# Patient Record
Sex: FEMALE | Race: WHITE | NOT HISPANIC OR LATINO | Employment: FULL TIME | URBAN - NONMETROPOLITAN AREA
[De-identification: names, ages, dates, MRNs, and addresses within clinical notes are randomized per-mention and may not be internally consistent; named-entity substitution may affect disease eponyms.]

---

## 2017-01-18 ENCOUNTER — OFFICE VISIT (OUTPATIENT)
Dept: FAMILY MEDICINE CLINIC | Facility: CLINIC | Age: 57
End: 2017-01-18

## 2017-01-18 DIAGNOSIS — I25.810 CORONARY ARTERY DISEASE INVOLVING CORONARY BYPASS GRAFT OF NATIVE HEART WITHOUT ANGINA PECTORIS: ICD-10-CM

## 2017-01-18 DIAGNOSIS — E11.8 TYPE 2 DIABETES MELLITUS WITH COMPLICATION, WITHOUT LONG-TERM CURRENT USE OF INSULIN (HCC): Primary | Chronic | ICD-10-CM

## 2017-01-18 DIAGNOSIS — R74.8 ELEVATED LIVER ENZYMES: ICD-10-CM

## 2017-01-18 DIAGNOSIS — K20.90 ESOPHAGITIS: ICD-10-CM

## 2017-01-18 DIAGNOSIS — I10 ESSENTIAL HYPERTENSION: Chronic | ICD-10-CM

## 2017-01-18 DIAGNOSIS — M19.90 OSTEOARTHRITIS, UNSPECIFIED OSTEOARTHRITIS TYPE, UNSPECIFIED SITE: Chronic | ICD-10-CM

## 2017-01-18 DIAGNOSIS — E78.5 DYSLIPIDEMIA: ICD-10-CM

## 2017-01-18 PROCEDURE — 99203 OFFICE O/P NEW LOW 30 MIN: CPT | Performed by: NURSE PRACTITIONER

## 2017-01-18 PROCEDURE — 90686 IIV4 VACC NO PRSV 0.5 ML IM: CPT | Performed by: NURSE PRACTITIONER

## 2017-01-18 PROCEDURE — 90471 IMMUNIZATION ADMIN: CPT | Performed by: NURSE PRACTITIONER

## 2017-01-18 RX ORDER — BUPROPION HYDROCHLORIDE 100 MG/1
100 TABLET ORAL DAILY
Qty: 30 TABLET | Refills: 0 | COMMUNITY
Start: 2017-01-18 | End: 2017-02-17

## 2017-01-18 RX ORDER — SUCRALFATE 1 G/1
1 TABLET ORAL 4 TIMES DAILY
COMMUNITY
End: 2017-05-24

## 2017-01-18 RX ORDER — VENLAFAXINE HYDROCHLORIDE 37.5 MG/1
37.5 CAPSULE, EXTENDED RELEASE ORAL DAILY
Qty: 30 CAPSULE | Refills: 0 | Status: SHIPPED | OUTPATIENT
Start: 2017-01-18 | End: 2017-04-28 | Stop reason: SDUPTHER

## 2017-01-18 RX ORDER — DULOXETIN HYDROCHLORIDE 30 MG/1
30 CAPSULE, DELAYED RELEASE ORAL DAILY
COMMUNITY
End: 2017-03-21

## 2017-01-18 RX ORDER — BUPROPION HYDROCHLORIDE 100 MG/1
100 TABLET ORAL 2 TIMES DAILY
COMMUNITY
End: 2017-01-18 | Stop reason: SDUPTHER

## 2017-01-18 NOTE — MR AVS SNAPSHOT
Monisha Gardner   1/18/2017 2:00 PM   Office Visit    Dept Phone:  [phone]   Encounter #:  [id number]    Provider:  TRUONG Torres   Department:  [facility]                Your Full Care Plan              Today's Medication Changes          These changes are accurate as of: 1/18/17  3:04 PM.  If you have any questions, ask your nurse or doctor.               New Medication(s)Ordered:     venlafaxine XR 37.5 MG 24 hr capsule   Commonly known as:  EFFEXOR XR   Take 1 capsule by mouth Daily for 30 days.   Replaces:  venlafaxine 75 MG tablet   Started by:  TRUONG Torres         Medication(s)that have changed:     buPROPion 100 MG tablet   Commonly known as:  WELLBUTRIN   Take 1 tablet by mouth Daily for 30 doses.   What changed:  when to take this   Changed by:  TRUONG Torres         Stop taking medication(s)listed here:     acetaminophen-codeine 300-30 MG per tablet   Commonly known as:  TYLENOL #3   Stopped by:  TRUONG Torres           JANUMET  MG per tablet   Generic drug:  sitaGLIPtin-metFORMIN   Stopped by:  TRUONG Torres           meloxicam 7.5 MG tablet   Commonly known as:  MOBIC   Stopped by:  TRUONG Torres           venlafaxine 75 MG tablet   Commonly known as:  EFFEXOR   Replaced by:  venlafaxine XR 37.5 MG 24 hr capsule   Stopped by:  TRUONG Torres                Where to Get Your Medications      [facility] [address] [facility] [address] [place] [address] [phone] [facility] [phone] [address] [address]     Phone:  [phone]     venlafaxine XR 37.5 MG 24 hr capsule         Information about where to get these medications is not yet available     ! Ask your nurse or doctor about these medications     buPROPion 100 MG tablet                  Your Updated Medication List          This list  is accurate as of: 1/18/17  3:04 PM.  Always use your most recent med list.                * aspirin 325 MG tablet       * aspirin  MG tablet   take 1 tablet by mouth once daily       buPROPion 100 MG tablet   Commonly known as:  WELLBUTRIN   Take 1 tablet by mouth Daily for 30 doses.       clopidogrel 75 MG tablet   Commonly known as:  PLAVIX   Take 1 tablet by mouth daily.       DULoxetine 30 MG capsule   Commonly known as:  CYMBALTA       esomeprazole 40 MG capsule   Commonly known as:  nexIUM       estradiol 0.075 MG/24HR patch   Commonly known as:  CLIMARA       FARXIGA 5 MG tablet tablet   Generic drug:  dapagliflozin       losartan 50 MG tablet   Commonly known as:  COZAAR   Take 1 tablet by mouth 2 (Two) Times a Day.       metoprolol tartrate 50 MG tablet   Commonly known as:  LOPRESSOR   Take 1 tablet by mouth 2 (two) times a day.       nitroglycerin 0.4 MG SL tablet   Commonly known as:  NITROSTAT   Place 1 tablet under the tongue Every 5 (Five) Minutes As Needed for chest pain.       pitavastatin calcium 2 MG tablet tablet   Commonly known as:  LIVALO       sucralfate 1 G tablet   Commonly known as:  CARAFATE       venlafaxine XR 37.5 MG 24 hr capsule   Commonly known as:  EFFEXOR XR   Take 1 capsule by mouth Daily for 30 days.       VICTOZA 18 MG/3ML solution pen-injector   Generic drug:  Liraglutide       * Notice:  This list has 2 medication(s) that are the same as other medications prescribed for you. Read the directions carefully, and ask your doctor or other care provider to review them with you.            We Performed the Following     Flu Vaccine Greater Than or Equal To 2yo Preservative Free IM       Instructions    Type 2 Diabetes Mellitus, Adult  Type 2 diabetes mellitus, often simply referred to as type 2 diabetes, is a long-lasting (chronic) disease. In type 2 diabetes, the pancreas does not make enough insulin (a hormone), the cells are less responsive to the insulin that is made  (insulin resistance), or both. Normally, insulin moves sugars from food into the tissue cells. The tissue cells use the sugars for energy. The lack of insulin or the lack of normal response to insulin causes excess sugars to build up in the blood instead of going into the tissue cells. As a result, high blood sugar (hyperglycemia) develops. The effect of high sugar (glucose) levels can cause many complications.   Type 2 diabetes was also previously called adult-onset diabetes, but it can occur at any age.     RISK FACTORS   A person is predisposed to developing type 2 diabetes if someone in the family has the disease and also has one or more of the following primary risk factors:  · Weight gain, or being overweight or obese.  · An inactive lifestyle.  · A history of consistently eating high-calorie foods.  Maintaining a normal weight and regular physical activity can reduce the chance of developing type 2 diabetes.  SYMPTOMS   A person with type 2 diabetes may not show symptoms initially. The symptoms of type 2 diabetes appear slowly. The symptoms include:  · Increased thirst (polydipsia).  · Increased urination (polyuria).  · Increased urination during the night (nocturia).  · Sudden or unexplained weight changes.  · Frequent, recurring infections.  · Tiredness (fatigue).  · Weakness.  · Vision changes, such as blurred vision.  · Fruity smell to your breath.  · Abdominal pain.  · Nausea or vomiting.  · Cuts or bruises which are slow to heal.  · Tingling or numbness in the hands or feet.  · An open skin wound (ulcer).  DIAGNOSIS  Type 2 diabetes is frequently not diagnosed until complications of diabetes are present. Type 2 diabetes is diagnosed when symptoms or complications are present and when blood glucose levels are increased. Your blood glucose level may be checked by one or more of the following blood tests:  · A fasting blood glucose test. You will not be allowed to eat for at least 8 hours before a blood  sample is taken.  · A random blood glucose test. Your blood glucose is checked at any time of the day regardless of when you ate.  · A hemoglobin A1c blood glucose test. A hemoglobin A1c test provides information about blood glucose control over the previous 3 months.  · An oral glucose tolerance test (OGTT). Your blood glucose is measured after you have not eaten (fasted) for 2 hours and then after you drink a glucose-containing beverage.  TREATMENT   · You may need to take insulin or diabetes medicine daily to keep blood glucose levels in the desired range.  · If you use insulin, you may need to adjust the dosage depending on the carbohydrates that you eat with each meal or snack.  · Lifestyle changes are recommended as part of your treatment. These may include:    Following an individualized diet plan developed by a nutritionist or dietitian.    Exercising daily.  Your health care providers will set individualized treatment goals for you based on your age, your medicines, how long you have had diabetes, and any other medical conditions you have. Generally, the goal of treatment is to maintain the following blood glucose levels:  · Before meals (preprandial):  mg/dL.  · After meals (postprandial): below 180 mg/dL.  · A1c: less than 6.5-7%.  HOME CARE INSTRUCTIONS   · Have your hemoglobin A1c level checked twice a year.  · Perform daily blood glucose monitoring as directed by your health care provider.  · Monitor urine ketones when you are ill and as directed by your health care provider.  · Take your diabetes medicine or insulin as directed by your health care provider to maintain your blood glucose levels in the desired range.    Never run out of diabetes medicine or insulin. It is needed every day.    If you are using insulin, you may need to adjust the amount of insulin given based on your intake of carbohydrates. Carbohydrates can raise blood glucose levels but need to be included in your diet.  Carbohydrates provide vitamins, minerals, and fiber which are an essential part of a healthy diet. Carbohydrates are found in fruits, vegetables, whole grains, dairy products, legumes, and foods containing added sugars.  · Eat healthy foods. You should make an appointment to see a registered dietitian to help you create an eating plan that is right for you.  · Lose weight if you are overweight.  · Carry a medical alert card or wear your medical alert jewelry.  · Carry a 15-gram carbohydrate snack with you at all times to treat low blood glucose (hypoglycemia). Some examples of 15-gram carbohydrate snacks include:    Glucose tablets, 3 or 4.    Glucose gel, 15-gram tube.    Raisins, 2 tablespoons (24 grams).    Jelly beans, 6.    Animal crackers, 8.    Regular pop, 4 ounces (120 mL).    Gummy treats, 9.  · Recognize hypoglycemia. Hypoglycemia occurs with blood glucose levels of 70 mg/dL and below. The risk for hypoglycemia increases when fasting or skipping meals, during or after intense exercise, and during sleep. Hypoglycemia symptoms can include:    Tremors or shakes.    Decreased ability to concentrate.    Sweating.    Increased heart rate.    Headache.    Dry mouth.    Hunger.    Irritability.    Anxiety.    Restless sleep.    Altered speech or coordination.    Confusion.  · Treat hypoglycemia promptly. If you are alert and able to safely swallow, follow the 15:15 rule:    Take 15-20 grams of rapid-acting glucose or carbohydrate. Rapid-acting options include glucose gel, glucose tablets, or 4 ounces (120 mL) of fruit juice, regular soda, or low-fat milk.    Check your blood glucose level 15 minutes after taking the glucose.    Take 15-20 grams more of glucose if the repeat blood glucose level is still 70 mg/dL or below.    Eat a meal or snack within 1 hour once blood glucose levels return to normal.  · Be alert to feeling very thirsty and urinating more frequently than usual, which are early signs of  hyperglycemia. An early awareness of hyperglycemia allows for prompt treatment. Treat hyperglycemia as directed by your health care provider.  · Engage in at least 150 minutes of moderate-intensity physical activity a week, spread over at least 3 days of the week or as directed by your health care provider. In addition, you should engage in resistance exercise at least 2 times a week or as directed by your health care provider. Try to spend no more than 90 minutes at one time inactive.  · Adjust your medicine and food intake as needed if you start a new exercise or sport.  · Follow your sick-day plan anytime you are unable to eat or drink as usual.  · Do not use any tobacco products including cigarettes, chewing tobacco, or electronic cigarettes. If you need help quitting, ask your health care provider.  · Limit alcohol intake to no more than 1 drink per day for nonpregnant women and 2 drinks per day for men. You should drink alcohol only when you are also eating food. Talk with your health care provider whether alcohol is safe for you. Tell your health care provider if you drink alcohol several times a week.  · Keep all follow-up visits as directed by your health care provider. This is important.  · Schedule an eye exam soon after the diagnosis of type 2 diabetes and then annually.  · Perform daily skin and foot care. Examine your skin and feet daily for cuts, bruises, redness, nail problems, bleeding, blisters, or sores. A foot exam by a health care provider should be done annually.  · Brush your teeth and gums at least twice a day and floss at least once a day. Follow up with your dentist regularly.  · Share your diabetes management plan with your workplace or school.  · Keep your immunizations up to date. It is recommended that you receive a flu (influenza) vaccine every year. It is also recommended that you receive a pneumonia (pneumococcal) vaccine. If you are 65 years of age or older and have never received a  pneumonia vaccine, this vaccine may be given as a series of two separate shots. Ask your health care provider which additional vaccines may be recommended.  · Learn to manage stress.  · Obtain ongoing diabetes education and support as needed.  · Participate in or seek rehabilitation as needed to maintain or improve independence and quality of life. Request a physical or occupational therapy referral if you are having foot or hand numbness, or difficulties with grooming, dressing, eating, or physical activity.  SEEK MEDICAL CARE IF:   · You are unable to eat food or drink fluids for more than 6 hours.  · You have nausea and vomiting for more than 6 hours.  · Your blood glucose level is over 240 mg/dL.  · There is a change in mental status.  · You develop an additional serious illness.  · You have diarrhea for more than 6 hours.  · You have been sick or have had a fever for a couple of days and are not getting better.  · You have pain during any physical activity.    SEEK IMMEDIATE MEDICAL CARE IF:  · You have difficulty breathing.  · You have moderate to large ketone levels.     This information is not intended to replace advice given to you by your health care provider. Make sure you discuss any questions you have with your health care provider.     Document Released: 12/18/2006 Document Revised: 09/07/2016 Document Reviewed: 07/16/2013  [employer] Interactive Patient Education ©2016 [employer]  Food Choices for Gastroesophageal Reflux Disease, Adult  When you have gastroesophageal reflux disease (GERD), the foods you eat and your eating habits are very important. Choosing the right foods can help ease your discomfort.   WHAT GUIDELINES DO I NEED TO FOLLOW?   · Choose fruits, vegetables, whole grains, and low-fat dairy products.    · Choose low-fat meat, fish, and poultry.  · Limit fats such as oils, salad dressings, butter, nuts, and avocado.    · Keep a food diary. This helps you identify foods that cause  symptoms.    · Avoid foods that cause symptoms. These may be different for everyone.    · Eat small meals often instead of 3 large meals a day.    · Eat your meals slowly, in a place where you are relaxed.    · Limit fried foods.    · Cook foods using methods other than frying.    · Avoid drinking alcohol.    · Avoid drinking large amounts of liquids with your meals.    · Avoid bending over or lying down until 2-3 hours after eating.    WHAT FOODS ARE NOT RECOMMENDED?   These are some foods and drinks that may make your symptoms worse:  Vegetables  Tomatoes. Tomato juice. Tomato and spaghetti sauce. Chili peppers. Onion and garlic. Horseradish.  Fruits  Oranges, grapefruit, and lemon (fruit and juice).  Meats  High-fat meats, fish, and poultry. This includes hot dogs, ribs, ham, sausage, salami, and villar.  Dairy  Whole milk and chocolate milk. Sour cream. Cream. Butter. Ice cream. Cream cheese.   Drinks  Coffee and tea. Bubbly (carbonated) drinks or energy drinks.  Condiments  Hot sauce. Barbecue sauce.   Sweets/Desserts  Chocolate and cocoa. Donuts. Peppermint and spearmint.  Fats and Oils  High-fat foods. This includes French fries and potato chips.  Other  Vinegar. Strong spices. This includes black pepper, white pepper, red pepper, cayenne, tyson powder, cloves, ginger, and chili powder.  The items listed above may not be a complete list of foods and drinks to avoid. Contact your dietitian for more information.     This information is not intended to replace advice given to you by your health care provider. Make sure you discuss any questions you have with your health care provider.     Document Released: 06/18/2013 Document Revised: 01/08/2016 Document Reviewed: 10/22/2014  "[employer] Interactive Patient Education ©2016 "[employer] Inc.  Gastritis, Adult  Gastritis is soreness and puffiness (inflammation) of the lining of the stomach. If you do not get help, gastritis can cause bleeding and sores (ulcers) in the  stomach.  HOME CARE   · Only take medicine as told by your doctor.  · If you were given antibiotic medicines, take them as told. Finish the medicines even if you start to feel better.  · Drink enough fluids to keep your pee (urine) clear or pale yellow.  · Avoid foods and drinks that make your problems worse. Foods you may want to avoid include:    Caffeine or alcohol.    Chocolate.    Mint.    Garlic and onions.    Spicy foods.    Citrus fruits, including oranges, letty, or limes.    Food containing tomatoes, including sauce, chili, salsa, and pizza.    Fried and fatty foods.  · Eat small meals throughout the day instead of large meals.  GET HELP RIGHT AWAY IF:   · You have black or dark red poop (stools).  · You throw up (vomit) blood. It may look like coffee grounds.  · You cannot keep fluids down.  · Your belly (abdominal) pain gets worse.  · You have a fever.  · You do not feel better after 1 week.  · You have any other questions or concerns.  MAKE SURE YOU:   · Understand these instructions.  · Will watch your condition.  · Will get help right away if you are not doing well or get worse.     This information is not intended to replace advice given to you by your health care provider. Make sure you discuss any questions you have with your health care provider.     Document Released: 06/05/2009 Document Revised: 03/11/2013 Document Reviewed: 01/30/2013  [employer] Interactive Patient Education ©2016 [employer]       Patient Instructions History      Upcoming Appointments     Visit Type Date Time Department    OFFICE VISIT 1/18/2017  2:00 PM [facility]    LAB 1/23/2017  8:50 AM [facility]    OFFICE VISIT 2/1/2017  5:00 PM [facility]      MyChart Signup     Our records indicate that you have declined [employer] signup. If you would like to sign up for [employer], please email [email or web address] or call [phone] to obtain an activation code.             Other Info  "from Your Visit           Your Appointments     Jan 23, 2017  8:50 AM EST   Lab with [facility] (--)    [address] [facility] [address]   [phone]            Feb 01, 2017  5:00 PM EST   Office Visit with TRUONG Torres   [facility] (--)    [address] [place] [id number]   [phone]           Arrive 15 minutes prior to appointment.              Allergies     Zofran [Ondansetron Hcl]  Shortness Of Breath    Ciprofloxacin      Penicillins        Vital Signs     Blood Pressure Pulse Temperature Height Weight Oxygen Saturation    140/80 (BP Location: Left arm, Patient Position: Sitting, Cuff Size: Large Adult) 66 98.3 °F (36.8 °C) (Oral) 64\" (162.6 cm) 183 lb (83 kg) 98%    Body Mass Index Smoking Status                31.41 kg/m2 Never Smoker          Immunizations Administered     Name Date    Influenza (IM) Preservative Free         "

## 2017-01-18 NOTE — PATIENT INSTRUCTIONS
Type 2 Diabetes Mellitus, Adult  Type 2 diabetes mellitus, often simply referred to as type 2 diabetes, is a long-lasting (chronic) disease. In type 2 diabetes, the pancreas does not make enough insulin (a hormone), the cells are less responsive to the insulin that is made (insulin resistance), or both. Normally, insulin moves sugars from food into the tissue cells. The tissue cells use the sugars for energy. The lack of insulin or the lack of normal response to insulin causes excess sugars to build up in the blood instead of going into the tissue cells. As a result, high blood sugar (hyperglycemia) develops. The effect of high sugar (glucose) levels can cause many complications.   Type 2 diabetes was also previously called adult-onset diabetes, but it can occur at any age.     RISK FACTORS   A person is predisposed to developing type 2 diabetes if someone in the family has the disease and also has one or more of the following primary risk factors:  · Weight gain, or being overweight or obese.  · An inactive lifestyle.  · A history of consistently eating high-calorie foods.  Maintaining a normal weight and regular physical activity can reduce the chance of developing type 2 diabetes.  SYMPTOMS   A person with type 2 diabetes may not show symptoms initially. The symptoms of type 2 diabetes appear slowly. The symptoms include:  · Increased thirst (polydipsia).  · Increased urination (polyuria).  · Increased urination during the night (nocturia).  · Sudden or unexplained weight changes.  · Frequent, recurring infections.  · Tiredness (fatigue).  · Weakness.  · Vision changes, such as blurred vision.  · Fruity smell to your breath.  · Abdominal pain.  · Nausea or vomiting.  · Cuts or bruises which are slow to heal.  · Tingling or numbness in the hands or feet.  · An open skin wound (ulcer).  DIAGNOSIS  Type 2 diabetes is frequently not diagnosed until complications of diabetes are present. Type 2 diabetes is diagnosed  when symptoms or complications are present and when blood glucose levels are increased. Your blood glucose level may be checked by one or more of the following blood tests:  · A fasting blood glucose test. You will not be allowed to eat for at least 8 hours before a blood sample is taken.  · A random blood glucose test. Your blood glucose is checked at any time of the day regardless of when you ate.  · A hemoglobin A1c blood glucose test. A hemoglobin A1c test provides information about blood glucose control over the previous 3 months.  · An oral glucose tolerance test (OGTT). Your blood glucose is measured after you have not eaten (fasted) for 2 hours and then after you drink a glucose-containing beverage.  TREATMENT   · You may need to take insulin or diabetes medicine daily to keep blood glucose levels in the desired range.  · If you use insulin, you may need to adjust the dosage depending on the carbohydrates that you eat with each meal or snack.  · Lifestyle changes are recommended as part of your treatment. These may include:    Following an individualized diet plan developed by a nutritionist or dietitian.    Exercising daily.  Your health care providers will set individualized treatment goals for you based on your age, your medicines, how long you have had diabetes, and any other medical conditions you have. Generally, the goal of treatment is to maintain the following blood glucose levels:  · Before meals (preprandial):  mg/dL.  · After meals (postprandial): below 180 mg/dL.  · A1c: less than 6.5-7%.  HOME CARE INSTRUCTIONS   · Have your hemoglobin A1c level checked twice a year.  · Perform daily blood glucose monitoring as directed by your health care provider.  · Monitor urine ketones when you are ill and as directed by your health care provider.  · Take your diabetes medicine or insulin as directed by your health care provider to maintain your blood glucose levels in the desired range.    Never run  out of diabetes medicine or insulin. It is needed every day.    If you are using insulin, you may need to adjust the amount of insulin given based on your intake of carbohydrates. Carbohydrates can raise blood glucose levels but need to be included in your diet. Carbohydrates provide vitamins, minerals, and fiber which are an essential part of a healthy diet. Carbohydrates are found in fruits, vegetables, whole grains, dairy products, legumes, and foods containing added sugars.  · Eat healthy foods. You should make an appointment to see a registered dietitian to help you create an eating plan that is right for you.  · Lose weight if you are overweight.  · Carry a medical alert card or wear your medical alert jewelry.  · Carry a 15-gram carbohydrate snack with you at all times to treat low blood glucose (hypoglycemia). Some examples of 15-gram carbohydrate snacks include:    Glucose tablets, 3 or 4.    Glucose gel, 15-gram tube.    Raisins, 2 tablespoons (24 grams).    Jelly beans, 6.    Animal crackers, 8.    Regular pop, 4 ounces (120 mL).    Gummy treats, 9.  · Recognize hypoglycemia. Hypoglycemia occurs with blood glucose levels of 70 mg/dL and below. The risk for hypoglycemia increases when fasting or skipping meals, during or after intense exercise, and during sleep. Hypoglycemia symptoms can include:    Tremors or shakes.    Decreased ability to concentrate.    Sweating.    Increased heart rate.    Headache.    Dry mouth.    Hunger.    Irritability.    Anxiety.    Restless sleep.    Altered speech or coordination.    Confusion.  · Treat hypoglycemia promptly. If you are alert and able to safely swallow, follow the 15:15 rule:    Take 15-20 grams of rapid-acting glucose or carbohydrate. Rapid-acting options include glucose gel, glucose tablets, or 4 ounces (120 mL) of fruit juice, regular soda, or low-fat milk.    Check your blood glucose level 15 minutes after taking the glucose.    Take 15-20 grams more of  glucose if the repeat blood glucose level is still 70 mg/dL or below.    Eat a meal or snack within 1 hour once blood glucose levels return to normal.  · Be alert to feeling very thirsty and urinating more frequently than usual, which are early signs of hyperglycemia. An early awareness of hyperglycemia allows for prompt treatment. Treat hyperglycemia as directed by your health care provider.  · Engage in at least 150 minutes of moderate-intensity physical activity a week, spread over at least 3 days of the week or as directed by your health care provider. In addition, you should engage in resistance exercise at least 2 times a week or as directed by your health care provider. Try to spend no more than 90 minutes at one time inactive.  · Adjust your medicine and food intake as needed if you start a new exercise or sport.  · Follow your sick-day plan anytime you are unable to eat or drink as usual.  · Do not use any tobacco products including cigarettes, chewing tobacco, or electronic cigarettes. If you need help quitting, ask your health care provider.  · Limit alcohol intake to no more than 1 drink per day for nonpregnant women and 2 drinks per day for men. You should drink alcohol only when you are also eating food. Talk with your health care provider whether alcohol is safe for you. Tell your health care provider if you drink alcohol several times a week.  · Keep all follow-up visits as directed by your health care provider. This is important.  · Schedule an eye exam soon after the diagnosis of type 2 diabetes and then annually.  · Perform daily skin and foot care. Examine your skin and feet daily for cuts, bruises, redness, nail problems, bleeding, blisters, or sores. A foot exam by a health care provider should be done annually.  · Brush your teeth and gums at least twice a day and floss at least once a day. Follow up with your dentist regularly.  · Share your diabetes management plan with your workplace or  school.  · Keep your immunizations up to date. It is recommended that you receive a flu (influenza) vaccine every year. It is also recommended that you receive a pneumonia (pneumococcal) vaccine. If you are 65 years of age or older and have never received a pneumonia vaccine, this vaccine may be given as a series of two separate shots. Ask your health care provider which additional vaccines may be recommended.  · Learn to manage stress.  · Obtain ongoing diabetes education and support as needed.  · Participate in or seek rehabilitation as needed to maintain or improve independence and quality of life. Request a physical or occupational therapy referral if you are having foot or hand numbness, or difficulties with grooming, dressing, eating, or physical activity.  SEEK MEDICAL CARE IF:   · You are unable to eat food or drink fluids for more than 6 hours.  · You have nausea and vomiting for more than 6 hours.  · Your blood glucose level is over 240 mg/dL.  · There is a change in mental status.  · You develop an additional serious illness.  · You have diarrhea for more than 6 hours.  · You have been sick or have had a fever for a couple of days and are not getting better.  · You have pain during any physical activity.    SEEK IMMEDIATE MEDICAL CARE IF:  · You have difficulty breathing.  · You have moderate to large ketone levels.     This information is not intended to replace advice given to you by your health care provider. Make sure you discuss any questions you have with your health care provider.     Document Released: 12/18/2006 Document Revised: 09/07/2016 Document Reviewed: 07/16/2013  [employer] Interactive Patient Education ©2016 [employer]  Food Choices for Gastroesophageal Reflux Disease, Adult  When you have gastroesophageal reflux disease (GERD), the foods you eat and your eating habits are very important. Choosing the right foods can help ease your discomfort.   WHAT GUIDELINES DO I NEED TO FOLLOW?    · Choose fruits, vegetables, whole grains, and low-fat dairy products.    · Choose low-fat meat, fish, and poultry.  · Limit fats such as oils, salad dressings, butter, nuts, and avocado.    · Keep a food diary. This helps you identify foods that cause symptoms.    · Avoid foods that cause symptoms. These may be different for everyone.    · Eat small meals often instead of 3 large meals a day.    · Eat your meals slowly, in a place where you are relaxed.    · Limit fried foods.    · Cook foods using methods other than frying.    · Avoid drinking alcohol.    · Avoid drinking large amounts of liquids with your meals.    · Avoid bending over or lying down until 2-3 hours after eating.    WHAT FOODS ARE NOT RECOMMENDED?   These are some foods and drinks that may make your symptoms worse:  Vegetables  Tomatoes. Tomato juice. Tomato and spaghetti sauce. Chili peppers. Onion and garlic. Horseradish.  Fruits  Oranges, grapefruit, and lemon (fruit and juice).  Meats  High-fat meats, fish, and poultry. This includes hot dogs, ribs, ham, sausage, salami, and villar.  Dairy  Whole milk and chocolate milk. Sour cream. Cream. Butter. Ice cream. Cream cheese.   Drinks  Coffee and tea. Bubbly (carbonated) drinks or energy drinks.  Condiments  Hot sauce. Barbecue sauce.   Sweets/Desserts  Chocolate and cocoa. Donuts. Peppermint and spearmint.  Fats and Oils  High-fat foods. This includes French fries and potato chips.  Other  Vinegar. Strong spices. This includes black pepper, white pepper, red pepper, cayenne, tyson powder, cloves, ginger, and chili powder.  The items listed above may not be a complete list of foods and drinks to avoid. Contact your dietitian for more information.     This information is not intended to replace advice given to you by your health care provider. Make sure you discuss any questions you have with your health care provider.     Document Released: 06/18/2013 Document Revised: 01/08/2016 Document  Reviewed: 10/22/2014  [employer] Interactive Patient Education ©2016 [employer]  Gastritis, Adult  Gastritis is soreness and puffiness (inflammation) of the lining of the stomach. If you do not get help, gastritis can cause bleeding and sores (ulcers) in the stomach.  HOME CARE   · Only take medicine as told by your doctor.  · If you were given antibiotic medicines, take them as told. Finish the medicines even if you start to feel better.  · Drink enough fluids to keep your pee (urine) clear or pale yellow.  · Avoid foods and drinks that make your problems worse. Foods you may want to avoid include:    Caffeine or alcohol.    Chocolate.    Mint.    Garlic and onions.    Spicy foods.    Citrus fruits, including oranges, letty, or limes.    Food containing tomatoes, including sauce, chili, salsa, and pizza.    Fried and fatty foods.  · Eat small meals throughout the day instead of large meals.  GET HELP RIGHT AWAY IF:   · You have black or dark red poop (stools).  · You throw up (vomit) blood. It may look like coffee grounds.  · You cannot keep fluids down.  · Your belly (abdominal) pain gets worse.  · You have a fever.  · You do not feel better after 1 week.  · You have any other questions or concerns.  MAKE SURE YOU:   · Understand these instructions.  · Will watch your condition.  · Will get help right away if you are not doing well or get worse.     This information is not intended to replace advice given to you by your health care provider. Make sure you discuss any questions you have with your health care provider.     Document Released: 06/05/2009 Document Revised: 03/11/2013 Document Reviewed: 01/30/2013  [employer] Interactive Patient Education ©2016 [employer]

## 2017-01-18 NOTE — PROGRESS NOTES
HPI Comments: Monisha presents to the clinic today to reestablish care. Monisha has a h/o DM, type 2, HTN, Dyslipidemia,  and CAD with Previous MI  Stent.   She was recently hospitalized at [facility] on 12/27/2016  for gastric pain , cardiac etiology was ruled out.  Her hospital records were requested and reviewed by me  with her discharge diagnosis of Chest Pain atypical, Esophagitis and Elevated liver functions. She was discharged on Carafate 1 Gr four times daily.        Diabetes   She has type 2 diabetes mellitus. Her disease course has been fluctuating. Associated symptoms include fatigue, polydipsia, polyphagia, polyuria and visual change. Pertinent negatives for diabetes include no chest pain, no weakness and no weight loss. Diabetic complications include peripheral neuropathy. Risk factors for coronary artery disease include diabetes mellitus, dyslipidemia, family history, hypertension, obesity, post-menopausal, sedentary lifestyle and stress. Current diabetic treatment includes oral agent (dual therapy). She is compliant with treatment most of the time. She is following a generally healthy diet. She has had a previous visit with a dietitian. She rarely participates in exercise. An ACE inhibitor/angiotensin II receptor blocker is not being taken. Eye exam is current.   Hypertension   The current episode started more than 1 year ago. Pertinent negatives include no chest pain, palpitations, peripheral edema or shortness of breath. Risk factors for coronary artery disease include diabetes mellitus, dyslipidemia, family history, obesity, post-menopausal state, sedentary lifestyle and stress. Past treatments include angiotensin blockers and beta blockers.        The following portions of the patient's history were reviewed and updated as appropriate: allergies, current medications, past family history, past medical history, past social history, past surgical history and problem list.    Review  of Systems   Constitutional: Positive for fatigue and unexpected weight change. Negative for activity change, appetite change, chills, fever and weight loss.   HENT: Negative.    Eyes: Positive for visual disturbance.   Respiratory: Negative for cough, chest tightness, shortness of breath and wheezing.    Cardiovascular: Negative for chest pain, palpitations and leg swelling.   Gastrointestinal: Negative for abdominal pain, constipation, diarrhea, nausea and vomiting.   Endocrine: Positive for heat intolerance, polydipsia, polyphagia and polyuria. Negative for cold intolerance.   Skin: Negative for color change and rash.   Neurological: Negative for weakness and light-headedness.   Hematological: Negative for adenopathy.   Psychiatric/Behavioral: Negative for decreased concentration and suicidal ideas.   All other systems reviewed and are negative.    Objective   Physical Exam   Constitutional: She is oriented to person, place, and time. She appears well-developed and well-nourished. No distress.   HENT:   Head: Normocephalic.   Nose: Nose normal.   Mouth/Throat: Oropharynx is clear and moist. No oropharyngeal exudate.   Eyes: Conjunctivae are normal. Pupils are equal, round, and reactive to light. Right eye exhibits no discharge. Left eye exhibits no discharge. No scleral icterus.   Neck: Neck supple. No JVD present. No rigidity. No thyromegaly present.   Cardiovascular: Normal rate, regular rhythm, normal heart sounds and intact distal pulses.  Exam reveals no friction rub.    No murmur heard.  Pulmonary/Chest: Effort normal. No respiratory distress. She has no decreased breath sounds. She has no wheezes. She has no rhonchi. She has no rales.   Abdominal: Soft. Bowel sounds are normal. She exhibits no distension. There is tenderness in the epigastric area. There is no rigidity, no rebound, no guarding and no CVA tenderness.   Musculoskeletal: She exhibits no edema.   Lymphadenopathy:     She has no cervical  "adenopathy.   Neurological: She is alert and oriented to person, place, and time.   Skin: Skin is warm and dry. No rash noted. No erythema.   Psychiatric: She has a normal mood and affect. Her behavior is normal. Judgment and thought content normal.   Nursing note and vitals reviewed.      Assessment/Plan   Diagnoses and all orders for this visit:    Type 2 diabetes mellitus with complication, without long-term current use of insulin  Comments:  Findings and recommendations discussed with Monisha. Will order labs to assess and have her to f/u in two weeks to review.   Orders:  -     Comprehensive Metabolic Panel; Future  -     TSH; Future  -     Hemoglobin A1c; Future  -     Vitamin D 25 Hydroxy; Future  -     MicroAlbumin, Urine, Random; Future    Essential hypertension  Comments:  Stable; Will continue current regimen; Reinforced lifestyle and risk reduction modifications.     Dyslipidemia  Comments:  Has been intolerate to various statins including Crestor, Lipitor and Zocor. Provided samples of Livalo 2 mg for her to try due to multiple risk factors.   Orders:  -     Lipid Panel; Future    Coronary artery disease involving coronary bypass graft of native heart without angina pectoris  Comments:  Stable; Encouraged f/u with Dr Watts.    Osteoarthritis, unspecified osteoarthritis type, unspecified site  Comments:  Stable; Reinforced lifestyle and risk reduction modifications including weight loss.  Orders:  -     CBC & Differential; Future  -     Uric Acid; Future    Elevated liver enzymes  Comments:  Labs ordered including Hepatitis Panel. She reports she has been told she has a \"Fatty Liver\".   Orders:  -     Hepatitis C Antibody; Future  -     Hepatitis B Core Antibody, Total; Future    Esophagitis  Comments:  Will continue Carafate for now but will consider GI consult.   Orders:  -     Helicobacter Pylori, IgA IgG IgM; Future    Other orders  -     buPROPion (WELLBUTRIN) 100 MG tablet; Take 1 tablet by mouth " Daily for 30 doses.  -     venlafaxine XR (EFFEXOR XR) 37.5 MG 24 hr capsule; Take 1 capsule by mouth Daily for 30 days.  -     Flu Vaccine Greater Than or Equal To 2yo Preservative Free IM    Findings and recommendations discussed with Monisha. Reinforced lifestyle and risk reduction modifications including nutrition, activity and weight loss. She does report she and her Mother are beginning a walking program. She expressed concerned over her recent weight gain and would like to reduce her Effexor and Wellbutrin at this time which we did.   Labs ordered and she will f/u in two weeks to review and evaluate.

## 2017-01-19 VITALS
BODY MASS INDEX: 31.24 KG/M2 | HEIGHT: 64 IN | WEIGHT: 183 LBS | OXYGEN SATURATION: 98 % | HEART RATE: 66 BPM | SYSTOLIC BLOOD PRESSURE: 140 MMHG | DIASTOLIC BLOOD PRESSURE: 80 MMHG | TEMPERATURE: 98.3 F | RESPIRATION RATE: 14 BRPM

## 2017-01-19 PROBLEM — R74.8 ELEVATED LIVER ENZYMES: Status: ACTIVE | Noted: 2017-01-19

## 2017-01-19 PROBLEM — K20.90 ESOPHAGITIS: Status: ACTIVE | Noted: 2017-01-19

## 2017-01-23 ENCOUNTER — LAB (OUTPATIENT)
Dept: FAMILY MEDICINE CLINIC | Facility: CLINIC | Age: 57
End: 2017-01-23

## 2017-01-23 DIAGNOSIS — M19.90 OSTEOARTHRITIS, UNSPECIFIED OSTEOARTHRITIS TYPE, UNSPECIFIED SITE: ICD-10-CM

## 2017-01-23 DIAGNOSIS — E78.5 DYSLIPIDEMIA: ICD-10-CM

## 2017-01-23 DIAGNOSIS — E11.8 TYPE 2 DIABETES MELLITUS WITH COMPLICATION, WITHOUT LONG-TERM CURRENT USE OF INSULIN (HCC): ICD-10-CM

## 2017-01-23 DIAGNOSIS — K20.90 ESOPHAGITIS: ICD-10-CM

## 2017-01-23 DIAGNOSIS — R74.8 ELEVATED LIVER ENZYMES: ICD-10-CM

## 2017-01-23 LAB
25(OH)D3 SERPL-MCNC: 18 NG/ML
ALBUMIN SERPL-MCNC: 4.6 G/DL (ref 3.5–5)
ALBUMIN UR-MCNC: 4.9 MG/L
ALBUMIN/GLOB SERPL: 1.5 G/DL (ref 1.5–2.5)
ALP SERPL-CCNC: 118 U/L (ref 46–116)
ALT SERPL W P-5'-P-CCNC: 35 U/L (ref 10–36)
ANION GAP SERPL CALCULATED.3IONS-SCNC: 9.5 MMOL/L (ref 3.6–11.2)
AST SERPL-CCNC: 38 U/L (ref 10–30)
BASOPHILS # BLD AUTO: 0.1 10*3/MM3 (ref 0–0.3)
BASOPHILS NFR BLD AUTO: 1.1 % (ref 0–2)
BILIRUB SERPL-MCNC: 0.3 MG/DL (ref 0.2–1.8)
BUN BLD-MCNC: 10 MG/DL (ref 7–21)
BUN/CREAT SERPL: 10.2 (ref 7–25)
CALCIUM SPEC-SCNC: 9.8 MG/DL (ref 7.7–10)
CHLORIDE SERPL-SCNC: 104 MMOL/L (ref 99–112)
CHOLEST SERPL-MCNC: 254 MG/DL (ref 0–200)
CO2 SERPL-SCNC: 30.5 MMOL/L (ref 24.3–31.9)
CREAT BLD-MCNC: 0.98 MG/DL (ref 0.43–1.29)
DEPRECATED RDW RBC AUTO: 44.9 FL (ref 37–54)
EOSINOPHIL # BLD AUTO: 0.18 10*3/MM3 (ref 0–0.7)
EOSINOPHIL NFR BLD AUTO: 2 % (ref 0–5)
ERYTHROCYTE [DISTWIDTH] IN BLOOD BY AUTOMATED COUNT: 15.5 % (ref 11.5–14.5)
GFR SERPL CREATININE-BSD FRML MDRD: 59 ML/MIN/1.73
GLOBULIN UR ELPH-MCNC: 3 GM/DL
GLUCOSE BLD-MCNC: 158 MG/DL (ref 70–110)
HBA1C MFR BLD: 7.3 % (ref 4.5–5.7)
HCT VFR BLD AUTO: 45.7 % (ref 37–47)
HCV AB SER DONR QL: NORMAL
HDLC SERPL-MCNC: 46 MG/DL (ref 60–100)
HGB BLD-MCNC: 14.4 G/DL (ref 12–16)
IMM GRANULOCYTES # BLD: 0.05 10*3/MM3 (ref 0–0.03)
IMM GRANULOCYTES NFR BLD: 0.5 % (ref 0–0.5)
LDLC SERPL CALC-MCNC: ABNORMAL MG/DL (ref 0–100)
LDLC/HDLC SERPL: ABNORMAL {RATIO}
LYMPHOCYTES # BLD AUTO: 3.27 10*3/MM3 (ref 1–3)
LYMPHOCYTES NFR BLD AUTO: 35.6 % (ref 21–51)
MCH RBC QN AUTO: 25.3 PG (ref 27–33)
MCHC RBC AUTO-ENTMCNC: 31.5 G/DL (ref 33–37)
MCV RBC AUTO: 80.3 FL (ref 80–94)
MONOCYTES # BLD AUTO: 0.6 10*3/MM3 (ref 0.1–0.9)
MONOCYTES NFR BLD AUTO: 6.5 % (ref 0–10)
NEUTROPHILS # BLD AUTO: 4.98 10*3/MM3 (ref 1.4–6.5)
NEUTROPHILS NFR BLD AUTO: 54.3 % (ref 30–70)
NRBC BLD MANUAL-RTO: 0 /100 WBC (ref 0–0)
OSMOLALITY SERPL CALC.SUM OF ELEC: 289.2 MOSM/KG (ref 273–305)
PLATELET # BLD AUTO: 367 10*3/MM3 (ref 130–400)
PMV BLD AUTO: 9.9 FL (ref 6–10)
POTASSIUM BLD-SCNC: 4.1 MMOL/L (ref 3.5–5.3)
PROT SERPL-MCNC: 7.6 G/DL (ref 6–8)
RBC # BLD AUTO: 5.69 10*6/MM3 (ref 4.2–5.4)
SODIUM BLD-SCNC: 144 MMOL/L (ref 135–153)
TRIGL SERPL-MCNC: 705 MG/DL (ref 0–150)
TSH SERPL DL<=0.05 MIU/L-ACNC: 1.16 MIU/ML (ref 0.55–4.78)
URATE SERPL-MCNC: 4.9 MG/DL (ref 2.4–5.7)
VLDLC SERPL-MCNC: ABNORMAL MG/DL
WBC NRBC COR # BLD: 9.18 10*3/MM3 (ref 4.5–12.5)

## 2017-01-23 PROCEDURE — 86704 HEP B CORE ANTIBODY TOTAL: CPT | Performed by: NURSE PRACTITIONER

## 2017-01-23 PROCEDURE — 84443 ASSAY THYROID STIM HORMONE: CPT | Performed by: NURSE PRACTITIONER

## 2017-01-23 PROCEDURE — 80061 LIPID PANEL: CPT | Performed by: NURSE PRACTITIONER

## 2017-01-23 PROCEDURE — 84550 ASSAY OF BLOOD/URIC ACID: CPT | Performed by: NURSE PRACTITIONER

## 2017-01-23 PROCEDURE — 36415 COLL VENOUS BLD VENIPUNCTURE: CPT

## 2017-01-23 PROCEDURE — 82043 UR ALBUMIN QUANTITATIVE: CPT | Performed by: NURSE PRACTITIONER

## 2017-01-23 PROCEDURE — 80053 COMPREHEN METABOLIC PANEL: CPT | Performed by: NURSE PRACTITIONER

## 2017-01-23 PROCEDURE — 82306 VITAMIN D 25 HYDROXY: CPT | Performed by: NURSE PRACTITIONER

## 2017-01-23 PROCEDURE — 83036 HEMOGLOBIN GLYCOSYLATED A1C: CPT | Performed by: NURSE PRACTITIONER

## 2017-01-23 PROCEDURE — 85025 COMPLETE CBC W/AUTO DIFF WBC: CPT | Performed by: NURSE PRACTITIONER

## 2017-01-23 PROCEDURE — 86803 HEPATITIS C AB TEST: CPT | Performed by: NURSE PRACTITIONER

## 2017-01-24 LAB
H PYLORI IGA SER IA-ACNC: 32.8 UNITS (ref 0–8.9)
H PYLORI IGG SER IA-ACNC: >8 U/ML (ref 0–0.8)
H PYLORI IGM SER-ACNC: <9 UNITS (ref 0–8.9)
HBV CORE AB SER DONR QL IA: NEGATIVE

## 2017-01-24 RX ORDER — BLOOD SUGAR DIAGNOSTIC
STRIP MISCELLANEOUS
Refills: 5 | OUTPATIENT
Start: 2017-01-24

## 2017-01-31 RX ORDER — CLOPIDOGREL BISULFATE 75 MG/1
75 TABLET ORAL DAILY
Qty: 30 TABLET | Refills: 6 | Status: SHIPPED | OUTPATIENT
Start: 2017-01-31 | End: 2017-10-13 | Stop reason: SDUPTHER

## 2017-02-01 ENCOUNTER — OFFICE VISIT (OUTPATIENT)
Dept: FAMILY MEDICINE CLINIC | Facility: CLINIC | Age: 57
End: 2017-02-01

## 2017-02-01 VITALS
DIASTOLIC BLOOD PRESSURE: 70 MMHG | HEIGHT: 64 IN | SYSTOLIC BLOOD PRESSURE: 140 MMHG | HEART RATE: 72 BPM | TEMPERATURE: 98.6 F | WEIGHT: 184 LBS | BODY MASS INDEX: 31.41 KG/M2 | OXYGEN SATURATION: 97 %

## 2017-02-01 DIAGNOSIS — Z87.19 HX OF PANCREATITIS: ICD-10-CM

## 2017-02-01 DIAGNOSIS — R30.0 DYSURIA: ICD-10-CM

## 2017-02-01 DIAGNOSIS — E55.9 VITAMIN D DEFICIENCY: ICD-10-CM

## 2017-02-01 DIAGNOSIS — E11.8 TYPE 2 DIABETES MELLITUS WITH COMPLICATION, WITHOUT LONG-TERM CURRENT USE OF INSULIN (HCC): Primary | Chronic | ICD-10-CM

## 2017-02-01 DIAGNOSIS — B96.81 HELICOBACTER POSITIVE GASTRITIS: ICD-10-CM

## 2017-02-01 DIAGNOSIS — I10 ESSENTIAL HYPERTENSION: Chronic | ICD-10-CM

## 2017-02-01 DIAGNOSIS — I25.810 CORONARY ARTERY DISEASE INVOLVING CORONARY BYPASS GRAFT OF NATIVE HEART WITHOUT ANGINA PECTORIS: Chronic | ICD-10-CM

## 2017-02-01 DIAGNOSIS — E78.5 DYSLIPIDEMIA: Chronic | ICD-10-CM

## 2017-02-01 DIAGNOSIS — K29.70 HELICOBACTER POSITIVE GASTRITIS: ICD-10-CM

## 2017-02-01 PROCEDURE — 99214 OFFICE O/P EST MOD 30 MIN: CPT | Performed by: NURSE PRACTITIONER

## 2017-02-01 PROCEDURE — 87086 URINE CULTURE/COLONY COUNT: CPT | Performed by: NURSE PRACTITIONER

## 2017-02-01 RX ORDER — ERGOCALCIFEROL 1.25 MG/1
50000 CAPSULE ORAL
Qty: 12 CAPSULE | Refills: 0 | Status: SHIPPED | OUTPATIENT
Start: 2017-02-01 | End: 2017-04-20

## 2017-02-01 NOTE — PATIENT INSTRUCTIONS
"DASH Eating Plan  DASH stands for \"Dietary Approaches to Stop Hypertension.\" The DASH eating plan is a healthy eating plan that has been shown to reduce high blood pressure (hypertension). Additional health benefits may include reducing the risk of type 2 diabetes mellitus, heart disease, and stroke. The DASH eating plan may also help with weight loss.  WHAT DO I NEED TO KNOW ABOUT THE DASH EATING PLAN?  For the DASH eating plan, you will follow these general guidelines:  · Choose foods with a percent daily value for sodium of less than 5% (as listed on the food label).  · Use salt-free seasonings or herbs instead of table salt or sea salt.  · Check with your health care provider or pharmacist before using salt substitutes.  · Eat lower-sodium products, often labeled as \"lower sodium\" or \"no salt added.\"  · Eat fresh foods.  · Eat more vegetables, fruits, and low-fat dairy products.  · Choose whole grains. Look for the word \"whole\" as the first word in the ingredient list.  · Choose fish and skinless chicken or turkey more often than red meat. Limit fish, poultry, and meat to 6 oz (170 g) each day.  · Limit sweets, desserts, sugars, and sugary drinks.  · Choose heart-healthy fats.  · Limit cheese to 1 oz (28 g) per day.  · Eat more home-cooked food and less restaurant, buffet, and fast food.  · Limit fried foods.  · Cook foods using methods other than frying.  · Limit canned vegetables. If you do use them, rinse them well to decrease the sodium.  · When eating at a restaurant, ask that your food be prepared with less salt, or no salt if possible.  WHAT FOODS CAN I EAT?  Seek help from a dietitian for individual calorie needs.  Grains  Whole grain or whole wheat bread. Brown rice. Whole grain or whole wheat pasta. Quinoa, bulgur, and whole grain cereals. Low-sodium cereals. Corn or whole wheat flour tortillas. Whole grain cornbread. Whole grain crackers. Low-sodium crackers.  Vegetables  Fresh or frozen vegetables " (raw, steamed, roasted, or grilled). Low-sodium or reduced-sodium tomato and vegetable juices. Low-sodium or reduced-sodium tomato sauce and paste. Low-sodium or reduced-sodium canned vegetables.   Fruits  All fresh, canned (in natural juice), or frozen fruits.  Meat and Other Protein Products  Ground beef (85% or leaner), grass-fed beef, or beef trimmed of fat. Skinless chicken or turkey. Ground chicken or turkey. Pork trimmed of fat. All fish and seafood. Eggs. Dried beans, peas, or lentils. Unsalted nuts and seeds. Unsalted canned beans.  Dairy  Low-fat dairy products, such as skim or 1% milk, 2% or reduced-fat cheeses, low-fat ricotta or cottage cheese, or plain low-fat yogurt. Low-sodium or reduced-sodium cheeses.  Fats and Oils  Tub margarines without trans fats. Light or reduced-fat mayonnaise and salad dressings (reduced sodium). Avocado. Safflower, olive, or canola oils. Natural peanut or almond butter.  Other  Unsalted popcorn and pretzels.  The items listed above may not be a complete list of recommended foods or beverages. Contact your dietitian for more options.  WHAT FOODS ARE NOT RECOMMENDED?  Grains  White bread. White pasta. White rice. Refined cornbread. Bagels and croissants. Crackers that contain trans fat.  Vegetables  Creamed or fried vegetables. Vegetables in a cheese sauce. Regular canned vegetables. Regular canned tomato sauce and paste. Regular tomato and vegetable juices.  Fruits  Dried fruits. Canned fruit in light or heavy syrup. Fruit juice.  Meat and Other Protein Products  Fatty cuts of meat. Ribs, chicken wings, villar, sausage, bologna, salami, chitterlings, fatback, hot dogs, bratwurst, and packaged luncheon meats. Salted nuts and seeds. Canned beans with salt.  Dairy  Whole or 2% milk, cream, half-and-half, and cream cheese. Whole-fat or sweetened yogurt. Full-fat cheeses or blue cheese. Nondairy creamers and whipped toppings. Processed cheese, cheese spreads, or cheese  "curds.  Condiments  Onion and garlic salt, seasoned salt, table salt, and sea salt. Canned and packaged gravies. Worcestershire sauce. Tartar sauce. Barbecue sauce. Teriyaki sauce. Soy sauce, including reduced sodium. Steak sauce. Fish sauce. Oyster sauce. Cocktail sauce. Horseradish. Ketchup and mustard. Meat flavorings and tenderizers. Bouillon cubes. Hot sauce. Tabasco sauce. Marinades. Taco seasonings. Relishes.  Fats and Oils  Butter, stick margarine, lard, shortening, ghee, and villar fat. Coconut, palm kernel, or palm oils. Regular salad dressings.  Other  Pickles and olives. Salted popcorn and pretzels.  The items listed above may not be a complete list of foods and beverages to avoid. Contact your dietitian for more information.  WHERE CAN I FIND MORE INFORMATION?  National [employer], [employer], [employer] [place]: www.nhlbi.nih.gov/health/health-topics/topics/dash/     This information is not intended to replace advice given to you by your health care provider. Make sure you discuss any questions you have with your health care provider.     Document Released: 12/06/2012 Document Revised: 01/08/2016 Document Reviewed: 10/22/2014  [employer] Interactive Patient Education ©2016 [employer]  Food Choices to Lower Your Triglycerides  Triglycerides are a type of fat in your blood. High levels of triglycerides can increase the risk of heart disease and stroke. If your triglyceride levels are high, the foods you eat and your eating habits are very important. Choosing the right foods can help lower your triglycerides.   WHAT GENERAL GUIDELINES DO I NEED TO FOLLOW?  · Lose weight if you are overweight.    · Limit or avoid alcohol.    · Fill one half of your plate with vegetables and green salads.    · Limit fruit to two servings a day. Choose fruit instead of juice.    · Make one fourth of your plate whole grains. Look for the word \"whole\" as the first word in the ingredient list.  · Fill one fourth of your plate with lean " protein foods.  · Enjoy fatty fish (such as salmon, mackerel, sardines, and tuna) three times a week.    · Choose healthy fats.    · Limit foods high in starch and sugar.  · Eat more home-cooked food and less restaurant, buffet, and fast food.  · Limit fried foods.  · Cook foods using methods other than frying.  · Limit saturated fats.  · Check ingredient lists to avoid foods with partially hydrogenated oils (trans fats) in them.  WHAT FOODS CAN I EAT?   Grains  Whole grains, such as whole wheat or whole grain breads, crackers, cereals, and pasta. Unsweetened oatmeal, bulgur, barley, quinoa, or brown rice. Corn or whole wheat flour tortillas.   Vegetables  Fresh or frozen vegetables (raw, steamed, roasted, or grilled). Green salads.  Fruits  All fresh, canned (in natural juice), or frozen fruits.  Meat and Other Protein Products  Ground beef (85% or leaner), grass-fed beef, or beef trimmed of fat. Skinless chicken or turkey. Ground chicken or turkey. Pork trimmed of fat. All fish and seafood. Eggs. Dried beans, peas, or lentils. Unsalted nuts or seeds. Unsalted canned or dry beans.  Dairy  Low-fat dairy products, such as skim or 1% milk, 2% or reduced-fat cheeses, low-fat ricotta or cottage cheese, or plain low-fat yogurt.  Fats and Oils  Tub margarines without trans fats. Light or reduced-fat mayonnaise and salad dressings. Avocado. Safflower, olive, or canola oils. Natural peanut or almond butter.  The items listed above may not be a complete list of recommended foods or beverages. Contact your dietitian for more options.  WHAT FOODS ARE NOT RECOMMENDED?   Grains  White bread. White pasta. White rice. Cornbread. Bagels, pastries, and croissants. Crackers that contain trans fat.  Vegetables  White potatoes. Corn. Creamed or fried vegetables. Vegetables in a cheese sauce.  Fruits  Dried fruits. Canned fruit in light or heavy syrup. Fruit juice.  Meat and Other Protein Products  Fatty cuts of meat. Ribs, chicken  wings, villar, sausage, bologna, salami, chitterlings, fatback, hot dogs, bratwurst, and packaged luncheon meats.  Dairy  Whole or 2% milk, cream, half-and-half, and cream cheese. Whole-fat or sweetened yogurt. Full-fat cheeses. Nondairy creamers and whipped toppings. Processed cheese, cheese spreads, or cheese curds.  Sweets and Desserts  Corn syrup, sugars, honey, and molasses. Candy. Jam and jelly. Syrup. Sweetened cereals. Cookies, pies, cakes, donuts, muffins, and ice cream.  Fats and Oils  Butter, stick margarine, lard, shortening, ghee, or villar fat. Coconut, palm kernel, or palm oils.  Beverages  Alcohol. Sweetened drinks (such as sodas, lemonade, and fruit drinks or punches).  The items listed above may not be a complete list of foods and beverages to avoid. Contact your dietitian for more information.     This information is not intended to replace advice given to you by your health care provider. Make sure you discuss any questions you have with your health care provider.     Document Released: 10/05/2005 Document Revised: 01/08/2016 Document Reviewed: 10/22/2014  [employer] Interactive Patient Education ©2016 [employer]

## 2017-02-01 NOTE — PROGRESS NOTES
HPI Comments: Monisha presents to the office today for follow up. She recently had lab work done pertaining to her  h/o DM, type 2, HTN, Dyslipidemia, Gastritis and CAD with Previous MI  Stent. These will be reviewed with her.    Diabetes   She has type 2 diabetes mellitus. Her disease course has been fluctuating. Associated symptoms include fatigue, polydipsia, polyphagia, polyuria and visual change. Pertinent negatives for diabetes include no chest pain, no weakness and no weight loss. Diabetic complications include peripheral neuropathy. Risk factors for coronary artery disease include diabetes mellitus, dyslipidemia, family history, hypertension, obesity, post-menopausal, sedentary lifestyle and stress. Current diabetic treatment includes oral agent (dual therapy). She is compliant with treatment most of the time. She is following a generally healthy diet. She has had a previous visit with a dietitian. She rarely participates in exercise. An ACE inhibitor/angiotensin II receptor blocker is not being taken. Eye exam is current.     Hypertension   The current episode started more than 1 year ago. Pertinent negatives include no chest pain, palpitations, peripheral edema or shortness of breath. Risk factors for coronary artery disease include diabetes mellitus, dyslipidemia, family history, obesity, post-menopausal state, sedentary lifestyle and stress. Past treatments include angiotensin blockers and beta blockers.   Refer to ROS for additional information.     Difficulty Urinating    The current episode started today. The quality of the pain is described as burning. There has been no fever. There is no history of pyelonephritis. Pertinent negatives include no chills, discharge, flank pain, hematuria, hesitancy, nausea, possible pregnancy, sweats, urgency or vomiting. She has tried increased fluids for the symptoms. There is no history of catheterization, kidney stones, recurrent UTIs, a single kidney or a  urological procedure.      The following portions of the patient's history were reviewed and updated as appropriate: allergies, current medications, past family history, past medical history, past social history, past surgical history and problem list.    Review of Systems   Constitutional: Positive for fatigue. Negative for activity change, appetite change, chills and fever.   HENT: Negative.    Eyes: Positive for visual disturbance (Improving).   Respiratory: Negative for cough, chest tightness, shortness of breath and wheezing.    Cardiovascular: Negative for chest pain, palpitations and leg swelling.   Gastrointestinal: Positive for abdominal pain (Improving) and diarrhea (Baseline). Negative for nausea and vomiting.   Endocrine: Negative for cold intolerance, heat intolerance, polydipsia, polyphagia and polyuria.   Genitourinary: Positive for dysuria. Negative for flank pain, hematuria, hesitancy and urgency.   Musculoskeletal: Positive for arthralgias (Baseline).   Skin: Negative for color change and rash.   Neurological: Negative for dizziness, tremors, speech difficulty, weakness, light-headedness and headaches.   Hematological: Negative for adenopathy. Bruises/bleeds easily (Contributes to Plavix).   Psychiatric/Behavioral: Negative for confusion, decreased concentration and suicidal ideas. The patient is not nervous/anxious.    All other systems reviewed and are negative.    Physical Exam   Constitutional: She is oriented to person, place, and time. She appears well-developed and well-nourished. No distress.   HENT:   Head: Normocephalic.   Right Ear: Tympanic membrane and ear canal normal.   Left Ear: Tympanic membrane and ear canal normal.   Nose: Nose normal.   Mouth/Throat: Oropharynx is clear and moist.   Eyes: Conjunctivae are normal. Pupils are equal, round, and reactive to light. Right eye exhibits no discharge. Left eye exhibits no discharge. Right conjunctiva is not injected. Left conjunctiva is  not injected.   Neck: Neck supple. No JVD present. No rigidity. No thyromegaly present.   Cardiovascular: Normal rate, regular rhythm, normal heart sounds and intact distal pulses.  Exam reveals no friction rub.    No murmur heard.  Pulmonary/Chest: Effort normal and breath sounds normal. No respiratory distress. She has no decreased breath sounds. She has no wheezes. She has no rhonchi. She has no rales.   Abdominal: Soft. Bowel sounds are normal. She exhibits no distension. There is tenderness in the epigastric area. There is no rigidity, no rebound, no guarding and no CVA tenderness.   Musculoskeletal: She exhibits no edema or tenderness.   Lymphadenopathy:     She has no cervical adenopathy.   Neurological: She is alert and oriented to person, place, and time.   Skin: Skin is warm and dry. No rash noted. No erythema.   Psychiatric: She has a normal mood and affect. Her behavior is normal. Judgment and thought content normal.   Vitals reviewed.    Assessment/Plan   Diagnoses and all orders for this visit:    Type 2 diabetes mellitus with complication, without long-term current use of insulin  Comments:  Findings and recommendations discussed with Monisha. Reviewed her lab work with her. Treatment options discussed.   Orders:  -     SITagliptin-MetFORMIN HCl ER (JANUMET XR) 100-1000 MG tablet sustained-release 24 hour; Take 1 tablet by mouth Daily for 30 days.    Dyslipidemia  Comments:  Lipid panel reviewed with Monisha.  She is tolerating the Livalo very well were she could not tolerate multiple other statins tried. Will increase to 4 mg.  Orders:  -     Pitavastatin Calcium (LIVALO) 4 MG tablet; Take 1 tablet by mouth Every Night.    Essential hypertension  Comments:  Will continue current regimen.    Coronary artery disease involving coronary bypass graft of native heart without angina pectoris  Comments:  Follow up with Dr Watts.    Helicobacter positive gastritis  Comments:  Reviewed her lab results which was  positive for H Pylori. Will hold off on treatment regimen until next visit.     Dysuria  Comments:  Urinalysis reviewed which was negative for leukocytes. Will send to lab for confirmation. Will put Farxiga on hold.   Orders:  -     Urine Culture    Vitamin D deficiency  Comments:  Vitamin D results: 18. Will start weekly Vitamin D.  Orders:  -     vitamin D (ERGOCALCIFEROL) 99945 UNITS capsule capsule; Take 1 capsule by mouth Every 7 (Seven) Days for 12 doses.    Hx of pancreatitis  Comments:  Noted Triglycerides of 705 mg/dL. Reinforced s/so of pancreatitis. Will monitor.    Findings and recommendations discussed with Monisha. Reviewed her lab work with her. Treatment options discussed. Will put both Farxiga and Victoza on hold for now. She will try JanuMet which she has previously used but did have diarrhea associated with it. Will titrate gradually. Monisha will f/u with me in March; Sooner if problems/concerns occur.

## 2017-02-02 PROBLEM — K29.70 HELICOBACTER POSITIVE GASTRITIS: Status: ACTIVE | Noted: 2017-02-02

## 2017-02-02 PROBLEM — Z87.19 HX OF PANCREATITIS: Status: ACTIVE | Noted: 2017-02-02

## 2017-02-02 PROBLEM — B96.81 HELICOBACTER POSITIVE GASTRITIS: Status: ACTIVE | Noted: 2017-02-02

## 2017-02-03 ENCOUNTER — TELEPHONE (OUTPATIENT)
Dept: RETAIL CLINIC | Facility: CLINIC | Age: 57
End: 2017-02-03

## 2017-02-03 ENCOUNTER — TELEPHONE (OUTPATIENT)
Dept: FAMILY MEDICINE CLINIC | Facility: CLINIC | Age: 57
End: 2017-02-03

## 2017-02-03 NOTE — TELEPHONE ENCOUNTER
Please phone Monisha to let her know that her Urine Culture confirmed she did not have a Urinary Tract Infection.

## 2017-02-04 LAB — BACTERIA SPEC AEROBE CULT: NORMAL

## 2017-02-08 RX ORDER — CLARITHROMYCIN 500 MG/1
500 TABLET, COATED ORAL 2 TIMES DAILY
Qty: 28 TABLET | Refills: 0 | Status: SHIPPED | OUTPATIENT
Start: 2017-02-08 | End: 2017-02-22

## 2017-02-08 RX ORDER — METRONIDAZOLE 500 MG/1
500 TABLET ORAL 2 TIMES DAILY
Qty: 28 TABLET | Refills: 0 | Status: SHIPPED | OUTPATIENT
Start: 2017-02-08 | End: 2017-02-22

## 2017-02-08 RX ORDER — LANSOPRAZOLE 30 MG/1
30 CAPSULE, DELAYED RELEASE ORAL 2 TIMES DAILY
Qty: 28 CAPSULE | Refills: 0 | Status: SHIPPED | OUTPATIENT
Start: 2017-02-08 | End: 2017-02-22

## 2017-03-06 RX ORDER — METOPROLOL TARTRATE 50 MG/1
50 TABLET, FILM COATED ORAL 2 TIMES DAILY
Qty: 60 TABLET | Refills: 6 | Status: SHIPPED | OUTPATIENT
Start: 2017-03-06 | End: 2017-10-13 | Stop reason: SDUPTHER

## 2017-03-17 RX ORDER — LOSARTAN POTASSIUM 50 MG/1
50 TABLET ORAL 2 TIMES DAILY
Qty: 30 TABLET | Refills: 6 | Status: SHIPPED | OUTPATIENT
Start: 2017-03-17 | End: 2017-06-28 | Stop reason: SDUPTHER

## 2017-03-21 ENCOUNTER — OFFICE VISIT (OUTPATIENT)
Dept: CARDIOLOGY | Facility: CLINIC | Age: 57
End: 2017-03-21

## 2017-03-21 VITALS
WEIGHT: 186.6 LBS | DIASTOLIC BLOOD PRESSURE: 66 MMHG | HEART RATE: 80 BPM | HEIGHT: 64 IN | SYSTOLIC BLOOD PRESSURE: 111 MMHG | BODY MASS INDEX: 31.86 KG/M2

## 2017-03-21 DIAGNOSIS — E78.5 DYSLIPIDEMIA: ICD-10-CM

## 2017-03-21 DIAGNOSIS — I73.9 CLAUDICATION (HCC): ICD-10-CM

## 2017-03-21 DIAGNOSIS — I10 ESSENTIAL HYPERTENSION: ICD-10-CM

## 2017-03-21 DIAGNOSIS — I25.708 CORONARY ARTERY DISEASE OF BYPASS GRAFT OF NATIVE HEART WITH STABLE ANGINA PECTORIS (HCC): Primary | ICD-10-CM

## 2017-03-21 PROCEDURE — 99214 OFFICE O/P EST MOD 30 MIN: CPT | Performed by: INTERNAL MEDICINE

## 2017-03-21 RX ORDER — VENLAFAXINE HYDROCHLORIDE 37.5 MG/1
37.5 CAPSULE, EXTENDED RELEASE ORAL DAILY
COMMUNITY
End: 2017-09-27 | Stop reason: DRUGHIGH

## 2017-03-21 RX ORDER — ISOSORBIDE MONONITRATE 30 MG/1
30 TABLET, EXTENDED RELEASE ORAL DAILY
Qty: 90 TABLET | Refills: 3 | Status: SHIPPED | OUTPATIENT
Start: 2017-03-21 | End: 2018-03-27 | Stop reason: SDUPTHER

## 2017-03-21 NOTE — PROGRESS NOTES
Encounter Date:03/21/2017      Patient ID: Monisha Gardner is a 56 y.o. female.    Chief Complaint: Coronary Artery Disease; Hypertension; and Hyperlipidemia    PROBLEM LIST:  1.  Coronary artery disease:  a. Previous MI and stent to unknown vessel  2 years ago, incomplete database.   b. Hospital presentation with acute coronary syndrome and features of inferior ST elevations, November 2015.  c. Cardiac catheterization study by Dr. Watts, 11/17/2015, showed single vessel disease with 95% stenosis  of the ostial/proximal RCA, ejection fraction 65%. No other significant disease.  d. Stenting of the RCA with 3.5 x 26 mm Resolute drug-eluting stent.   2.  Hypertension.   3. Dyslipidemia.   4. Type 2 diabetes.   5. Overweight.   6. Osteoarthritis.     History of Present Illness   Patient returns today for follow-up.  Since last visit she is expressing chest pain and has had to use nitroglycerin for relief up to twice a week.  Has associated left shoulder pain.  She also notes pedal edema and bilateral leg pain.  These episodes occur at random times but are more pronounced with exertion.  Denies any recent hospitalization.  Continues to remain active as tolerated.  Does not smoke cigarettes nor does she consume alcohol.      Allergies   Allergen Reactions   • Zofran [Ondansetron Hcl] Shortness Of Breath   • Ciprofloxacin    • Penicillins    • Statins Myalgia         Current Outpatient Prescriptions:   •  aspirin  MG tablet, take 1 tablet by mouth once daily, Disp: 30 tablet, Rfl: 6  •  clopidogrel (PLAVIX) 75 MG tablet, Take 1 tablet by mouth Daily., Disp: 30 tablet, Rfl: 6  •  losartan (COZAAR) 50 MG tablet, Take 1 tablet by mouth 2 (Two) Times a Day., Disp: 30 tablet, Rfl: 6  •  metoprolol tartrate (LOPRESSOR) 50 MG tablet, Take 1 tablet by mouth 2 (Two) Times a Day., Disp: 60 tablet, Rfl: 6  •  nitroglycerin (NITROSTAT) 0.4 MG SL tablet, Place 1 tablet under the tongue Every 5 (Five) Minutes As Needed for  "chest pain., Disp: 25 tablet, Rfl: 6  •  sucralfate (CARAFATE) 1 G tablet, Take 1 g by mouth 4 (Four) Times a Day., Disp: , Rfl:   •  venlafaxine XR (EFFEXOR-XR) 37.5 MG 24 hr capsule, Take 37.5 mg by mouth Daily., Disp: , Rfl:   •  vitamin D (ERGOCALCIFEROL) 06924 UNITS capsule capsule, Take 1 capsule by mouth Every 7 (Seven) Days for 12 doses., Disp: 12 capsule, Rfl: 0  •  isosorbide mononitrate (IMDUR) 30 MG 24 hr tablet, Take 1 tablet by mouth Daily., Disp: 90 tablet, Rfl: 3    The following portions of the patient's history were reviewed and updated as appropriate: allergies, current medications, past family history, past medical history, past social history, past surgical history and problem list.    Review of Systems   Constitution: Negative for chills, fever, weight gain and weight loss.   Cardiovascular: Negative for chest pain, claudication, dyspnea on exertion, leg swelling, orthopnea, palpitations, paroxysmal nocturnal dyspnea and syncope.        No dizziness   Gastrointestinal: Negative for abdominal pain, constipation, diarrhea, nausea and vomiting.   Genitourinary:        No urinary symptoms   Neurological:        No symptoms of stroke.   All other systems reviewed and are negative.    Objective:     Blood pressure 111/66, pulse 80, height 64\" (162.6 cm), weight 186 lb 9.6 oz (84.6 kg).      Physical Exam   Constitutional: She appears well-developed and well-nourished.   HENT:   HEENT exam unremarkable.   Neck: Neck supple. No JVD present.   No carotid bruits.   Cardiovascular: Normal rate, regular rhythm and normal heart sounds.    No murmur heard.  2 plus symmetric pulses.   Pulmonary/Chest: Breath sounds normal. She exhibits no tenderness.   Abdominal:   Abdomen benign.   Musculoskeletal: She exhibits no edema.   Discoloration of bilateral lower legs.   Neurological:   Neurological exam unremarkable.   Vitals reviewed.    Procedures       Assessment:      Diagnosis Plan   1. Coronary artery disease " of bypass graft of native heart with stable angina pectoris  Stress Test With Myocardial Perfusion One Day   2. Essential hypertension     3. Dyslipidemia     4. Claudication  Doppler Ankle Brachial Index Multi Level CAR     Plan:   Schedule IOANA and stress test.  Start Imdur 30 mg once daily.  Continue all other current medications.   FU in 3 MO, sooner as needed.  Thank you for allowing us to participate in the care of your patient.     Scribed for Derrick Watts MD by Karsten Jerry. 3/21/2017  1:39 PM    I, Derrick Watts MD, personally performed the services described in this documentation as scribed by the above named individual in my presence, and it is both accurate and complete.  3/21/2017  1:42 PM        Please note that portions of this note may have been completed with a voice recognition program. Efforts were made to edit the dictations, but occasionally words are mistranscribed.

## 2017-03-27 ENCOUNTER — TRANSCRIBE ORDERS (OUTPATIENT)
Dept: CARDIOLOGY | Facility: CLINIC | Age: 57
End: 2017-03-27

## 2017-03-27 ENCOUNTER — TELEPHONE (OUTPATIENT)
Dept: CARDIOLOGY | Facility: CLINIC | Age: 57
End: 2017-03-27

## 2017-03-27 DIAGNOSIS — I73.9 PERIPHERAL VASCULAR DISEASE (HCC): Primary | ICD-10-CM

## 2017-03-27 NOTE — TELEPHONE ENCOUNTER
Pt called to report headaches and increased BP since starting Imdur.  BP was 187/110 with HR in the 90's this morning.  Most recent BP is 124/70, HR 84.  She did not take her night time dose of Imdur yesterday.    She will cut her dosage in half and take them six hours apart to see if that alleviates the symptoms.  She will call back in three days to let us know the results.  Patient verbalized understanding.  KENDY Hernández

## 2017-03-29 ENCOUNTER — OFFICE VISIT (OUTPATIENT)
Dept: FAMILY MEDICINE CLINIC | Facility: CLINIC | Age: 57
End: 2017-03-29

## 2017-03-29 VITALS
DIASTOLIC BLOOD PRESSURE: 70 MMHG | BODY MASS INDEX: 31.58 KG/M2 | HEIGHT: 64 IN | WEIGHT: 185 LBS | SYSTOLIC BLOOD PRESSURE: 140 MMHG | HEART RATE: 77 BPM | TEMPERATURE: 98.7 F | OXYGEN SATURATION: 97 %

## 2017-03-29 DIAGNOSIS — K29.70 HELICOBACTER POSITIVE GASTRITIS: ICD-10-CM

## 2017-03-29 DIAGNOSIS — I10 ESSENTIAL HYPERTENSION: Chronic | ICD-10-CM

## 2017-03-29 DIAGNOSIS — R21 RASH: ICD-10-CM

## 2017-03-29 DIAGNOSIS — E11.8 TYPE 2 DIABETES MELLITUS WITH COMPLICATION, WITHOUT LONG-TERM CURRENT USE OF INSULIN (HCC): Primary | Chronic | ICD-10-CM

## 2017-03-29 DIAGNOSIS — E55.9 VITAMIN D DEFICIENCY: ICD-10-CM

## 2017-03-29 DIAGNOSIS — B96.81 HELICOBACTER POSITIVE GASTRITIS: ICD-10-CM

## 2017-03-29 DIAGNOSIS — E78.5 DYSLIPIDEMIA: Chronic | ICD-10-CM

## 2017-03-29 DIAGNOSIS — M19.90 OSTEOARTHRITIS, UNSPECIFIED OSTEOARTHRITIS TYPE, UNSPECIFIED SITE: Chronic | ICD-10-CM

## 2017-03-29 PROCEDURE — 99214 OFFICE O/P EST MOD 30 MIN: CPT | Performed by: NURSE PRACTITIONER

## 2017-04-04 DIAGNOSIS — E11.8 TYPE 2 DIABETES MELLITUS WITH COMPLICATION, WITHOUT LONG-TERM CURRENT USE OF INSULIN (HCC): Chronic | ICD-10-CM

## 2017-04-04 RX ORDER — SITAGLIPTIN AND METFORMIN HYDROCHLORIDE 1000; 100 MG/1; MG/1
TABLET, FILM COATED, EXTENDED RELEASE ORAL
Qty: 30 TABLET | Refills: 1 | Status: SHIPPED | OUTPATIENT
Start: 2017-04-04 | End: 2017-06-02 | Stop reason: SDUPTHER

## 2017-04-05 ENCOUNTER — HOSPITAL ENCOUNTER (OUTPATIENT)
Dept: NUCLEAR MEDICINE | Facility: HOSPITAL | Age: 57
Discharge: HOME | End: 2017-04-05

## 2017-04-05 ENCOUNTER — HOSPITAL ENCOUNTER (OUTPATIENT)
Dept: ULTRASOUND IMAGING | Facility: HOSPITAL | Age: 57
Discharge: HOME | End: 2017-04-05

## 2017-04-05 ENCOUNTER — HOSPITAL ENCOUNTER (OUTPATIENT)
Dept: CARDIOLOGY | Facility: HOSPITAL | Age: 57
Discharge: HOME | End: 2017-04-05

## 2017-04-05 DIAGNOSIS — I25.708 CORONARY ARTERY DISEASE OF BYPASS GRAFT OF NATIVE HEART WITH STABLE ANGINA PECTORIS (HCC): ICD-10-CM

## 2017-04-05 DIAGNOSIS — I73.9 PERIPHERAL VASCULAR DISEASE (HCC): ICD-10-CM

## 2017-04-05 PROCEDURE — 93018 CV STRESS TEST I&R ONLY: CPT | Performed by: INTERNAL MEDICINE

## 2017-04-05 PROCEDURE — 93922 UPR/L XTREMITY ART 2 LEVELS: CPT | Performed by: RADIOLOGY

## 2017-04-05 PROCEDURE — 93922 UPR/L XTREMITY ART 2 LEVELS: CPT

## 2017-04-05 PROCEDURE — 25010000002 REGADENOSON 0.4 MG/5ML SOLUTION: Performed by: INTERNAL MEDICINE

## 2017-04-05 PROCEDURE — 0 TECHNETIUM SESTAMIBI: Performed by: INTERNAL MEDICINE

## 2017-04-05 PROCEDURE — 25010000002 AMINOPHYLLINE PER 250 MG: Performed by: INTERNAL MEDICINE

## 2017-04-05 PROCEDURE — 78451 HT MUSCLE IMAGE SPECT SING: CPT

## 2017-04-05 PROCEDURE — A9500 TC99M SESTAMIBI: HCPCS | Performed by: INTERNAL MEDICINE

## 2017-04-05 PROCEDURE — 78451 HT MUSCLE IMAGE SPECT SING: CPT | Performed by: INTERNAL MEDICINE

## 2017-04-05 PROCEDURE — 93017 CV STRESS TEST TRACING ONLY: CPT

## 2017-04-05 RX ORDER — AMINOPHYLLINE DIHYDRATE 25 MG/ML
125 INJECTION, SOLUTION INTRAVENOUS ONCE
Status: COMPLETED | OUTPATIENT
Start: 2017-04-05 | End: 2017-04-05

## 2017-04-05 RX ADMIN — REGADENOSON 0.4 MG: 0.08 INJECTION, SOLUTION INTRAVENOUS at 08:40

## 2017-04-05 RX ADMIN — AMINOPHYLLINE: 25 INJECTION, SOLUTION INTRAVENOUS at 08:45

## 2017-04-05 RX ADMIN — Medication 1 DOSE: at 07:40

## 2017-04-05 RX ADMIN — Medication 1 DOSE: at 08:40

## 2017-04-06 LAB
BH CV NUCLEAR PRIOR STUDY: 3
BH CV STRESS BP STAGE 1: NORMAL
BH CV STRESS BP STAGE 2: NORMAL
BH CV STRESS COMMENTS STAGE 1: NORMAL
BH CV STRESS COMMENTS STAGE 2: NORMAL
BH CV STRESS DOSE REGADENOSON STAGE 1: 0.4
BH CV STRESS DURATION MIN STAGE 1: 0
BH CV STRESS DURATION MIN STAGE 2: 4
BH CV STRESS DURATION SEC STAGE 1: 15
BH CV STRESS DURATION SEC STAGE 2: 0
BH CV STRESS HR STAGE 1: 55
BH CV STRESS HR STAGE 2: 69
BH CV STRESS PROTOCOL 1: NORMAL
BH CV STRESS RECOVERY BP: NORMAL MMHG
BH CV STRESS RECOVERY HR: 61 BPM
BH CV STRESS STAGE 1: 1
BH CV STRESS STAGE 2: 2
LV EF NUC BP: 82 %
MAXIMAL PREDICTED HEART RATE: 165 BPM
PERCENT MAX PREDICTED HR: 48.48 %
STRESS BASELINE BP: NORMAL MMHG
STRESS BASELINE HR: 55 BPM
STRESS PERCENT HR: 57 %
STRESS POST PEAK BP: NORMAL MMHG
STRESS POST PEAK HR: 80 BPM
STRESS TARGET HR: 140 BPM

## 2017-04-12 ENCOUNTER — DOCUMENTATION (OUTPATIENT)
Dept: FAMILY MEDICINE CLINIC | Facility: CLINIC | Age: 57
End: 2017-04-12

## 2017-04-27 ENCOUNTER — DOCUMENTATION (OUTPATIENT)
Dept: FAMILY MEDICINE CLINIC | Facility: CLINIC | Age: 57
End: 2017-04-27

## 2017-04-27 NOTE — PROGRESS NOTES
Faxed labs, appeal letter, office notes, copy of insurance denial to [employer] for futher appeals process.  PKF

## 2017-04-28 RX ORDER — VENLAFAXINE HYDROCHLORIDE 37.5 MG/1
CAPSULE, EXTENDED RELEASE ORAL
Qty: 30 CAPSULE | Refills: 2 | Status: SHIPPED | OUTPATIENT
Start: 2017-04-28 | End: 2017-05-24

## 2017-05-12 ENCOUNTER — DOCUMENTATION (OUTPATIENT)
Dept: FAMILY MEDICINE CLINIC | Facility: CLINIC | Age: 57
End: 2017-05-12

## 2017-05-16 ENCOUNTER — LAB (OUTPATIENT)
Dept: FAMILY MEDICINE CLINIC | Facility: CLINIC | Age: 57
End: 2017-05-16

## 2017-05-16 DIAGNOSIS — R21 RASH: ICD-10-CM

## 2017-05-16 DIAGNOSIS — E78.5 DYSLIPIDEMIA: ICD-10-CM

## 2017-05-16 DIAGNOSIS — M19.90 OSTEOARTHRITIS, UNSPECIFIED OSTEOARTHRITIS TYPE, UNSPECIFIED SITE: ICD-10-CM

## 2017-05-16 DIAGNOSIS — K29.70 HELICOBACTER POSITIVE GASTRITIS: ICD-10-CM

## 2017-05-16 DIAGNOSIS — E55.9 VITAMIN D DEFICIENCY: ICD-10-CM

## 2017-05-16 DIAGNOSIS — E78.5 DYSLIPIDEMIA: Primary | ICD-10-CM

## 2017-05-16 DIAGNOSIS — B96.81 HELICOBACTER POSITIVE GASTRITIS: ICD-10-CM

## 2017-05-16 DIAGNOSIS — E11.8 TYPE 2 DIABETES MELLITUS WITH COMPLICATION, WITHOUT LONG-TERM CURRENT USE OF INSULIN (HCC): ICD-10-CM

## 2017-05-16 LAB
25(OH)D3 SERPL-MCNC: 28 NG/ML
ALBUMIN SERPL-MCNC: 4.3 G/DL (ref 3.5–5)
ALBUMIN UR-MCNC: 3.4 MG/L
ALBUMIN/GLOB SERPL: 1.6 G/DL (ref 1.5–2.5)
ALP SERPL-CCNC: 94 U/L (ref 35–104)
ALT SERPL W P-5'-P-CCNC: 44 U/L (ref 10–36)
ANION GAP SERPL CALCULATED.3IONS-SCNC: 7.6 MMOL/L (ref 3.6–11.2)
AST SERPL-CCNC: 40 U/L (ref 10–30)
BASOPHILS # BLD AUTO: 0.05 10*3/MM3 (ref 0–0.3)
BASOPHILS NFR BLD AUTO: 0.7 % (ref 0–2)
BILIRUB SERPL-MCNC: 0.3 MG/DL (ref 0.2–1.8)
BUN BLD-MCNC: 12 MG/DL (ref 7–21)
BUN/CREAT SERPL: 12.5 (ref 7–25)
CALCIUM SPEC-SCNC: 9.7 MG/DL (ref 7.7–10)
CHLORIDE SERPL-SCNC: 104 MMOL/L (ref 99–112)
CHOLEST SERPL-MCNC: 295 MG/DL (ref 0–200)
CHROMATIN AB SERPL-ACNC: 21 IU/ML (ref 0–14)
CO2 SERPL-SCNC: 31.4 MMOL/L (ref 24.3–31.9)
CREAT BLD-MCNC: 0.96 MG/DL (ref 0.43–1.29)
CRP SERPL-MCNC: 0.67 MG/DL (ref 0–0.99)
DEPRECATED RDW RBC AUTO: 44.6 FL (ref 37–54)
EOSINOPHIL # BLD AUTO: 0.21 10*3/MM3 (ref 0–0.7)
EOSINOPHIL NFR BLD AUTO: 2.9 % (ref 0–5)
ERYTHROCYTE [DISTWIDTH] IN BLOOD BY AUTOMATED COUNT: 14.8 % (ref 11.5–14.5)
ERYTHROCYTE [SEDIMENTATION RATE] IN BLOOD: 10 MM/HR (ref 0–30)
GFR SERPL CREATININE-BSD FRML MDRD: 60 ML/MIN/1.73
GLOBULIN UR ELPH-MCNC: 2.7 GM/DL
GLUCOSE BLD-MCNC: 120 MG/DL (ref 70–110)
HBA1C MFR BLD: 7.3 % (ref 4.5–5.7)
HCT VFR BLD AUTO: 41.2 % (ref 37–47)
HDLC SERPL-MCNC: 50 MG/DL (ref 60–100)
HGB BLD-MCNC: 12.8 G/DL (ref 12–16)
IMM GRANULOCYTES # BLD: 0.03 10*3/MM3 (ref 0–0.03)
IMM GRANULOCYTES NFR BLD: 0.4 % (ref 0–0.5)
LDLC SERPL CALC-MCNC: ABNORMAL MG/DL (ref 0–100)
LDLC/HDLC SERPL: ABNORMAL {RATIO}
LYMPHOCYTES # BLD AUTO: 2.83 10*3/MM3 (ref 1–3)
LYMPHOCYTES NFR BLD AUTO: 39 % (ref 21–51)
MCH RBC QN AUTO: 25.6 PG (ref 27–33)
MCHC RBC AUTO-ENTMCNC: 31.1 G/DL (ref 33–37)
MCV RBC AUTO: 82.4 FL (ref 80–94)
MONOCYTES # BLD AUTO: 0.35 10*3/MM3 (ref 0.1–0.9)
MONOCYTES NFR BLD AUTO: 4.8 % (ref 0–10)
NEUTROPHILS # BLD AUTO: 3.79 10*3/MM3 (ref 1.4–6.5)
NEUTROPHILS NFR BLD AUTO: 52.2 % (ref 30–70)
OSMOLALITY SERPL CALC.SUM OF ELEC: 285.9 MOSM/KG (ref 273–305)
PLATELET # BLD AUTO: 312 10*3/MM3 (ref 130–400)
PMV BLD AUTO: 10 FL (ref 6–10)
POTASSIUM BLD-SCNC: 4 MMOL/L (ref 3.5–5.3)
PROT SERPL-MCNC: 7 G/DL (ref 6–8)
RBC # BLD AUTO: 5 10*6/MM3 (ref 4.2–5.4)
SODIUM BLD-SCNC: 143 MMOL/L (ref 135–153)
TRIGL SERPL-MCNC: 510 MG/DL (ref 0–150)
TSH SERPL DL<=0.05 MIU/L-ACNC: 1.25 MIU/ML (ref 0.55–4.78)
VIT B12 BLD-MCNC: 520 PG/ML (ref 211–911)
VLDLC SERPL-MCNC: ABNORMAL MG/DL
WBC NRBC COR # BLD: 7.26 10*3/MM3 (ref 4.5–12.5)

## 2017-05-16 PROCEDURE — 85652 RBC SED RATE AUTOMATED: CPT | Performed by: NURSE PRACTITIONER

## 2017-05-16 PROCEDURE — 83036 HEMOGLOBIN GLYCOSYLATED A1C: CPT | Performed by: NURSE PRACTITIONER

## 2017-05-16 PROCEDURE — 82306 VITAMIN D 25 HYDROXY: CPT | Performed by: NURSE PRACTITIONER

## 2017-05-16 PROCEDURE — 85025 COMPLETE CBC W/AUTO DIFF WBC: CPT | Performed by: NURSE PRACTITIONER

## 2017-05-16 PROCEDURE — 83721 ASSAY OF BLOOD LIPOPROTEIN: CPT | Performed by: NURSE PRACTITIONER

## 2017-05-16 PROCEDURE — 80061 LIPID PANEL: CPT | Performed by: NURSE PRACTITIONER

## 2017-05-16 PROCEDURE — 36415 COLL VENOUS BLD VENIPUNCTURE: CPT

## 2017-05-16 PROCEDURE — 84443 ASSAY THYROID STIM HORMONE: CPT | Performed by: NURSE PRACTITIONER

## 2017-05-16 PROCEDURE — 86038 ANTINUCLEAR ANTIBODIES: CPT | Performed by: NURSE PRACTITIONER

## 2017-05-16 PROCEDURE — 82607 VITAMIN B-12: CPT | Performed by: NURSE PRACTITIONER

## 2017-05-16 PROCEDURE — 82043 UR ALBUMIN QUANTITATIVE: CPT | Performed by: NURSE PRACTITIONER

## 2017-05-16 PROCEDURE — 86431 RHEUMATOID FACTOR QUANT: CPT | Performed by: NURSE PRACTITIONER

## 2017-05-16 PROCEDURE — 86140 C-REACTIVE PROTEIN: CPT | Performed by: NURSE PRACTITIONER

## 2017-05-16 PROCEDURE — 80053 COMPREHEN METABOLIC PANEL: CPT | Performed by: NURSE PRACTITIONER

## 2017-05-17 LAB — ANA SER QL: NEGATIVE

## 2017-05-18 LAB — LDL CHOL. (DIRECT): 185 MG/DL (ref 0–99)

## 2017-05-23 ENCOUNTER — DOCUMENTATION (OUTPATIENT)
Dept: FAMILY MEDICINE CLINIC | Facility: CLINIC | Age: 57
End: 2017-05-23

## 2017-05-24 ENCOUNTER — OFFICE VISIT (OUTPATIENT)
Dept: FAMILY MEDICINE CLINIC | Facility: CLINIC | Age: 57
End: 2017-05-24

## 2017-05-24 VITALS
HEIGHT: 64 IN | BODY MASS INDEX: 31.58 KG/M2 | HEART RATE: 71 BPM | OXYGEN SATURATION: 96 % | SYSTOLIC BLOOD PRESSURE: 130 MMHG | WEIGHT: 185 LBS | TEMPERATURE: 99 F | DIASTOLIC BLOOD PRESSURE: 70 MMHG

## 2017-05-24 DIAGNOSIS — R21 RASH: ICD-10-CM

## 2017-05-24 DIAGNOSIS — E11.8 TYPE 2 DIABETES MELLITUS WITH COMPLICATION, WITHOUT LONG-TERM CURRENT USE OF INSULIN (HCC): Primary | Chronic | ICD-10-CM

## 2017-05-24 DIAGNOSIS — E55.9 VITAMIN D DEFICIENCY: ICD-10-CM

## 2017-05-24 DIAGNOSIS — E78.5 DYSLIPIDEMIA: Chronic | ICD-10-CM

## 2017-05-24 DIAGNOSIS — I10 ESSENTIAL HYPERTENSION: Chronic | ICD-10-CM

## 2017-05-24 DIAGNOSIS — M19.90 OSTEOARTHRITIS, UNSPECIFIED OSTEOARTHRITIS TYPE, UNSPECIFIED SITE: Chronic | ICD-10-CM

## 2017-05-24 PROCEDURE — 99214 OFFICE O/P EST MOD 30 MIN: CPT | Performed by: NURSE PRACTITIONER

## 2017-05-24 RX ORDER — ERGOCALCIFEROL 1.25 MG/1
50000 CAPSULE ORAL
Qty: 4 CAPSULE | Refills: 3 | Status: SHIPPED | OUTPATIENT
Start: 2017-05-24 | End: 2017-09-19 | Stop reason: SDUPTHER

## 2017-05-24 RX ORDER — INSULIN DEGLUDEC 200 U/ML
INJECTION, SOLUTION SUBCUTANEOUS
Refills: 0 | COMMUNITY
Start: 2017-05-19 | End: 2017-05-24 | Stop reason: SDUPTHER

## 2017-05-24 RX ORDER — INSULIN DEGLUDEC 200 U/ML
30 INJECTION, SOLUTION SUBCUTANEOUS DAILY
Qty: 2 PEN | Refills: 3 | Status: SHIPPED | OUTPATIENT
Start: 2017-05-24 | End: 2017-06-23

## 2017-05-29 PROBLEM — R21 RASH: Status: ACTIVE | Noted: 2017-05-29

## 2017-06-02 DIAGNOSIS — E11.8 TYPE 2 DIABETES MELLITUS WITH COMPLICATION, WITHOUT LONG-TERM CURRENT USE OF INSULIN (HCC): Chronic | ICD-10-CM

## 2017-06-03 RX ORDER — SITAGLIPTIN AND METFORMIN HYDROCHLORIDE 1000; 100 MG/1; MG/1
TABLET, FILM COATED, EXTENDED RELEASE ORAL
Qty: 30 TABLET | Refills: 1 | Status: SHIPPED | OUTPATIENT
Start: 2017-06-03 | End: 2017-08-02 | Stop reason: SDUPTHER

## 2017-06-06 ENCOUNTER — OFFICE VISIT (OUTPATIENT)
Dept: FAMILY MEDICINE CLINIC | Facility: CLINIC | Age: 57
End: 2017-06-06

## 2017-06-06 VITALS
SYSTOLIC BLOOD PRESSURE: 125 MMHG | OXYGEN SATURATION: 98 % | WEIGHT: 183 LBS | DIASTOLIC BLOOD PRESSURE: 85 MMHG | TEMPERATURE: 98.2 F | BODY MASS INDEX: 31.24 KG/M2 | HEIGHT: 64 IN | HEART RATE: 67 BPM

## 2017-06-06 DIAGNOSIS — E11.8 TYPE 2 DIABETES MELLITUS WITH COMPLICATION, WITHOUT LONG-TERM CURRENT USE OF INSULIN (HCC): ICD-10-CM

## 2017-06-06 DIAGNOSIS — L03.115 CELLULITIS OF RIGHT LOWER EXTREMITY: Primary | ICD-10-CM

## 2017-06-06 DIAGNOSIS — M19.90 OSTEOARTHRITIS, UNSPECIFIED OSTEOARTHRITIS TYPE, UNSPECIFIED SITE: ICD-10-CM

## 2017-06-06 DIAGNOSIS — I10 ESSENTIAL HYPERTENSION: ICD-10-CM

## 2017-06-06 PROCEDURE — 99214 OFFICE O/P EST MOD 30 MIN: CPT | Performed by: PHYSICIAN ASSISTANT

## 2017-06-06 RX ORDER — SULFAMETHOXAZOLE AND TRIMETHOPRIM 800; 160 MG/1; MG/1
1 TABLET ORAL 2 TIMES DAILY
Qty: 20 TABLET | Refills: 0 | Status: SHIPPED | OUTPATIENT
Start: 2017-06-06 | End: 2017-08-22

## 2017-06-06 NOTE — PROGRESS NOTES
Subjective   Monisha Gardner is a 56 y.o. female.     History of Present Illness     Acute injury-  She is here today with complaints of hitting her leg on an outdoor park bench approximately 2 weeks ago.  She reports the trauma resulted in a cut on her right lower leg.  The wound has become tender and red and progressively worsening over the past week.  Pain is described as moderate soreness worse with palpation.  Some relief with Neosporin    Hypertension-well controlled    Diabetes  Current symptoms include none.  Evaluation to date has been: fasting blood sugar. Home sugars: BGs are running  consistent with Hgb A1C. Current treatments: metformin, DPP inhibitor - Janumet and basal insulin - Tesiba. Most recent hemoglobin A1c   Lab Results   Component Value Date    HGBA1C 7.30 (H) 05/16/2017    HGBA1C 7.30 (H) 01/23/2017    HGBA1C 6.4 (H) 11/15/2015    HGBA1C 6.5 (H) 03/07/2015    Stable    Osteoarthritis-stable    The following portions of the patient's history were reviewed and updated as appropriate: allergies, current medications, past family history, past medical history, past social history, past surgical history and problem list.    Review of Systems   Constitutional: Negative for activity change, appetite change and fever.   HENT: Negative for ear pain, sinus pressure and sore throat.    Eyes: Negative for pain and visual disturbance.   Respiratory: Negative for cough and chest tightness.    Cardiovascular: Negative for chest pain and palpitations.   Gastrointestinal: Negative for abdominal pain, constipation, diarrhea, nausea and vomiting.   Endocrine: Negative for polydipsia and polyuria.   Genitourinary: Negative for dysuria and frequency.   Musculoskeletal: Negative for back pain and myalgias.   Skin: Positive for wound. Negative for color change and rash.   Allergic/Immunologic: Negative for food allergies and immunocompromised state.   Neurological: Negative for dizziness, syncope and headaches.    Hematological: Negative for adenopathy. Does not bruise/bleed easily.   Psychiatric/Behavioral: Negative for hallucinations and suicidal ideas. The patient is not nervous/anxious.        Objective   Physical Exam   Constitutional: She is oriented to person, place, and time. She appears well-developed and well-nourished.   HENT:   Head: Normocephalic and atraumatic.   Nose: Nose normal.   Mouth/Throat: Oropharynx is clear and moist.   Eyes: Conjunctivae and EOM are normal. Pupils are equal, round, and reactive to light.   Neck: Normal range of motion. Neck supple. No tracheal deviation present. No thyromegaly present.   Cardiovascular: Normal rate, regular rhythm, normal heart sounds and intact distal pulses.    No murmur heard.  Pulmonary/Chest: Effort normal and breath sounds normal. No respiratory distress. She has no wheezes.   Abdominal: Soft. Bowel sounds are normal. There is no tenderness. There is no guarding.   Musculoskeletal: Normal range of motion. She exhibits no edema or tenderness.   Lymphadenopathy:     She has no cervical adenopathy.   Neurological: She is alert and oriented to person, place, and time.   Skin: Skin is warm and dry. No rash noted. There is erythema.   Approximately one and half by 1-1/2 cm excoriation with granulating tissue noted, erythema surrounded with tenderness approximately half a centimeter   Psychiatric: She has a normal mood and affect. Her behavior is normal.   Nursing note and vitals reviewed.      Assessment/Plan   Diagnoses and all orders for this visit:    Cellulitis of right lower extremity  -     mupirocin (BACTROBAN) 2 % ointment; Apply  topically 3 (Three) Times a Day.  -     sulfamethoxazole-trimethoprim (BACTRIM DS) 800-160 MG per tablet; Take 1 tablet by mouth 2 (Two) Times a Day.    Essential hypertension    Type 2 diabetes mellitus with complication, without long-term current use of insulin    Osteoarthritis, unspecified osteoarthritis type, unspecified  site

## 2017-06-11 ENCOUNTER — HOSPITAL ENCOUNTER (EMERGENCY)
Facility: HOSPITAL | Age: 57
Discharge: HOME | End: 2017-06-11
Admitting: EMERGENCY MEDICINE

## 2017-06-11 ENCOUNTER — APPOINTMENT (OUTPATIENT)
Dept: GENERAL RADIOLOGY | Facility: HOSPITAL | Age: 57
End: 2017-06-11

## 2017-06-11 VITALS
WEIGHT: 181 LBS | HEART RATE: 58 BPM | SYSTOLIC BLOOD PRESSURE: 117 MMHG | BODY MASS INDEX: 30.9 KG/M2 | OXYGEN SATURATION: 98 % | TEMPERATURE: 98 F | RESPIRATION RATE: 16 BRPM | DIASTOLIC BLOOD PRESSURE: 82 MMHG | HEIGHT: 64 IN

## 2017-06-11 DIAGNOSIS — S76.011A HIP STRAIN, RIGHT, INITIAL ENCOUNTER: Primary | ICD-10-CM

## 2017-06-11 PROCEDURE — 73502 X-RAY EXAM HIP UNI 2-3 VIEWS: CPT | Performed by: RADIOLOGY

## 2017-06-11 PROCEDURE — 96372 THER/PROPH/DIAG INJ SC/IM: CPT

## 2017-06-11 PROCEDURE — 99283 EMERGENCY DEPT VISIT LOW MDM: CPT

## 2017-06-11 PROCEDURE — 25010000002 PROMETHAZINE PER 50 MG: Performed by: NURSE PRACTITIONER

## 2017-06-11 PROCEDURE — 73502 X-RAY EXAM HIP UNI 2-3 VIEWS: CPT

## 2017-06-11 PROCEDURE — 25010000002 KETOROLAC TROMETHAMINE PER 15 MG: Performed by: NURSE PRACTITIONER

## 2017-06-11 PROCEDURE — 25010000002 HYDROMORPHONE PER 4 MG: Performed by: NURSE PRACTITIONER

## 2017-06-11 RX ORDER — ACETAMINOPHEN AND CODEINE PHOSPHATE 300; 30 MG/1; MG/1
1 TABLET ORAL EVERY 4 HOURS PRN
Qty: 15 TABLET | Refills: 0 | Status: SHIPPED | OUTPATIENT
Start: 2017-06-11 | End: 2017-08-22

## 2017-06-11 RX ORDER — PROMETHAZINE HYDROCHLORIDE 25 MG/ML
6.25 INJECTION, SOLUTION INTRAMUSCULAR; INTRAVENOUS ONCE
Status: COMPLETED | OUTPATIENT
Start: 2017-06-11 | End: 2017-06-11

## 2017-06-11 RX ORDER — KETOROLAC TROMETHAMINE 30 MG/ML
30 INJECTION, SOLUTION INTRAMUSCULAR; INTRAVENOUS ONCE
Status: COMPLETED | OUTPATIENT
Start: 2017-06-11 | End: 2017-06-11

## 2017-06-11 RX ADMIN — HYDROMORPHONE HYDROCHLORIDE 1 MG: 1 INJECTION, SOLUTION INTRAMUSCULAR; INTRAVENOUS; SUBCUTANEOUS at 12:22

## 2017-06-11 RX ADMIN — KETOROLAC TROMETHAMINE 30 MG: 30 INJECTION, SOLUTION INTRAMUSCULAR; INTRAVENOUS at 14:00

## 2017-06-11 RX ADMIN — PROMETHAZINE HYDROCHLORIDE 6.25 MG: 25 INJECTION INTRAMUSCULAR; INTRAVENOUS at 12:21

## 2017-06-11 NOTE — ED PROVIDER NOTES
Subjective   Patient is a 56 y.o. female presenting with lower extremity pain.   Lower Extremity Issue   Location:  Hip  Time since incident:  1 day  Injury: yes    Mechanism of injury comment:  Reports slipped in shower and states she twisted right hip  Hip location:  R hip  Pain details:     Quality:  Aching    Radiates to:  R leg    Severity:  Moderate    Onset quality:  Sudden    Duration:  1 day    Timing:  Constant    Progression:  Worsening  Chronicity:  New  Dislocation: no    Foreign body present:  No foreign bodies  Tetanus status:  Up to date  Prior injury to area:  No  Relieved by:  Nothing  Worsened by:  Nothing  Ineffective treatments:  None tried  Associated symptoms: no back pain, no decreased ROM, no fever, no itching, no muscle weakness, no neck pain, no numbness, no stiffness, no swelling and no tingling    Risk factors: no concern for non-accidental trauma, no frequent fractures, no known bone disorder, no obesity and no recent illness        Review of Systems   Constitutional: Negative.  Negative for fever.   HENT: Negative.    Eyes: Negative.    Respiratory: Negative.    Cardiovascular: Negative.    Gastrointestinal: Negative.    Endocrine: Negative.    Genitourinary: Negative.    Musculoskeletal: Negative.  Negative for back pain, neck pain and stiffness.   Skin: Negative.  Negative for itching.   Allergic/Immunologic: Negative.    Neurological: Negative.    Hematological: Negative.    Psychiatric/Behavioral: Negative.        Past Medical History:   Diagnosis Date   • Diabetes mellitus    • Hypertension    • Myocardial infarction        Allergies   Allergen Reactions   • Zofran [Ondansetron Hcl] Shortness Of Breath   • Ciprofloxacin    • Penicillins    • Statins Myalgia       Past Surgical History:   Procedure Laterality Date   • APPENDECTOMY     • CARDIAC CATHETERIZATION     •  SECTION     • CHOLECYSTECTOMY     • CORONARY STENT PLACEMENT      Stenting of the RCA with 3.5 x 26 mm  Resolute drug-eluting stent   • HYSTERECTOMY     • JOINT REPLACEMENT     • TONSILLECTOMY         Family History   Problem Relation Age of Onset   • Arthritis Mother    • Heart disease Sister    • Vision loss Daughter    • Heart disease Father        Social History     Social History   • Marital status:      Spouse name: N/A   • Number of children: 2   • Years of education: 12     Occupational History   • [employer]      Social History Main Topics   • Smoking status: Never Smoker   • Smokeless tobacco: Never Used   • Alcohol use No   • Drug use: No   • Sexual activity: Defer     Other Topics Concern   • None     Social History Narrative   • None           Objective   Physical Exam   Constitutional: She is oriented to person, place, and time. She appears well-developed and well-nourished.   HENT:   Head: Normocephalic.   Right Ear: External ear normal.   Left Ear: External ear normal.   Mouth/Throat: Oropharynx is clear and moist.   Eyes: EOM are normal. Pupils are equal, round, and reactive to light.   Neck: Normal range of motion. Neck supple.   Cardiovascular: Normal rate and regular rhythm.    Pulmonary/Chest: Effort normal and breath sounds normal.   Abdominal: Soft. Bowel sounds are normal.   Musculoskeletal: Normal range of motion.   Neurological: She is alert and oriented to person, place, and time.   Skin: Skin is warm and dry.   Psychiatric: She has a normal mood and affect. Her behavior is normal.   Nursing note and vitals reviewed.      Procedures         ED Course  ED Course                  MDM    Final diagnoses:   Hip strain, right, initial encounter            Nahid Del Angel, TRUONG  06/11/17 1402       Nahid Del Angel, TRUONG  06/11/17 1401

## 2017-06-20 ENCOUNTER — TELEPHONE (OUTPATIENT)
Dept: CARDIOLOGY | Facility: CLINIC | Age: 57
End: 2017-06-20

## 2017-06-20 NOTE — TELEPHONE ENCOUNTER
Patient called to check on status of injectable cholesterol medication that we have been trying to get approved. Informed patient it appeared it was rejected on 5/11/17 but I will leave Dr Watts nurse a note to see if he had appealed it and will let her know status.

## 2017-06-21 ENCOUNTER — TELEPHONE (OUTPATIENT)
Dept: FAMILY MEDICINE CLINIC | Facility: CLINIC | Age: 57
End: 2017-06-21

## 2017-06-23 ENCOUNTER — DOCUMENTATION (OUTPATIENT)
Dept: FAMILY MEDICINE CLINIC | Facility: CLINIC | Age: 57
End: 2017-06-23

## 2017-06-23 NOTE — PROGRESS NOTES
I had discussed the issue of the Repatha with Dr Watts's nurse and he said he would contact Monisha with the information about this medication.

## 2017-06-28 ENCOUNTER — TELEPHONE (OUTPATIENT)
Dept: CARDIOLOGY | Facility: CLINIC | Age: 57
End: 2017-06-28

## 2017-06-28 RX ORDER — LOSARTAN POTASSIUM 50 MG/1
TABLET ORAL
Qty: 30 TABLET | Refills: 6 | Status: SHIPPED | OUTPATIENT
Start: 2017-06-28 | End: 2017-10-14 | Stop reason: SDUPTHER

## 2017-06-28 NOTE — TELEPHONE ENCOUNTER
Called to inform patient we are still working on pre-authorization for Repatha.  I will update her once I have more information    Patient verbalized understanding.

## 2017-07-06 ENCOUNTER — TELEPHONE (OUTPATIENT)
Dept: CARDIOLOGY | Facility: CLINIC | Age: 57
End: 2017-07-06

## 2017-07-06 NOTE — TELEPHONE ENCOUNTER
We are in the process of getting Repatha prior authorized for this patient.    Her PCP initially tried to get her Praluent, but it was denied based on Repatha being preferred.    Now the patient was denied based on not having tried Zetia.    Would you like me to proceed with an appeal based on Zetia not being appropriate, or have her try the Zetia?    Please advise.

## 2017-07-07 DIAGNOSIS — Z79.899 HIGH RISK MEDICATION USE: ICD-10-CM

## 2017-07-07 DIAGNOSIS — E78.5 DYSLIPIDEMIA: Primary | ICD-10-CM

## 2017-07-07 RX ORDER — EZETIMIBE 10 MG/1
10 TABLET ORAL DAILY
Qty: 90 TABLET | Refills: 1 | Status: SHIPPED | OUTPATIENT
Start: 2017-07-07 | End: 2017-08-01

## 2017-07-18 ENCOUNTER — TELEPHONE (OUTPATIENT)
Dept: CARDIOLOGY | Facility: CLINIC | Age: 57
End: 2017-07-18

## 2017-07-18 NOTE — TELEPHONE ENCOUNTER
Patient called in and states that she has been having severe stomach cramps, nausea, diarrhea, and belching since starting Zetia.  The patient has held the medication for the last two days and her symptoms are diminished.      I advised the patient to discontinue the medication, and I would appeal the denial of injectable cholesterol meds.    Patient verbalized understanding.

## 2017-08-02 DIAGNOSIS — E11.8 TYPE 2 DIABETES MELLITUS WITH COMPLICATION, WITHOUT LONG-TERM CURRENT USE OF INSULIN (HCC): Chronic | ICD-10-CM

## 2017-08-02 RX ORDER — SITAGLIPTIN AND METFORMIN HYDROCHLORIDE 1000; 100 MG/1; MG/1
TABLET, FILM COATED, EXTENDED RELEASE ORAL
Qty: 30 TABLET | Refills: 1 | Status: SHIPPED | OUTPATIENT
Start: 2017-08-02 | End: 2017-09-30 | Stop reason: SDUPTHER

## 2017-08-22 ENCOUNTER — OFFICE VISIT (OUTPATIENT)
Dept: CARDIOLOGY | Facility: CLINIC | Age: 57
End: 2017-08-22

## 2017-08-22 VITALS
WEIGHT: 183 LBS | HEIGHT: 64 IN | SYSTOLIC BLOOD PRESSURE: 118 MMHG | DIASTOLIC BLOOD PRESSURE: 72 MMHG | HEART RATE: 72 BPM | BODY MASS INDEX: 31.24 KG/M2

## 2017-08-22 DIAGNOSIS — R61 DIAPHORESIS: Primary | ICD-10-CM

## 2017-08-22 DIAGNOSIS — I25.708 CORONARY ARTERY DISEASE OF BYPASS GRAFT OF NATIVE HEART WITH STABLE ANGINA PECTORIS (HCC): ICD-10-CM

## 2017-08-22 DIAGNOSIS — E78.5 DYSLIPIDEMIA: ICD-10-CM

## 2017-08-22 DIAGNOSIS — I10 ESSENTIAL HYPERTENSION: ICD-10-CM

## 2017-08-22 PROCEDURE — 99214 OFFICE O/P EST MOD 30 MIN: CPT | Performed by: INTERNAL MEDICINE

## 2017-08-22 NOTE — PROGRESS NOTES
Encounter Date:08/22/2017      Patient ID: Monisha Gardner is a 56 y.o. female.    Chief Complaint: Coronary Artery Disease (follow up)    PROBLEM LIST:  1.  Coronary artery disease:  a. Previous MI and stent to unknown vessel  2 years ago, incomplete database.   b. Hospital presentation with acute coronary syndrome and features of inferior ST elevations, November 2015.  c. Cardiac catheterization study by Dr. Watts, 11/17/2015, showed single vessel disease with 95% stenosis  of the ostial/proximal RCA, ejection fraction 65%. No other significant disease.  d. Stenting of the RCA with 3.5 x 26 mm Resolute drug-eluting stent.   e. MPS 4/6/2017: EF >70%. Normal perfusion with no evidence of ischemia.  2. Peripheral arterial disease  a. IOANA 4/5/2017 showed pressure in the right arm was 140 and L arm was 140.   3. Hypertension.   4. Dyslipidemia.   5. Type 2 diabetes.   6. Overweight.   7. Osteoarthritis.     History of Present Illness  Patient presents today for follow-up with a history of CAD and cardiac risk factors. She has been doing well, except for experiencing excessive Hot sweats with exertion and this subsequently leads to a sensation of fatigue. Otherwise no complaints of exertional chest pain or dyspnea, she has recently started cholesterol injections which she is tolerating well. Denies chest pain, shortness of breath, leg swelling, palpitations, and syncope. Remains busy and active, but concerned as the symptoms occur with activity.    Allergies   Allergen Reactions   • Zofran [Ondansetron Hcl] Shortness Of Breath   • Ciprofloxacin    • Penicillins    • Statins Myalgia         Current Outpatient Prescriptions:   •  aspirin  MG tablet, take 1 tablet by mouth once daily, Disp: 30 tablet, Rfl: 6  •  clopidogrel (PLAVIX) 75 MG tablet, Take 1 tablet by mouth Daily., Disp: 30 tablet, Rfl: 6  •  Evolocumab with Infusor 420 MG/3.5ML solution cartridge, Inject 420 mg under the skin Every 30 (Thirty)  "Days., Disp: 3 cartridge., Rfl: 3  •  isosorbide mononitrate (IMDUR) 30 MG 24 hr tablet, Take 1 tablet by mouth Daily., Disp: 90 tablet, Rfl: 3  •  JANUMET -1000 MG tablet sustained-release 24 hour, TAKE 1 TABLET BY MOUTH ONCE A DAY, Disp: 30 tablet, Rfl: 1  •  losartan (COZAAR) 50 MG tablet, take 1 tablet by mouth twice a day, Disp: 30 tablet, Rfl: 6  •  metoprolol tartrate (LOPRESSOR) 50 MG tablet, Take 1 tablet by mouth 2 (Two) Times a Day., Disp: 60 tablet, Rfl: 6  •  nitroglycerin (NITROSTAT) 0.4 MG SL tablet, Place 1 tablet under the tongue Every 5 (Five) Minutes As Needed for chest pain., Disp: 25 tablet, Rfl: 6  •  venlafaxine XR (EFFEXOR-XR) 37.5 MG 24 hr capsule, Take 37.5 mg by mouth Daily., Disp: , Rfl:     The following portions of the patient's history were reviewed and updated as appropriate: allergies, current medications, past family history, past medical history, past social history, past surgical history and problem list.    ROS  Review of Systems   Constitution: Negative for chills, fever, weight gain and weight loss.   Cardiovascular: Negative for chest pain, claudication, dyspnea on exertion, leg swelling, orthopnea, palpitations, paroxysmal nocturnal dyspnea and syncope.        No dizziness   Gastrointestinal: Negative for abdominal pain, constipation, diarrhea, nausea and vomiting.   Genitourinary:        No urinary symptoms   Neurological:        No symptoms of stroke.   All other systems reviewed and are negative.    Objective:     Blood pressure 118/72, pulse 72, height 64\" (162.6 cm), weight 183 lb (83 kg).      Physical Exam  Constitutional: She appears well-developed and well-nourished.   HENT:   HEENT exam unremarkable.   Neck: Neck supple. No JVD present.   No carotid bruits.   Cardiovascular: Normal rate, regular rhythm and normal heart sounds.    No murmur heard.  2 plus symmetric pulses.   Pulmonary/Chest: Breath sounds normal. Does not exhibit tenderness.   Abdominal: "   Abdomen benign.   Musculoskeletal: Does not exhibit edema.   Neurological:   Neurological exam unremarkable.   Vitals reviewed.    Lab Review:   Procedures       Assessment:      Diagnosis Plan   1. Hot sweats, these appear with heat intolerance and hormone issues rather than cardiac problem, she has no angina and had a negative stress test. FU with PCP and/or Endocrinology   2. Coronary artery disease , Status post stent, stable and asymptomatic    3. Essential hypertension, well controlled.    4. Dyslipidemia, controlled on recently started Cholesterol injections      Plan:   Stable cardiac status.  Continue current medications.   FU in 6 MO, sooner as needed.  Thank you for allowing us to participate in the care of your patient.     Scribed for Derrick Watts MD by Karsten Jerry. 8/22/2017  1:31 PM    I, Derrick Watts MD, personally performed the services described in this documentation as scribed by the above named individual in my presence, and it is both accurate and complete.  8/22/2017  1:41 PM        Please note that portions of this note may have been completed with a voice recognition program. Efforts were made to edit the dictations, but occasionally words are mistranscribed.

## 2017-08-30 ENCOUNTER — OFFICE VISIT (OUTPATIENT)
Dept: FAMILY MEDICINE CLINIC | Facility: CLINIC | Age: 57
End: 2017-08-30

## 2017-08-30 VITALS
WEIGHT: 186 LBS | SYSTOLIC BLOOD PRESSURE: 115 MMHG | BODY MASS INDEX: 31.76 KG/M2 | DIASTOLIC BLOOD PRESSURE: 60 MMHG | TEMPERATURE: 99.3 F | OXYGEN SATURATION: 97 % | HEIGHT: 64 IN | HEART RATE: 75 BPM

## 2017-08-30 DIAGNOSIS — R21 RASH: ICD-10-CM

## 2017-08-30 DIAGNOSIS — E55.9 VITAMIN D DEFICIENCY: ICD-10-CM

## 2017-08-30 DIAGNOSIS — E11.8 TYPE 2 DIABETES MELLITUS WITH COMPLICATION, WITHOUT LONG-TERM CURRENT USE OF INSULIN (HCC): Primary | Chronic | ICD-10-CM

## 2017-08-30 DIAGNOSIS — M19.90 OSTEOARTHRITIS, UNSPECIFIED OSTEOARTHRITIS TYPE, UNSPECIFIED SITE: Chronic | ICD-10-CM

## 2017-08-30 DIAGNOSIS — I10 ESSENTIAL HYPERTENSION: Chronic | ICD-10-CM

## 2017-08-30 DIAGNOSIS — E78.5 DYSLIPIDEMIA: Chronic | ICD-10-CM

## 2017-08-30 PROCEDURE — 99214 OFFICE O/P EST MOD 30 MIN: CPT | Performed by: NURSE PRACTITIONER

## 2017-08-30 RX ORDER — ERGOCALCIFEROL 1.25 MG/1
CAPSULE ORAL
Refills: 0 | COMMUNITY
Start: 2017-08-24 | End: 2018-03-27

## 2017-08-30 RX ORDER — DEXLANSOPRAZOLE 60 MG/1
CAPSULE, DELAYED RELEASE ORAL DAILY
Refills: 0 | COMMUNITY
Start: 2017-08-25 | End: 2018-07-05 | Stop reason: SDUPTHER

## 2017-09-13 ENCOUNTER — TELEPHONE (OUTPATIENT)
Dept: FAMILY MEDICINE CLINIC | Facility: CLINIC | Age: 57
End: 2017-09-13

## 2017-09-13 ENCOUNTER — LAB (OUTPATIENT)
Dept: FAMILY MEDICINE CLINIC | Facility: CLINIC | Age: 57
End: 2017-09-13

## 2017-09-13 DIAGNOSIS — E78.5 DYSLIPIDEMIA: ICD-10-CM

## 2017-09-13 DIAGNOSIS — E55.9 VITAMIN D DEFICIENCY: ICD-10-CM

## 2017-09-13 DIAGNOSIS — M19.90 OSTEOARTHRITIS, UNSPECIFIED OSTEOARTHRITIS TYPE, UNSPECIFIED SITE: ICD-10-CM

## 2017-09-13 DIAGNOSIS — R05.9 COUGH: Primary | ICD-10-CM

## 2017-09-13 DIAGNOSIS — E11.8 TYPE 2 DIABETES MELLITUS WITH COMPLICATION, WITHOUT LONG-TERM CURRENT USE OF INSULIN (HCC): ICD-10-CM

## 2017-09-13 DIAGNOSIS — Z79.899 HIGH RISK MEDICATION USE: ICD-10-CM

## 2017-09-13 LAB
25(OH)D3 SERPL-MCNC: 39 NG/ML
ALBUMIN SERPL-MCNC: 4.7 G/DL (ref 3.5–5)
ALBUMIN/GLOB SERPL: 1.9 G/DL (ref 1.5–2.5)
ALP SERPL-CCNC: 97 U/L (ref 35–104)
ALT SERPL W P-5'-P-CCNC: 47 U/L (ref 10–36)
ANION GAP SERPL CALCULATED.3IONS-SCNC: 10.2 MMOL/L (ref 3.6–11.2)
AST SERPL-CCNC: 45 U/L (ref 10–30)
BASOPHILS # BLD AUTO: 0.07 10*3/MM3 (ref 0–0.3)
BASOPHILS NFR BLD AUTO: 0.8 % (ref 0–2)
BILIRUB CONJ SERPL-MCNC: 0.1 MG/DL (ref 0–0.2)
BILIRUB SERPL-MCNC: 0.3 MG/DL (ref 0.2–1.8)
BUN BLD-MCNC: 8 MG/DL (ref 7–21)
BUN/CREAT SERPL: 8.3 (ref 7–25)
CALCIUM SPEC-SCNC: 10 MG/DL (ref 7.7–10)
CHLORIDE SERPL-SCNC: 105 MMOL/L (ref 99–112)
CHOLEST SERPL-MCNC: 190 MG/DL (ref 0–200)
CO2 SERPL-SCNC: 26.8 MMOL/L (ref 24.3–31.9)
CREAT BLD-MCNC: 0.96 MG/DL (ref 0.43–1.29)
DEPRECATED RDW RBC AUTO: 52.9 FL (ref 37–54)
EOSINOPHIL # BLD AUTO: 0.28 10*3/MM3 (ref 0–0.7)
EOSINOPHIL NFR BLD AUTO: 3.3 % (ref 0–5)
ERYTHROCYTE [DISTWIDTH] IN BLOOD BY AUTOMATED COUNT: 17.5 % (ref 11.5–14.5)
GFR SERPL CREATININE-BSD FRML MDRD: 60 ML/MIN/1.73
GLOBULIN UR ELPH-MCNC: 2.5 GM/DL
GLUCOSE BLD-MCNC: 125 MG/DL (ref 70–110)
HBA1C MFR BLD: 6.4 % (ref 4.5–5.7)
HCT VFR BLD AUTO: 42 % (ref 37–47)
HDLC SERPL-MCNC: 45 MG/DL (ref 60–100)
HGB BLD-MCNC: 13.2 G/DL (ref 12–16)
IMM GRANULOCYTES # BLD: 0.04 10*3/MM3 (ref 0–0.03)
IMM GRANULOCYTES NFR BLD: 0.5 % (ref 0–0.5)
LDLC SERPL CALC-MCNC: ABNORMAL MG/DL (ref 0–100)
LDLC/HDLC SERPL: ABNORMAL {RATIO}
LYMPHOCYTES # BLD AUTO: 3.36 10*3/MM3 (ref 1–3)
LYMPHOCYTES NFR BLD AUTO: 39.4 % (ref 21–51)
MCH RBC QN AUTO: 26.2 PG (ref 27–33)
MCHC RBC AUTO-ENTMCNC: 31.4 G/DL (ref 33–37)
MCV RBC AUTO: 83.5 FL (ref 80–94)
MONOCYTES # BLD AUTO: 0.48 10*3/MM3 (ref 0.1–0.9)
MONOCYTES NFR BLD AUTO: 5.6 % (ref 0–10)
NEUTROPHILS # BLD AUTO: 4.3 10*3/MM3 (ref 1.4–6.5)
NEUTROPHILS NFR BLD AUTO: 50.4 % (ref 30–70)
OSMOLALITY SERPL CALC.SUM OF ELEC: 282.9 MOSM/KG (ref 273–305)
PLATELET # BLD AUTO: 358 10*3/MM3 (ref 130–400)
PMV BLD AUTO: 9.7 FL (ref 6–10)
POTASSIUM BLD-SCNC: 4.3 MMOL/L (ref 3.5–5.3)
PROT SERPL-MCNC: 7.2 G/DL (ref 6–8)
RBC # BLD AUTO: 5.03 10*6/MM3 (ref 4.2–5.4)
SODIUM BLD-SCNC: 142 MMOL/L (ref 135–153)
TRIGL SERPL-MCNC: 416 MG/DL (ref 0–150)
TSH SERPL DL<=0.05 MIU/L-ACNC: 0.95 MIU/ML (ref 0.55–4.78)
URATE SERPL-MCNC: 8 MG/DL (ref 2.4–5.7)
VIT B12 BLD-MCNC: 503 PG/ML (ref 211–911)
VLDLC SERPL-MCNC: ABNORMAL MG/DL
WBC NRBC COR # BLD: 8.53 10*3/MM3 (ref 4.5–12.5)

## 2017-09-13 PROCEDURE — 84550 ASSAY OF BLOOD/URIC ACID: CPT | Performed by: NURSE PRACTITIONER

## 2017-09-13 PROCEDURE — 82607 VITAMIN B-12: CPT | Performed by: NURSE PRACTITIONER

## 2017-09-13 PROCEDURE — 80061 LIPID PANEL: CPT | Performed by: NURSE PRACTITIONER

## 2017-09-13 PROCEDURE — 82306 VITAMIN D 25 HYDROXY: CPT | Performed by: NURSE PRACTITIONER

## 2017-09-13 PROCEDURE — 85025 COMPLETE CBC W/AUTO DIFF WBC: CPT | Performed by: NURSE PRACTITIONER

## 2017-09-13 PROCEDURE — 83036 HEMOGLOBIN GLYCOSYLATED A1C: CPT | Performed by: NURSE PRACTITIONER

## 2017-09-13 PROCEDURE — 82248 BILIRUBIN DIRECT: CPT | Performed by: NURSE PRACTITIONER

## 2017-09-13 PROCEDURE — 80053 COMPREHEN METABOLIC PANEL: CPT | Performed by: NURSE PRACTITIONER

## 2017-09-13 PROCEDURE — 84443 ASSAY THYROID STIM HORMONE: CPT | Performed by: NURSE PRACTITIONER

## 2017-09-13 RX ORDER — BENZONATATE 200 MG/1
200 CAPSULE ORAL 3 TIMES DAILY PRN
Qty: 20 CAPSULE | Refills: 0 | Status: SHIPPED | OUTPATIENT
Start: 2017-09-13 | End: 2017-11-27

## 2017-09-13 NOTE — TELEPHONE ENCOUNTER
Monisha was here doing labs and she has a bad cough wants to know if you could write something for her cough

## 2017-09-14 ENCOUNTER — TELEPHONE (OUTPATIENT)
Dept: FAMILY MEDICINE CLINIC | Facility: CLINIC | Age: 57
End: 2017-09-14

## 2017-09-14 NOTE — TELEPHONE ENCOUNTER
Monisha wants to know if her cholestenol is down enough to  Take a shot to help you loose weight that maybe you told her about

## 2017-09-14 NOTE — TELEPHONE ENCOUNTER
If you would let Monisha know her Lipid panel had improved but her Triglycerides are still above 400 so I feel she needs to wait until her next labs and we can see where she stands. Her Total Cholesterol is below 200 mg/dL which is good. Her A1C is down almost one percent at 6.4. Keep up the good job. Vitamin D is 39 so continue Vitamin D.   I also sent in a prescription for Tessalon Perles for her cough.

## 2017-09-18 RX ORDER — BROMPHENIRAMINE MALEATE, PSEUDOEPHEDRINE HYDROCHLORIDE, AND DEXTROMETHORPHAN HYDROBROMIDE 2; 30; 10 MG/5ML; MG/5ML; MG/5ML
5 SYRUP ORAL 3 TIMES DAILY PRN
Qty: 160 ML | Refills: 0 | Status: SHIPPED | OUTPATIENT
Start: 2017-09-18 | End: 2017-09-27

## 2017-09-19 ENCOUNTER — TELEPHONE (OUTPATIENT)
Dept: FAMILY MEDICINE CLINIC | Facility: CLINIC | Age: 57
End: 2017-09-19

## 2017-09-19 DIAGNOSIS — E11.65 UNCONTROLLED TYPE 2 DIABETES MELLITUS WITH COMPLICATION, UNSPECIFIED LONG TERM INSULIN USE STATUS: Primary | ICD-10-CM

## 2017-09-19 DIAGNOSIS — K21.9 GASTROESOPHAGEAL REFLUX DISEASE, ESOPHAGITIS PRESENCE NOT SPECIFIED: ICD-10-CM

## 2017-09-19 DIAGNOSIS — E55.9 VITAMIN D DEFICIENCY: ICD-10-CM

## 2017-09-19 DIAGNOSIS — E78.5 DYSLIPIDEMIA: ICD-10-CM

## 2017-09-19 DIAGNOSIS — E11.8 UNCONTROLLED TYPE 2 DIABETES MELLITUS WITH COMPLICATION, UNSPECIFIED LONG TERM INSULIN USE STATUS: Primary | ICD-10-CM

## 2017-09-19 RX ORDER — ERGOCALCIFEROL 1.25 MG/1
CAPSULE ORAL
Qty: 4 CAPSULE | Refills: 3 | Status: SHIPPED | OUTPATIENT
Start: 2017-09-19 | End: 2018-01-15 | Stop reason: SDUPTHER

## 2017-09-27 ENCOUNTER — OFFICE VISIT (OUTPATIENT)
Dept: FAMILY MEDICINE CLINIC | Facility: CLINIC | Age: 57
End: 2017-09-27

## 2017-09-27 VITALS
WEIGHT: 185 LBS | HEART RATE: 71 BPM | BODY MASS INDEX: 31.58 KG/M2 | HEIGHT: 64 IN | DIASTOLIC BLOOD PRESSURE: 80 MMHG | SYSTOLIC BLOOD PRESSURE: 148 MMHG | OXYGEN SATURATION: 98 % | TEMPERATURE: 96.8 F

## 2017-09-27 DIAGNOSIS — M19.90 OSTEOARTHRITIS, UNSPECIFIED OSTEOARTHRITIS TYPE, UNSPECIFIED SITE: Chronic | ICD-10-CM

## 2017-09-27 DIAGNOSIS — E11.8 TYPE 2 DIABETES MELLITUS WITH COMPLICATION, WITHOUT LONG-TERM CURRENT USE OF INSULIN (HCC): Chronic | ICD-10-CM

## 2017-09-27 DIAGNOSIS — E55.9 VITAMIN D DEFICIENCY: ICD-10-CM

## 2017-09-27 DIAGNOSIS — N95.1 MENOPAUSAL SYMPTOMS: ICD-10-CM

## 2017-09-27 DIAGNOSIS — J01.00 ACUTE NON-RECURRENT MAXILLARY SINUSITIS: Primary | ICD-10-CM

## 2017-09-27 DIAGNOSIS — E78.5 DYSLIPIDEMIA: Chronic | ICD-10-CM

## 2017-09-27 DIAGNOSIS — J40 BRONCHITIS: ICD-10-CM

## 2017-09-27 DIAGNOSIS — I10 ESSENTIAL HYPERTENSION: Chronic | ICD-10-CM

## 2017-09-27 PROCEDURE — 96372 THER/PROPH/DIAG INJ SC/IM: CPT | Performed by: NURSE PRACTITIONER

## 2017-09-27 PROCEDURE — 99214 OFFICE O/P EST MOD 30 MIN: CPT | Performed by: NURSE PRACTITIONER

## 2017-09-27 RX ORDER — DOXYCYCLINE HYCLATE 100 MG/1
100 CAPSULE ORAL 2 TIMES DAILY
Qty: 20 CAPSULE | Refills: 0 | Status: SHIPPED | OUTPATIENT
Start: 2017-09-27 | End: 2017-10-07

## 2017-09-27 RX ORDER — VENLAFAXINE HYDROCHLORIDE 75 MG/1
75 CAPSULE, EXTENDED RELEASE ORAL DAILY
Qty: 30 CAPSULE | Refills: 2 | Status: SHIPPED | OUTPATIENT
Start: 2017-09-27 | End: 2017-12-27 | Stop reason: SDUPTHER

## 2017-09-27 RX ORDER — METHYLPREDNISOLONE ACETATE 80 MG/ML
80 INJECTION, SUSPENSION INTRA-ARTICULAR; INTRALESIONAL; INTRAMUSCULAR; SOFT TISSUE ONCE
Status: COMPLETED | OUTPATIENT
Start: 2017-09-27 | End: 2017-09-27

## 2017-09-27 RX ORDER — PREDNISONE 10 MG/1
20 TABLET ORAL 2 TIMES DAILY
Qty: 20 TABLET | Refills: 0 | Status: SHIPPED | OUTPATIENT
Start: 2017-09-27 | End: 2017-11-27

## 2017-09-27 RX ADMIN — METHYLPREDNISOLONE ACETATE 80 MG: 80 INJECTION, SUSPENSION INTRA-ARTICULAR; INTRALESIONAL; INTRAMUSCULAR; SOFT TISSUE at 16:34

## 2017-09-30 DIAGNOSIS — E11.8 TYPE 2 DIABETES MELLITUS WITH COMPLICATION, WITHOUT LONG-TERM CURRENT USE OF INSULIN (HCC): Chronic | ICD-10-CM

## 2017-10-02 PROBLEM — J32.9 SINUSITIS: Status: ACTIVE | Noted: 2017-10-02

## 2017-10-02 RX ORDER — SITAGLIPTIN AND METFORMIN HYDROCHLORIDE 1000; 100 MG/1; MG/1
TABLET, FILM COATED, EXTENDED RELEASE ORAL
Qty: 30 TABLET | Refills: 3 | Status: SHIPPED | OUTPATIENT
Start: 2017-10-02 | End: 2018-03-07 | Stop reason: ALTCHOICE

## 2017-10-02 RX ORDER — IMIQUIMOD 37.5 MG/G
CREAM TOPICAL
COMMUNITY
Start: 2017-09-12 | End: 2017-11-27

## 2017-10-03 ENCOUNTER — TELEPHONE (OUTPATIENT)
Dept: FAMILY MEDICINE CLINIC | Facility: CLINIC | Age: 57
End: 2017-10-03

## 2017-10-03 NOTE — TELEPHONE ENCOUNTER
I would recommend either to:     Call Dr Watts and get his input whether this could be relate to the Repatha which she started just prior to the cough. Or    She could come in to let one of the other providers evaluate her.

## 2017-10-03 NOTE — TELEPHONE ENCOUNTER
Monisha called and she said her cough did calm down a little but it is back and she said it is a bad cough what should she do

## 2017-10-05 ENCOUNTER — TELEPHONE (OUTPATIENT)
Dept: FAMILY MEDICINE CLINIC | Facility: CLINIC | Age: 57
End: 2017-10-05

## 2017-10-05 NOTE — TELEPHONE ENCOUNTER
----- Message from TRUONG Torres sent at 10/5/2017  9:38 AM EDT -----  If her cough continues to be better I do not see why she could not...If it does not continue to improve she needs to get Dr Watts's input.  ----- Message -----     From: Erika Long MA     Sent: 10/5/2017   9:21 AM       To: TRUONG Torres    She is due for her Repatha injection Monday, wants to know if you think she should continue it?? Her cough is little better      10/5/2017  S/w pt, she will call dr Watts in the morning for his input as well. tmills

## 2017-10-06 ENCOUNTER — TELEPHONE (OUTPATIENT)
Dept: CARDIOLOGY | Facility: CLINIC | Age: 57
End: 2017-10-06

## 2017-10-06 NOTE — TELEPHONE ENCOUNTER
"Patient reports having a \"harsh cough\" for the last several weeks.  She has also been experiencing cold like symptoms.  She has been treated with two different antibiotics with no relief.  Her MD is concerned this could be a side effect of the Repatha and urged the patient to call our office.    Patient has had two monthly injections at this point, and is almost ready for her third.  I advised her to hold medication until I have spoken with Dr. Watts.  "

## 2017-10-09 ENCOUNTER — TELEPHONE (OUTPATIENT)
Dept: FAMILY MEDICINE CLINIC | Facility: CLINIC | Age: 57
End: 2017-10-09

## 2017-10-09 NOTE — TELEPHONE ENCOUNTER
I have spoke with ave and the dr is not going to let her take her repatha shot today and she is doing much better the cough has almost stopped and she said thank you very much

## 2017-10-13 RX ORDER — METOPROLOL TARTRATE 50 MG/1
TABLET, FILM COATED ORAL
Qty: 60 TABLET | Refills: 6 | Status: SHIPPED | OUTPATIENT
Start: 2017-10-13 | End: 2018-05-05 | Stop reason: SDUPTHER

## 2017-10-13 RX ORDER — CLOPIDOGREL BISULFATE 75 MG/1
TABLET ORAL
Qty: 30 TABLET | Refills: 6 | Status: SHIPPED | OUTPATIENT
Start: 2017-10-13 | End: 2018-05-05 | Stop reason: SDUPTHER

## 2017-10-16 RX ORDER — LOSARTAN POTASSIUM 50 MG/1
TABLET ORAL
Qty: 30 TABLET | Refills: 6 | Status: SHIPPED | OUTPATIENT
Start: 2017-10-16 | End: 2018-01-21 | Stop reason: SDUPTHER

## 2017-10-26 ENCOUNTER — TELEPHONE (OUTPATIENT)
Dept: CARDIOLOGY | Facility: CLINIC | Age: 57
End: 2017-10-26

## 2017-10-26 ENCOUNTER — TELEPHONE (OUTPATIENT)
Dept: FAMILY MEDICINE CLINIC | Facility: CLINIC | Age: 57
End: 2017-10-26

## 2017-10-26 DIAGNOSIS — R25.2 MUSCLE CRAMPS: Primary | ICD-10-CM

## 2017-10-26 NOTE — TELEPHONE ENCOUNTER
Patient wants to know when she should take her Repatha injection again.  She is also having muscle cramps in feet/legs.    Informed patient to hold Repatha one month as recommended by Dr. Watts for flu like symptoms.  Next injection would be on 11/9/17.    Follow up with PCP about muscle cramps.    Patient verbalized understanding.

## 2017-10-26 NOTE — TELEPHONE ENCOUNTER
----- Message from TRUONG Torres sent at 10/26/2017 11:35 AM EDT -----  That is fine. I will place the order and she can come on in and have it done..thanks  ----- Message -----     From: Mark Muñoz Rep     Sent: 10/26/2017  11:27 AM       To: TRUONG Torres    Having sever leg cramps, jamie recommend we check her potassium and what ever you recommend .

## 2017-10-27 ENCOUNTER — LAB (OUTPATIENT)
Dept: FAMILY MEDICINE CLINIC | Facility: CLINIC | Age: 57
End: 2017-10-27

## 2017-10-27 DIAGNOSIS — E11.65 UNCONTROLLED TYPE 2 DIABETES MELLITUS WITH COMPLICATION, UNSPECIFIED LONG TERM INSULIN USE STATUS: ICD-10-CM

## 2017-10-27 DIAGNOSIS — K21.9 GASTROESOPHAGEAL REFLUX DISEASE, ESOPHAGITIS PRESENCE NOT SPECIFIED: ICD-10-CM

## 2017-10-27 DIAGNOSIS — R25.2 MUSCLE CRAMPS: ICD-10-CM

## 2017-10-27 DIAGNOSIS — E11.8 UNCONTROLLED TYPE 2 DIABETES MELLITUS WITH COMPLICATION, UNSPECIFIED LONG TERM INSULIN USE STATUS: ICD-10-CM

## 2017-10-27 DIAGNOSIS — E55.9 VITAMIN D DEFICIENCY: ICD-10-CM

## 2017-10-27 DIAGNOSIS — E78.5 DYSLIPIDEMIA: ICD-10-CM

## 2017-10-27 LAB
25(OH)D3 SERPL-MCNC: 34 NG/ML
ALBUMIN SERPL-MCNC: 4.5 G/DL (ref 3.5–5)
ALBUMIN UR-MCNC: 5.1 MG/L
ALBUMIN/GLOB SERPL: 1.7 G/DL (ref 1.5–2.5)
ALP SERPL-CCNC: 94 U/L (ref 35–104)
ALT SERPL W P-5'-P-CCNC: 56 U/L (ref 10–36)
ANION GAP SERPL CALCULATED.3IONS-SCNC: 6.4 MMOL/L (ref 3.6–11.2)
AST SERPL-CCNC: 43 U/L (ref 10–30)
BASOPHILS # BLD AUTO: 0.06 10*3/MM3 (ref 0–0.3)
BASOPHILS NFR BLD AUTO: 0.6 % (ref 0–2)
BILIRUB SERPL-MCNC: 0.3 MG/DL (ref 0.2–1.8)
BUN BLD-MCNC: 26 MG/DL (ref 7–21)
BUN/CREAT SERPL: 17.2 (ref 7–25)
CALCIUM SPEC-SCNC: 10.1 MG/DL (ref 7.7–10)
CHLORIDE SERPL-SCNC: 105 MMOL/L (ref 99–112)
CHOLEST SERPL-MCNC: 231 MG/DL (ref 0–200)
CO2 SERPL-SCNC: 29.6 MMOL/L (ref 24.3–31.9)
CREAT BLD-MCNC: 1.51 MG/DL (ref 0.43–1.29)
DEPRECATED RDW RBC AUTO: 45.6 FL (ref 37–54)
EOSINOPHIL # BLD AUTO: 0.16 10*3/MM3 (ref 0–0.7)
EOSINOPHIL NFR BLD AUTO: 1.6 % (ref 0–5)
ERYTHROCYTE [DISTWIDTH] IN BLOOD BY AUTOMATED COUNT: 14.9 % (ref 11.5–14.5)
GFR SERPL CREATININE-BSD FRML MDRD: 36 ML/MIN/1.73
GLOBULIN UR ELPH-MCNC: 2.6 GM/DL
GLUCOSE BLD-MCNC: 123 MG/DL (ref 70–110)
HBA1C MFR BLD: 6.2 % (ref 4.5–5.7)
HCT VFR BLD AUTO: 41.5 % (ref 37–47)
HDLC SERPL-MCNC: 58 MG/DL (ref 60–100)
HGB BLD-MCNC: 13.7 G/DL (ref 12–16)
IMM GRANULOCYTES # BLD: 0.04 10*3/MM3 (ref 0–0.03)
IMM GRANULOCYTES NFR BLD: 0.4 % (ref 0–0.5)
LDLC SERPL CALC-MCNC: 136 MG/DL (ref 0–100)
LDLC/HDLC SERPL: 2.35 {RATIO}
LYMPHOCYTES # BLD AUTO: 3.12 10*3/MM3 (ref 1–3)
LYMPHOCYTES NFR BLD AUTO: 30.6 % (ref 21–51)
MAGNESIUM SERPL-MCNC: 2.2 MG/DL (ref 1.7–2.6)
MCH RBC QN AUTO: 28 PG (ref 27–33)
MCHC RBC AUTO-ENTMCNC: 33 G/DL (ref 33–37)
MCV RBC AUTO: 84.9 FL (ref 80–94)
MONOCYTES # BLD AUTO: 0.67 10*3/MM3 (ref 0.1–0.9)
MONOCYTES NFR BLD AUTO: 6.6 % (ref 0–10)
NEUTROPHILS # BLD AUTO: 6.15 10*3/MM3 (ref 1.4–6.5)
NEUTROPHILS NFR BLD AUTO: 60.2 % (ref 30–70)
OSMOLALITY SERPL CALC.SUM OF ELEC: 287.4 MOSM/KG (ref 273–305)
PLATELET # BLD AUTO: 359 10*3/MM3 (ref 130–400)
PMV BLD AUTO: 9.6 FL (ref 6–10)
POTASSIUM BLD-SCNC: 4.3 MMOL/L (ref 3.5–5.3)
PROT SERPL-MCNC: 7.1 G/DL (ref 6–8)
RBC # BLD AUTO: 4.89 10*6/MM3 (ref 4.2–5.4)
SODIUM BLD-SCNC: 141 MMOL/L (ref 135–153)
TRIGL SERPL-MCNC: 184 MG/DL (ref 0–150)
TSH SERPL DL<=0.05 MIU/L-ACNC: 0.65 MIU/ML (ref 0.55–4.78)
VIT B12 BLD-MCNC: 349 PG/ML (ref 211–911)
VLDLC SERPL-MCNC: 36.8 MG/DL
WBC NRBC COR # BLD: 10.2 10*3/MM3 (ref 4.5–12.5)

## 2017-10-27 PROCEDURE — 84443 ASSAY THYROID STIM HORMONE: CPT | Performed by: NURSE PRACTITIONER

## 2017-10-27 PROCEDURE — 85025 COMPLETE CBC W/AUTO DIFF WBC: CPT | Performed by: NURSE PRACTITIONER

## 2017-10-27 PROCEDURE — 83036 HEMOGLOBIN GLYCOSYLATED A1C: CPT | Performed by: NURSE PRACTITIONER

## 2017-10-27 PROCEDURE — 82306 VITAMIN D 25 HYDROXY: CPT | Performed by: NURSE PRACTITIONER

## 2017-10-27 PROCEDURE — 82607 VITAMIN B-12: CPT | Performed by: NURSE PRACTITIONER

## 2017-10-27 PROCEDURE — 36415 COLL VENOUS BLD VENIPUNCTURE: CPT

## 2017-10-27 PROCEDURE — 83735 ASSAY OF MAGNESIUM: CPT | Performed by: NURSE PRACTITIONER

## 2017-10-27 PROCEDURE — 80053 COMPREHEN METABOLIC PANEL: CPT | Performed by: NURSE PRACTITIONER

## 2017-10-27 PROCEDURE — 82043 UR ALBUMIN QUANTITATIVE: CPT | Performed by: NURSE PRACTITIONER

## 2017-10-27 PROCEDURE — 80061 LIPID PANEL: CPT | Performed by: NURSE PRACTITIONER

## 2017-10-28 ENCOUNTER — TELEPHONE (OUTPATIENT)
Dept: RETAIL CLINIC | Facility: CLINIC | Age: 57
End: 2017-10-28

## 2017-10-28 NOTE — TELEPHONE ENCOUNTER
Discussed results of her CMP and Magnesium with Monisha which revealed an increase in BUN and Creatinine. She reports she has been dieting very strictly over the past month. Recommended she increase water intake, monitor her urine output, do not restrict CHOs more than 30-45 Grams per meal and put her Janumet on hold and increase her blood glucose monitoring. Counseled regarding s/s of concern and if occur to seek further medical evaluation. Will repeat CMP in one week. She verbalized understanding.

## 2017-11-07 ENCOUNTER — LAB (OUTPATIENT)
Dept: FAMILY MEDICINE CLINIC | Facility: CLINIC | Age: 57
End: 2017-11-07

## 2017-11-07 DIAGNOSIS — R25.2 MUSCLE CRAMPS: ICD-10-CM

## 2017-11-07 LAB
ALBUMIN SERPL-MCNC: 4.2 G/DL (ref 3.5–5)
ALBUMIN/GLOB SERPL: 1.7 G/DL (ref 1.5–2.5)
ALP SERPL-CCNC: 95 U/L (ref 35–104)
ALT SERPL W P-5'-P-CCNC: 29 U/L (ref 10–36)
ANION GAP SERPL CALCULATED.3IONS-SCNC: 5.2 MMOL/L (ref 3.6–11.2)
AST SERPL-CCNC: 23 U/L (ref 10–30)
BILIRUB SERPL-MCNC: 0.3 MG/DL (ref 0.2–1.8)
BUN BLD-MCNC: 14 MG/DL (ref 7–21)
BUN/CREAT SERPL: 15.1 (ref 7–25)
CALCIUM SPEC-SCNC: 9.5 MG/DL (ref 7.7–10)
CHLORIDE SERPL-SCNC: 107 MMOL/L (ref 99–112)
CO2 SERPL-SCNC: 30.8 MMOL/L (ref 24.3–31.9)
CREAT BLD-MCNC: 0.93 MG/DL (ref 0.43–1.29)
GFR SERPL CREATININE-BSD FRML MDRD: 62 ML/MIN/1.73
GLOBULIN UR ELPH-MCNC: 2.5 GM/DL
GLUCOSE BLD-MCNC: 104 MG/DL (ref 70–110)
OSMOLALITY SERPL CALC.SUM OF ELEC: 285.8 MOSM/KG (ref 273–305)
POTASSIUM BLD-SCNC: 4.5 MMOL/L (ref 3.5–5.3)
PROT SERPL-MCNC: 6.7 G/DL (ref 6–8)
SODIUM BLD-SCNC: 143 MMOL/L (ref 135–153)

## 2017-11-07 PROCEDURE — 80053 COMPREHEN METABOLIC PANEL: CPT | Performed by: NURSE PRACTITIONER

## 2017-11-07 PROCEDURE — 36415 COLL VENOUS BLD VENIPUNCTURE: CPT

## 2017-11-22 ENCOUNTER — OFFICE VISIT (OUTPATIENT)
Dept: FAMILY MEDICINE CLINIC | Facility: CLINIC | Age: 57
End: 2017-11-22

## 2017-11-22 VITALS
BODY MASS INDEX: 31.76 KG/M2 | DIASTOLIC BLOOD PRESSURE: 80 MMHG | HEIGHT: 64 IN | HEART RATE: 76 BPM | WEIGHT: 186 LBS | TEMPERATURE: 98.6 F | OXYGEN SATURATION: 96 % | SYSTOLIC BLOOD PRESSURE: 130 MMHG

## 2017-11-22 DIAGNOSIS — E78.5 DYSLIPIDEMIA: Chronic | ICD-10-CM

## 2017-11-22 DIAGNOSIS — E55.9 VITAMIN D DEFICIENCY: ICD-10-CM

## 2017-11-22 DIAGNOSIS — E11.8 TYPE 2 DIABETES MELLITUS WITH COMPLICATION, WITHOUT LONG-TERM CURRENT USE OF INSULIN (HCC): Primary | ICD-10-CM

## 2017-11-22 DIAGNOSIS — M19.90 OSTEOARTHRITIS, UNSPECIFIED OSTEOARTHRITIS TYPE, UNSPECIFIED SITE: ICD-10-CM

## 2017-11-22 DIAGNOSIS — I25.708 CORONARY ARTERY DISEASE OF BYPASS GRAFT OF NATIVE HEART WITH STABLE ANGINA PECTORIS (HCC): ICD-10-CM

## 2017-11-22 DIAGNOSIS — I10 ESSENTIAL HYPERTENSION: Chronic | ICD-10-CM

## 2017-11-22 PROCEDURE — 99214 OFFICE O/P EST MOD 30 MIN: CPT | Performed by: NURSE PRACTITIONER

## 2017-11-22 NOTE — PATIENT INSTRUCTIONS
Type 2 Diabetes Mellitus, Self Care, Adult  When you have type 2 diabetes (type 2 diabetes mellitus), you must keep your blood sugar (glucose) under control. You can do this with:  · Nutrition.  · Exercise.  · Lifestyle changes.  · Medicines or insulin, if needed.  · Support from your doctors and others.  HOW DO I MANAGE MY BLOOD SUGAR?  · Check your blood sugar level every day, as often as told.  · Call your doctor if your blood sugar is above your goal numbers for 2 tests in a row.  · Have your A1c (hemoglobin A1c) level checked at least two times a year. Have it checked more often if your doctor tells you to.  Your doctor will set treatment goals for you. Generally, you should have these blood sugar levels:  · Before meals (preprandial):  mg/dL (4.4-7.2 mmol/L).  · After meals (postprandial): lower than 180 mg/dL (10 mmol/L).  · A1c level: less than 7%.  WHAT DO I NEED TO KNOW ABOUT HIGH BLOOD SUGAR?  High blood sugar is called hyperglycemia. Know the signs of high blood sugar. Signs may include:  · Feeling:    Thirsty.    Hungry.    Very tired.  · Needing to pee (urinate) more than usual.  · Blurry vision.  WHAT DO I NEED TO KNOW ABOUT LOW BLOOD SUGAR?  Low blood sugar is called hypoglycemia. This is when blood sugar is at or below 70 mg/dL (3.9 mmol/L). Symptoms may include:  · Feeling:    Hungry.    Worried or nervous (anxious).    Sweaty and clammy.    Confused.    Dizzy.    Sleepy.    Sick to your stomach (nauseous).  · Having:    A fast heartbeat (palpitations).    A headache.    A change in your vision.    Jerky movements that you cannot control (seizure).    Nightmares.    Tingling or no feeling (numbness) around the mouth, lips, or tongue.  · Having trouble with:    Talking.    Paying attention (concentrating).    Moving (coordination).    Sleeping.  · Shaking.  · Passing out (fainting).  · Getting upset easily (irritability).  Treating low blood sugar  To treat low blood sugar, eat or drink  something sugary right away. If you can think clearly and swallow safely, follow the 15:15 rule:   · Take 15 grams of a fast-acting carb (carbohydrate). Some fast-acting carbs are:    1 tube of glucose gel.    3 sugar tablets (glucose pills).    6-8 pieces of hard candy.    4 oz (120 mL) of fruit juice.    4 oz (120 mL) regular (not diet) soda.  · Check your blood sugar 15 minutes after you take the carb.  · If your blood sugar is still at or below 70 mg/dL (3.9 mmol/L), take 15 grams of a carb again.  · If your blood sugar does not go above 70 mg/dL (3.9 mmol/L) after 3 tries, get help right away.  · After your blood sugar goes back to normal, eat a meal or a snack within 1 hour.  Treating very low blood sugar  If your blood sugar is at or below 54 mg/dL (3 mmol/L), you have very low blood sugar (severe hypoglycemia). This is an emergency. Do not wait to see if the symptoms will go away. Get medical help right away. Call your local emergency services (911 in the [place] Do not drive yourself to the hospital.  If you have very low blood sugar and you cannot eat or drink, you may need a glucagon shot (injection). A family member or friend should learn how to check your blood sugar and how to give you a glucagon shot. Ask your doctor if you need to have a glucagon shot kit at home.  WHAT ELSE IS IMPORTANT TO MANAGE MY DIABETES?  Medicine  Follow these instructions about insulin and diabetes medicines:  · Take them as told by your doctor.  · Adjust them as told by your doctor.  · Do not run out of them.  Having diabetes can raise your risk for other long-term conditions. These include heart or kidney disease. Your doctor may prescribe medicines to help prevent problems from diabetes.  Food  · Make healthy food choices. These include:    Chicken, fish, egg whites, and beans.    Oats, whole wheat, bulgur, brown rice, quinoa, and millet.    Fresh fruits and vegetables.    Low-fat dairy products.    Nuts, avocado, olive  oil, and canola oil.  · Make a food plan with a specialist (dietitian).  · Follow instructions from your doctor about what you cannot eat or drink.  · Drink enough fluid to keep your pee (urine) clear or pale yellow.  · Eat healthy snacks between healthy meals.  · Keep track of carbs that you eat. Read food labels. Learn food serving sizes.  · Follow your sick day plan when you cannot eat or drink normally. Make this plan with your doctor so it is ready to use.  Activity  · Exercise at least 3 times a week.  · Do not go more than 2 days without exercising.  · Talk with your doctor before you start a new exercise. Your doctor may need to adjust your insulin, medicines, or food.  Lifestyle  · Do not use any tobacco products. These include cigarettes, chewing tobacco, and e-cigarettes.If you need help quitting, ask your doctor.  · Ask your doctor how much alcohol is safe for you.  · Learn to deal with stress. If you need help with this, ask your doctor.  Body care  · Stay up to date with your shots (immunizations).  · Have your eyes and feet checked by a doctor as often as told.  · Check your skin and feet every day. Check for cuts, bruises, redness, blisters, or sores.  · Brush your teeth and gums two times a day.  · Floss at least one time a day.  · Go to the dentist least one time every 6 months.  · Stay at a healthy weight.  General instructions  · Take over-the-counter and prescription medicines only as told by your doctor.  · Share your diabetes care plan with:    Your work or school.    People you live with.  · Check your pee (urine) for ketones:    When you are sick.    As told by your doctor.  · Carry a card or wear jewelry that says that you have diabetes.  · Ask your doctor:    Do I need to meet with a diabetes educator?    Where can I find a support group for people with diabetes?  · Keep all follow-up visits as told by your doctor. This is important.  WHERE TO FIND MORE INFORMATION:  To learn more about  "diabetes, visit:  · American Diabetes Association: www.diabetes.org  · American Association of Diabetes Educators: www.diabeteseducator.org/patient-resources     This information is not intended to replace advice given to you by your health care provider. Make sure you discuss any questions you have with your health care provider.     Document Released: 04/10/2017 Document Reviewed: 01/20/2017  [employer] Interactive Patient Education ©2017 [employer] Inc.  DASH Eating Plan  DASH stands for \"Dietary Approaches to Stop Hypertension.\" The DASH eating plan is a healthy eating plan that has been shown to reduce high blood pressure (hypertension). Additional health benefits may include reducing the risk of type 2 diabetes mellitus, heart disease, and stroke. The DASH eating plan may also help with weight loss.  WHAT DO I NEED TO KNOW ABOUT THE DASH EATING PLAN?  For the DASH eating plan, you will follow these general guidelines:  · Choose foods with less than 150 milligrams of sodium per serving (as listed on the food label).  · Use salt-free seasonings or herbs instead of table salt or sea salt.  · Check with your health care provider or pharmacist before using salt substitutes.  · Eat lower-sodium products. These are often labeled as \"low-sodium\" or \"no salt added.\"  · Eat fresh foods. Avoid eating a lot of canned foods.  · Eat more vegetables, fruits, and low-fat dairy products.  · Choose whole grains. Look for the word \"whole\" as the first word in the ingredient list.  · Choose fish and skinless chicken or turkey more often than red meat. Limit fish, poultry, and meat to 6 oz (170 g) each day.  · Limit sweets, desserts, sugars, and sugary drinks.  · Choose heart-healthy fats.  · Eat more home-cooked food and less restaurant, buffet, and fast food.  · Limit fried foods.  · Do not terrell foods. Cook foods using methods such as baking, boiling, grilling, and broiling instead.  · When eating at a restaurant, ask that your food " be prepared with less salt, or no salt if possible.  WHAT FOODS CAN I EAT?  Seek help from a dietitian for individual calorie needs.  Grains  Whole grain or whole wheat bread. Brown rice. Whole grain or whole wheat pasta. Quinoa, bulgur, and whole grain cereals. Low-sodium cereals. Corn or whole wheat flour tortillas. Whole grain cornbread. Whole grain crackers. Low-sodium crackers.  Vegetables  Fresh or frozen vegetables (raw, steamed, roasted, or grilled). Low-sodium or reduced-sodium tomato and vegetable juices. Low-sodium or reduced-sodium tomato sauce and paste. Low-sodium or reduced-sodium canned vegetables.   Fruits  All fresh, canned (in natural juice), or frozen fruits.  Meat and Other Protein Products  Ground beef (85% or leaner), grass-fed beef, or beef trimmed of fat. Skinless chicken or turkey. Ground chicken or turkey. Pork trimmed of fat. All fish and seafood. Eggs. Dried beans, peas, or lentils. Unsalted nuts and seeds. Unsalted canned beans.  Dairy  Low-fat dairy products, such as skim or 1% milk, 2% or reduced-fat cheeses, low-fat ricotta or cottage cheese, or plain low-fat yogurt. Low-sodium or reduced-sodium cheeses.  Fats and Oils  Tub margarines without trans fats. Light or reduced-fat mayonnaise and salad dressings (reduced sodium). Avocado. Safflower, olive, or canola oils. Natural peanut or almond butter.  Other  Unsalted popcorn and pretzels.  The items listed above may not be a complete list of recommended foods or beverages. Contact your dietitian for more options.  WHAT FOODS ARE NOT RECOMMENDED?  Grains  White bread. White pasta. White rice. Refined cornbread. Bagels and croissants. Crackers that contain trans fat.  Vegetables  Creamed or fried vegetables. Vegetables in a cheese sauce. Regular canned vegetables. Regular canned tomato sauce and paste. Regular tomato and vegetable juices.  Fruits  Canned fruit in light or heavy syrup. Fruit juice.  Meat and Other Protein Products  Fatty  cuts of meat. Ribs, chicken wings, villar, sausage, bologna, salami, chitterlings, fatback, hot dogs, bratwurst, and packaged luncheon meats. Salted nuts and seeds. Canned beans with salt.  Dairy  Whole or 2% milk, cream, half-and-half, and cream cheese. Whole-fat or sweetened yogurt. Full-fat cheeses or blue cheese. Nondairy creamers and whipped toppings. Processed cheese, cheese spreads, or cheese curds.  Condiments  Onion and garlic salt, seasoned salt, table salt, and sea salt. Canned and packaged gravies. Worcestershire sauce. Tartar sauce. Barbecue sauce. Teriyaki sauce. Soy sauce, including reduced sodium. Steak sauce. Fish sauce. Oyster sauce. Cocktail sauce. Horseradish. Ketchup and mustard. Meat flavorings and tenderizers. Bouillon cubes. Hot sauce. Tabasco sauce. Marinades. Taco seasonings. Relishes.  Fats and Oils  Butter, stick margarine, lard, shortening, ghee, and villar fat. Coconut, palm kernel, or palm oils. Regular salad dressings.  Other  Pickles and olives. Salted popcorn and pretzels.  The items listed above may not be a complete list of foods and beverages to avoid. Contact your dietitian for more information.  WHERE CAN I FIND MORE INFORMATION?  National Heart, Lung, and [employer] [place]: www.nhlbi.nih.gov/health/health-topics/topics/dash/     This information is not intended to replace advice given to you by your health care provider. Make sure you discuss any questions you have with your health care provider.     Document Released: 12/06/2012 Document Revised: 04/10/2017 Document Reviewed: 10/22/2014  Elsevier Interactive Patient Education ©2017 [employer]

## 2017-11-22 NOTE — PROGRESS NOTES
HPI Comments: Monisha presents to the office today for follow up. She recently had lab work done pertaining to her  h/o DM, type 2, HTN, Dyslipidemia, Gastritis and CAD with Previous MI  Stent. These will be reviewed with her.    Diabetes   She has type 2 diabetes mellitus. Her disease course has been improving.  Pertinent negatives for diabetes include no chest pain, no weakness and no weight loss. Diabetic complications include peripheral neuropathy. Risk factors for coronary artery disease include diabetes mellitus, dyslipidemia, family history, hypertension, obesity, post-menopausal, sedentary lifestyle and stress. Current diabetic treatment includes oral agent (dual therapy) which is on hold due to recent decline in renal function and basal insulin. She is compliant with treatment most of the time. She is following a generally healthy diet. She has had a previous visit with a dietitian. She has increased her exercise due to assisting her mother with her joint replacement exercises. An ACE inhibitor/angiotensin II receptor blocker is being taken. Eye exam is current.     Hypertension   The current episode started more than 1 year ago. Pertinent negatives include no chest pain, palpitations, peripheral edema or shortness of breath. Risk factors for coronary artery disease include diabetes mellitus, dyslipidemia, family history, obesity, post-menopausal state, sedentary lifestyle and stress. Past treatments include angiotensin blockers and beta blockers.     Hyperlipidemia   The current episode started more than 1 year ago. The problem is uncontrolled. Recent lipid tests were reviewed and are high. Pertinent negatives include no chest pain or shortness of breath. She is currently on no antihyperlipidemic treatment. Compliance problems include medication side effects.  Risk factors for coronary artery disease include diabetes mellitus, family history, dyslipidemia, hypertension, obesity, post-menopausal and  "stress. Dr Watts, her cardiologist, has started her on Repatha. This has been on hold due to her respiratory symptoms which felt was related to this medication. She is due to restart soon.     Rash   The current episode started more than 1 month ago. The problem has been gradually worsening since onset. The affected locations include the right lowerleg and left lower leg. The rash is characterized by redness. She was exposed to nothing. Pertinent negatives include no cough, facial edema, fever, shortness of breath or vomiting. Since her last appt, she has seen a dermatologist, Dr Rowe, in [place] whom Monisha was not happy with and will be seeing a dermatologist in [place].     Refer to ROS for additional information.      Vitals:    11/22/17 1054   BP: 130/80   Pulse: 76   Temp: 98.6 °F (37 °C)   TempSrc: Temporal Artery    SpO2: 96%   Weight: 186 lb (84.4 kg)   Height: 64\" (162.6 cm)      The following portions of the patient's history were reviewed and updated as appropriate: allergies, current medications, past family history, past medical history, past social history, past surgical history and problem list.    Review of Systems   Constitutional: Positive for activity change (Increasing). Negative for appetite change, chills, fatigue, fever and unexpected weight change.   HENT: Negative.    Eyes: Negative for visual disturbance.   Respiratory: Negative for cough, chest tightness, shortness of breath and wheezing.    Cardiovascular: Negative for chest pain, palpitations and leg swelling.   Gastrointestinal: Negative for abdominal pain, nausea and vomiting.   Endocrine: Negative for cold intolerance, heat intolerance, polydipsia, polyphagia and polyuria.   Musculoskeletal: Positive for arthralgias.   Skin: Positive for rash (Stable). Negative for color change.   Neurological: Negative for dizziness, tremors, speech difficulty, weakness, light-headedness and headaches.   Hematological: Negative for adenopathy. "   Psychiatric/Behavioral: Negative for confusion, decreased concentration and suicidal ideas. The patient is not nervous/anxious.    All other systems reviewed and are negative.    Physical Exam   Constitutional: She is oriented to person, place, and time. She appears well-developed and well-nourished. No distress.   HENT:   Head: Normocephalic.   Nose: Nose normal.   Mouth/Throat: Oropharynx is clear and moist. No oropharyngeal exudate.   Eyes: Conjunctivae are normal. Pupils are equal, round, and reactive to light. Right eye exhibits no discharge. Left eye exhibits no discharge. No scleral icterus.   Neck: Neck supple. No JVD present. No thyromegaly present.   Cardiovascular: Normal rate, regular rhythm and normal heart sounds.  Exam reveals no friction rub.    No murmur heard.  Pulmonary/Chest: Effort normal and breath sounds normal. No respiratory distress. She has no wheezes. She has no rales.   Abdominal: Soft. She exhibits no distension. There is no tenderness. There is no rebound and no guarding.   Musculoskeletal: She exhibits no edema.   Lymphadenopathy:     She has no cervical adenopathy.   Neurological: She is alert and oriented to person, place, and time.   Skin: Skin is warm and dry. Rash noted. No erythema.   Psychiatric: She has a normal mood and affect. Her behavior is normal. Judgment and thought content normal.   Vitals reviewed.    Assessment/Plan   Problems Addressed this Visit        Cardiovascular and Mediastinum    CAD (coronary artery disease)    Hypertension       Digestive    Vitamin D deficiency    Relevant Orders    Vitamin D 25 Hydroxy       Endocrine    Type 2 diabetes mellitus - Primary    Relevant Orders    Comprehensive Metabolic Panel    TSH    Hemoglobin A1c    Vitamin B12    MicroAlbumin, Urine, Random - Urine, Clean Catch       Musculoskeletal and Integument    Osteoarthritis    Relevant Orders    CBC & Differential       Other    Dyslipidemia    Relevant Orders    Lipid Panel           Results for orders placed or performed in visit on 11/07/17   Comprehensive Metabolic Panel   Result Value Ref Range    Glucose 104 70 - 110 mg/dL    BUN 14 7 - 21 mg/dL    Creatinine 0.93 0.43 - 1.29 mg/dL    Sodium 143 135 - 153 mmol/L    Potassium 4.5 3.5 - 5.3 mmol/L    Chloride 107 99 - 112 mmol/L    CO2 30.8 24.3 - 31.9 mmol/L    Calcium 9.5 7.7 - 10.0 mg/dL    Total Protein 6.7 6.0 - 8.0 g/dL    Albumin 4.20 3.50 - 5.00 g/dL    ALT (SGPT) 29 10 - 36 U/L    AST (SGOT) 23 10 - 30 U/L    Alkaline Phosphatase 95 35 - 104 U/L    Total Bilirubin 0.3 0.2 - 1.8 mg/dL    eGFR Non African Amer 62 >60 mL/min/1.73    Globulin 2.5 gm/dL    A/G Ratio 1.7 1.5 - 2.5 g/dL    BUN/Creatinine Ratio 15.1 7.0 - 25.0    Anion Gap 5.2 3.6 - 11.2 mmol/L   Osmolality, Calculated   Result Value Ref Range    Osmolality Calc 285.8 273.0 - 305.0 mOsm/kg     Findings and recommendations discussed with Monisha. Reviewed her lab results with her. Treatment options discussed. Will continue to hold JanuMet at this time and add Trulicity. Reviewed her lipid panel which included TC: 231 mg/dL; Triglycerides: 184 mg/dL; and HDL: 58 mg/dL and  mg/dL with good response to Repatha.   Lifestyle modifications reinforced including nutrition and exercise recommendations. Monisha will f/u in late January with labs scheduled prior to her appt. Sooner if problems/concerns occur.

## 2017-11-27 ENCOUNTER — TELEPHONE (OUTPATIENT)
Dept: FAMILY MEDICINE CLINIC | Facility: CLINIC | Age: 57
End: 2017-11-27

## 2017-11-27 PROBLEM — J32.9 SINUSITIS: Status: RESOLVED | Noted: 2017-10-02 | Resolved: 2017-11-27

## 2017-11-27 RX ORDER — COLCHICINE 0.6 MG/1
TABLET ORAL
Qty: 3 TABLET | Refills: 0 | Status: SHIPPED | OUTPATIENT
Start: 2017-11-27 | End: 2018-01-17

## 2017-11-27 NOTE — TELEPHONE ENCOUNTER
----- Message from TRUONG Torres sent at 11/27/2017 11:23 AM EST -----  Cristal,    I looked at her visit and they also gave her a steroid injection and started her on Zithromax. She has had a recent issue with her kidneys so I want to be cautious about what I prescribe for her. I will send three more tablets which she can take today. She can take two at one time and wait one hour and take the other. The other medication used is Indocin which can impact kidneys so I would rather her wait and see how she is feeling. You may check to see her could see her like on Tuesday,  Wednesday or Thursday if she is no better. thanks  ----- Message -----     From: Mark Muñoz Rep     Sent: 11/27/2017  10:57 AM       To: TRUONG Torres    I have the visit scanned in for you to review, she said does seem to be helping. Also gave her flu medicaition but is feeling better with that   [facility] [place]   ----- Message -----     From: TRUONG Torres     Sent: 11/27/2017   9:55 AM       To: Mark Muñoz    If you would confirm what they gave her and if it worked. If it is colchicine, I can prescribe an additional number. She can take three for three days if she tolerated them.Also, where to send .thanks  ----- Message -----     From: Mark Muñoz Rep     Sent: 11/27/2017   9:06 AM       To: TRUONG Torres    Went to urgent care over the holiday was treated for gout, but only gave her two pills and told her if it was still flared up to call her pcp for more.. Tried to get her in but not able to come     [facility]         11/27/2017  S/W Patient,  She understands the directions on taking the medication and if she is no better she will call to be seen by any providers tmills

## 2017-12-20 NOTE — TELEPHONE ENCOUNTER
Left Message - left message about patient's injectable cholesterol medication. Ask if they could assist with the Rapatha since the insurance will not cover praluent. Ask Nurse to return my call. PKF

## 2017-12-20 NOTE — TELEPHONE ENCOUNTER
Called pt to be sure that she has gotten the 3 way that was called in. She said she had. I told her if she was not doing better in a few days she would need to be seen. SLY

## 2017-12-27 DIAGNOSIS — N95.1 MENOPAUSAL SYMPTOMS: ICD-10-CM

## 2017-12-28 ENCOUNTER — OFFICE VISIT (OUTPATIENT)
Dept: FAMILY MEDICINE CLINIC | Facility: CLINIC | Age: 57
End: 2017-12-28

## 2017-12-28 VITALS
HEART RATE: 63 BPM | BODY MASS INDEX: 31.76 KG/M2 | DIASTOLIC BLOOD PRESSURE: 74 MMHG | WEIGHT: 186 LBS | HEIGHT: 64 IN | TEMPERATURE: 98.6 F | OXYGEN SATURATION: 97 % | SYSTOLIC BLOOD PRESSURE: 134 MMHG

## 2017-12-28 DIAGNOSIS — M79.604 PAIN IN BOTH LOWER EXTREMITIES: Primary | ICD-10-CM

## 2017-12-28 DIAGNOSIS — M79.605 PAIN IN BOTH LOWER EXTREMITIES: Primary | ICD-10-CM

## 2017-12-28 PROCEDURE — 99213 OFFICE O/P EST LOW 20 MIN: CPT | Performed by: PHYSICIAN ASSISTANT

## 2017-12-28 RX ORDER — VENLAFAXINE HYDROCHLORIDE 75 MG/1
CAPSULE, EXTENDED RELEASE ORAL
Qty: 30 CAPSULE | Refills: 2 | Status: SHIPPED | OUTPATIENT
Start: 2017-12-28 | End: 2018-03-07 | Stop reason: ALTCHOICE

## 2017-12-28 RX ORDER — ROPINIROLE 0.25 MG/1
0.25 TABLET, FILM COATED ORAL SEE ADMIN INSTRUCTIONS
Qty: 60 TABLET | Refills: 0 | Status: SHIPPED | OUTPATIENT
Start: 2017-12-28 | End: 2018-01-17

## 2017-12-28 NOTE — PROGRESS NOTES
"Subjective   Monisha Gardner is a 57 y.o. female.     History of Present Illness     Leg pain-  She complains of bilateral lower extremity moderate burning with associated \"drawing up\"of feet that is worse when she is sleeping.  Onset was several months ago.  Potassium has been checked and found to be normal since that time.  She states that she does have an urge to move and has to get up and walk around the house to try and alleviate the pain.    The following portions of the patient's history were reviewed and updated as appropriate: allergies, current medications, past family history, past medical history, past social history, past surgical history and problem list.    Review of Systems   Constitutional: Negative for activity change, appetite change and fever.   HENT: Negative for ear pain, sinus pressure and sore throat.    Eyes: Negative for pain and visual disturbance.   Respiratory: Negative for cough and chest tightness.    Cardiovascular: Negative for chest pain and palpitations.   Gastrointestinal: Negative for abdominal pain, constipation, diarrhea, nausea and vomiting.   Endocrine: Negative for polydipsia and polyuria.   Genitourinary: Negative for dysuria and frequency.   Musculoskeletal: Negative for back pain and myalgias.        Leg pain and feet drawing up   Skin: Negative for color change and rash.   Allergic/Immunologic: Negative for food allergies and immunocompromised state.   Neurological: Negative for dizziness, syncope and headaches.   Hematological: Negative for adenopathy. Does not bruise/bleed easily.   Psychiatric/Behavioral: Negative for hallucinations and suicidal ideas. The patient is not nervous/anxious.        Objective   Physical Exam   Constitutional: She appears well-developed and well-nourished.   Cardiovascular: Normal rate, regular rhythm and normal heart sounds.    No murmur heard.  Pulmonary/Chest: Effort normal and breath sounds normal.   Musculoskeletal: Normal range of " motion. She exhibits no edema or tenderness.   Neurological: She is alert.   Skin: Skin is warm and dry.   Psychiatric: She has a normal mood and affect. Her behavior is normal.   Nursing note and vitals reviewed.      Assessment/Plan   Diagnoses and all orders for this visit:    Pain in both lower extremities  -     rOPINIRole (REQUIP) 0.25 MG tablet; Take 1 tablet by mouth See Admin Instructions. 1 tab qhs x 3 days then 2 qhs x 3 days then 4 qhs thereafter    Start Requip and titrate up to 1 mg daily at bedtime    11/7/2017 labs reviewed with potassium normal at 4.5

## 2018-01-09 ENCOUNTER — LAB (OUTPATIENT)
Dept: FAMILY MEDICINE CLINIC | Facility: CLINIC | Age: 58
End: 2018-01-09

## 2018-01-09 DIAGNOSIS — M19.90 OSTEOARTHRITIS, UNSPECIFIED OSTEOARTHRITIS TYPE, UNSPECIFIED SITE: ICD-10-CM

## 2018-01-09 DIAGNOSIS — E11.8 TYPE 2 DIABETES MELLITUS WITH COMPLICATION, WITHOUT LONG-TERM CURRENT USE OF INSULIN (HCC): ICD-10-CM

## 2018-01-09 DIAGNOSIS — E78.5 DYSLIPIDEMIA: Chronic | ICD-10-CM

## 2018-01-09 DIAGNOSIS — E55.9 VITAMIN D DEFICIENCY: ICD-10-CM

## 2018-01-09 LAB
25(OH)D3 SERPL-MCNC: 31 NG/ML
ALBUMIN SERPL-MCNC: 4.5 G/DL (ref 3.5–5)
ALBUMIN UR-MCNC: 24.4 MG/L
ALBUMIN/GLOB SERPL: 1.9 G/DL (ref 1.5–2.5)
ALP SERPL-CCNC: 98 U/L (ref 35–104)
ALT SERPL W P-5'-P-CCNC: 31 U/L (ref 10–36)
ANION GAP SERPL CALCULATED.3IONS-SCNC: 6.8 MMOL/L (ref 3.6–11.2)
AST SERPL-CCNC: 24 U/L (ref 10–30)
BASOPHILS # BLD AUTO: 0.05 10*3/MM3 (ref 0–0.3)
BASOPHILS NFR BLD AUTO: 0.5 % (ref 0–2)
BILIRUB SERPL-MCNC: 0.4 MG/DL (ref 0.2–1.8)
BUN BLD-MCNC: 11 MG/DL (ref 7–21)
BUN/CREAT SERPL: 11.1 (ref 7–25)
CALCIUM SPEC-SCNC: 9.6 MG/DL (ref 7.7–10)
CHLORIDE SERPL-SCNC: 103 MMOL/L (ref 99–112)
CHOLEST SERPL-MCNC: 191 MG/DL (ref 0–200)
CO2 SERPL-SCNC: 31.2 MMOL/L (ref 24.3–31.9)
CREAT BLD-MCNC: 0.99 MG/DL (ref 0.43–1.29)
DEPRECATED RDW RBC AUTO: 47.5 FL (ref 37–54)
EOSINOPHIL # BLD AUTO: 0.09 10*3/MM3 (ref 0–0.7)
EOSINOPHIL NFR BLD AUTO: 0.8 % (ref 0–5)
ERYTHROCYTE [DISTWIDTH] IN BLOOD BY AUTOMATED COUNT: 15.7 % (ref 11.5–14.5)
GFR SERPL CREATININE-BSD FRML MDRD: 58 ML/MIN/1.73
GLOBULIN UR ELPH-MCNC: 2.4 GM/DL
GLUCOSE BLD-MCNC: 115 MG/DL (ref 70–110)
HBA1C MFR BLD: 7.3 % (ref 4.5–5.7)
HCT VFR BLD AUTO: 43.3 % (ref 37–47)
HDLC SERPL-MCNC: 56 MG/DL (ref 60–100)
HGB BLD-MCNC: 14 G/DL (ref 12–16)
IMM GRANULOCYTES # BLD: 0.05 10*3/MM3 (ref 0–0.03)
IMM GRANULOCYTES NFR BLD: 0.5 % (ref 0–0.5)
LDLC SERPL CALC-MCNC: 95 MG/DL (ref 0–100)
LDLC/HDLC SERPL: 1.7 {RATIO}
LYMPHOCYTES # BLD AUTO: 3.33 10*3/MM3 (ref 1–3)
LYMPHOCYTES NFR BLD AUTO: 31.3 % (ref 21–51)
MCH RBC QN AUTO: 27.2 PG (ref 27–33)
MCHC RBC AUTO-ENTMCNC: 32.3 G/DL (ref 33–37)
MCV RBC AUTO: 84.2 FL (ref 80–94)
MONOCYTES # BLD AUTO: 0.73 10*3/MM3 (ref 0.1–0.9)
MONOCYTES NFR BLD AUTO: 6.9 % (ref 0–10)
NEUTROPHILS # BLD AUTO: 6.38 10*3/MM3 (ref 1.4–6.5)
NEUTROPHILS NFR BLD AUTO: 60 % (ref 30–70)
OSMOLALITY SERPL CALC.SUM OF ELEC: 281.6 MOSM/KG (ref 273–305)
PLATELET # BLD AUTO: 327 10*3/MM3 (ref 130–400)
PMV BLD AUTO: 9.5 FL (ref 6–10)
POTASSIUM BLD-SCNC: 4.2 MMOL/L (ref 3.5–5.3)
PROT SERPL-MCNC: 6.9 G/DL (ref 6–8)
RBC # BLD AUTO: 5.14 10*6/MM3 (ref 4.2–5.4)
SODIUM BLD-SCNC: 141 MMOL/L (ref 135–153)
TRIGL SERPL-MCNC: 200 MG/DL (ref 0–150)
TSH SERPL DL<=0.05 MIU/L-ACNC: 0.45 MIU/ML (ref 0.55–4.78)
URATE SERPL-MCNC: 6.3 MG/DL (ref 2.4–5.7)
VIT B12 BLD-MCNC: 389 PG/ML (ref 211–911)
VLDLC SERPL-MCNC: 40 MG/DL
WBC NRBC COR # BLD: 10.63 10*3/MM3 (ref 4.5–12.5)

## 2018-01-09 PROCEDURE — 85025 COMPLETE CBC W/AUTO DIFF WBC: CPT | Performed by: NURSE PRACTITIONER

## 2018-01-09 PROCEDURE — 82043 UR ALBUMIN QUANTITATIVE: CPT | Performed by: NURSE PRACTITIONER

## 2018-01-09 PROCEDURE — 84443 ASSAY THYROID STIM HORMONE: CPT | Performed by: NURSE PRACTITIONER

## 2018-01-09 PROCEDURE — 82306 VITAMIN D 25 HYDROXY: CPT | Performed by: NURSE PRACTITIONER

## 2018-01-09 PROCEDURE — 80061 LIPID PANEL: CPT | Performed by: NURSE PRACTITIONER

## 2018-01-09 PROCEDURE — 82607 VITAMIN B-12: CPT | Performed by: NURSE PRACTITIONER

## 2018-01-09 PROCEDURE — 84550 ASSAY OF BLOOD/URIC ACID: CPT | Performed by: NURSE PRACTITIONER

## 2018-01-09 PROCEDURE — 83036 HEMOGLOBIN GLYCOSYLATED A1C: CPT | Performed by: NURSE PRACTITIONER

## 2018-01-09 PROCEDURE — 36415 COLL VENOUS BLD VENIPUNCTURE: CPT

## 2018-01-09 PROCEDURE — 80053 COMPREHEN METABOLIC PANEL: CPT | Performed by: NURSE PRACTITIONER

## 2018-01-15 DIAGNOSIS — E55.9 VITAMIN D DEFICIENCY: ICD-10-CM

## 2018-01-15 RX ORDER — ERGOCALCIFEROL 1.25 MG/1
CAPSULE ORAL
Qty: 4 CAPSULE | Refills: 3 | Status: SHIPPED | OUTPATIENT
Start: 2018-01-15 | End: 2018-03-27

## 2018-01-17 ENCOUNTER — OFFICE VISIT (OUTPATIENT)
Dept: FAMILY MEDICINE CLINIC | Facility: CLINIC | Age: 58
End: 2018-01-17

## 2018-01-17 DIAGNOSIS — G47.62 NOCTURNAL LEG CRAMPS: ICD-10-CM

## 2018-01-17 DIAGNOSIS — E78.5 DYSLIPIDEMIA: Chronic | ICD-10-CM

## 2018-01-17 DIAGNOSIS — E66.3 OVERWEIGHT: ICD-10-CM

## 2018-01-17 DIAGNOSIS — E11.8 TYPE 2 DIABETES MELLITUS WITH COMPLICATION, WITHOUT LONG-TERM CURRENT USE OF INSULIN (HCC): Primary | Chronic | ICD-10-CM

## 2018-01-17 DIAGNOSIS — E53.8 VITAMIN B 12 DEFICIENCY: ICD-10-CM

## 2018-01-17 DIAGNOSIS — E55.9 VITAMIN D DEFICIENCY: ICD-10-CM

## 2018-01-17 DIAGNOSIS — R79.89 ABNORMAL TSH: ICD-10-CM

## 2018-01-17 DIAGNOSIS — I10 ESSENTIAL HYPERTENSION: Chronic | ICD-10-CM

## 2018-01-17 PROCEDURE — 99214 OFFICE O/P EST MOD 30 MIN: CPT | Performed by: NURSE PRACTITIONER

## 2018-01-17 RX ORDER — CYANOCOBALAMIN 1000 UG/ML
1000 INJECTION, SOLUTION INTRAMUSCULAR; SUBCUTANEOUS WEEKLY
Status: SHIPPED | OUTPATIENT
Start: 2018-01-17 | End: 2018-02-14

## 2018-01-17 NOTE — PROGRESS NOTES
HPI Comments: Monisha presents to the office today for follow up. She recently had lab work done pertaining to her  h/o DM, type 2, HTN, Dyslipidemia, Gastritis and CAD with Previous MI  Stent. These will be reviewed with her.  Monisha also reports her nocturnal leg cramps and burning of her feet has worsened. She was seen on 12/28/2017 and prescribed Requip which she has titrated but has had no relief.     Diabetes   She has type 2 diabetes mellitus. Her disease course has been stable.  Pertinent negatives for diabetes include no chest pain, no weakness and no weight loss. Diabetic complications include peripheral neuropathy which is worsening. She reports for appx one month, she has had increased in burning/tingling especially at night. Risk factors for coronary artery disease include diabetes mellitus, dyslipidemia, family history, hypertension, obesity, post-menopausal, sedentary lifestyle and stress. Current diabetic treatment includes oral agent (dual therapy)  and basal insulin. She is compliant with treatment most of the time. She is following a generally healthy diet. She has had a previous visit with a dietitian.  An ACE inhibitor/angiotensin II receptor blocker is being taken. Eye exam is current and due 02/2018.     Hypertension   The current episode started more than 1 year ago. She does home blood pressure.  Pertinent negatives include no chest pain, palpitations, peripheral edema or shortness of breath. Risk factors for coronary artery disease include diabetes mellitus, dyslipidemia, family history, obesity, post-menopausal state, sedentary lifestyle and stress. Past treatments include angiotensin blockers and beta blockers.     Hyperlipidemia   The current episode started more than 1 year ago. The problem is uncontrolled. Recent lipid tests are improving. Pertinent negatives include no chest pain or shortness of breath. She is currently on Rapatha.  Risk factors for coronary artery disease include  diabetes mellitus, family history, dyslipidemia, hypertension, obesity, post-menopausal and stress.     Rash   The current episode started more than 1 month ago. The problem has been stable for the past weeks.  The affected locations include the bilateral lower extremities. The rash is characterized by redness. She was exposed to nothing. Pertinent negatives include no cough, facial edema, fever, shortness of breath or vomiting. Since her last appt, her plan was to see a dermatologist in [place] but due to her mother's health condition, she has not made an appt.    Refer to ROS for additional information.      Vitals:    01/17/18 1117   BP: 148/92   BP Location: Right arm   Patient Position: Sitting   Cuff Size: Adult   Pulse: 67   Temp: 97.7 °F (36.5 °C)   TempSrc: Oral      The following portions of the patient's history were reviewed and updated as appropriate: allergies, current medications, past family history, past medical history, past social history, past surgical history and problem list.    Review of Systems   Constitutional: Negative for activity change, appetite change, chills and fever.   HENT: Negative.    Eyes: Negative for visual disturbance.   Respiratory: Negative for cough, chest tightness, shortness of breath and wheezing.    Cardiovascular: Negative for chest pain, palpitations and leg swelling.   Gastrointestinal: Negative for abdominal pain, constipation, diarrhea, nausea and vomiting.   Endocrine: Negative for cold intolerance, heat intolerance, polydipsia, polyphagia and polyuria.   Musculoskeletal: Positive for myalgias (Bilateral lower  extremities at night).   Skin: Positive for rash. Negative for color change.   Neurological: Positive for numbness. Negative for dizziness, tremors, speech difficulty, weakness, light-headedness and headaches.   Hematological: Negative for adenopathy.   Psychiatric/Behavioral: Negative for confusion, decreased concentration and suicidal ideas. The patient  is not nervous/anxious.    All other systems reviewed and are negative.    Physical Exam   Constitutional: She is oriented to person, place, and time. She appears well-developed and well-nourished. No distress.   HENT:   Head: Normocephalic and atraumatic.   Nose: Nose normal.   Mouth/Throat: Oropharynx is clear and moist. No oropharyngeal exudate.   Eyes: Conjunctivae are normal. Pupils are equal, round, and reactive to light. Right eye exhibits no discharge. Left eye exhibits no discharge. No scleral icterus.   Neck: Neck supple. No JVD present. No thyromegaly present.   Cardiovascular: Normal rate, regular rhythm and normal heart sounds.  Exam reveals no friction rub.    No murmur heard.  Pulmonary/Chest: Effort normal and breath sounds normal. No respiratory distress. She has no wheezes. She has no rales.   Abdominal: Soft. Bowel sounds are normal. She exhibits no distension. There is no tenderness. There is no rebound and no guarding.   Musculoskeletal: She exhibits no edema.    Monisha had a diabetic foot exam performed today.    Vascular Status -  Her exam exhibits right foot vasculature normal. Her exam exhibits no right foot edema. Her exam exhibits left foot vasculature normal. Her exam exhibits no left foot edema.   Skin Integrity  -  Her right foot skin is intact.     Monisha 's left foot skin is intact. .  Lymphadenopathy:     She has no cervical adenopathy.   Neurological: She is alert and oriented to person, place, and time.   Skin: Skin is warm and dry. Rash (Bilateral lower  extremities) noted. No erythema.   Psychiatric: She has a normal mood and affect. Her behavior is normal. Judgment and thought content normal.   Vitals reviewed.    Assessment/Plan   Problems Addressed this Visit        Cardiovascular and Mediastinum    Hypertension       Digestive    Vitamin D deficiency    Vitamin B 12 deficiency    Relevant Medications    cyanocobalamin injection 1,000 mcg       Endocrine    Type 2 diabetes  mellitus - Primary    Relevant Medications    gabapentin (NEURONTIN) 300 MG capsule       Musculoskeletal and Integument    Nocturnal leg cramps    Relevant Medications    Magnesium 500 MG capsule       Other    Dyslipidemia    Overweight      Other Visit Diagnoses     Abnormal TSH        TSH of 0.448 noted and will repeat.    Relevant Orders    TSH    T4, Free    T3, Free          Results for orders placed or performed in visit on 01/09/18   Comprehensive Metabolic Panel   Result Value Ref Range    Glucose 115 (H) 70 - 110 mg/dL    BUN 11 7 - 21 mg/dL    Creatinine 0.99 0.43 - 1.29 mg/dL    Sodium 141 135 - 153 mmol/L    Potassium 4.2 3.5 - 5.3 mmol/L    Chloride 103 99 - 112 mmol/L    CO2 31.2 24.3 - 31.9 mmol/L    Calcium 9.6 7.7 - 10.0 mg/dL    Total Protein 6.9 6.0 - 8.0 g/dL    Albumin 4.50 3.50 - 5.00 g/dL    ALT (SGPT) 31 10 - 36 U/L    AST (SGOT) 24 10 - 30 U/L    Alkaline Phosphatase 98 35 - 104 U/L    Total Bilirubin 0.4 0.2 - 1.8 mg/dL    eGFR Non African Amer 58 (L) >60 mL/min/1.73    Globulin 2.4 gm/dL    A/G Ratio 1.9 1.5 - 2.5 g/dL    BUN/Creatinine Ratio 11.1 7.0 - 25.0    Anion Gap 6.8 3.6 - 11.2 mmol/L   Lipid Panel   Result Value Ref Range    Total Cholesterol 191 0 - 200 mg/dL    Triglycerides 200 (H) 0 - 150 mg/dL    HDL Cholesterol 56 (L) 60 - 100 mg/dL    LDL Cholesterol  95 0 - 100 mg/dL    VLDL Cholesterol 40 mg/dL    LDL/HDL Ratio 1.70    TSH   Result Value Ref Range    TSH 0.448 (L) 0.550 - 4.780 mIU/mL   Hemoglobin A1c   Result Value Ref Range    Hemoglobin A1C 7.30 (H) 4.50 - 5.70 %   Vitamin D 25 Hydroxy   Result Value Ref Range    25 Hydroxy, Vitamin D 31.0 ng/ml   Vitamin B12   Result Value Ref Range    Vitamin B-12 389 211 - 911 pg/mL   Uric Acid   Result Value Ref Range    Uric Acid 6.3 (H) 2.4 - 5.7 mg/dL   MicroAlbumin, Urine, Random - Urine, Clean Catch   Result Value Ref Range    Microalbumin, Urine 24.4 mg/L   CBC Auto Differential   Result Value Ref Range    WBC 10.63 4.50 -  12.50 10*3/mm3    RBC 5.14 4.20 - 5.40 10*6/mm3    Hemoglobin 14.0 12.0 - 16.0 g/dL    Hematocrit 43.3 37.0 - 47.0 %    MCV 84.2 80.0 - 94.0 fL    MCH 27.2 27.0 - 33.0 pg    MCHC 32.3 (L) 33.0 - 37.0 g/dL    RDW 15.7 (H) 11.5 - 14.5 %    RDW-SD 47.5 37.0 - 54.0 fl    MPV 9.5 6.0 - 10.0 fL    Platelets 327 130 - 400 10*3/mm3    Neutrophil % 60.0 30.0 - 70.0 %    Lymphocyte % 31.3 21.0 - 51.0 %    Monocyte % 6.9 0.0 - 10.0 %    Eosinophil % 0.8 0.0 - 5.0 %    Basophil % 0.5 0.0 - 2.0 %    Immature Grans % 0.5 0.0 - 0.5 %    Neutrophils, Absolute 6.38 1.40 - 6.50 10*3/mm3    Lymphocytes, Absolute 3.33 (H) 1.00 - 3.00 10*3/mm3    Monocytes, Absolute 0.73 0.10 - 0.90 10*3/mm3    Eosinophils, Absolute 0.09 0.00 - 0.70 10*3/mm3    Basophils, Absolute 0.05 0.00 - 0.30 10*3/mm3    Immature Grans, Absolute 0.05 (H) 0.00 - 0.03 10*3/mm3   Osmolality, Calculated   Result Value Ref Range    Osmolality Calc 281.6 273.0 - 305.0 mOsm/kg   Findings and recommendations discussed with Monisha. Reviewed above labs with her which included an A1C of 7.3, Reinforced diabetes self management skills with her including nutrition and activity. She is tolerated Trulicity at this time but did discuss possibly changing to Victoza at a later date. Due to her lack of response to Requip, I discussed options with her. Will start Neurontin 300 mg nightly for one week and increase to twice daily.   Reviewed her lipid panel which included: TC: 191 mg/dL; Triglycerides: 200 mg/dL; HDL: 56 mg/dL and LDL 95 mg/dL. Much improved.  Discussed night time stretches and adding Magnesium for her nocturnal leg cramps   Encouraged Monisha to to report if symptoms worsen or if any problems/ concerns. She will f/u in four weeks for recheck; Sooner if problems/concerns occur.   She will have her Influenza vaccine at her pharmacy.   She will return to the office for her TSH, Free T4 and T3 and Vitamin B12 injection.     As part of the patient's treatment plan they are  being prescribed a controlled substance/ substances with potential for abuse.  Monisha has been made aware of the appropriate use of such medications, including potential risk of somnolence, limited ability to drive and/or work safely, and potential for overdose.  It has also been made clear these medications are for use by Monisha only, without concomitant use of alcohol or other substances unless prescribed/advised by medical provider.    Ana Maria has completed prescribing agreement detailing terms of continued prescribing of controlled substances including monitoring JUAN R reports, urine drug screens, and pill counts.  Monisha  is aware JUAN R reports are reviewed on a regular basis and scanned into the chart.    History and physical exam exhibit  safe and appropriate use of controlled substances.  Juan R #[id number] reviewed and consistent with her history.

## 2018-01-17 NOTE — PATIENT INSTRUCTIONS
Hypertension  Hypertension is another name for high blood pressure. High blood pressure forces your heart to work harder to pump blood. A blood pressure reading has two numbers, which includes a higher number over a lower number (example: 110/72).  Follow these instructions at home:  · Have your blood pressure rechecked by your doctor.  · Only take medicine as told by your doctor. Follow the directions carefully. The medicine does not work as well if you skip doses. Skipping doses also puts you at risk for problems.  · Do not smoke.  · Monitor your blood pressure at home as told by your doctor.  Contact a doctor if:  · You think you are having a reaction to the medicine you are taking.  · You have repeat headaches or feel dizzy.  · You have puffiness (swelling) in your ankles.  · You have trouble with your vision.  Get help right away if:  · You get a very bad headache and are confused.  · You feel weak, numb, or faint.  · You get chest or belly (abdominal) pain.  · You throw up (vomit).  · You cannot breathe very well.  This information is not intended to replace advice given to you by your health care provider. Make sure you discuss any questions you have with your health care provider.  Document Released: 06/05/2009 Document Revised: 05/25/2017 Document Reviewed: 10/10/2014  [employer] Interactive Patient Education © 2017 [employer]  Type 2 Diabetes Mellitus, Self Care, Adult  When you have type 2 diabetes (type 2 diabetes mellitus), you must keep your blood sugar (glucose) under control. You can do this with:  · Nutrition.  · Exercise.  · Lifestyle changes.  · Medicines or insulin, if needed.  · Support from your doctors and others.  How do I manage my blood sugar?  · Check your blood sugar level every day, as often as told.  · Call your doctor if your blood sugar is above your goal numbers for 2 tests in a row.  · Have your A1c (hemoglobin A1c) level checked at least two times a year. Have it checked more  often if your doctor tells you to.  Your doctor will set treatment goals for you. Generally, you should have these blood sugar levels:  · Before meals (preprandial):  mg/dL (4.4-7.2 mmol/L).  · After meals (postprandial): lower than 180 mg/dL (10 mmol/L).  · A1c level: less than 7%.  What do I need to know about high blood sugar?  High blood sugar is called hyperglycemia. Know the signs of high blood sugar. Signs may include:  · Feeling:  ¨ Thirsty.  ¨ Hungry.  ¨ Very tired.  · Needing to pee (urinate) more than usual.  · Blurry vision.  What do I need to know about low blood sugar?  Low blood sugar is called hypoglycemia. This is when blood sugar is at or below 70 mg/dL (3.9 mmol/L). Symptoms may include:  · Feeling:  ¨ Hungry.  ¨ Worried or nervous (anxious).  ¨ Sweaty and clammy.  ¨ Confused.  ¨ Dizzy.  ¨ Sleepy.  ¨ Sick to your stomach (nauseous).  · Having:  ¨ A fast heartbeat (palpitations).  ¨ A headache.  ¨ A change in your vision.  ¨ Jerky movements that you cannot control (seizure).  ¨ Nightmares.  ¨ Tingling or no feeling (numbness) around the mouth, lips, or tongue.  · Having trouble with:  ¨ Talking.  ¨ Paying attention (concentrating).  ¨ Moving (coordination).  ¨ Sleeping.  · Shaking.  · Passing out (fainting).  · Getting upset easily (irritability).  Treating low blood sugar   To treat low blood sugar, eat or drink something sugary right away. If you can think clearly and swallow safely, follow the 15:15 rule:  · Take 15 grams of a fast-acting carb (carbohydrate). Some fast-acting carbs are:  ¨ 1 tube of glucose gel.  ¨ 3 sugar tablets (glucose pills).  ¨ 6-8 pieces of hard candy.  ¨ 4 oz (120 mL) of fruit juice.  ¨ 4 oz (120 mL) regular (not diet) soda.  · Check your blood sugar 15 minutes after you take the carb.  · If your blood sugar is still at or below 70 mg/dL (3.9 mmol/L), take 15 grams of a carb again.  · If your blood sugar does not go above 70 mg/dL (3.9 mmol/L) after 3 tries, get  help right away.  · After your blood sugar goes back to normal, eat a meal or a snack within 1 hour.  Treating very low blood sugar   If your blood sugar is at or below 54 mg/dL (3 mmol/L), you have very low blood sugar (severe hypoglycemia). This is an emergency. Do not wait to see if the symptoms will go away. Get medical help right away. Call your local emergency services (911 in the [place] Do not drive yourself to the hospital.  If you have very low blood sugar and you cannot eat or drink, you may need a glucagon shot (injection). A family member or friend should learn how to check your blood sugar and how to give you a glucagon shot. Ask your doctor if you need to have a glucagon shot kit at home.  What else is important to manage my diabetes?  Medicine   Follow these instructions about insulin and diabetes medicines:  · Take them as told by your doctor.  · Adjust them as told by your doctor.  · Do not run out of them.  Having diabetes can raise your risk for other long-term conditions. These include heart or kidney disease. Your doctor may prescribe medicines to help prevent problems from diabetes.  Food   · Make healthy food choices. These include:  ¨ Chicken, fish, egg whites, and beans.  ¨ Oats, whole wheat, bulgur, brown rice, quinoa, and millet.  ¨ Fresh fruits and vegetables.  ¨ Low-fat dairy products.  ¨ Nuts, avocado, olive oil, and canola oil.  · Make a food plan with a specialist (dietitian).  · Follow instructions from your doctor about what you cannot eat or drink.  · Drink enough fluid to keep your pee (urine) clear or pale yellow.  · Eat healthy snacks between healthy meals.  · Keep track of carbs that you eat. Read food labels. Learn food serving sizes.  · Follow your sick day plan when you cannot eat or drink normally. Make this plan with your doctor so it is ready to use.  Activity  · Exercise at least 3 times a week.  · Do not go more than 2 days without exercising.  · Talk with your doctor  "before you start a new exercise. Your doctor may need to adjust your insulin, medicines, or food.  Lifestyle   · Do not use any tobacco products. These include cigarettes, chewing tobacco, and e-cigarettes.If you need help quitting, ask your doctor.  · Ask your doctor how much alcohol is safe for you.  · Learn to deal with stress. If you need help with this, ask your doctor.  Body care  · Stay up to date with your shots (immunizations).  · Have your eyes and feet checked by a doctor as often as told.  · Check your skin and feet every day. Check for cuts, bruises, redness, blisters, or sores.  · Brush your teeth and gums two times a day.  · Floss at least one time a day.  · Go to the dentist least one time every 6 months.  · Stay at a healthy weight.  General instructions   · Take over-the-counter and prescription medicines only as told by your doctor.  · Share your diabetes care plan with:  ¨ Your work or school.  ¨ People you live with.  · Check your pee (urine) for ketones:  ¨ When you are sick.  ¨ As told by your doctor.  · Carry a card or wear jewelry that says that you have diabetes.  · Ask your doctor:  ¨ Do I need to meet with a diabetes educator?  ¨ Where can I find a support group for people with diabetes?  · Keep all follow-up visits as told by your doctor. This is important.  Where to find more information:  To learn more about diabetes, visit:  · American Diabetes Association: www.diabetes.org  · American Association of Diabetes Educators: www.diabeteseducator.org/patient-resources  This information is not intended to replace advice given to you by your health care provider. Make sure you discuss any questions you have with your health care provider.  Document Released: 04/10/2017 Document Revised: 05/25/2017 Document Reviewed: 01/20/2017  [employer] Interactive Patient Education © 2017 [employer]  DASH Eating Plan  DASH stands for \"Dietary Approaches to Stop Hypertension.\" The DASH eating plan is a " "healthy eating plan that has been shown to reduce high blood pressure (hypertension). Additional health benefits may include reducing the risk of type 2 diabetes mellitus, heart disease, and stroke. The DASH eating plan may also help with weight loss.  What do I need to know about the DASH eating plan?  For the DASH eating plan, you will follow these general guidelines:  · Choose foods with less than 150 milligrams of sodium per serving (as listed on the food label).  · Use salt-free seasonings or herbs instead of table salt or sea salt.  · Check with your health care provider or pharmacist before using salt substitutes.  · Eat lower-sodium products. These are often labeled as \"low-sodium\" or \"no salt added.\"  · Eat fresh foods. Avoid eating a lot of canned foods.  · Eat more vegetables, fruits, and low-fat dairy products.  · Choose whole grains. Look for the word \"whole\" as the first word in the ingredient list.  · Choose fish and skinless chicken or turkey more often than red meat. Limit fish, poultry, and meat to 6 oz (170 g) each day.  · Limit sweets, desserts, sugars, and sugary drinks.  · Choose heart-healthy fats.  · Eat more home-cooked food and less restaurant, buffet, and fast food.  · Limit fried foods.  · Do not terrell foods. Cook foods using methods such as baking, boiling, grilling, and broiling instead.  · When eating at a restaurant, ask that your food be prepared with less salt, or no salt if possible.  What foods can I eat?  Seek help from a dietitian for individual calorie needs.  Grains   Whole grain or whole wheat bread. Brown rice. Whole grain or whole wheat pasta. Quinoa, bulgur, and whole grain cereals. Low-sodium cereals. Corn or whole wheat flour tortillas. Whole grain cornbread. Whole grain crackers. Low-sodium crackers.  Vegetables   Fresh or frozen vegetables (raw, steamed, roasted, or grilled). Low-sodium or reduced-sodium tomato and vegetable juices. Low-sodium or reduced-sodium tomato " sauce and paste. Low-sodium or reduced-sodium canned vegetables.  Fruits   All fresh, canned (in natural juice), or frozen fruits.  Meat and Other Protein Products   Ground beef (85% or leaner), grass-fed beef, or beef trimmed of fat. Skinless chicken or turkey. Ground chicken or turkey. Pork trimmed of fat. All fish and seafood. Eggs. Dried beans, peas, or lentils. Unsalted nuts and seeds. Unsalted canned beans.  Dairy   Low-fat dairy products, such as skim or 1% milk, 2% or reduced-fat cheeses, low-fat ricotta or cottage cheese, or plain low-fat yogurt. Low-sodium or reduced-sodium cheeses.  Fats and Oils   Tub margarines without trans fats. Light or reduced-fat mayonnaise and salad dressings (reduced sodium). Avocado. Safflower, olive, or canola oils. Natural peanut or almond butter.  Other   Unsalted popcorn and pretzels.  The items listed above may not be a complete list of recommended foods or beverages. Contact your dietitian for more options.   What foods are not recommended?  Grains   White bread. White pasta. White rice. Refined cornbread. Bagels and croissants. Crackers that contain trans fat.  Vegetables   Creamed or fried vegetables. Vegetables in a cheese sauce. Regular canned vegetables. Regular canned tomato sauce and paste. Regular tomato and vegetable juices.  Fruits   Canned fruit in light or heavy syrup. Fruit juice.  Meat and Other Protein Products   Fatty cuts of meat. Ribs, chicken wings, villar, sausage, bologna, salami, chitterlings, fatback, hot dogs, bratwurst, and packaged luncheon meats. Salted nuts and seeds. Canned beans with salt.  Dairy   Whole or 2% milk, cream, half-and-half, and cream cheese. Whole-fat or sweetened yogurt. Full-fat cheeses or blue cheese. Nondairy creamers and whipped toppings. Processed cheese, cheese spreads, or cheese curds.  Condiments   Onion and garlic salt, seasoned salt, table salt, and sea salt. Canned and packaged gravies. [facility] sauce. Tartar  sauce. Barbecue sauce. Teriyaki sauce. Soy sauce, including reduced sodium. Steak sauce. Fish sauce. Oyster sauce. Cocktail sauce. Horseradish. Ketchup and mustard. Meat flavorings and tenderizers. Bouillon cubes. Hot sauce. Tabasco sauce. Marinades. Taco seasonings. Relishes.  Fats and Oils   Butter, stick margarine, lard, shortening, ghee, and villar fat. Coconut, palm kernel, or palm oils. Regular salad dressings.  Other   Pickles and olives. Salted popcorn and pretzels.  The items listed above may not be a complete list of foods and beverages to avoid. Contact your dietitian for more information.   Where can I find more information?  National Heart, [employer], and [employer] [place]: www.nhlbi.nih.gov/health/health-topics/topics/dash/  This information is not intended to replace advice given to you by your health care provider. Make sure you discuss any questions you have with your health care provider.  Document Released: 12/06/2012 Document Revised: 05/25/2017 Document Reviewed: 10/22/2014  [employer] Interactive Patient Education © 2017 [employer]

## 2018-01-18 PROBLEM — G47.62 NOCTURNAL LEG CRAMPS: Status: ACTIVE | Noted: 2018-01-18

## 2018-01-18 RX ORDER — GABAPENTIN 300 MG/1
CAPSULE ORAL
Qty: 50 CAPSULE | Refills: 0
Start: 2018-01-18 | End: 2018-03-07 | Stop reason: SINTOL

## 2018-01-18 RX ORDER — FOLIC ACID 0.8 MG
1 TABLET ORAL DAILY
Qty: 30 EACH | Refills: 2 | Status: SHIPPED | OUTPATIENT
Start: 2018-01-18 | End: 2018-07-26

## 2018-01-22 RX ORDER — LOSARTAN POTASSIUM 50 MG/1
TABLET ORAL
Qty: 60 TABLET | Refills: 6 | Status: SHIPPED | OUTPATIENT
Start: 2018-01-22 | End: 2018-08-23 | Stop reason: SDUPTHER

## 2018-01-23 ENCOUNTER — CLINICAL SUPPORT (OUTPATIENT)
Dept: FAMILY MEDICINE CLINIC | Facility: CLINIC | Age: 58
End: 2018-01-23

## 2018-01-23 DIAGNOSIS — E53.8 VITAMIN B 12 DEFICIENCY: ICD-10-CM

## 2018-01-23 LAB
T3FREE SERPL-MCNC: 2.7 PG/ML (ref 2.3–4.2)
T4 FREE SERPL-MCNC: 1.1 NG/DL (ref 0.89–1.76)
TSH SERPL DL<=0.05 MIU/L-ACNC: 0.96 MIU/ML (ref 0.55–4.78)

## 2018-01-23 PROCEDURE — 84439 ASSAY OF FREE THYROXINE: CPT | Performed by: NURSE PRACTITIONER

## 2018-01-23 PROCEDURE — 36415 COLL VENOUS BLD VENIPUNCTURE: CPT | Performed by: NURSE PRACTITIONER

## 2018-01-23 PROCEDURE — 84443 ASSAY THYROID STIM HORMONE: CPT | Performed by: NURSE PRACTITIONER

## 2018-01-23 PROCEDURE — 96372 THER/PROPH/DIAG INJ SC/IM: CPT | Performed by: NURSE PRACTITIONER

## 2018-01-23 PROCEDURE — 84481 FREE ASSAY (FT-3): CPT | Performed by: NURSE PRACTITIONER

## 2018-01-23 RX ADMIN — CYANOCOBALAMIN 1000 MCG: 1000 INJECTION, SOLUTION INTRAMUSCULAR; SUBCUTANEOUS at 10:00

## 2018-01-24 ENCOUNTER — DOCUMENTATION (OUTPATIENT)
Dept: FAMILY MEDICINE CLINIC | Facility: CLINIC | Age: 58
End: 2018-01-24

## 2018-01-24 VITALS
HEART RATE: 67 BPM | TEMPERATURE: 97.7 F | BODY MASS INDEX: 32.27 KG/M2 | DIASTOLIC BLOOD PRESSURE: 92 MMHG | SYSTOLIC BLOOD PRESSURE: 148 MMHG | WEIGHT: 189 LBS | RESPIRATION RATE: 16 BRPM | HEIGHT: 64 IN | OXYGEN SATURATION: 98 %

## 2018-02-12 ENCOUNTER — CLINICAL SUPPORT (OUTPATIENT)
Dept: FAMILY MEDICINE CLINIC | Facility: CLINIC | Age: 58
End: 2018-02-12

## 2018-02-12 DIAGNOSIS — E53.8 VITAMIN B 12 DEFICIENCY: ICD-10-CM

## 2018-02-12 PROCEDURE — 96372 THER/PROPH/DIAG INJ SC/IM: CPT | Performed by: NURSE PRACTITIONER

## 2018-02-12 RX ADMIN — CYANOCOBALAMIN 1000 MCG: 1000 INJECTION, SOLUTION INTRAMUSCULAR; SUBCUTANEOUS at 15:31

## 2018-03-07 ENCOUNTER — OFFICE VISIT (OUTPATIENT)
Dept: FAMILY MEDICINE CLINIC | Facility: CLINIC | Age: 58
End: 2018-03-07

## 2018-03-07 VITALS
SYSTOLIC BLOOD PRESSURE: 120 MMHG | TEMPERATURE: 97.6 F | WEIGHT: 192 LBS | DIASTOLIC BLOOD PRESSURE: 80 MMHG | HEIGHT: 64 IN | HEART RATE: 76 BPM | BODY MASS INDEX: 32.78 KG/M2 | OXYGEN SATURATION: 98 %

## 2018-03-07 DIAGNOSIS — E78.5 DYSLIPIDEMIA: Chronic | ICD-10-CM

## 2018-03-07 DIAGNOSIS — E55.9 VITAMIN D DEFICIENCY: ICD-10-CM

## 2018-03-07 DIAGNOSIS — I10 ESSENTIAL HYPERTENSION: Chronic | ICD-10-CM

## 2018-03-07 DIAGNOSIS — E53.8 VITAMIN B 12 DEFICIENCY: ICD-10-CM

## 2018-03-07 DIAGNOSIS — I25.708 CORONARY ARTERY DISEASE OF BYPASS GRAFT OF NATIVE HEART WITH STABLE ANGINA PECTORIS (HCC): ICD-10-CM

## 2018-03-07 DIAGNOSIS — F41.9 ANXIETY: ICD-10-CM

## 2018-03-07 DIAGNOSIS — E11.8 TYPE 2 DIABETES MELLITUS WITH COMPLICATION, WITHOUT LONG-TERM CURRENT USE OF INSULIN (HCC): Primary | Chronic | ICD-10-CM

## 2018-03-07 DIAGNOSIS — G47.62 NOCTURNAL LEG CRAMPS: ICD-10-CM

## 2018-03-07 PROCEDURE — 99214 OFFICE O/P EST MOD 30 MIN: CPT | Performed by: NURSE PRACTITIONER

## 2018-03-07 RX ORDER — HYDROXYZINE HYDROCHLORIDE 10 MG/1
10 TABLET, FILM COATED ORAL EVERY 6 HOURS PRN
Qty: 50 TABLET | Refills: 0 | Status: SHIPPED | OUTPATIENT
Start: 2018-03-07 | End: 2018-06-27 | Stop reason: SDUPTHER

## 2018-03-07 RX ORDER — ESCITALOPRAM OXALATE 10 MG/1
10 TABLET ORAL DAILY
Qty: 30 TABLET | Refills: 1 | Status: SHIPPED | OUTPATIENT
Start: 2018-03-07 | End: 2018-05-06 | Stop reason: SDUPTHER

## 2018-03-07 NOTE — PROGRESS NOTES
History of Present Illness   Monisha presents to the office today for follow up pertaining to her H/O DM, type 2, HTN, Dyslipidemia, HTN. In addition, she has a H/O CAD with Previous MI  Stent which she is followed by Dr Watts.   Monisha also is C/O anxiety which she contributes to her mother's recent hospitalizations and her work.    Diabetes   She has type 2 diabetes mellitus. Her disease course has been stable.  Pertinent negatives for diabetes include no chest pain, no weakness and no weight loss. Diabetic complications include peripheral neuropathy which is worsening. She reports for appx one month, she has had increased in burning/tingling especially at night. Risk factors for coronary artery disease include diabetes mellitus, dyslipidemia, family history, hypertension, obesity, post-menopausal, sedentary lifestyle and stress. Current diabetic treatment includes oral agent (dual therapy)  and basal insulin. She is compliant with treatment most of the time. She is following a generally healthy diet. She has had a previous visit with a dietitian.  An ACE inhibitor/angiotensin II receptor blocker is being taken. Eye exam is current and due 02/2018.     Hypertension   The current episode started more than 1 year ago. She does home blood pressure.  Pertinent negatives include no chest pain, palpitations, peripheral edema or shortness of breath. Risk factors for coronary artery disease include diabetes mellitus, dyslipidemia, family history, obesity, post-menopausal state, sedentary lifestyle and stress. Past treatments include angiotensin blockers and beta blockers.     Hyperlipidemia   The current episode started more than 1 year ago. The problem is uncontrolled. Recent lipid tests are improving. Pertinent negatives include no chest pain or shortness of breath. She is currently on Rapatha.  Risk factors for coronary artery disease include diabetes mellitus, family history, dyslipidemia, hypertension, obesity,  "post-menopausal and stress.   Refer to ROS for additional information.     Vitals:    03/07/18 1306   BP: 120/80   Pulse: 76   Temp: 97.6 °F (36.4 °C)   TempSrc: Temporal Artery    SpO2: 98%   Weight: 87.1 kg (192 lb)   Height: 162.6 cm (64\")      The following portions of the patient's history were reviewed and updated as appropriate: allergies, current medications, past family history, past medical history, past social history, past surgical history and problem list.    Review of Systems   Constitutional: Positive for fatigue. Negative for activity change, appetite change, chills, fever and unexpected weight change.   Eyes: Positive for visual disturbance (Recently seen by Dr Winkler, Optomerist).   Respiratory: Negative for cough, chest tightness, shortness of breath and wheezing.    Cardiovascular: Negative for chest pain, palpitations and leg swelling.   Gastrointestinal: Negative for abdominal pain and vomiting.   Endocrine: Negative for cold intolerance, heat intolerance, polydipsia, polyphagia and polyuria.   Musculoskeletal: Positive for arthralgias (At baseline).   Skin: Positive for rash (Being managed by Dermatology). Negative for color change.   Neurological: Positive for numbness (Neuropathy) and headaches. Negative for dizziness, tremors, speech difficulty, weakness and light-headedness.   Hematological: Negative for adenopathy.   Psychiatric/Behavioral: Positive for decreased concentration. Negative for confusion, self-injury and suicidal ideas. The patient is nervous/anxious.    All other systems reviewed and are negative.    Physical Exam   Constitutional: She is oriented to person, place, and time. She appears well-developed and well-nourished. No distress.   HENT:   Head: Normocephalic.   Nose: Nose normal.   Mouth/Throat: Oropharynx is clear and moist. No oropharyngeal exudate.   Eyes: Conjunctivae and EOM are normal. Pupils are equal, round, and reactive to light. Right eye exhibits no " discharge. Left eye exhibits no discharge. No scleral icterus.   Neck: Neck supple. No JVD present. No thyromegaly present.   Cardiovascular: Normal rate, regular rhythm and normal heart sounds.  Exam reveals no friction rub.    No murmur heard.  Pulmonary/Chest: Effort normal and breath sounds normal. No respiratory distress. She has no wheezes. She has no rales.   Abdominal: Soft. Bowel sounds are normal. She exhibits no distension. There is no tenderness. There is no guarding.   Musculoskeletal: She exhibits no edema.   Lymphadenopathy:     She has no cervical adenopathy.   Neurological: She is alert and oriented to person, place, and time.   Skin: Skin is warm and dry.   Psychiatric: Her speech is normal and behavior is normal. Judgment and thought content normal. Her mood appears anxious. She is not withdrawn. Cognition and memory are normal. She does not exhibit a depressed mood.   Vitals reviewed.    Assessment/Plan   Problems Addressed this Visit        Cardiovascular and Mediastinum    CAD (coronary artery disease)    Hypertension       Digestive    Vitamin D deficiency    Relevant Orders    Vitamin D 25 Hydroxy    Vitamin B 12 deficiency    Relevant Orders    Vitamin B12       Endocrine    Type 2 diabetes mellitus - Primary    Relevant Medications    Dulaglutide (TRULICITY) 1.5 MG/0.5ML solution pen-injector    metFORMIN (GLUCOPHAGE) 500 MG tablet    Other Relevant Orders    Comprehensive Metabolic Panel    TSH    Hemoglobin A1c    MicroAlbumin, Urine, Random - Urine, Clean Catch       Musculoskeletal and Integument    Nocturnal leg cramps    Relevant Orders    CBC & Differential       Other    Dyslipidemia    Relevant Orders    Lipid Panel    Anxiety    Relevant Medications    escitalopram (LEXAPRO) 10 MG tablet    hydrOXYzine (ATARAX) 10 MG tablet      Other Visit Diagnoses    None.       Findings and recommendations discussed with Monisha. She reports her blood sugars have been running above 250 mg/dL.  She has not been taking JanuMet or Metformin so will restart Metformin. She may also increase her Basal insulin if her blood sugars do not improve with the addition of Metformin. Individualized diabetes self management skills reinforced.   Treatment options reviewed regarding her anxiety. Allowed her to discuss the stressors in her life. Offered counseling which she does not feel is needed at this time.  She would like to start Lexapro (discontinue Effexor)  and will add Hydroxyzine on prn basis. Also, discussed lifestyle modifications which would assist her including exercise. S/S of concern reviewed and if occurs, she will phone the office or seek further evaluation.   She will f/u with me in April with routine labs ordered prior to her appt; sooner if problems/concerns occur.

## 2018-03-07 NOTE — PATIENT INSTRUCTIONS
"DASH Eating Plan  DASH stands for \"Dietary Approaches to Stop Hypertension.\" The DASH eating plan is a healthy eating plan that has been shown to reduce high blood pressure (hypertension). It may also reduce your risk for type 2 diabetes, heart disease, and stroke. The DASH eating plan may also help with weight loss.  What are tips for following this plan?  General guidelines   · Avoid eating more than 2,300 mg (milligrams) of salt (sodium) a day. If you have hypertension, you may need to reduce your sodium intake to 1,500 mg a day.  · Limit alcohol intake to no more than 1 drink a day for nonpregnant women and 2 drinks a day for men. One drink equals 12 oz of beer, 5 oz of wine, or 1½ oz of hard liquor.  · Work with your health care provider to maintain a healthy body weight or to lose weight. Ask what an ideal weight is for you.  · Get at least 30 minutes of exercise that causes your heart to beat faster (aerobic exercise) most days of the week. Activities may include walking, swimming, or biking.  · Work with your health care provider or diet and nutrition specialist (dietitian) to adjust your eating plan to your individual calorie needs.  Reading food labels   · Check food labels for the amount of sodium per serving. Choose foods with less than 5 percent of the Daily Value of sodium. Generally, foods with less than 300 mg of sodium per serving fit into this eating plan.  · To find whole grains, look for the word \"whole\" as the first word in the ingredient list.  Shopping   · Buy products labeled as \"low-sodium\" or \"no salt added.\"  · Buy fresh foods. Avoid canned foods and premade or frozen meals.  Cooking   · Avoid adding salt when cooking. Use salt-free seasonings or herbs instead of table salt or sea salt. Check with your health care provider or pharmacist before using salt substitutes.  · Do not terrell foods. Cook foods using healthy methods such as baking, boiling, grilling, and broiling instead.  · Cook with " heart-healthy oils, such as olive, canola, soybean, or sunflower oil.  Meal planning     · Eat a balanced diet that includes:  ¨ 5 or more servings of fruits and vegetables each day. At each meal, try to fill half of your plate with fruits and vegetables.  ¨ Up to 6-8 servings of whole grains each day.  ¨ Less than 6 oz of lean meat, poultry, or fish each day. A 3-oz serving of meat is about the same size as a deck of cards. One egg equals 1 oz.  ¨ 2 servings of low-fat dairy each day.  ¨ A serving of nuts, seeds, or beans 5 times each week.  ¨ Heart-healthy fats. Healthy fats called Omega-3 fatty acids are found in foods such as flaxseeds and coldwater fish, like sardines, salmon, and mackerel.  · Limit how much you eat of the following:  ¨ Canned or prepackaged foods.  ¨ Food that is high in trans fat, such as fried foods.  ¨ Food that is high in saturated fat, such as fatty meat.  ¨ Sweets, desserts, sugary drinks, and other foods with added sugar.  ¨ Full-fat dairy products.  · Do not salt foods before eating.  · Try to eat at least 2 vegetarian meals each week.  · Eat more home-cooked food and less restaurant, buffet, and fast food.  · When eating at a restaurant, ask that your food be prepared with less salt or no salt, if possible.  What foods are recommended?  The items listed may not be a complete list. Talk with your dietitian about what dietary choices are best for you.  Grains   Whole-grain or whole-wheat bread. Whole-grain or whole-wheat pasta. Brown rice. Oatmeal. Quinoa. Bulgur. Whole-grain and low-sodium cereals. Cleopatra bread. Low-fat, low-sodium crackers. Whole-wheat flour tortillas.  Vegetables   Fresh or frozen vegetables (raw, steamed, roasted, or grilled). Low-sodium or reduced-sodium tomato and vegetable juice. Low-sodium or reduced-sodium tomato sauce and tomato paste. Low-sodium or reduced-sodium canned vegetables.  Fruits   All fresh, dried, or frozen fruit. Canned fruit in natural juice  (without added sugar).  Meat and other protein foods   Skinless chicken or turkey. Ground chicken or turkey. Pork with fat trimmed off. Fish and seafood. Egg whites. Dried beans, peas, or lentils. Unsalted nuts, nut butters, and seeds. Unsalted canned beans. Lean cuts of beef with fat trimmed off. Low-sodium, lean deli meat.  Dairy   Low-fat (1%) or fat-free (skim) milk. Fat-free, low-fat, or reduced-fat cheeses. Nonfat, low-sodium ricotta or cottage cheese. Low-fat or nonfat yogurt. Low-fat, low-sodium cheese.  Fats and oils   Soft margarine without trans fats. Vegetable oil. Low-fat, reduced-fat, or light mayonnaise and salad dressings (reduced-sodium). Canola, safflower, olive, soybean, and sunflower oils. Avocado.  Seasoning and other foods   Herbs. Spices. Seasoning mixes without salt. Unsalted popcorn and pretzels. Fat-free sweets.  What foods are not recommended?  The items listed may not be a complete list. Talk with your dietitian about what dietary choices are best for you.  Grains   Baked goods made with fat, such as croissants, muffins, or some breads. Dry pasta or rice meal packs.  Vegetables   Creamed or fried vegetables. Vegetables in a cheese sauce. Regular canned vegetables (not low-sodium or reduced-sodium). Regular canned tomato sauce and paste (not low-sodium or reduced-sodium). Regular tomato and vegetable juice (not low-sodium or reduced-sodium). Pickles. Olives.  Fruits   Canned fruit in a light or heavy syrup. Fried fruit. Fruit in cream or butter sauce.  Meat and other protein foods   Fatty cuts of meat. Ribs. Fried meat. Dhaliwal. Sausage. Bologna and other processed lunch meats. Salami. Fatback. Hotdogs. Bratwurst. Salted nuts and seeds. Canned beans with added salt. Canned or smoked fish. Whole eggs or egg yolks. Chicken or turkey with skin.  Dairy   Whole or 2% milk, cream, and half-and-half. Whole or full-fat cream cheese. Whole-fat or sweetened yogurt. Full-fat cheese. Nondairy creamers.  Whipped toppings. Processed cheese and cheese spreads.  Fats and oils   Butter. Stick margarine. Lard. Shortening. Ghee. Dhaliwal fat. Tropical oils, such as coconut, palm kernel, or palm oil.  Seasoning and other foods   Salted popcorn and pretzels. Onion salt, garlic salt, seasoned salt, table salt, and sea salt. Worcestershire sauce. Tartar sauce. Barbecue sauce. Teriyaki sauce. Soy sauce, including reduced-sodium. Steak sauce. Canned and packaged gravies. Fish sauce. Oyster sauce. Cocktail sauce. Horseradish that you find on the shelf. Ketchup. Mustard. Meat flavorings and tenderizers. Bouillon cubes. Hot sauce and Tabasco sauce. Premade or packaged marinades. Premade or packaged taco seasonings. Relishes. Regular salad dressings.  Where to find more information:  · National Heart, Lung, and Blood [place]: www.nhlbi.nih.gov  · American Heart Association: www.heart.org  Summary  · The DASH eating plan is a healthy eating plan that has been shown to reduce high blood pressure (hypertension). It may also reduce your risk for type 2 diabetes, heart disease, and stroke.  · With the DASH eating plan, you should limit salt (sodium) intake to 2,300 mg a day. If you have hypertension, you may need to reduce your sodium intake to 1,500 mg a day.  · When on the DASH eating plan, aim to eat more fresh fruits and vegetables, whole grains, lean proteins, low-fat dairy, and heart-healthy fats.  · Work with your health care provider or diet and nutrition specialist (dietitian) to adjust your eating plan to your individual calorie needs.  This information is not intended to replace advice given to you by your health care provider. Make sure you discuss any questions you have with your health care provider.  Document Released: 12/06/2012 Document Revised: 12/11/2017 Document Reviewed: 12/11/2017  [employer] Interactive Patient Education © 2017 [employer]  Type 2 Diabetes Mellitus, Self Care, Adult  When you have type 2 diabetes  (type 2 diabetes mellitus), you must keep your blood sugar (glucose) under control. You can do this with:  · Nutrition.  · Exercise.  · Lifestyle changes.  · Medicines or insulin, if needed.  · Support from your doctors and others.  How do I manage my blood sugar?  · Check your blood sugar level every day, as often as told.  · Call your doctor if your blood sugar is above your goal numbers for 2 tests in a row.  · Have your A1c (hemoglobin A1c) level checked at least two times a year. Have it checked more often if your doctor tells you to.  Your doctor will set treatment goals for you. Generally, you should have these blood sugar levels:  · Before meals (preprandial):  mg/dL (4.4-7.2 mmol/L).  · After meals (postprandial): lower than 180 mg/dL (10 mmol/L).  · A1c level: less than 7%.  What do I need to know about high blood sugar?  High blood sugar is called hyperglycemia. Know the signs of high blood sugar. Signs may include:  · Feeling:  ¨ Thirsty.  ¨ Hungry.  ¨ Very tired.  · Needing to pee (urinate) more than usual.  · Blurry vision.  What do I need to know about low blood sugar?  Low blood sugar is called hypoglycemia. This is when blood sugar is at or below 70 mg/dL (3.9 mmol/L). Symptoms may include:  · Feeling:  ¨ Hungry.  ¨ Worried or nervous (anxious).  ¨ Sweaty and clammy.  ¨ Confused.  ¨ Dizzy.  ¨ Sleepy.  ¨ Sick to your stomach (nauseous).  · Having:  ¨ A fast heartbeat (palpitations).  ¨ A headache.  ¨ A change in your vision.  ¨ Jerky movements that you cannot control (seizure).  ¨ Nightmares.  ¨ Tingling or no feeling (numbness) around the mouth, lips, or tongue.  · Having trouble with:  ¨ Talking.  ¨ Paying attention (concentrating).  ¨ Moving (coordination).  ¨ Sleeping.  · Shaking.  · Passing out (fainting).  · Getting upset easily (irritability).  Treating low blood sugar     To treat low blood sugar, eat or drink something sugary right away. If you can think clearly and swallow safely,  follow the 15:15 rule:  · Take 15 grams of a fast-acting carb (carbohydrate). Some fast-acting carbs are:  ¨ 1 tube of glucose gel.  ¨ 3 sugar tablets (glucose pills).  ¨ 6-8 pieces of hard candy.  ¨ 4 oz (120 mL) of fruit juice.  ¨ 4 oz (120 mL) regular (not diet) soda.  · Check your blood sugar 15 minutes after you take the carb.  · If your blood sugar is still at or below 70 mg/dL (3.9 mmol/L), take 15 grams of a carb again.  · If your blood sugar does not go above 70 mg/dL (3.9 mmol/L) after 3 tries, get help right away.  · After your blood sugar goes back to normal, eat a meal or a snack within 1 hour.  Treating very low blood sugar   If your blood sugar is at or below 54 mg/dL (3 mmol/L), you have very low blood sugar (severe hypoglycemia). This is an emergency. Do not wait to see if the symptoms will go away. Get medical help right away. Call your local emergency services (911 in the [place] Do not drive yourself to the hospital.  If you have very low blood sugar and you cannot eat or drink, you may need a glucagon shot (injection). A family member or friend should learn how to check your blood sugar and how to give you a glucagon shot. Ask your doctor if you need to have a glucagon shot kit at home.  What else is important to manage my diabetes?  Medicine   Follow these instructions about insulin and diabetes medicines:  · Take them as told by your doctor.  · Adjust them as told by your doctor.  · Do not run out of them.  Having diabetes can raise your risk for other long-term conditions. These include heart or kidney disease. Your doctor may prescribe medicines to help prevent problems from diabetes.  Food     · Make healthy food choices. These include:  ¨ Chicken, fish, egg whites, and beans.  ¨ Oats, whole wheat, bulgur, brown rice, quinoa, and millet.  ¨ Fresh fruits and vegetables.  ¨ Low-fat dairy products.  ¨ Nuts, avocado, olive oil, and canola oil.  · Make a food plan with a specialist  (dietitian).  · Follow instructions from your doctor about what you cannot eat or drink.  · Drink enough fluid to keep your pee (urine) clear or pale yellow.  · Eat healthy snacks between healthy meals.  · Keep track of carbs that you eat. Read food labels. Learn food serving sizes.  · Follow your sick day plan when you cannot eat or drink normally. Make this plan with your doctor so it is ready to use.  Activity   · Exercise at least 3 times a week.  · Do not go more than 2 days without exercising.  · Talk with your doctor before you start a new exercise. Your doctor may need to adjust your insulin, medicines, or food.  Lifestyle     · Do not use any tobacco products. These include cigarettes, chewing tobacco, and e-cigarettes.If you need help quitting, ask your doctor.  · Ask your doctor how much alcohol is safe for you.  · Learn to deal with stress. If you need help with this, ask your doctor.  Body care   · Stay up to date with your shots (immunizations).  · Have your eyes and feet checked by a doctor as often as told.  · Check your skin and feet every day. Check for cuts, bruises, redness, blisters, or sores.  · Brush your teeth and gums two times a day.  · Floss at least one time a day.  · Go to the dentist least one time every 6 months.  · Stay at a healthy weight.  General instructions     · Take over-the-counter and prescription medicines only as told by your doctor.  · Share your diabetes care plan with:  ¨ Your work or school.  ¨ People you live with.  · Check your pee (urine) for ketones:  ¨ When you are sick.  ¨ As told by your doctor.  · Carry a card or wear jewelry that says that you have diabetes.  · Ask your doctor:  ¨ Do I need to meet with a diabetes educator?  ¨ Where can I find a support group for people with diabetes?  · Keep all follow-up visits as told by your doctor. This is important.  Where to find more information:  To learn more about diabetes, visit:  · American Diabetes Association:  www.diabetes.org  · American Association of Diabetes Educators: www.diabeteseducator.org/patient-resources  This information is not intended to replace advice given to you by your health care provider. Make sure you discuss any questions you have with your health care provider.  Document Released: 04/10/2017 Document Revised: 05/25/2017 Document Reviewed: 01/20/2017  [employer] Interactive Patient Education © 2017 [employer]

## 2018-03-08 ENCOUNTER — TELEPHONE (OUTPATIENT)
Dept: FAMILY MEDICINE CLINIC | Facility: CLINIC | Age: 58
End: 2018-03-08

## 2018-03-08 NOTE — TELEPHONE ENCOUNTER
----- Message from TRUONG Torres sent at 3/8/2018  1:56 PM EST -----  The 50 is correct. This is only a as needed medication which she should only take if needed for anxiety. thanks  ----- Message -----     From: Sophy Carmona MA     Sent: 3/8/2018   1:13 PM       To: TRUONG Torresa called and wanted to know about her hydroxyzine medicine. She said she thought the prescription should have been for 150 tablets instead on the 50. What should I tell her?

## 2018-03-10 RX ORDER — POLYMYXIN B SULFATE AND TRIMETHOPRIM 1; 10000 MG/ML; [USP'U]/ML
SOLUTION OPHTHALMIC
Refills: 0 | COMMUNITY
Start: 2018-03-02 | End: 2018-03-27

## 2018-03-12 ENCOUNTER — OFFICE VISIT (OUTPATIENT)
Dept: FAMILY MEDICINE CLINIC | Facility: CLINIC | Age: 58
End: 2018-03-12

## 2018-03-12 ENCOUNTER — TELEPHONE (OUTPATIENT)
Dept: FAMILY MEDICINE CLINIC | Facility: CLINIC | Age: 58
End: 2018-03-12

## 2018-03-12 VITALS
BODY MASS INDEX: 32.1 KG/M2 | DIASTOLIC BLOOD PRESSURE: 70 MMHG | TEMPERATURE: 98.5 F | OXYGEN SATURATION: 96 % | WEIGHT: 188 LBS | SYSTOLIC BLOOD PRESSURE: 130 MMHG | HEART RATE: 80 BPM | HEIGHT: 64 IN

## 2018-03-12 DIAGNOSIS — Z79.4 TYPE 2 DIABETES MELLITUS WITH OTHER CIRCULATORY COMPLICATION, WITH LONG-TERM CURRENT USE OF INSULIN (HCC): ICD-10-CM

## 2018-03-12 DIAGNOSIS — I25.10 CORONARY ARTERY DISEASE INVOLVING NATIVE CORONARY ARTERY OF NATIVE HEART WITHOUT ANGINA PECTORIS: ICD-10-CM

## 2018-03-12 DIAGNOSIS — R68.89 FLU-LIKE SYMPTOMS: Primary | ICD-10-CM

## 2018-03-12 DIAGNOSIS — E11.59 TYPE 2 DIABETES MELLITUS WITH OTHER CIRCULATORY COMPLICATION, WITH LONG-TERM CURRENT USE OF INSULIN (HCC): ICD-10-CM

## 2018-03-12 DIAGNOSIS — I10 ESSENTIAL HYPERTENSION: ICD-10-CM

## 2018-03-12 LAB
EXPIRATION DATE: NORMAL
FLUAV AG NPH QL: NORMAL
FLUBV AG NPH QL: NORMAL
INTERNAL CONTROL: NORMAL
Lab: NORMAL

## 2018-03-12 PROCEDURE — 99214 OFFICE O/P EST MOD 30 MIN: CPT | Performed by: NURSE PRACTITIONER

## 2018-03-12 PROCEDURE — 87804 INFLUENZA ASSAY W/OPTIC: CPT | Performed by: NURSE PRACTITIONER

## 2018-03-12 RX ORDER — AZITHROMYCIN 250 MG/1
TABLET, FILM COATED ORAL
Qty: 6 TABLET | Refills: 0 | Status: SHIPPED | OUTPATIENT
Start: 2018-03-12 | End: 2018-03-27

## 2018-03-12 RX ORDER — AZITHROMYCIN 250 MG/1
TABLET, FILM COATED ORAL
Qty: 6 TABLET | Refills: 0 | Status: SHIPPED | OUTPATIENT
Start: 2018-03-12 | End: 2018-03-12 | Stop reason: SDUPTHER

## 2018-03-12 NOTE — TELEPHONE ENCOUNTER
----- Message from TRUONG Torres sent at 3/10/2018  9:20 PM EST -----  Regarding: Lab appt  Monisha will need a lab appt prior to her April appt. thanks

## 2018-03-12 NOTE — PROGRESS NOTES
Subjective   Monisha Gardner is a 57 y.o. female.     Chief Complaint   Patient presents with   • Cough   • Fever       History of Present Illness    URI symptoms ×3 days.  Moderate intensity.  Bodyaches and chills.  Unknown exposure to flu.  Cough that is keeping her up at night.    Medical history must be considered in plan of care today due to coronary artery disease, hypertension and diabetes.    Diabetes-controlled    Hypertension-controlled    Coronary artery disease-stable      The following portions of the patient's history were reviewed and updated as appropriate: allergies, current medications, past family history, past medical history, past social history, past surgical history and problem list.    Review of Systems   Constitutional: Positive for chills, fatigue and fever. Negative for appetite change and unexpected weight change.   HENT: Positive for congestion, ear pain, sinus pain and sinus pressure. Negative for nosebleeds, postnasal drip, rhinorrhea, sore throat, trouble swallowing and voice change.    Eyes: Positive for pain. Negative for visual disturbance.   Respiratory: Positive for cough. Negative for shortness of breath and wheezing.    Cardiovascular: Negative for chest pain and palpitations.   Gastrointestinal: Positive for abdominal pain and diarrhea. Negative for blood in stool and constipation.   Endocrine: Negative for cold intolerance and polydipsia.   Genitourinary: Negative for difficulty urinating, flank pain and hematuria.   Musculoskeletal: Negative for arthralgias, back pain, gait problem, joint swelling and myalgias.   Skin: Negative for color change and rash.   Allergic/Immunologic: Negative.    Neurological: Negative for syncope, numbness and headaches.   Hematological: Negative.    Psychiatric/Behavioral: Negative for sleep disturbance and suicidal ideas.   All other systems reviewed and are negative.      Objective     /70   Pulse 80   Temp 98.5 °F (36.9 °C) (Temporal  "Artery )   Ht 162.6 cm (64\")   Wt 85.3 kg (188 lb)   LMP  (LMP Unknown)   SpO2 96%   BMI 32.27 kg/m²   Office Visit on 01/17/2018   Component Date Value Ref Range Status   • TSH 01/23/2018 0.965  0.550 - 4.780 mIU/mL Final   • Free T4 01/23/2018 1.10  0.89 - 1.76 ng/dL Final   • T3, Free 01/23/2018 2.70  2.30 - 4.20 pg/mL Final       Physical Exam   Constitutional: She is oriented to person, place, and time. She appears well-developed and well-nourished. She has a sickly appearance. No distress.   Appears acutely ill.    HENT:   Head: Normocephalic.   Right Ear: Hearing normal. No lacerations. No drainage or swelling. No foreign bodies. No mastoid tenderness. Tympanic membrane is bulging. Tympanic membrane is not perforated and not erythematous. A middle ear effusion is present.   Left Ear: Hearing normal. No lacerations. No drainage or swelling. No foreign bodies. No mastoid tenderness. Tympanic membrane is bulging. Tympanic membrane is not perforated and not erythematous. A middle ear effusion is present.   Nose: Mucosal edema and rhinorrhea present. No nose lacerations, sinus tenderness or nasal deformity. No epistaxis.  No foreign bodies. Right sinus exhibits maxillary sinus tenderness. Left sinus exhibits maxillary sinus tenderness.   Mouth/Throat: Uvula is midline and mucous membranes are normal. Oropharyngeal exudate and posterior oropharyngeal erythema present. No posterior oropharyngeal edema or tonsillar abscesses.   TM's are cloudy bilateral   Eyes: EOM are normal. Pupils are equal, round, and reactive to light.   Neck: Normal range of motion. Neck supple. No thyromegaly present.   Cardiovascular: Normal rate, regular rhythm, normal heart sounds and intact distal pulses.    Pulmonary/Chest: Effort normal and breath sounds normal. No respiratory distress.   Cough noted    Abdominal: Soft. Bowel sounds are normal. She exhibits no distension. There is no tenderness. There is no guarding. "   Lymphadenopathy:     She has cervical adenopathy.        Right cervical: Superficial cervical adenopathy present. No deep cervical adenopathy present.       Left cervical: Superficial cervical adenopathy present. No deep cervical adenopathy present.   Neurological: She is alert and oriented to person, place, and time. She has normal reflexes.   Skin: Skin is warm and dry. No rash noted. She is not diaphoretic.   Psychiatric: She has a normal mood and affect. Her speech is normal and behavior is normal. Judgment and thought content normal. Cognition and memory are normal.       Assessment/Plan     Problem List Items Addressed This Visit        Cardiovascular and Mediastinum    CAD (coronary artery disease)    Overview     a. Previous MI and stent to unknown vessel 2 years ago, incomplete database.   b. Hospital presentation with acute coronary syndrome and features of inferior ST elevations, November 2015.  c. Cardiac catheterization study by Dr. Watts, 11/17/2015, showed single vessel disease with 95% stenosis of the ostial/proximal RCA, ejection fraction 65%. No other significant disease.  d. Stenting of the RCA with 3.5 x 26 mm Resolute drug-eluting stent.          Hypertension    Type 2 diabetes mellitus with circulatory disorder, with long-term current use of insulin      Other Visit Diagnoses     Flu-like symptoms    -  Primary    Relevant Medications    azithromycin (ZITHROMAX Z-JENNIFER) 250 MG tablet    Other Relevant Orders    POCT Influenza A/B (Completed)        Influenza negative.  Rotate Tylenol and Motrin as needed for pain/fever.  Written script for 3-way cough syrup one tsp every eight hours prn, disp 6 oz. Equal parts Robitussin DM, Liquid phenergan and Albuterol syrup.   Fluids, rest, symptomatic treatment advised. Steam therapy. Dispose of tooth bush after 24 hours of starting antibiotics if ordered. Complete antibiotics as ordered.  Discussed possible side effects/interactions of medication.  Discussed symptoms to report as well as reasons to seek urgent or emergent medical attention. Understanding stated.   Recommend follow up in 2-3 days if not improved, sooner if needed.   RTC 2-5 days if not improved, sooner if condition worsens/changes. Symptomatic care advised as well as reasons for urgent or emergent care. Pt / family state understanding.              This document has been electronically signed by:  TRUONG Pierre, NP-C

## 2018-03-27 ENCOUNTER — OFFICE VISIT (OUTPATIENT)
Dept: CARDIOLOGY | Facility: CLINIC | Age: 58
End: 2018-03-27

## 2018-03-27 VITALS
HEART RATE: 78 BPM | WEIGHT: 191 LBS | DIASTOLIC BLOOD PRESSURE: 74 MMHG | SYSTOLIC BLOOD PRESSURE: 121 MMHG | BODY MASS INDEX: 32.61 KG/M2 | HEIGHT: 64 IN

## 2018-03-27 DIAGNOSIS — I10 ESSENTIAL HYPERTENSION: ICD-10-CM

## 2018-03-27 DIAGNOSIS — E78.5 DYSLIPIDEMIA: ICD-10-CM

## 2018-03-27 DIAGNOSIS — I25.118 CORONARY ARTERY DISEASE OF NATIVE ARTERY OF NATIVE HEART WITH STABLE ANGINA PECTORIS (HCC): ICD-10-CM

## 2018-03-27 PROCEDURE — 99213 OFFICE O/P EST LOW 20 MIN: CPT | Performed by: INTERNAL MEDICINE

## 2018-03-27 PROCEDURE — 93000 ELECTROCARDIOGRAM COMPLETE: CPT | Performed by: INTERNAL MEDICINE

## 2018-03-27 RX ORDER — ISOSORBIDE MONONITRATE 60 MG/1
60 TABLET, EXTENDED RELEASE ORAL DAILY
Qty: 90 TABLET | Refills: 3 | Status: SHIPPED | OUTPATIENT
Start: 2018-03-27 | End: 2018-03-27 | Stop reason: SDUPTHER

## 2018-03-27 RX ORDER — ISOSORBIDE MONONITRATE 60 MG/1
60 TABLET, EXTENDED RELEASE ORAL DAILY
Qty: 90 TABLET | Refills: 3 | Status: SHIPPED | OUTPATIENT
Start: 2018-03-27 | End: 2018-04-25

## 2018-03-27 NOTE — PROGRESS NOTES
Encounter Date:03/27/2018      Patient ID: Monisha Gardner is a 57 y.o. female.    Chief Complaint: Coronary Artery Disease      PROBLEM LIST:  1. Coronary artery disease:  a. Previous MI and stent to unknown vessel  2 years ago, incomplete database.   b. Hospital presentation with acute coronary syndrome and features of inferior ST elevations, November 2015.  c. Cardiac catheterization study by Dr. Watts, 11/17/2015, showed single vessel disease with 95% stenosis  of the ostial/proximal RCA, ejection fraction 65%. No other significant disease.  d. Stenting of the RCA with 3.5 x 26 mm Resolute drug-eluting stent.   e. MPS 4/6/2017: EF >70%. Normal perfusion with no evidence of ischemia.  2. Peripheral arterial disease  a. IOANA 4/5/2017 showed pressure in the right arm was 140 and L arm was 140.   3. Hypertension.   4. Dyslipidemia.   5. Type 2 diabetes.   6. Overweight.   7. Osteoarthritis.     History of Present Illness  Patient presents today for follow-up with a history of CAD, PAD, and cardiac risk factors. Since last visit has complaints of left shoulder Pain which is positional, states that the left shoulder and arm hurt all day long, this worsens when she tries to lift her arm up.  Sometimes this is also associated with a left-sided jaw pain and palpitations.  She does not recall any injury to the left shoulder.  Has been under a lot of stress lately due to her mother's medical problems and having to provide care.  Past remain busy and active, occasionally experiences exertional dyspnea and jaw pain but shoulder pain is unrelated.    Allergies   Allergen Reactions   • Ciprofloxacin Anaphylaxis   • Penicillins Shortness Of Breath   • Statins Myalgia   • Zofran [Ondansetron Hcl] Shortness Of Breath         Current Outpatient Prescriptions:   •  aspirin  MG tablet, take 1 tablet by mouth once daily, Disp: 30 tablet, Rfl: 6  •  clopidogrel (PLAVIX) 75 MG tablet, take 1 tablet by mouth once daily,  Disp: 30 tablet, Rfl: 6  •  DEXILANT 60 MG capsule, Daily., Disp: , Rfl: 0  •  Dulaglutide (TRULICITY) 1.5 MG/0.5ML solution pen-injector, Inject  under the skin., Disp: , Rfl:   •  escitalopram (LEXAPRO) 10 MG tablet, Take 1 tablet by mouth Daily., Disp: 30 tablet, Rfl: 1  •  Evolocumab with Infusor 420 MG/3.5ML solution cartridge, Inject 420 mg under the skin Every 30 (Thirty) Days., Disp: 3 cartridge., Rfl: 3  •  hydrOXYzine (ATARAX) 10 MG tablet, Take 1 tablet by mouth Every 6 (Six) Hours As Needed for Anxiety., Disp: 50 tablet, Rfl: 0  •  Insulin Degludec (TRESIBA FLEXTOUCH) 200 UNIT/ML solution pen-injector, Inject  under the skin., Disp: , Rfl:   •  isosorbide mononitrate (IMDUR) 60 MG 24 hr tablet, Take 1 tablet by mouth Daily., Disp: 90 tablet, Rfl: 3  •  losartan (COZAAR) 50 MG tablet, take 1 tablet by mouth twice a day, Disp: 60 tablet, Rfl: 6  •  Magnesium 500 MG capsule, Take 1 capsule by mouth Daily., Disp: 30 each, Rfl: 2  •  metFORMIN (GLUCOPHAGE) 500 MG tablet, Take 2 tablets by mouth 2 (Two) Times a Day With Meals., Disp: 120 tablet, Rfl: 1  •  metoprolol tartrate (LOPRESSOR) 50 MG tablet, take 1 tablet by mouth twice a day, Disp: 60 tablet, Rfl: 6  •  nitroglycerin (NITROSTAT) 0.4 MG SL tablet, Place 1 tablet under the tongue Every 5 (Five) Minutes As Needed for chest pain., Disp: 25 tablet, Rfl: 6    The following portions of the patient's history were reviewed and updated as appropriate: allergies, current medications, past family history, past medical history, past social history, past surgical history and problem list.    ROS  Review of Systems   Constitution: Negative for chills, fever, weight gain and weight loss.   Cardiovascular: Negative for chest pain, claudication, dyspnea on exertion, leg swelling, orthopnea, palpitations, paroxysmal nocturnal dyspnea and syncope.        No dizziness   Gastrointestinal: Negative for abdominal pain, constipation, diarrhea, nausea and vomiting.  "  Genitourinary:        No urinary symptoms   Neurological:        No symptoms of stroke.   All other systems reviewed and are negative.    Objective:     Blood pressure 121/74, pulse 78, height 162.6 cm (64\"), weight 86.6 kg (191 lb).        Physical Exam  Constitutional: She appears well-developed and well-nourished.   HENT:   HEENT exam unremarkable.   Neck: Neck supple. No JVD present.   No carotid bruits.   Cardiovascular: Normal rate, regular rhythm and normal heart sounds.    No murmur heard.  2 plus symmetric pulses.   Pulmonary/Chest: Breath sounds normal. Does not exhibit tenderness.   Abdominal:   Abdomen benign.   Musculoskeletal: Does not exhibit edema.   Neurological:   Neurological exam unremarkable.   Vitals reviewed.    Lab Review:     ECG 12 Lead  Date/Time: 3/27/2018 2:03 PM  Performed by: LOIDA WATTS  Authorized by: LOIDA WATTS   Comparison: compared with previous ECG from 11/15/2015  Similar to previous ECG  Rhythm: sinus rhythm  Clinical impression: non-specific ECG  Comments: Nonspecific ST changes                Assessment:      Diagnosis Plan   1. Coronary artery disease of native artery of native heart with stable angina pectoris  Increase Imdur, if no significant improvement of symptoms we will consider further cardiac evaluation.     2. Essential hypertension  Well controlled, continue current medications.     3. Dyslipidemia  Goal LDL less than 70, continue current PCS K-9 inhibitor therapy.       Plan:   Increase Imdur to 60 minute grams daily, continue rest of the current medications.    Can improvement of symptoms we will consider further cardiac evaluation.     FU in 6 MO, sooner as needed.  Thank you for allowing us to participate in the care of your patient.     Scribed for Loida Watts MD by Kota Jerry. 3/27/2018  2:23 PM    ILoida MD, personally performed the services described in this documentation as scribed by the above named individual in my presence, and it is " both accurate and complete.  3/27/2018  2:28 PM        Please note that portions of this note may have been completed with a voice recognition program. Efforts were made to edit the dictations, but occasionally words are mistranscribed.

## 2018-04-02 ENCOUNTER — OFFICE VISIT (OUTPATIENT)
Dept: FAMILY MEDICINE CLINIC | Facility: CLINIC | Age: 58
End: 2018-04-02

## 2018-04-02 VITALS
SYSTOLIC BLOOD PRESSURE: 160 MMHG | HEART RATE: 88 BPM | BODY MASS INDEX: 32.44 KG/M2 | OXYGEN SATURATION: 97 % | WEIGHT: 190 LBS | HEIGHT: 64 IN | DIASTOLIC BLOOD PRESSURE: 85 MMHG | TEMPERATURE: 98.6 F

## 2018-04-02 DIAGNOSIS — R68.89 FLU-LIKE SYMPTOMS: ICD-10-CM

## 2018-04-02 DIAGNOSIS — R52 BODY ACHES: Primary | ICD-10-CM

## 2018-04-02 DIAGNOSIS — R05.9 COUGH: ICD-10-CM

## 2018-04-02 LAB
EXPIRATION DATE: NORMAL
FLUAV AG NPH QL: NEGATIVE
FLUBV AG NPH QL: NEGATIVE
INTERNAL CONTROL: NORMAL
Lab: NORMAL

## 2018-04-02 PROCEDURE — 96372 THER/PROPH/DIAG INJ SC/IM: CPT | Performed by: NURSE PRACTITIONER

## 2018-04-02 PROCEDURE — 87804 INFLUENZA ASSAY W/OPTIC: CPT | Performed by: NURSE PRACTITIONER

## 2018-04-02 PROCEDURE — 99214 OFFICE O/P EST MOD 30 MIN: CPT | Performed by: NURSE PRACTITIONER

## 2018-04-02 RX ORDER — PEN NEEDLE, DIABETIC 32GX 5/32"
NEEDLE, DISPOSABLE MISCELLANEOUS
Refills: 0 | COMMUNITY
Start: 2018-02-27 | End: 2020-06-16 | Stop reason: SDUPTHER

## 2018-04-02 RX ORDER — SULFAMETHOXAZOLE AND TRIMETHOPRIM 800; 160 MG/1; MG/1
1 TABLET ORAL 2 TIMES DAILY
Qty: 20 TABLET | Refills: 0 | Status: SHIPPED | OUTPATIENT
Start: 2018-04-02 | End: 2018-04-02 | Stop reason: SDUPTHER

## 2018-04-02 RX ORDER — IPRATROPIUM BROMIDE AND ALBUTEROL SULFATE 2.5; .5 MG/3ML; MG/3ML
3 SOLUTION RESPIRATORY (INHALATION) EVERY 4 HOURS PRN
Qty: 540 ML | Refills: 5 | Status: SHIPPED | OUTPATIENT
Start: 2018-04-02 | End: 2018-04-02 | Stop reason: SDUPTHER

## 2018-04-02 RX ORDER — FLUTICASONE PROPIONATE 50 MCG
2 SPRAY, SUSPENSION (ML) NASAL DAILY
Qty: 1 BOTTLE | Refills: 5 | Status: SHIPPED | OUTPATIENT
Start: 2018-04-02 | End: 2018-04-02 | Stop reason: SDUPTHER

## 2018-04-02 RX ORDER — IPRATROPIUM BROMIDE AND ALBUTEROL SULFATE 2.5; .5 MG/3ML; MG/3ML
3 SOLUTION RESPIRATORY (INHALATION) EVERY 6 HOURS PRN
Qty: 540 ML | Refills: 5 | Status: SHIPPED | OUTPATIENT
Start: 2018-04-02 | End: 2018-07-26

## 2018-04-02 RX ORDER — VALACYCLOVIR HYDROCHLORIDE 500 MG/1
500 TABLET, FILM COATED ORAL EVERY 12 HOURS
Refills: 0 | COMMUNITY
Start: 2018-01-15 | End: 2018-08-01

## 2018-04-02 RX ORDER — LANSOPRAZOLE 30 MG/1
CAPSULE, DELAYED RELEASE ORAL
Refills: 0 | COMMUNITY
Start: 2017-12-28 | End: 2018-07-26

## 2018-04-02 RX ORDER — FLUTICASONE PROPIONATE 50 MCG
2 SPRAY, SUSPENSION (ML) NASAL DAILY
Qty: 1 BOTTLE | Refills: 5 | Status: SHIPPED | OUTPATIENT
Start: 2018-04-02 | End: 2018-07-26

## 2018-04-02 RX ORDER — SULFAMETHOXAZOLE AND TRIMETHOPRIM 800; 160 MG/1; MG/1
1 TABLET ORAL 2 TIMES DAILY
Qty: 20 TABLET | Refills: 0 | Status: SHIPPED | OUTPATIENT
Start: 2018-04-02 | End: 2018-04-25

## 2018-04-02 NOTE — PROGRESS NOTES
Subjective   Monisha Gardner is a 57 y.o. female.     Chief Complaint   Patient presents with   • Cough   • Nasal Congestion   • Back Pain       History of Present Illness       Flulike symptoms ×1 month.  Patient reports that she has completed her recent antibiotics and been using three-way cough syrup with no improvement.  She has cough, nasal congestion, body aches and sore throat and fatigue.  Cough is productive with thick green sputum.  She does have some wheezing.  Patient is a diabetic with elevated glucose levels over the past few weeks.She reports having been to see her cardiology provider Dr. Watts in the past month. An EKG was performed at that visit and her cardiac medications increased. She is not sleeping well, tired all the time. Using Proair inhaler several times a day with no improvement. Low grade temp on and off.     MULTIPLE ALLERGIES AS LISTED, UNSURE IF TOLERATED ROCEPHIN IN THE PAST     She has tested negative for the flu a few weeks ago.  Symptoms have not improved.      The following portions of the patient's history were reviewed and updated as appropriate: allergies, current medications, past family history, past medical history, past social history, past surgical history and problem list.    Review of Systems   Constitutional: Positive for fatigue. Negative for appetite change and unexpected weight change.   HENT: Positive for congestion, postnasal drip and sore throat. Negative for ear pain, nosebleeds, rhinorrhea, trouble swallowing and voice change.    Eyes: Negative for pain and visual disturbance.   Respiratory: Positive for cough and wheezing. Negative for shortness of breath.    Cardiovascular: Negative for chest pain and palpitations.   Gastrointestinal: Negative for abdominal pain, blood in stool, constipation and diarrhea.   Endocrine: Negative for cold intolerance and polydipsia.   Genitourinary: Negative for difficulty urinating, flank pain and hematuria.   Musculoskeletal:  "Positive for back pain. Negative for arthralgias, gait problem, joint swelling and myalgias.   Skin: Negative for color change and rash.   Allergic/Immunologic: Negative.    Neurological: Negative for syncope, numbness and headaches.   Hematological: Negative.    Psychiatric/Behavioral: Negative for sleep disturbance and suicidal ideas.   All other systems reviewed and are negative.      Objective     /85   Pulse 88   Temp 98.6 °F (37 °C) (Temporal Artery )   Ht 162.6 cm (64\")   Wt 86.2 kg (190 lb)   LMP  (LMP Unknown)   SpO2 97%   BMI 32.61 kg/m²   Office Visit on 03/12/2018   Component Date Value Ref Range Status   • Rapid Influenza A Ag 03/12/2018 neg   Final   • Rapid Influenza B Ag 03/12/2018 neg   Final   • Internal Control 03/12/2018 Passed  Passed Final   • Lot Number 03/12/2018 [id number]   Final   • Expiration Date 03/12/2018 12/13/2020   Final       Physical Exam   Constitutional: Vital signs are normal. She appears well-developed and well-nourished. She appears ill. No distress.   Ill appearing 57-year-old female, febrile with audible cough.   HENT:   Head: Atraumatic.   Right Ear: There is tenderness. Tympanic membrane is erythematous.   Left Ear: There is tenderness. Tympanic membrane is erythematous.   Nose: Mucosal edema present. Right sinus exhibits maxillary sinus tenderness. Right sinus exhibits no frontal sinus tenderness. Left sinus exhibits maxillary sinus tenderness. Left sinus exhibits no frontal sinus tenderness.   Mouth/Throat: Mucous membranes are normal. Oropharyngeal exudate and posterior oropharyngeal erythema present. No posterior oropharyngeal edema.   Cardiovascular: Normal rate, regular rhythm and normal heart sounds.    Pulmonary/Chest: Effort normal. No respiratory distress. She has decreased breath sounds in the right lower field and the left lower field. She has wheezes in the right upper field and the left upper field. She has no rhonchi. She has no rales. "   Abdominal: Soft. Bowel sounds are normal. There is no tenderness.   Skin: Skin is warm and dry. No rash noted. She is not diaphoretic. No erythema. No pallor.   Psychiatric: She has a normal mood and affect. Her behavior is normal. Judgment and thought content normal.       Assessment/Plan     Problem List Items Addressed This Visit     None      Visit Diagnoses     Body aches    -  Primary    Relevant Medications    ketorolac (TORADOL) injection 30 mg (Completed) (Start on 4/2/2018  5:30 PM)    Other Relevant Orders    XR Chest PA & Lateral    CBC & Differential    Comprehensive Metabolic Panel    Cough        Relevant Medications    promethazine (PHENERGAN) 6.25 MG/5ML solution oral solution    ipratropium-albuterol (DUO-NEB) 0.5-2.5 mg/mL nebulizer    Other Relevant Orders    XR Chest PA & Lateral    CBC & Differential    Comprehensive Metabolic Panel    Flu-like symptoms        Relevant Medications    sulfamethoxazole-trimethoprim (BACTRIM DS) 800-160 MG per tablet    fluticasone (FLONASE) 50 MCG/ACT nasal spray    Other Relevant Orders    XR Chest PA & Lateral    CBC & Differential    Comprehensive Metabolic Panel    POCT Influenza A/B        Flu test negative.  Start liquid Phenergan which should be helpful with cough and lack of sleep.  Bactrim DS 1 by mouth twice a day ×10 days.  Encouraged adequate fluid intake.  Start Flonase.  I have discussed diagnosis in detail today allowing time for questions and answers. Pt is aware of reasons to seek urgent or emergent medical care as well as reasons to return to the clinic for evaluation. Possible side effects, interactions and progression of symptoms discussed as well. Pt / family states understanding.   Emotional support and active listening provided.     Patient was given an order for chest x-ray, CBC and CMP to be performed at the [facility] as she does not wish to travel to [facility].   Off work next 3 days, longer if needed.   Order provided for  Nebulizer machine, instructions for use given. Do not use Pro-air inhaler or 3 way cough syrup with in 6 hours of use.   Fluids, rest, symptomatic treatment advised. Steam therapy. Dispose of tooth bush after 24 hours of starting antibiotics if ordered. Complete antibiotics as ordered.  Discussed possible side effects/interactions of medication. Discussed symptoms to report as well as reasons to seek urgent or emergent medical attention. Understanding stated.   Recommend follow up in 2-3 days if not improved, sooner if needed.   Suspect pneumonia. Pt will call tomorrow morning for any further instructions/orders. Seek immediate medical attention with any changes or worsening of symptoms.     RTC 2-5 days if not improved, sooner if condition worsens/changes. Symptomatic care advised as well as reasons for urgent or emergent care. Pt / family state understanding.     Remain under the care of cardiology and report any changes in symptoms to cardiology at once.  Patient states understanding.    Errors in dictation may reflect use of voice recognition software and not all errors in transcription may have been detected prior to signing.               This document has been electronically signed by:  TRUONG Pierre, NP-C

## 2018-04-03 ENCOUNTER — TELEPHONE (OUTPATIENT)
Dept: FAMILY MEDICINE CLINIC | Facility: CLINIC | Age: 58
End: 2018-04-03

## 2018-04-03 NOTE — TELEPHONE ENCOUNTER
Matt has looked at [facility] she does not have pneumonia she is really still sick today I have told her that matt said she needs to go to the ER and be evaluated I sent her prescription over to [facility] to get a nebulizer machine and I have spoke with laisha at the pharmacy

## 2018-04-09 RX ORDER — ISOSORBIDE MONONITRATE 60 MG/1
60 TABLET, EXTENDED RELEASE ORAL DAILY
Qty: 90 TABLET | Refills: 1 | Status: SHIPPED | OUTPATIENT
Start: 2018-04-09 | End: 2018-06-27 | Stop reason: SDUPTHER

## 2018-04-13 RX ORDER — ISOSORBIDE MONONITRATE 30 MG/1
TABLET, EXTENDED RELEASE ORAL
Qty: 90 TABLET | Refills: 3 | OUTPATIENT
Start: 2018-04-13

## 2018-04-16 ENCOUNTER — LAB (OUTPATIENT)
Dept: FAMILY MEDICINE CLINIC | Facility: CLINIC | Age: 58
End: 2018-04-16

## 2018-04-16 DIAGNOSIS — E55.9 VITAMIN D DEFICIENCY: ICD-10-CM

## 2018-04-16 DIAGNOSIS — G47.62 NOCTURNAL LEG CRAMPS: ICD-10-CM

## 2018-04-16 DIAGNOSIS — E53.8 VITAMIN B 12 DEFICIENCY: ICD-10-CM

## 2018-04-16 DIAGNOSIS — E11.8 TYPE 2 DIABETES MELLITUS WITH COMPLICATION, WITHOUT LONG-TERM CURRENT USE OF INSULIN (HCC): ICD-10-CM

## 2018-04-16 DIAGNOSIS — E78.5 DYSLIPIDEMIA: Chronic | ICD-10-CM

## 2018-04-16 LAB
25(OH)D3 SERPL-MCNC: 31 NG/ML
ALBUMIN SERPL-MCNC: 4.4 G/DL (ref 3.5–5)
ALBUMIN UR-MCNC: 18.8 MG/L
ALBUMIN/GLOB SERPL: 1.8 G/DL (ref 1.5–2.5)
ALP SERPL-CCNC: 100 U/L (ref 35–104)
ALT SERPL W P-5'-P-CCNC: 61 U/L (ref 10–36)
ANION GAP SERPL CALCULATED.3IONS-SCNC: 8.6 MMOL/L (ref 3.6–11.2)
AST SERPL-CCNC: 59 U/L (ref 10–30)
BASOPHILS # BLD AUTO: 0.06 10*3/MM3 (ref 0–0.3)
BASOPHILS NFR BLD AUTO: 0.7 % (ref 0–2)
BILIRUB SERPL-MCNC: 0.4 MG/DL (ref 0.2–1.8)
BUN BLD-MCNC: 11 MG/DL (ref 7–21)
BUN/CREAT SERPL: 12 (ref 7–25)
CALCIUM SPEC-SCNC: 9.8 MG/DL (ref 7.7–10)
CHLORIDE SERPL-SCNC: 107 MMOL/L (ref 99–112)
CHOLEST SERPL-MCNC: 127 MG/DL (ref 0–200)
CO2 SERPL-SCNC: 26.4 MMOL/L (ref 24.3–31.9)
CREAT BLD-MCNC: 0.92 MG/DL (ref 0.43–1.29)
DEPRECATED RDW RBC AUTO: 45.8 FL (ref 37–54)
EOSINOPHIL # BLD AUTO: 0.31 10*3/MM3 (ref 0–0.7)
EOSINOPHIL NFR BLD AUTO: 3.9 % (ref 0–5)
ERYTHROCYTE [DISTWIDTH] IN BLOOD BY AUTOMATED COUNT: 14.7 % (ref 11.5–14.5)
GFR SERPL CREATININE-BSD FRML MDRD: 63 ML/MIN/1.73
GLOBULIN UR ELPH-MCNC: 2.5 GM/DL
GLUCOSE BLD-MCNC: 115 MG/DL (ref 70–110)
HBA1C MFR BLD: 6.8 % (ref 4.5–5.7)
HCT VFR BLD AUTO: 39.8 % (ref 37–47)
HDLC SERPL-MCNC: 44 MG/DL (ref 60–100)
HGB BLD-MCNC: 12.8 G/DL (ref 12–16)
IMM GRANULOCYTES # BLD: 0.02 10*3/MM3 (ref 0–0.03)
IMM GRANULOCYTES NFR BLD: 0.2 % (ref 0–0.5)
LDLC SERPL CALC-MCNC: 16 MG/DL (ref 0–100)
LDLC/HDLC SERPL: 0.36 {RATIO}
LYMPHOCYTES # BLD AUTO: 2.62 10*3/MM3 (ref 1–3)
LYMPHOCYTES NFR BLD AUTO: 32.6 % (ref 21–51)
MCH RBC QN AUTO: 27.8 PG (ref 27–33)
MCHC RBC AUTO-ENTMCNC: 32.2 G/DL (ref 33–37)
MCV RBC AUTO: 86.3 FL (ref 80–94)
MONOCYTES # BLD AUTO: 0.46 10*3/MM3 (ref 0.1–0.9)
MONOCYTES NFR BLD AUTO: 5.7 % (ref 0–10)
NEUTROPHILS # BLD AUTO: 4.57 10*3/MM3 (ref 1.4–6.5)
NEUTROPHILS NFR BLD AUTO: 56.9 % (ref 30–70)
OSMOLALITY SERPL CALC.SUM OF ELEC: 283.4 MOSM/KG (ref 273–305)
PLATELET # BLD AUTO: 336 10*3/MM3 (ref 130–400)
PMV BLD AUTO: 9.9 FL (ref 6–10)
POTASSIUM BLD-SCNC: 4.5 MMOL/L (ref 3.5–5.3)
PROT SERPL-MCNC: 6.9 G/DL (ref 6–8)
RBC # BLD AUTO: 4.61 10*6/MM3 (ref 4.2–5.4)
SODIUM BLD-SCNC: 142 MMOL/L (ref 135–153)
TRIGL SERPL-MCNC: 336 MG/DL (ref 0–150)
TSH SERPL DL<=0.05 MIU/L-ACNC: 0.98 MIU/ML (ref 0.55–4.78)
VIT B12 BLD-MCNC: 573 PG/ML (ref 211–911)
VLDLC SERPL-MCNC: 67.2 MG/DL
WBC NRBC COR # BLD: 8.04 10*3/MM3 (ref 4.5–12.5)

## 2018-04-16 PROCEDURE — 83036 HEMOGLOBIN GLYCOSYLATED A1C: CPT | Performed by: NURSE PRACTITIONER

## 2018-04-16 PROCEDURE — 84443 ASSAY THYROID STIM HORMONE: CPT | Performed by: NURSE PRACTITIONER

## 2018-04-16 PROCEDURE — 85025 COMPLETE CBC W/AUTO DIFF WBC: CPT | Performed by: NURSE PRACTITIONER

## 2018-04-16 PROCEDURE — 80053 COMPREHEN METABOLIC PANEL: CPT | Performed by: NURSE PRACTITIONER

## 2018-04-16 PROCEDURE — 80061 LIPID PANEL: CPT | Performed by: NURSE PRACTITIONER

## 2018-04-16 PROCEDURE — 82607 VITAMIN B-12: CPT | Performed by: NURSE PRACTITIONER

## 2018-04-16 PROCEDURE — 36415 COLL VENOUS BLD VENIPUNCTURE: CPT

## 2018-04-16 PROCEDURE — 82306 VITAMIN D 25 HYDROXY: CPT | Performed by: NURSE PRACTITIONER

## 2018-04-16 PROCEDURE — 82043 UR ALBUMIN QUANTITATIVE: CPT | Performed by: NURSE PRACTITIONER

## 2018-04-18 ENCOUNTER — TELEPHONE (OUTPATIENT)
Dept: CARDIOLOGY | Facility: CLINIC | Age: 58
End: 2018-04-18

## 2018-04-18 NOTE — TELEPHONE ENCOUNTER
Patient reports she is allergic to fish oil.  States it caused severe N/V.  I added this to her allergy list.    Just work on diet, or add something else?

## 2018-04-18 NOTE — TELEPHONE ENCOUNTER
Patient was advised by her PCP to follow up with Dr. Watts due to elevated triglycerides.  Patient has frequent labs due to DM II.  She is on Repatha 420mg monthly.    Labs are available to review in Epic.

## 2018-04-23 RX ORDER — FENOFIBRATE 160 MG/1
160 TABLET ORAL DAILY
Qty: 90 TABLET | Refills: 1 | Status: SHIPPED | OUTPATIENT
Start: 2018-04-23 | End: 2018-06-27 | Stop reason: SDUPTHER

## 2018-04-24 NOTE — PROGRESS NOTES
Monisha presents to the office today for follow up and to review lab results pertaining to her H/O DM, type 2, HTN, Dyslipidemia, HTN. In addition, she has a H/O CAD with Previous MI  Stent which she is followed by Dr Watts. Monisha is also c/o left shoulder pain which started three weeks ago when she was experiencing upper respiratory symptoms. She did have a chest x-ray done on April 2nd which was reviewed and did not reveal acute cardiopulmonary findings    Diabetes   She has type 2 diabetes mellitus. Her disease course has been stable.  Pertinent negatives for diabetes include no chest pain, no weakness. Diabetic complications include peripheral neuropathy. Risk factors for coronary artery disease include diabetes mellitus, dyslipidemia, family history, hypertension, obesity, post-menopausal, sedentary lifestyle and stress. Current diabetic treatment includes oral agent (dual therapy)  and basal insulin. In addition, she was started on Trulicity at her last visit. She is compliant with treatment most of the time. She is following a generally healthy diet. She has had a previous visit with a dietitian.  An ACE inhibitor/angiotensin II receptor blocker is being taken. Eye exam is up to date and done by Dr Winkler.    Hypertension   The current episode started more than 1 year ago. She does home blood pressure.  Pertinent negatives include no chest pain, palpitations, peripheral edema or shortness of breath. Risk factors for coronary artery disease include diabetes mellitus, dyslipidemia, family history, obesity, post-menopausal state, sedentary lifestyle and stress. Past treatments include angiotensin blockers and beta blockers.     Hyperlipidemia   The current episode started more than 1 year ago. The problem is uncontrolled. Recent lipid tests are improving. Pertinent negatives include no chest pain or shortness of breath. She is currently on Rapatha.  Risk factors for coronary artery disease include diabetes  "mellitus, family history, dyslipidemia, hypertension, obesity, post-menopausal and stress.     Upper Extremity Issue   This is a new problem. The current episode started 1 to 4 weeks ago. The problem occurs intermittently. The problem has been gradually worsening. Associated symptoms include arthralgias (Left shoulder) and nausea. Pertinent negatives include no vomiting. Exacerbated by: Movements. She has tried position changes, ice, heat and acetaminophen (Patches) for the symptoms. The treatment provided no relief.     Refer to ROS for additional information.     Vitals:    04/25/18 1425   BP: 122/60   Pulse: 102   Temp: 97.2 °F (36.2 °C)   TempSrc: Temporal Artery    SpO2: 97%   Weight: 84.8 kg (187 lb)   Height: 162.6 cm (64\")        The following portions of the patient's history were reviewed and updated as appropriate: allergies, current medications, past family history, past medical history, past social history, past surgical history and problem list.    Review of Systems   Constitutional: Positive for appetite change. Negative for activity change and unexpected weight change.   Eyes: Negative for visual disturbance.   Respiratory: Negative for chest tightness, shortness of breath and wheezing.    Cardiovascular: Positive for leg swelling. Negative for palpitations.   Gastrointestinal: Positive for nausea. Negative for constipation, diarrhea and vomiting.   Endocrine: Positive for heat intolerance and polydipsia. Negative for cold intolerance, polyphagia and polyuria.   Musculoskeletal: Positive for arthralgias (Left shoulder).   Skin: Negative for color change.   Neurological: Positive for dizziness. Negative for tremors, speech difficulty and light-headedness.   Hematological: Negative for adenopathy.   Psychiatric/Behavioral: Positive for sleep disturbance (Due to pain). Negative for confusion, decreased concentration and suicidal ideas. The patient is nervous/anxious (Doing well on Lexapor).    All other " systems reviewed and are negative.    Physical Exam   Constitutional: She is oriented to person, place, and time. She appears well-developed and well-nourished. No distress.   HENT:   Head: Normocephalic.   Right Ear: Hearing, tympanic membrane, external ear and ear canal normal.   Left Ear: Hearing, tympanic membrane, external ear and ear canal normal.   Nose: No mucosal edema or rhinorrhea.   Mouth/Throat: Oropharynx is clear and moist and mucous membranes are normal. No oropharyngeal exudate.   Eyes: Conjunctivae, EOM and lids are normal. Pupils are equal, round, and reactive to light. No scleral icterus. Right eye exhibits normal extraocular motion and no nystagmus. Left eye exhibits normal extraocular motion and no nystagmus.   Neck: Neck supple. No JVD present. No tracheal tenderness present. Carotid bruit is not present. No thyromegaly present.   Cardiovascular: Normal rate, regular rhythm, S1 normal, S2 normal, normal heart sounds and intact distal pulses.    No murmur heard.  Pulmonary/Chest: Effort normal and breath sounds normal. She has no wheezes. She has no rales. She exhibits no tenderness.   Abdominal: Soft. She exhibits no mass. There is no hepatosplenomegaly. There is no tenderness.   Musculoskeletal: She exhibits tenderness (Left Shoulder). She exhibits no edema.        Left shoulder: She exhibits decreased range of motion, tenderness and pain.    Monisha had a diabetic foot exam performed today.  Vascular Status -  Her right foot exhibits normal foot vasculature  and no edema. Her left foot exhibits normal foot vasculature  and no edema.  Skin Integrity  -  Her right foot skin is intact.Her left foot skin is intact..  Lymphadenopathy:     She has no cervical adenopathy.   Neurological: She is alert and oriented to person, place, and time. She has normal strength. She displays no tremor. No cranial nerve deficit or sensory deficit. She exhibits normal muscle tone. Coordination and gait normal.    Skin: Skin is warm, dry and intact. Capillary refill takes less than 2 seconds. No cyanosis. No pallor. Nails show no clubbing.   Psychiatric: She has a normal mood and affect. Her speech is normal and behavior is normal. Judgment and thought content normal. She is not actively hallucinating. Cognition and memory are normal. She is attentive.   Vitals reviewed.    Assessment/Plan   Problems Addressed this Visit        Cardiovascular and Mediastinum    Hypertension    Type 2 diabetes mellitus with circulatory disorder, with long-term current use of insulin - Primary    Relevant Orders    Comprehensive Metabolic Panel    TSH    Hemoglobin A1c    MicroAlbumin, Urine, Random - Urine, Clean Catch    Vitamin B12       Digestive    Vitamin D deficiency    Relevant Orders    Vitamin D 25 Hydroxy    Vitamin B 12 deficiency       Nervous and Auditory    Acute pain of left shoulder    Relevant Medications    cyclobenzaprine (FLEXERIL) 5 MG tablet    ibuprofen (ADVIL,MOTRIN) 800 MG tablet    Other Relevant Orders    XR Shoulder 2+ View Left (Completed)       Musculoskeletal and Integument    Osteoarthritis    Relevant Orders    Uric Acid       Other    Dyslipidemia    Relevant Orders    Lipid Panel    Overweight    Elevated liver enzymes    Relevant Orders    CBC & Differential    Anxiety      Other Visit Diagnoses    None.       Results for orders placed or performed in visit on 04/16/18   Comprehensive Metabolic Panel   Result Value Ref Range    Glucose 115 (H) 70 - 110 mg/dL    BUN 11 7 - 21 mg/dL    Creatinine 0.92 0.43 - 1.29 mg/dL    Sodium 142 135 - 153 mmol/L    Potassium 4.5 3.5 - 5.3 mmol/L    Chloride 107 99 - 112 mmol/L    CO2 26.4 24.3 - 31.9 mmol/L    Calcium 9.8 7.7 - 10.0 mg/dL    Total Protein 6.9 6.0 - 8.0 g/dL    Albumin 4.40 3.50 - 5.00 g/dL    ALT (SGPT) 61 (H) 10 - 36 U/L    AST (SGOT) 59 (H) 10 - 30 U/L    Alkaline Phosphatase 100 35 - 104 U/L    Total Bilirubin 0.4 0.2 - 1.8 mg/dL    eGFR Non African  Amer 63 >60 mL/min/1.73    Globulin 2.5 gm/dL    A/G Ratio 1.8 1.5 - 2.5 g/dL    BUN/Creatinine Ratio 12.0 7.0 - 25.0    Anion Gap 8.6 3.6 - 11.2 mmol/L   Lipid Panel   Result Value Ref Range    Total Cholesterol 127 0 - 200 mg/dL    Triglycerides 336 (H) 0 - 150 mg/dL    HDL Cholesterol 44 (L) 60 - 100 mg/dL    LDL Cholesterol  16 0 - 100 mg/dL    VLDL Cholesterol 67.2 mg/dL    LDL/HDL Ratio 0.36    TSH   Result Value Ref Range    TSH 0.984 0.550 - 4.780 mIU/mL   Hemoglobin A1c   Result Value Ref Range    Hemoglobin A1C 6.80 (H) 4.50 - 5.70 %   Vitamin D 25 Hydroxy   Result Value Ref Range    25 Hydroxy, Vitamin D 31.0 ng/ml   Vitamin B12   Result Value Ref Range    Vitamin B-12 573 211 - 911 pg/mL   MicroAlbumin, Urine, Random - Urine, Clean Catch   Result Value Ref Range    Microalbumin, Urine 18.8 mg/L   CBC Auto Differential   Result Value Ref Range    WBC 8.04 4.50 - 12.50 10*3/mm3    RBC 4.61 4.20 - 5.40 10*6/mm3    Hemoglobin 12.8 12.0 - 16.0 g/dL    Hematocrit 39.8 37.0 - 47.0 %    MCV 86.3 80.0 - 94.0 fL    MCH 27.8 27.0 - 33.0 pg    MCHC 32.2 (L) 33.0 - 37.0 g/dL    RDW 14.7 (H) 11.5 - 14.5 %    RDW-SD 45.8 37.0 - 54.0 fl    MPV 9.9 6.0 - 10.0 fL    Platelets 336 130 - 400 10*3/mm3    Neutrophil % 56.9 30.0 - 70.0 %    Lymphocyte % 32.6 21.0 - 51.0 %    Monocyte % 5.7 0.0 - 10.0 %    Eosinophil % 3.9 0.0 - 5.0 %    Basophil % 0.7 0.0 - 2.0 %    Immature Grans % 0.2 0.0 - 0.5 %    Neutrophils, Absolute 4.57 1.40 - 6.50 10*3/mm3    Lymphocytes, Absolute 2.62 1.00 - 3.00 10*3/mm3    Monocytes, Absolute 0.46 0.10 - 0.90 10*3/mm3    Eosinophils, Absolute 0.31 0.00 - 0.70 10*3/mm3    Basophils, Absolute 0.06 0.00 - 0.30 10*3/mm3    Immature Grans, Absolute 0.02 0.00 - 0.03 10*3/mm3   Osmolality, Calculated   Result Value Ref Range    Osmolality Calc 283.4 273.0 - 305.0 mOsm/kg     Findings and recommendations discussed with Monisha. Reviewed her lab results with her which included an A1C of 6.8 and FBS of 115  mg/dL  She does c/o nausea which is possibly a s/e of Trulicity. Options discussed and she would like to continue.  Lipid panel was reviewed. Dr Watts has added Fenofibrate at this time. Reinforced nutrition recommendations to improve Triglycerides. Monisha reports the Lexapro has worked wonders without side effects and she has only had to take the Hydroxyzine a couple of times for increased anxiety.   Sent for X-Ray of Left Shoulder which revealed:  Arthritic change at the acromioclavicular joint. There is no acute fracture or dislocation. Treatment options reviewed including scheduling an appt for a Joint Injection and/or Physical Therapy. She also sees Dr Singh who she or I could contact for evaluation.   Monisha will f/u with me in August with labs prior to her appt.sooner if problems/concerns occur.

## 2018-04-25 ENCOUNTER — TRANSCRIBE ORDERS (OUTPATIENT)
Dept: ADMINISTRATIVE | Facility: HOSPITAL | Age: 58
End: 2018-04-25

## 2018-04-25 ENCOUNTER — OFFICE VISIT (OUTPATIENT)
Dept: FAMILY MEDICINE CLINIC | Facility: CLINIC | Age: 58
End: 2018-04-25

## 2018-04-25 ENCOUNTER — HOSPITAL ENCOUNTER (OUTPATIENT)
Dept: GENERAL RADIOLOGY | Facility: HOSPITAL | Age: 58
Discharge: HOME | End: 2018-04-25
Admitting: NURSE PRACTITIONER

## 2018-04-25 VITALS
TEMPERATURE: 97.2 F | BODY MASS INDEX: 31.92 KG/M2 | SYSTOLIC BLOOD PRESSURE: 122 MMHG | HEART RATE: 102 BPM | DIASTOLIC BLOOD PRESSURE: 60 MMHG | WEIGHT: 187 LBS | OXYGEN SATURATION: 97 % | HEIGHT: 64 IN

## 2018-04-25 DIAGNOSIS — E78.5 DYSLIPIDEMIA: Chronic | ICD-10-CM

## 2018-04-25 DIAGNOSIS — M19.90 OSTEOARTHRITIS, UNSPECIFIED OSTEOARTHRITIS TYPE, UNSPECIFIED SITE: ICD-10-CM

## 2018-04-25 DIAGNOSIS — E66.3 OVERWEIGHT: ICD-10-CM

## 2018-04-25 DIAGNOSIS — Z79.4 TYPE 2 DIABETES MELLITUS WITH OTHER CIRCULATORY COMPLICATION, WITH LONG-TERM CURRENT USE OF INSULIN (HCC): Primary | Chronic | ICD-10-CM

## 2018-04-25 DIAGNOSIS — F41.9 ANXIETY: ICD-10-CM

## 2018-04-25 DIAGNOSIS — I10 ESSENTIAL HYPERTENSION: Chronic | ICD-10-CM

## 2018-04-25 DIAGNOSIS — E11.59 TYPE 2 DIABETES MELLITUS WITH OTHER CIRCULATORY COMPLICATION, WITH LONG-TERM CURRENT USE OF INSULIN (HCC): Primary | Chronic | ICD-10-CM

## 2018-04-25 DIAGNOSIS — R74.8 ELEVATED LIVER ENZYMES: ICD-10-CM

## 2018-04-25 DIAGNOSIS — E55.9 VITAMIN D DEFICIENCY: ICD-10-CM

## 2018-04-25 DIAGNOSIS — E53.8 VITAMIN B 12 DEFICIENCY: ICD-10-CM

## 2018-04-25 DIAGNOSIS — M25.512 ACUTE PAIN OF LEFT SHOULDER: ICD-10-CM

## 2018-04-25 DIAGNOSIS — M25.512 ACUTE PAIN OF LEFT SHOULDER: Primary | ICD-10-CM

## 2018-04-25 PROCEDURE — 99214 OFFICE O/P EST MOD 30 MIN: CPT | Performed by: NURSE PRACTITIONER

## 2018-04-25 PROCEDURE — 73030 X-RAY EXAM OF SHOULDER: CPT

## 2018-04-25 PROCEDURE — 73030 X-RAY EXAM OF SHOULDER: CPT | Performed by: RADIOLOGY

## 2018-04-25 RX ORDER — IBUPROFEN 800 MG/1
800 TABLET ORAL 3 TIMES DAILY PRN
Qty: 30 TABLET | Refills: 1 | Status: SHIPPED | OUTPATIENT
Start: 2018-04-25 | End: 2020-04-09

## 2018-04-25 RX ORDER — CYCLOBENZAPRINE HCL 5 MG
5 TABLET ORAL 2 TIMES DAILY PRN
Qty: 30 TABLET | Refills: 0 | Status: SHIPPED | OUTPATIENT
Start: 2018-04-25 | End: 2018-07-26

## 2018-04-25 NOTE — PATIENT INSTRUCTIONS
"Food Choices to Lower Your Triglycerides  Triglycerides are a type of fat in your blood. High levels of triglycerides can increase the risk of heart disease and stroke. If your triglyceride levels are high, the foods you eat and your eating habits are very important. Choosing the right foods can help lower your triglycerides.  What general guidelines do I need to follow?  · Lose weight if you are overweight.  · Limit or avoid alcohol.  · Fill one half of your plate with vegetables and green salads.  · Limit fruit to two servings a day. Choose fruit instead of juice.  · Make one fourth of your plate whole grains. Look for the word \"whole\" as the first word in the ingredient list.  · Fill one fourth of your plate with lean protein foods.  · Enjoy fatty fish (such as salmon, mackerel, sardines, and tuna) three times a week.  · Choose healthy fats.  · Limit foods high in starch and sugar.  · Eat more home-cooked food and less restaurant, buffet, and fast food.  · Limit fried foods.  · Cook foods using methods other than frying.  · Limit saturated fats.  · Check ingredient lists to avoid foods with partially hydrogenated oils (trans fats) in them.  What foods can I eat?  Grains   Whole grains, such as whole wheat or whole grain breads, crackers, cereals, and pasta. Unsweetened oatmeal, bulgur, barley, quinoa, or brown rice. Corn or whole wheat flour tortillas.  Vegetables   Fresh or frozen vegetables (raw, steamed, roasted, or grilled). Green salads.  Fruits   All fresh, canned (in natural juice), or frozen fruits.  Meat and Other Protein Products   Ground beef (85% or leaner), grass-fed beef, or beef trimmed of fat. Skinless chicken or turkey. Ground chicken or turkey. Pork trimmed of fat. All fish and seafood. Eggs. Dried beans, peas, or lentils. Unsalted nuts or seeds. Unsalted canned or dry beans.  Dairy   Low-fat dairy products, such as skim or 1% milk, 2% or reduced-fat cheeses, low-fat ricotta or cottage cheese, " or plain low-fat yogurt.  Fats and Oils   Tub margarines without trans fats. Light or reduced-fat mayonnaise and salad dressings. Avocado. Safflower, olive, or canola oils. Natural peanut or almond butter.  The items listed above may not be a complete list of recommended foods or beverages. Contact your dietitian for more options.   What foods are not recommended?  Grains   White bread. White pasta. White rice. Cornbread. Bagels, pastries, and croissants. Crackers that contain trans fat.  Vegetables   White potatoes. Corn. Creamed or fried vegetables. Vegetables in a cheese sauce.  Fruits   Dried fruits. Canned fruit in light or heavy syrup. Fruit juice.  Meat and Other Protein Products   Fatty cuts of meat. Ribs, chicken wings, villar, sausage, bologna, salami, chitterlings, fatback, hot dogs, bratwurst, and packaged luncheon meats.  Dairy   Whole or 2% milk, cream, half-and-half, and cream cheese. Whole-fat or sweetened yogurt. Full-fat cheeses. Nondairy creamers and whipped toppings. Processed cheese, cheese spreads, or cheese curds.  Sweets and Desserts   Corn syrup, sugars, honey, and molasses. Candy. Jam and jelly. Syrup. Sweetened cereals. Cookies, pies, cakes, donuts, muffins, and ice cream.  Fats and Oils   Butter, stick margarine, lard, shortening, ghee, or villar fat. Coconut, palm kernel, or palm oils.  Beverages   Alcohol. Sweetened drinks (such as sodas, lemonade, and fruit drinks or punches).  The items listed above may not be a complete list of foods and beverages to avoid. Contact your dietitian for more information.   This information is not intended to replace advice given to you by your health care provider. Make sure you discuss any questions you have with your health care provider.  Document Released: 10/05/2005 Document Revised: 05/25/2017 Document Reviewed: 10/22/2014  [employer] Interactive Patient Education © 2017 [employer]    Shoulder Pain  Many things can cause shoulder pain,  including:  · An injury to the area.  · Overuse of the shoulder.  · Arthritis.  The source of the pain can be:  · Inflammation.  · An injury to the shoulder joint.  · An injury to a tendon, ligament, or bone.  Follow these instructions at home:  Take these actions to help with your pain:  · Squeeze a soft ball or a foam pad as much as possible. This helps to keep the shoulder from swelling. It also helps to strengthen the arm.  · Take over-the-counter and prescription medicines only as told by your health care provider.  · If directed, apply ice to the area:  ¨ Put ice in a plastic bag.  ¨ Place a towel between your skin and the bag.  ¨ Leave the ice on for 20 minutes, 2-3 times per day. Stop applying ice if it does not help with the pain.  · If you were given a shoulder sling or immobilizer:  ¨ Wear it as told.  ¨ Remove it to shower or bathe.  ¨ Move your arm as little as possible, but keep your hand moving to prevent swelling.  Contact a health care provider if:  · Your pain gets worse.  · Your pain is not relieved with medicines.  · New pain develops in your arm, hand, or fingers.  Get help right away if:  · Your arm, hand, or fingers:  ¨ Tingle.  ¨ Become numb.  ¨ Become swollen.  ¨ Become painful.  ¨ Turn white or blue.  This information is not intended to replace advice given to you by your health care provider. Make sure you discuss any questions you have with your health care provider.  Document Released: 09/27/2006 Document Revised: 08/13/2017 Document Reviewed: 04/11/2016  [employer] Interactive Patient Education © 2017 [employer]

## 2018-04-27 ENCOUNTER — OFFICE VISIT (OUTPATIENT)
Dept: FAMILY MEDICINE CLINIC | Facility: CLINIC | Age: 58
End: 2018-04-27

## 2018-04-27 VITALS
DIASTOLIC BLOOD PRESSURE: 74 MMHG | OXYGEN SATURATION: 98 % | SYSTOLIC BLOOD PRESSURE: 130 MMHG | BODY MASS INDEX: 32.44 KG/M2 | HEIGHT: 64 IN | WEIGHT: 190 LBS | HEART RATE: 75 BPM | TEMPERATURE: 98.7 F

## 2018-04-27 DIAGNOSIS — M25.512 ACUTE PAIN OF LEFT SHOULDER: Primary | ICD-10-CM

## 2018-04-27 PROCEDURE — 99213 OFFICE O/P EST LOW 20 MIN: CPT | Performed by: PHYSICIAN ASSISTANT

## 2018-04-29 NOTE — PROGRESS NOTES
"Karthik Gardner is a 57 y.o. female.     Chief complaint-shoulder pain    History of Present Illness     Shoulder pain-  She complains of left shoulder pain.  Pain is described as intermittent severe aching with some sharp pains.  She states that she has not been able to lift her left arm to wash her hair.  Onset 5 weeks ago.  Pain likely due to exacerbation of osteoarthritis.    The following portions of the patient's history were reviewed and updated as appropriate: allergies, current medications, past family history, past medical history, past social history, past surgical history and problem list.    Review of Systems   Constitutional: Negative for activity change, appetite change and fever.   HENT: Negative for ear pain, sinus pressure and sore throat.    Eyes: Negative for pain and visual disturbance.   Respiratory: Negative for cough and chest tightness.    Cardiovascular: Negative for chest pain and palpitations.   Gastrointestinal: Negative for abdominal pain, constipation, diarrhea, nausea and vomiting.   Endocrine: Negative for polydipsia and polyuria.   Genitourinary: Negative for dysuria and frequency.   Musculoskeletal: Positive for arthralgias. Negative for back pain and myalgias.        Left shoulder pain   Skin: Negative for color change and rash.   Allergic/Immunologic: Negative for food allergies and immunocompromised state.   Neurological: Negative for dizziness, syncope and headaches.   Hematological: Negative for adenopathy. Does not bruise/bleed easily.   Psychiatric/Behavioral: Negative for hallucinations and suicidal ideas. The patient is not nervous/anxious.        /74   Pulse 75   Temp 98.7 °F (37.1 °C) (Oral)   Ht 162.6 cm (64.02\")   Wt 86.2 kg (190 lb)   LMP  (LMP Unknown)   SpO2 98%   BMI 32.60 kg/m²     Physical Exam   Constitutional: She is oriented to person, place, and time. She appears well-developed and well-nourished.   Cardiovascular: Normal rate, " regular rhythm, normal heart sounds and intact distal pulses.    No murmur heard.  Pulmonary/Chest: Effort normal and breath sounds normal. She has no wheezes.   Musculoskeletal: She exhibits tenderness.   Severely impaired range of joint motion with left shoulder abduction and mild decreased range of joint motion with internal rotation.  Tenderness to left shoulder palpation superior and posteriorly.   Neurological: She is alert and oriented to person, place, and time.   Nursing note and vitals reviewed.      Assessment/Plan     Diagnoses and all orders for this visit:    Acute pain of left shoulder  Comments:  Acute pain due to chronic left shoulder osteoarthritis    Procedure: Left shoulder intra-articular injection  The procedure risks and benefits were spinous patient she gave verbal consent to have the procedure performed.  Indication: Left shoulder pain due to osteoarthritis  Provider: Kimmy Vela PA-C  Description: The left shoulder area was prepped and draped in sterile fashion.  An injection was given using 2 cc of 1% lidocaine and 1 cc of Kenalog intra-articularly.  Pressure was held and a sterile dressing was placed.  No complications  Estimated blood loss: Minimal  Patient tolerated procedure well    One percent lidocaine 500 mg per 50 mL NDC #0409-427 6-1 7  Kenalog 40 mg per 1 mL NDC #0003-029 3-0 5               This document has been electronically signed by:  Kimmy Vela PA-C

## 2018-04-30 ENCOUNTER — TELEPHONE (OUTPATIENT)
Dept: FAMILY MEDICINE CLINIC | Facility: CLINIC | Age: 58
End: 2018-04-30

## 2018-04-30 NOTE — TELEPHONE ENCOUNTER
Pt said she doesn't have time for physical therapy right now, she said she would call and talk to her ortho Dr and see what he says.      ----- Message from TRUONG Torres sent at 4/30/2018 11:14 AM EDT -----  Both. Please call her and see who she has used previously for PT and make the referral and  see if she wants to call or have you to call her Ortho Dr Corbin  who they have for shoulder evaluation.Thanks   ----- Message -----  From: Sophy Carmona MA  Sent: 4/30/2018  11:12 AM  To: TRUONG Torres    Patient called in this morning and said that the shoulder injection that attila done on Friday isn't helping her. She wants to know what you would like to do next? Ortho, physical therapy maybe?

## 2018-05-05 DIAGNOSIS — E11.8 TYPE 2 DIABETES MELLITUS WITH COMPLICATION, WITHOUT LONG-TERM CURRENT USE OF INSULIN (HCC): Chronic | ICD-10-CM

## 2018-05-06 DIAGNOSIS — E11.8 TYPE 2 DIABETES MELLITUS WITH COMPLICATION, WITHOUT LONG-TERM CURRENT USE OF INSULIN (HCC): Chronic | ICD-10-CM

## 2018-05-06 DIAGNOSIS — F41.9 ANXIETY: ICD-10-CM

## 2018-05-07 RX ORDER — ESCITALOPRAM OXALATE 10 MG/1
TABLET ORAL
Qty: 30 TABLET | Refills: 1 | Status: SHIPPED | OUTPATIENT
Start: 2018-05-07 | End: 2018-06-27 | Stop reason: SDUPTHER

## 2018-05-07 RX ORDER — CLOPIDOGREL BISULFATE 75 MG/1
TABLET ORAL
Qty: 30 TABLET | Refills: 6 | Status: SHIPPED | OUTPATIENT
Start: 2018-05-07 | End: 2018-11-21 | Stop reason: SDUPTHER

## 2018-05-07 RX ORDER — CLOPIDOGREL BISULFATE 75 MG/1
TABLET ORAL
Qty: 30 TABLET | Refills: 6 | OUTPATIENT
Start: 2018-05-07

## 2018-05-07 RX ORDER — METOPROLOL TARTRATE 50 MG/1
TABLET, FILM COATED ORAL
Qty: 60 TABLET | Refills: 6 | Status: SHIPPED | OUTPATIENT
Start: 2018-05-07 | End: 2018-11-21 | Stop reason: SDUPTHER

## 2018-05-07 RX ORDER — METOPROLOL TARTRATE 50 MG/1
TABLET, FILM COATED ORAL
Qty: 60 TABLET | Refills: 6 | OUTPATIENT
Start: 2018-05-07

## 2018-05-25 ENCOUNTER — TELEPHONE (OUTPATIENT)
Dept: FAMILY MEDICINE CLINIC | Facility: CLINIC | Age: 58
End: 2018-05-25

## 2018-05-25 NOTE — TELEPHONE ENCOUNTER
Pt is aware of this information.   ----- Message from TRUONG Torres sent at 5/25/2018  9:25 AM EDT -----  Will do.I am thinking that the only antibiotic ointment is Bactroban that the insurance will cover. She can cleanse the area three times a day with like Dial soap; pat dry and then apply antibiotic ointment. Just monitor it...  ----- Message -----  From: Sophy Carmona MA  Sent: 5/25/2018   9:20 AM  To: TRUONG Torres    Patient called and stated that she has cut her leg on a cinder block yesterday. She wants to know if you could send her in some antibiotic cream to the pharmacy. She is afraid it wont heal good where she is a diabetic.

## 2018-06-27 DIAGNOSIS — F41.9 ANXIETY: ICD-10-CM

## 2018-06-27 DIAGNOSIS — E11.8 TYPE 2 DIABETES MELLITUS WITH COMPLICATION, WITHOUT LONG-TERM CURRENT USE OF INSULIN (HCC): Chronic | ICD-10-CM

## 2018-06-27 RX ORDER — FENOFIBRATE 160 MG/1
160 TABLET ORAL DAILY
Qty: 90 TABLET | Refills: 1 | Status: SHIPPED | OUTPATIENT
Start: 2018-06-27 | End: 2018-12-22 | Stop reason: SDUPTHER

## 2018-06-27 RX ORDER — ISOSORBIDE MONONITRATE 60 MG/1
60 TABLET, EXTENDED RELEASE ORAL DAILY
Qty: 90 TABLET | Refills: 1 | Status: SHIPPED | OUTPATIENT
Start: 2018-06-27 | End: 2018-12-22 | Stop reason: SDUPTHER

## 2018-06-28 RX ORDER — ESCITALOPRAM OXALATE 10 MG/1
10 TABLET ORAL DAILY
Qty: 30 TABLET | Refills: 1 | Status: SHIPPED | OUTPATIENT
Start: 2018-06-28 | End: 2018-06-29 | Stop reason: SDUPTHER

## 2018-06-28 RX ORDER — HYDROXYZINE HYDROCHLORIDE 10 MG/1
10 TABLET, FILM COATED ORAL EVERY 6 HOURS PRN
Qty: 50 TABLET | Refills: 0 | Status: SHIPPED | OUTPATIENT
Start: 2018-06-28 | End: 2018-06-29 | Stop reason: SDUPTHER

## 2018-06-29 DIAGNOSIS — E11.8 TYPE 2 DIABETES MELLITUS WITH COMPLICATION, WITHOUT LONG-TERM CURRENT USE OF INSULIN (HCC): Chronic | ICD-10-CM

## 2018-06-29 DIAGNOSIS — F41.9 ANXIETY: ICD-10-CM

## 2018-06-29 RX ORDER — ESCITALOPRAM OXALATE 10 MG/1
10 TABLET ORAL DAILY
Qty: 30 TABLET | Refills: 1 | Status: SHIPPED | OUTPATIENT
Start: 2018-06-29 | End: 2018-07-13 | Stop reason: SDUPTHER

## 2018-06-29 RX ORDER — HYDROXYZINE HYDROCHLORIDE 10 MG/1
10 TABLET, FILM COATED ORAL EVERY 6 HOURS PRN
Qty: 50 TABLET | Refills: 0 | Status: SHIPPED | OUTPATIENT
Start: 2018-06-29 | End: 2019-08-22 | Stop reason: SDUPTHER

## 2018-07-05 RX ORDER — DEXLANSOPRAZOLE 60 MG/1
60 CAPSULE, DELAYED RELEASE ORAL DAILY
Qty: 30 CAPSULE | Refills: 2 | Status: SHIPPED | OUTPATIENT
Start: 2018-07-05 | End: 2019-09-13 | Stop reason: SDUPTHER

## 2018-07-13 DIAGNOSIS — F41.9 ANXIETY: ICD-10-CM

## 2018-07-14 RX ORDER — ESCITALOPRAM OXALATE 10 MG/1
10 TABLET ORAL DAILY
Qty: 30 TABLET | Refills: 1 | Status: SHIPPED | OUTPATIENT
Start: 2018-07-14 | End: 2018-08-31 | Stop reason: SDUPTHER

## 2018-07-26 ENCOUNTER — OFFICE VISIT (OUTPATIENT)
Dept: FAMILY MEDICINE CLINIC | Facility: CLINIC | Age: 58
End: 2018-07-26

## 2018-07-26 ENCOUNTER — LAB (OUTPATIENT)
Dept: FAMILY MEDICINE CLINIC | Facility: CLINIC | Age: 58
End: 2018-07-26

## 2018-07-26 VITALS
TEMPERATURE: 98.6 F | SYSTOLIC BLOOD PRESSURE: 110 MMHG | BODY MASS INDEX: 29.53 KG/M2 | WEIGHT: 173 LBS | HEIGHT: 64 IN | DIASTOLIC BLOOD PRESSURE: 58 MMHG | HEART RATE: 67 BPM | OXYGEN SATURATION: 97 %

## 2018-07-26 DIAGNOSIS — E55.9 VITAMIN D DEFICIENCY: ICD-10-CM

## 2018-07-26 DIAGNOSIS — Z79.4 TYPE 2 DIABETES MELLITUS WITH OTHER CIRCULATORY COMPLICATION, WITH LONG-TERM CURRENT USE OF INSULIN (HCC): ICD-10-CM

## 2018-07-26 DIAGNOSIS — I25.118 CORONARY ARTERY DISEASE OF NATIVE ARTERY OF NATIVE HEART WITH STABLE ANGINA PECTORIS (HCC): ICD-10-CM

## 2018-07-26 DIAGNOSIS — M19.90 OSTEOARTHRITIS, UNSPECIFIED OSTEOARTHRITIS TYPE, UNSPECIFIED SITE: ICD-10-CM

## 2018-07-26 DIAGNOSIS — I10 ESSENTIAL HYPERTENSION: ICD-10-CM

## 2018-07-26 DIAGNOSIS — R74.8 ELEVATED LIVER ENZYMES: ICD-10-CM

## 2018-07-26 DIAGNOSIS — E78.5 DYSLIPIDEMIA: Chronic | ICD-10-CM

## 2018-07-26 DIAGNOSIS — M17.11 PRIMARY OSTEOARTHRITIS OF RIGHT KNEE: Primary | ICD-10-CM

## 2018-07-26 DIAGNOSIS — E11.59 TYPE 2 DIABETES MELLITUS WITH OTHER CIRCULATORY COMPLICATION, WITH LONG-TERM CURRENT USE OF INSULIN (HCC): ICD-10-CM

## 2018-07-26 LAB
25(OH)D3 SERPL-MCNC: 40 NG/ML
ALBUMIN SERPL-MCNC: 4.4 G/DL (ref 3.5–5)
ALBUMIN UR-MCNC: 0.9 MG/L
ALBUMIN/GLOB SERPL: 2.1 G/DL (ref 1.5–2.5)
ALP SERPL-CCNC: 58 U/L (ref 35–104)
ALT SERPL W P-5'-P-CCNC: 33 U/L (ref 10–36)
ANION GAP SERPL CALCULATED.3IONS-SCNC: 8.7 MMOL/L (ref 3.6–11.2)
AST SERPL-CCNC: 41 U/L (ref 10–30)
BASOPHILS # BLD AUTO: 0.07 10*3/MM3 (ref 0–0.3)
BASOPHILS NFR BLD AUTO: 0.9 % (ref 0–2)
BILIRUB SERPL-MCNC: 0.3 MG/DL (ref 0.2–1.8)
BUN BLD-MCNC: 14 MG/DL (ref 7–21)
BUN/CREAT SERPL: 14.9 (ref 7–25)
CALCIUM SPEC-SCNC: 9.7 MG/DL (ref 7.7–10)
CHLORIDE SERPL-SCNC: 107 MMOL/L (ref 99–112)
CHOLEST SERPL-MCNC: 105 MG/DL (ref 0–200)
CO2 SERPL-SCNC: 25.3 MMOL/L (ref 24.3–31.9)
CREAT BLD-MCNC: 0.94 MG/DL (ref 0.43–1.29)
DEPRECATED RDW RBC AUTO: 44.6 FL (ref 37–54)
EOSINOPHIL # BLD AUTO: 0.4 10*3/MM3 (ref 0–0.7)
EOSINOPHIL NFR BLD AUTO: 5.2 % (ref 0–5)
ERYTHROCYTE [DISTWIDTH] IN BLOOD BY AUTOMATED COUNT: 14.8 % (ref 11.5–14.5)
GFR SERPL CREATININE-BSD FRML MDRD: 62 ML/MIN/1.73
GLOBULIN UR ELPH-MCNC: 2.1 GM/DL
GLUCOSE BLD-MCNC: 87 MG/DL (ref 70–110)
HBA1C MFR BLD: 5.5 % (ref 4.5–5.7)
HCT VFR BLD AUTO: 38.3 % (ref 37–47)
HDLC SERPL-MCNC: 42 MG/DL (ref 60–100)
HGB BLD-MCNC: 12.7 G/DL (ref 12–16)
IMM GRANULOCYTES # BLD: 0.01 10*3/MM3 (ref 0–0.03)
IMM GRANULOCYTES NFR BLD: 0.1 % (ref 0–0.5)
LDLC SERPL CALC-MCNC: 29 MG/DL (ref 0–100)
LDLC/HDLC SERPL: 0.7 {RATIO}
LYMPHOCYTES # BLD AUTO: 2.83 10*3/MM3 (ref 1–3)
LYMPHOCYTES NFR BLD AUTO: 36.6 % (ref 21–51)
MCH RBC QN AUTO: 27.6 PG (ref 27–33)
MCHC RBC AUTO-ENTMCNC: 33.2 G/DL (ref 33–37)
MCV RBC AUTO: 83.3 FL (ref 80–94)
MONOCYTES # BLD AUTO: 0.58 10*3/MM3 (ref 0.1–0.9)
MONOCYTES NFR BLD AUTO: 7.5 % (ref 0–10)
NEUTROPHILS # BLD AUTO: 3.85 10*3/MM3 (ref 1.4–6.5)
NEUTROPHILS NFR BLD AUTO: 49.7 % (ref 30–70)
OSMOLALITY SERPL CALC.SUM OF ELEC: 281.1 MOSM/KG (ref 273–305)
PLATELET # BLD AUTO: 380 10*3/MM3 (ref 130–400)
PMV BLD AUTO: 9.8 FL (ref 6–10)
POTASSIUM BLD-SCNC: 4.2 MMOL/L (ref 3.5–5.3)
PROT SERPL-MCNC: 6.5 G/DL (ref 6–8)
RBC # BLD AUTO: 4.6 10*6/MM3 (ref 4.2–5.4)
SODIUM BLD-SCNC: 141 MMOL/L (ref 135–153)
TRIGL SERPL-MCNC: 169 MG/DL (ref 0–150)
TSH SERPL DL<=0.05 MIU/L-ACNC: 1.12 MIU/ML (ref 0.55–4.78)
URATE SERPL-MCNC: 5.8 MG/DL (ref 2.4–5.7)
VIT B12 BLD-MCNC: 313 PG/ML (ref 211–911)
VLDLC SERPL-MCNC: 33.8 MG/DL
WBC NRBC COR # BLD: 7.74 10*3/MM3 (ref 4.5–12.5)

## 2018-07-26 PROCEDURE — 84443 ASSAY THYROID STIM HORMONE: CPT | Performed by: NURSE PRACTITIONER

## 2018-07-26 PROCEDURE — 82043 UR ALBUMIN QUANTITATIVE: CPT | Performed by: NURSE PRACTITIONER

## 2018-07-26 PROCEDURE — 83036 HEMOGLOBIN GLYCOSYLATED A1C: CPT | Performed by: NURSE PRACTITIONER

## 2018-07-26 PROCEDURE — 85025 COMPLETE CBC W/AUTO DIFF WBC: CPT | Performed by: NURSE PRACTITIONER

## 2018-07-26 PROCEDURE — 20610 DRAIN/INJ JOINT/BURSA W/O US: CPT | Performed by: PHYSICIAN ASSISTANT

## 2018-07-26 PROCEDURE — 82607 VITAMIN B-12: CPT | Performed by: NURSE PRACTITIONER

## 2018-07-26 PROCEDURE — 99214 OFFICE O/P EST MOD 30 MIN: CPT | Performed by: PHYSICIAN ASSISTANT

## 2018-07-26 PROCEDURE — 36415 COLL VENOUS BLD VENIPUNCTURE: CPT

## 2018-07-26 PROCEDURE — 80053 COMPREHEN METABOLIC PANEL: CPT | Performed by: NURSE PRACTITIONER

## 2018-07-26 PROCEDURE — 84550 ASSAY OF BLOOD/URIC ACID: CPT | Performed by: NURSE PRACTITIONER

## 2018-07-26 PROCEDURE — 80061 LIPID PANEL: CPT | Performed by: NURSE PRACTITIONER

## 2018-07-26 PROCEDURE — 82306 VITAMIN D 25 HYDROXY: CPT | Performed by: NURSE PRACTITIONER

## 2018-07-30 NOTE — PROGRESS NOTES
"Karthik Gardner is a 57 y.o. female.     Chief complaint-right knee pain    History of Present Illness     Right knee pain-  She complains of moderate throbbing anterior right knee pain.  There is associated swelling for the past month.  Minimal relief with ibuprofen.  No known injury.    The following portions of the patient's history were reviewed and updated as appropriate: allergies, current medications, past family history, past medical history, past social history, past surgical history and problem list.    Review of Systems   Constitutional: Negative for activity change, appetite change and fever.   HENT: Negative for ear pain, sinus pressure and sore throat.    Eyes: Negative for pain and visual disturbance.   Respiratory: Negative for cough and chest tightness.    Cardiovascular: Negative for chest pain and palpitations.   Gastrointestinal: Negative for abdominal pain, constipation, diarrhea, nausea and vomiting.   Endocrine: Negative for polydipsia and polyuria.   Genitourinary: Negative for dysuria and frequency.   Musculoskeletal: Positive for gait problem and joint swelling. Negative for back pain and myalgias.        Right knee pain   Skin: Negative for color change and rash.   Allergic/Immunologic: Negative for food allergies and immunocompromised state.   Neurological: Negative for dizziness, syncope and headaches.   Hematological: Negative for adenopathy. Does not bruise/bleed easily.   Psychiatric/Behavioral: Negative for hallucinations and suicidal ideas. The patient is not nervous/anxious.        /58   Pulse 67   Temp 98.6 °F (37 °C) (Oral)   Ht 162.6 cm (64.02\")   Wt 78.5 kg (173 lb)   LMP  (LMP Unknown)   SpO2 97%   BMI 29.68 kg/m²     Physical Exam   Constitutional: She is oriented to person, place, and time. She appears well-developed and well-nourished.   HENT:   Head: Normocephalic and atraumatic.   Nose: Nose normal.   Mouth/Throat: Oropharynx is clear and moist. "   Eyes: Pupils are equal, round, and reactive to light. Conjunctivae and EOM are normal.   Neck: Normal range of motion. Neck supple. No tracheal deviation present. No thyromegaly present.   Cardiovascular: Normal rate, regular rhythm, normal heart sounds and intact distal pulses.    No murmur heard.  Pulmonary/Chest: Effort normal and breath sounds normal. No respiratory distress. She has no wheezes.   Abdominal: Soft. Bowel sounds are normal. There is no tenderness. There is no guarding.   Musculoskeletal: Normal range of motion. She exhibits edema and tenderness.   Right knee tender to flexion and extension with coarse crepitus noted.  Minimal swelling noted anteriorly.   Lymphadenopathy:     She has no cervical adenopathy.   Neurological: She is alert and oriented to person, place, and time.   Skin: Skin is warm and dry. No rash noted.   Psychiatric: She has a normal mood and affect. Her behavior is normal.   Nursing note and vitals reviewed.      Assessment/Plan     Diagnoses and all orders for this visit:    Primary osteoarthritis of right knee    Coronary artery disease of native artery of native heart with stable angina pectoris (CMS/[facility])    Essential hypertension    Vitamin D deficiency      Procedure: Right knee intra-articular/large joint injection  The procedure risks and benefits were explained to patient she gave verbal consent have the procedure performed.  Indication: Osteoarthritis  right knee  Provider: Kimmy Vela PA-C  Description: The right knee was placed at 90° angle.  The right knee was prepped and draped in sterile fashion.  An injection was given using 2 cc lidocaine 1% and 1 cc of Kenalog.  Pressure was held and a sterile dressing was placed.  medications  Estimated blood loss: Minimal  Patient tolerated the procedure well    Kenalog 40 mg per 1 mL NDC #0003-029 3-0 5  1% lidocaine 20 mg per 2 mL NDC #5515 0-1 6 1-0 2           This document has been electronically signed by:  Kimmy Vela  JARRET

## 2018-08-01 ENCOUNTER — OFFICE VISIT (OUTPATIENT)
Dept: FAMILY MEDICINE CLINIC | Facility: CLINIC | Age: 58
End: 2018-08-01

## 2018-08-01 VITALS
TEMPERATURE: 98.6 F | WEIGHT: 170 LBS | OXYGEN SATURATION: 97 % | BODY MASS INDEX: 29.02 KG/M2 | HEIGHT: 64 IN | RESPIRATION RATE: 14 BRPM | SYSTOLIC BLOOD PRESSURE: 135 MMHG | DIASTOLIC BLOOD PRESSURE: 82 MMHG | HEART RATE: 80 BPM

## 2018-08-01 DIAGNOSIS — M19.90 OSTEOARTHRITIS, UNSPECIFIED OSTEOARTHRITIS TYPE, UNSPECIFIED SITE: Chronic | ICD-10-CM

## 2018-08-01 DIAGNOSIS — E55.9 VITAMIN D DEFICIENCY: ICD-10-CM

## 2018-08-01 DIAGNOSIS — Z79.4 TYPE 2 DIABETES MELLITUS WITH OTHER CIRCULATORY COMPLICATION, WITH LONG-TERM CURRENT USE OF INSULIN (HCC): Primary | ICD-10-CM

## 2018-08-01 DIAGNOSIS — E78.5 DYSLIPIDEMIA: Chronic | ICD-10-CM

## 2018-08-01 DIAGNOSIS — E11.59 TYPE 2 DIABETES MELLITUS WITH OTHER CIRCULATORY COMPLICATION, WITH LONG-TERM CURRENT USE OF INSULIN (HCC): Primary | ICD-10-CM

## 2018-08-01 DIAGNOSIS — E53.8 VITAMIN B 12 DEFICIENCY: ICD-10-CM

## 2018-08-01 DIAGNOSIS — I10 ESSENTIAL HYPERTENSION: Chronic | ICD-10-CM

## 2018-08-01 PROBLEM — M25.512 ACUTE PAIN OF LEFT SHOULDER: Status: RESOLVED | Noted: 2018-04-25 | Resolved: 2018-08-01

## 2018-08-01 PROBLEM — R21 RASH: Status: RESOLVED | Noted: 2017-05-29 | Resolved: 2018-08-01

## 2018-08-01 PROCEDURE — 99214 OFFICE O/P EST MOD 30 MIN: CPT | Performed by: NURSE PRACTITIONER

## 2018-08-01 NOTE — PROGRESS NOTES
History of Present Illness   Monisha presents to the office today for follow up and to review lab results pertaining to her H/O DM, type 2, HTN, Dyslipidemia, HTN. In addition, she has a H/O CAD with Previous MI  Stent which she is followed by Dr Watts.    Diabetes   She has type 2 diabetes mellitus. Her disease course has been improving.  Pertinent negatives for diabetes include no chest pain, no weakness. Diabetic complications include peripheral neuropathy. Risk factors for coronary artery disease include diabetes mellitus, dyslipidemia, family history, hypertension, obesity, post-menopausal, sedentary lifestyle and stress. Current diabetic treatment includes oral agent (dual therapy),  basal insulin and Trulicity. She is compliant with treatment most of the time. She is following a generally healthy diet. She has had a previous visit with a dietitian.  An ACE inhibitor/angiotensin II receptor blocker is being taken. Eye exam is up to date and done by Dr Winkler.    Hypertension   The current episode started more than 1 year ago. She does home blood pressure.  Pertinent negatives include no chest pain, palpitations, peripheral edema or shortness of breath. Risk factors for coronary artery disease include diabetes mellitus, dyslipidemia, family history, obesity, post-menopausal state, sedentary lifestyle and stress. Past treatments include angiotensin blockers and beta blockers.     Hyperlipidemia   The current episode started more than 1 year ago. The problem is uncontrolled. Recent lipid tests are improving. Pertinent negatives include no chest pain or shortness of breath. She is currently on Rapatha.  Risk factors for coronary artery disease include diabetes mellitus, family history, dyslipidemia, hypertension, obesity, post-menopausal and stress.     Vitals:    08/01/18 1105   BP: 135/82   Pulse: 80   Resp: 14   Temp: 98.6 °F (37 °C)   TempSrc: Temporal Artery    SpO2: 97%   Weight: 77.1 kg (170 lb)  "  Height: 162.6 cm (64.02\")      The following portions of the patient's history were reviewed and updated as appropriate: allergies, current medications, past family history, past medical history, past social history, past surgical history and problem list.    Review of Systems   Constitutional: Positive for activity change (More active), appetite change (Lifestyle changes made) and fatigue. Negative for fever and unexpected weight change (Intentional).   HENT: Negative.    Eyes: Positive for visual disturbance.   Respiratory: Positive for cough. Negative for chest tightness, shortness of breath and wheezing.    Cardiovascular: Negative for chest pain, palpitations and leg swelling.   Gastrointestinal: Negative for abdominal pain, constipation, diarrhea, nausea and vomiting.   Endocrine: Positive for heat intolerance. Negative for cold intolerance, polydipsia, polyphagia and polyuria.   Musculoskeletal: Positive for arthralgias (Knee).   Skin: Negative for color change and rash.   Neurological: Positive for numbness. Negative for dizziness, tremors, speech difficulty, weakness, light-headedness and headaches.   Hematological: Bruises/bleeds easily.   Psychiatric/Behavioral: Positive for sleep disturbance. Negative for confusion, decreased concentration and suicidal ideas. The patient is not nervous/anxious.    All other systems reviewed and are negative.    Physical Exam   Constitutional: She is oriented to person, place, and time. She appears well-developed and well-nourished. No distress.   HENT:   Head: Normocephalic.   Nose: Nose normal.   Mouth/Throat: Oropharynx is clear and moist. No oropharyngeal exudate.   Eyes: Pupils are equal, round, and reactive to light. Conjunctivae are normal. Right eye exhibits no discharge. Left eye exhibits no discharge. No scleral icterus.   Neck: Neck supple. No JVD present. No thyromegaly present.   Cardiovascular: Normal rate, regular rhythm, normal heart sounds and intact " distal pulses.  Exam reveals no friction rub.    No murmur heard.  Pulmonary/Chest: Effort normal and breath sounds normal. No respiratory distress. She has no wheezes. She has no rales.   Abdominal: Soft. Bowel sounds are normal. She exhibits no distension. There is no tenderness. There is no rebound and no guarding.   Musculoskeletal: She exhibits no edema. Tenderness: Right knee tenderness.   Lymphadenopathy:     She has no cervical adenopathy.   Neurological: She is alert and oriented to person, place, and time.   Skin: Skin is warm and dry. Capillary refill takes less than 2 seconds. No rash noted. No erythema.   Psychiatric: She has a normal mood and affect. Her behavior is normal. Judgment and thought content normal.   Vitals reviewed.      Assessment/Plan   Problems Addressed this Visit        Cardiovascular and Mediastinum    Hypertension    Type 2 diabetes mellitus with circulatory disorder, with long-term current use of insulin (CMS/[facility]) - Primary       Digestive    Vitamin D deficiency    Vitamin B 12 deficiency       Musculoskeletal and Integument    Osteoarthritis       Other    Dyslipidemia        Results for orders placed or performed in visit on 07/26/18   Comprehensive Metabolic Panel   Result Value Ref Range    Glucose 87 70 - 110 mg/dL    BUN 14 7 - 21 mg/dL    Creatinine 0.94 0.43 - 1.29 mg/dL    Sodium 141 135 - 153 mmol/L    Potassium 4.2 3.5 - 5.3 mmol/L    Chloride 107 99 - 112 mmol/L    CO2 25.3 24.3 - 31.9 mmol/L    Calcium 9.7 7.7 - 10.0 mg/dL    Total Protein 6.5 6.0 - 8.0 g/dL    Albumin 4.40 3.50 - 5.00 g/dL    ALT (SGPT) 33 10 - 36 U/L    AST (SGOT) 41 (H) 10 - 30 U/L    Alkaline Phosphatase 58 35 - 104 U/L    Total Bilirubin 0.3 0.2 - 1.8 mg/dL    eGFR Non African Amer 62 >60 mL/min/1.73    Globulin 2.1 gm/dL    A/G Ratio 2.1 1.5 - 2.5 g/dL    BUN/Creatinine Ratio 14.9 7.0 - 25.0    Anion Gap 8.7 3.6 - 11.2 mmol/L   Lipid Panel   Result Value Ref Range    Total Cholesterol 105 0 -  200 mg/dL    Triglycerides 169 (H) 0 - 150 mg/dL    HDL Cholesterol 42 (L) 60 - 100 mg/dL    LDL Cholesterol  29 0 - 100 mg/dL    VLDL Cholesterol 33.8 mg/dL    LDL/HDL Ratio 0.70    TSH   Result Value Ref Range    TSH 1.117 0.550 - 4.780 mIU/mL   Hemoglobin A1c   Result Value Ref Range    Hemoglobin A1C 5.50 4.50 - 5.70 %   Vitamin D 25 Hydroxy   Result Value Ref Range    25 Hydroxy, Vitamin D 40.0 ng/ml   Uric Acid   Result Value Ref Range    Uric Acid 5.8 (H) 2.4 - 5.7 mg/dL   MicroAlbumin, Urine, Random - Urine, Clean Catch   Result Value Ref Range    Microalbumin, Urine 0.9 mg/L   Vitamin B12   Result Value Ref Range    Vitamin B-12 313 211 - 911 pg/mL   CBC Auto Differential   Result Value Ref Range    WBC 7.74 4.50 - 12.50 10*3/mm3    RBC 4.60 4.20 - 5.40 10*6/mm3    Hemoglobin 12.7 12.0 - 16.0 g/dL    Hematocrit 38.3 37.0 - 47.0 %    MCV 83.3 80.0 - 94.0 fL    MCH 27.6 27.0 - 33.0 pg    MCHC 33.2 33.0 - 37.0 g/dL    RDW 14.8 (H) 11.5 - 14.5 %    RDW-SD 44.6 37.0 - 54.0 fl    MPV 9.8 6.0 - 10.0 fL    Platelets 380 130 - 400 10*3/mm3    Neutrophil % 49.7 30.0 - 70.0 %    Lymphocyte % 36.6 21.0 - 51.0 %    Monocyte % 7.5 0.0 - 10.0 %    Eosinophil % 5.2 (H) 0.0 - 5.0 %    Basophil % 0.9 0.0 - 2.0 %    Immature Grans % 0.1 0.0 - 0.5 %    Neutrophils, Absolute 3.85 1.40 - 6.50 10*3/mm3    Lymphocytes, Absolute 2.83 1.00 - 3.00 10*3/mm3    Monocytes, Absolute 0.58 0.10 - 0.90 10*3/mm3    Eosinophils, Absolute 0.40 0.00 - 0.70 10*3/mm3    Basophils, Absolute 0.07 0.00 - 0.30 10*3/mm3    Immature Grans, Absolute 0.01 0.00 - 0.03 10*3/mm3   Osmolality, Calculated   Result Value Ref Range    Osmolality Calc 281.1 273.0 - 305.0 mOsm/kg   Findings and recommendations discussed with oMnisha. Reviewed her recent lab results with her which included an A1C of 5.5. Praise given for her glycemic and cardiovascular improvement and weight loss. No changes were made today but we did discuss with her weight loss changes made need  to be made. She is to contact our office if she has any problems/concerns. She will f/u with me in December; sooner if problems/concerns occur.

## 2018-08-01 NOTE — PATIENT INSTRUCTIONS
"DASH Eating Plan  DASH stands for \"Dietary Approaches to Stop Hypertension.\" The DASH eating plan is a healthy eating plan that has been shown to reduce high blood pressure (hypertension). It may also reduce your risk for type 2 diabetes, heart disease, and stroke. The DASH eating plan may also help with weight loss.  What are tips for following this plan?  General guidelines  · Avoid eating more than 2,300 mg (milligrams) of salt (sodium) a day. If you have hypertension, you may need to reduce your sodium intake to 1,500 mg a day.  · Limit alcohol intake to no more than 1 drink a day for nonpregnant women and 2 drinks a day for men. One drink equals 12 oz of beer, 5 oz of wine, or 1½ oz of hard liquor.  · Work with your health care provider to maintain a healthy body weight or to lose weight. Ask what an ideal weight is for you.  · Get at least 30 minutes of exercise that causes your heart to beat faster (aerobic exercise) most days of the week. Activities may include walking, swimming, or biking.  · Work with your health care provider or diet and nutrition specialist (dietitian) to adjust your eating plan to your individual calorie needs.  Reading food labels  · Check food labels for the amount of sodium per serving. Choose foods with less than 5 percent of the Daily Value of sodium. Generally, foods with less than 300 mg of sodium per serving fit into this eating plan.  · To find whole grains, look for the word \"whole\" as the first word in the ingredient list.  Shopping  · Buy products labeled as \"low-sodium\" or \"no salt added.\"  · Buy fresh foods. Avoid canned foods and premade or frozen meals.  Cooking  · Avoid adding salt when cooking. Use salt-free seasonings or herbs instead of table salt or sea salt. Check with your health care provider or pharmacist before using salt substitutes.  · Do not terrell foods. Cook foods using healthy methods such as baking, boiling, grilling, and broiling instead.  · Cook with " heart-healthy oils, such as olive, canola, soybean, or sunflower oil.  Meal planning    · Eat a balanced diet that includes:  ? 5 or more servings of fruits and vegetables each day. At each meal, try to fill half of your plate with fruits and vegetables.  ? Up to 6-8 servings of whole grains each day.  ? Less than 6 oz of lean meat, poultry, or fish each day. A 3-oz serving of meat is about the same size as a deck of cards. One egg equals 1 oz.  ? 2 servings of low-fat dairy each day.  ? A serving of nuts, seeds, or beans 5 times each week.  ? Heart-healthy fats. Healthy fats called Omega-3 fatty acids are found in foods such as flaxseeds and coldwater fish, like sardines, salmon, and mackerel.  · Limit how much you eat of the following:  ? Canned or prepackaged foods.  ? Food that is high in trans fat, such as fried foods.  ? Food that is high in saturated fat, such as fatty meat.  ? Sweets, desserts, sugary drinks, and other foods with added sugar.  ? Full-fat dairy products.  · Do not salt foods before eating.  · Try to eat at least 2 vegetarian meals each week.  · Eat more home-cooked food and less restaurant, buffet, and fast food.  · When eating at a restaurant, ask that your food be prepared with less salt or no salt, if possible.  What foods are recommended?  The items listed may not be a complete list. Talk with your dietitian about what dietary choices are best for you.  Grains  Whole-grain or whole-wheat bread. Whole-grain or whole-wheat pasta. Brown rice. Oatmeal. Quinoa. Bulgur. Whole-grain and low-sodium cereals. Cleopatra bread. Low-fat, low-sodium crackers. Whole-wheat flour tortillas.  Vegetables  Fresh or frozen vegetables (raw, steamed, roasted, or grilled). Low-sodium or reduced-sodium tomato and vegetable juice. Low-sodium or reduced-sodium tomato sauce and tomato paste. Low-sodium or reduced-sodium canned vegetables.  Fruits  All fresh, dried, or frozen fruit. Canned fruit in natural juice (without  added sugar).  Meat and other protein foods  Skinless chicken or turkey. Ground chicken or turkey. Pork with fat trimmed off. Fish and seafood. Egg whites. Dried beans, peas, or lentils. Unsalted nuts, nut butters, and seeds. Unsalted canned beans. Lean cuts of beef with fat trimmed off. Low-sodium, lean deli meat.  Dairy  Low-fat (1%) or fat-free (skim) milk. Fat-free, low-fat, or reduced-fat cheeses. Nonfat, low-sodium ricotta or cottage cheese. Low-fat or nonfat yogurt. Low-fat, low-sodium cheese.  Fats and oils  Soft margarine without trans fats. Vegetable oil. Low-fat, reduced-fat, or light mayonnaise and salad dressings (reduced-sodium). Canola, safflower, olive, soybean, and sunflower oils. Avocado.  Seasoning and other foods  Herbs. Spices. Seasoning mixes without salt. Unsalted popcorn and pretzels. Fat-free sweets.  What foods are not recommended?  The items listed may not be a complete list. Talk with your dietitian about what dietary choices are best for you.  Grains  Baked goods made with fat, such as croissants, muffins, or some breads. Dry pasta or rice meal packs.  Vegetables  Creamed or fried vegetables. Vegetables in a cheese sauce. Regular canned vegetables (not low-sodium or reduced-sodium). Regular canned tomato sauce and paste (not low-sodium or reduced-sodium). Regular tomato and vegetable juice (not low-sodium or reduced-sodium). Pickles. Olives.  Fruits  Canned fruit in a light or heavy syrup. Fried fruit. Fruit in cream or butter sauce.  Meat and other protein foods  Fatty cuts of meat. Ribs. Fried meat. Dhaliwal. Sausage. Bologna and other processed lunch meats. Salami. Fatback. Hotdogs. Bratwurst. Salted nuts and seeds. Canned beans with added salt. Canned or smoked fish. Whole eggs or egg yolks. Chicken or turkey with skin.  Dairy  Whole or 2% milk, cream, and half-and-half. Whole or full-fat cream cheese. Whole-fat or sweetened yogurt. Full-fat cheese. Nondairy creamers. Whipped toppings.  Processed cheese and cheese spreads.  Fats and oils  Butter. Stick margarine. Lard. Shortening. Ghee. Dhaliwal fat. Tropical oils, such as coconut, palm kernel, or palm oil.  Seasoning and other foods  Salted popcorn and pretzels. Onion salt, garlic salt, seasoned salt, table salt, and sea salt. Worcestershire sauce. Tartar sauce. Barbecue sauce. Teriyaki sauce. Soy sauce, including reduced-sodium. Steak sauce. Canned and packaged gravies. Fish sauce. Oyster sauce. Cocktail sauce. Horseradish that you find on the shelf. Ketchup. Mustard. Meat flavorings and tenderizers. Bouillon cubes. Hot sauce and Tabasco sauce. Premade or packaged marinades. Premade or packaged taco seasonings. Relishes. Regular salad dressings.  Where to find more information:  · National Heart, Lung, and Blood [place]: www.nhlbi.nih.gov  · American Heart Association: www.heart.org  Summary  · The DASH eating plan is a healthy eating plan that has been shown to reduce high blood pressure (hypertension). It may also reduce your risk for type 2 diabetes, heart disease, and stroke.  · With the DASH eating plan, you should limit salt (sodium) intake to 2,300 mg a day. If you have hypertension, you may need to reduce your sodium intake to 1,500 mg a day.  · When on the DASH eating plan, aim to eat more fresh fruits and vegetables, whole grains, lean proteins, low-fat dairy, and heart-healthy fats.  · Work with your health care provider or diet and nutrition specialist (dietitian) to adjust your eating plan to your individual calorie needs.  This information is not intended to replace advice given to you by your health care provider. Make sure you discuss any questions you have with your health care provider.  Document Released: 12/06/2012 Document Revised: 12/11/2017 Document Reviewed: 12/11/2017  [employer] Interactive Patient Education © 2018 [employer]  Type 2 Diabetes Mellitus, Diagnosis, Adult  Type 2 diabetes (type 2 diabetes mellitus) is a  long-term (chronic) disease. It may be caused by one or both of these problems:  · Your body does not make enough of a hormone called insulin.  · Your body does not react in a normal way to insulin that it makes.    Insulin lets sugars (glucose) go into cells in the body. This gives you energy. If you have type 2 diabetes, sugars cannot get into cells. This causes high blood sugar (hyperglycemia).  Your doctor will set treatment goals for you. Generally, you should have these blood sugar levels:  · Before meals (preprandial):  mg/dL (4.4-7.2 mmol/L).  · After meals (postprandial): below 180 mg/dL (10 mmol/L).  · A1c (hemoglobin A1c) level: less than 7%.    Follow these instructions at home:  Questions to Ask Your Doctor    You may want to ask these questions:  · Do I need to meet with a diabetes educator?  · Where can I find a support group for people with diabetes?  · What equipment will I need to care for myself at home?  · What diabetes medicines do I need? When should I take them?  · How often do I need to check my blood sugar?  · What number can I call if I have questions?  · When is my next doctor's visit?    General instructions  · Take over-the-counter and prescription medicines only as told by your doctor.  · Keep all follow-up visits as told by your doctor. This is important.  Contact a doctor if:  · Your blood sugar is at or above 240 mg/dL (13.3 mmol/L) for 2 days in a row.  · You have been sick or have had a fever for 2 days or more and you are not getting better.  · You have any of these problems for more than 6 hours:  ? You cannot eat or drink.  ? You feel sick to your stomach (nauseous).  ? You throw up (vomit).  ? You have watery poop (diarrhea).  Get help right away if:  · Your blood sugar is lower than 54 mg/dL (3 mmol/L).  · You get confused.  · You have trouble:  ? Thinking clearly.  ? Breathing.  · You have moderate or large ketone levels in your pee (urine).  This information is not  intended to replace advice given to you by your health care provider. Make sure you discuss any questions you have with your health care provider.  Document Released: 09/26/2009 Document Revised: 05/25/2017 Document Reviewed: 01/20/2017  [employer] Interactive Patient Education © 2018 [employer]

## 2018-08-17 ENCOUNTER — TELEPHONE (OUTPATIENT)
Dept: CARDIOLOGY | Facility: CLINIC | Age: 58
End: 2018-08-17

## 2018-08-17 NOTE — TELEPHONE ENCOUNTER
Patient needs blood thinner instructions for EGD/Colonoscopy.  Letter faxed.    [facility]  [phone] P  [phone] F

## 2018-08-23 RX ORDER — LOSARTAN POTASSIUM 100 MG/1
50 TABLET ORAL 2 TIMES DAILY
Qty: 30 TABLET | Refills: 5 | Status: SHIPPED | OUTPATIENT
Start: 2018-08-23 | End: 2019-02-27 | Stop reason: SDUPTHER

## 2018-08-31 DIAGNOSIS — E11.8 TYPE 2 DIABETES MELLITUS WITH COMPLICATION, WITHOUT LONG-TERM CURRENT USE OF INSULIN (HCC): Chronic | ICD-10-CM

## 2018-08-31 DIAGNOSIS — F41.9 ANXIETY: ICD-10-CM

## 2018-08-31 RX ORDER — ESCITALOPRAM OXALATE 10 MG/1
10 TABLET ORAL DAILY
Qty: 30 TABLET | Refills: 3 | Status: SHIPPED | OUTPATIENT
Start: 2018-08-31 | End: 2019-01-11 | Stop reason: SDUPTHER

## 2018-09-26 ENCOUNTER — OFFICE VISIT (OUTPATIENT)
Dept: FAMILY MEDICINE CLINIC | Facility: CLINIC | Age: 58
End: 2018-09-26

## 2018-09-26 VITALS
SYSTOLIC BLOOD PRESSURE: 120 MMHG | DIASTOLIC BLOOD PRESSURE: 82 MMHG | OXYGEN SATURATION: 97 % | BODY MASS INDEX: 29.19 KG/M2 | TEMPERATURE: 98.7 F | WEIGHT: 171 LBS | HEART RATE: 85 BPM | HEIGHT: 64 IN

## 2018-09-26 DIAGNOSIS — J01.00 ACUTE NON-RECURRENT MAXILLARY SINUSITIS: Primary | ICD-10-CM

## 2018-09-26 PROCEDURE — 96372 THER/PROPH/DIAG INJ SC/IM: CPT | Performed by: PHYSICIAN ASSISTANT

## 2018-09-26 PROCEDURE — 99213 OFFICE O/P EST LOW 20 MIN: CPT | Performed by: PHYSICIAN ASSISTANT

## 2018-09-26 RX ORDER — CEFTRIAXONE 1 G/1
1 INJECTION, POWDER, FOR SOLUTION INTRAMUSCULAR; INTRAVENOUS ONCE
Status: COMPLETED | OUTPATIENT
Start: 2018-09-26 | End: 2018-09-26

## 2018-09-26 RX ORDER — GUAIFENESIN/DEXTROMETHORPHAN 100-10MG/5
5 SYRUP ORAL 3 TIMES DAILY PRN
Qty: 236 ML | Refills: 1 | Status: SHIPPED | OUTPATIENT
Start: 2018-09-26 | End: 2018-11-02

## 2018-09-26 RX ORDER — SULFAMETHOXAZOLE AND TRIMETHOPRIM 800; 160 MG/1; MG/1
1 TABLET ORAL 2 TIMES DAILY
Qty: 20 TABLET | Refills: 0 | Status: SHIPPED | OUTPATIENT
Start: 2018-09-26 | End: 2018-11-02

## 2018-09-26 RX ADMIN — CEFTRIAXONE 1 G: 1 INJECTION, POWDER, FOR SOLUTION INTRAMUSCULAR; INTRAVENOUS at 14:51

## 2018-09-26 NOTE — PROGRESS NOTES
"Karthik Gardner is a 58 y.o. female.     Cc- sinus congestion    History of Present Illness     Sinus congestion-  She complains of nasal congestion, intermittent dizziness, achiness, chills, right earache, and dry cough for the past week.  Minimal relief with Tylenol, Mucinex, and over-the-counter cough syrup.    The following portions of the patient's history were reviewed and updated as appropriate: allergies, current medications, past family history, past medical history, past social history, past surgical history and problem list.    Review of Systems   Constitutional: Positive for chills and fever. Negative for activity change and appetite change.   HENT: Positive for congestion, ear pain and sore throat. Negative for sinus pressure.    Eyes: Negative for pain and visual disturbance.   Respiratory: Positive for cough. Negative for chest tightness.    Cardiovascular: Negative for chest pain and palpitations.   Gastrointestinal: Negative for abdominal pain, constipation, diarrhea, nausea and vomiting.   Endocrine: Negative for polydipsia and polyuria.   Genitourinary: Negative for dysuria and frequency.   Musculoskeletal: Negative for back pain and myalgias.   Skin: Negative for color change and rash.   Allergic/Immunologic: Negative for food allergies and immunocompromised state.   Neurological: Negative for dizziness, syncope and headaches.   Hematological: Negative for adenopathy. Does not bruise/bleed easily.   Psychiatric/Behavioral: Negative for hallucinations and suicidal ideas. The patient is not nervous/anxious.        /82   Pulse 85   Temp 98.7 °F (37.1 °C) (Temporal Artery )   Ht 162.6 cm (64\")   Wt 77.6 kg (171 lb)   LMP  (LMP Unknown)   SpO2 97%   BMI 29.35 kg/m²     Physical Exam   Constitutional: She is oriented to person, place, and time. She appears well-developed and well-nourished. No distress.   HENT:   Head: Normocephalic and atraumatic.   Right Ear: Tympanic " membrane, external ear and ear canal normal.   Left Ear: Tympanic membrane, external ear and ear canal normal.   Right maxillary sinus tenderness noted.  Posterior oropharynx noted to have postnasal drainage with minimal erythema and no exudates.   Neck: Normal range of motion. Neck supple. No thyromegaly present.   Cardiovascular: Normal rate and regular rhythm.    No murmur heard.  Pulmonary/Chest: Effort normal and breath sounds normal. She has no wheezes. She has no rales.   Neurological: She is alert and oriented to person, place, and time.   Skin: Skin is warm and dry.   Psychiatric: She has a normal mood and affect. Her behavior is normal.       Assessment/Plan     Diagnoses and all orders for this visit:    Acute non-recurrent maxillary sinusitis  -     sulfamethoxazole-trimethoprim (BACTRIM DS) 800-160 MG per tablet; Take 1 tablet by mouth 2 (Two) Times a Day.  -     cefTRIAXone (ROCEPHIN) injection 1 g; Inject 1 g into the appropriate muscle as directed by prescriber 1 (One) Time.  -     guaifenesin-dextromethorphan (ROBITUSSIN DM) 100-10 MG/5ML syrup; Take 5 mL by mouth 3 (Three) Times a Day As Needed for Cough.                 This document has been electronically signed by:  Kimmy Vela PA-C

## 2018-11-02 ENCOUNTER — LAB (OUTPATIENT)
Dept: LAB | Facility: HOSPITAL | Age: 58
End: 2018-11-02

## 2018-11-02 ENCOUNTER — OFFICE VISIT (OUTPATIENT)
Dept: CARDIOLOGY | Facility: CLINIC | Age: 58
End: 2018-11-02

## 2018-11-02 VITALS
HEART RATE: 63 BPM | HEIGHT: 64 IN | BODY MASS INDEX: 29.02 KG/M2 | DIASTOLIC BLOOD PRESSURE: 70 MMHG | WEIGHT: 170 LBS | SYSTOLIC BLOOD PRESSURE: 142 MMHG

## 2018-11-02 DIAGNOSIS — I10 ESSENTIAL HYPERTENSION: ICD-10-CM

## 2018-11-02 DIAGNOSIS — R29.898 LEFT ARM WEAKNESS: ICD-10-CM

## 2018-11-02 DIAGNOSIS — I25.10 CORONARY ARTERY DISEASE INVOLVING NATIVE CORONARY ARTERY OF NATIVE HEART WITHOUT ANGINA PECTORIS: Primary | ICD-10-CM

## 2018-11-02 DIAGNOSIS — E78.5 DYSLIPIDEMIA: ICD-10-CM

## 2018-11-02 DIAGNOSIS — I25.10 CORONARY ARTERY DISEASE INVOLVING NATIVE CORONARY ARTERY OF NATIVE HEART WITHOUT ANGINA PECTORIS: ICD-10-CM

## 2018-11-02 LAB
ARTICHOKE IGE QN: 156 MG/DL (ref 0–130)
CHOLEST SERPL-MCNC: 216 MG/DL (ref 0–200)
HDLC SERPL-MCNC: 49 MG/DL (ref 40–60)
TRIGL SERPL-MCNC: 201 MG/DL (ref 0–150)

## 2018-11-02 PROCEDURE — 80061 LIPID PANEL: CPT

## 2018-11-02 PROCEDURE — 99214 OFFICE O/P EST MOD 30 MIN: CPT | Performed by: INTERNAL MEDICINE

## 2018-11-02 PROCEDURE — 36415 COLL VENOUS BLD VENIPUNCTURE: CPT

## 2018-11-02 NOTE — PROGRESS NOTES
Encounter Date:11/02/2018      Patient ID: Monisha Gardner is a 58 y.o. female.  PCP: Dr. Peterson   Chief Complaint: Coronary Artery Disease      PROBLEM LIST:  1. Coronary artery disease:  a. Previous MI and stent to unknown vessel  2 years ago, incomplete database.   b. Hospital presentation with acute coronary syndrome and features of inferior ST elevations, November 2015.  c. Cardiac catheterization study by Dr. Watts, 11/17/2015, showed single vessel disease with 95% stenosis  of the ostial/proximal RCA, ejection fraction 65%. No other significant disease.  d. Stenting of the RCA with 3.5 x 26 mm Resolute drug-eluting stent.   e. MPS 4/6/2017: EF >70%. Normal perfusion with no evidence of ischemia.  2. Peripheral arterial disease  a. IOANA 4/5/2017 showed pressure in the right arm was 140 and L arm was 140.   3. Hypertension.   4. Dyslipidemia.   5. Type 2 diabetes.   6. Overweight.   7. Osteoarthritis.     History of Present Illness  Patient presents today for follow-up with a history of CAD, PAD, and cardiac risk factors. Since last visit she has severe numbness in left hand this is has been going for a few months. Her BP is well controlled.  She is tolerating her medications well. She would like to get back on Repatha for HLD. She notices this when trying to hold or Grandbaby. Denies chest pain, shortness of breath, leg swelling, palpitations, and syncope. She has been concerned about the Left arm intermittently going numb.  She is staying busy babysitting her Grandchild.      Allergies   Allergen Reactions   • Ciprofloxacin Anaphylaxis   • Penicillins Shortness Of Breath   • Statins Myalgia   • Zofran [Ondansetron Hcl] Shortness Of Breath   • Fish Oil [Omega-3 Fatty Acids] Nausea And Vomiting         Current Outpatient Prescriptions:   •  BD PEN NEEDLE ASAEL U/F 32G X 4 MM misc, , Disp: , Rfl: 0  •  clopidogrel (PLAVIX) 75 MG tablet, take 1 tablet by mouth once daily, Disp: 30 tablet, Rfl: 6  •   DEXILANT 60 MG capsule, Take 1 capsule by mouth Daily., Disp: 30 capsule, Rfl: 2  •  Dulaglutide (TRULICITY) 1.5 MG/0.5ML solution pen-injector, Inject 1.5 mg under the skin 1 (One) Time Per Week., Disp: 4 pen, Rfl: 3  •  escitalopram (LEXAPRO) 10 MG tablet, Take 1 tablet by mouth Daily., Disp: 30 tablet, Rfl: 3  •  fenofibrate 160 MG tablet, Take 1 tablet by mouth Daily., Disp: 90 tablet, Rfl: 1  •  hydrOXYzine (ATARAX) 10 MG tablet, Take 1 tablet by mouth Every 6 (Six) Hours As Needed for Anxiety., Disp: 50 tablet, Rfl: 0  •  ibuprofen (ADVIL,MOTRIN) 800 MG tablet, Take 1 tablet by mouth 3 (Three) Times a Day As Needed for Mild Pain  or Moderate Pain ., Disp: 30 tablet, Rfl: 1  •  Insulin Degludec (TRESIBA FLEXTOUCH) 200 UNIT/ML solution pen-injector, Inject 40 Units under the skin Daily., Disp: 4 pen, Rfl: 3  •  isosorbide mononitrate (IMDUR) 60 MG 24 hr tablet, Take 1 tablet by mouth Daily., Disp: 90 tablet, Rfl: 1  •  losartan (COZAAR) 100 MG tablet, Take 0.5 tablets by mouth 2 (Two) Times a Day., Disp: 30 tablet, Rfl: 5  •  metFORMIN (GLUCOPHAGE) 500 MG tablet, Take 1 tablet by mouth 2 (Two) Times a Day With Meals., Disp: 120 tablet, Rfl: 3  •  metoprolol tartrate (LOPRESSOR) 50 MG tablet, take 1 tablet by mouth twice a day, Disp: 60 tablet, Rfl: 6  •  nitroglycerin (NITROSTAT) 0.4 MG SL tablet, Place 1 tablet under the tongue Every 5 (Five) Minutes As Needed for chest pain., Disp: 25 tablet, Rfl: 6    The following portions of the patient's history were reviewed and updated as appropriate: allergies, current medications, past family history, past medical history, past social history, past surgical history and problem list.    ROS  Review of Systems   Constitution: Negative for chills, fever, weight gain and weight loss.   Cardiovascular: Negative for chest pain, claudication, dyspnea on exertion, leg swelling, orthopnea, palpitations, paroxysmal nocturnal dyspnea and syncope.        No dizziness  "  Gastrointestinal: Negative for abdominal pain, constipation, diarrhea, nausea and vomiting.   Genitourinary:        No urinary symptoms   Neurological:        No symptoms of stroke.   All other systems reviewed and are negative.    Objective:     Blood pressure 142/70, pulse 63, height 162.6 cm (64\"), weight 77.1 kg (170 lb).        Physical Exam  Constitutional: She appears well-developed and well-nourished.   HENT:   HEENT exam unremarkable.   Neck: Neck supple. No JVD present.   No carotid bruits.   Cardiovascular: Normal rate, regular rhythm and normal heart sounds.    No murmur heard.  2 plus symmetric pulses.  Decreased radial pulse with excellent ulnar pulse.   Pulmonary/Chest: Breath sounds normal. Does not exhibit tenderness.   Abdominal:   Abdomen benign.   Musculoskeletal: Does not exhibit edema.   Neurological:   Neurological exam unremarkable.   Vitals reviewed.    Lab Review:   Procedures       Assessment:      Diagnosis Plan   1. Coronary artery disease involving native coronary artery of native heart without angina pectoris  Lipid Panel   2. Essential hypertension  Lipid Panel   3. Dyslipidemia  Lipid Panel   4. Left arm weakness  Lipid Panel     Plan:   Follow up with PCP for suspected Radiculopathy in left arm.  Continue current medications.   FU in 12 MO, sooner as needed.  Thank you for allowing us to participate in the care of your patient.     Scribed for Derrick Watts MD by Kalpesh Garcia PA-C. 11/2/2018  10:51 AM    I, Derrick Watts MD, personally performed the services described in this documentation as scribed by the above named individual in my presence, and it is both accurate and complete.  11/2/2018  11:42 AM          Please note that portions of this note may have been completed with a voice recognition program. Efforts were made to edit the dictations, but occasionally words are mistranscribed.      "

## 2018-11-19 ENCOUNTER — TELEPHONE (OUTPATIENT)
Dept: CARDIOLOGY | Facility: CLINIC | Age: 58
End: 2018-11-19

## 2018-11-19 NOTE — TELEPHONE ENCOUNTER
Patient called about her lipid panel and the results.   Says that she was in the ER at [facility] with severe leg cramps.   Says that she could not walk.    They to call an ambulance to take to the ER.   She says that her insurance has changed and she thinks that [facility] was going to her her Repatha approved.

## 2018-11-20 ENCOUNTER — PRIOR AUTHORIZATION (OUTPATIENT)
Dept: CARDIOLOGY | Facility: CLINIC | Age: 58
End: 2018-11-20

## 2018-11-21 RX ORDER — METOPROLOL TARTRATE 50 MG/1
TABLET, FILM COATED ORAL
Qty: 60 TABLET | Refills: 4 | Status: SHIPPED | OUTPATIENT
Start: 2018-11-21 | End: 2019-05-03 | Stop reason: SDUPTHER

## 2018-11-21 RX ORDER — CLOPIDOGREL BISULFATE 75 MG/1
TABLET ORAL
Qty: 30 TABLET | Refills: 4 | Status: SHIPPED | OUTPATIENT
Start: 2018-11-21 | End: 2019-04-19

## 2018-11-21 NOTE — TELEPHONE ENCOUNTER
ER discharge recs are to f/u with PCP. Patient will f/u with PCP.  Call back if they rec. Further assessment by cardio.

## 2018-12-24 RX ORDER — ISOSORBIDE MONONITRATE 60 MG/1
TABLET, EXTENDED RELEASE ORAL
Qty: 90 TABLET | Refills: 1 | Status: SHIPPED | OUTPATIENT
Start: 2018-12-24 | End: 2019-08-06 | Stop reason: SDUPTHER

## 2018-12-24 RX ORDER — FENOFIBRATE 160 MG/1
160 TABLET ORAL DAILY
Qty: 90 TABLET | Refills: 1 | Status: SHIPPED | OUTPATIENT
Start: 2018-12-24 | End: 2019-12-20

## 2019-01-11 DIAGNOSIS — F41.9 ANXIETY: ICD-10-CM

## 2019-01-11 RX ORDER — ESCITALOPRAM OXALATE 10 MG/1
10 TABLET ORAL DAILY
Qty: 30 TABLET | Refills: 1 | Status: SHIPPED | OUTPATIENT
Start: 2019-01-11 | End: 2019-02-19

## 2019-01-24 ENCOUNTER — LAB (OUTPATIENT)
Dept: FAMILY MEDICINE CLINIC | Facility: CLINIC | Age: 59
End: 2019-01-24

## 2019-01-24 DIAGNOSIS — Z79.4 TYPE 2 DIABETES MELLITUS WITH OTHER CIRCULATORY COMPLICATION, WITH LONG-TERM CURRENT USE OF INSULIN (HCC): ICD-10-CM

## 2019-01-24 DIAGNOSIS — E55.9 VITAMIN D DEFICIENCY: ICD-10-CM

## 2019-01-24 DIAGNOSIS — M19.90 OSTEOARTHRITIS, UNSPECIFIED OSTEOARTHRITIS TYPE, UNSPECIFIED SITE: ICD-10-CM

## 2019-01-24 DIAGNOSIS — E53.8 VITAMIN B 12 DEFICIENCY: ICD-10-CM

## 2019-01-24 DIAGNOSIS — E11.59 TYPE 2 DIABETES MELLITUS WITH OTHER CIRCULATORY COMPLICATION, WITH LONG-TERM CURRENT USE OF INSULIN (HCC): ICD-10-CM

## 2019-01-24 LAB
25(OH)D3 SERPL-MCNC: 66 NG/ML
ALBUMIN SERPL-MCNC: 4.4 G/DL (ref 3.5–5)
ALBUMIN/GLOB SERPL: 1.9 G/DL (ref 1.5–2.5)
ALP SERPL-CCNC: 74 U/L (ref 35–104)
ALT SERPL W P-5'-P-CCNC: 28 U/L (ref 10–36)
ANION GAP SERPL CALCULATED.3IONS-SCNC: 9.1 MMOL/L (ref 3.6–11.2)
AST SERPL-CCNC: 31 U/L (ref 10–30)
BASOPHILS # BLD AUTO: 0.05 10*3/MM3 (ref 0–0.3)
BASOPHILS NFR BLD AUTO: 0.7 % (ref 0–2)
BILIRUB SERPL-MCNC: 0.3 MG/DL (ref 0.2–1.8)
BUN BLD-MCNC: 15 MG/DL (ref 7–21)
BUN/CREAT SERPL: 14.3 (ref 7–25)
CALCIUM SPEC-SCNC: 10.1 MG/DL (ref 7.7–10)
CHLORIDE SERPL-SCNC: 104 MMOL/L (ref 99–112)
CHOLEST SERPL-MCNC: 222 MG/DL (ref 0–200)
CO2 SERPL-SCNC: 27.9 MMOL/L (ref 24.3–31.9)
CREAT BLD-MCNC: 1.05 MG/DL (ref 0.43–1.29)
DEPRECATED RDW RBC AUTO: 42.5 FL (ref 37–54)
EOSINOPHIL # BLD AUTO: 0.2 10*3/MM3 (ref 0–0.7)
EOSINOPHIL NFR BLD AUTO: 2.7 % (ref 0–5)
ERYTHROCYTE [DISTWIDTH] IN BLOOD BY AUTOMATED COUNT: 14.1 % (ref 11.5–14.5)
GFR SERPL CREATININE-BSD FRML MDRD: 54 ML/MIN/1.73
GLOBULIN UR ELPH-MCNC: 2.3 GM/DL
GLUCOSE BLD-MCNC: 96 MG/DL (ref 70–110)
HBA1C MFR BLD: 6.9 % (ref 4.5–5.7)
HCT VFR BLD AUTO: 40.2 % (ref 37–47)
HDLC SERPL-MCNC: 46 MG/DL (ref 60–100)
HGB BLD-MCNC: 13.1 G/DL (ref 12–16)
IMM GRANULOCYTES # BLD AUTO: 0.03 10*3/MM3 (ref 0–0.03)
IMM GRANULOCYTES NFR BLD AUTO: 0.4 % (ref 0–0.5)
LDLC SERPL CALC-MCNC: 137 MG/DL (ref 0–100)
LDLC/HDLC SERPL: 2.97 {RATIO}
LYMPHOCYTES # BLD AUTO: 2.86 10*3/MM3 (ref 1–3)
LYMPHOCYTES NFR BLD AUTO: 38.8 % (ref 21–51)
MCH RBC QN AUTO: 27.3 PG (ref 27–33)
MCHC RBC AUTO-ENTMCNC: 32.6 G/DL (ref 33–37)
MCV RBC AUTO: 83.9 FL (ref 80–94)
MONOCYTES # BLD AUTO: 0.43 10*3/MM3 (ref 0.1–0.9)
MONOCYTES NFR BLD AUTO: 5.8 % (ref 0–10)
NEUTROPHILS # BLD AUTO: 3.8 10*3/MM3 (ref 1.4–6.5)
NEUTROPHILS NFR BLD AUTO: 51.6 % (ref 30–70)
OSMOLALITY SERPL CALC.SUM OF ELEC: 282 MOSM/KG (ref 273–305)
PLATELET # BLD AUTO: 347 10*3/MM3 (ref 130–400)
PMV BLD AUTO: 10 FL (ref 6–10)
POTASSIUM BLD-SCNC: 4.4 MMOL/L (ref 3.5–5.3)
PROT SERPL-MCNC: 6.7 G/DL (ref 6–8)
RBC # BLD AUTO: 4.79 10*6/MM3 (ref 4.2–5.4)
SODIUM BLD-SCNC: 141 MMOL/L (ref 135–153)
TRIGL SERPL-MCNC: 196 MG/DL (ref 0–150)
TSH SERPL DL<=0.05 MIU/L-ACNC: 0.97 MIU/ML (ref 0.55–4.78)
URATE SERPL-MCNC: 6.5 MG/DL (ref 2.4–5.7)
VIT B12 BLD-MCNC: 470 PG/ML (ref 211–911)
VLDLC SERPL-MCNC: 39.2 MG/DL
WBC NRBC COR # BLD: 7.37 10*3/MM3 (ref 4.5–12.5)

## 2019-01-24 PROCEDURE — 82607 VITAMIN B-12: CPT | Performed by: NURSE PRACTITIONER

## 2019-01-24 PROCEDURE — 83036 HEMOGLOBIN GLYCOSYLATED A1C: CPT | Performed by: NURSE PRACTITIONER

## 2019-01-24 PROCEDURE — 82306 VITAMIN D 25 HYDROXY: CPT | Performed by: NURSE PRACTITIONER

## 2019-01-24 PROCEDURE — 80053 COMPREHEN METABOLIC PANEL: CPT | Performed by: NURSE PRACTITIONER

## 2019-01-24 PROCEDURE — 84550 ASSAY OF BLOOD/URIC ACID: CPT | Performed by: NURSE PRACTITIONER

## 2019-01-24 PROCEDURE — 80061 LIPID PANEL: CPT | Performed by: NURSE PRACTITIONER

## 2019-01-24 PROCEDURE — 85025 COMPLETE CBC W/AUTO DIFF WBC: CPT | Performed by: NURSE PRACTITIONER

## 2019-01-24 PROCEDURE — 84443 ASSAY THYROID STIM HORMONE: CPT | Performed by: NURSE PRACTITIONER

## 2019-01-30 ENCOUNTER — OFFICE VISIT (OUTPATIENT)
Dept: FAMILY MEDICINE CLINIC | Facility: CLINIC | Age: 59
End: 2019-01-30

## 2019-01-30 VITALS
SYSTOLIC BLOOD PRESSURE: 140 MMHG | HEART RATE: 83 BPM | BODY MASS INDEX: 29.71 KG/M2 | HEIGHT: 64 IN | TEMPERATURE: 98.6 F | OXYGEN SATURATION: 94 % | DIASTOLIC BLOOD PRESSURE: 85 MMHG | WEIGHT: 174 LBS

## 2019-01-30 DIAGNOSIS — I10 ESSENTIAL HYPERTENSION: Chronic | ICD-10-CM

## 2019-01-30 DIAGNOSIS — E11.40 CONTROLLED TYPE 2 DIABETES MELLITUS WITH NEUROPATHY (HCC): Primary | Chronic | ICD-10-CM

## 2019-01-30 DIAGNOSIS — E78.5 DYSLIPIDEMIA: Chronic | ICD-10-CM

## 2019-01-30 DIAGNOSIS — E55.9 VITAMIN D DEFICIENCY: ICD-10-CM

## 2019-01-30 DIAGNOSIS — E53.8 VITAMIN B 12 DEFICIENCY: ICD-10-CM

## 2019-01-30 DIAGNOSIS — M1A.9XX0 CHRONIC GOUT WITHOUT TOPHUS, UNSPECIFIED CAUSE, UNSPECIFIED SITE: Chronic | ICD-10-CM

## 2019-01-30 DIAGNOSIS — F41.9 ANXIETY: ICD-10-CM

## 2019-01-30 DIAGNOSIS — M19.90 OSTEOARTHRITIS, UNSPECIFIED OSTEOARTHRITIS TYPE, UNSPECIFIED SITE: Chronic | ICD-10-CM

## 2019-01-30 PROCEDURE — 90471 IMMUNIZATION ADMIN: CPT | Performed by: NURSE PRACTITIONER

## 2019-01-30 PROCEDURE — 90632 HEPA VACCINE ADULT IM: CPT | Performed by: NURSE PRACTITIONER

## 2019-01-30 PROCEDURE — 99214 OFFICE O/P EST MOD 30 MIN: CPT | Performed by: NURSE PRACTITIONER

## 2019-01-30 PROCEDURE — 90472 IMMUNIZATION ADMIN EACH ADD: CPT | Performed by: NURSE PRACTITIONER

## 2019-01-30 PROCEDURE — 90686 IIV4 VACC NO PRSV 0.5 ML IM: CPT | Performed by: NURSE PRACTITIONER

## 2019-01-30 RX ORDER — ALLOPURINOL 100 MG/1
100 TABLET ORAL 2 TIMES DAILY
Qty: 60 TABLET | Refills: 1 | Status: SHIPPED | OUTPATIENT
Start: 2019-01-30 | End: 2019-04-03 | Stop reason: SDUPTHER

## 2019-01-30 NOTE — PATIENT INSTRUCTIONS
Hypertension  Hypertension is another name for high blood pressure. High blood pressure forces your heart to work harder to pump blood. This can cause problems over time.  There are two numbers in a blood pressure reading. There is a top number (systolic) over a bottom number (diastolic). It is best to have a blood pressure below 120/80. Healthy choices can help lower your blood pressure. You may need medicine to help lower your blood pressure if:  · Your blood pressure cannot be lowered with healthy choices.  · Your blood pressure is higher than 130/80.    Follow these instructions at home:  Eating and drinking  · If directed, follow the DASH eating plan. This diet includes:  ? Filling half of your plate at each meal with fruits and vegetables.  ? Filling one quarter of your plate at each meal with whole grains. Whole grains include whole wheat pasta, brown rice, and whole grain bread.  ? Eating or drinking low-fat dairy products, such as skim milk or low-fat yogurt.  ? Filling one quarter of your plate at each meal with low-fat (lean) proteins. Low-fat proteins include fish, skinless chicken, eggs, beans, and tofu.  ? Avoiding fatty meat, cured and processed meat, or chicken with skin.  ? Avoiding premade or processed food.  · Eat less than 1,500 mg of salt (sodium) a day.  · Limit alcohol use to no more than 1 drink a day for nonpregnant women and 2 drinks a day for men. One drink equals 12 oz of beer, 5 oz of wine, or 1½ oz of hard liquor.  Lifestyle  · Work with your doctor to stay at a healthy weight or to lose weight. Ask your doctor what the best weight is for you.  · Get at least 30 minutes of exercise that causes your heart to beat faster (aerobic exercise) most days of the week. This may include walking, swimming, or biking.  · Get at least 30 minutes of exercise that strengthens your muscles (resistance exercise) at least 3 days a week. This may include lifting weights or pilates.  · Do not use any  products that contain nicotine or tobacco. This includes cigarettes and e-cigarettes. If you need help quitting, ask your doctor.  · Check your blood pressure at home as told by your doctor.  · Keep all follow-up visits as told by your doctor. This is important.  Medicines  · Take over-the-counter and prescription medicines only as told by your doctor. Follow directions carefully.  · Do not skip doses of blood pressure medicine. The medicine does not work as well if you skip doses. Skipping doses also puts you at risk for problems.  · Ask your doctor about side effects or reactions to medicines that you should watch for.  Contact a doctor if:  · You think you are having a reaction to the medicine you are taking.  · You have headaches that keep coming back (recurring).  · You feel dizzy.  · You have swelling in your ankles.  · You have trouble with your vision.  Get help right away if:  · You get a very bad headache.  · You start to feel confused.  · You feel weak or numb.  · You feel faint.  · You get very bad pain in your:  ? Chest.  ? Belly (abdomen).  · You throw up (vomit) more than once.  · You have trouble breathing.  Summary  · Hypertension is another name for high blood pressure.  · Making healthy choices can help lower blood pressure. If your blood pressure cannot be controlled with healthy choices, you may need to take medicine.  This information is not intended to replace advice given to you by your health care provider. Make sure you discuss any questions you have with your health care provider.  Document Released: 06/05/2009 Document Revised: 11/15/2017 Document Reviewed: 11/15/2017  [employer] Interactive Patient Education © 2018 [employer]  Gout  Gout is painful swelling that can happen in some of your joints. Gout is a type of arthritis. This condition is caused by having too much uric acid in your body. Uric acid is a chemical that is made when your body breaks down substances called purines. If  your body has too much uric acid, sharp crystals can form and build up in your joints. This causes pain and swelling.  Gout attacks can happen quickly and be very painful (acute gout). Over time, the attacks can affect more joints and happen more often (chronic gout).  Follow these instructions at home:  During a Gout Attack  · If directed, put ice on the painful area:  ? Put ice in a plastic bag.  ? Place a towel between your skin and the bag.  ? Leave the ice on for 20 minutes, 2-3 times a day.  · Rest the joint as much as possible. If the joint is in your leg, you may be given crutches to use.  · Raise (elevate) the painful joint above the level of your heart as often as you can.  · Drink enough fluids to keep your pee (urine) clear or pale yellow.  · Take over-the-counter and prescription medicines only as told by your doctor.  · Do not drive or use heavy machinery while taking prescription pain medicine.  · Follow instructions from your doctor about what you can or cannot eat and drink.  · Return to your normal activities as told by your doctor. Ask your doctor what activities are safe for you.  Avoiding Future Gout Attacks  · Follow a low-purine diet as told by a specialist (dietitian) or your doctor. Avoid foods and drinks that have a lot of purines, such as:  ? Liver.  ? Kidney.  ? Anchovies.  ? Asparagus.  ? Herring.  ? Mushrooms  ? Mussels.  ? Beer.  · Limit alcohol intake to no more than 1 drink a day for nonpregnant women and 2 drinks a day for men. One drink equals 12 oz of beer, 5 oz of wine, or 1½ oz of hard liquor.  · Stay at a healthy weight or lose weight if you are overweight. If you want to lose weight, talk with your doctor. It is important that you do not lose weight too fast.  · Start or continue an exercise plan as told by your doctor.  · Drink enough fluids to keep your pee clear or pale yellow.  · Take over-the-counter and prescription medicines only as told by your doctor.  · Keep all  follow-up visits as told by your doctor. This is important.  Contact a doctor if:  · You have another gout attack.  · You still have symptoms of a gout attack after10 days of treatment.  · You have problems (side effects) because of your medicines.  · You have chills or a fever.  · You have burning pain when you pee (urinate).  · You have pain in your lower back or belly.  Get help right away if:  · You have very bad pain.  · Your pain cannot be controlled.  · You cannot pee.  This information is not intended to replace advice given to you by your health care provider. Make sure you discuss any questions you have with your health care provider.  Document Released: 09/26/2009 Document Revised: 05/25/2017 Document Reviewed: 09/29/2016  [employer] Interactive Patient Education © 2018 [employer]  Type 2 Diabetes Mellitus, Self Care, Adult  Caring for yourself after you have been diagnosed with type 2 diabetes (type 2 diabetes mellitus) means keeping your blood sugar (glucose) under control with a balance of:  · Nutrition.  · Exercise.  · Lifestyle changes.  · Medicines or insulin, if necessary.  · Support from your team of health care providers and others.    The following information explains what you need to know to manage your diabetes at home.  What do I need to do to manage my blood glucose?  · Check your blood glucose every day, as often as told by your health care provider.  · Contact your health care provider if your blood glucose is above your target for 2 tests in a row.  · Have your A1c (hemoglobin A1c) level checked at least two times a year, or as often as told by your health care provider.  Your health care provider will set individualized treatment goals for you. Generally, the goal of treatment is to maintain the following blood glucose levels:  · Before meals (preprandial):  mg/dL (4.4-7.2 mmol/L).  · After meals (postprandial): below 180 mg/dL (10 mmol/L).  · A1c level: less than 7%.    What do  I need to know about hyperglycemia and hypoglycemia?  What is hyperglycemia?  Hyperglycemia, also called high blood glucose, occurs when blood glucose is too high. Make sure you know the early signs of hyperglycemia, such as:  · Increased thirst.  · Hunger.  · Feeling very tired.  · Needing to urinate more often than usual.  · Blurry vision.    What is hypoglycemia?  Hypoglycemia, also called low blood glucose, occurs with a blood glucose level at or below 70 mg/dL (3.9 mmol/L). The risk for hypoglycemia increases during or after exercise, during sleep, during illness, and when skipping meals or not eating for a long time (fasting).  It is important to know the symptoms of hypoglycemia and treat it right away. Always have a 15-gram rapid-acting carbohydrate snack with you to treat low blood glucose. Family members and close friends should also know the symptoms and should understand how to treat hypoglycemia, in case you are not able to treat yourself.  What are the symptoms of hypoglycemia?  Hypoglycemia symptoms can include:  · Hunger.  · Anxiety.  · Sweating and feeling clammy.  · Confusion.  · Dizziness or feeling light-headed.  · Sleepiness.  · Nausea.  · Increased heart rate.  · Headache.  · Blurry vision.  · Seizure.  · Nightmares.  · Tingling or numbness around the mouth, lips, or tongue.  · A change in speech.  · Decreased ability to concentrate.  · A change in coordination.  · Restless sleep.  · Tremors or shakes.  · Fainting.  · Irritability.    How do I treat hypoglycemia?    If you are alert and able to swallow safely, follow the 15:15 rule:  · Take 15 grams of a rapid-acting carbohydrate. Rapid-acting options include:  ? 1 tube of glucose gel.  ? 3 glucose pills.  ? 6-8 pieces of hard candy.  ? 4 oz (120 mL) of fruit juice.  ? 4 oz (120 mL) of regular (not diet) soda.  · Check your blood glucose 15 minutes after you take the carbohydrate.  · If the repeat blood glucose level is still at or below 70  mg/dL (3.9 mmol/L), take 15 grams of a carbohydrate again.  · If your blood glucose level does not increase above 70 mg/dL (3.9 mmol/L) after 3 tries, seek emergency medical care.  · After your blood glucose level returns to normal, eat a meal or a snack within 1 hour.    How do I treat severe hypoglycemia?  Severe hypoglycemia is when your blood glucose level is at or below 54 mg/dL (3 mmol/L). Severe hypoglycemia is an emergency. Do not wait to see if the symptoms will go away. Get medical help right away. Call your local emergency services (911 in the [place] Do not drive yourself to the hospital.  If you have severe hypoglycemia and you cannot eat or drink, you may need an injection of glucagon. A family member or close friend should learn how to check your blood glucose and how to give you a glucagon injection. Ask your health care provider if you need to have an emergency glucagon injection kit available.  Severe hypoglycemia may need to be treated in a hospital. The treatment may include getting glucose through an IV tube. You may also need treatment for the cause of your hypoglycemia.  Can having diabetes put me at risk for other conditions?  Having diabetes can put you at risk for other long-term (chronic) conditions, such as heart disease and kidney disease. Your health care provider may prescribe medicines to help prevent complications from diabetes. These medicines may include:  · Aspirin.  · Medicine to lower cholesterol.  · Medicine to control blood pressure.    What else can I do to manage my diabetes?  Take your diabetes medicines as told  · If your health care provider prescribed insulin or diabetes medicines, take them every day.  · Do not run out of insulin or other diabetes medicines that you take. Plan ahead so you always have these available.  · If you use insulin, adjust your dosage based on how physically active you are and what foods you eat. Your health care provider will tell you how to  adjust your dosage.  Make healthy food choices    The things that you eat and drink affect your blood glucose and your insulin dosage. Making good choices helps to control your diabetes and prevent other health problems. A healthy meal plan includes eating lean proteins, complex carbohydrates, fresh fruits and vegetables, low-fat dairy products, and healthy fats.  Make an appointment to see a diet and nutrition specialist (registered dietitian) to help you create an eating plan that is right for you. Make sure that you:  · Follow instructions from your health care provider about eating or drinking restrictions.  · Drink enough fluid to keep your urine clear or pale yellow.  · Eat healthy snacks between nutritious meals.  · Track the carbohydrates that you eat. Do this by reading food labels and learning the standard serving sizes of foods.  · Follow your sick day plan whenever you cannot eat or drink as usual. Make this plan in advance with your health care provider.    Stay active    Exercise regularly, as told by your health care provider. This may include:  · Stretching and doing strength exercises, such as yoga or weightlifting, at least 2 times a week.  · Doing at least 150 minutes of moderate-intensity or vigorous-intensity exercise each week. This could be brisk walking, biking, or water aerobics.  ? Spread out your activity over at least 3 days of the week.  ? Do not go more than 2 days in a row without doing some kind of physical activity.    When you start a new exercise or activity, work with your health care provider to adjust your insulin, medicines, or food intake as needed.  Make healthy lifestyle choices  · Do not use any tobacco products, such as cigarettes, chewing tobacco, and e-cigarettes. If you need help quitting, ask your health care provider.  · If your health care provider says that alcohol is safe for you, limit alcohol intake to no more than 1 drink per day for nonpregnant women and 2  drinks per day for men. One drink equals 12 oz of beer, 5 oz of wine, or 1½ oz of hard liquor.  · Learn to manage stress. If you need help with this, ask your health care provider.  Care for your body    · Keep your immunizations up to date. In addition to getting vaccinations as told by your health care provider, it is recommended that you get vaccinated against the following illnesses:  ? The flu (influenza). Get a flu shot every year.  ? Pneumonia.  ? Hepatitis B.  · Schedule an eye exam soon after your diagnosis, and then one time every year after that.  · Check your skin and feet every day for cuts, bruises, redness, blisters, or sores. Schedule a foot exam with your health care provider once every year.  · Brush your teeth and gums two times a day, and floss at least one time a day. Visit your dentist at least once every 6 months.  · Maintain a healthy weight.  General instructions  · Take over-the-counter and prescription medicines only as told by your health care provider.  · Share your diabetes management plan with people in your workplace, school, and household.  · Check your urine for ketones when you are ill and as told by your health care provider.  · Ask your health care provider:  ? Do I need to meet with a diabetes educator?  ? Where can I find a support group for people with diabetes?  · Carry a medical alert card or wear medical alert jewelry.  · Keep all follow-up visits as told by your health care provider. This is important.  Where to find more information:  For more information about diabetes, visit:  · American Diabetes Association (ADA): www.diabetes.org  · American Association of Diabetes Educators (AADE): www.diabeteseducator.org/patient-resources    This information is not intended to replace advice given to you by your health care provider. Make sure you discuss any questions you have with your health care provider.  Document Released: 04/10/2017 Document Revised: 05/25/2017 Document  Reviewed: 01/20/2017  [employer] Interactive Patient Education © 2018 [employer]

## 2019-01-30 NOTE — PROGRESS NOTES
History of Present Illness   Monisha presents to the office today for follow up and to review lab results pertaining to her H/O DM, type 2, HTN, Dyslipidemia, HTN. In addition, she has a H/O CAD with Previous MI  Stent which she is followed by Dr Watst. In addition, she has had Hip Replacement per Dr Singh.  Monisha does report she was seen in the [facility] on November 26 th, 2018 due to having issues with ambulation. This visit note was reviewed by me. Her labs were within normal limits and she was given Toradol. She does continue to have bothersome leg cramps especially at night. She does have a history of gout and has noticed an increase in joint pain.  She does report she has been under a great deal of stress due to the loss of a job and being a primary caregiver of her mother. She is prescribed a very low does of Vistaril for anxiety which she has not been taking.     Diabetes   She has type 2 diabetes mellitus. Her disease course has been improving.  Pertinent negatives for diabetes include no chest pain, no weakness. Diabetic complications include peripheral neuropathy. Risk factors for coronary artery disease include diabetes mellitus, dyslipidemia, family history, hypertension, obesity, post-menopausal, sedentary lifestyle and stress. Current diabetic treatment includes oral agent (dual therapy),  basal insulin and Trulicity. She is compliant with treatment most of the time. She is following a generally healthy diet. She has had a previous visit with a dietitian.  An ACE inhibitor/angiotensin II receptor blocker is being taken. Eye exam is up to date and done by Dr Winkler.     Hypertension   The current episode started more than 1 year ago. She does home blood pressure.  Pertinent negatives include no chest pain, palpitations, peripheral edema or shortness of breath. Risk factors for coronary artery disease include diabetes mellitus, dyslipidemia, family history, obesity, post-menopausal  "state, sedentary lifestyle and stress. Past treatments include angiotensin blockers and beta blockers.      Hyperlipidemia   The current episode started more than 1 year ago. The problem is uncontrolled. Recent lipid tests are improving. Pertinent negatives include no chest pain or shortness of breath. She is currently prescribed Rapatha per Dr Watts but she her insurance has denied coverage. .  Risk factors for coronary artery disease include diabetes mellitus, family history, dyslipidemia, hypertension, obesity, post-menopausal and stress.     Vitals:    01/30/19 1052   BP: 140/85   Pulse: 83   Temp: 98.6 °F (37 °C)   TempSrc: Temporal   SpO2: 94%   Weight: 78.9 kg (174 lb)   Height: 162.6 cm (64\")      The following portions of the patient's history were reviewed and updated as appropriate: allergies, current medications, past family history, past medical history, past social history, past surgical history and problem list.    Review of Systems   Constitutional: Positive for appetite change. Negative for activity change, chills, fatigue, fever and unexpected weight change.   HENT: Negative.    Eyes: Positive for visual disturbance.   Respiratory: Negative for cough, chest tightness, shortness of breath and wheezing.    Cardiovascular: Negative for chest pain, palpitations and leg swelling.   Gastrointestinal: Positive for nausea. Negative for abdominal pain, constipation, diarrhea and vomiting.   Endocrine: Negative for cold intolerance, heat intolerance, polydipsia, polyphagia and polyuria.   Musculoskeletal: Positive for arthralgias.   Skin: Negative for color change and rash.   Neurological: Positive for numbness. Negative for dizziness, tremors, speech difficulty, weakness, light-headedness and headaches.   Hematological: Negative for adenopathy. Bruises/bleeds easily.   Psychiatric/Behavioral: Negative for confusion, decreased concentration and suicidal ideas. The patient is not nervous/anxious.    All other " systems reviewed and are negative.    Physical Exam   Constitutional: She is oriented to person, place, and time. She appears well-developed and well-nourished. No distress.   HENT:   Head: Normocephalic.   Nose: Nose normal.   Mouth/Throat: Oropharynx is clear and moist. No oropharyngeal exudate.   Eyes: Conjunctivae and EOM are normal. Pupils are equal, round, and reactive to light. Right eye exhibits no discharge. Left eye exhibits no discharge. No scleral icterus.   Neck: Neck supple. No JVD present. No thyromegaly present.   Cardiovascular: Normal rate, regular rhythm and normal heart sounds. Exam reveals no friction rub.   No murmur heard.  Pulmonary/Chest: Effort normal and breath sounds normal. No respiratory distress. She has no wheezes. She has no rales.   Abdominal: Soft. Bowel sounds are normal. She exhibits no distension. There is no tenderness. There is no rebound and no guarding.   Musculoskeletal: She exhibits tenderness (Multiple sites ). She exhibits no edema.   Lymphadenopathy:     She has no cervical adenopathy.   Neurological: She is alert and oriented to person, place, and time. No cranial nerve deficit. Coordination normal.   Skin: Skin is warm and dry. Capillary refill takes less than 2 seconds. No rash noted. No erythema.   Psychiatric: She has a normal mood and affect. Her behavior is normal. Judgment and thought content normal.   Vitals reviewed.    Assessment/Plan   Problems Addressed this Visit        Cardiovascular and Mediastinum    Hypertension       Digestive    Vitamin D deficiency    Vitamin B 12 deficiency       Endocrine    Controlled type 2 diabetes mellitus with neuropathy (CMS/HCC) - Primary       Musculoskeletal and Integument    Osteoarthritis       Other    Dyslipidemia    Anxiety      Other Visit Diagnoses     Chronic gout without tophus, unspecified cause, unspecified site  (Chronic)           Results for orders placed or performed in visit on 01/24/19   Comprehensive  Metabolic Panel   Result Value Ref Range    Glucose 96 70 - 110 mg/dL    BUN 15 7 - 21 mg/dL    Creatinine 1.05 0.43 - 1.29 mg/dL    Sodium 141 135 - 153 mmol/L    Potassium 4.4 3.5 - 5.3 mmol/L    Chloride 104 99 - 112 mmol/L    CO2 27.9 24.3 - 31.9 mmol/L    Calcium 10.1 (H) 7.7 - 10.0 mg/dL    Total Protein 6.7 6.0 - 8.0 g/dL    Albumin 4.40 3.50 - 5.00 g/dL    ALT (SGPT) 28 10 - 36 U/L    AST (SGOT) 31 (H) 10 - 30 U/L    Alkaline Phosphatase 74 35 - 104 U/L    Total Bilirubin 0.3 0.2 - 1.8 mg/dL    eGFR Non African Amer 54 (L) >60 mL/min/1.73    Globulin 2.3 gm/dL    A/G Ratio 1.9 1.5 - 2.5 g/dL    BUN/Creatinine Ratio 14.3 7.0 - 25.0    Anion Gap 9.1 3.6 - 11.2 mmol/L   Lipid Panel   Result Value Ref Range    Total Cholesterol 222 (H) 0 - 200 mg/dL    Triglycerides 196 (H) 0 - 150 mg/dL    HDL Cholesterol 46 (L) 60 - 100 mg/dL    LDL Cholesterol  137 (H) 0 - 100 mg/dL    VLDL Cholesterol 39.2 mg/dL    LDL/HDL Ratio 2.97    TSH   Result Value Ref Range    TSH 0.968 0.550 - 4.780 mIU/mL   Hemoglobin A1c   Result Value Ref Range    Hemoglobin A1C 6.90 (H) 4.50 - 5.70 %   Vitamin D 25 Hydroxy   Result Value Ref Range    25 Hydroxy, Vitamin D 66.0 ng/ml   Uric Acid   Result Value Ref Range    Uric Acid 6.5 (H) 2.4 - 5.7 mg/dL   Vitamin B12   Result Value Ref Range    Vitamin B-12 470 211 - 911 pg/mL   CBC Auto Differential   Result Value Ref Range    WBC 7.37 4.50 - 12.50 10*3/mm3    RBC 4.79 4.20 - 5.40 10*6/mm3    Hemoglobin 13.1 12.0 - 16.0 g/dL    Hematocrit 40.2 37.0 - 47.0 %    MCV 83.9 80.0 - 94.0 fL    MCH 27.3 27.0 - 33.0 pg    MCHC 32.6 (L) 33.0 - 37.0 g/dL    RDW 14.1 11.5 - 14.5 %    RDW-SD 42.5 37.0 - 54.0 fl    MPV 10.0 6.0 - 10.0 fL    Platelets 347 130 - 400 10*3/mm3    Neutrophil % 51.6 30.0 - 70.0 %    Lymphocyte % 38.8 21.0 - 51.0 %    Monocyte % 5.8 0.0 - 10.0 %    Eosinophil % 2.7 0.0 - 5.0 %    Basophil % 0.7 0.0 - 2.0 %    Immature Grans % 0.4 0.0 - 0.5 %    Neutrophils, Absolute 3.80 1.40 -  6.50 10*3/mm3    Lymphocytes, Absolute 2.86 1.00 - 3.00 10*3/mm3    Monocytes, Absolute 0.43 0.10 - 0.90 10*3/mm3    Eosinophils, Absolute 0.20 0.00 - 0.70 10*3/mm3    Basophils, Absolute 0.05 0.00 - 0.30 10*3/mm3    Immature Grans, Absolute 0.03 0.00 - 0.03 10*3/mm3   Osmolality, Calculated   Result Value Ref Range    Osmolality Calc 282.0 273.0 - 305.0 mOsm/kg     Findings and recommendations discussed with Monisha. Reviewed her recent labs with her. Reinforced lifestyle modifications including nutrition and stress management. Encouraged her to use the low dose Vistaril as needed for her Anxiety at this time. The other option is to increase the Lexapro if needed. She has not had her Repatha due to insurance coverage. Provided her samples for her to use.   Will restart her on her Allopurinol at 100 mg and she can add an additional tablet after one week. Encouraged increase in her water intake. Discussed her leg cramps and strategies to reduce.If they worsen, she is to contact me. She will f/u in six weeks for reevaluation; sooner if problems/concerns occur.   Influenza and Hep A vaccinations given today.

## 2019-01-31 ENCOUNTER — TELEPHONE (OUTPATIENT)
Dept: FAMILY MEDICINE CLINIC | Facility: CLINIC | Age: 59
End: 2019-01-31

## 2019-01-31 NOTE — TELEPHONE ENCOUNTER
----- Message from TRUONG Torres sent at 1/31/2019  2:07 PM EST -----  Regarding: RE: Repatha Approval and Allopurinal  Thanks so much...  ----- Message -----  From: Sophy Carmona MA  Sent: 1/31/2019   1:55 PM  To: TRUONG Torres  Subject: RE: Repatha Approval and Allopurinal             Yeah I called the pharmacy just to make sure. Its probably because she has a deductible and that will have to be paid before her insurance will give her that medicine any cheaper.   ----- Message -----  From: Jean Alexander APRN  Sent: 1/31/2019   1:42 PM  To: Sophy Carmona MA  Subject: RE: Repatha Approval and Allopurinal             Thanks so even with Approval?  ----- Message -----  From: Sophy Carmona MA  Sent: 1/31/2019   1:01 PM  To: TRUONG Torres  Subject: RE: Repatha Approval and Allopurinal             Monisha said that her co pay was 750 dollars for the repatha. So she cant afford to pay that.   ----- Message -----  From: Jean Alexander APRN  Sent: 1/31/2019  11:54 AM  To: Sophy Carmona MA  Subject: Repatha Approval and Allopurinal                 Sophy,    Would you give Monisha a call and tell her that it looks like the Repatha was approved in November. If you would look in the media section that is what it appears ..     Also, with the Allopurinol she can increase that in one week to twice daily. She does need to increase her water intake and take with food.

## 2019-02-19 ENCOUNTER — PROCEDURE VISIT (OUTPATIENT)
Dept: FAMILY MEDICINE CLINIC | Facility: CLINIC | Age: 59
End: 2019-02-19

## 2019-02-19 VITALS
BODY MASS INDEX: 29.88 KG/M2 | HEIGHT: 64 IN | OXYGEN SATURATION: 97 % | WEIGHT: 175 LBS | SYSTOLIC BLOOD PRESSURE: 140 MMHG | HEART RATE: 74 BPM | TEMPERATURE: 98.4 F | DIASTOLIC BLOOD PRESSURE: 70 MMHG

## 2019-02-19 DIAGNOSIS — F33.1 MODERATE EPISODE OF RECURRENT MAJOR DEPRESSIVE DISORDER (HCC): Primary | ICD-10-CM

## 2019-02-19 DIAGNOSIS — I25.10 CORONARY ARTERY DISEASE INVOLVING NATIVE CORONARY ARTERY OF NATIVE HEART WITHOUT ANGINA PECTORIS: ICD-10-CM

## 2019-02-19 DIAGNOSIS — M19.012 PRIMARY OSTEOARTHRITIS OF LEFT SHOULDER: ICD-10-CM

## 2019-02-19 DIAGNOSIS — M19.011 PRIMARY OSTEOARTHRITIS, RIGHT SHOULDER: ICD-10-CM

## 2019-02-19 DIAGNOSIS — I10 ESSENTIAL HYPERTENSION: ICD-10-CM

## 2019-02-19 PROCEDURE — 20610 DRAIN/INJ JOINT/BURSA W/O US: CPT | Performed by: PHYSICIAN ASSISTANT

## 2019-02-19 PROCEDURE — 99214 OFFICE O/P EST MOD 30 MIN: CPT | Performed by: PHYSICIAN ASSISTANT

## 2019-02-19 RX ORDER — ESCITALOPRAM OXALATE 20 MG/1
20 TABLET ORAL DAILY
Qty: 30 TABLET | Refills: 5 | Status: SHIPPED | OUTPATIENT
Start: 2019-02-19 | End: 2019-03-01

## 2019-02-19 NOTE — PROGRESS NOTES
Subjective   Monisha Gardner is a 58 y.o. female.       Chief Complaint -  depression    History of Present Illness -      Depression-  She complains of depression and anxiety with feelings of being overwhelmed.  Her mother and daughter have both recently undergone surgery.  She is caring for them both and they have a pill so that they can bring for her services.  She states she is also helping to care for her grandson.  She reports being significantly overwhelmed despite the use of Lexapro 10 milligrams daily.  Uncontrolled    Joint pain-  She complains of moderate throbbing pain in her left and right shoulders that is worse with abduction.  Apparently her daughter weighs 300 pounds and she has had 2 help her transfer and change body positions.  She states that she feels this has caused some strain in irritated her shoulders.    Osteoarthritis-  Not at goal due to acute exacerbation of left and right shoulder osteoarthritis and pain.    Hypertension-may be elevated today due to acute pain and stress    Coronary artery disease-stable with Plavix and risk factor modification    he following portions of the patient's history were reviewed and updated as appropriate: allergies, current medications, past family history, past medical history, past social history, past surgical history and problem list.    Review of Systems   Constitutional: Negative for activity change, appetite change and fever.   HENT: Negative for ear pain, sinus pressure and sore throat.    Eyes: Negative for pain and visual disturbance.   Respiratory: Negative for cough and chest tightness.    Cardiovascular: Negative for chest pain and palpitations.   Gastrointestinal: Negative for abdominal pain, constipation, diarrhea, nausea and vomiting.   Endocrine: Negative for polydipsia and polyuria.   Genitourinary: Negative for dysuria and frequency.   Musculoskeletal: Positive for arthralgias (left and right shoulder pain). Negative for back pain and  "myalgias.   Skin: Negative for color change and rash.   Allergic/Immunologic: Negative for food allergies and immunocompromised state.   Neurological: Negative for dizziness, syncope and headaches.   Hematological: Negative for adenopathy. Does not bruise/bleed easily.   Psychiatric/Behavioral: Positive for dysphoric mood and sleep disturbance. Negative for hallucinations, self-injury and suicidal ideas. The patient is nervous/anxious.        /70   Pulse 74   Temp 98.4 °F (36.9 °C) (Oral)   Ht 162.6 cm (64.02\")   Wt 79.4 kg (175 lb)   LMP  (LMP Unknown)   SpO2 97%   BMI 30.02 kg/m²   Lab on 01/24/2019   Component Date Value Ref Range Status   • Glucose 01/24/2019 96  70 - 110 mg/dL Final   • BUN 01/24/2019 15  7 - 21 mg/dL Final   • Creatinine 01/24/2019 1.05  0.43 - 1.29 mg/dL Final   • Sodium 01/24/2019 141  135 - 153 mmol/L Final   • Potassium 01/24/2019 4.4  3.5 - 5.3 mmol/L Final   • Chloride 01/24/2019 104  99 - 112 mmol/L Final   • CO2 01/24/2019 27.9  24.3 - 31.9 mmol/L Final   • Calcium 01/24/2019 10.1* 7.7 - 10.0 mg/dL Final   • Total Protein 01/24/2019 6.7  6.0 - 8.0 g/dL Final   • Albumin 01/24/2019 4.40  3.50 - 5.00 g/dL Final   • ALT (SGPT) 01/24/2019 28  10 - 36 U/L Final   • AST (SGOT) 01/24/2019 31* 10 - 30 U/L Final   • Alkaline Phosphatase 01/24/2019 74  35 - 104 U/L Final   • Total Bilirubin 01/24/2019 0.3  0.2 - 1.8 mg/dL Final   • eGFR Non African Amer 01/24/2019 54* >60 mL/min/1.73 Final   • Globulin 01/24/2019 2.3  gm/dL Final   • A/G Ratio 01/24/2019 1.9  1.5 - 2.5 g/dL Final   • BUN/Creatinine Ratio 01/24/2019 14.3  7.0 - 25.0 Final   • Anion Gap 01/24/2019 9.1  3.6 - 11.2 mmol/L Final   • Total Cholesterol 01/24/2019 222* 0 - 200 mg/dL Final   • Triglycerides 01/24/2019 196* 0 - 150 mg/dL Final   • HDL Cholesterol 01/24/2019 46* 60 - 100 mg/dL Final   • LDL Cholesterol  01/24/2019 137* 0 - 100 mg/dL Final   • VLDL Cholesterol 01/24/2019 39.2  mg/dL Final   • LDL/HDL Ratio " 01/24/2019 2.97   Final   • TSH 01/24/2019 0.968  0.550 - 4.780 mIU/mL Final   • Hemoglobin A1C 01/24/2019 6.90* 4.50 - 5.70 % Final   • 25 Hydroxy, Vitamin D 01/24/2019 66.0  ng/ml Final   • Uric Acid 01/24/2019 6.5* 2.4 - 5.7 mg/dL Final   • Vitamin B-12 01/24/2019 470  211 - 911 pg/mL Final   • WBC 01/24/2019 7.37  4.50 - 12.50 10*3/mm3 Final   • RBC 01/24/2019 4.79  4.20 - 5.40 10*6/mm3 Final   • Hemoglobin 01/24/2019 13.1  12.0 - 16.0 g/dL Final   • Hematocrit 01/24/2019 40.2  37.0 - 47.0 % Final   • MCV 01/24/2019 83.9  80.0 - 94.0 fL Final   • MCH 01/24/2019 27.3  27.0 - 33.0 pg Final   • MCHC 01/24/2019 32.6* 33.0 - 37.0 g/dL Final   • RDW 01/24/2019 14.1  11.5 - 14.5 % Final   • RDW-SD 01/24/2019 42.5  37.0 - 54.0 fl Final   • MPV 01/24/2019 10.0  6.0 - 10.0 fL Final   • Platelets 01/24/2019 347  130 - 400 10*3/mm3 Final   • Neutrophil % 01/24/2019 51.6  30.0 - 70.0 % Final   • Lymphocyte % 01/24/2019 38.8  21.0 - 51.0 % Final   • Monocyte % 01/24/2019 5.8  0.0 - 10.0 % Final   • Eosinophil % 01/24/2019 2.7  0.0 - 5.0 % Final   • Basophil % 01/24/2019 0.7  0.0 - 2.0 % Final   • Immature Grans % 01/24/2019 0.4  0.0 - 0.5 % Final   • Neutrophils, Absolute 01/24/2019 3.80  1.40 - 6.50 10*3/mm3 Final   • Lymphocytes, Absolute 01/24/2019 2.86  1.00 - 3.00 10*3/mm3 Final   • Monocytes, Absolute 01/24/2019 0.43  0.10 - 0.90 10*3/mm3 Final   • Eosinophils, Absolute 01/24/2019 0.20  0.00 - 0.70 10*3/mm3 Final   • Basophils, Absolute 01/24/2019 0.05  0.00 - 0.30 10*3/mm3 Final   • Immature Grans, Absolute 01/24/2019 0.03  0.00 - 0.03 10*3/mm3 Final   • Osmolality Calc 01/24/2019 282.0  273.0 - 305.0 mOsm/kg Final       Physical Exam   Constitutional: She is oriented to person, place, and time. She appears well-developed and well-nourished.   HENT:   Head: Normocephalic and atraumatic.   Nose: Nose normal.   Mouth/Throat: Oropharynx is clear and moist.   Eyes: Conjunctivae and EOM are normal. Pupils are equal, round,  and reactive to light.   Neck: Normal range of motion. Neck supple. No tracheal deviation present. No thyromegaly present.   Cardiovascular: Normal rate, regular rhythm, normal heart sounds and intact distal pulses.   No murmur heard.  Pulmonary/Chest: Effort normal and breath sounds normal. No respiratory distress. She has no wheezes.   Abdominal: Soft. Bowel sounds are normal. There is no tenderness. There is no guarding.   Musculoskeletal: She exhibits tenderness. She exhibits no edema.   Tenderness noted to superior palpation of left and right shoulder joints.  Decreased range of joint motion in left and right shoulder joints approximately 80-90° with abduction.  Coarse crepitus noted in bilateral shoulder joints with manipulation.   Lymphadenopathy:     She has no cervical adenopathy.   Neurological: She is alert and oriented to person, place, and time.   Skin: Skin is warm and dry. No rash noted.   Psychiatric: She has a normal mood and affect. Her behavior is normal.   Nursing note and vitals reviewed.      Assessment/Plan     Diagnoses and all orders for this visit:    Moderate episode of recurrent major depressive disorder (CMS/[facility])  Comments:  increase lexapro 20mg qd  Orders:  -     escitalopram (LEXAPRO) 20 MG tablet; Take 1 tablet by mouth Daily.    Primary osteoarthritis of left shoulder    Primary osteoarthritis, right shoulder    Essential hypertension    Coronary artery disease involving native coronary artery of native heart without angina pectoris     procedure: Intra-articular left and right shoulder joint injections (2 joint injections total)  Indication: Left shoulder osteoarthritis/right shoulder osteoarthritis  Provider: Kimmy Vela PA-C  Description: The left and right shoulder areas were prepped and draped in sterile fashion.  An intra-articular injection was given into each joint using 2 cc 1% lidocaine and 1 cc of Kenalog.  Pressure was held and sterile dressings were placed.  No  consultations  Estimated blood loss: Minimal  Patient tolerated the procedure well    One percent lidocaine 20 mg per 2 mL NDC #5515 0-1 6 1-0 2  Kenalog 40 mg per 1 mL NDC #0003-029 3-0 5             This document has been electronically signed by:  Kimmy Vela PA-C

## 2019-02-27 RX ORDER — LOSARTAN POTASSIUM 100 MG/1
TABLET ORAL
Qty: 30 TABLET | Refills: 5 | Status: SHIPPED | OUTPATIENT
Start: 2019-02-27 | End: 2019-09-30 | Stop reason: SDUPTHER

## 2019-03-01 RX ORDER — DULOXETIN HYDROCHLORIDE 60 MG/1
60 CAPSULE, DELAYED RELEASE ORAL DAILY
Qty: 30 CAPSULE | Refills: 5 | Status: SHIPPED | OUTPATIENT
Start: 2019-03-01 | End: 2019-06-13

## 2019-03-04 ENCOUNTER — TELEPHONE (OUTPATIENT)
Dept: FAMILY MEDICINE CLINIC | Facility: CLINIC | Age: 59
End: 2019-03-04

## 2019-03-04 NOTE — TELEPHONE ENCOUNTER
---  I CALLED AUSTYN informed her that cymbalta donna sent to pharmacy take it for 2 week than stop lexapro but take    cymbalta  Change follow up to one month                  -- Message from ANH Medina sent at 3/1/2019  2:04 PM EST -----  Inform the patient of the following  1.  Start Cymbalta  2.  discontinue Lexapro 2 weeks after starting Cymbalta since she states that it makes her more anxious  3.  Change her follow-up visit to 1 month with Kimmy

## 2019-04-03 RX ORDER — ALLOPURINOL 100 MG/1
TABLET ORAL
Qty: 60 TABLET | Refills: 1 | Status: SHIPPED | OUTPATIENT
Start: 2019-04-03 | End: 2019-09-13 | Stop reason: SDUPTHER

## 2019-04-18 ENCOUNTER — TELEPHONE (OUTPATIENT)
Dept: CARDIOLOGY | Facility: CLINIC | Age: 59
End: 2019-04-18

## 2019-04-18 DIAGNOSIS — Z79.01 CHRONIC ANTICOAGULATION: ICD-10-CM

## 2019-04-18 DIAGNOSIS — I25.119 CORONARY ARTERY DISEASE INVOLVING NATIVE CORONARY ARTERY OF NATIVE HEART WITH ANGINA PECTORIS (HCC): Primary | ICD-10-CM

## 2019-04-18 NOTE — TELEPHONE ENCOUNTER
"Patient calling to report \"blood spots\" all over arms and legs.  Spots are \"small\" \"like pins\".  OB MD advised patient to call cardiology.  Patient also reports easy bruising while working in her yard, or other physical tasks.  She would like to know if she could cut back on plavix/aspirin.    Currently on plavix 75mg, and aspirin 325mg.    Please advise.  "

## 2019-04-26 ENCOUNTER — LAB (OUTPATIENT)
Dept: FAMILY MEDICINE CLINIC | Facility: CLINIC | Age: 59
End: 2019-04-26

## 2019-04-26 DIAGNOSIS — I25.119 CORONARY ARTERY DISEASE INVOLVING NATIVE CORONARY ARTERY OF NATIVE HEART WITH ANGINA PECTORIS (HCC): ICD-10-CM

## 2019-04-26 LAB
DEPRECATED RDW RBC AUTO: 47.2 FL (ref 37–54)
ERYTHROCYTE [DISTWIDTH] IN BLOOD BY AUTOMATED COUNT: 14.7 % (ref 12.3–15.4)
HCT VFR BLD AUTO: 44.1 % (ref 34–46.6)
HGB BLD-MCNC: 13.7 G/DL (ref 12–15.9)
MCH RBC QN AUTO: 27.3 PG (ref 26.6–33)
MCHC RBC AUTO-ENTMCNC: 31.1 G/DL (ref 31.5–35.7)
MCV RBC AUTO: 87.8 FL (ref 79–97)
PLATELET # BLD AUTO: 334 10*3/MM3 (ref 140–450)
PMV BLD AUTO: 9.6 FL (ref 6–12)
RBC # BLD AUTO: 5.02 10*6/MM3 (ref 3.77–5.28)
WBC NRBC COR # BLD: 8.32 10*3/MM3 (ref 3.4–10.8)

## 2019-04-26 PROCEDURE — 85027 COMPLETE CBC AUTOMATED: CPT | Performed by: INTERNAL MEDICINE

## 2019-05-03 DIAGNOSIS — E78.5 DYSLIPIDEMIA: ICD-10-CM

## 2019-05-03 DIAGNOSIS — E53.8 VITAMIN B 12 DEFICIENCY: ICD-10-CM

## 2019-05-03 DIAGNOSIS — F41.9 ANXIETY: ICD-10-CM

## 2019-05-03 DIAGNOSIS — E11.8 TYPE 2 DIABETES MELLITUS WITH COMPLICATION, WITHOUT LONG-TERM CURRENT USE OF INSULIN (HCC): Chronic | ICD-10-CM

## 2019-05-03 DIAGNOSIS — M47.817 SPONDYLOSIS OF LUMBOSACRAL REGION, UNSPECIFIED SPINAL OSTEOARTHRITIS COMPLICATION STATUS: ICD-10-CM

## 2019-05-03 DIAGNOSIS — I25.10 CORONARY ARTERY DISEASE INVOLVING NATIVE CORONARY ARTERY OF NATIVE HEART WITHOUT ANGINA PECTORIS: ICD-10-CM

## 2019-05-03 DIAGNOSIS — E11.40 CONTROLLED TYPE 2 DIABETES MELLITUS WITH NEUROPATHY (HCC): Primary | ICD-10-CM

## 2019-05-03 DIAGNOSIS — E55.9 VITAMIN D DEFICIENCY: ICD-10-CM

## 2019-05-06 ENCOUNTER — LAB (OUTPATIENT)
Dept: FAMILY MEDICINE CLINIC | Facility: CLINIC | Age: 59
End: 2019-05-06

## 2019-05-06 DIAGNOSIS — E53.8 VITAMIN B 12 DEFICIENCY: ICD-10-CM

## 2019-05-06 DIAGNOSIS — E78.5 DYSLIPIDEMIA: ICD-10-CM

## 2019-05-06 DIAGNOSIS — F41.9 ANXIETY: ICD-10-CM

## 2019-05-06 DIAGNOSIS — M47.817 SPONDYLOSIS OF LUMBOSACRAL REGION, UNSPECIFIED SPINAL OSTEOARTHRITIS COMPLICATION STATUS: ICD-10-CM

## 2019-05-06 DIAGNOSIS — E55.9 VITAMIN D DEFICIENCY: ICD-10-CM

## 2019-05-06 DIAGNOSIS — E11.40 CONTROLLED TYPE 2 DIABETES MELLITUS WITH NEUROPATHY (HCC): ICD-10-CM

## 2019-05-06 LAB
25(OH)D3 SERPL-MCNC: 68.6 NG/ML (ref 30–100)
ALBUMIN SERPL-MCNC: 4.5 G/DL (ref 3.5–5.2)
ALBUMIN UR-MCNC: 3.7 MG/L
ALBUMIN/GLOB SERPL: 1.8 G/DL
ALP SERPL-CCNC: 91 U/L (ref 39–117)
ALT SERPL W P-5'-P-CCNC: 49 U/L (ref 1–33)
ANION GAP SERPL CALCULATED.3IONS-SCNC: 13.9 MMOL/L
AST SERPL-CCNC: 44 U/L (ref 1–32)
BASOPHILS # BLD AUTO: 0.07 10*3/MM3 (ref 0–0.2)
BASOPHILS NFR BLD AUTO: 0.9 % (ref 0–1.5)
BILIRUB SERPL-MCNC: 0.3 MG/DL (ref 0.2–1.2)
BUN BLD-MCNC: 12 MG/DL (ref 6–20)
BUN/CREAT SERPL: 14.1 (ref 7–25)
CALCIUM SPEC-SCNC: 10.1 MG/DL (ref 8.6–10.5)
CHLORIDE SERPL-SCNC: 99 MMOL/L (ref 98–107)
CHOLEST SERPL-MCNC: 220 MG/DL (ref 0–200)
CO2 SERPL-SCNC: 27.1 MMOL/L (ref 22–29)
CREAT BLD-MCNC: 0.85 MG/DL (ref 0.57–1)
DEPRECATED RDW RBC AUTO: 46.4 FL (ref 37–54)
EOSINOPHIL # BLD AUTO: 0.2 10*3/MM3 (ref 0–0.4)
EOSINOPHIL NFR BLD AUTO: 2.5 % (ref 0.3–6.2)
ERYTHROCYTE [DISTWIDTH] IN BLOOD BY AUTOMATED COUNT: 14.6 % (ref 12.3–15.4)
GFR SERPL CREATININE-BSD FRML MDRD: 69 ML/MIN/1.73
GLOBULIN UR ELPH-MCNC: 2.5 GM/DL
GLUCOSE BLD-MCNC: 140 MG/DL (ref 65–99)
HBA1C MFR BLD: 6.1 % (ref 4.8–5.6)
HCT VFR BLD AUTO: 42.2 % (ref 34–46.6)
HDLC SERPL-MCNC: 40 MG/DL (ref 40–60)
HGB BLD-MCNC: 13.3 G/DL (ref 12–15.9)
IMM GRANULOCYTES # BLD AUTO: 0.06 10*3/MM3 (ref 0–0.05)
IMM GRANULOCYTES NFR BLD AUTO: 0.7 % (ref 0–0.5)
LDLC SERPL CALC-MCNC: 111 MG/DL (ref 0–100)
LDLC/HDLC SERPL: 2.78 {RATIO}
LYMPHOCYTES # BLD AUTO: 3.23 10*3/MM3 (ref 0.7–3.1)
LYMPHOCYTES NFR BLD AUTO: 40.3 % (ref 19.6–45.3)
MCH RBC QN AUTO: 27.5 PG (ref 26.6–33)
MCHC RBC AUTO-ENTMCNC: 31.5 G/DL (ref 31.5–35.7)
MCV RBC AUTO: 87.4 FL (ref 79–97)
MONOCYTES # BLD AUTO: 0.49 10*3/MM3 (ref 0.1–0.9)
MONOCYTES NFR BLD AUTO: 6.1 % (ref 5–12)
NEUTROPHILS # BLD AUTO: 3.97 10*3/MM3 (ref 1.7–7)
NEUTROPHILS NFR BLD AUTO: 49.5 % (ref 42.7–76)
NRBC BLD AUTO-RTO: 0 /100 WBC (ref 0–0.2)
PLATELET # BLD AUTO: 297 10*3/MM3 (ref 140–450)
PMV BLD AUTO: 9.6 FL (ref 6–12)
POTASSIUM BLD-SCNC: 4.6 MMOL/L (ref 3.5–5.2)
PROT SERPL-MCNC: 7 G/DL (ref 6–8.5)
RBC # BLD AUTO: 4.83 10*6/MM3 (ref 3.77–5.28)
SODIUM BLD-SCNC: 140 MMOL/L (ref 136–145)
TRIGL SERPL-MCNC: 344 MG/DL (ref 0–150)
TSH SERPL DL<=0.05 MIU/L-ACNC: 1.2 MIU/ML (ref 0.27–4.2)
URATE SERPL-MCNC: 6.3 MG/DL (ref 2.4–5.7)
VIT B12 BLD-MCNC: 526 PG/ML (ref 211–946)
VLDLC SERPL-MCNC: 68.8 MG/DL (ref 5–40)
WBC NRBC COR # BLD: 8.02 10*3/MM3 (ref 3.4–10.8)

## 2019-05-06 PROCEDURE — 80053 COMPREHEN METABOLIC PANEL: CPT | Performed by: NURSE PRACTITIONER

## 2019-05-06 PROCEDURE — 80061 LIPID PANEL: CPT | Performed by: NURSE PRACTITIONER

## 2019-05-06 PROCEDURE — 85025 COMPLETE CBC W/AUTO DIFF WBC: CPT | Performed by: NURSE PRACTITIONER

## 2019-05-06 PROCEDURE — 84443 ASSAY THYROID STIM HORMONE: CPT | Performed by: NURSE PRACTITIONER

## 2019-05-06 PROCEDURE — 84550 ASSAY OF BLOOD/URIC ACID: CPT | Performed by: NURSE PRACTITIONER

## 2019-05-06 PROCEDURE — 82306 VITAMIN D 25 HYDROXY: CPT | Performed by: NURSE PRACTITIONER

## 2019-05-06 PROCEDURE — 83036 HEMOGLOBIN GLYCOSYLATED A1C: CPT | Performed by: NURSE PRACTITIONER

## 2019-05-06 PROCEDURE — 82043 UR ALBUMIN QUANTITATIVE: CPT | Performed by: NURSE PRACTITIONER

## 2019-05-06 PROCEDURE — 82607 VITAMIN B-12: CPT | Performed by: NURSE PRACTITIONER

## 2019-05-06 RX ORDER — INSULIN DEGLUDEC 200 U/ML
INJECTION, SOLUTION SUBCUTANEOUS
Qty: 9 ML | Refills: 3 | Status: SHIPPED | OUTPATIENT
Start: 2019-05-06 | End: 2019-07-24 | Stop reason: ALTCHOICE

## 2019-05-06 RX ORDER — METOPROLOL TARTRATE 50 MG/1
TABLET, FILM COATED ORAL
Qty: 60 TABLET | Refills: 6 | Status: SHIPPED | OUTPATIENT
Start: 2019-05-06 | End: 2020-01-13

## 2019-05-28 RX ORDER — CLOPIDOGREL BISULFATE 75 MG/1
TABLET ORAL
Qty: 30 TABLET | Refills: 4 | OUTPATIENT
Start: 2019-05-28

## 2019-06-13 ENCOUNTER — OFFICE VISIT (OUTPATIENT)
Dept: FAMILY MEDICINE CLINIC | Facility: CLINIC | Age: 59
End: 2019-06-13

## 2019-06-13 VITALS
BODY MASS INDEX: 30.56 KG/M2 | SYSTOLIC BLOOD PRESSURE: 150 MMHG | HEART RATE: 66 BPM | DIASTOLIC BLOOD PRESSURE: 80 MMHG | WEIGHT: 179 LBS | OXYGEN SATURATION: 98 % | HEIGHT: 64 IN | TEMPERATURE: 99.3 F

## 2019-06-13 DIAGNOSIS — L57.0 AK (ACTINIC KERATOSIS): ICD-10-CM

## 2019-06-13 DIAGNOSIS — F41.1 GAD (GENERALIZED ANXIETY DISORDER): ICD-10-CM

## 2019-06-13 DIAGNOSIS — M19.012 PRIMARY OSTEOARTHRITIS OF LEFT SHOULDER: ICD-10-CM

## 2019-06-13 DIAGNOSIS — M15.9 PRIMARY OSTEOARTHRITIS INVOLVING MULTIPLE JOINTS: Primary | ICD-10-CM

## 2019-06-13 DIAGNOSIS — L98.9 SKIN LESION OF LEFT LEG: ICD-10-CM

## 2019-06-13 DIAGNOSIS — M17.11 PRIMARY OSTEOARTHRITIS OF RIGHT KNEE: ICD-10-CM

## 2019-06-13 PROCEDURE — 99214 OFFICE O/P EST MOD 30 MIN: CPT | Performed by: PHYSICIAN ASSISTANT

## 2019-06-13 PROCEDURE — 96372 THER/PROPH/DIAG INJ SC/IM: CPT | Performed by: PHYSICIAN ASSISTANT

## 2019-06-13 PROCEDURE — 20610 DRAIN/INJ JOINT/BURSA W/O US: CPT | Performed by: PHYSICIAN ASSISTANT

## 2019-06-13 RX ORDER — ESCITALOPRAM OXALATE 20 MG/1
20 TABLET ORAL DAILY
Qty: 30 TABLET | Refills: 5 | Status: SHIPPED | OUTPATIENT
Start: 2019-06-13 | End: 2019-09-13

## 2019-06-13 RX ORDER — KETOROLAC TROMETHAMINE 30 MG/ML
60 INJECTION, SOLUTION INTRAMUSCULAR; INTRAVENOUS ONCE
Status: COMPLETED | OUTPATIENT
Start: 2019-06-13 | End: 2019-06-13

## 2019-06-13 RX ADMIN — KETOROLAC TROMETHAMINE 60 MG: 30 INJECTION, SOLUTION INTRAMUSCULAR; INTRAVENOUS at 12:34

## 2019-06-13 NOTE — PROGRESS NOTES
Subjective   Monisha Gardner is a 58 y.o. female.       Chief Complaint -joint pain    History of Present Illness -      Joint pain-  She complains of joint pain due to osteoarthritis in bilateral shoulders and right knee.  She reports pain in right knee is moderate to severe achy and exacerbated with walking.  She reports left shoulder pain is moderate to severe and worse with posterior palpation.  Onset approximately  1 week.  Minimal relief with ibuprofen. No known injury    Generalized anxiety disorder-  She reports having tremors when waking up and whole body shaking in the morning when taking Cymbalta.  She denies any SI or HI.  Not at goal    Skin lesion-  She reports multiple skin lesions on her lower extremities.  There is a soreness surrounding skin lesion on left anterior lower extremity that is been present for 3 weeks.  She has a similar area on the medial right knee and a black nevus that is suspicious on the lateral right knee.    The following portions of the patient's history were reviewed and updated as appropriate: allergies, current medications, past family history, past medical history, past social history, past surgical history and problem list.    Review of Systems   Constitutional: Negative for activity change, appetite change and fever.   HENT: Negative for ear pain, sinus pressure and sore throat.    Eyes: Negative for pain and visual disturbance.   Respiratory: Negative for cough and chest tightness.    Cardiovascular: Negative for chest pain and palpitations.   Gastrointestinal: Negative for abdominal pain, constipation, diarrhea, nausea and vomiting.   Endocrine: Negative for polydipsia and polyuria.   Genitourinary: Negative for dysuria and frequency.   Musculoskeletal: Positive for arthralgias and joint swelling (right knee). Negative for back pain and myalgias.   Skin: Positive for color change (skin lesions left leg). Negative for rash.   Allergic/Immunologic: Negative for food  "allergies and immunocompromised state.   Neurological: Negative for dizziness, syncope and headaches.   Hematological: Negative for adenopathy. Does not bruise/bleed easily.   Psychiatric/Behavioral: Positive for dysphoric mood. Negative for hallucinations, self-injury and suicidal ideas. The patient is nervous/anxious.        /80   Pulse 66   Temp 99.3 °F (37.4 °C) (Core)   Ht 162.6 cm (64.02\")   Wt 81.2 kg (179 lb)   LMP  (LMP Unknown)   SpO2 98%   BMI 30.71 kg/m²   Lab on 05/06/2019   Component Date Value Ref Range Status   • Glucose 05/06/2019 140* 65 - 99 mg/dL Final   • BUN 05/06/2019 12  6 - 20 mg/dL Final   • Creatinine 05/06/2019 0.85  0.57 - 1.00 mg/dL Final   • Sodium 05/06/2019 140  136 - 145 mmol/L Final   • Potassium 05/06/2019 4.6  3.5 - 5.2 mmol/L Final   • Chloride 05/06/2019 99  98 - 107 mmol/L Final   • CO2 05/06/2019 27.1  22.0 - 29.0 mmol/L Final   • Calcium 05/06/2019 10.1  8.6 - 10.5 mg/dL Final   • Total Protein 05/06/2019 7.0  6.0 - 8.5 g/dL Final   • Albumin 05/06/2019 4.50  3.50 - 5.20 g/dL Final   • ALT (SGPT) 05/06/2019 49* 1 - 33 U/L Final   • AST (SGOT) 05/06/2019 44* 1 - 32 U/L Final   • Alkaline Phosphatase 05/06/2019 91  39 - 117 U/L Final   • Total Bilirubin 05/06/2019 0.3  0.2 - 1.2 mg/dL Final   • eGFR Non African Amer 05/06/2019 69  >60 mL/min/1.73 Final   • Globulin 05/06/2019 2.5  gm/dL Final   • A/G Ratio 05/06/2019 1.8  g/dL Final   • BUN/Creatinine Ratio 05/06/2019 14.1  7.0 - 25.0 Final   • Anion Gap 05/06/2019 13.9  mmol/L Final   • TSH 05/06/2019 1.200  0.270 - 4.200 mIU/mL Final   • Total Cholesterol 05/06/2019 220* 0 - 200 mg/dL Final   • Triglycerides 05/06/2019 344* 0 - 150 mg/dL Final   • HDL Cholesterol 05/06/2019 40  40 - 60 mg/dL Final   • LDL Cholesterol  05/06/2019 111* 0 - 100 mg/dL Final   • VLDL Cholesterol 05/06/2019 68.8* 5 - 40 mg/dL Final   • LDL/HDL Ratio 05/06/2019 2.78   Final   • Hemoglobin A1C 05/06/2019 6.10* 4.80 - 5.60 % Final   • 25 " Hydroxy, Vitamin D 05/06/2019 68.6  30.0 - 100.0 ng/ml Final   • Uric Acid 05/06/2019 6.3* 2.4 - 5.7 mg/dL Final   • Microalbumin, Urine 05/06/2019 3.7  mg/L Final   • Vitamin B-12 05/06/2019 526  211 - 946 pg/mL Final   • WBC 05/06/2019 8.02  3.40 - 10.80 10*3/mm3 Final   • RBC 05/06/2019 4.83  3.77 - 5.28 10*6/mm3 Final   • Hemoglobin 05/06/2019 13.3  12.0 - 15.9 g/dL Final   • Hematocrit 05/06/2019 42.2  34.0 - 46.6 % Final   • MCV 05/06/2019 87.4  79.0 - 97.0 fL Final   • MCH 05/06/2019 27.5  26.6 - 33.0 pg Final   • MCHC 05/06/2019 31.5  31.5 - 35.7 g/dL Final   • RDW 05/06/2019 14.6  12.3 - 15.4 % Final   • RDW-SD 05/06/2019 46.4  37.0 - 54.0 fl Final   • MPV 05/06/2019 9.6  6.0 - 12.0 fL Final   • Platelets 05/06/2019 297  140 - 450 10*3/mm3 Final   • Neutrophil % 05/06/2019 49.5  42.7 - 76.0 % Final   • Lymphocyte % 05/06/2019 40.3  19.6 - 45.3 % Final   • Monocyte % 05/06/2019 6.1  5.0 - 12.0 % Final   • Eosinophil % 05/06/2019 2.5  0.3 - 6.2 % Final   • Basophil % 05/06/2019 0.9  0.0 - 1.5 % Final   • Immature Grans % 05/06/2019 0.7* 0.0 - 0.5 % Final   • Neutrophils, Absolute 05/06/2019 3.97  1.70 - 7.00 10*3/mm3 Final   • Lymphocytes, Absolute 05/06/2019 3.23* 0.70 - 3.10 10*3/mm3 Final   • Monocytes, Absolute 05/06/2019 0.49  0.10 - 0.90 10*3/mm3 Final   • Eosinophils, Absolute 05/06/2019 0.20  0.00 - 0.40 10*3/mm3 Final   • Basophils, Absolute 05/06/2019 0.07  0.00 - 0.20 10*3/mm3 Final   • Immature Grans, Absolute 05/06/2019 0.06* 0.00 - 0.05 10*3/mm3 Final   • nRBC 05/06/2019 0.0  0.0 - 0.2 /100 WBC Final       Physical Exam   Constitutional: She is oriented to person, place, and time. She appears well-developed and well-nourished.   HENT:   Head: Normocephalic and atraumatic.   Nose: Nose normal.   Mouth/Throat: Oropharynx is clear and moist.   Eyes: Conjunctivae and EOM are normal. Pupils are equal, round, and reactive to light.   Neck: Normal range of motion. Neck supple. No tracheal deviation  present. No thyromegaly present.   Cardiovascular: Normal rate, regular rhythm, normal heart sounds and intact distal pulses.   No murmur heard.  Pulmonary/Chest: Effort normal and breath sounds normal. No respiratory distress. She has no wheezes.   Abdominal: Soft. Bowel sounds are normal. There is no tenderness. There is no guarding.   Musculoskeletal: Normal range of motion. She exhibits edema and tenderness.   Tenderness noted with minimal swelling right knee joint with coarse crepitus noted.  Left shoulder tender to posterior palpation with decreased range of joint motion with abduction greater than 70 degrees.  Crepitus noted right shoulder joint.   Lymphadenopathy:     She has no cervical adenopathy.   Neurological: She is alert and oriented to person, place, and time.   Skin: Skin is warm and dry. No rash noted.   Multiple erythematous skin lesions noted on bilateral lower extremities.  The tender skin lesion that she points out is on the left anterior mid lower extremity and appears erythematous plaque approximately 1 x 1 cm with scales and exquisitely tender surrounding approximately 2 cm.  Similar lesion is noted left medial knee but smaller.  Small black nevus noted left lateral knee   Psychiatric: She has a normal mood and affect. Her behavior is normal.   Nursing note and vitals reviewed.      Assessment/Plan     Diagnoses and all orders for this visit:    Primary osteoarthritis involving multiple joints  -     ketorolac (TORADOL) injection 60 mg    Skin lesion of left leg  Comments:  Procedure will be scheduled for next week for excision and punch biopsy of several lesions on left leg    INDIRA (generalized anxiety disorder)  Comments:  Discontinued Cymbalta due to tremor  Restart Lexapro 20 mill grams daily  Orders:  -     escitalopram (LEXAPRO) 20 MG tablet; Take 1 tablet by mouth Daily.    AK (actinic keratosis)  Comments:  Multiple AK's on lower extremities  Refer dermatology  Orders:  -      Ambulatory Referral to Dermatology    Primary osteoarthritis of left shoulder  Comments:  Left shoulder injection given today    Primary osteoarthritis of right knee  Comments:  Right knee injection given today    Procedure: Right knee injection  Indication: Right knee pain due to osteoarthritis  Provider: Kimmy Vela PA-C  Description: The right knee was prepped and draped in sterile fashion.  An intra-articular knee injection was given using 3 cc 1% lidocaine 1 cc triamcinolone and 1 cc of dexamethasone.  Pressure was on a sterile dressing was placed.  No complications  Estimate blood loss: Minimal  Patient tolerated the procedure well.    Procedure: Left shoulder injection  Indication: Left shoulder pain due to osteoarthritis  Provider: Kimmy Vela PA-C  Description: The left shoulder was prepped and draped in sterile fashion.  An intra-articular injection was given using a 3 cc of 1% lidocaine 1 cc triamcinolone and 1 cc of dexamethasone.  Pressure was held and sterile dressing was placed.  No comp occasions  Asthma blood loss: Minimal  Patient tolerated procedure well    Lidocaine 1% 500 mg per 50 mL NDC number 4902-7612-36  Triamcinolone 40 mg per 1 mL NDC number [address]-5  Dexamethasone 4 mg per 1 mL NDC number 10589-902-240             This document has been electronically signed by:  Kimmy Vela PA-C

## 2019-06-14 ENCOUNTER — TELEPHONE (OUTPATIENT)
Dept: FAMILY MEDICINE CLINIC | Facility: CLINIC | Age: 59
End: 2019-06-14

## 2019-06-14 NOTE — TELEPHONE ENCOUNTER
---has sunni in [place] the patient aware              -- Message from Mark Muñoz sent at 6/13/2019  1:15 PM EDT -----  Requesting the dermatology apt for Lena be scheduled on  Thursday or Friday if possible

## 2019-06-28 ENCOUNTER — OFFICE VISIT (OUTPATIENT)
Dept: FAMILY MEDICINE CLINIC | Facility: CLINIC | Age: 59
End: 2019-06-28

## 2019-06-28 VITALS
DIASTOLIC BLOOD PRESSURE: 78 MMHG | OXYGEN SATURATION: 99 % | HEIGHT: 64 IN | WEIGHT: 172 LBS | HEART RATE: 74 BPM | TEMPERATURE: 97.6 F | BODY MASS INDEX: 29.37 KG/M2 | SYSTOLIC BLOOD PRESSURE: 138 MMHG

## 2019-06-28 DIAGNOSIS — E66.3 OVERWEIGHT: ICD-10-CM

## 2019-06-28 DIAGNOSIS — E55.9 VITAMIN D DEFICIENCY: ICD-10-CM

## 2019-06-28 DIAGNOSIS — I10 ESSENTIAL HYPERTENSION: Chronic | ICD-10-CM

## 2019-06-28 DIAGNOSIS — M19.90 OSTEOARTHRITIS, UNSPECIFIED OSTEOARTHRITIS TYPE, UNSPECIFIED SITE: ICD-10-CM

## 2019-06-28 DIAGNOSIS — E53.8 VITAMIN B 12 DEFICIENCY: ICD-10-CM

## 2019-06-28 DIAGNOSIS — E11.40 CONTROLLED TYPE 2 DIABETES MELLITUS WITH NEUROPATHY (HCC): Primary | Chronic | ICD-10-CM

## 2019-06-28 DIAGNOSIS — G47.62 NOCTURNAL LEG CRAMPS: ICD-10-CM

## 2019-06-28 DIAGNOSIS — E78.5 DYSLIPIDEMIA: Chronic | ICD-10-CM

## 2019-06-28 DIAGNOSIS — M79.672 LEFT FOOT PAIN: ICD-10-CM

## 2019-06-28 DIAGNOSIS — F41.9 ANXIETY: ICD-10-CM

## 2019-06-28 PROCEDURE — 99214 OFFICE O/P EST MOD 30 MIN: CPT | Performed by: NURSE PRACTITIONER

## 2019-06-28 NOTE — PATIENT INSTRUCTIONS
Stress  Stress is a normal reaction to life events. Stress is what you feel when life demands more than you are used to, or more than you think you can handle. Some stress can be useful, such as studying for a test or meeting a deadline at work. Stress that occurs too often or for too long can cause problems. It can affect your emotional health and interfere with relationships and normal daily activities. Too much stress can weaken your body's defense system (immune system) and increase your risk for physical illness. If you already have a medical problem, stress can make it worse.  What are the causes?  All sorts of life events can cause stress. An event that causes stress for one person may not be stressful for another person. Major life events, whether positive or negative, commonly cause stress. Examples include:  · Losing a job or starting a new job.  · Losing a loved one.  · Moving to a new town or home.  · Getting  or .  · Having a baby.  · Injury or illness.    Less obvious life events can also cause stress, especially if they occur day after day or in combination with each other. Examples include:  · Working long hours.  · Driving in traffic.  · Caring for children.  · Being in debt.  · Being in a difficult relationship.    What are the signs or symptoms?  Stress can cause emotional symptoms, including:  · Anxiety. This is feeling worried, afraid, on edge, overwhelmed, or out of control.  · Anger, including irritation or impatience.  · Depression. This is feeling sad, down, helpless, or guilty.  · Trouble focusing, remembering, or making decisions.    Stress can cause physical symptoms, including:  · Aches and pains. These may affect your head, neck, back, stomach, or other areas of your body.  · Tight muscles or a clenched jaw.  · Low energy.  · Trouble sleeping.    Stress can cause unhealthy behaviors, including:  · Eating to feel better (overeating) or skipping meals.  · Working too much  or putting off tasks.  · Smoking, drinking alcohol, or using drugs to feel better.    How is this diagnosed?  Stress is diagnosed through an assessment by your health care provider. He or she may diagnose this condition based on:  · Your symptoms and any stressful life events.  · Your medical history.  · Tests to rule out other causes of your symptoms.    Depending on your condition, your health care provider may refer you to a specialist for further evaluation.  How is this treated?  Stress management techniques are the recommended treatment for stress. Medicine is not typically recommended for the treatment of stress.  Techniques to reduce your reaction to stressful life events include:  · Stress identification. Monitor yourself for symptoms of stress and identify what causes stress for you. These skills may help you to avoid or prepare for stressful events.  · Time management. Set your priorities, keep a calendar of events, and learn to say “no.” Taking these actions can help you avoid making too many commitments.    Techniques for coping with stress include:  · Rethinking the problem. Try to think realistically about stressful events rather than ignoring them or overreacting. Try to find the positives in a stressful situation rather than focusing on the negatives.  · Exercise. Physical exercise can release both physical and emotional tension. The key is to find a form of exercise that you enjoy and do it regularly.  · Relaxation techniques. These relax the body and mind. The key is to find one or more that you enjoy and use the technique(s) regularly. Examples include:  ? Meditation, deep breathing, or progressive relaxation techniques.  ? Yoga or jose manuel chi.  ? Biofeedback, mindfulness techniques, or journaling.  ? Listening to music, being out in nature, or participating in other hobbies.  · Practicing a healthy lifestyle. Eat a balanced diet, drink plenty of water, limit or avoid caffeine, and get plenty of  sleep.  · Having a strong support network. Spend time with family, friends, or other people you enjoy being around. Express your feelings and talk things over with someone you trust.    Counseling or talk therapy with a mental health professional may be helpful if you are having trouble managing stress on your own.  Follow these instructions at home:  Lifestyle  · Avoid drugs.  · Do not use any products that contain nicotine or tobacco, such as cigarettes and e-cigarettes. If you need help quitting, ask your health care provider.  · Limit alcohol intake to no more than 1 drink a day for nonpregnant women and 2 drinks a day for men. One drink equals 12 oz of beer, 5 oz of wine, or 1½ oz of hard liquor.  · Do not use alcohol or drugs to relax.  · Eat a balanced diet that includes fresh fruits and vegetables, whole grains, lean meats, fish, eggs, and beans, and low-fat dairy. Avoid processed foods and foods high in added fat, sugar, and salt.  · Exercise at least 30 minutes on 5 or more days each week.  · Get 7-8 hours of sleep each night.  General instructions  · Practice stress management techniques as discussed with your health care provider.  · Drink enough fluid to keep your urine clear or pale yellow.  · Take over-the-counter and prescription medicines only as told by your health care provider.  · Keep all follow-up visits as told by your health care provider. This is important.  Contact a health care provider if:  · Your symptoms get worse.  · You have new symptoms.  · You feel overwhelmed by your problems and can no longer manage them on your own.  Get help right away if:  · You have thoughts of hurting yourself or others.  If you ever feel like you may hurt yourself or others, or have thoughts about taking your own life, get help right away. You can go to your nearest emergency department or call:  · Your local emergency services (911 in the [place]  · A suicide crisis helpline, such as the National Suicide  "Prevention Lifeline at 1-550.803.8858. This is open 24 hours a day.    Summary  · Stress is a normal reaction to life events. It can cause problems if it happens too often or for too long.  · Practicing stress management techniques is the best way to treat stress.  · Counseling or talk therapy with a mental health professional may be helpful if you are having trouble managing stress on your own.  This information is not intended to replace advice given to you by your health care provider. Make sure you discuss any questions you have with your health care provider.  Document Released: 06/13/2002 Document Revised: 02/07/2018 Document Reviewed: 02/07/2018  [employer] Interactive Patient Education © 2019 [employer]  DASH Eating Plan  DASH stands for \"Dietary Approaches to Stop Hypertension.\" The DASH eating plan is a healthy eating plan that has been shown to reduce high blood pressure (hypertension). It may also reduce your risk for type 2 diabetes, heart disease, and stroke. The DASH eating plan may also help with weight loss.  What are tips for following this plan?  General guidelines  · Avoid eating more than 2,300 mg (milligrams) of salt (sodium) a day. If you have hypertension, you may need to reduce your sodium intake to 1,500 mg a day.  · Limit alcohol intake to no more than 1 drink a day for nonpregnant women and 2 drinks a day for men. One drink equals 12 oz of beer, 5 oz of wine, or 1½ oz of hard liquor.  · Work with your health care provider to maintain a healthy body weight or to lose weight. Ask what an ideal weight is for you.  · Get at least 30 minutes of exercise that causes your heart to beat faster (aerobic exercise) most days of the week. Activities may include walking, swimming, or biking.  · Work with your health care provider or diet and nutrition specialist (dietitian) to adjust your eating plan to your individual calorie needs.  Reading food labels  · Check food labels for the amount of sodium " "per serving. Choose foods with less than 5 percent of the Daily Value of sodium. Generally, foods with less than 300 mg of sodium per serving fit into this eating plan.  · To find whole grains, look for the word \"whole\" as the first word in the ingredient list.  Shopping  · Buy products labeled as \"low-sodium\" or \"no salt added.\"  · Buy fresh foods. Avoid canned foods and premade or frozen meals.  Cooking  · Avoid adding salt when cooking. Use salt-free seasonings or herbs instead of table salt or sea salt. Check with your health care provider or pharmacist before using salt substitutes.  · Do not terrell foods. Cook foods using healthy methods such as baking, boiling, grilling, and broiling instead.  · Cook with heart-healthy oils, such as olive, canola, soybean, or sunflower oil.  Meal planning    · Eat a balanced diet that includes:  ? 5 or more servings of fruits and vegetables each day. At each meal, try to fill half of your plate with fruits and vegetables.  ? Up to 6-8 servings of whole grains each day.  ? Less than 6 oz of lean meat, poultry, or fish each day. A 3-oz serving of meat is about the same size as a deck of cards. One egg equals 1 oz.  ? 2 servings of low-fat dairy each day.  ? A serving of nuts, seeds, or beans 5 times each week.  ? Heart-healthy fats. Healthy fats called Omega-3 fatty acids are found in foods such as flaxseeds and coldwater fish, like sardines, salmon, and mackerel.  · Limit how much you eat of the following:  ? Canned or prepackaged foods.  ? Food that is high in trans fat, such as fried foods.  ? Food that is high in saturated fat, such as fatty meat.  ? Sweets, desserts, sugary drinks, and other foods with added sugar.  ? Full-fat dairy products.  · Do not salt foods before eating.  · Try to eat at least 2 vegetarian meals each week.  · Eat more home-cooked food and less restaurant, buffet, and fast food.  · When eating at a restaurant, ask that your food be prepared with less " salt or no salt, if possible.  What foods are recommended?  The items listed may not be a complete list. Talk with your dietitian about what dietary choices are best for you.  Grains  Whole-grain or whole-wheat bread. Whole-grain or whole-wheat pasta. Brown rice. Oatmeal. Quinoa. Bulgur. Whole-grain and low-sodium cereals. Cleopatra bread. Low-fat, low-sodium crackers. Whole-wheat flour tortillas.  Vegetables  Fresh or frozen vegetables (raw, steamed, roasted, or grilled). Low-sodium or reduced-sodium tomato and vegetable juice. Low-sodium or reduced-sodium tomato sauce and tomato paste. Low-sodium or reduced-sodium canned vegetables.  Fruits  All fresh, dried, or frozen fruit. Canned fruit in natural juice (without added sugar).  Meat and other protein foods  Skinless chicken or turkey. Ground chicken or turkey. Pork with fat trimmed off. Fish and seafood. Egg whites. Dried beans, peas, or lentils. Unsalted nuts, nut butters, and seeds. Unsalted canned beans. Lean cuts of beef with fat trimmed off. Low-sodium, lean deli meat.  Dairy  Low-fat (1%) or fat-free (skim) milk. Fat-free, low-fat, or reduced-fat cheeses. Nonfat, low-sodium ricotta or cottage cheese. Low-fat or nonfat yogurt. Low-fat, low-sodium cheese.  Fats and oils  Soft margarine without trans fats. Vegetable oil. Low-fat, reduced-fat, or light mayonnaise and salad dressings (reduced-sodium). Canola, safflower, olive, soybean, and sunflower oils. Avocado.  Seasoning and other foods  Herbs. Spices. Seasoning mixes without salt. Unsalted popcorn and pretzels. Fat-free sweets.  What foods are not recommended?  The items listed may not be a complete list. Talk with your dietitian about what dietary choices are best for you.  Grains  Baked goods made with fat, such as croissants, muffins, or some breads. Dry pasta or rice meal packs.  Vegetables  Creamed or fried vegetables. Vegetables in a cheese sauce. Regular canned vegetables (not low-sodium or  reduced-sodium). Regular canned tomato sauce and paste (not low-sodium or reduced-sodium). Regular tomato and vegetable juice (not low-sodium or reduced-sodium). Pickles. Olives.  Fruits  Canned fruit in a light or heavy syrup. Fried fruit. Fruit in cream or butter sauce.  Meat and other protein foods  Fatty cuts of meat. Ribs. Fried meat. Dhaliwal. Sausage. Bologna and other processed lunch meats. Salami. Fatback. Hotdogs. Bratwurst. Salted nuts and seeds. Canned beans with added salt. Canned or smoked fish. Whole eggs or egg yolks. Chicken or turkey with skin.  Dairy  Whole or 2% milk, cream, and half-and-half. Whole or full-fat cream cheese. Whole-fat or sweetened yogurt. Full-fat cheese. Nondairy creamers. Whipped toppings. Processed cheese and cheese spreads.  Fats and oils  Butter. Stick margarine. Lard. Shortening. Ghee. Dhaliwal fat. Tropical oils, such as coconut, palm kernel, or palm oil.  Seasoning and other foods  Salted popcorn and pretzels. Onion salt, garlic salt, seasoned salt, table salt, and sea salt. Worcestershire sauce. Tartar sauce. Barbecue sauce. Teriyaki sauce. Soy sauce, including reduced-sodium. Steak sauce. Canned and packaged gravies. Fish sauce. Oyster sauce. Cocktail sauce. Horseradish that you find on the shelf. Ketchup. Mustard. Meat flavorings and tenderizers. Bouillon cubes. Hot sauce and Tabasco sauce. Premade or packaged marinades. Premade or packaged taco seasonings. Relishes. Regular salad dressings.  Where to find more information:  · National Heart, Lung, and Blood [place]: www.nhlbi.nih.gov  · American Heart Association: www.heart.org  Summary  · The DASH eating plan is a healthy eating plan that has been shown to reduce high blood pressure (hypertension). It may also reduce your risk for type 2 diabetes, heart disease, and stroke.  · With the DASH eating plan, you should limit salt (sodium) intake to 2,300 mg a day. If you have hypertension, you may need to reduce your sodium  intake to 1,500 mg a day.  · When on the DASH eating plan, aim to eat more fresh fruits and vegetables, whole grains, lean proteins, low-fat dairy, and heart-healthy fats.  · Work with your health care provider or diet and nutrition specialist (dietitian) to adjust your eating plan to your individual calorie needs.  This information is not intended to replace advice given to you by your health care provider. Make sure you discuss any questions you have with your health care provider.  Document Released: 12/06/2012 Document Revised: 12/11/2017 Document Reviewed: 12/11/2017  [employer] Interactive Patient Education © 2019 [employer]  Type 2 Diabetes Mellitus, Self Care, Adult  When you have type 2 diabetes (type 2 diabetes mellitus), you must make sure your blood sugar (glucose) stays in a healthy range. You can do this with:  · Nutrition.  · Exercise.  · Lifestyle changes.  · Medicines or insulin, if needed.  · Support from your doctors and others.    How to stay aware of blood sugar  · Check your blood sugar level every day, as often as told.  · Have your A1c (hemoglobin A1c) level checked two or more times a year. Have it checked more often if your doctor tells you to.  Your doctor will set personal treatment goals for you. Generally, you should have these blood sugar levels:  · Before meals (preprandial):  mg/dL (4.4-7.2 mmol/L).  · After meals (postprandial): below 180 mg/dL (10 mmol/L).  · A1c level: less than 7%.    How to manage high and low blood sugar  Signs of high blood sugar  High blood sugar is called hyperglycemia. Know the signs of high blood sugar. Signs may include:  · Feeling:  ? Thirsty.  ? Hungry.  ? Very tired.  · Needing to pee (urinate) more than usual.  · Blurry vision.    Signs of low blood sugar  Low blood sugar is called hypoglycemia. This is when blood sugar is at or below 70 mg/dL (3.9 mmol/L). Signs may include:  · Feeling:  ? Hungry.  ? Worried or nervous (anxious).  ? Sweaty and  clammy.  ? Confused.  ? Dizzy.  ? Sleepy.  ? Sick to your stomach (nauseous).  · Having:  ? A fast heartbeat.  ? A headache.  ? A change in your vision.  ? Jerky movements that you cannot control (seizure).  ? Tingling or no feeling (numbness) around your mouth, lips, or tongue.  · Having trouble with:  ? Moving (coordination).  ? Sleeping.  ? Passing out (fainting).  ? Getting upset easily (irritability).    Treating low blood sugar  To treat low blood sugar, eat or drink something sugary right away. If you can think clearly and swallow safely, follow the 15:15 rule:  · Take 15 grams of a fast-acting carb (carbohydrate). Some fast-acting carbs are:  ? 1 tube of glucose gel.  ? 3 sugar tablets (glucose pills).  ? 6-8 pieces of hard candy.  ? 4 oz (120 mL) of fruit juice.  ? 4 oz (120 mL) regular (not diet) soda.  · Check your blood sugar 15 minutes after you take the carb.  · If your blood sugar is still at or below 70 mg/dL (3.9 mmol/L), take 15 grams of a carb again.  · If your blood sugar does not go above 70 mg/dL (3.9 mmol/L) after 3 tries, get help right away.  · After your blood sugar goes back to normal, eat a meal or a snack within 1 hour.    Treating very low blood sugar  If your blood sugar is at or below 54 mg/dL (3 mmol/L), you have very low blood sugar (severe hypoglycemia). This is an emergency. Do not wait to see if the symptoms will go away. Get medical help right away. Call your local emergency services (911 in the [place]  If you have very low blood sugar and you cannot eat or drink, you may need a glucagon shot (injection). A family member or friend should learn how to check your blood sugar and how to give you a glucagon shot. Ask your doctor if you need to have a glucagon shot kit at home.  Follow these instructions at home:  Medicine  · Take insulin and diabetes medicines as told.  · If your doctor says you should take more or less insulin and medicines, do this exactly as told.  · Do not run  out of insulin or medicines.  Having diabetes can raise your risk for other long-term conditions. These include heart disease and kidney disease. Your doctor may prescribe medicines to help you not have these problems.  Food  · Make healthy food choices. These include:  ? Chicken, fish, egg whites, and beans.  ? Oats, whole wheat, bulgur, brown rice, quinoa, and millet.  ? Fresh fruits and vegetables.  ? Low-fat dairy products.  ? Nuts, avocado, olive oil, and canola oil.  · Meet with a  (dietitian). He or she can help you make an eating plan that is right for you.  · Follow instructions from your doctor about what you cannot eat or drink.  · Drink enough fluid to keep your pee (urine) pale yellow.  · Keep track of carbs that you eat. Do this by reading food labels and learning food serving sizes.  · Follow your sick day plan when you cannot eat or drink normally. Make this plan with your doctor so it is ready to use.  Activity  · Exercise 3 or more times a week.  · Do not go more than 2 days without exercising.  · Talk with your doctor before you start a new exercise. Your doctor may need to tell you to change:  ? How much insulin or medicines you take.  ? How much food you eat.  Lifestyle  · Do not use any tobacco products. These include cigarettes, chewing tobacco, and e-cigarettes. If you need help quitting, ask your doctor.  · Ask your doctor how much alcohol is safe for you.  · Learn to deal with stress. If you need help with this, ask your doctor.  Body care  · Stay up to date with your shots (immunizations).  · Have your eyes and feet checked by a doctor as often as told.  · Check your skin and feet every day. Check for cuts, bruises, redness, blisters, or sores.  · Brush your teeth and gums two times a day. Floss one or more times a day.  · Go to the dentist one or more times every 6 months.  · Stay at a healthy weight.  General instructions  · Take over-the-counter and prescription medicines  only as told by your doctor.  · Share your diabetes care plan with:  ? Your work or school.  ? People you live with.  · Carry a card or wear jewelry that says you have diabetes.  · Keep all follow-up visits as told by your doctor. This is important.  Questions to ask your doctor  · Do I need to meet with a diabetes educator?  · Where can I find a support group for people with diabetes?  Where to find more information  To learn more about diabetes, visit:  · American Diabetes Association: www.diabetes.org  · American Association of Diabetes Educators: www.diabeteseducator.org    Summary  · When you have type 2 diabetes, you must make sure your blood sugar (glucose) stays in a healthy range.  · Check your blood sugar every day, as often as told.  · Having diabetes can raise your risk for other conditions. Your doctor may prescribe medicines to help you not have these problems.  · Keep all follow-up visits as told by your doctor. This is important.  This information is not intended to replace advice given to you by your health care provider. Make sure you discuss any questions you have with your health care provider.  Document Released: 04/10/2017 Document Revised: 07/20/2018 Document Reviewed: 01/20/2017  [employer] Interactive Patient Education © 2019 [employer]

## 2019-06-28 NOTE — PROGRESS NOTES
History of Present Illness   Monisha presents to the office today for follow up and to review lab results pertaining to her DM, type 2, HTN, Dyslipidemia, HTN. In addition, she has a H/O CAD with Previous MI  Stent which she is followed by Dr Watts. In addition, she has had Hip Replacement per Dr Singh.  She does continue to have bothersome leg cramps especially at night. She does have a history of gout and has noticed an increase in joint pain which the Allopurinol.  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   She has developed left heel pain which started two to three weeks ago. She is having to wear shoes without backs due to the discomfort. She denies an injury or trauma prior to the pain.     Diabetes   She has type 2 diabetes mellitus. Her disease course has been improving.  Pertinent negatives for diabetes include no chest pain, no weakness. Diabetic complications include peripheral neuropathy. Risk factors for coronary artery disease include diabetes mellitus, dyslipidemia, family history, hypertension, obesity, post-menopausal, sedentary lifestyle and stress. Current diabetic treatment includes oral agent (dual therapy),  basal insulin and Trulicity. She is compliant with treatment most of the time. She is following a generally healthy diet. She has had a previous visit with a dietitian.  An ACE inhibitor/angiotensin II receptor blocker is being taken. Eye exam is up to date and done by Dr Winkler.     Lab Results   Component Value Date    HGBA1C 6.10 (H) 05/06/2019     Hypertension   The current episode started more than 1 year ago. She does home blood pressure.  Pertinent negatives include no chest pain, palpitations, peripheral edema or shortness of breath. Risk factors for coronary artery disease include diabetes mellitus, dyslipidemia, family history, obesity, post-menopausal state, sedentary lifestyle and stress. Past treatments include  "angiotensin blockers and beta blockers.      Lab Results   Component Value Date    GLUCOSE 140 (H) 05/06/2019    BUN 12 05/06/2019    CREATININE 0.85 05/06/2019    EGFRIFNONA 69 05/06/2019    BCR 14.1 05/06/2019    K 4.6 05/06/2019    CO2 27.1 05/06/2019    CALCIUM 10.1 05/06/2019    ALBUMIN 4.50 05/06/2019    LABIL2 1.9 03/07/2015    AST 44 (H) 05/06/2019    ALT 49 (H) 05/06/2019     Hyperlipidemia   The current episode started more than 1 year ago. The problem is uncontrolled. Recent lipid tests are improving. Pertinent negatives include no chest pain or shortness of breath. She is currently prescribed Rapatha per Dr Watts but she her insurance has denied coverage. .  Risk factors for coronary artery disease include diabetes mellitus, family history, dyslipidemia, hypertension, obesity, post-menopausal and stress.   Lab Results   Component Value Date    CHOL 220 (H) 05/06/2019    CHOL 222 (H) 01/24/2019    CHOL 216 (H) 11/02/2018    CHLPL 186 11/16/2015    CHLPL 206 (H) 03/07/2015    CHLPL 322 (H) 07/15/2014     Lab Results   Component Value Date    TRIG 344 (H) 05/06/2019    TRIG 196 (H) 01/24/2019    TRIG 201 (H) 11/02/2018     Lab Results   Component Value Date    HDL 40 05/06/2019    HDL 46 (L) 01/24/2019    HDL 49 11/02/2018     Lab Results   Component Value Date     (H) 05/06/2019     (H) 01/24/2019     (H) 11/02/2018     Vitals:    06/28/19 1107 06/28/19 1140   BP: 146/84 138/78   BP Location: Right arm Left arm   Patient Position: Sitting Sitting   Cuff Size: Adult Adult   Pulse: 75 74   Temp: 97.6 °F (36.4 °C)    TempSrc: Tympanic    SpO2: 99%    Weight: 78 kg (172 lb)    Height: 162.6 cm (64\")       The following portions of the patient's history were reviewed and updated as appropriate: allergies, current medications, past family history, past medical history, past social history, past surgical history and problem list.    Review of Systems   Constitutional: Positive for activity " change. Negative for appetite change, chills, fatigue, fever and unexpected weight change.   Eyes: Positive for visual disturbance.   Respiratory: Negative for cough, chest tightness, shortness of breath and wheezing.    Cardiovascular: Negative for chest pain, palpitations and leg swelling.   Gastrointestinal: Negative for abdominal pain, constipation, diarrhea, nausea and vomiting.   Endocrine: Negative for cold intolerance, heat intolerance, polydipsia, polyphagia and polyuria.   Musculoskeletal: Positive for arthralgias and gait problem.   Skin: Negative for color change and rash.   Neurological: Positive for headaches. Negative for dizziness, tremors, speech difficulty, weakness and light-headedness.   Hematological: Negative for adenopathy.   Psychiatric/Behavioral: Negative for confusion, decreased concentration and suicidal ideas. The patient is not nervous/anxious.    All other systems reviewed and are negative.    Physical Exam   Constitutional: She is oriented to person, place, and time. She appears well-developed and well-nourished. No distress.   HENT:   Head: Normocephalic.   Nose: Nose normal.   Mouth/Throat: Oropharynx is clear and moist. No oropharyngeal exudate.   Eyes: Conjunctivae are normal. Pupils are equal, round, and reactive to light. Right eye exhibits no discharge. Left eye exhibits no discharge. No scleral icterus.   Neck: Neck supple. No JVD present. No thyromegaly present.   Cardiovascular: Normal rate, regular rhythm and normal heart sounds. Exam reveals no friction rub.   No murmur heard.  Pulmonary/Chest: Effort normal and breath sounds normal. No respiratory distress. She has no wheezes. She has no rales.   Abdominal: Soft. She exhibits no distension. There is no tenderness. There is no rebound and no guarding.   Musculoskeletal: She exhibits tenderness (Left heel). She exhibits no edema.        Left foot: There is tenderness and bony tenderness. There is no swelling, no crepitus,  no deformity and no laceration.   Lymphadenopathy:     She has no cervical adenopathy.   Neurological: She is alert and oriented to person, place, and time.   Skin: Skin is warm and dry. Capillary refill takes less than 2 seconds. No rash noted. No erythema.   Psychiatric: She has a normal mood and affect. Her behavior is normal. Judgment and thought content normal.   Vitals reviewed.    Results for orders placed or performed in visit on 05/06/19   Comprehensive Metabolic Panel   Result Value Ref Range    Glucose 140 (H) 65 - 99 mg/dL    BUN 12 6 - 20 mg/dL    Creatinine 0.85 0.57 - 1.00 mg/dL    Sodium 140 136 - 145 mmol/L    Potassium 4.6 3.5 - 5.2 mmol/L    Chloride 99 98 - 107 mmol/L    CO2 27.1 22.0 - 29.0 mmol/L    Calcium 10.1 8.6 - 10.5 mg/dL    Total Protein 7.0 6.0 - 8.5 g/dL    Albumin 4.50 3.50 - 5.20 g/dL    ALT (SGPT) 49 (H) 1 - 33 U/L    AST (SGOT) 44 (H) 1 - 32 U/L    Alkaline Phosphatase 91 39 - 117 U/L    Total Bilirubin 0.3 0.2 - 1.2 mg/dL    eGFR Non African Amer 69 >60 mL/min/1.73    Globulin 2.5 gm/dL    A/G Ratio 1.8 g/dL    BUN/Creatinine Ratio 14.1 7.0 - 25.0    Anion Gap 13.9 mmol/L   TSH   Result Value Ref Range    TSH 1.200 0.270 - 4.200 mIU/mL   Lipid Panel   Result Value Ref Range    Total Cholesterol 220 (H) 0 - 200 mg/dL    Triglycerides 344 (H) 0 - 150 mg/dL    HDL Cholesterol 40 40 - 60 mg/dL    LDL Cholesterol  111 (H) 0 - 100 mg/dL    VLDL Cholesterol 68.8 (H) 5 - 40 mg/dL    LDL/HDL Ratio 2.78    Hemoglobin A1c   Result Value Ref Range    Hemoglobin A1C 6.10 (H) 4.80 - 5.60 %   Vitamin D 25 Hydroxy   Result Value Ref Range    25 Hydroxy, Vitamin D 68.6 30.0 - 100.0 ng/ml   Uric Acid   Result Value Ref Range    Uric Acid 6.3 (H) 2.4 - 5.7 mg/dL   MicroAlbumin, Urine, Random - Urine, Clean Catch   Result Value Ref Range    Microalbumin, Urine 3.7 mg/L   Vitamin B12   Result Value Ref Range    Vitamin B-12 526 211 - 946 pg/mL   CBC Auto Differential   Result Value Ref Range    WBC  8.02 3.40 - 10.80 10*3/mm3    RBC 4.83 3.77 - 5.28 10*6/mm3    Hemoglobin 13.3 12.0 - 15.9 g/dL    Hematocrit 42.2 34.0 - 46.6 %    MCV 87.4 79.0 - 97.0 fL    MCH 27.5 26.6 - 33.0 pg    MCHC 31.5 31.5 - 35.7 g/dL    RDW 14.6 12.3 - 15.4 %    RDW-SD 46.4 37.0 - 54.0 fl    MPV 9.6 6.0 - 12.0 fL    Platelets 297 140 - 450 10*3/mm3    Neutrophil % 49.5 42.7 - 76.0 %    Lymphocyte % 40.3 19.6 - 45.3 %    Monocyte % 6.1 5.0 - 12.0 %    Eosinophil % 2.5 0.3 - 6.2 %    Basophil % 0.9 0.0 - 1.5 %    Immature Grans % 0.7 (H) 0.0 - 0.5 %    Neutrophils, Absolute 3.97 1.70 - 7.00 10*3/mm3    Lymphocytes, Absolute 3.23 (H) 0.70 - 3.10 10*3/mm3    Monocytes, Absolute 0.49 0.10 - 0.90 10*3/mm3    Eosinophils, Absolute 0.20 0.00 - 0.40 10*3/mm3    Basophils, Absolute 0.07 0.00 - 0.20 10*3/mm3    Immature Grans, Absolute 0.06 (H) 0.00 - 0.05 10*3/mm3    nRBC 0.0 0.0 - 0.2 /100 WBC     Assessment/Plan     Patient's Body mass index is 29.52 kg/m². BMI is above normal parameters. Recommendations include: exercise counseling and nutrition counseling.   (Normal BMI:  18.5-24.9, OW 25-29.9, Obesity 30 or greater)    Assessment/Plan (cont)  Problems Addressed this Visit        Cardiovascular and Mediastinum    Hypertension       Digestive    Vitamin D deficiency    Relevant Orders    Vitamin D 25 Hydroxy    Vitamin B 12 deficiency    Relevant Orders    Vitamin B12       Endocrine    Controlled type 2 diabetes mellitus with neuropathy (CMS/HCC) - Primary    Relevant Orders    Comprehensive Metabolic Panel    TSH    Hemoglobin A1c    MicroAlbumin, Urine, Random - Urine, Clean Catch       Musculoskeletal and Integument    Osteoarthritis    Relevant Orders    Uric Acid    Nocturnal leg cramps       Other    Dyslipidemia    Relevant Orders    Lipid Panel    Overweight    Anxiety    Relevant Orders    CBC & Differential      Other Visit Diagnoses     Left foot pain        Left Foot Xray ordered    Relevant Orders    XR Foot 3+ View Left         Findings and recommendations discussed with Monisha. Reviewed above labs with her which included an A1C of 6.1. Treatment options reviewed. BP well controlled at this time. Will continue Cozaar and Cozaar and lifestyle modifications.   Lipid panel reviewed and not at target. She reports she has been inconsistent about taking her Repatha  Provided her two months supply.   Lifestyle modifcations including weight bearing exercises reinforced in regards to Osteoarthritis. She will continue Lexapro and Hydroxyzine prn for Anxiety. Left Foot Xray ordered for Left Heel pain and she will be notified when results are available. She will f/u in August; sooner if problems/concerns occur. Routine labs ordered.               This document has been electronically signed by TRUONG Avendaño, JERSON-BC, KETTY  June 28, 2019 5:53 PM

## 2019-07-10 ENCOUNTER — TELEPHONE (OUTPATIENT)
Dept: FAMILY MEDICINE CLINIC | Facility: CLINIC | Age: 59
End: 2019-07-10

## 2019-07-10 RX ORDER — DOXYCYCLINE 100 MG/1
100 CAPSULE ORAL EVERY 12 HOURS SCHEDULED
Qty: 20 CAPSULE | Refills: 0 | Status: SHIPPED | OUTPATIENT
Start: 2019-07-10 | End: 2019-09-13

## 2019-07-10 NOTE — TELEPHONE ENCOUNTER
----- Message from TRUONG Gonzalez sent at 7/10/2019 11:21 AM EDT -----  sent  ----- Message -----  From: Sophy Carmona MA  Sent: 7/10/2019   9:45 AM  To: TRUONG Gonzalez    Patient of attila's. Requesting antibiotics for a head cold.

## 2019-07-24 DIAGNOSIS — E11.40 CONTROLLED TYPE 2 DIABETES MELLITUS WITH NEUROPATHY (HCC): Primary | ICD-10-CM

## 2019-07-24 RX ORDER — INSULIN GLARGINE 100 [IU]/ML
40 INJECTION, SOLUTION SUBCUTANEOUS DAILY
Qty: 4 PEN | Refills: 1 | Status: SHIPPED | OUTPATIENT
Start: 2019-07-24 | End: 2019-09-13 | Stop reason: SDUPTHER

## 2019-08-07 RX ORDER — ISOSORBIDE MONONITRATE 60 MG/1
TABLET, EXTENDED RELEASE ORAL
Qty: 90 TABLET | Refills: 1 | Status: SHIPPED | OUTPATIENT
Start: 2019-08-07 | End: 2020-03-13

## 2019-08-22 ENCOUNTER — OFFICE VISIT (OUTPATIENT)
Dept: FAMILY MEDICINE CLINIC | Facility: CLINIC | Age: 59
End: 2019-08-22

## 2019-08-22 VITALS
TEMPERATURE: 98.3 F | HEART RATE: 95 BPM | SYSTOLIC BLOOD PRESSURE: 150 MMHG | WEIGHT: 178.4 LBS | HEIGHT: 64 IN | OXYGEN SATURATION: 96 % | DIASTOLIC BLOOD PRESSURE: 90 MMHG | BODY MASS INDEX: 30.46 KG/M2

## 2019-08-22 DIAGNOSIS — F41.9 ANXIETY: ICD-10-CM

## 2019-08-22 DIAGNOSIS — E55.9 VITAMIN D DEFICIENCY: ICD-10-CM

## 2019-08-22 DIAGNOSIS — E78.5 DYSLIPIDEMIA: Chronic | ICD-10-CM

## 2019-08-22 DIAGNOSIS — G47.62 NOCTURNAL LEG CRAMPS: ICD-10-CM

## 2019-08-22 DIAGNOSIS — E11.40 TYPE 2 DIABETES, CONTROLLED, WITH NEUROPATHY (HCC): Primary | Chronic | ICD-10-CM

## 2019-08-22 DIAGNOSIS — M19.90 OSTEOARTHRITIS, UNSPECIFIED OSTEOARTHRITIS TYPE, UNSPECIFIED SITE: ICD-10-CM

## 2019-08-22 DIAGNOSIS — E53.8 VITAMIN B 12 DEFICIENCY: ICD-10-CM

## 2019-08-22 DIAGNOSIS — L03.116 CELLULITIS OF LEFT LOWER EXTREMITY: ICD-10-CM

## 2019-08-22 DIAGNOSIS — I10 ESSENTIAL HYPERTENSION: Chronic | ICD-10-CM

## 2019-08-22 DIAGNOSIS — F32.A ANXIETY AND DEPRESSION: ICD-10-CM

## 2019-08-22 DIAGNOSIS — F41.9 ANXIETY AND DEPRESSION: ICD-10-CM

## 2019-08-22 DIAGNOSIS — E11.40 CONTROLLED TYPE 2 DIABETES MELLITUS WITH NEUROPATHY (HCC): Chronic | ICD-10-CM

## 2019-08-22 PROCEDURE — 80061 LIPID PANEL: CPT | Performed by: NURSE PRACTITIONER

## 2019-08-22 PROCEDURE — 82043 UR ALBUMIN QUANTITATIVE: CPT | Performed by: NURSE PRACTITIONER

## 2019-08-22 PROCEDURE — 82306 VITAMIN D 25 HYDROXY: CPT | Performed by: NURSE PRACTITIONER

## 2019-08-22 PROCEDURE — 83036 HEMOGLOBIN GLYCOSYLATED A1C: CPT | Performed by: NURSE PRACTITIONER

## 2019-08-22 PROCEDURE — 99214 OFFICE O/P EST MOD 30 MIN: CPT | Performed by: NURSE PRACTITIONER

## 2019-08-22 PROCEDURE — 80053 COMPREHEN METABOLIC PANEL: CPT | Performed by: NURSE PRACTITIONER

## 2019-08-22 PROCEDURE — 85025 COMPLETE CBC W/AUTO DIFF WBC: CPT | Performed by: NURSE PRACTITIONER

## 2019-08-22 PROCEDURE — 82607 VITAMIN B-12: CPT | Performed by: NURSE PRACTITIONER

## 2019-08-22 PROCEDURE — 84550 ASSAY OF BLOOD/URIC ACID: CPT | Performed by: NURSE PRACTITIONER

## 2019-08-22 PROCEDURE — 84443 ASSAY THYROID STIM HORMONE: CPT | Performed by: NURSE PRACTITIONER

## 2019-08-22 RX ORDER — DULOXETIN HYDROCHLORIDE 30 MG/1
30 CAPSULE, DELAYED RELEASE ORAL 2 TIMES DAILY
Qty: 60 CAPSULE | Refills: 0 | Status: SHIPPED | OUTPATIENT
Start: 2019-08-22 | End: 2019-09-13

## 2019-08-22 RX ORDER — HYDROXYZINE HYDROCHLORIDE 10 MG/1
10 TABLET, FILM COATED ORAL EVERY 6 HOURS PRN
Qty: 50 TABLET | Refills: 0 | Status: SHIPPED | OUTPATIENT
Start: 2019-08-22 | End: 2020-06-05

## 2019-08-22 NOTE — PROGRESS NOTES
Monisha presents to the office today for follow up pertaining to her DM, type 2, HTN, Dyslipidemia,and Gout. In addition, she has CAD with Previous MI  Stent which she is followed by Dr Watts. In addition, she has had Hip Replacement per Dr Singh.  She does continue to have bothersome leg cramps especially at night.  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    She has a laceration on her left lower extremity which she suffered when she was doing yard work. She is UTD on her DTAP.     Diabetes   She has type 2 diabetes mellitus. Her disease course has been improving.  Pertinent negatives for diabetes include no chest pain, no weakness. Diabetic complications include peripheral neuropathy. Risk factors for coronary artery disease include diabetes mellitus, dyslipidemia, family history, hypertension, obesity, post-menopausal, sedentary lifestyle and stress. Current diabetic treatment includes oral agent (dual therapy),  basal insulin and Trulicity. She is compliant with treatment most of the time. She is following a generally healthy diet. She has had a previous visit with a dietitian.  An ACE inhibitor/angiotensin II receptor blocker is being taken. Eye exam is up to date and done by Dr Winkler.           Lab Results   Component Value Date     HGBA1C 6.10 (H) 05/06/2019      Hypertension   The current episode started more than 1 year ago. She does home blood pressure.  Pertinent negatives include no chest pain, palpitations, peripheral edema or shortness of breath. Risk factors for coronary artery disease include diabetes mellitus, dyslipidemia, family history, obesity, post-menopausal state, sedentary lifestyle and stress. Past treatments include angiotensin blockers and beta blockers.            Lab Results   Component Value Date     GLUCOSE 140 (H) 05/06/2019     BUN 12 05/06/2019     CREATININE 0.85 05/06/2019      "EGFRIFNONA 69 05/06/2019     BCR 14.1 05/06/2019     K 4.6 05/06/2019     CO2 27.1 05/06/2019     CALCIUM 10.1 05/06/2019     ALBUMIN 4.50 05/06/2019     LABIL2 1.9 03/07/2015     AST 44 (H) 05/06/2019     ALT 49 (H) 05/06/2019      Hyperlipidemia   The current episode started more than 1 year ago. The problem is uncontrolled. Recent lipid tests are improving. Pertinent negatives include no chest pain or shortness of breath. She is currently prescribed Rapatha per Dr Watts but she her insurance has denied coverage. .  Risk factors for coronary artery disease include diabetes mellitus, family history, dyslipidemia, hypertension, obesity, post-menopausal and stress.         Lab Results   Component Value Date     CHOL 220 (H) 05/06/2019     CHOL 222 (H) 01/24/2019     CHOL 216 (H) 11/02/2018     CHLPL 186 11/16/2015     CHLPL 206 (H) 03/07/2015     CHLPL 322 (H) 07/15/2014            Lab Results   Component Value Date     TRIG 344 (H) 05/06/2019     TRIG 196 (H) 01/24/2019     TRIG 201 (H) 11/02/2018            Lab Results   Component Value Date     HDL 40 05/06/2019     HDL 46 (L) 01/24/2019     HDL 49 11/02/2018            Lab Results   Component Value Date      (H) 05/06/2019      (H) 01/24/2019      (H) 11/02/2018       Lower Extremity Issue   This is a new problem. The current episode started in the past 7 days. The problem has been gradually worsening. Associated symptoms include congestion, coughing, headaches, neck pain and numbness. Pertinent negatives include no abdominal pain, chest pain, chills, fatigue, fever, nausea, rash, vomiting or weakness. Treatments tried: Cleansed and SteriStrip. The treatment provided no relief.      Vitals:    08/22/19 1050   BP: 150/90   Pulse: 95   Temp: 98.3 °F (36.8 °C)   TempSrc: Oral   SpO2: 96%   Weight: 80.9 kg (178 lb 6.4 oz)   Height: 162.6 cm (64\")      The following portions of the patient's history were reviewed and updated as appropriate: " allergies, current medications, past family history, past medical history, past social history, past surgical history and problem list.    Review of Systems   Constitutional: Positive for activity change. Negative for appetite change, chills, fatigue, fever and unexpected weight change.   HENT: Positive for congestion.    Eyes: Negative for visual disturbance.   Respiratory: Positive for cough. Negative for chest tightness, shortness of breath and wheezing.    Cardiovascular: Negative for chest pain, palpitations and leg swelling.   Gastrointestinal: Negative for abdominal pain, constipation, diarrhea, nausea and vomiting.   Endocrine: Negative for cold intolerance, heat intolerance, polydipsia, polyphagia and polyuria.   Musculoskeletal: Positive for neck pain.        [place]urna leg crampsl    Skin: Negative for color change and rash.   Neurological: Positive for numbness and headaches. Negative for dizziness, tremors, speech difficulty, weakness and light-headedness.   Hematological: Negative for adenopathy.   Psychiatric/Behavioral: Positive for sleep disturbance. Negative for confusion, decreased concentration and suicidal ideas. The patient is nervous/anxious.    All other systems reviewed and are negative.    Physical Exam   Constitutional: She is oriented to person, place, and time. She appears well-developed and well-nourished. No distress.   HENT:   Head: Normocephalic.   Nose: Nose normal.   Mouth/Throat: Oropharynx is clear and moist. No oropharyngeal exudate.   Eyes: Conjunctivae and EOM are normal. Pupils are equal, round, and reactive to light. Right eye exhibits no discharge. Left eye exhibits no discharge. No scleral icterus.   Neck: Neck supple. No JVD present. No thyromegaly present.   Cardiovascular: Normal rate, regular rhythm and normal heart sounds. Exam reveals no friction rub.   No murmur heard.  Pulmonary/Chest: Effort normal and breath sounds normal. No respiratory distress. She has no  wheezes. She has no rales.   Abdominal: Soft. Bowel sounds are normal. She exhibits no distension. There is no tenderness. There is no rebound and no guarding.   Musculoskeletal: She exhibits no edema.   Lymphadenopathy:     She has no cervical adenopathy.   Neurological: She is alert and oriented to person, place, and time.   Skin: Skin is warm and dry. Capillary refill takes less than 2 seconds. No rash noted. No erythema.        Psychiatric: She has a normal mood and affect. Her behavior is normal. Judgment and thought content normal.   Nursing note and vitals reviewed.    Assessment/Plan   PHQ-9 Depression Screening  Little interest or pleasure in doing things?  0   Feeling down, depressed, or hopeless?  2   Trouble falling or staying asleep, or sleeping too much?  2   Feeling tired or having little energy?  1   Poor appetite or overeating?  0   Feeling bad about yourself - or that you are a failure or have let yourself or your family down?  1   Trouble concentrating on things, such as reading the newspaper or watching television?  1   Moving or speaking so slowly that other people could have noticed? Or the opposite - being so fidgety or restless that you have been moving around a lot more than usual?  2   Thoughts that you would be better off dead, or of hurting yourself in some way?  0   PHQ-9 Total Score  9   If you checked off any problems, how difficult have these problems made it for you to do your work, take care of things at home, or get along with other people?  0       Patient's Body mass index is 30.62 kg/m². BMI is above normal parameters. Recommendations include: exercise counseling and nutrition counseling.   (Normal BMI:  18.5-24.9, OW 25-29.9, Obesity 30 or greater)  Fall Risk Assessment was completed, and Monisha Gardner is at high risk for falls.    Problems Addressed this Visit        Cardiovascular and Mediastinum    Hypertension       Digestive    Vitamin D deficiency    Vitamin B 12  deficiency       Endocrine    Type 2 diabetes, controlled, with neuropathy (CMS/HCC) - Primary    Relevant Medications    Dulaglutide (TRULICITY) 1.5 MG/0.5ML solution pen-injector    DULoxetine (CYMBALTA) 30 MG capsule       Musculoskeletal and Integument    Nocturnal leg cramps       Other    Dyslipidemia    Body mass index (bmi) 30.0-30.9, adult    Anxiety    Relevant Medications    hydrOXYzine (ATARAX) 10 MG tablet      Other Visit Diagnoses     Cellulitis of left lower extremity        Wound care and will add Bactroban    Relevant Medications    mupirocin (BACTROBAN) 2 % ointment        Findings and recommendations discussed with Monisha. Routine labs are due and she is fasting today. These were done including Vit B12 and Vit D levels. Treatment options reviewed.She will restart Trulucity  She would like to try Cymbalta again for the neuropathy and perhaps it will help her Anxiety. She will taper the Lexapro to 10 mg and she will return in September for reevaluation.She has not been taking taking Metoprolol as directed  She will increase to bid. For her Anxiety and Depression offered Behavioral Health but at this time, she has so much going on she would like to postpone and see her response to Cymbalta. She does have low dose of Hydroxyzine if needed. Reinforced lifestyle modifications including nutrition and exercise recommendations. Supportive care measures reinforced regarding her leg cramps and CMP completed today. Health maintenance recommendations reviewed and she would like to postpone due to her vacation which is coming up in a few days.   Follow up scheduled for September but encouraged her if she had problems/concerns she should f/u sooner.     This document has been electronically signed by TRUONG Avendaño, JERSON-BC, KETTY

## 2019-08-22 NOTE — PATIENT INSTRUCTIONS
"Wound Care, Adult  Taking care of your wound properly can help to prevent pain, infection, and scarring. It can also help your wound to heal more quickly.  How to care for your wound  Wound care    DASH Eating Plan  DASH stands for \"Dietary Approaches to Stop Hypertension.\" The DASH eating plan is a healthy eating plan that has been shown to reduce high blood pressure (hypertension). It may also reduce your risk for type 2 diabetes, heart disease, and stroke. The DASH eating plan may also help with weight loss.  What are tips for following this plan?    General guidelines  · Avoid eating more than 2,300 mg (milligrams) of salt (sodium) a day. If you have hypertension, you may need to reduce your sodium intake to 1,500 mg a day.  · Limit alcohol intake to no more than 1 drink a day for nonpregnant women and 2 drinks a day for men. One drink equals 12 oz of beer, 5 oz of wine, or 1½ oz of hard liquor.  · Work with your health care provider to maintain a healthy body weight or to lose weight. Ask what an ideal weight is for you.  · Get at least 30 minutes of exercise that causes your heart to beat faster (aerobic exercise) most days of the week. Activities may include walking, swimming, or biking.  · Work with your health care provider or diet and nutrition specialist (dietitian) to adjust your eating plan to your individual calorie needs.  Reading food labels    · Check food labels for the amount of sodium per serving. Choose foods with less than 5 percent of the Daily Value of sodium. Generally, foods with less than 300 mg of sodium per serving fit into this eating plan.  · To find whole grains, look for the word \"whole\" as the first word in the ingredient list.  Shopping  · Buy products labeled as \"low-sodium\" or \"no salt added.\"  · Buy fresh foods. Avoid canned foods and premade or frozen meals.  Cooking  · Avoid adding salt when cooking. Use salt-free seasonings or herbs instead of table salt or sea salt. Check " with your health care provider or pharmacist before using salt substitutes.  · Do not terrell foods. Cook foods using healthy methods such as baking, boiling, grilling, and broiling instead.  · Cook with heart-healthy oils, such as olive, canola, soybean, or sunflower oil.  Meal planning  · Eat a balanced diet that includes:  ? 5 or more servings of fruits and vegetables each day. At each meal, try to fill half of your plate with fruits and vegetables.  ? Up to 6-8 servings of whole grains each day.  ? Less than 6 oz of lean meat, poultry, or fish each day. A 3-oz serving of meat is about the same size as a deck of cards. One egg equals 1 oz.  ? 2 servings of low-fat dairy each day.  ? A serving of nuts, seeds, or beans 5 times each week.  ? Heart-healthy fats. Healthy fats called Omega-3 fatty acids are found in foods such as flaxseeds and coldwater fish, like sardines, salmon, and mackerel.  · Limit how much you eat of the following:  ? Canned or prepackaged foods.  ? Food that is high in trans fat, such as fried foods.  ? Food that is high in saturated fat, such as fatty meat.  ? Sweets, desserts, sugary drinks, and other foods with added sugar.  ? Full-fat dairy products.  · Do not salt foods before eating.  · Try to eat at least 2 vegetarian meals each week.  · Eat more home-cooked food and less restaurant, buffet, and fast food.  · When eating at a restaurant, ask that your food be prepared with less salt or no salt, if possible.  What foods are recommended?  The items listed may not be a complete list. Talk with your dietitian about what dietary choices are best for you.  Grains  Whole-grain or whole-wheat bread. Whole-grain or whole-wheat pasta. Brown rice. Oatmeal. Quinoa. Bulgur. Whole-grain and low-sodium cereals. Cleopatra bread. Low-fat, low-sodium crackers. Whole-wheat flour tortillas.  Vegetables  Fresh or frozen vegetables (raw, steamed, roasted, or grilled). Low-sodium or reduced-sodium tomato and vegetable  juice. Low-sodium or reduced-sodium tomato sauce and tomato paste. Low-sodium or reduced-sodium canned vegetables.  Fruits  All fresh, dried, or frozen fruit. Canned fruit in natural juice (without added sugar).  Meat and other protein foods  Skinless chicken or turkey. Ground chicken or turkey. Pork with fat trimmed off. Fish and seafood. Egg whites. Dried beans, peas, or lentils. Unsalted nuts, nut butters, and seeds. Unsalted canned beans. Lean cuts of beef with fat trimmed off. Low-sodium, lean deli meat.  Dairy  Low-fat (1%) or fat-free (skim) milk. Fat-free, low-fat, or reduced-fat cheeses. Nonfat, low-sodium ricotta or cottage cheese. Low-fat or nonfat yogurt. Low-fat, low-sodium cheese.  Fats and oils  Soft margarine without trans fats. Vegetable oil. Low-fat, reduced-fat, or light mayonnaise and salad dressings (reduced-sodium). Canola, safflower, olive, soybean, and sunflower oils. Avocado.  Seasoning and other foods  Herbs. Spices. Seasoning mixes without salt. Unsalted popcorn and pretzels. Fat-free sweets.  What foods are not recommended?  The items listed may not be a complete list. Talk with your dietitian about what dietary choices are best for you.  Grains  Baked goods made with fat, such as croissants, muffins, or some breads. Dry pasta or rice meal packs.  Vegetables  Creamed or fried vegetables. Vegetables in a cheese sauce. Regular canned vegetables (not low-sodium or reduced-sodium). Regular canned tomato sauce and paste (not low-sodium or reduced-sodium). Regular tomato and vegetable juice (not low-sodium or reduced-sodium). Pickles. Olives.  Fruits  Canned fruit in a light or heavy syrup. Fried fruit. Fruit in cream or butter sauce.  Meat and other protein foods  Fatty cuts of meat. Ribs. Fried meat. Dhaliwal. Sausage. Bologna and other processed lunch meats. Salami. Fatback. Hotdogs. Bratwurst. Salted nuts and seeds. Canned beans with added salt. Canned or smoked fish. Whole eggs or egg yolks.  Chicken or turkey with skin.  Dairy  Whole or 2% milk, cream, and half-and-half. Whole or full-fat cream cheese. Whole-fat or sweetened yogurt. Full-fat cheese. Nondairy creamers. Whipped toppings. Processed cheese and cheese spreads.  Fats and oils  Butter. Stick margarine. Lard. Shortening. Ghee. Dhaliwal fat. Tropical oils, such as coconut, palm kernel, or palm oil.  Seasoning and other foods  Salted popcorn and pretzels. Onion salt, garlic salt, seasoned salt, table salt, and sea salt. Worcestershire sauce. Tartar sauce. Barbecue sauce. Teriyaki sauce. Soy sauce, including reduced-sodium. Steak sauce. Canned and packaged gravies. Fish sauce. Oyster sauce. Cocktail sauce. Horseradish that you find on the shelf. Ketchup. Mustard. Meat flavorings and tenderizers. Bouillon cubes. Hot sauce and Tabasco sauce. Premade or packaged marinades. Premade or packaged taco seasonings. Relishes. Regular salad dressings.  Where to find more information:  · National Heart, Lung, and Blood [place]: www.nhlbi.nih.gov  · American Heart Association: www.heart.org  Summary  · The DASH eating plan is a healthy eating plan that has been shown to reduce high blood pressure (hypertension). It may also reduce your risk for type 2 diabetes, heart disease, and stroke.  · With the DASH eating plan, you should limit salt (sodium) intake to 2,300 mg a day. If you have hypertension, you may need to reduce your sodium intake to 1,500 mg a day.  · When on the DASH eating plan, aim to eat more fresh fruits and vegetables, whole grains, lean proteins, low-fat dairy, and heart-healthy fats.  · Work with your health care provider or diet and nutrition specialist (dietitian) to adjust your eating plan to your individual calorie needs.  This information is not intended to replace advice given to you by your health care provider. Make sure you discuss any questions you have with your health care provider.  Document Released: 12/06/2012 Document Revised:  12/11/2017 Document Reviewed: 12/11/2017  [employer] Interactive Patient Education © 2019 [employer]    Type 2 Diabetes Mellitus, Self Care, Adult  When you have type 2 diabetes (type 2 diabetes mellitus), you must make sure your blood sugar (glucose) stays in a healthy range. You can do this with:  Nutrition.  Exercise.  Lifestyle changes.  Medicines or insulin, if needed.  Support from your doctors and others.  How to stay aware of blood sugar    Check your blood sugar level every day, as often as told.  Have your A1c (hemoglobin A1c) level checked two or more times a year. Have it checked more often if your doctor tells you to.  Your doctor will set personal treatment goals for you. Generally, you should have these blood sugar levels:  Before meals (preprandial):  mg/dL (4.4-7.2 mmol/L).  After meals (postprandial): below 180 mg/dL (10 mmol/L).  A1c level: less than 7%.  How to manage high and low blood sugar  Signs of high blood sugar  High blood sugar is called hyperglycemia. Know the signs of high blood sugar. Signs may include:  Feeling:  Thirsty.  Hungry.  Very tired.  Needing to pee (urinate) more than usual.  Blurry vision.  Signs of low blood sugar  Low blood sugar is called hypoglycemia. This is when blood sugar is at or below 70 mg/dL (3.9 mmol/L). Signs may include:  Feeling:  Hungry.  Worried or nervous (anxious).  Sweaty and clammy.  Confused.  Dizzy.  Sleepy.  Sick to your stomach (nauseous).  Having:  A fast heartbeat.  A headache.  A change in your vision.  Jerky movements that you cannot control (seizure).  Tingling or no feeling (numbness) around your mouth, lips, or tongue.  Having trouble with:  Moving (coordination).  Sleeping.  Passing out (fainting).  Getting upset easily (irritability).  Treating low blood sugar  To treat low blood sugar, eat or drink something sugary right away. If you can think clearly and swallow safely, follow the 15:15 rule:  Take 15 grams of a fast-acting carb  (carbohydrate). Some fast-acting carbs are:  1 tube of glucose gel.  3 sugar tablets (glucose pills).  6-8 pieces of hard candy.  4 oz (120 mL) of fruit juice.  4 oz (120 mL) regular (not diet) soda.  Check your blood sugar 15 minutes after you take the carb.  If your blood sugar is still at or below 70 mg/dL (3.9 mmol/L), take 15 grams of a carb again.  If your blood sugar does not go above 70 mg/dL (3.9 mmol/L) after 3 tries, get help right away.  After your blood sugar goes back to normal, eat a meal or a snack within 1 hour.  Treating very low blood sugar  If your blood sugar is at or below 54 mg/dL (3 mmol/L), you have very low blood sugar (severe hypoglycemia). This is an emergency. Do not wait to see if the symptoms will go away. Get medical help right away. Call your local emergency services (911 in the [place]  If you have very low blood sugar and you cannot eat or drink, you may need a glucagon shot (injection). A family member or friend should learn how to check your blood sugar and how to give you a glucagon shot. Ask your doctor if you need to have a glucagon shot kit at home.  Follow these instructions at home:  Medicine  Take insulin and diabetes medicines as told.  If your doctor says you should take more or less insulin and medicines, do this exactly as told.  Do not run out of insulin or medicines.  Having diabetes can raise your risk for other long-term conditions. These include heart disease and kidney disease. Your doctor may prescribe medicines to help you not have these problems.  Food    Make healthy food choices. These include:  Chicken, fish, egg whites, and beans.  Oats, whole wheat, bulgur, brown rice, quinoa, and millet.  Fresh fruits and vegetables.  Low-fat dairy products.  Nuts, avocado, olive oil, and canola oil.  Meet with a  (dietitian). He or she can help you make an eating plan that is right for you.  Follow instructions from your doctor about what you cannot eat or  drink.  Drink enough fluid to keep your pee (urine) pale yellow.  Keep track of carbs that you eat. Do this by reading food labels and learning food serving sizes.  Follow your sick day plan when you cannot eat or drink normally. Make this plan with your doctor so it is ready to use.  Activity  Exercise 3 or more times a week.  Do not go more than 2 days without exercising.  Talk with your doctor before you start a new exercise. Your doctor may need to tell you to change:  How much insulin or medicines you take.  How much food you eat.  Lifestyle  Do not use any tobacco products. These include cigarettes, chewing tobacco, and e-cigarettes. If you need help quitting, ask your doctor.  Ask your doctor how much alcohol is safe for you.  Learn to deal with stress. If you need help with this, ask your doctor.  Body care    Stay up to date with your shots (immunizations).  Have your eyes and feet checked by a doctor as often as told.  Check your skin and feet every day. Check for cuts, bruises, redness, blisters, or sores.  Brush your teeth and gums two times a day. Floss one or more times a day.  Go to the dentist one or more times every 6 months.  Stay at a healthy weight.  General instructions  Take over-the-counter and prescription medicines only as told by your doctor.  Share your diabetes care plan with:  Your work or school.  People you live with.  Carry a card or wear jewelry that says you have diabetes.  Keep all follow-up visits as told by your doctor. This is important.  Questions to ask your doctor  Do I need to meet with a diabetes educator?  Where can I find a support group for people with diabetes?  Where to find more information  To learn more about diabetes, visit:  American Diabetes Association: www.diabetes.org  American Association of Diabetes Educators: www.diabeteseducator.org  Summary  When you have type 2 diabetes, you must make sure your blood sugar (glucose) stays in a healthy range.  Check your  blood sugar every day, as often as told.  Having diabetes can raise your risk for other conditions. Your doctor may prescribe medicines to help you not have these problems.  Keep all follow-up visits as told by your doctor. This is important.  This information is not intended to replace advice given to you by your health care provider. Make sure you discuss any questions you have with your health care provider.  Document Released: 04/10/2017 Document Revised: 07/20/2018 Document Reviewed: 01/20/2017  [employer] Interactive Patient Education © 2019 [employer]    · Follow instructions from your health care provider about how to take care of your wound. Make sure you:  ? Wash your hands with soap and water before you change the bandage (dressing). If soap and water are not available, use hand .  ? Change your dressing as told by your health care provider.  ? Leave stitches (sutures), skin glue, or adhesive strips in place. These skin closures may need to stay in place for 2 weeks or longer. If adhesive strip edges start to loosen and curl up, you may trim the loose edges. Do not remove adhesive strips completely unless your health care provider tells you to do that.  · Check your wound area every day for signs of infection. Check for:  ? Redness, swelling, or pain.  ? Fluid or blood.  ? Warmth.  ? Pus or a bad smell.  · Ask your health care provider if you should clean the wound with mild soap and water. Doing this may include:  ? Using a clean towel to pat the wound dry after cleaning it. Do not rub or scrub the wound.  ? Applying a cream or ointment. Do this only as told by your health care provider.  ? Covering the incision with a clean dressing.  · Ask your health care provider when you can leave the wound uncovered.  · Keep the dressing dry until your health care provider says it can be removed. Do not take baths, swim, use a hot tub, or do anything that would put the wound underwater until your health  care provider approves. Ask your health care provider if you can take showers. You may only be allowed to take sponge baths.  Medicines    · If you were prescribed an antibiotic medicine, cream, or ointment, take or use the antibiotic as told by your health care provider. Do not stop taking or using the antibiotic even if your condition improves.  · Take over-the-counter and prescription medicines only as told by your health care provider. If you were prescribed pain medicine, take it 30 or more minutes before you do any wound care or as told by your health care provider.  General instructions  · Return to your normal activities as told by your health care provider. Ask your health care provider what activities are safe.  · Do not scratch or pick at the wound.  · Do not use any products that contain nicotine or tobacco, such as cigarettes and e-cigarettes. These may delay wound healing. If you need help quitting, ask your health care provider.  · Keep all follow-up visits as told by your health care provider. This is important.  · Eat a diet that includes protein, vitamin A, vitamin C, and other nutrient-rich foods to help the wound heal.  ? Foods rich in protein include meat, dairy, beans, nuts, and other sources.  ? Foods rich in vitamin A include carrots and dark green, leafy vegetables.  ? Foods rich in vitamin C include citrus, tomatoes, and other fruits and vegetables.  ? Nutrient-rich foods have protein, carbohydrates, fat, vitamins, or minerals. Eat a variety of healthy foods including vegetables, fruits, and whole grains.  Contact a health care provider if:  · You received a tetanus shot and you have swelling, severe pain, redness, or bleeding at the injection site.  · Your pain is not controlled with medicine.  · You have redness, swelling, or pain around the wound.  · You have fluid or blood coming from the wound.  · Your wound feels warm to the touch.  · You have pus or a bad smell coming from the  wound.  · You have a fever or chills.  · You are nauseous or you vomit.  · You are dizzy.  Get help right away if:  · You have a red streak going away from your wound.  · The edges of the wound open up and separate.  · Your wound is bleeding, and the bleeding does not stop with gentle pressure.  · You have a rash.  · You faint.  · You have trouble breathing.  Summary  · Always wash your hands with soap and water before changing your bandage (dressing).  · To help with healing, eat foods that are rich in protein, vitamin A, vitamin C, and other nutrients.  · Check your wound every day for signs of infection. Contact your health care provider if you suspect that your wound is infected.  This information is not intended to replace advice given to you by your health care provider. Make sure you discuss any questions you have with your health care provider.  Document Released: 09/26/2009 Document Revised: 01/29/2019 Document Reviewed: 07/04/2017  [employer] Interactive Patient Education © 2019 [employer]    Living With Anxiety    After being diagnosed with an anxiety disorder, you may be relieved to know why you have felt or behaved a certain way. It is natural to also feel overwhelmed about the treatment ahead and what it will mean for your life. With care and support, you can manage this condition and recover from it.  How to cope with anxiety  Dealing with stress  Stress is your body’s reaction to life changes and events, both good and bad. Stress can last just a few hours or it can be ongoing. Stress can play a major role in anxiety, so it is important to learn both how to cope with stress and how to think about it differently.  Talk with your health care provider or a counselor to learn more about stress reduction. He or she may suggest some stress reduction techniques, such as:  · Music therapy. This can include creating or listening to music that you enjoy and that inspires you.  · Mindfulness-based meditation.  This involves being aware of your normal breaths, rather than trying to control your breathing. It can be done while sitting or walking.  · Centering prayer. This is a kind of meditation that involves focusing on a word, phrase, or sacred image that is meaningful to you and that brings you peace.  · Deep breathing. To do this, expand your stomach and inhale slowly through your nose. Hold your breath for 3-5 seconds. Then exhale slowly, allowing your stomach muscles to relax.  · Self-talk. This is a skill where you identify thought patterns that lead to anxiety reactions and correct those thoughts.  · Muscle relaxation. This involves tensing muscles then relaxing them.  Choose a stress reduction technique that fits your lifestyle and personality. Stress reduction techniques take time and practice. Set aside 5-15 minutes a day to do them. Therapists can offer training in these techniques. The training may be covered by some insurance plans. Other things you can do to manage stress include:  · Keeping a stress diary. This can help you learn what triggers your stress and ways to control your response.  · Thinking about how you respond to certain situations. You may not be able to control everything, but you can control your reaction.  · Making time for activities that help you relax, and not feeling guilty about spending your time in this way.  Therapy combined with coping and stress-reduction skills provides the best chance for successful treatment.  Medicines  Medicines can help ease symptoms. Medicines for anxiety include:  · Anti-anxiety drugs.  · Antidepressants.  · Beta-blockers.  Medicines may be used as the main treatment for anxiety disorder, along with therapy, or if other treatments are not working. Medicines should be prescribed by a health care provider.  Relationships  Relationships can play a big part in helping you recover. Try to spend more time connecting with trusted friends and family members.  Consider going to couples counseling, taking family education classes, or going to family therapy. Therapy can help you and others better understand the condition.  How to recognize changes in your condition  Everyone has a different response to treatment for anxiety. Recovery from anxiety happens when symptoms decrease and stop interfering with your daily activities at home or work. This may mean that you will start to:  · Have better concentration and focus.  · Sleep better.  · Be less irritable.  · Have more energy.  · Have improved memory.  It is important to recognize when your condition is getting worse. Contact your health care provider if your symptoms interfere with home or work and you do not feel like your condition is improving.  Where to find help and support:  You can get help and support from these sources:  · Self-help groups.  · Online and community organizations.  · A trusted spiritual leader.  · Couples counseling.  · Family education classes.  · Family therapy.  Follow these instructions at home:  · Eat a healthy diet that includes plenty of vegetables, fruits, whole grains, low-fat dairy products, and lean protein. Do not eat a lot of foods that are high in solid fats, added sugars, or salt.  · Exercise. Most adults should do the following:  ? Exercise for at least 150 minutes each week. The exercise should increase your heart rate and make you sweat (moderate-intensity exercise).  ? Strengthening exercises at least twice a week.  · Cut down on caffeine, tobacco, alcohol, and other potentially harmful substances.  · Get the right amount and quality of sleep. Most adults need 7-9 hours of sleep each night.  · Make choices that simplify your life.  · Take over-the-counter and prescription medicines only as told by your health care provider.  · Avoid caffeine, alcohol, and certain over-the-counter cold medicines. These may make you feel worse. Ask your pharmacist which medicines to avoid.  · Keep  all follow-up visits as told by your health care provider. This is important.  Questions to ask your health care provider  · Would I benefit from therapy?  · How often should I follow up with a health care provider?  · How long do I need to take medicine?  · Are there any long-term side effects of my medicine?  · Are there any alternatives to taking medicine?  Contact a health care provider if:  · You have a hard time staying focused or finishing daily tasks.  · You spend many hours a day feeling worried about everyday life.  · You become exhausted by worry.  · You start to have headaches, feel tense, or have nausea.  · You urinate more than normal.  · You have diarrhea.  Get help right away if:  · You have a racing heart and shortness of breath.  · You have thoughts of hurting yourself or others.  If you ever feel like you may hurt yourself or others, or have thoughts about taking your own life, get help right away. You can go to your nearest emergency department or call:  · Your local emergency services (911 in the [place]  · A suicide crisis helpline, such as the National Suicide Prevention Lifeline at 1-219.493.1776. This is open 24-hours a day.  Summary  · Taking steps to deal with stress can help calm you.  · Medicines cannot cure anxiety disorders, but they can help ease symptoms.  · Family, friends, and partners can play a big part in helping you recover from an anxiety disorder.  This information is not intended to replace advice given to you by your health care provider. Make sure you discuss any questions you have with your health care provider.  Document Released: 12/12/2017 Document Revised: 12/12/2017 Document Reviewed: 12/12/2017  Elsevier Interactive Patient Education © 2019 [employer]

## 2019-08-23 LAB
25(OH)D3 SERPL-MCNC: 59.3 NG/ML (ref 30–100)
ALBUMIN SERPL-MCNC: 4.7 G/DL (ref 3.5–5.2)
ALBUMIN UR-MCNC: <1.2 MG/DL
ALBUMIN/GLOB SERPL: 2.2 G/DL
ALP SERPL-CCNC: 53 U/L (ref 39–117)
ALT SERPL W P-5'-P-CCNC: <5 U/L (ref 1–33)
ANION GAP SERPL CALCULATED.3IONS-SCNC: 14.1 MMOL/L (ref 5–15)
AST SERPL-CCNC: 30 U/L (ref 1–32)
BASOPHILS # BLD AUTO: 0.06 10*3/MM3 (ref 0–0.2)
BASOPHILS NFR BLD AUTO: 0.8 % (ref 0–1.5)
BILIRUB SERPL-MCNC: 0.3 MG/DL (ref 0.2–1.2)
BUN BLD-MCNC: 8 MG/DL (ref 6–20)
BUN/CREAT SERPL: 8.4 (ref 7–25)
CALCIUM SPEC-SCNC: 9.5 MG/DL (ref 8.6–10.5)
CHLORIDE SERPL-SCNC: 106 MMOL/L (ref 98–107)
CHOLEST SERPL-MCNC: 102 MG/DL (ref 0–200)
CO2 SERPL-SCNC: 23.9 MMOL/L (ref 22–29)
CREAT BLD-MCNC: 0.95 MG/DL (ref 0.57–1)
DEPRECATED RDW RBC AUTO: 49 FL (ref 37–54)
EOSINOPHIL # BLD AUTO: 0.12 10*3/MM3 (ref 0–0.4)
EOSINOPHIL NFR BLD AUTO: 1.6 % (ref 0.3–6.2)
ERYTHROCYTE [DISTWIDTH] IN BLOOD BY AUTOMATED COUNT: 15.3 % (ref 12.3–15.4)
GFR SERPL CREATININE-BSD FRML MDRD: 61 ML/MIN/1.73
GLOBULIN UR ELPH-MCNC: 2.1 GM/DL
GLUCOSE BLD-MCNC: 69 MG/DL (ref 65–99)
HBA1C MFR BLD: 6.15 % (ref 4.8–5.6)
HCT VFR BLD AUTO: 42.3 % (ref 34–46.6)
HDLC SERPL-MCNC: 45 MG/DL (ref 40–60)
HGB BLD-MCNC: 13 G/DL (ref 12–15.9)
IMM GRANULOCYTES # BLD AUTO: 0.03 10*3/MM3 (ref 0–0.05)
IMM GRANULOCYTES NFR BLD AUTO: 0.4 % (ref 0–0.5)
LDLC SERPL CALC-MCNC: 26 MG/DL (ref 0–100)
LDLC/HDLC SERPL: 0.57 {RATIO}
LYMPHOCYTES # BLD AUTO: 3.44 10*3/MM3 (ref 0.7–3.1)
LYMPHOCYTES NFR BLD AUTO: 46.4 % (ref 19.6–45.3)
MCH RBC QN AUTO: 26.7 PG (ref 26.6–33)
MCHC RBC AUTO-ENTMCNC: 30.7 G/DL (ref 31.5–35.7)
MCV RBC AUTO: 87 FL (ref 79–97)
MONOCYTES # BLD AUTO: 0.4 10*3/MM3 (ref 0.1–0.9)
MONOCYTES NFR BLD AUTO: 5.4 % (ref 5–12)
NEUTROPHILS # BLD AUTO: 3.37 10*3/MM3 (ref 1.7–7)
NEUTROPHILS NFR BLD AUTO: 45.4 % (ref 42.7–76)
NRBC BLD AUTO-RTO: 0.1 /100 WBC (ref 0–0.2)
PLATELET # BLD AUTO: 385 10*3/MM3 (ref 140–450)
PMV BLD AUTO: 9.9 FL (ref 6–12)
POTASSIUM BLD-SCNC: 4.1 MMOL/L (ref 3.5–5.2)
PROT SERPL-MCNC: 6.8 G/DL (ref 6–8.5)
RBC # BLD AUTO: 4.86 10*6/MM3 (ref 3.77–5.28)
SODIUM BLD-SCNC: 144 MMOL/L (ref 136–145)
TRIGL SERPL-MCNC: 156 MG/DL (ref 0–150)
TSH SERPL DL<=0.05 MIU/L-ACNC: 0.72 MIU/ML (ref 0.27–4.2)
URATE SERPL-MCNC: 5.6 MG/DL (ref 2.4–5.7)
VIT B12 BLD-MCNC: 458 PG/ML (ref 211–946)
VLDLC SERPL-MCNC: 31.2 MG/DL (ref 5–40)
WBC NRBC COR # BLD: 7.42 10*3/MM3 (ref 3.4–10.8)

## 2019-08-24 PROBLEM — B96.81 HELICOBACTER POSITIVE GASTRITIS: Status: RESOLVED | Noted: 2017-02-02 | Resolved: 2019-08-24

## 2019-08-24 PROBLEM — K29.70 HELICOBACTER POSITIVE GASTRITIS: Status: RESOLVED | Noted: 2017-02-02 | Resolved: 2019-08-24

## 2019-08-26 ENCOUNTER — TELEPHONE (OUTPATIENT)
Dept: CARDIOLOGY | Facility: CLINIC | Age: 59
End: 2019-08-26

## 2019-08-26 NOTE — TELEPHONE ENCOUNTER
"Patient reports that over the last three weeks her BP has increased, she is fatigued, and has bilateral lower extremity edema.  States BP has been 208/111 at the highest.  The readings are highest in the morning before medications, and come down gradually through the day.  BP normally in the 140-150's/60-70's, HR's in the 70-80's.  Denies SOA, CP.  She has gained around 6lbs over this time.  The edema in her legs gets better overnight when sleeping.  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  She will be back next week.  Advised her to elevate legs when resting.  Monitor sodium intake.  Please advise.  "

## 2019-08-27 RX ORDER — CHLORTHALIDONE 25 MG/1
25 TABLET ORAL DAILY
Qty: 30 TABLET | Refills: 5 | Status: SHIPPED | OUTPATIENT
Start: 2019-08-27 | End: 2020-03-13

## 2019-09-13 ENCOUNTER — OFFICE VISIT (OUTPATIENT)
Dept: FAMILY MEDICINE CLINIC | Facility: CLINIC | Age: 59
End: 2019-09-13

## 2019-09-13 VITALS
HEIGHT: 64 IN | HEART RATE: 103 BPM | BODY MASS INDEX: 29.53 KG/M2 | DIASTOLIC BLOOD PRESSURE: 68 MMHG | RESPIRATION RATE: 16 BRPM | WEIGHT: 173 LBS | TEMPERATURE: 98.3 F | SYSTOLIC BLOOD PRESSURE: 142 MMHG | OXYGEN SATURATION: 97 %

## 2019-09-13 DIAGNOSIS — F32.A ANXIETY AND DEPRESSION: ICD-10-CM

## 2019-09-13 DIAGNOSIS — K20.90 ESOPHAGITIS: ICD-10-CM

## 2019-09-13 DIAGNOSIS — E11.40 TYPE 2 DIABETES, CONTROLLED, WITH NEUROPATHY (HCC): Chronic | ICD-10-CM

## 2019-09-13 DIAGNOSIS — E11.8 TYPE 2 DIABETES MELLITUS WITH COMPLICATION, WITHOUT LONG-TERM CURRENT USE OF INSULIN (HCC): Chronic | ICD-10-CM

## 2019-09-13 DIAGNOSIS — E11.40 CONTROLLED TYPE 2 DIABETES MELLITUS WITH NEUROPATHY (HCC): ICD-10-CM

## 2019-09-13 DIAGNOSIS — M1A.09X0 IDIOPATHIC CHRONIC GOUT OF MULTIPLE SITES WITHOUT TOPHUS: ICD-10-CM

## 2019-09-13 DIAGNOSIS — F41.9 ANXIETY AND DEPRESSION: ICD-10-CM

## 2019-09-13 DIAGNOSIS — G25.81 RLS (RESTLESS LEGS SYNDROME): Primary | ICD-10-CM

## 2019-09-13 PROCEDURE — 99214 OFFICE O/P EST MOD 30 MIN: CPT | Performed by: PHYSICIAN ASSISTANT

## 2019-09-13 PROCEDURE — 90674 CCIIV4 VAC NO PRSV 0.5 ML IM: CPT | Performed by: PHYSICIAN ASSISTANT

## 2019-09-13 PROCEDURE — 90471 IMMUNIZATION ADMIN: CPT | Performed by: PHYSICIAN ASSISTANT

## 2019-09-13 RX ORDER — DULOXETIN HYDROCHLORIDE 60 MG/1
60 CAPSULE, DELAYED RELEASE ORAL DAILY
Qty: 30 CAPSULE | Refills: 5 | Status: SHIPPED | OUTPATIENT
Start: 2019-09-13 | End: 2020-03-14

## 2019-09-13 RX ORDER — ROPINIROLE 0.25 MG/1
0.25 TABLET, FILM COATED ORAL SEE ADMIN INSTRUCTIONS
Qty: 120 TABLET | Refills: 0 | Status: SHIPPED | OUTPATIENT
Start: 2019-09-13 | End: 2019-10-14

## 2019-09-13 RX ORDER — DEXLANSOPRAZOLE 60 MG/1
60 CAPSULE, DELAYED RELEASE ORAL DAILY
Qty: 30 CAPSULE | Refills: 2 | Status: SHIPPED | OUTPATIENT
Start: 2019-09-13 | End: 2020-03-31 | Stop reason: SDUPTHER

## 2019-09-13 RX ORDER — INSULIN GLARGINE 100 [IU]/ML
40 INJECTION, SOLUTION SUBCUTANEOUS DAILY
Qty: 4 PEN | Refills: 1 | Status: SHIPPED | OUTPATIENT
Start: 2019-09-13 | End: 2019-12-13 | Stop reason: SDUPTHER

## 2019-09-13 RX ORDER — ALLOPURINOL 100 MG/1
100 TABLET ORAL 2 TIMES DAILY
Qty: 60 TABLET | Refills: 5 | Status: SHIPPED | OUTPATIENT
Start: 2019-09-13 | End: 2020-03-14

## 2019-09-14 NOTE — PROGRESS NOTES
Subjective   Monisha Gardner is a 59 y.o. female.       Chief Complaint -leg pain    History of Present Illness -      Leg pain-  She complains of pain in bilateral legs and feet.  Pain is described as intermittent moderate burning that is worse at night.  She also reports a overwhelming urge to move her legs with involuntary movements.  Pain and leg movements disrupt her sleep.  Onset greater than 6 months.    Diabetes mellitus type 2-  Controlled with Trulicity, diabetic diet, and Basaglar 40 units daily  Lab Results   Component Value Date    HGBA1C 6.15 (H) 08/22/2019     Peripheral neuropathy-  She does have associated peripheral neuropathy with diabetes mellitus.  Leg pain not controlled.  She recently started Cymbalta but is on 30 mg dose twice daily.  She complains of sleepiness when taking the Cymbalta in the morning.    Depression with anxiety-  She complains of hypersomnolence with the use of Lexapro and hydroxyzine.  She also had been taking the Cymbalta 30 mg twice daily.  She states that she is having a hard time staying during the day when taking these medications in the morning.  She denies any SI or HI.    Esophagitis-improved with Dexilant.  She denies any heartburn.    Chronic gout-stable with allopurinol for prophylaxis    The following portions of the patient's history were reviewed and updated as appropriate: allergies, current medications, past family history, past medical history, past social history, past surgical history and problem list.    Review of Systems   Constitutional: Positive for fatigue. Negative for activity change, appetite change and fever.   HENT: Negative for ear pain, sinus pressure and sore throat.    Eyes: Negative for pain and visual disturbance.   Respiratory: Negative for cough and chest tightness.    Cardiovascular: Negative for chest pain and palpitations.   Gastrointestinal: Negative for abdominal pain, constipation, diarrhea, nausea and vomiting.   Endocrine:  "Negative for polydipsia and polyuria.   Genitourinary: Negative for dysuria and frequency.   Musculoskeletal: Negative for back pain and myalgias.        Leg pain.  Involuntary leg movements.   Skin: Negative for color change and rash.   Allergic/Immunologic: Negative for food allergies and immunocompromised state.   Neurological: Negative for dizziness, syncope and headaches.   Hematological: Negative for adenopathy. Does not bruise/bleed easily.   Psychiatric/Behavioral: Positive for dysphoric mood and sleep disturbance. Negative for hallucinations, self-injury and suicidal ideas. The patient is nervous/anxious.        /68 (BP Location: Right arm, Patient Position: Sitting)   Pulse 103   Temp 98.3 °F (36.8 °C) (Oral)   Resp 16   Ht 162.6 cm (64.02\")   Wt 78.5 kg (173 lb)   LMP  (LMP Unknown)   SpO2 97%   BMI 29.68 kg/m²   Orders Only on 08/22/2019   Component Date Value Ref Range Status   • Glucose 08/22/2019 69  65 - 99 mg/dL Final   • BUN 08/22/2019 8  6 - 20 mg/dL Final   • Creatinine 08/22/2019 0.95  0.57 - 1.00 mg/dL Final   • Sodium 08/22/2019 144  136 - 145 mmol/L Final   • Potassium 08/22/2019 4.1  3.5 - 5.2 mmol/L Final   • Chloride 08/22/2019 106  98 - 107 mmol/L Final   • CO2 08/22/2019 23.9  22.0 - 29.0 mmol/L Final   • Calcium 08/22/2019 9.5  8.6 - 10.5 mg/dL Final   • Total Protein 08/22/2019 6.8  6.0 - 8.5 g/dL Final   • Albumin 08/22/2019 4.70  3.50 - 5.20 g/dL Final   • ALT (SGPT) 08/22/2019 <5  1 - 33 U/L Final   • AST (SGOT) 08/22/2019 30  1 - 32 U/L Final   • Alkaline Phosphatase 08/22/2019 53  39 - 117 U/L Final   • Total Bilirubin 08/22/2019 0.3  0.2 - 1.2 mg/dL Final   • eGFR Non African Amer 08/22/2019 61  >60 mL/min/1.73 Final   • Globulin 08/22/2019 2.1  gm/dL Final   • A/G Ratio 08/22/2019 2.2  g/dL Final   • BUN/Creatinine Ratio 08/22/2019 8.4  7.0 - 25.0 Final   • Anion Gap 08/22/2019 14.1  5.0 - 15.0 mmol/L Final   • Total Cholesterol 08/22/2019 102  0 - 200 mg/dL Final "   • Triglycerides 08/22/2019 156* 0 - 150 mg/dL Final   • HDL Cholesterol 08/22/2019 45  40 - 60 mg/dL Final   • LDL Cholesterol  08/22/2019 26  0 - 100 mg/dL Final   • VLDL Cholesterol 08/22/2019 31.2  5 - 40 mg/dL Final   • LDL/HDL Ratio 08/22/2019 0.57   Final   • TSH 08/22/2019 0.720  0.270 - 4.200 mIU/mL Final   • Hemoglobin A1C 08/22/2019 6.15* 4.80 - 5.60 % Final   • 25 Hydroxy, Vitamin D 08/22/2019 59.3  30.0 - 100.0 ng/ml Final   • Uric Acid 08/22/2019 5.6  2.4 - 5.7 mg/dL Final   • Microalbumin, Urine 08/22/2019 <1.2  mg/dL Final   • Vitamin B-12 08/22/2019 458  211 - 946 pg/mL Final   • WBC 08/22/2019 7.42  3.40 - 10.80 10*3/mm3 Final   • RBC 08/22/2019 4.86  3.77 - 5.28 10*6/mm3 Final   • Hemoglobin 08/22/2019 13.0  12.0 - 15.9 g/dL Final   • Hematocrit 08/22/2019 42.3  34.0 - 46.6 % Final   • MCV 08/22/2019 87.0  79.0 - 97.0 fL Final   • MCH 08/22/2019 26.7  26.6 - 33.0 pg Final   • MCHC 08/22/2019 30.7* 31.5 - 35.7 g/dL Final   • RDW 08/22/2019 15.3  12.3 - 15.4 % Final   • RDW-SD 08/22/2019 49.0  37.0 - 54.0 fl Final   • MPV 08/22/2019 9.9  6.0 - 12.0 fL Final   • Platelets 08/22/2019 385  140 - 450 10*3/mm3 Final   • Neutrophil % 08/22/2019 45.4  42.7 - 76.0 % Final   • Lymphocyte % 08/22/2019 46.4* 19.6 - 45.3 % Final   • Monocyte % 08/22/2019 5.4  5.0 - 12.0 % Final   • Eosinophil % 08/22/2019 1.6  0.3 - 6.2 % Final   • Basophil % 08/22/2019 0.8  0.0 - 1.5 % Final   • Immature Grans % 08/22/2019 0.4  0.0 - 0.5 % Final   • Neutrophils, Absolute 08/22/2019 3.37  1.70 - 7.00 10*3/mm3 Final   • Lymphocytes, Absolute 08/22/2019 3.44* 0.70 - 3.10 10*3/mm3 Final   • Monocytes, Absolute 08/22/2019 0.40  0.10 - 0.90 10*3/mm3 Final   • Eosinophils, Absolute 08/22/2019 0.12  0.00 - 0.40 10*3/mm3 Final   • Basophils, Absolute 08/22/2019 0.06  0.00 - 0.20 10*3/mm3 Final   • Immature Grans, Absolute 08/22/2019 0.03  0.00 - 0.05 10*3/mm3 Final   • nRBC 08/22/2019 0.1  0.0 - 0.2 /100 WBC Final       Physical Exam    Constitutional: She is oriented to person, place, and time. She appears well-developed and well-nourished.   HENT:   Head: Normocephalic and atraumatic.   Nose: Nose normal.   Mouth/Throat: Oropharynx is clear and moist.   Eyes: Conjunctivae and EOM are normal. Pupils are equal, round, and reactive to light.   Neck: Normal range of motion. Neck supple. No tracheal deviation present. No thyromegaly present.   Cardiovascular: Normal rate, regular rhythm, normal heart sounds and intact distal pulses.   No murmur heard.  Pulmonary/Chest: Effort normal and breath sounds normal. No respiratory distress. She has no wheezes.   Abdominal: Soft. Bowel sounds are normal. There is no tenderness. There is no guarding.   Musculoskeletal: Normal range of motion. She exhibits no edema or tenderness.   Lymphadenopathy:     She has no cervical adenopathy.   Neurological: She is alert and oriented to person, place, and time.   Skin: Skin is warm and dry. No rash noted.   Psychiatric: She has a normal mood and affect. Her behavior is normal.   Nursing note and vitals reviewed.      Assessment/Plan     Diagnoses and all orders for this visit:    RLS (restless legs syndrome)  Comments:  start requip  Orders:  -     rOPINIRole (REQUIP) 0.25 MG tablet; Take 1 tablet by mouth See Admin Instructions. 1 tab qhs x 2 nights then 2 tab qhs x 2 nights then 1 4 tabs qhs after    Type 2 diabetes, controlled, with neuropathy (CMS/HCC)  Comments:  continue trulicity and metformin  Orders:  -     Dulaglutide (TRULICITY) 1.5 MG/0.5ML solution pen-injector; Inject 1.5 mg under the skin into the appropriate area as directed 1 (One) Time Per Week.    Controlled type 2 diabetes mellitus with neuropathy (CMS/HCC)  -     Insulin Glargine (BASAGLAR KWIKPEN) 100 UNIT/ML injection pen; Inject 40 Units under the skin into the appropriate area as directed Daily.    Anxiety and depression  Comments:  discontinue lexapro and hydroxizine due to hypersomnolence    Change cymbalta 60mg qhs  Orders:  -     DULoxetine (CYMBALTA) 60 MG capsule; Take 1 capsule by mouth Daily.    Type 2 diabetes mellitus with complication, without long-term current use of insulin (CMS/[facility])  Comments:  Findings and recommendations discussed with Monisha. She reports her blood sugars have been running above 250 mg/dL. She has not been taking JanuMet.  Orders:  -     metFORMIN (GLUCOPHAGE) 500 MG tablet; Take 1 tablet by mouth 2 (Two) Times a Day With Meals.    Esophagitis  Comments:  continue dexilant  Orders:  -     DEXILANT 60 MG capsule; Take 1 capsule by mouth Daily.    Idiopathic chronic gout of multiple sites without tophus  -     allopurinol (ZYLOPRIM) 100 MG tablet; Take 1 tablet by mouth 2 (Two) Times a Day.    Other orders  -     Flucelvax Quad=>4Years (PFS)             This document has been electronically signed by:  Kimmy Vela PA-C

## 2019-09-26 ENCOUNTER — TELEPHONE (OUTPATIENT)
Dept: FAMILY MEDICINE CLINIC | Facility: CLINIC | Age: 59
End: 2019-09-26

## 2019-09-30 RX ORDER — LOSARTAN POTASSIUM 100 MG/1
TABLET ORAL
Qty: 30 TABLET | Refills: 5 | Status: SHIPPED | OUTPATIENT
Start: 2019-09-30 | End: 2019-10-14 | Stop reason: SDUPTHER

## 2019-10-14 ENCOUNTER — OFFICE VISIT (OUTPATIENT)
Dept: FAMILY MEDICINE CLINIC | Facility: CLINIC | Age: 59
End: 2019-10-14

## 2019-10-14 VITALS
SYSTOLIC BLOOD PRESSURE: 132 MMHG | DIASTOLIC BLOOD PRESSURE: 78 MMHG | BODY MASS INDEX: 29.53 KG/M2 | OXYGEN SATURATION: 90 % | HEART RATE: 88 BPM | HEIGHT: 64 IN | WEIGHT: 173 LBS | TEMPERATURE: 98.7 F

## 2019-10-14 DIAGNOSIS — E11.40 TYPE 2 DIABETES, CONTROLLED, WITH NEUROPATHY (HCC): ICD-10-CM

## 2019-10-14 DIAGNOSIS — I10 ESSENTIAL HYPERTENSION: ICD-10-CM

## 2019-10-14 DIAGNOSIS — G25.81 RLS (RESTLESS LEGS SYNDROME): Primary | ICD-10-CM

## 2019-10-14 DIAGNOSIS — J06.9 UPPER RESPIRATORY TRACT INFECTION, UNSPECIFIED TYPE: ICD-10-CM

## 2019-10-14 PROCEDURE — 96372 THER/PROPH/DIAG INJ SC/IM: CPT | Performed by: PHYSICIAN ASSISTANT

## 2019-10-14 PROCEDURE — 99214 OFFICE O/P EST MOD 30 MIN: CPT | Performed by: PHYSICIAN ASSISTANT

## 2019-10-14 RX ORDER — ROPINIROLE 2 MG/1
2 TABLET, FILM COATED ORAL NIGHTLY
Qty: 30 TABLET | Refills: 5 | Status: SHIPPED | OUTPATIENT
Start: 2019-10-14 | End: 2020-01-24 | Stop reason: SDUPTHER

## 2019-10-14 RX ORDER — LOSARTAN POTASSIUM 100 MG/1
50 TABLET ORAL 2 TIMES DAILY
Qty: 30 TABLET | Refills: 5 | Status: SHIPPED | OUTPATIENT
Start: 2019-10-14 | End: 2020-08-27 | Stop reason: SDUPTHER

## 2019-10-14 RX ORDER — CEFDINIR 300 MG/1
600 CAPSULE ORAL DAILY
Qty: 20 CAPSULE | Refills: 0 | Status: SHIPPED | OUTPATIENT
Start: 2019-10-14 | End: 2019-10-24

## 2019-10-14 RX ORDER — CEFTRIAXONE 1 G/1
1 INJECTION, POWDER, FOR SOLUTION INTRAMUSCULAR; INTRAVENOUS ONCE
Status: COMPLETED | OUTPATIENT
Start: 2019-10-14 | End: 2019-10-14

## 2019-10-14 RX ADMIN — CEFTRIAXONE 1 G: 1 INJECTION, POWDER, FOR SOLUTION INTRAMUSCULAR; INTRAVENOUS at 12:46

## 2019-10-14 NOTE — PROGRESS NOTES
Subjective   Monisha Gardner is a 59 y.o. female.       Chief Complaint -leg cramps    History of Present Illness -      Leg cramps-  She complains of moderate intermittent cramping pain in her legs that is worse at night.  She does have restless leg syndrome which is improved with Requip 1 mg nightly but not at goal due to intermittent leg cramps that do awaken her from sleep.  She states there is an improvement in her involuntary leg movements but pain persists.  Not at goal    Head congestion-she complains of head congestion left earache sore throat hoarseness and dry cough for the past week.  Minimal relief with Clarinex and Mucinex.    Hypertension-stable with losartan and metoprolol    Diabetes mellitus type 2- stable with Basaglar metformin.  She was unable to get Trulicity due to insurance coverage and cost.  Lab Results   Component Value Date    HGBA1C 6.15 (H) 08/22/2019       The following portions of the patient's history were reviewed and updated as appropriate: allergies, current medications, past family history, past medical history, past social history, past surgical history and problem list.    Review of Systems   Constitutional: Positive for diaphoresis and fatigue. Negative for activity change, appetite change and fever.   HENT: Positive for congestion, ear pain, sore throat and voice change. Negative for sinus pressure.    Eyes: Negative for pain and visual disturbance.   Respiratory: Positive for cough. Negative for chest tightness.    Cardiovascular: Negative for chest pain and palpitations.   Gastrointestinal: Negative for abdominal pain, constipation, diarrhea, nausea and vomiting.   Endocrine: Negative for polydipsia and polyuria.   Genitourinary: Negative for dysuria and frequency.   Musculoskeletal: Positive for myalgias (leg cramps). Negative for back pain.   Skin: Negative for color change and rash.   Allergic/Immunologic: Negative for food allergies and immunocompromised state.  "  Neurological: Negative for dizziness, syncope and headaches.   Hematological: Negative for adenopathy. Does not bruise/bleed easily.   Psychiatric/Behavioral: Negative for hallucinations and suicidal ideas. The patient is not nervous/anxious.        /78   Pulse 88   Temp 98.7 °F (37.1 °C) (Oral)   Ht 162.6 cm (64.02\")   Wt 78.5 kg (173 lb)   LMP  (LMP Unknown)   SpO2 90%   BMI 29.68 kg/m²   Orders Only on 08/22/2019   Component Date Value Ref Range Status   • Glucose 08/22/2019 69  65 - 99 mg/dL Final   • BUN 08/22/2019 8  6 - 20 mg/dL Final   • Creatinine 08/22/2019 0.95  0.57 - 1.00 mg/dL Final   • Sodium 08/22/2019 144  136 - 145 mmol/L Final   • Potassium 08/22/2019 4.1  3.5 - 5.2 mmol/L Final   • Chloride 08/22/2019 106  98 - 107 mmol/L Final   • CO2 08/22/2019 23.9  22.0 - 29.0 mmol/L Final   • Calcium 08/22/2019 9.5  8.6 - 10.5 mg/dL Final   • Total Protein 08/22/2019 6.8  6.0 - 8.5 g/dL Final   • Albumin 08/22/2019 4.70  3.50 - 5.20 g/dL Final   • ALT (SGPT) 08/22/2019 <5  1 - 33 U/L Final   • AST (SGOT) 08/22/2019 30  1 - 32 U/L Final   • Alkaline Phosphatase 08/22/2019 53  39 - 117 U/L Final   • Total Bilirubin 08/22/2019 0.3  0.2 - 1.2 mg/dL Final   • eGFR Non African Amer 08/22/2019 61  >60 mL/min/1.73 Final   • Globulin 08/22/2019 2.1  gm/dL Final   • A/G Ratio 08/22/2019 2.2  g/dL Final   • BUN/Creatinine Ratio 08/22/2019 8.4  7.0 - 25.0 Final   • Anion Gap 08/22/2019 14.1  5.0 - 15.0 mmol/L Final   • Total Cholesterol 08/22/2019 102  0 - 200 mg/dL Final   • Triglycerides 08/22/2019 156* 0 - 150 mg/dL Final   • HDL Cholesterol 08/22/2019 45  40 - 60 mg/dL Final   • LDL Cholesterol  08/22/2019 26  0 - 100 mg/dL Final   • VLDL Cholesterol 08/22/2019 31.2  5 - 40 mg/dL Final   • LDL/HDL Ratio 08/22/2019 0.57   Final   • TSH 08/22/2019 0.720  0.270 - 4.200 mIU/mL Final   • Hemoglobin A1C 08/22/2019 6.15* 4.80 - 5.60 % Final   • 25 Hydroxy, Vitamin D 08/22/2019 59.3  30.0 - 100.0 ng/ml Final "   • Uric Acid 08/22/2019 5.6  2.4 - 5.7 mg/dL Final   • Microalbumin, Urine 08/22/2019 <1.2  mg/dL Final   • Vitamin B-12 08/22/2019 458  211 - 946 pg/mL Final   • WBC 08/22/2019 7.42  3.40 - 10.80 10*3/mm3 Final   • RBC 08/22/2019 4.86  3.77 - 5.28 10*6/mm3 Final   • Hemoglobin 08/22/2019 13.0  12.0 - 15.9 g/dL Final   • Hematocrit 08/22/2019 42.3  34.0 - 46.6 % Final   • MCV 08/22/2019 87.0  79.0 - 97.0 fL Final   • MCH 08/22/2019 26.7  26.6 - 33.0 pg Final   • MCHC 08/22/2019 30.7* 31.5 - 35.7 g/dL Final   • RDW 08/22/2019 15.3  12.3 - 15.4 % Final   • RDW-SD 08/22/2019 49.0  37.0 - 54.0 fl Final   • MPV 08/22/2019 9.9  6.0 - 12.0 fL Final   • Platelets 08/22/2019 385  140 - 450 10*3/mm3 Final   • Neutrophil % 08/22/2019 45.4  42.7 - 76.0 % Final   • Lymphocyte % 08/22/2019 46.4* 19.6 - 45.3 % Final   • Monocyte % 08/22/2019 5.4  5.0 - 12.0 % Final   • Eosinophil % 08/22/2019 1.6  0.3 - 6.2 % Final   • Basophil % 08/22/2019 0.8  0.0 - 1.5 % Final   • Immature Grans % 08/22/2019 0.4  0.0 - 0.5 % Final   • Neutrophils, Absolute 08/22/2019 3.37  1.70 - 7.00 10*3/mm3 Final   • Lymphocytes, Absolute 08/22/2019 3.44* 0.70 - 3.10 10*3/mm3 Final   • Monocytes, Absolute 08/22/2019 0.40  0.10 - 0.90 10*3/mm3 Final   • Eosinophils, Absolute 08/22/2019 0.12  0.00 - 0.40 10*3/mm3 Final   • Basophils, Absolute 08/22/2019 0.06  0.00 - 0.20 10*3/mm3 Final   • Immature Grans, Absolute 08/22/2019 0.03  0.00 - 0.05 10*3/mm3 Final   • nRBC 08/22/2019 0.1  0.0 - 0.2 /100 WBC Final       Physical Exam   Constitutional: She is oriented to person, place, and time. She appears well-developed and well-nourished.   HENT:   Head: Normocephalic and atraumatic.   Right Ear: External ear normal.   Left Ear: External ear normal.   Mouth/Throat: No oropharyngeal exudate.   Oropharynx erythematous without exudates.  Boggy nasal mucosa.   Eyes: Conjunctivae and EOM are normal. Pupils are equal, round, and reactive to light.   Neck: Normal range of  motion. Neck supple. No tracheal deviation present. No thyromegaly present.   Cardiovascular: Normal rate, regular rhythm, normal heart sounds and intact distal pulses.   No murmur heard.  Pulmonary/Chest: Effort normal and breath sounds normal. No respiratory distress. She has no wheezes.   Abdominal: Soft. Bowel sounds are normal. There is no tenderness. There is no guarding.   Musculoskeletal: Normal range of motion. She exhibits no edema or tenderness.   Lymphadenopathy:     She has no cervical adenopathy.   Neurological: She is alert and oriented to person, place, and time.   Skin: Skin is warm and dry. No rash noted.   Psychiatric: She has a normal mood and affect. Her behavior is normal.   Nursing note and vitals reviewed.      Assessment/Plan     Diagnoses and all orders for this visit:    RLS (restless legs syndrome)  Comments:  Increase Requip 2 mg daily  Orders:  -     rOPINIRole (REQUIP) 2 MG tablet; Take 1 tablet by mouth Every Night.    Upper respiratory tract infection, unspecified type  -     cefTRIAXone (ROCEPHIN) injection 1 g  -     cefdinir (OMNICEF) 300 MG capsule; Take 2 capsules by mouth Daily for 10 days.    Essential hypertension  Comments:  Continue losartan and metoprolol  Orders:  -     losartan (COZAAR) 100 MG tablet; Take 0.5 tablets by mouth 2 (Two) Times a Day.    Type 2 diabetes, controlled, with neuropathy (CMS/HCC)  Comments:  Continue Basaglar and metformin  Start Ozempic    Other orders  -     Semaglutide, 1 MG/DOSE, (OZEMPIC, 1 MG/DOSE,) 2 MG/1.5ML solution pen-injector; Inject 0.25 mg under the skin into the appropriate area as directed 1 (One) Time Per Week.                 This document has been electronically signed by:  Kimmy Vela PA-C

## 2019-10-24 ENCOUNTER — TELEPHONE (OUTPATIENT)
Dept: FAMILY MEDICINE CLINIC | Facility: CLINIC | Age: 59
End: 2019-10-24

## 2019-10-24 NOTE — TELEPHONE ENCOUNTER
Pt has been to see Kimmy since this note and Kimmy has prescribed Ozempic.   ----- Message from TRUONG Torres sent at 9/25/2019  3:16 PM EDT -----  Regarding: RE: Trulicity denial  Please let Monisha know this. If she would like to try the  Ozempic which tends to have greater weight loss and once weekly vs Victoza.either is fine with me just let me know  ----- Message -----  From: Bertha Vitale LPN  Sent: 9/25/2019  10:30 AM  To: TRUONG Torres  Subject: Trulicity denial                                 I completed a PA for Trulicity 1.5mg/0.5ml pen and it was denied. Insurance prefers Victoza or Ozempic. How would you like to proceed?    Bertha Melvin

## 2019-12-13 ENCOUNTER — OFFICE VISIT (OUTPATIENT)
Dept: FAMILY MEDICINE CLINIC | Facility: CLINIC | Age: 59
End: 2019-12-13

## 2019-12-13 VITALS
TEMPERATURE: 97.9 F | WEIGHT: 172 LBS | OXYGEN SATURATION: 98 % | BODY MASS INDEX: 29.37 KG/M2 | SYSTOLIC BLOOD PRESSURE: 100 MMHG | HEART RATE: 81 BPM | HEIGHT: 64 IN | DIASTOLIC BLOOD PRESSURE: 66 MMHG

## 2019-12-13 DIAGNOSIS — E11.40 CONTROLLED TYPE 2 DIABETES MELLITUS WITH NEUROPATHY (HCC): ICD-10-CM

## 2019-12-13 DIAGNOSIS — E11.42 DIABETIC POLYNEUROPATHY ASSOCIATED WITH TYPE 2 DIABETES MELLITUS (HCC): Primary | ICD-10-CM

## 2019-12-13 DIAGNOSIS — I10 ESSENTIAL HYPERTENSION: ICD-10-CM

## 2019-12-13 DIAGNOSIS — F41.9 ANXIETY: ICD-10-CM

## 2019-12-13 PROCEDURE — 99214 OFFICE O/P EST MOD 30 MIN: CPT | Performed by: PHYSICIAN ASSISTANT

## 2019-12-13 RX ORDER — INSULIN GLARGINE 100 [IU]/ML
40 INJECTION, SOLUTION SUBCUTANEOUS DAILY
Qty: 4 PEN | Refills: 5 | Status: SHIPPED | OUTPATIENT
Start: 2019-12-13 | End: 2020-06-12

## 2019-12-13 NOTE — PROGRESS NOTES
Subjective   Monisha Gardner is a 59 y.o. female.       Chief Complaint -  Foot pain    History of Present Illness -      Foot pain-  She complains of moderate to severe sharp burning pain in bilateral feet and lower legs that is worse at night.  She reports pain has began to occur during the day as well.  Onset greater than 6 months.  Minimal relief with Requip.    Diabetes-controlled with basaglar Ozempic and metformin  Lab Results   Component Value Date    HGBA1C 6.15 (H) 08/22/2019     Hypertension-controlled with chlorthalidone and losartan    Anxiety-stable with Cymbalta.  She denies any SI or HI.    The following portions of the patient's history were reviewed and updated as appropriate: allergies, current medications, past family history, past medical history, past social history, past surgical history and problem list.    Review of Systems   Constitutional: Negative for activity change, appetite change, fatigue and fever.   HENT: Negative for ear pain, sinus pressure and sore throat.    Eyes: Negative for pain and visual disturbance.   Respiratory: Negative for cough and chest tightness.    Cardiovascular: Negative for chest pain and palpitations.   Gastrointestinal: Negative for abdominal pain, constipation, diarrhea, nausea and vomiting.   Endocrine: Negative for polydipsia and polyuria.   Genitourinary: Negative for dysuria and frequency.   Musculoskeletal: Positive for myalgias (foot pain). Negative for back pain.   Skin: Negative for color change and rash.   Allergic/Immunologic: Negative for food allergies and immunocompromised state.   Neurological: Negative for dizziness, syncope and headaches.   Hematological: Negative for adenopathy. Does not bruise/bleed easily.   Psychiatric/Behavioral: Positive for suicidal ideas. Negative for dysphoric mood and hallucinations. The patient is not nervous/anxious.        /66 (BP Location: Right arm, Patient Position: Sitting, Cuff Size: Adult)   Pulse 81   " Temp 97.9 °F (36.6 °C) (Temporal)   Ht 162.6 cm (64.02\")   Wt 78 kg (172 lb)   LMP  (LMP Unknown)   SpO2 98%   BMI 29.51 kg/m²   Orders Only on 08/22/2019   Component Date Value Ref Range Status   • Glucose 08/22/2019 69  65 - 99 mg/dL Final   • BUN 08/22/2019 8  6 - 20 mg/dL Final   • Creatinine 08/22/2019 0.95  0.57 - 1.00 mg/dL Final   • Sodium 08/22/2019 144  136 - 145 mmol/L Final   • Potassium 08/22/2019 4.1  3.5 - 5.2 mmol/L Final   • Chloride 08/22/2019 106  98 - 107 mmol/L Final   • CO2 08/22/2019 23.9  22.0 - 29.0 mmol/L Final   • Calcium 08/22/2019 9.5  8.6 - 10.5 mg/dL Final   • Total Protein 08/22/2019 6.8  6.0 - 8.5 g/dL Final   • Albumin 08/22/2019 4.70  3.50 - 5.20 g/dL Final   • ALT (SGPT) 08/22/2019 <5  1 - 33 U/L Final   • AST (SGOT) 08/22/2019 30  1 - 32 U/L Final   • Alkaline Phosphatase 08/22/2019 53  39 - 117 U/L Final   • Total Bilirubin 08/22/2019 0.3  0.2 - 1.2 mg/dL Final   • eGFR Non African Amer 08/22/2019 61  >60 mL/min/1.73 Final   • Globulin 08/22/2019 2.1  gm/dL Final   • A/G Ratio 08/22/2019 2.2  g/dL Final   • BUN/Creatinine Ratio 08/22/2019 8.4  7.0 - 25.0 Final   • Anion Gap 08/22/2019 14.1  5.0 - 15.0 mmol/L Final   • Total Cholesterol 08/22/2019 102  0 - 200 mg/dL Final   • Triglycerides 08/22/2019 156* 0 - 150 mg/dL Final   • HDL Cholesterol 08/22/2019 45  40 - 60 mg/dL Final   • LDL Cholesterol  08/22/2019 26  0 - 100 mg/dL Final   • VLDL Cholesterol 08/22/2019 31.2  5 - 40 mg/dL Final   • LDL/HDL Ratio 08/22/2019 0.57   Final   • TSH 08/22/2019 0.720  0.270 - 4.200 mIU/mL Final   • Hemoglobin A1C 08/22/2019 6.15* 4.80 - 5.60 % Final   • 25 Hydroxy, Vitamin D 08/22/2019 59.3  30.0 - 100.0 ng/ml Final   • Uric Acid 08/22/2019 5.6  2.4 - 5.7 mg/dL Final   • Microalbumin, Urine 08/22/2019 <1.2  mg/dL Final   • Vitamin B-12 08/22/2019 458  211 - 946 pg/mL Final   • WBC 08/22/2019 7.42  3.40 - 10.80 10*3/mm3 Final   • RBC 08/22/2019 4.86  3.77 - 5.28 10*6/mm3 Final   • " Hemoglobin 08/22/2019 13.0  12.0 - 15.9 g/dL Final   • Hematocrit 08/22/2019 42.3  34.0 - 46.6 % Final   • MCV 08/22/2019 87.0  79.0 - 97.0 fL Final   • MCH 08/22/2019 26.7  26.6 - 33.0 pg Final   • MCHC 08/22/2019 30.7* 31.5 - 35.7 g/dL Final   • RDW 08/22/2019 15.3  12.3 - 15.4 % Final   • RDW-SD 08/22/2019 49.0  37.0 - 54.0 fl Final   • MPV 08/22/2019 9.9  6.0 - 12.0 fL Final   • Platelets 08/22/2019 385  140 - 450 10*3/mm3 Final   • Neutrophil % 08/22/2019 45.4  42.7 - 76.0 % Final   • Lymphocyte % 08/22/2019 46.4* 19.6 - 45.3 % Final   • Monocyte % 08/22/2019 5.4  5.0 - 12.0 % Final   • Eosinophil % 08/22/2019 1.6  0.3 - 6.2 % Final   • Basophil % 08/22/2019 0.8  0.0 - 1.5 % Final   • Immature Grans % 08/22/2019 0.4  0.0 - 0.5 % Final   • Neutrophils, Absolute 08/22/2019 3.37  1.70 - 7.00 10*3/mm3 Final   • Lymphocytes, Absolute 08/22/2019 3.44* 0.70 - 3.10 10*3/mm3 Final   • Monocytes, Absolute 08/22/2019 0.40  0.10 - 0.90 10*3/mm3 Final   • Eosinophils, Absolute 08/22/2019 0.12  0.00 - 0.40 10*3/mm3 Final   • Basophils, Absolute 08/22/2019 0.06  0.00 - 0.20 10*3/mm3 Final   • Immature Grans, Absolute 08/22/2019 0.03  0.00 - 0.05 10*3/mm3 Final   • nRBC 08/22/2019 0.1  0.0 - 0.2 /100 WBC Final       Physical Exam   Constitutional: She is oriented to person, place, and time. She appears well-developed and well-nourished.   HENT:   Head: Normocephalic and atraumatic.   Nose: Nose normal.   Mouth/Throat: Oropharynx is clear and moist.   Eyes: Pupils are equal, round, and reactive to light. Conjunctivae and EOM are normal.   Neck: Normal range of motion. Neck supple. No tracheal deviation present. No thyromegaly present.   Cardiovascular: Normal rate, regular rhythm, normal heart sounds and intact distal pulses.   No murmur heard.  Pulmonary/Chest: Effort normal and breath sounds normal. No respiratory distress. She has no wheezes.   Abdominal: Soft. Bowel sounds are normal. There is no tenderness. There is no  guarding.   Musculoskeletal: Normal range of motion. She exhibits tenderness (soles of bilateral feet without erythema). She exhibits no edema.   Lymphadenopathy:     She has no cervical adenopathy.   Neurological: She is alert and oriented to person, place, and time.   Skin: Skin is warm and dry. No rash noted.   Psychiatric: She has a normal mood and affect. Her behavior is normal.   Nursing note and vitals reviewed.      Assessment/Plan     Diagnoses and all orders for this visit:    Diabetic polyneuropathy associated with type 2 diabetes mellitus (CMS/[facility])  Comments:  Start gabapentin  Advised trial of CBD cream rubbed on feet    Controlled type 2 diabetes mellitus with neuropathy (CMS/HCC)  Comments:  Start prescription gabapentin 100 mg nightly and then may titrate to 100 mg twice daily if needed  Orders:  -     Insulin Glargine (BASAGLAR KWIKPEN) 100 UNIT/ML injection pen; Inject 40 Units under the skin into the appropriate area as directed Daily.    Essential hypertension  Comments:  Continue chlorthalidone and losartan.    Anxiety  Comments:  Continue Cymbalta                 This document has been electronically signed by:  Kimmy Vela PA-C

## 2019-12-13 NOTE — PATIENT INSTRUCTIONS
"Carbohydrate Counting for Diabetes Mellitus, Adult    Carbohydrate counting is a method of keeping track of how many carbohydrates you eat. Eating carbohydrates naturally increases the amount of sugar (glucose) in the blood. Counting how many carbohydrates you eat helps keep your blood glucose within normal limits, which helps you manage your diabetes (diabetes mellitus).  It is important to know how many carbohydrates you can safely have in each meal. This is different for every person. A diet and nutrition specialist (registered dietitian) can help you make a meal plan and calculate how many carbohydrates you should have at each meal and snack.  Carbohydrates are found in the following foods:  · Grains, such as breads and cereals.  · Dried beans and soy products.  · Starchy vegetables, such as potatoes, peas, and corn.  · Fruit and fruit juices.  · Milk and yogurt.  · Sweets and snack foods, such as cake, cookies, candy, chips, and soft drinks.  How do I count carbohydrates?  There are two ways to count carbohydrates in food. You can use either of the methods or a combination of both.  Reading \"Nutrition Facts\" on packaged food  The \"Nutrition Facts\" list is included on the labels of almost all packaged foods and beverages in the [place] It includes:  · The serving size.  · Information about nutrients in each serving, including the grams (g) of carbohydrate per serving.  To use the “Nutrition Facts\":  · Decide how many servings you will have.  · Multiply the number of servings by the number of carbohydrates per serving.  · The resulting number is the total amount of carbohydrates that you will be having.  Learning standard serving sizes of other foods  When you eat carbohydrate foods that are not packaged or do not include \"Nutrition Facts\" on the label, you need to measure the servings in order to count the amount of carbohydrates:  · Measure the foods that you will eat with a food scale or measuring cup, if " needed.  · Decide how many standard-size servings you will eat.  · Multiply the number of servings by 15. Most carbohydrate-rich foods have about 15 g of carbohydrates per serving.  ? For example, if you eat 8 oz (170 g) of strawberries, you will have eaten 2 servings and 30 g of carbohydrates (2 servings x 15 g = 30 g).  · For foods that have more than one food mixed, such as soups and casseroles, you must count the carbohydrates in each food that is included.  The following list contains standard serving sizes of common carbohydrate-rich foods. Each of these servings has about 15 g of carbohydrates:  · ½ hamburger bun or ½ English muffin.  · ½ oz (15 mL) syrup.  · ½ oz (14 g) jelly.  · 1 slice of bread.  · 1 six-inch tortilla.  · 3 oz (85 g) cooked rice or pasta.  · 4 oz (113 g) cooked dried beans.  · 4 oz (113 g) starchy vegetable, such as peas, corn, or potatoes.  · 4 oz (113 g) hot cereal.  · 4 oz (113 g) mashed potatoes or ¼ of a large baked potato.  · 4 oz (113 g) canned or frozen fruit.  · 4 oz (120 mL) fruit juice.  · 4-6 crackers.  · 6 chicken nuggets.  · 6 oz (170 g) unsweetened dry cereal.  · 6 oz (170 g) plain fat-free yogurt or yogurt sweetened with artificial sweeteners.  · 8 oz (240 mL) milk.  · 8 oz (170 g) fresh fruit or one small piece of fruit.  · 24 oz (680 g) popped popcorn.  Example of carbohydrate counting  Sample meal  · 3 oz (85 g) chicken breast.  · 6 oz (170 g) brown rice.  · 4 oz (113 g) corn.  · 8 oz (240 mL) milk.  · 8 oz (170 g) strawberries with sugar-free whipped topping.  Carbohydrate calculation  1. Identify the foods that contain carbohydrates:  ? Rice.  ? Corn.  ? Milk.  ? Strawberries.  2. Calculate how many servings you have of each food:  ? 2 servings rice.  ? 1 serving corn.  ? 1 serving milk.  ? 1 serving strawberries.  3. Multiply each number of servings by 15 g:  ? 2 servings rice x 15 g = 30 g.  ? 1 serving corn x 15 g = 15 g.  ? 1 serving milk x 15 g = 15 g.  ? 1  serving strawberries x 15 g = 15 g.  4. Add together all of the amounts to find the total grams of carbohydrates eaten:  ? 30 g + 15 g + 15 g + 15 g = 75 g of carbohydrates total.  Summary  · Carbohydrate counting is a method of keeping track of how many carbohydrates you eat.  · Eating carbohydrates naturally increases the amount of sugar (glucose) in the blood.  · Counting how many carbohydrates you eat helps keep your blood glucose within normal limits, which helps you manage your diabetes.  · A diet and nutrition specialist (registered dietitian) can help you make a meal plan and calculate how many carbohydrates you should have at each meal and snack.  This information is not intended to replace advice given to you by your health care provider. Make sure you discuss any questions you have with your health care provider.  Document Released: 12/18/2006 Document Revised: 06/27/2018 Document Reviewed: 05/31/2017  [employer] Interactive Patient Education © 2019 [employer]

## 2019-12-14 RX ORDER — GABAPENTIN 100 MG/1
100 CAPSULE ORAL SEE ADMIN INSTRUCTIONS
Qty: 60 CAPSULE | Refills: 2 | Status: SHIPPED | OUTPATIENT
Start: 2019-12-14 | End: 2020-03-27 | Stop reason: SDUPTHER

## 2019-12-20 RX ORDER — PSEUDOEPHEDRINE HCL 120 MG/1
120 TABLET, FILM COATED, EXTENDED RELEASE ORAL EVERY 12 HOURS
Qty: 30 TABLET | Refills: 0 | Status: SHIPPED | OUTPATIENT
Start: 2019-12-20 | End: 2020-03-17

## 2019-12-20 RX ORDER — FENOFIBRATE 160 MG/1
160 TABLET ORAL DAILY
Qty: 90 TABLET | Refills: 1 | Status: SHIPPED | OUTPATIENT
Start: 2019-12-20 | End: 2020-06-30

## 2020-01-13 RX ORDER — LANCETS 30 GAUGE
EACH MISCELLANEOUS
Qty: 100 EACH | Refills: 5 | Status: SHIPPED | OUTPATIENT
Start: 2020-01-13 | End: 2020-08-27 | Stop reason: SDUPTHER

## 2020-01-13 RX ORDER — METOPROLOL TARTRATE 50 MG/1
TABLET, FILM COATED ORAL
Qty: 60 TABLET | Refills: 6 | Status: SHIPPED | OUTPATIENT
Start: 2020-01-13 | End: 2020-08-27 | Stop reason: SDUPTHER

## 2020-01-18 ENCOUNTER — HOSPITAL ENCOUNTER (EMERGENCY)
Facility: HOSPITAL | Age: 60
Discharge: HOME | End: 2020-01-18
Attending: FAMILY MEDICINE | Admitting: FAMILY MEDICINE

## 2020-01-18 ENCOUNTER — APPOINTMENT (OUTPATIENT)
Dept: CT IMAGING | Facility: HOSPITAL | Age: 60
End: 2020-01-18

## 2020-01-18 VITALS
RESPIRATION RATE: 20 BRPM | OXYGEN SATURATION: 98 % | SYSTOLIC BLOOD PRESSURE: 167 MMHG | HEIGHT: 63 IN | HEART RATE: 70 BPM | BODY MASS INDEX: 30.48 KG/M2 | DIASTOLIC BLOOD PRESSURE: 72 MMHG | TEMPERATURE: 97.6 F | WEIGHT: 172 LBS

## 2020-01-18 DIAGNOSIS — R10.13 EPIGASTRIC ABDOMINAL PAIN: Primary | ICD-10-CM

## 2020-01-18 LAB
ALBUMIN SERPL-MCNC: 4.33 G/DL (ref 3.5–5.2)
ALBUMIN/GLOB SERPL: 1.6 G/DL
ALP SERPL-CCNC: 63 U/L (ref 39–117)
ALT SERPL W P-5'-P-CCNC: 27 U/L (ref 1–33)
ANION GAP SERPL CALCULATED.3IONS-SCNC: 14.5 MMOL/L (ref 5–15)
AST SERPL-CCNC: 36 U/L (ref 1–32)
BASOPHILS # BLD AUTO: 0.09 10*3/MM3 (ref 0–0.2)
BASOPHILS NFR BLD AUTO: 0.8 % (ref 0–1.5)
BILIRUB SERPL-MCNC: 0.3 MG/DL (ref 0.2–1.2)
BUN BLD-MCNC: 16 MG/DL (ref 6–20)
BUN/CREAT SERPL: 13.1 (ref 7–25)
CALCIUM SPEC-SCNC: 10.3 MG/DL (ref 8.6–10.5)
CHLORIDE SERPL-SCNC: 102 MMOL/L (ref 98–107)
CK SERPL-CCNC: 126 U/L (ref 20–180)
CO2 SERPL-SCNC: 24.5 MMOL/L (ref 22–29)
CREAT BLD-MCNC: 1.22 MG/DL (ref 0.57–1)
CRP SERPL-MCNC: 0.46 MG/DL (ref 0–0.5)
DEPRECATED RDW RBC AUTO: 44.2 FL (ref 37–54)
EOSINOPHIL # BLD AUTO: 0.24 10*3/MM3 (ref 0–0.4)
EOSINOPHIL NFR BLD AUTO: 2.2 % (ref 0.3–6.2)
ERYTHROCYTE [DISTWIDTH] IN BLOOD BY AUTOMATED COUNT: 13.9 % (ref 12.3–15.4)
GFR SERPL CREATININE-BSD FRML MDRD: 45 ML/MIN/1.73
GLOBULIN UR ELPH-MCNC: 2.7 GM/DL
GLUCOSE BLD-MCNC: 115 MG/DL (ref 65–99)
HCT VFR BLD AUTO: 40.2 % (ref 34–46.6)
HGB BLD-MCNC: 13 G/DL (ref 12–15.9)
HOLD SPECIMEN: NORMAL
HOLD SPECIMEN: NORMAL
IMM GRANULOCYTES # BLD AUTO: 0.06 10*3/MM3 (ref 0–0.05)
IMM GRANULOCYTES NFR BLD AUTO: 0.6 % (ref 0–0.5)
LIPASE SERPL-CCNC: 27 U/L (ref 13–60)
LYMPHOCYTES # BLD AUTO: 4.2 10*3/MM3 (ref 0.7–3.1)
LYMPHOCYTES NFR BLD AUTO: 38.7 % (ref 19.6–45.3)
MAGNESIUM SERPL-MCNC: 2 MG/DL (ref 1.6–2.6)
MCH RBC QN AUTO: 28.4 PG (ref 26.6–33)
MCHC RBC AUTO-ENTMCNC: 32.3 G/DL (ref 31.5–35.7)
MCV RBC AUTO: 88 FL (ref 79–97)
MONOCYTES # BLD AUTO: 0.68 10*3/MM3 (ref 0.1–0.9)
MONOCYTES NFR BLD AUTO: 6.3 % (ref 5–12)
NEUTROPHILS # BLD AUTO: 5.59 10*3/MM3 (ref 1.7–7)
NEUTROPHILS NFR BLD AUTO: 51.4 % (ref 42.7–76)
NRBC BLD AUTO-RTO: 0 /100 WBC (ref 0–0.2)
PLATELET # BLD AUTO: 323 10*3/MM3 (ref 140–450)
PMV BLD AUTO: 9.8 FL (ref 6–12)
POTASSIUM BLD-SCNC: 3.8 MMOL/L (ref 3.5–5.2)
PROT SERPL-MCNC: 7 G/DL (ref 6–8.5)
RBC # BLD AUTO: 4.57 10*6/MM3 (ref 3.77–5.28)
SODIUM BLD-SCNC: 141 MMOL/L (ref 136–145)
TROPONIN T SERPL-MCNC: <0.01 NG/ML (ref 0–0.03)
WBC NRBC COR # BLD: 10.86 10*3/MM3 (ref 3.4–10.8)
WHOLE BLOOD HOLD SPECIMEN: NORMAL
WHOLE BLOOD HOLD SPECIMEN: NORMAL

## 2020-01-18 PROCEDURE — 96374 THER/PROPH/DIAG INJ IV PUSH: CPT

## 2020-01-18 PROCEDURE — 83690 ASSAY OF LIPASE: CPT | Performed by: FAMILY MEDICINE

## 2020-01-18 PROCEDURE — 93010 ELECTROCARDIOGRAM REPORT: CPT | Performed by: INTERNAL MEDICINE

## 2020-01-18 PROCEDURE — 96375 TX/PRO/DX INJ NEW DRUG ADDON: CPT

## 2020-01-18 PROCEDURE — 85025 COMPLETE CBC W/AUTO DIFF WBC: CPT | Performed by: FAMILY MEDICINE

## 2020-01-18 PROCEDURE — 80053 COMPREHEN METABOLIC PANEL: CPT | Performed by: FAMILY MEDICINE

## 2020-01-18 PROCEDURE — 82550 ASSAY OF CK (CPK): CPT | Performed by: FAMILY MEDICINE

## 2020-01-18 PROCEDURE — 25010000002 METOCLOPRAMIDE PER 10 MG: Performed by: FAMILY MEDICINE

## 2020-01-18 PROCEDURE — 86140 C-REACTIVE PROTEIN: CPT | Performed by: FAMILY MEDICINE

## 2020-01-18 PROCEDURE — 25010000002 MORPHINE PER 10 MG: Performed by: FAMILY MEDICINE

## 2020-01-18 PROCEDURE — 84484 ASSAY OF TROPONIN QUANT: CPT | Performed by: FAMILY MEDICINE

## 2020-01-18 PROCEDURE — 83735 ASSAY OF MAGNESIUM: CPT | Performed by: FAMILY MEDICINE

## 2020-01-18 PROCEDURE — 99284 EMERGENCY DEPT VISIT MOD MDM: CPT

## 2020-01-18 PROCEDURE — 99283 EMERGENCY DEPT VISIT LOW MDM: CPT

## 2020-01-18 PROCEDURE — 93005 ELECTROCARDIOGRAM TRACING: CPT | Performed by: FAMILY MEDICINE

## 2020-01-18 PROCEDURE — 74176 CT ABD & PELVIS W/O CONTRAST: CPT

## 2020-01-18 RX ORDER — CLARITHROMYCIN 500 MG/1
500 TABLET, COATED ORAL 2 TIMES DAILY
Qty: 20 TABLET | Refills: 0 | Status: SHIPPED | OUTPATIENT
Start: 2020-01-18 | End: 2020-04-06 | Stop reason: SDUPTHER

## 2020-01-18 RX ORDER — ALUMINA, MAGNESIA, AND SIMETHICONE 2400; 2400; 240 MG/30ML; MG/30ML; MG/30ML
15 SUSPENSION ORAL ONCE
Status: COMPLETED | OUTPATIENT
Start: 2020-01-18 | End: 2020-01-18

## 2020-01-18 RX ORDER — METOCLOPRAMIDE HYDROCHLORIDE 5 MG/ML
10 INJECTION INTRAMUSCULAR; INTRAVENOUS ONCE
Status: COMPLETED | OUTPATIENT
Start: 2020-01-18 | End: 2020-01-18

## 2020-01-18 RX ORDER — LIDOCAINE HYDROCHLORIDE 20 MG/ML
15 SOLUTION OROPHARYNGEAL ONCE
Status: COMPLETED | OUTPATIENT
Start: 2020-01-18 | End: 2020-01-18

## 2020-01-18 RX ORDER — SODIUM CHLORIDE 0.9 % (FLUSH) 0.9 %
10 SYRINGE (ML) INJECTION AS NEEDED
Status: DISCONTINUED | OUTPATIENT
Start: 2020-01-18 | End: 2020-01-18 | Stop reason: HOSPADM

## 2020-01-18 RX ORDER — AMOXICILLIN 500 MG/1
1000 CAPSULE ORAL 2 TIMES DAILY
Qty: 40 CAPSULE | Refills: 0 | Status: SHIPPED | OUTPATIENT
Start: 2020-01-18 | End: 2020-03-20

## 2020-01-18 RX ORDER — FAMOTIDINE 10 MG/ML
20 INJECTION, SOLUTION INTRAVENOUS ONCE
Status: COMPLETED | OUTPATIENT
Start: 2020-01-18 | End: 2020-01-18

## 2020-01-18 RX ADMIN — MORPHINE SULFATE 4 MG: 4 INJECTION, SOLUTION INTRAMUSCULAR; INTRAVENOUS at 17:10

## 2020-01-18 RX ADMIN — SODIUM CHLORIDE 1000 ML: 9 INJECTION, SOLUTION INTRAVENOUS at 17:10

## 2020-01-18 RX ADMIN — ALUMINUM HYDROXIDE, MAGNESIUM HYDROXIDE, AND DIMETHICONE 15 ML: 400; 400; 40 SUSPENSION ORAL at 17:11

## 2020-01-18 RX ADMIN — LIDOCAINE HYDROCHLORIDE 15 ML: 20 SOLUTION ORAL; TOPICAL at 17:11

## 2020-01-18 RX ADMIN — METOCLOPRAMIDE 10 MG: 5 INJECTION, SOLUTION INTRAMUSCULAR; INTRAVENOUS at 17:10

## 2020-01-18 RX ADMIN — FAMOTIDINE 20 MG: 10 INJECTION INTRAVENOUS at 17:10

## 2020-01-19 NOTE — ED PROVIDER NOTES
Subjective     History provided by:  Patient  Abdominal Pain   Pain location:  Epigastric  Pain quality: aching and stabbing    Pain radiates to:  Does not radiate  Pain severity:  Moderate  Onset quality:  Gradual  Timing:  Constant  Progression:  Worsening  Chronicity:  Recurrent  Relieved by:  Nothing  Ineffective treatments: Pt has been on doxy x 1 month.  Associated symptoms: nausea    Risk factors comment:  Recently dx w/ h. pylori infection by Dr. Dewey      Review of Systems   Gastrointestinal: Positive for abdominal pain and nausea.       Past Medical History:   Diagnosis Date   • Anxiety    • Arthritis    • Diabetes mellitus (CMS/HCC)    • Diverticulosis    • Gout    • Hyperlipidemia    • Hypertension    • Myocardial infarction (CMS/HCC)        Allergies   Allergen Reactions   • Ciprofloxacin Anaphylaxis   • Penicillins Shortness Of Breath   • Statins Myalgia   • Zofran [Ondansetron Hcl] Shortness Of Breath   • Fish Oil [Omega-3 Fatty Acids] Nausea And Vomiting       Past Surgical History:   Procedure Laterality Date   • APPENDECTOMY     • CARDIAC CATHETERIZATION     •  SECTION     • CHOLECYSTECTOMY     • CORONARY STENT PLACEMENT      Stenting of the RCA with 3.5 x 26 mm Resolute drug-eluting stent   • HYSTERECTOMY     • JOINT REPLACEMENT     • TONSILLECTOMY         Family History   Problem Relation Age of Onset   • Arthritis Mother    • Heart disease Mother    • Heart disease Sister    • Vision loss Daughter    • Heart disease Father        Social History     Socioeconomic History   • Marital status:      Spouse name: Not on file   • Number of children: 2   • Years of education: 12   • Highest education level: Not on file   Occupational History   • Occupation: [employer]   Social Needs   • Financial resource strain: Somewhat hard   • Food insecurity:     Worry: Never true     Inability: Never true   • Transportation needs:     Medical: No     Non-medical: No   Tobacco Use   • Smoking  status: Never Smoker   • Smokeless tobacco: Never Used   Substance and Sexual Activity   • Alcohol use: No   • Drug use: No   • Sexual activity: Defer   Lifestyle   • Physical activity:     Days per week: 0 days     Minutes per session: 0 min   • Stress: Very much   Relationships   • Social connections:     Talks on phone: More than three times a week     Gets together: More than three times a week     Attends L[employer] day service: More than 4 times per year     Active member of club or organization: No     Attends meetings of clubs or organizations: Never     Relationship status:            Objective   Physical Exam   Constitutional: She is oriented to person, place, and time. She appears well-developed and well-nourished. No distress.   HENT:   Head: Normocephalic and atraumatic.   Right Ear: External ear normal.   Left Ear: External ear normal.   Nose: Nose normal.   Eyes: Conjunctivae are normal.   Neck: Normal range of motion. Neck supple. No JVD present. No tracheal deviation present.   Cardiovascular: Normal rate, regular rhythm and normal heart sounds.   No murmur heard.  Pulmonary/Chest: Effort normal and breath sounds normal. No respiratory distress. She has no wheezes.   Abdominal: Soft. Bowel sounds are normal. There is tenderness in the epigastric area.   Musculoskeletal: Normal range of motion. She exhibits no edema or deformity.   Neurological: She is alert and oriented to person, place, and time. No cranial nerve deficit.   Skin: Skin is warm and dry. No rash noted. She is not diaphoretic. No erythema. No pallor.   Psychiatric: She has a normal mood and affect. Her behavior is normal. Thought content normal.   Nursing note and vitals reviewed.      Procedures           ED Course  ED Course as of Jan 18 2031   Sat Jan 18, 2020 2022 CT abd pelvis rad interpreted;  Negative CT of the abdomen and pelvis. Surgical absence of the gallbladder.       [RB]   2030 Pt states that she has never had a  known allergy to PCN.     [RB]      ED Course User Index  [RB] Oneal Del Angel II, PA                                               MDM  Number of Diagnoses or Management Options  Epigastric abdominal pain: established and worsening     Amount and/or Complexity of Data Reviewed  Clinical lab tests: ordered and reviewed  Tests in the radiology section of CPT®: ordered and reviewed  Decide to obtain previous medical records or to obtain history from someone other than the patient: yes    Risk of Complications, Morbidity, and/or Mortality  Presenting problems: moderate  Diagnostic procedures: moderate  Management options: low    Patient Progress  Patient progress: stable      Final diagnoses:   Epigastric abdominal pain            Oneal Del Angel II, PA  01/18/20 2031

## 2020-01-24 ENCOUNTER — OFFICE VISIT (OUTPATIENT)
Dept: FAMILY MEDICINE CLINIC | Facility: CLINIC | Age: 60
End: 2020-01-24

## 2020-01-24 VITALS
OXYGEN SATURATION: 97 % | TEMPERATURE: 97.7 F | HEIGHT: 63 IN | HEART RATE: 111 BPM | SYSTOLIC BLOOD PRESSURE: 160 MMHG | DIASTOLIC BLOOD PRESSURE: 90 MMHG | WEIGHT: 173 LBS | BODY MASS INDEX: 30.65 KG/M2

## 2020-01-24 DIAGNOSIS — E11.65 TYPE 2 DIABETES MELLITUS WITH HYPERGLYCEMIA, WITH LONG-TERM CURRENT USE OF INSULIN (HCC): Primary | ICD-10-CM

## 2020-01-24 DIAGNOSIS — R10.13 EPIGASTRIC PAIN: ICD-10-CM

## 2020-01-24 DIAGNOSIS — A04.8 H. PYLORI INFECTION: ICD-10-CM

## 2020-01-24 DIAGNOSIS — G25.81 RLS (RESTLESS LEGS SYNDROME): ICD-10-CM

## 2020-01-24 DIAGNOSIS — M19.012 ARTHRITIS OF LEFT SHOULDER REGION: ICD-10-CM

## 2020-01-24 DIAGNOSIS — Z79.4 TYPE 2 DIABETES MELLITUS WITH HYPERGLYCEMIA, WITH LONG-TERM CURRENT USE OF INSULIN (HCC): Primary | ICD-10-CM

## 2020-01-24 PROCEDURE — 20610 DRAIN/INJ JOINT/BURSA W/O US: CPT | Performed by: PHYSICIAN ASSISTANT

## 2020-01-24 PROCEDURE — 99214 OFFICE O/P EST MOD 30 MIN: CPT | Performed by: PHYSICIAN ASSISTANT

## 2020-01-24 RX ORDER — DEXAMETHASONE SODIUM PHOSPHATE 4 MG/ML
4 INJECTION, SOLUTION INTRA-ARTICULAR; INTRALESIONAL; INTRAMUSCULAR; INTRAVENOUS; SOFT TISSUE ONCE
Status: COMPLETED | OUTPATIENT
Start: 2020-01-24 | End: 2020-01-24

## 2020-01-24 RX ORDER — ROPINIROLE 2 MG/1
2 TABLET, FILM COATED ORAL NIGHTLY
Qty: 30 TABLET | Refills: 5 | Status: SHIPPED | OUTPATIENT
Start: 2020-01-24 | End: 2020-08-27 | Stop reason: SDUPTHER

## 2020-01-24 RX ORDER — TRIAMCINOLONE ACETONIDE 40 MG/ML
40 INJECTION, SUSPENSION INTRA-ARTICULAR; INTRAMUSCULAR ONCE
Status: COMPLETED | OUTPATIENT
Start: 2020-01-24 | End: 2020-01-24

## 2020-01-24 RX ADMIN — DEXAMETHASONE SODIUM PHOSPHATE 4 MG: 4 INJECTION, SOLUTION INTRA-ARTICULAR; INTRALESIONAL; INTRAMUSCULAR; INTRAVENOUS; SOFT TISSUE at 12:49

## 2020-01-24 RX ADMIN — TRIAMCINOLONE ACETONIDE 40 MG: 40 INJECTION, SUSPENSION INTRA-ARTICULAR; INTRAMUSCULAR at 12:49

## 2020-01-24 NOTE — PATIENT INSTRUCTIONS
Joint Steroid Injection  A joint steroid injection is a procedure to relieve swelling and pain in a joint. Steroids are medicines that reduce inflammation. In this procedure, your health care provider uses a syringe and a needle to inject a steroid medicine into a painful and inflamed joint. A pain-relieving medicine (anesthetic) may be injected along with the steroid. In some cases, your health care provider may use an imaging technique such as ultrasound or fluoroscopy to guide the injection.  Joints that are often treated with steroid injections include the knee, shoulder, hip, and spine. These injections may also be used in the elbow, ankle, and joints of the hands or feet. You may have joint steroid injections as part of your treatment for inflammation caused by:  · Gout.  · Rheumatoid arthritis.  · Advanced wear-and-tear arthritis (osteoarthritis).  · Tendinitis.  · Bursitis.  Joint steroid injections may be repeated, but having them too often can damage a joint or the skin over the joint. You should not have joint steroid injections less than 6 weeks apart or more than four times a year.  Tell a health care provider about:  · Any allergies you have.  · All medicines you are taking, including vitamins, herbs, eye drops, creams, and over-the-counter medicines.  · Any problems you or family members have had with anesthetic medicines.  · Any blood disorders you have.  · Any surgeries you have had.  · Any medical conditions you have.  · Whether you are pregnant or may be pregnant.  What are the risks?  Generally, this is a safe treatment. However, problems may occur, including:  · Infection.  · Bleeding.  · Allergic reactions to medicines.  · Damage to the joint or tissues around the joint.  · Thinning of skin or loss of skin color over the joint.  · Temporary flushing of the face or chest.  · Temporary increase in pain.  · Temporary increase in blood sugar.  · Failure to relieve inflammation or pain.  What  happens before the treatment?  · You may have imaging tests of your joint.  · Ask your health care provider about:  ? Changing or stopping your regular medicines. This is especially important if you are taking diabetes medicines or blood thinners.  ? Taking medicines such as aspirin and ibuprofen. These medicines can thin your blood. Do not take these medicines unless your health care provider tells you to take them.  ? Taking over-the-counter medicines, vitamins, herbs, and supplements.  · Ask your health care provider if you can drive yourself home after the procedure.  What happens during the treatment?    · Your health care provider will position you for the injection and locate the injection site over your joint.  · The skin over the joint will be cleaned with a germ-killing soap.  · Your health care provider may:  ? Spray a numbing solution (topical anesthetic) over the injection site.  ? Inject a local anesthetic under the skin above your joint.  · The needle will be placed through your skin into your joint. Your health care provider may use imaging to guide the needle to the right spot for the injection. If imaging is used, a special contrast dye may be injected to confirm that the needle is in the correct location.  · The steroid medicine will be injected into your joint.  · Anesthetic may be injected along with the steroid. This may be a medicine that relieves pain for a short time (short-acting anesthetic) or for a longer time (long-acting anesthetic).  · The needle will be removed, and an adhesive bandage (dressing) will be placed over the injection site.  The procedure may vary among health care providers and hospitals.  What can I expect after the treatment?  · You will be able to go home after the treatment.  · It is normal to feel slight flushing for a few days after the injection.  · After the treatment, it is common to have an increase in joint pain after the anesthetic has worn off. This may  happen about an hour after a short-acting anesthetic or about 8 hours after a longer-acting anesthetic.  · You should begin to feel relief from joint pain and swelling after 24 to 48 hours.  Follow these instructions at home:  Injection site care  · Leave the adhesive dressing over your injection site in place until your health care provider says you can remove it.  · Check your injection site every day for signs of infection. Check for:  ? Redness, swelling, or pain.  ? Fluid or blood.  ? Warmth.  ? Pus or a bad smell.  Activity  · Return to your normal activities as told by your health care provider. Ask your health care provider what activities are safe for you. You may be asked to limit activities that put stress on the joint for a few days.  · Do joint exercises as told by your health care provider.  · Do not take baths, swim, or use a hot tub until your health care provider approves.  Managing pain, stiffness, and swelling    · If directed, put ice on the joint.  ? Put ice in a plastic bag.  ? Place a towel between your skin and the bag.  ? Leave the ice on for 20 minutes, 2-3 times a day.  · Raise (elevate) your joint above the level of your heart when you are sitting or lying down.  General instructions  · Take over-the-counter and prescription medicines only as told by your health care provider.  · Do not use any products that contain nicotine or tobacco, such as cigarettes, e-cigarettes, and chewing tobacco. These can delay joint healing. If you need help quitting, ask your health care provider.  · If you have diabetes, be aware that your blood sugar may be slightly elevated for several days after the injection.  · Keep all follow-up visits as told by your health care provider. This is important.  Contact a health care provider if you have:  · Chills or a fever.  · Any signs of infection at your injection site.  · Increased pain or swelling or no relief after 2 days.  Summary  · A joint steroid injection  is a treatment to relieve pain and swelling in a joint.  · Steroids are medicines that reduce inflammation. Your health care provider may add an anesthetic along with the steroid.  · You may have joint steroid injections as part of your arthritis treatment.  · Joint steroid injections may be repeated, but having them too often can damage a joint or the skin over the joint.  · Contact your health care provider if you have a fever, chills, or signs of infection or if you get no relief from joint pain or swelling.  This information is not intended to replace advice given to you by your health care provider. Make sure you discuss any questions you have with your health care provider.  Document Released: 08/20/2019 Document Revised: 08/20/2019 Document Reviewed: 08/20/2019  [employer] Interactive Patient Education © 2019 [employer]

## 2020-01-24 NOTE — PROGRESS NOTES
Subjective   Monisha Gardner is a 59 y.o. female.       Chief Complaint -diabetes    History of Present Illness -      Diabetes-  She complains of hyperglycemia and uncontrolled diabetes mellitus.  She states that she was unable to tolerate Ozempic due to weight gain.  She has been taking Basaglar 40 units every morning and metformin.  She reports home fasting blood glucose runs around 200 or more.    Abdominal pain-  She complains of intermittent moderate to severe crampy epigastric pain.  She was seen by Dr. Pagan/gastroenterologist and diagnosed with H. pylori.  She states that she has been on the treatment regimen for H. pylori for the last month with out symptom relief.  She is interested in a second opinion.  Not at goal    Leg pain-  She continues to complain of leg pain.  She states she has not been getting her Requip from the pharmacy due to some confusion.  She describes a moderate crampy pain that occurs at night with involuntary leg movements.  Not at goal    Shoulder pain-  She complains of moderate to severe sharp left shoulder pain that is worse with certain movements.  She does have associated crepitus.  Decreased range of joint motion with abduction or internal rotation limited due to pain.  No known injury.  Minimal relief with Tylenol.    The following portions of the patient's history were reviewed and updated as appropriate: allergies, current medications, past family history, past medical history, past social history, past surgical history and problem list.    Review of Systems   Constitutional: Positive for fatigue. Negative for activity change, appetite change and fever.   HENT: Negative for ear pain, sinus pressure and sore throat.    Eyes: Negative for pain and visual disturbance.   Respiratory: Negative for cough and chest tightness.    Cardiovascular: Negative for chest pain and palpitations.   Gastrointestinal: Positive for abdominal pain and nausea. Negative for constipation, diarrhea  "and vomiting.   Endocrine: Negative for polydipsia and polyuria.   Genitourinary: Negative for dysuria and frequency.   Musculoskeletal: Positive for arthralgias and joint swelling. Negative for back pain and myalgias.        Leg pain, shoulder pain   Skin: Negative for color change and rash.   Allergic/Immunologic: Negative for food allergies and immunocompromised state.   Neurological: Negative for dizziness, syncope and headaches.   Hematological: Negative for adenopathy. Does not bruise/bleed easily.   Psychiatric/Behavioral: Negative for hallucinations and suicidal ideas. The patient is not nervous/anxious.        /90   Pulse 111   Temp 97.7 °F (36.5 °C) (Temporal)   Ht 160 cm (63\")   Wt 78.5 kg (173 lb)   LMP  (LMP Unknown)   SpO2 97%   BMI 30.65 kg/m²   Admission on 01/18/2020, Discharged on 01/18/2020   Component Date Value Ref Range Status   • Glucose 01/18/2020 115* 65 - 99 mg/dL Final   • BUN 01/18/2020 16  6 - 20 mg/dL Final   • Creatinine 01/18/2020 1.22* 0.57 - 1.00 mg/dL Final   • Sodium 01/18/2020 141  136 - 145 mmol/L Final   • Potassium 01/18/2020 3.8  3.5 - 5.2 mmol/L Final   • Chloride 01/18/2020 102  98 - 107 mmol/L Final   • CO2 01/18/2020 24.5  22.0 - 29.0 mmol/L Final   • Calcium 01/18/2020 10.3  8.6 - 10.5 mg/dL Final   • Total Protein 01/18/2020 7.0  6.0 - 8.5 g/dL Final   • Albumin 01/18/2020 4.33  3.50 - 5.20 g/dL Final   • ALT (SGPT) 01/18/2020 27  1 - 33 U/L Final   • AST (SGOT) 01/18/2020 36* 1 - 32 U/L Final   • Alkaline Phosphatase 01/18/2020 63  39 - 117 U/L Final   • Total Bilirubin 01/18/2020 0.3  0.2 - 1.2 mg/dL Final   • eGFR Non African Amer 01/18/2020 45* >60 mL/min/1.73 Final   • Globulin 01/18/2020 2.7  gm/dL Final   • A/G Ratio 01/18/2020 1.6  g/dL Final   • BUN/Creatinine Ratio 01/18/2020 13.1  7.0 - 25.0 Final   • Anion Gap 01/18/2020 14.5  5.0 - 15.0 mmol/L Final   • Lipase 01/18/2020 27  13 - 60 U/L Final   • Troponin T 01/18/2020 <0.010  0.000 - 0.030 " ng/mL Final   • C-Reactive Protein 01/18/2020 0.46  0.00 - 0.50 mg/dL Final   • Magnesium 01/18/2020 2.0  1.6 - 2.6 mg/dL Final   • Creatine Kinase 01/18/2020 126  20 - 180 U/L Final   • WBC 01/18/2020 10.86* 3.40 - 10.80 10*3/mm3 Final   • RBC 01/18/2020 4.57  3.77 - 5.28 10*6/mm3 Final   • Hemoglobin 01/18/2020 13.0  12.0 - 15.9 g/dL Final   • Hematocrit 01/18/2020 40.2  34.0 - 46.6 % Final   • MCV 01/18/2020 88.0  79.0 - 97.0 fL Final   • MCH 01/18/2020 28.4  26.6 - 33.0 pg Final   • MCHC 01/18/2020 32.3  31.5 - 35.7 g/dL Final   • RDW 01/18/2020 13.9  12.3 - 15.4 % Final   • RDW-SD 01/18/2020 44.2  37.0 - 54.0 fl Final   • MPV 01/18/2020 9.8  6.0 - 12.0 fL Final   • Platelets 01/18/2020 323  140 - 450 10*3/mm3 Final   • Neutrophil % 01/18/2020 51.4  42.7 - 76.0 % Final   • Lymphocyte % 01/18/2020 38.7  19.6 - 45.3 % Final   • Monocyte % 01/18/2020 6.3  5.0 - 12.0 % Final   • Eosinophil % 01/18/2020 2.2  0.3 - 6.2 % Final   • Basophil % 01/18/2020 0.8  0.0 - 1.5 % Final   • Immature Grans % 01/18/2020 0.6* 0.0 - 0.5 % Final   • Neutrophils, Absolute 01/18/2020 5.59  1.70 - 7.00 10*3/mm3 Final   • Lymphocytes, Absolute 01/18/2020 4.20* 0.70 - 3.10 10*3/mm3 Final   • Monocytes, Absolute 01/18/2020 0.68  0.10 - 0.90 10*3/mm3 Final   • Eosinophils, Absolute 01/18/2020 0.24  0.00 - 0.40 10*3/mm3 Final   • Basophils, Absolute 01/18/2020 0.09  0.00 - 0.20 10*3/mm3 Final   • Immature Grans, Absolute 01/18/2020 0.06* 0.00 - 0.05 10*3/mm3 Final   • nRBC 01/18/2020 0.0  0.0 - 0.2 /100 WBC Final   • Extra Tube 01/18/2020 hold for add-on   Final   • Extra Tube 01/18/2020 Hold for add-ons.   Final   • Extra Tube 01/18/2020 hold for add-on   Final   • Extra Tube 01/18/2020 Hold for add-ons.   Final       Physical Exam   Constitutional: She is oriented to person, place, and time. She appears well-developed and well-nourished.   HENT:   Head: Normocephalic and atraumatic.   Nose: Nose normal.   Mouth/Throat: Oropharynx is clear  and moist.   Eyes: Pupils are equal, round, and reactive to light. Conjunctivae and EOM are normal.   Neck: Normal range of motion. Neck supple. No tracheal deviation present. No thyromegaly present.   Cardiovascular: Normal rate, regular rhythm, normal heart sounds and intact distal pulses.   No murmur heard.  Pulmonary/Chest: Effort normal and breath sounds normal. No respiratory distress. She has no wheezes.   Abdominal: Soft. Bowel sounds are normal. She exhibits no mass. There is tenderness (epigastric). There is no guarding. No hernia.   Musculoskeletal: She exhibits tenderness. She exhibits no edema.   Decreased range of joint motion with abduction or internal rotation of the left shoulder.  Pain reproducible with palpation of anterior left shoulder joint.   Lymphadenopathy:     She has no cervical adenopathy.   Neurological: She is alert and oriented to person, place, and time.   Skin: Skin is warm and dry. No rash noted.   Psychiatric: She has a normal mood and affect. Her behavior is normal.   Nursing note and vitals reviewed.      Assessment/Plan     Diagnoses and all orders for this visit:    Type 2 diabetes mellitus with hyperglycemia, with long-term current use of insulin (CMS/[facility])  Comments:  change Basaglar to 20 units qam and 40 units in evening  DO home F and 2hr pp BG log and bring to next visit    H. pylori infection  -     Ambulatory Referral to Gastroenterology    Epigastric pain  -     Ambulatory Referral to Gastroenterology    RLS (restless legs syndrome)  Comments:  start Requip 2 mg daily  Orders:  -     rOPINIRole (REQUIP) 2 MG tablet; Take 1 tablet by mouth Every Night.    Arthritis of left shoulder region  Comments:  Left shoulder intra-articular injection was performed today  Prescription written for topical pain cream from [facility] pharmacy    Procedure: Left shoulder intra-articular injection  Provider: Kimmy Vela PA-C  Indication: Left shoulder pain due to  osteoarthritis  Description: The left shoulder was prepped and draped in sterile fashion.  An intra-articular injection was given using 3 cc of 1% lidocaine, 1 cc of dexamethasone, 1 cc of triamcinolone.  Pressure was held and sterile dressings were placed.  No complication  Estimated blood loss: Minimal  Patient tolerated the procedure well    Lidocaine 1% 500 mg per 50 mL NDC number 7829-7400-64  Lot #[id number]  Expiration 5/2020        This document has been electronically signed by:  Kimmy Vela PA-C

## 2020-02-18 ENCOUNTER — TELEPHONE (OUTPATIENT)
Dept: FAMILY MEDICINE CLINIC | Facility: CLINIC | Age: 60
End: 2020-02-18

## 2020-02-18 DIAGNOSIS — Z01.10 ENCOUNTER FOR HEARING EXAMINATION, UNSPECIFIED WHETHER ABNORMAL FINDINGS: Primary | ICD-10-CM

## 2020-02-18 NOTE — TELEPHONE ENCOUNTER
Rebecca is working on it.        ----- Message from Erika Long, Mark Rep sent at 2/13/2020  1:31 PM EST -----  Regarding: referral   Monisha [facility] [place].. Needs to say hearing evaluation/treat   Says her and Kimmy discussed at last visit.      [phone] F [phone]

## 2020-03-13 DIAGNOSIS — F41.9 ANXIETY AND DEPRESSION: ICD-10-CM

## 2020-03-13 DIAGNOSIS — F32.A ANXIETY AND DEPRESSION: ICD-10-CM

## 2020-03-13 DIAGNOSIS — M1A.09X0 IDIOPATHIC CHRONIC GOUT OF MULTIPLE SITES WITHOUT TOPHUS: ICD-10-CM

## 2020-03-13 RX ORDER — ISOSORBIDE MONONITRATE 60 MG/1
TABLET, EXTENDED RELEASE ORAL
Qty: 90 TABLET | Refills: 2 | Status: SHIPPED | OUTPATIENT
Start: 2020-03-13 | End: 2020-08-27 | Stop reason: SDUPTHER

## 2020-03-13 RX ORDER — CHLORTHALIDONE 25 MG/1
25 TABLET ORAL DAILY
Qty: 30 TABLET | Refills: 5 | Status: SHIPPED | OUTPATIENT
Start: 2020-03-13 | End: 2020-08-27 | Stop reason: SDUPTHER

## 2020-03-14 RX ORDER — DULOXETIN HYDROCHLORIDE 60 MG/1
CAPSULE, DELAYED RELEASE ORAL
Qty: 30 CAPSULE | Refills: 5 | Status: SHIPPED | OUTPATIENT
Start: 2020-03-14 | End: 2020-08-27 | Stop reason: SDUPTHER

## 2020-03-14 RX ORDER — ALLOPURINOL 100 MG/1
TABLET ORAL
Qty: 60 TABLET | Refills: 5 | Status: SHIPPED | OUTPATIENT
Start: 2020-03-14 | End: 2020-08-27 | Stop reason: SDUPTHER

## 2020-03-17 RX ORDER — PSEUDOEPHEDRINE HCL 120 MG/1
TABLET, FILM COATED, EXTENDED RELEASE ORAL
Qty: 30 TABLET | Refills: 0 | Status: SHIPPED | OUTPATIENT
Start: 2020-03-17 | End: 2020-04-09

## 2020-03-19 ENCOUNTER — TELEPHONE (OUTPATIENT)
Dept: FAMILY MEDICINE CLINIC | Facility: CLINIC | Age: 60
End: 2020-03-19

## 2020-03-19 NOTE — TELEPHONE ENCOUNTER
----- Message from ANH Medina sent at 3/17/2020  2:16 PM EDT -----  Inform the patient that her endoscopy results from [facility] did show severe gastritis with bleeding.  Does she have a follow-up with the GI specialist?    Monisha said they did not make her another appt and she said she vomits and she is very concerned about this and wants to know what she should do what do you recommend

## 2020-03-20 DIAGNOSIS — K29.30 CHRONIC SUPERFICIAL GASTRITIS, PRESENCE OF BLEEDING UNSPECIFIED: Primary | ICD-10-CM

## 2020-03-20 RX ORDER — SUCRALFATE 1 G/1
1 TABLET ORAL
Qty: 120 TABLET | Refills: 1 | Status: SHIPPED | OUTPATIENT
Start: 2020-03-20 | End: 2020-06-30

## 2020-03-20 NOTE — TELEPHONE ENCOUNTER
Please inform the patient that I sent a prescription for her to take Carafate before meals and at bedtime (4 times daily) for the next month due to her severe gastritis with bleeding.  The pathology results are still pending but she should be able to receive those from her surgeon/GI specialist.  Make her a follow-up visit with me in 8 weeks please

## 2020-03-27 ENCOUNTER — TELEPHONE (OUTPATIENT)
Dept: FAMILY MEDICINE CLINIC | Facility: CLINIC | Age: 60
End: 2020-03-27

## 2020-03-27 DIAGNOSIS — F41.9 ANXIETY: Primary | ICD-10-CM

## 2020-03-27 RX ORDER — GABAPENTIN 100 MG/1
100 CAPSULE ORAL SEE ADMIN INSTRUCTIONS
Qty: 60 CAPSULE | Refills: 1 | Status: SHIPPED | OUTPATIENT
Start: 2020-03-27 | End: 2020-05-28 | Stop reason: SDUPTHER

## 2020-03-27 RX ORDER — BUSPIRONE HYDROCHLORIDE 15 MG/1
15 TABLET ORAL 2 TIMES DAILY
Qty: 60 TABLET | Refills: 2 | Status: SHIPPED | OUTPATIENT
Start: 2020-03-27 | End: 2020-06-05

## 2020-03-27 NOTE — TELEPHONE ENCOUNTER
Pt is aware of this information.      ----- Message from ANH Medina sent at 3/27/2020  3:30 PM EDT -----  Inform the patient that I sent her in a prescription for BuSpar which is an anxiety medicine that can be taken twice a day along with her current continuation of Cymbalta.  Please put her on my schedule in 2 weeks for a video visit follow-up      ----- Message -----  From: Sophy Carmona MA  Sent: 3/27/2020  10:22 AM EDT  To: ANH Medina    Patient called and stated that her nerve medicine is not helping. She said that she has a lot going on right now and she just cries and cries. She wants to know if there is anything different you could give her?

## 2020-03-31 DIAGNOSIS — K20.90 ESOPHAGITIS: ICD-10-CM

## 2020-03-31 RX ORDER — DEXLANSOPRAZOLE 60 MG/1
60 CAPSULE, DELAYED RELEASE ORAL DAILY
Qty: 30 CAPSULE | Refills: 2 | Status: SHIPPED | OUTPATIENT
Start: 2020-03-31 | End: 2020-06-30 | Stop reason: SDUPTHER

## 2020-04-06 ENCOUNTER — TELEPHONE (OUTPATIENT)
Dept: FAMILY MEDICINE CLINIC | Facility: CLINIC | Age: 60
End: 2020-04-06

## 2020-04-06 RX ORDER — CLARITHROMYCIN 500 MG/1
500 TABLET, COATED ORAL 2 TIMES DAILY
Qty: 20 TABLET | Refills: 0 | Status: SHIPPED | OUTPATIENT
Start: 2020-04-06 | End: 2020-06-05

## 2020-04-06 RX ORDER — ALBUTEROL SULFATE 90 UG/1
2 AEROSOL, METERED RESPIRATORY (INHALATION) EVERY 4 HOURS PRN
Qty: 1 INHALER | Refills: 5 | Status: SHIPPED | OUTPATIENT
Start: 2020-04-06 | End: 2020-08-27 | Stop reason: SDUPTHER

## 2020-04-06 NOTE — TELEPHONE ENCOUNTER
Pt is aware of this information.       ----- Message from TRUONG Gonzalez sent at 4/6/2020  4:21 PM EDT -----    I sent her some antibiotics and an inhaler.  I will encourage her to push fluids and to not let her feet and legs dangle.  If her shortness of breath increases any further she may need to seek emergent care.      ----- Message -----  From: Sophy Carmona MA  Sent: 4/6/2020   4:13 PM EDT  To: TRUONG Gonzalez    Patient called in and stated that she started getting sick last night.     She has a cough, sob (feels like she is smothering), wheezing. No fever, chills or sore throat. She also said her feet and legs are swelled up.     She wanted to know what she needed to do?

## 2020-04-09 ENCOUNTER — TELEPHONE (OUTPATIENT)
Dept: FAMILY MEDICINE CLINIC | Facility: CLINIC | Age: 60
End: 2020-04-09

## 2020-04-09 ENCOUNTER — OFFICE VISIT (OUTPATIENT)
Dept: FAMILY MEDICINE CLINIC | Facility: CLINIC | Age: 60
End: 2020-04-09

## 2020-04-09 ENCOUNTER — LAB (OUTPATIENT)
Dept: LAB | Facility: HOSPITAL | Age: 60
End: 2020-04-09

## 2020-04-09 DIAGNOSIS — R50.9 FEVER, UNSPECIFIED FEVER CAUSE: Primary | ICD-10-CM

## 2020-04-09 DIAGNOSIS — R05.9 COUGH: ICD-10-CM

## 2020-04-09 DIAGNOSIS — R50.9 FEVER, UNSPECIFIED FEVER CAUSE: ICD-10-CM

## 2020-04-09 PROCEDURE — 99442 PR PHYS/QHP TELEPHONE EVALUATION 11-20 MIN: CPT | Performed by: PHYSICIAN ASSISTANT

## 2020-04-09 PROCEDURE — U0003 INFECTIOUS AGENT DETECTION BY NUCLEIC ACID (DNA OR RNA); SEVERE ACUTE RESPIRATORY SYNDROME CORONAVIRUS 2 (SARS-COV-2) (CORONAVIRUS DISEASE [COVID-19]), AMPLIFIED PROBE TECHNIQUE, MAKING USE OF HIGH THROUGHPUT TECHNOLOGIES AS DESCRIBED BY CMS-2020-01-R: HCPCS

## 2020-04-09 PROCEDURE — 87635 SARS-COV-2 COVID-19 AMP PRB: CPT

## 2020-04-09 NOTE — TELEPHONE ENCOUNTER
Patient is being scheduled for a visit with attila.     ----- Message from ANH Medina sent at 4/9/2020 10:49 AM EDT -----  Put her on for video/telephone visit today with me    ----- Message -----  From: Sophy Carmona MA  Sent: 4/9/2020  10:31 AM EDT  To: ANH Medina    Patient called back this morning and stated that is still sick and it is not getting any better.     She has a horrible cough (so bad she could barley talk to me on the phone for coughing), fever (yesterday that was 102), SOB (feels like she is smothering), wheezing, and feels horrible.     She said her family told her she needs to be tested for the coronavirus but she thinks she is okay. She wanted to see what you recommended.

## 2020-04-10 NOTE — PROGRESS NOTES
Subjective   Monisha Gardner is a 59 y.o. female.     Telephone visit    You have chosen to receive care through a telephone visit. Do you consent to use a telephone visit for your medical care today? Yes      Chief Complaint -fever    History of Present Illness -      Fever-  She complains of fever, body aches, sharp shooting pains in her upper back, shortness of breath, chest tightness with nonproductive cough, fever and extreme fatigue for the past 4 days.  She reports symptoms have worsened despite the use of clarithromycin and albuterol.  Her oral antibiotic choices are limited due to her allergies.    The following portions of the patient's history were reviewed and updated as appropriate: allergies, current medications, past family history, past medical history, past social history, past surgical history and problem list.    Review of Systems   Constitutional: Positive for activity change, appetite change, chills, fatigue and fever.   HENT: Positive for congestion. Negative for ear pain, sinus pressure and sore throat.    Eyes: Negative for pain and visual disturbance.   Respiratory: Positive for cough, chest tightness and shortness of breath.    Cardiovascular: Positive for chest pain. Negative for palpitations.   Gastrointestinal: Negative for abdominal pain, constipation, diarrhea, nausea and vomiting.   Endocrine: Negative for polydipsia and polyuria.   Genitourinary: Negative for dysuria and frequency.   Musculoskeletal: Negative for back pain and myalgias.   Skin: Negative for color change and rash.   Allergic/Immunologic: Negative for food allergies and immunocompromised state.   Neurological: Negative for dizziness, syncope and headaches.   Hematological: Negative for adenopathy. Does not bruise/bleed easily.   Psychiatric/Behavioral: Negative for hallucinations and suicidal ideas. The patient is not nervous/anxious.        LMP  (LMP Unknown)   Admission on 01/18/2020, Discharged on 01/18/2020    Component Date Value Ref Range Status   • Glucose 01/18/2020 115* 65 - 99 mg/dL Final   • BUN 01/18/2020 16  6 - 20 mg/dL Final   • Creatinine 01/18/2020 1.22* 0.57 - 1.00 mg/dL Final   • Sodium 01/18/2020 141  136 - 145 mmol/L Final   • Potassium 01/18/2020 3.8  3.5 - 5.2 mmol/L Final   • Chloride 01/18/2020 102  98 - 107 mmol/L Final   • CO2 01/18/2020 24.5  22.0 - 29.0 mmol/L Final   • Calcium 01/18/2020 10.3  8.6 - 10.5 mg/dL Final   • Total Protein 01/18/2020 7.0  6.0 - 8.5 g/dL Final   • Albumin 01/18/2020 4.33  3.50 - 5.20 g/dL Final   • ALT (SGPT) 01/18/2020 27  1 - 33 U/L Final   • AST (SGOT) 01/18/2020 36* 1 - 32 U/L Final   • Alkaline Phosphatase 01/18/2020 63  39 - 117 U/L Final   • Total Bilirubin 01/18/2020 0.3  0.2 - 1.2 mg/dL Final   • eGFR Non African Amer 01/18/2020 45* >60 mL/min/1.73 Final   • Globulin 01/18/2020 2.7  gm/dL Final   • A/G Ratio 01/18/2020 1.6  g/dL Final   • BUN/Creatinine Ratio 01/18/2020 13.1  7.0 - 25.0 Final   • Anion Gap 01/18/2020 14.5  5.0 - 15.0 mmol/L Final   • Lipase 01/18/2020 27  13 - 60 U/L Final   • Troponin T 01/18/2020 <0.010  0.000 - 0.030 ng/mL Final   • C-Reactive Protein 01/18/2020 0.46  0.00 - 0.50 mg/dL Final   • Magnesium 01/18/2020 2.0  1.6 - 2.6 mg/dL Final   • Creatine Kinase 01/18/2020 126  20 - 180 U/L Final   • WBC 01/18/2020 10.86* 3.40 - 10.80 10*3/mm3 Final   • RBC 01/18/2020 4.57  3.77 - 5.28 10*6/mm3 Final   • Hemoglobin 01/18/2020 13.0  12.0 - 15.9 g/dL Final   • Hematocrit 01/18/2020 40.2  34.0 - 46.6 % Final   • MCV 01/18/2020 88.0  79.0 - 97.0 fL Final   • MCH 01/18/2020 28.4  26.6 - 33.0 pg Final   • MCHC 01/18/2020 32.3  31.5 - 35.7 g/dL Final   • RDW 01/18/2020 13.9  12.3 - 15.4 % Final   • RDW-SD 01/18/2020 44.2  37.0 - 54.0 fl Final   • MPV 01/18/2020 9.8  6.0 - 12.0 fL Final   • Platelets 01/18/2020 323  140 - 450 10*3/mm3 Final   • Neutrophil % 01/18/2020 51.4  42.7 - 76.0 % Final   • Lymphocyte % 01/18/2020 38.7  19.6 - 45.3 % Final    • Monocyte % 01/18/2020 6.3  5.0 - 12.0 % Final   • Eosinophil % 01/18/2020 2.2  0.3 - 6.2 % Final   • Basophil % 01/18/2020 0.8  0.0 - 1.5 % Final   • Immature Grans % 01/18/2020 0.6* 0.0 - 0.5 % Final   • Neutrophils, Absolute 01/18/2020 5.59  1.70 - 7.00 10*3/mm3 Final   • Lymphocytes, Absolute 01/18/2020 4.20* 0.70 - 3.10 10*3/mm3 Final   • Monocytes, Absolute 01/18/2020 0.68  0.10 - 0.90 10*3/mm3 Final   • Eosinophils, Absolute 01/18/2020 0.24  0.00 - 0.40 10*3/mm3 Final   • Basophils, Absolute 01/18/2020 0.09  0.00 - 0.20 10*3/mm3 Final   • Immature Grans, Absolute 01/18/2020 0.06* 0.00 - 0.05 10*3/mm3 Final   • nRBC 01/18/2020 0.0  0.0 - 0.2 /100 WBC Final   • Extra Tube 01/18/2020 hold for add-on   Final   • Extra Tube 01/18/2020 Hold for add-ons.   Final   • Extra Tube 01/18/2020 hold for add-on   Final   • Extra Tube 01/18/2020 Hold for add-ons.   Final       Physical Exam   Pulmonary/Chest:   She sounds winded and is unable to finish full sentences due to shortness of breath.   Psychiatric: She has a normal mood and affect. Her behavior is normal. Thought content normal.       Assessment/Plan     Diagnoses and all orders for this visit:    Fever, unspecified fever cause  -     SARS-CoV-2, [facility] ([facility]) - Swab, Nasopharynx; Future    Cough  -     XR Chest PA & Lateral; Future    Fever and cough-  Since she has failed outpatient antibiotics and her symptoms are worsening including shortness of breath she was advised to go to the emergency room for possible admission to the hospital.  The patient declines going to the hospital due to fear of rupert coronavirus.  I have ordered testing including chest x-ray and COVID -19 testing to further evaluate.  Patient was advised on symptomatic care and that if her symptoms persist she should call me back or go to the emergency room for hospital admission.    This visit has been rescheduled as a phone visit to comply with patient safety concerns in accordance  with CDC recommendations. Total time of discussion was 11 minutes.            This document has been electronically signed by:  Kimmy Vela PA-C

## 2020-04-11 LAB — SARS-COV-2 RNA RESP QL NAA+PROBE: NOT DETECTED

## 2020-04-12 ENCOUNTER — HOSPITAL ENCOUNTER (EMERGENCY)
Facility: HOSPITAL | Age: 60
Discharge: HOME | End: 2020-04-12
Attending: EMERGENCY MEDICINE

## 2020-04-12 ENCOUNTER — NURSE TRIAGE (OUTPATIENT)
Dept: CALL CENTER | Facility: HOSPITAL | Age: 60
End: 2020-04-12

## 2020-04-12 ENCOUNTER — APPOINTMENT (OUTPATIENT)
Dept: CT IMAGING | Facility: HOSPITAL | Age: 60
End: 2020-04-12

## 2020-04-12 ENCOUNTER — APPOINTMENT (OUTPATIENT)
Dept: GENERAL RADIOLOGY | Facility: HOSPITAL | Age: 60
End: 2020-04-12

## 2020-04-12 VITALS
RESPIRATION RATE: 18 BRPM | DIASTOLIC BLOOD PRESSURE: 68 MMHG | BODY MASS INDEX: 28.68 KG/M2 | WEIGHT: 168 LBS | TEMPERATURE: 98 F | SYSTOLIC BLOOD PRESSURE: 146 MMHG | OXYGEN SATURATION: 99 % | HEART RATE: 71 BPM | HEIGHT: 64 IN

## 2020-04-12 DIAGNOSIS — E11.9 TYPE 2 DIABETES MELLITUS WITHOUT COMPLICATION, WITH LONG-TERM CURRENT USE OF INSULIN (HCC): ICD-10-CM

## 2020-04-12 DIAGNOSIS — J45.901 ASTHMATIC BRONCHITIS WITH ACUTE EXACERBATION, UNSPECIFIED ASTHMA SEVERITY, UNSPECIFIED WHETHER PERSISTENT: Primary | ICD-10-CM

## 2020-04-12 DIAGNOSIS — Z79.4 TYPE 2 DIABETES MELLITUS WITHOUT COMPLICATION, WITH LONG-TERM CURRENT USE OF INSULIN (HCC): ICD-10-CM

## 2020-04-12 LAB
A-A DO2: 20.1 MMHG (ref 0–300)
ALBUMIN SERPL-MCNC: 4.94 G/DL (ref 3.5–5.2)
ALBUMIN/GLOB SERPL: 1.8 G/DL
ALP SERPL-CCNC: 97 U/L (ref 39–117)
ALT SERPL W P-5'-P-CCNC: 43 U/L (ref 1–33)
ANION GAP SERPL CALCULATED.3IONS-SCNC: 16.9 MMOL/L (ref 5–15)
ARTERIAL PATENCY WRIST A: ABNORMAL
AST SERPL-CCNC: 48 U/L (ref 1–32)
ATMOSPHERIC PRESS: 725 MMHG
BACTERIA UR QL AUTO: NORMAL /HPF
BASE EXCESS BLDA CALC-SCNC: 1.3 MMOL/L (ref 0–2)
BASOPHILS # BLD AUTO: 0.15 10*3/MM3 (ref 0–0.2)
BASOPHILS NFR BLD AUTO: 1.3 % (ref 0–1.5)
BDY SITE: ABNORMAL
BILIRUB SERPL-MCNC: 0.4 MG/DL (ref 0.2–1.2)
BILIRUB UR QL STRIP: NEGATIVE
BODY TEMPERATURE: 0 C
BUN BLD-MCNC: 22 MG/DL (ref 6–20)
BUN/CREAT SERPL: 14.3 (ref 7–25)
CALCIUM SPEC-SCNC: 11.1 MG/DL (ref 8.6–10.5)
CHLORIDE SERPL-SCNC: 99 MMOL/L (ref 98–107)
CLARITY UR: CLEAR
CO2 BLDA-SCNC: 24.6 MMOL/L (ref 22–33)
CO2 SERPL-SCNC: 24.1 MMOL/L (ref 22–29)
COHGB MFR BLD: 1.1 % (ref 0–5)
COLOR UR: YELLOW
CREAT BLD-MCNC: 1.54 MG/DL (ref 0.57–1)
CRP SERPL-MCNC: 0.34 MG/DL (ref 0–0.5)
D-LACTATE SERPL-SCNC: 3.4 MMOL/L (ref 0.5–2)
DEPRECATED RDW RBC AUTO: 43.1 FL (ref 37–54)
EOSINOPHIL # BLD AUTO: 0.47 10*3/MM3 (ref 0–0.4)
EOSINOPHIL NFR BLD AUTO: 4.1 % (ref 0.3–6.2)
ERYTHROCYTE [DISTWIDTH] IN BLOOD BY AUTOMATED COUNT: 13.6 % (ref 12.3–15.4)
FERRITIN SERPL-MCNC: 28.25 NG/ML (ref 13–150)
GFR SERPL CREATININE-BSD FRML MDRD: 35 ML/MIN/1.73
GLOBULIN UR ELPH-MCNC: 2.8 GM/DL
GLUCOSE BLD-MCNC: 125 MG/DL (ref 65–99)
GLUCOSE UR STRIP-MCNC: NEGATIVE MG/DL
HCO3 BLDA-SCNC: 23.7 MMOL/L (ref 20–26)
HCT VFR BLD AUTO: 45.2 % (ref 34–46.6)
HCT VFR BLD CALC: 45.4 % (ref 38–51)
HGB BLD-MCNC: 14.7 G/DL (ref 12–15.9)
HGB BLDA-MCNC: 14.8 G/DL (ref 13.5–17.5)
HGB UR QL STRIP.AUTO: NEGATIVE
HOROWITZ INDEX BLD+IHG-RTO: 21 %
HYALINE CASTS UR QL AUTO: NORMAL /LPF
IMM GRANULOCYTES # BLD AUTO: 0.06 10*3/MM3 (ref 0–0.05)
IMM GRANULOCYTES NFR BLD AUTO: 0.5 % (ref 0–0.5)
KETONES UR QL STRIP: NEGATIVE
LACTATE HOLD SPECIMEN: NORMAL
LDH SERPL-CCNC: 214 U/L (ref 135–214)
LEUKOCYTE ESTERASE UR QL STRIP.AUTO: ABNORMAL
LYMPHOCYTES # BLD AUTO: 4.62 10*3/MM3 (ref 0.7–3.1)
LYMPHOCYTES NFR BLD AUTO: 40.8 % (ref 19.6–45.3)
Lab: ABNORMAL
MCH RBC QN AUTO: 28.1 PG (ref 26.6–33)
MCHC RBC AUTO-ENTMCNC: 32.5 G/DL (ref 31.5–35.7)
MCV RBC AUTO: 86.4 FL (ref 79–97)
METHGB BLD QL: 0.4 % (ref 0–3)
MODALITY: ABNORMAL
MONOCYTES # BLD AUTO: 0.6 10*3/MM3 (ref 0.1–0.9)
MONOCYTES NFR BLD AUTO: 5.3 % (ref 5–12)
NEUTROPHILS # BLD AUTO: 5.43 10*3/MM3 (ref 1.7–7)
NEUTROPHILS NFR BLD AUTO: 48 % (ref 42.7–76)
NITRITE UR QL STRIP: NEGATIVE
NOTE: ABNORMAL
NRBC BLD AUTO-RTO: 0 /100 WBC (ref 0–0.2)
OXYHGB MFR BLDV: 96.1 % (ref 94–99)
PCO2 BLDA: 30.6 MM HG (ref 35–45)
PCO2 TEMP ADJ BLD: ABNORMAL MM[HG]
PH BLDA: 7.5 PH UNITS (ref 7.35–7.45)
PH UR STRIP.AUTO: 7.5 [PH] (ref 5–8)
PH, TEMP CORRECTED: ABNORMAL
PLATELET # BLD AUTO: 407 10*3/MM3 (ref 140–450)
PMV BLD AUTO: 9.8 FL (ref 6–12)
PO2 BLDA: 87.6 MM HG (ref 83–108)
PO2 TEMP ADJ BLD: ABNORMAL MM[HG]
POTASSIUM BLD-SCNC: 4.4 MMOL/L (ref 3.5–5.2)
PROCALCITONIN SERPL-MCNC: 0.12 NG/ML (ref 0.1–0.25)
PROT SERPL-MCNC: 7.7 G/DL (ref 6–8.5)
PROT UR QL STRIP: NEGATIVE
RBC # BLD AUTO: 5.23 10*6/MM3 (ref 3.77–5.28)
RBC # UR: NORMAL /HPF
REF LAB TEST METHOD: NORMAL
SAO2 % BLDCOA: 97.6 % (ref 94–99)
SODIUM BLD-SCNC: 140 MMOL/L (ref 136–145)
SP GR UR STRIP: 1.01 (ref 1–1.03)
SQUAMOUS #/AREA URNS HPF: NORMAL /HPF
TROPONIN T SERPL-MCNC: <0.01 NG/ML (ref 0–0.03)
UROBILINOGEN UR QL STRIP: ABNORMAL
VENTILATOR MODE: ABNORMAL
WBC NRBC COR # BLD: 11.33 10*3/MM3 (ref 3.4–10.8)
WBC UR QL AUTO: NORMAL /HPF

## 2020-04-12 PROCEDURE — 82375 ASSAY CARBOXYHB QUANT: CPT

## 2020-04-12 PROCEDURE — 99285 EMERGENCY DEPT VISIT HI MDM: CPT

## 2020-04-12 PROCEDURE — 81001 URINALYSIS AUTO W/SCOPE: CPT | Performed by: EMERGENCY MEDICINE

## 2020-04-12 PROCEDURE — 96375 TX/PRO/DX INJ NEW DRUG ADDON: CPT

## 2020-04-12 PROCEDURE — 93010 ELECTROCARDIOGRAM REPORT: CPT | Performed by: INTERNAL MEDICINE

## 2020-04-12 PROCEDURE — 71250 CT THORAX DX C-: CPT | Performed by: RADIOLOGY

## 2020-04-12 PROCEDURE — 25010000002 METHYLPREDNISOLONE PER 125 MG: Performed by: EMERGENCY MEDICINE

## 2020-04-12 PROCEDURE — 93005 ELECTROCARDIOGRAM TRACING: CPT | Performed by: EMERGENCY MEDICINE

## 2020-04-12 PROCEDURE — 70450 CT HEAD/BRAIN W/O DYE: CPT | Performed by: RADIOLOGY

## 2020-04-12 PROCEDURE — 83605 ASSAY OF LACTIC ACID: CPT | Performed by: EMERGENCY MEDICINE

## 2020-04-12 PROCEDURE — 83050 HGB METHEMOGLOBIN QUAN: CPT

## 2020-04-12 PROCEDURE — 71045 X-RAY EXAM CHEST 1 VIEW: CPT

## 2020-04-12 PROCEDURE — 80053 COMPREHEN METABOLIC PANEL: CPT | Performed by: EMERGENCY MEDICINE

## 2020-04-12 PROCEDURE — 25010000002 ONDANSETRON PER 1 MG: Performed by: EMERGENCY MEDICINE

## 2020-04-12 PROCEDURE — 36600 WITHDRAWAL OF ARTERIAL BLOOD: CPT

## 2020-04-12 PROCEDURE — 25010000002 LORAZEPAM PER 2 MG: Performed by: EMERGENCY MEDICINE

## 2020-04-12 PROCEDURE — 70450 CT HEAD/BRAIN W/O DYE: CPT

## 2020-04-12 PROCEDURE — 87040 BLOOD CULTURE FOR BACTERIA: CPT | Performed by: EMERGENCY MEDICINE

## 2020-04-12 PROCEDURE — 83615 LACTATE (LD) (LDH) ENZYME: CPT | Performed by: EMERGENCY MEDICINE

## 2020-04-12 PROCEDURE — 82728 ASSAY OF FERRITIN: CPT | Performed by: EMERGENCY MEDICINE

## 2020-04-12 PROCEDURE — 84484 ASSAY OF TROPONIN QUANT: CPT | Performed by: EMERGENCY MEDICINE

## 2020-04-12 PROCEDURE — 84145 PROCALCITONIN (PCT): CPT | Performed by: EMERGENCY MEDICINE

## 2020-04-12 PROCEDURE — 86140 C-REACTIVE PROTEIN: CPT | Performed by: EMERGENCY MEDICINE

## 2020-04-12 PROCEDURE — 71250 CT THORAX DX C-: CPT

## 2020-04-12 PROCEDURE — 85025 COMPLETE CBC W/AUTO DIFF WBC: CPT | Performed by: EMERGENCY MEDICINE

## 2020-04-12 PROCEDURE — 71045 X-RAY EXAM CHEST 1 VIEW: CPT | Performed by: RADIOLOGY

## 2020-04-12 PROCEDURE — 82805 BLOOD GASES W/O2 SATURATION: CPT

## 2020-04-12 PROCEDURE — 96374 THER/PROPH/DIAG INJ IV PUSH: CPT

## 2020-04-12 RX ORDER — DOXYCYCLINE 100 MG/1
100 CAPSULE ORAL 2 TIMES DAILY
Qty: 20 CAPSULE | Refills: 0 | Status: SHIPPED | OUTPATIENT
Start: 2020-04-12 | End: 2020-05-22

## 2020-04-12 RX ORDER — ALBUTEROL SULFATE 90 UG/1
2 AEROSOL, METERED RESPIRATORY (INHALATION) ONCE
Status: COMPLETED | OUTPATIENT
Start: 2020-04-12 | End: 2020-04-12

## 2020-04-12 RX ORDER — LORAZEPAM 2 MG/ML
1 INJECTION INTRAMUSCULAR ONCE
Status: COMPLETED | OUTPATIENT
Start: 2020-04-12 | End: 2020-04-12

## 2020-04-12 RX ORDER — DOXYCYCLINE 100 MG/1
100 CAPSULE ORAL ONCE
Status: COMPLETED | OUTPATIENT
Start: 2020-04-12 | End: 2020-04-12

## 2020-04-12 RX ORDER — SODIUM CHLORIDE 0.9 % (FLUSH) 0.9 %
10 SYRINGE (ML) INJECTION AS NEEDED
Status: DISCONTINUED | OUTPATIENT
Start: 2020-04-12 | End: 2020-04-12 | Stop reason: HOSPADM

## 2020-04-12 RX ORDER — METHYLPREDNISOLONE SODIUM SUCCINATE 125 MG/2ML
80 INJECTION, POWDER, LYOPHILIZED, FOR SOLUTION INTRAMUSCULAR; INTRAVENOUS ONCE
Status: COMPLETED | OUTPATIENT
Start: 2020-04-12 | End: 2020-04-12

## 2020-04-12 RX ORDER — ONDANSETRON 2 MG/ML
4 INJECTION INTRAMUSCULAR; INTRAVENOUS ONCE
Status: COMPLETED | OUTPATIENT
Start: 2020-04-12 | End: 2020-04-12

## 2020-04-12 RX ADMIN — DOXYCYCLINE 100 MG: 100 CAPSULE ORAL at 17:17

## 2020-04-12 RX ADMIN — ALBUTEROL SULFATE 2 PUFF: 90 AEROSOL, METERED RESPIRATORY (INHALATION) at 13:25

## 2020-04-12 RX ADMIN — SODIUM CHLORIDE 1000 ML: 9 INJECTION, SOLUTION INTRAVENOUS at 15:12

## 2020-04-12 RX ADMIN — ONDANSETRON 4 MG: 2 INJECTION INTRAMUSCULAR; INTRAVENOUS at 13:25

## 2020-04-12 RX ADMIN — LORAZEPAM 1 MG: 2 INJECTION INTRAMUSCULAR; INTRAVENOUS at 13:42

## 2020-04-12 RX ADMIN — METHYLPREDNISOLONE SODIUM SUCCINATE 80 MG: 125 INJECTION, POWDER, FOR SOLUTION INTRAMUSCULAR; INTRAVENOUS at 15:11

## 2020-04-12 NOTE — TELEPHONE ENCOUNTER
"Encouraged to call 911.  She is alone, as she is in quarantine.  I feel she is too sick to drive herself.      Reason for Disposition  • SEVERE difficulty breathing (e.g., struggling for each breath, speaks in single words)    Answer Assessment - Initial Assessment Questions  1. COVID-19 DIAGNOSIS: \"Who made your Coronavirus (COVID-19) diagnosis?\" \"Was it confirmed by a positive lab test?\" If not diagnosed by a HCP, ask \"Are there lots of cases (community spread) where you live?\" (See public health department website, if unsure)    * MAJOR community spread: high number of cases; numbers of cases are increasing; many people hospitalized.    * MINOR community spread: low number of cases; not increasing; few or no people hospitalized     Unknown.   2. ONSET: \"When did the COVID-19 symptoms start?\"       She was tested for COVID -19 on Thursday.   3. WORST SYMPTOM: \"What is your worst symptom?\" (e.g., cough, fever, shortness of breath, muscle aches)      Shortness of breath, cough, profuse sweating, sick at stomach.  She denies fever but states she feels like she is burning up.    4. COUGH: \"How bad is the cough?\"        Moderate, coughs every few words.    5. FEVER: \"Do you have a fever?\" If so, ask: \"What is your temperature, how was it measured, and when did it start?\"      She denies fever but states she is burning up.   6. RESPIRATORY STATUS: \"Describe your breathing?\" (e.g., shortness of breath, wheezing, unable to speak)       Shorthness of breath.    7. BETTER-SAME-WORSE: \"Are you getting better, staying the same or getting worse compared to yesterday?\"  If getting worse, ask, \"In what way?\"      She is much worse than yesterday.  More short of breath, cough worse, profuse sweating and sick at stomach.   8. HIGH RISK DISEASE: \"Do you have any chronic medical problems?\" (e.g., asthma, heart or lung disease, weak immune system, etc.)      Unknown.    9. PREGNANCY: \"Is there any chance you are pregnant?\" \"When was " "your last menstrual period?\"      Unknown.   10. OTHER SYMPTOMS: \"Do you have any other symptoms?\"  (e.g., runny nose, headache, sore throat, loss of smell)        Unknown.    Protocols used: CORONAVIRUS (COVID-19) DIAGNOSED OR SUSPECTED-ADULT-AH      "

## 2020-04-12 NOTE — ED PROVIDER NOTES
Subjective   59-year-old white female complains of shortness of breath.  Patient had outpatient testing for coronavirus 3 days ago.  Today she called to find out the results.  The results were not available, but she told them on the phone that she was having much worse shortness of breath, and they recommended she come to the ER.  She says that her shortness of breath started this morning and has been progressive.  She used inhaler without relief.  She denied any history of COPD, asthma, CHF, but did have a history of coronary artery disease and diabetes.  She also has a history of anxiety.  She has had subjective fever and cough.  She has not had any travel or known COVID contacts.          Review of Systems   All other systems reviewed and are negative.      Past Medical History:   Diagnosis Date   • Anxiety    • Arthritis    • Diabetes mellitus (CMS/HCC)    • Diverticulosis    • Gout    • Hyperlipidemia    • Hypertension    • Myocardial infarction (CMS/HCC)        Allergies   Allergen Reactions   • Ciprofloxacin Anaphylaxis   • Penicillins Shortness Of Breath   • Statins Myalgia   • Zofran [Ondansetron Hcl] Shortness Of Breath   • Fish Oil [Omega-3 Fatty Acids] Nausea And Vomiting       Past Surgical History:   Procedure Laterality Date   • APPENDECTOMY     • CARDIAC CATHETERIZATION     •  SECTION     • CHOLECYSTECTOMY     • CORONARY STENT PLACEMENT      Stenting of the RCA with 3.5 x 26 mm Resolute drug-eluting stent   • HYSTERECTOMY     • JOINT REPLACEMENT     • TONSILLECTOMY         Family History   Problem Relation Age of Onset   • Arthritis Mother    • Heart disease Mother    • Heart disease Sister    • Vision loss Daughter    • Heart disease Father        Social History     Socioeconomic History   • Marital status:      Spouse name: Not on file   • Number of children: 2   • Years of education: 12   • Highest education level: Not on file   Occupational History   • Occupation: [employer]    Social Needs   • Financial resource strain: Somewhat hard   • Food insecurity:     Worry: Never true     Inability: Never true   • Transportation needs:     Medical: No     Non-medical: No   Tobacco Use   • Smoking status: Never Smoker   • Smokeless tobacco: Never Used   Substance and Sexual Activity   • Alcohol use: No   • Drug use: No   • Sexual activity: Defer   Lifestyle   • Physical activity:     Days per week: 0 days     Minutes per session: 0 min   • Stress: Very much   Relationships   • Social connections:     Talks on phone: More than three times a week     Gets together: More than three times a week     Attends Denominational service: More than 4 times per year     Active member of club or organization: No     Attends meetings of clubs or organizations: Never     Relationship status:            Objective   Physical Exam   Constitutional: She is oriented to person, place, and time. She appears well-developed and well-nourished.   HENT:   Head: Normocephalic and atraumatic.   Eyes: No scleral icterus.   Cardiovascular: Normal rate, regular rhythm and normal heart sounds. Exam reveals no gallop and no friction rub.   No murmur heard.  Pulmonary/Chest: Tachypnea noted. She has wheezes (Mild bilateral anterior). She has no rhonchi. She has no rales.   Abdominal: Bowel sounds are normal. There is no tenderness. There is no rebound and no guarding.   Musculoskeletal: Normal range of motion. She exhibits no edema.   Neurological: She is alert and oriented to person, place, and time.   Skin: Skin is warm and dry.   Psychiatric: Her mood appears anxious.   Nursing note and vitals reviewed.      Procedures  Results for orders placed or performed during the hospital encounter of 04/12/20   Comprehensive Metabolic Panel   Result Value Ref Range    Glucose 125 (H) 65 - 99 mg/dL    BUN 22 (H) 6 - 20 mg/dL    Creatinine 1.54 (H) 0.57 - 1.00 mg/dL    Sodium 140 136 - 145 mmol/L    Potassium 4.4 3.5 - 5.2 mmol/L     Chloride 99 98 - 107 mmol/L    CO2 24.1 22.0 - 29.0 mmol/L    Calcium 11.1 (H) 8.6 - 10.5 mg/dL    Total Protein 7.7 6.0 - 8.5 g/dL    Albumin 4.94 3.50 - 5.20 g/dL    ALT (SGPT) 43 (H) 1 - 33 U/L    AST (SGOT) 48 (H) 1 - 32 U/L    Alkaline Phosphatase 97 39 - 117 U/L    Total Bilirubin 0.4 0.2 - 1.2 mg/dL    eGFR Non African Amer 35 (L) >60 mL/min/1.73    Globulin 2.8 gm/dL    A/G Ratio 1.8 g/dL    BUN/Creatinine Ratio 14.3 7.0 - 25.0    Anion Gap 16.9 (H) 5.0 - 15.0 mmol/L   Lactate Dehydrogenase   Result Value Ref Range     135 - 214 U/L   C-reactive Protein   Result Value Ref Range    C-Reactive Protein 0.34 0.00 - 0.50 mg/dL   Ferritin   Result Value Ref Range    Ferritin 28.25 13.00 - 150.00 ng/mL   Lactic Acid, Plasma   Result Value Ref Range    Lactate 3.4 (C) 0.5 - 2.0 mmol/L   CBC Auto Differential   Result Value Ref Range    WBC 11.33 (H) 3.40 - 10.80 10*3/mm3    RBC 5.23 3.77 - 5.28 10*6/mm3    Hemoglobin 14.7 12.0 - 15.9 g/dL    Hematocrit 45.2 34.0 - 46.6 %    MCV 86.4 79.0 - 97.0 fL    MCH 28.1 26.6 - 33.0 pg    MCHC 32.5 31.5 - 35.7 g/dL    RDW 13.6 12.3 - 15.4 %    RDW-SD 43.1 37.0 - 54.0 fl    MPV 9.8 6.0 - 12.0 fL    Platelets 407 140 - 450 10*3/mm3    Neutrophil % 48.0 42.7 - 76.0 %    Lymphocyte % 40.8 19.6 - 45.3 %    Monocyte % 5.3 5.0 - 12.0 %    Eosinophil % 4.1 0.3 - 6.2 %    Basophil % 1.3 0.0 - 1.5 %    Immature Grans % 0.5 0.0 - 0.5 %    Neutrophils, Absolute 5.43 1.70 - 7.00 10*3/mm3    Lymphocytes, Absolute 4.62 (H) 0.70 - 3.10 10*3/mm3    Monocytes, Absolute 0.60 0.10 - 0.90 10*3/mm3    Eosinophils, Absolute 0.47 (H) 0.00 - 0.40 10*3/mm3    Basophils, Absolute 0.15 0.00 - 0.20 10*3/mm3    Immature Grans, Absolute 0.06 (H) 0.00 - 0.05 10*3/mm3    nRBC 0.0 0.0 - 0.2 /100 WBC   Blood Gas, Arterial With Co-Ox   Result Value Ref Range    Site Left Brachial     Galileo's Test N/A     pH, Arterial 7.496 (H) 7.350 - 7.450 pH units    pCO2, Arterial 30.6 (L) 35.0 - 45.0 mm Hg    pO2,  Arterial 87.6 83.0 - 108.0 mm Hg    HCO3, Arterial 23.7 20.0 - 26.0 mmol/L    Base Excess, Arterial 1.3 0.0 - 2.0 mmol/L    O2 Saturation, Arterial 97.6 94.0 - 99.0 %    Hemoglobin, Blood Gas 14.8 13.5 - 17.5 g/dL    Hematocrit, Blood Gas 45.4 38.0 - 51.0 %    Oxyhemoglobin 96.1 94 - 99 %    Methemoglobin 0.40 0.00 - 3.00 %    Carboxyhemoglobin 1.1 0 - 5 %    A-a Gradiant 20.1 0.0 - 300.0 mmHg    CO2 Content 24.6 22 - 33 mmol/L    Temperature 0.0 C    Barometric Pressure for Blood Gas 725 mmHg    Modality Room Air     FIO2 21 %    Ventilator Mode NA     Note      Collected by [id number]     pH, Temp Corrected      pCO2, Temperature Corrected      pO2, Temperature Corrected     Troponin   Result Value Ref Range    Troponin T <0.010 0.000 - 0.030 ng/mL   Lactic Acid, Reflex Timer (This will reflex a repeat order 3-3:15 hours after ordered.)   Result Value Ref Range    Hold Tube Hold for add-ons.    Urinalysis With Culture If Indicated - Urine, Clean Catch   Result Value Ref Range    Color, UA Yellow Yellow, Straw    Appearance, UA Clear Clear    pH, UA 7.5 5.0 - 8.0    Specific Gravity, UA 1.013 1.005 - 1.030    Glucose, UA Negative Negative    Ketones, UA Negative Negative    Bilirubin, UA Negative Negative    Blood, UA Negative Negative    Protein, UA Negative Negative    Leuk Esterase, UA Trace (A) Negative    Nitrite, UA Negative Negative    Urobilinogen, UA 0.2 E.U./dL 0.2 - 1.0 E.U./dL   Urinalysis, Microscopic Only - Urine, Clean Catch   Result Value Ref Range    RBC, UA None Seen None Seen, 0-2 /HPF    WBC, UA 0-2 None Seen, 0-2 /HPF    Bacteria, UA None Seen None Seen /HPF    Squamous Epithelial Cells, UA 0-2 None Seen, 0-2 /HPF    Hyaline Casts, UA None Seen None Seen /LPF    Methodology Automated Microscopy      Ct Head Without Contrast    Result Date: 4/12/2020  Narrative: EXAMINATION: CT HEAD WO CONTRAST-  CLINICAL INDICATION:     ams  COMPARISON:    None  Technique: Multiple CT axial images were obtained  through the level of the brain without IV contrast administration. Reformatted images in the coronal and/or sagittal plane(s) were generated from the axial data set to facilitate diagnostic accuracy and/or surgical planning.  Radiation dose reduction techniques were utilized per ALARA protocol. Automated exposure control was initiated through either or [employer] or [employer] software packages by  protocol.   DOSE (DLP mGy-cm):  FINDINGS:   Brain: Unremarkable. No parenchymal hemorrhage or mass. No white matter abnormality. No areas of mass effect. Ventricles: Unremarkable. No hydrocephalus. Extra-axial spaces: Unremarkable. No extra-axial hemorrhage. No extra-axial masses. Bones: Unremarkable. No acute fracture identified. Sinuses: Unremarkable as visualized. No air-fluid levels. Mastoids: Unremarkable. No mastoid effusions. Soft Tissues: Unremarkable.       Impression: No CT evidence of acute intracranial abnormality.  This report was finalized on 4/12/2020 2:29 PM by Dr. Sai Gómez MD.      Ct Chest Without Contrast    Result Date: 4/12/2020  Narrative: EXAM: CT CHEST WO CONTRAST-    CLINICAL INDICATION:COVID Evaluation, Cough, Fever  COMPARISON: NONE.  TECHNIQUE: Multiple axial CT images were obtained from lung apex through upper abdomen WITHOUT administration of IV contrast. Reformatted images in the coronal and/or sagittal plane(s) were generated from the axial data set to facilitate diagnostic accuracy and/or surgical planning.  Radiation dose reduction techniques were utilized per ALARA protocol. Automated exposure control was initiated through either or [employer] or [employer] software packages by  protocol.  DOSE (DLP mGy-cm):   FINDINGS:  LUNGS: LUNGS ARE CLEAR WITH NO SUSPICIOUS GROUNDGLASS OPACITIES OR NODULES IDENTIFIED.  HEART: MODERATE TO SEVERE CORONARY ARTERY CALCIFICATIONS WITH MITRAL VALVULAR CALCIFICATIONS ALSO NOTED. HEART SIZE WITHIN NORMAL LIMITS.  PERICARDIUM: No  effusion.  MEDIASTINUM: No masses. No enlarged lymph nodes.  No fluid collections.  PLEURA: No pleural effusion. No pleural mass or abnormal calcification. No pneumothorax.  MAJOR AIRWAYS: Clear. No intrinsic mass.  VASCULATURE: No evidence of aneurysm.  VISUALIZED UPPER ABDOMEN:SMALL SLIDING-TYPE HIATAL HERNIA. SMALL NONOBSTRUCTING LEFT KIDNEY STONE. PRIOR CHOLECYSTECTOMY.  OTHER: None.  BONES: No acute bony abnormality.      Impression: 1. [facility] COVID-19 pneumonia imaging classification: Negative for pneumonia. 2. No CT findings present to indicate pneumonia. (Note: CT may be negative in the early stages of COVID-19.) 3. No pleural effusion. 4. Moderate to severe coronary artery calcifications with mitral valvular calcifications.  This report was finalized on 4/12/2020 2:35 PM by Dr. Sai Gómez MD.      Xr Chest Ap    Result Date: 4/12/2020  Narrative: EXAMINATION: XR CHEST AP-  CLINICAL INDICATION:     COVID Evaluation, Cough, Fever  TECHNIQUE:  XR CHEST AP-  COMPARISON: 03/07/2015  FINDINGS:  Lungs are aerated. Heart size, mediastinum, and pulmonary vascularity are unremarkable. No pneumothorax. No effusions. No acute osseous findings.       Impression: No radiographic evidence of acute cardiac or pulmonary disease.  This report was finalized on 4/12/2020 2:06 PM by Dr. Sai Gómez MD.               ED Course  ED Course as of Apr 12 1750   Sun Apr 12, 2020   1543 Endorsed to Dr. Guillen at shift change.    [BC]      ED Course User Index  [BC] Romain Jacobo MD                                           MDM  Number of Diagnoses or Management Options  Asthmatic bronchitis with acute exacerbation, unspecified asthma severity, unspecified whether persistent: new and requires workup  Type 2 diabetes mellitus without complication, with long-term current use of insulin (CMS/[facility]): new and requires workup     Amount and/or Complexity of Data Reviewed  Clinical lab tests: reviewed and ordered  Tests in the radiology  section of CPT®: reviewed and ordered  Tests in the medicine section of CPT®: ordered and reviewed  Decide to obtain previous medical records or to obtain history from someone other than the patient: yes  Independent visualization of images, tracings, or specimens: yes    Risk of Complications, Morbidity, and/or Mortality  Presenting problems: high  Diagnostic procedures: high  Management options: moderate    Patient Progress  Patient progress: stable      Final diagnoses:   Asthmatic bronchitis with acute exacerbation, unspecified asthma severity, unspecified whether persistent   Type 2 diabetes mellitus without complication, with long-term current use of insulin (CMS/[facility])            Madalyn Gulilen DO  04/12/20 [id number]

## 2020-04-12 NOTE — ED NOTES
Patient is more relaxed after medication. X-ray in to do procedure     Juan J Bautista, RN  04/12/20 [id number]

## 2020-04-14 DIAGNOSIS — J45.41 MODERATE PERSISTENT ASTHMA WITH EXACERBATION: Primary | ICD-10-CM

## 2020-04-14 RX ORDER — GUAIFENESIN AND CODEINE PHOSPHATE 100; 10 MG/5ML; MG/5ML
5 SOLUTION ORAL 3 TIMES DAILY PRN
Qty: 180 ML | Refills: 0 | Status: SHIPPED | OUTPATIENT
Start: 2020-04-14 | End: 2020-05-22

## 2020-04-14 RX ORDER — SEMAGLUTIDE 1.34 MG/ML
INJECTION, SOLUTION SUBCUTANEOUS
Qty: 1.5 ML | Refills: 5 | Status: SHIPPED | OUTPATIENT
Start: 2020-04-14 | End: 2020-06-05

## 2020-04-17 LAB
BACTERIA SPEC AEROBE CULT: NORMAL
BACTERIA SPEC AEROBE CULT: NORMAL

## 2020-05-11 ENCOUNTER — TELEPHONE (OUTPATIENT)
Dept: FAMILY MEDICINE CLINIC | Facility: CLINIC | Age: 60
End: 2020-05-11

## 2020-05-11 NOTE — TELEPHONE ENCOUNTER
----- Message from ANH Medina sent at 5/11/2020  1:14 PM EDT -----  I asked the front to make her an apt tomorrow at 1pm with me.  Thanks    ----- Message -----  From: Sophy Carmona MA  Sent: 5/11/2020   1:07 PM EDT  To: ANH Medina    Patient called and wanted to know if you could send her in something for her joint pain?

## 2020-05-22 ENCOUNTER — OFFICE VISIT (OUTPATIENT)
Dept: FAMILY MEDICINE CLINIC | Facility: CLINIC | Age: 60
End: 2020-05-22

## 2020-05-22 ENCOUNTER — PRIOR AUTHORIZATION (OUTPATIENT)
Dept: FAMILY MEDICINE CLINIC | Facility: CLINIC | Age: 60
End: 2020-05-22

## 2020-05-22 VITALS
OXYGEN SATURATION: 93 % | WEIGHT: 175 LBS | HEIGHT: 64 IN | BODY MASS INDEX: 29.88 KG/M2 | SYSTOLIC BLOOD PRESSURE: 140 MMHG | HEART RATE: 90 BPM | TEMPERATURE: 98.1 F | DIASTOLIC BLOOD PRESSURE: 90 MMHG

## 2020-05-22 DIAGNOSIS — M15.9 PRIMARY OSTEOARTHRITIS INVOLVING MULTIPLE JOINTS: Primary | ICD-10-CM

## 2020-05-22 DIAGNOSIS — Z79.4 TYPE 2 DIABETES MELLITUS WITH HYPERGLYCEMIA, WITH LONG-TERM CURRENT USE OF INSULIN (HCC): ICD-10-CM

## 2020-05-22 DIAGNOSIS — E53.8 VITAMIN B 12 DEFICIENCY: ICD-10-CM

## 2020-05-22 DIAGNOSIS — E78.5 DYSLIPIDEMIA: ICD-10-CM

## 2020-05-22 DIAGNOSIS — E55.9 VITAMIN D DEFICIENCY: ICD-10-CM

## 2020-05-22 DIAGNOSIS — E11.65 TYPE 2 DIABETES MELLITUS WITH HYPERGLYCEMIA, WITH LONG-TERM CURRENT USE OF INSULIN (HCC): ICD-10-CM

## 2020-05-22 PROBLEM — M1A.09X0 CHRONIC GOUT OF MULTIPLE SITES: Status: ACTIVE | Noted: 2020-05-22

## 2020-05-22 LAB
25(OH)D3 SERPL-MCNC: 29.1 NG/ML (ref 30–100)
ALBUMIN SERPL-MCNC: 4.6 G/DL (ref 3.5–5.2)
ALBUMIN UR-MCNC: 2.1 MG/DL
ALBUMIN/GLOB SERPL: 1.9 G/DL
ALP SERPL-CCNC: 71 U/L (ref 39–117)
ALT SERPL W P-5'-P-CCNC: 27 U/L (ref 1–33)
ANION GAP SERPL CALCULATED.3IONS-SCNC: 15.2 MMOL/L (ref 5–15)
AST SERPL-CCNC: 35 U/L (ref 1–32)
BASOPHILS # BLD AUTO: 0.11 10*3/MM3 (ref 0–0.2)
BASOPHILS NFR BLD AUTO: 1.3 % (ref 0–1.5)
BILIRUB SERPL-MCNC: 0.2 MG/DL (ref 0.2–1.2)
BUN BLD-MCNC: 17 MG/DL (ref 6–20)
BUN/CREAT SERPL: 16.2 (ref 7–25)
CALCIUM SPEC-SCNC: 10.3 MG/DL (ref 8.6–10.5)
CHLORIDE SERPL-SCNC: 99 MMOL/L (ref 98–107)
CHOLEST SERPL-MCNC: 216 MG/DL (ref 0–200)
CO2 SERPL-SCNC: 23.8 MMOL/L (ref 22–29)
CREAT BLD-MCNC: 1.05 MG/DL (ref 0.57–1)
DEPRECATED RDW RBC AUTO: 41.2 FL (ref 37–54)
EOSINOPHIL # BLD AUTO: 0.49 10*3/MM3 (ref 0–0.4)
EOSINOPHIL NFR BLD AUTO: 5.7 % (ref 0.3–6.2)
ERYTHROCYTE [DISTWIDTH] IN BLOOD BY AUTOMATED COUNT: 13.5 % (ref 12.3–15.4)
GFR SERPL CREATININE-BSD FRML MDRD: 54 ML/MIN/1.73
GLOBULIN UR ELPH-MCNC: 2.4 GM/DL
GLUCOSE BLD-MCNC: 139 MG/DL (ref 65–99)
HBA1C MFR BLD: 6.9 % (ref 4.8–5.6)
HCT VFR BLD AUTO: 37.7 % (ref 34–46.6)
HDLC SERPL-MCNC: 33 MG/DL (ref 40–60)
HGB BLD-MCNC: 12.7 G/DL (ref 12–15.9)
IMM GRANULOCYTES # BLD AUTO: 0.04 10*3/MM3 (ref 0–0.05)
IMM GRANULOCYTES NFR BLD AUTO: 0.5 % (ref 0–0.5)
LDLC SERPL CALC-MCNC: 103 MG/DL (ref 0–100)
LDLC/HDLC SERPL: 3.12 {RATIO}
LYMPHOCYTES # BLD AUTO: 3.36 10*3/MM3 (ref 0.7–3.1)
LYMPHOCYTES NFR BLD AUTO: 38.8 % (ref 19.6–45.3)
MCH RBC QN AUTO: 28.2 PG (ref 26.6–33)
MCHC RBC AUTO-ENTMCNC: 33.7 G/DL (ref 31.5–35.7)
MCV RBC AUTO: 83.6 FL (ref 79–97)
MONOCYTES # BLD AUTO: 0.48 10*3/MM3 (ref 0.1–0.9)
MONOCYTES NFR BLD AUTO: 5.5 % (ref 5–12)
NEUTROPHILS # BLD AUTO: 4.17 10*3/MM3 (ref 1.7–7)
NEUTROPHILS NFR BLD AUTO: 48.2 % (ref 42.7–76)
NRBC BLD AUTO-RTO: 0 /100 WBC (ref 0–0.2)
PLATELET # BLD AUTO: 414 10*3/MM3 (ref 140–450)
PMV BLD AUTO: 9.6 FL (ref 6–12)
POTASSIUM BLD-SCNC: 4.3 MMOL/L (ref 3.5–5.2)
PROT SERPL-MCNC: 7 G/DL (ref 6–8.5)
RBC # BLD AUTO: 4.51 10*6/MM3 (ref 3.77–5.28)
SODIUM BLD-SCNC: 138 MMOL/L (ref 136–145)
TRIGL SERPL-MCNC: 400 MG/DL (ref 0–150)
VLDLC SERPL-MCNC: 80 MG/DL (ref 5–40)
WBC NRBC COR # BLD: 8.65 10*3/MM3 (ref 3.4–10.8)

## 2020-05-22 PROCEDURE — 83921 ORGANIC ACID SINGLE QUANT: CPT | Performed by: PHYSICIAN ASSISTANT

## 2020-05-22 PROCEDURE — 82043 UR ALBUMIN QUANTITATIVE: CPT | Performed by: PHYSICIAN ASSISTANT

## 2020-05-22 PROCEDURE — 83036 HEMOGLOBIN GLYCOSYLATED A1C: CPT | Performed by: PHYSICIAN ASSISTANT

## 2020-05-22 PROCEDURE — 85025 COMPLETE CBC W/AUTO DIFF WBC: CPT | Performed by: PHYSICIAN ASSISTANT

## 2020-05-22 PROCEDURE — 99214 OFFICE O/P EST MOD 30 MIN: CPT | Performed by: PHYSICIAN ASSISTANT

## 2020-05-22 PROCEDURE — 20610 DRAIN/INJ JOINT/BURSA W/O US: CPT | Performed by: PHYSICIAN ASSISTANT

## 2020-05-22 PROCEDURE — 80053 COMPREHEN METABOLIC PANEL: CPT | Performed by: PHYSICIAN ASSISTANT

## 2020-05-22 PROCEDURE — 80061 LIPID PANEL: CPT | Performed by: PHYSICIAN ASSISTANT

## 2020-05-22 PROCEDURE — 82306 VITAMIN D 25 HYDROXY: CPT | Performed by: PHYSICIAN ASSISTANT

## 2020-05-22 RX ORDER — CALCIUM CITRATE/VITAMIN D3 200MG-6.25
1 TABLET ORAL 3 TIMES DAILY
COMMUNITY
Start: 2020-04-14 | End: 2020-08-27 | Stop reason: SDUPTHER

## 2020-05-22 NOTE — PATIENT INSTRUCTIONS
Joint Steroid Injection  A joint steroid injection is a procedure to relieve swelling and pain in a joint. Steroids are medicines that reduce inflammation. In this procedure, your health care provider uses a syringe and a needle to inject a steroid medicine into a painful and inflamed joint. A pain-relieving medicine (anesthetic) may be injected along with the steroid. In some cases, your health care provider may use an imaging technique such as ultrasound or fluoroscopy to guide the injection.  Joints that are often treated with steroid injections include the knee, shoulder, hip, and spine. These injections may also be used in the elbow, ankle, and joints of the hands or feet. You may have joint steroid injections as part of your treatment for inflammation caused by:  · Gout.  · Rheumatoid arthritis.  · Advanced wear-and-tear arthritis (osteoarthritis).  · Tendinitis.  · Bursitis.  Joint steroid injections may be repeated, but having them too often can damage a joint or the skin over the joint. You should not have joint steroid injections less than 6 weeks apart or more than four times a year.  Tell a health care provider about:  · Any allergies you have.  · All medicines you are taking, including vitamins, herbs, eye drops, creams, and over-the-counter medicines.  · Any problems you or family members have had with anesthetic medicines.  · Any blood disorders you have.  · Any surgeries you have had.  · Any medical conditions you have.  · Whether you are pregnant or may be pregnant.  What are the risks?  Generally, this is a safe treatment. However, problems may occur, including:  · Infection.  · Bleeding.  · Allergic reactions to medicines.  · Damage to the joint or tissues around the joint.  · Thinning of skin or loss of skin color over the joint.  · Temporary flushing of the face or chest.  · Temporary increase in pain.  · Temporary increase in blood sugar.  · Failure to relieve inflammation or pain.  What  happens before the treatment?  · You may have imaging tests of your joint.  · Ask your health care provider about:  ? Changing or stopping your regular medicines. This is especially important if you are taking diabetes medicines or blood thinners.  ? Taking medicines such as aspirin and ibuprofen. These medicines can thin your blood. Do not take these medicines unless your health care provider tells you to take them.  ? Taking over-the-counter medicines, vitamins, herbs, and supplements.  · Ask your health care provider if you can drive yourself home after the procedure.  What happens during the treatment?    · Your health care provider will position you for the injection and locate the injection site over your joint.  · The skin over the joint will be cleaned with a germ-killing soap.  · Your health care provider may:  ? Spray a numbing solution (topical anesthetic) over the injection site.  ? Inject a local anesthetic under the skin above your joint.  · The needle will be placed through your skin into your joint. Your health care provider may use imaging to guide the needle to the right spot for the injection. If imaging is used, a special contrast dye may be injected to confirm that the needle is in the correct location.  · The steroid medicine will be injected into your joint.  · Anesthetic may be injected along with the steroid. This may be a medicine that relieves pain for a short time (short-acting anesthetic) or for a longer time (long-acting anesthetic).  · The needle will be removed, and an adhesive bandage (dressing) will be placed over the injection site.  The procedure may vary among health care providers and hospitals.  What can I expect after the treatment?  · You will be able to go home after the treatment.  · It is normal to feel slight flushing for a few days after the injection.  · After the treatment, it is common to have an increase in joint pain after the anesthetic has worn off. This may  happen about an hour after a short-acting anesthetic or about 8 hours after a longer-acting anesthetic.  · You should begin to feel relief from joint pain and swelling after 24 to 48 hours.  Follow these instructions at home:  Injection site care  · Leave the adhesive dressing over your injection site in place until your health care provider says you can remove it.  · Check your injection site every day for signs of infection. Check for:  ? Redness, swelling, or pain.  ? Fluid or blood.  ? Warmth.  ? Pus or a bad smell.  Activity  · Return to your normal activities as told by your health care provider. Ask your health care provider what activities are safe for you. You may be asked to limit activities that put stress on the joint for a few days.  · Do joint exercises as told by your health care provider.  · Do not take baths, swim, or use a hot tub until your health care provider approves.  Managing pain, stiffness, and swelling    · If directed, put ice on the joint.  ? Put ice in a plastic bag.  ? Place a towel between your skin and the bag.  ? Leave the ice on for 20 minutes, 2-3 times a day.  · Raise (elevate) your joint above the level of your heart when you are sitting or lying down.  General instructions  · Take over-the-counter and prescription medicines only as told by your health care provider.  · Do not use any products that contain nicotine or tobacco, such as cigarettes, e-cigarettes, and chewing tobacco. These can delay joint healing. If you need help quitting, ask your health care provider.  · If you have diabetes, be aware that your blood sugar may be slightly elevated for several days after the injection.  · Keep all follow-up visits as told by your health care provider. This is important.  Contact a health care provider if you have:  · Chills or a fever.  · Any signs of infection at your injection site.  · Increased pain or swelling or no relief after 2 days.  Summary  · A joint steroid injection  is a treatment to relieve pain and swelling in a joint.  · Steroids are medicines that reduce inflammation. Your health care provider may add an anesthetic along with the steroid.  · You may have joint steroid injections as part of your arthritis treatment.  · Joint steroid injections may be repeated, but having them too often can damage a joint or the skin over the joint.  · Contact your health care provider if you have a fever, chills, or signs of infection or if you get no relief from joint pain or swelling.  This information is not intended to replace advice given to you by your health care provider. Make sure you discuss any questions you have with your health care provider.  Document Released: 08/20/2019 Document Revised: 08/20/2019 Document Reviewed: 08/20/2019  [employer]vier Patient Education © 2020 [employer]

## 2020-05-22 NOTE — PROGRESS NOTES
Subjective   Monisha Gardner is a 59 y.o. female.       Chief Complaint -joint pain    History of Present Illness -      Joint pain-  She complains of moderate to severe achy pain in her left shoulder and right knee due to osteoarthritis.  Pain in the left shoulder is worse with abduction.  Pain in the right knee is worse with prolonged ambulation.  Onset of exacerbation approximately 2 weeks.  No known new injury.  Minimal relief with Tylenol.    B12 deficiency-stable with B12 supplementation    Vitamin D deficiency-stable with vitamin D supplementation    Diabetes mellitus type 2-stable with basaglar and Ozempic  Lab Results   Component Value Date    HGBA1C 6.15 (H) 08/22/2019     Dyslipidemia-  Not at goal despite the use of fenofibrate.  She states that she has been unable to get Repatha due to insurance coverage and cost.  Lab Results   Component Value Date    CHOL 102 08/22/2019    CHOL 220 (H) 05/06/2019    CHOL 222 (H) 01/24/2019    CHLPL 186 11/16/2015    CHLPL 206 (H) 03/07/2015    CHLPL 322 (H) 07/15/2014     Lab Results   Component Value Date    TRIG 156 (H) 08/22/2019    TRIG 344 (H) 05/06/2019    TRIG 196 (H) 01/24/2019     Lab Results   Component Value Date    HDL 45 08/22/2019    HDL 40 05/06/2019    HDL 46 (L) 01/24/2019     Lab Results   Component Value Date    LDL 26 08/22/2019     (H) 05/06/2019     (H) 01/24/2019         The following portions of the patient's history were reviewed and updated as appropriate: allergies, current medications, past family history, past medical history, past social history, past surgical history and problem list.    Review of Systems   Constitutional: Negative for activity change, appetite change, fatigue and fever.   HENT: Negative for ear pain, sinus pressure and sore throat.    Eyes: Negative for pain and visual disturbance.   Respiratory: Negative for cough and chest tightness.    Cardiovascular: Negative for chest pain and palpitations.  "  Gastrointestinal: Negative for abdominal pain, constipation, diarrhea, nausea and vomiting.   Endocrine: Negative for polydipsia and polyuria.   Genitourinary: Negative for dysuria and frequency.   Musculoskeletal: Positive for arthralgias and gait problem. Negative for back pain and myalgias.   Skin: Negative for color change and rash.   Allergic/Immunologic: Negative for food allergies and immunocompromised state.   Neurological: Negative for dizziness, syncope and headaches.   Hematological: Negative for adenopathy. Does not bruise/bleed easily.   Psychiatric/Behavioral: Negative for hallucinations and suicidal ideas. The patient is not nervous/anxious.        /90   Pulse 90   Temp 98.1 °F (36.7 °C) (Temporal)   Ht 162.6 cm (64\")   Wt 79.4 kg (175 lb)   LMP  (LMP Unknown)   SpO2 93%   BMI 30.04 kg/m²   Admission on 04/12/2020, Discharged on 04/12/2020   Component Date Value Ref Range Status   • Glucose 04/12/2020 125* 65 - 99 mg/dL Final   • BUN 04/12/2020 22* 6 - 20 mg/dL Final   • Creatinine 04/12/2020 1.54* 0.57 - 1.00 mg/dL Final   • Sodium 04/12/2020 140  136 - 145 mmol/L Final   • Potassium 04/12/2020 4.4  3.5 - 5.2 mmol/L Final   • Chloride 04/12/2020 99  98 - 107 mmol/L Final   • CO2 04/12/2020 24.1  22.0 - 29.0 mmol/L Final   • Calcium 04/12/2020 11.1* 8.6 - 10.5 mg/dL Final   • Total Protein 04/12/2020 7.7  6.0 - 8.5 g/dL Final   • Albumin 04/12/2020 4.94  3.50 - 5.20 g/dL Final   • ALT (SGPT) 04/12/2020 43* 1 - 33 U/L Final   • AST (SGOT) 04/12/2020 48* 1 - 32 U/L Final   • Alkaline Phosphatase 04/12/2020 97  39 - 117 U/L Final   • Total Bilirubin 04/12/2020 0.4  0.2 - 1.2 mg/dL Final   • eGFR Non African Amer 04/12/2020 35* >60 mL/min/1.73 Final   • Globulin 04/12/2020 2.8  gm/dL Final   • A/G Ratio 04/12/2020 1.8  g/dL Final   • BUN/Creatinine Ratio 04/12/2020 14.3  7.0 - 25.0 Final   • Anion Gap 04/12/2020 16.9* 5.0 - 15.0 mmol/L Final   • LDH 04/12/2020 214  135 - 214 U/L Final   • " Procalcitonin 04/12/2020 0.12  0.10 - 0.25 ng/mL Final   • C-Reactive Protein 04/12/2020 0.34  0.00 - 0.50 mg/dL Final   • Ferritin 04/12/2020 28.25  13.00 - 150.00 ng/mL Final   • Lactate 04/12/2020 3.4* 0.5 - 2.0 mmol/L Final   • WBC 04/12/2020 11.33* 3.40 - 10.80 10*3/mm3 Final   • RBC 04/12/2020 5.23  3.77 - 5.28 10*6/mm3 Final   • Hemoglobin 04/12/2020 14.7  12.0 - 15.9 g/dL Final   • Hematocrit 04/12/2020 45.2  34.0 - 46.6 % Final   • MCV 04/12/2020 86.4  79.0 - 97.0 fL Final   • MCH 04/12/2020 28.1  26.6 - 33.0 pg Final   • MCHC 04/12/2020 32.5  31.5 - 35.7 g/dL Final   • RDW 04/12/2020 13.6  12.3 - 15.4 % Final   • RDW-SD 04/12/2020 43.1  37.0 - 54.0 fl Final   • MPV 04/12/2020 9.8  6.0 - 12.0 fL Final   • Platelets 04/12/2020 407  140 - 450 10*3/mm3 Final   • Neutrophil % 04/12/2020 48.0  42.7 - 76.0 % Final   • Lymphocyte % 04/12/2020 40.8  19.6 - 45.3 % Final   • Monocyte % 04/12/2020 5.3  5.0 - 12.0 % Final   • Eosinophil % 04/12/2020 4.1  0.3 - 6.2 % Final   • Basophil % 04/12/2020 1.3  0.0 - 1.5 % Final   • Immature Grans % 04/12/2020 0.5  0.0 - 0.5 % Final   • Neutrophils, Absolute 04/12/2020 5.43  1.70 - 7.00 10*3/mm3 Final   • Lymphocytes, Absolute 04/12/2020 4.62* 0.70 - 3.10 10*3/mm3 Final   • Monocytes, Absolute 04/12/2020 0.60  0.10 - 0.90 10*3/mm3 Final   • Eosinophils, Absolute 04/12/2020 0.47* 0.00 - 0.40 10*3/mm3 Final   • Basophils, Absolute 04/12/2020 0.15  0.00 - 0.20 10*3/mm3 Final   • Immature Grans, Absolute 04/12/2020 0.06* 0.00 - 0.05 10*3/mm3 Final   • nRBC 04/12/2020 0.0  0.0 - 0.2 /100 WBC Final   • Site 04/12/2020 Left Brachial   Final   • Galileo's Test 04/12/2020 N/A   Final   • pH, Arterial 04/12/2020 7.496* 7.350 - 7.450 pH units Final   • pCO2, Arterial 04/12/2020 30.6* 35.0 - 45.0 mm Hg Final   • pO2, Arterial 04/12/2020 87.6  83.0 - 108.0 mm Hg Final   • HCO3, Arterial 04/12/2020 23.7  20.0 - 26.0 mmol/L Final   • Base Excess, Arterial 04/12/2020 1.3  0.0 - 2.0 mmol/L Final    • O2 Saturation, Arterial 04/12/2020 97.6  94.0 - 99.0 % Final   • Hemoglobin, Blood Gas 04/12/2020 14.8  13.5 - 17.5 g/dL Final   • Hematocrit, Blood Gas 04/12/2020 45.4  38.0 - 51.0 % Final   • Oxyhemoglobin 04/12/2020 96.1  94 - 99 % Final   • Methemoglobin 04/12/2020 0.40  0.00 - 3.00 % Final   • Carboxyhemoglobin 04/12/2020 1.1  0 - 5 % Final   • A-a Gradiant 04/12/2020 20.1  0.0 - 300.0 mmHg Final   • CO2 Content 04/12/2020 24.6  22 - 33 mmol/L Final   • Temperature 04/12/2020 0.0  C Final   • Barometric Pressure for Blood Gas 04/12/2020 725  mmHg Final   • Modality 04/12/2020 Room Air   Final   • FIO2 04/12/2020 21  % Final   • Ventilator Mode 04/12/2020 NA   Final   • Collected by 04/12/2020 [id number]   Final   • Troponin T 04/12/2020 <0.010  0.000 - 0.030 ng/mL Final   • Blood Culture 04/12/2020 No growth at 5 days   Final   • Blood Culture 04/12/2020 No growth at 5 days   Final   • Hold Tube 04/12/2020 Hold for add-ons.   Final   • Color, UA 04/12/2020 Yellow  Yellow, Straw Final   • Appearance, UA 04/12/2020 Clear  Clear Final   • pH, UA 04/12/2020 7.5  5.0 - 8.0 Final   • Specific Gravity, UA 04/12/2020 1.013  1.005 - 1.030 Final   • Glucose, UA 04/12/2020 Negative  Negative Final   • Ketones, UA 04/12/2020 Negative  Negative Final   • Bilirubin, UA 04/12/2020 Negative  Negative Final   • Blood, UA 04/12/2020 Negative  Negative Final   • Protein, UA 04/12/2020 Negative  Negative Final   • Leuk Esterase, UA 04/12/2020 Trace* Negative Final   • Nitrite, UA 04/12/2020 Negative  Negative Final   • Urobilinogen, UA 04/12/2020 0.2 E.U./dL  0.2 - 1.0 E.U./dL Final   • RBC, UA 04/12/2020 None Seen  None Seen, 0-2 /HPF Final   • WBC, UA 04/12/2020 0-2  None Seen, 0-2 /HPF Final   • Bacteria, UA 04/12/2020 None Seen  None Seen /HPF Final   • Squamous Epithelial Cells, UA 04/12/2020 0-2  None Seen, 0-2 /HPF Final   • Hyaline Casts, UA 04/12/2020 None Seen  None Seen /LPF Final   • Methodology 04/12/2020 Automated  Microscopy   Final       Physical Exam   Constitutional: She is oriented to person, place, and time. She appears well-developed and well-nourished.   HENT:   Head: Normocephalic and atraumatic.   Nose: Nose normal.   Mouth/Throat: Oropharynx is clear and moist.   Eyes: Pupils are equal, round, and reactive to light. Conjunctivae and EOM are normal.   Neck: Normal range of motion. Neck supple. No tracheal deviation present. No thyromegaly present.   Cardiovascular: Normal rate, regular rhythm, normal heart sounds and intact distal pulses.   No murmur heard.  Pulmonary/Chest: Effort normal and breath sounds normal. No respiratory distress. She has no wheezes.   Abdominal: Soft. Bowel sounds are normal. There is no tenderness. There is no guarding.   Musculoskeletal: Normal range of motion. She exhibits tenderness. She exhibits no edema.   Tenderness noted to lateral deltoid and decreased range of joint motion in left shoulder with ambulation due to pain.  Coarse crepitus noted in bilateral knee joints with manipulation.   Lymphadenopathy:     She has no cervical adenopathy.   Neurological: She is alert and oriented to person, place, and time.   Skin: Skin is warm and dry. No rash noted.   Psychiatric: She has a normal mood and affect. Her behavior is normal.   Nursing note and vitals reviewed.      Assessment/Plan     Diagnoses and all orders for this visit:    Primary osteoarthritis involving multiple joints  Comments:  Left shoulder intra-articular injection and right knee intra-articular injections given today  Activity advised as tolerated  Orders:  -     diclofenac (VOLTAREN) 1 % gel gel; Apply 4 g topically to the appropriate area as directed 4 (Four) Times a Day As Needed (joint pain).  -     CBC & Differential  -     Comprehensive Metabolic Panel  -     CBC Auto Differential    Vitamin B 12 deficiency  -     CBC & Differential  -     Methylmalonic Acid, Serum  -     CBC Auto Differential    Vitamin D  deficiency  -     Vitamin D 25 Hydroxy    Type 2 diabetes mellitus with hyperglycemia, with long-term current use of insulin (CMS/[facility])  Comments:  Continue Ozempic and Basaglar    Orders:  -     MicroAlbumin, Urine, Random - Urine, Clean Catch  -     Hemoglobin A1c    Dyslipidemia  Comments:  Continue fenofibrate  Plan to ANH Perez as the patient needs this medication to reduce her cardiovascular risk  Orders:  -     Comprehensive Metabolic Panel  -     Lipid Panel      Procedure: Left shoulder intra-articular injection and right knee intra-articular injection  Provider: Kimmy Vela PA-C  Indication: Left shoulder pain and right knee pain due to osteoarthritis  Description: The left shoulder and right knee areas were prepped and draped in sterile fashion.  An intra-articular injection was given into each joint using 3 cc of 1% lidocaine, 1 cc of dexamethasone, and 1 cc of triamcinolone.  Pressure was held and sterile dressing was placed.  No complications  Estimated blood loss: Minimal  Patient tolerated procedure well    Lidocaine 1% 500 mg per 50 mL NDC number 0796-3883-88  Lot #[id number]  Expiration 2/2021    Triamcinolone 40 mg per 1 mL  NDC number [address]-0  Lot number [id number]  Expiration 9/2021    Dexamethasone 4 mg/mL NDC number 03941-813-61  Lot #[id number]  Expiration 12/20      This document has been electronically signed by:  Kimmy Vela PA-C

## 2020-05-26 LAB
Lab: NORMAL
METHYLMALONATE SERPL-SCNC: 110 NMOL/L (ref 0–378)

## 2020-05-29 RX ORDER — GABAPENTIN 100 MG/1
100 CAPSULE ORAL SEE ADMIN INSTRUCTIONS
Qty: 60 CAPSULE | Refills: 2 | Status: SHIPPED | OUTPATIENT
Start: 2020-05-29 | End: 2020-08-30

## 2020-05-29 RX ORDER — ERGOCALCIFEROL 1.25 MG/1
50000 CAPSULE ORAL WEEKLY
Qty: 5 CAPSULE | Refills: 5 | Status: SHIPPED | OUTPATIENT
Start: 2020-05-29 | End: 2020-08-27 | Stop reason: SDUPTHER

## 2020-06-05 ENCOUNTER — TELEPHONE (OUTPATIENT)
Dept: FAMILY MEDICINE CLINIC | Facility: CLINIC | Age: 60
End: 2020-06-05

## 2020-06-05 ENCOUNTER — OFFICE VISIT (OUTPATIENT)
Dept: CARDIOLOGY | Facility: CLINIC | Age: 60
End: 2020-06-05

## 2020-06-05 VITALS
WEIGHT: 174.8 LBS | SYSTOLIC BLOOD PRESSURE: 114 MMHG | HEIGHT: 64 IN | BODY MASS INDEX: 29.84 KG/M2 | DIASTOLIC BLOOD PRESSURE: 60 MMHG | OXYGEN SATURATION: 97 % | HEART RATE: 64 BPM

## 2020-06-05 DIAGNOSIS — M79.604 PAIN IN BOTH LOWER EXTREMITIES: ICD-10-CM

## 2020-06-05 DIAGNOSIS — I10 ESSENTIAL HYPERTENSION: ICD-10-CM

## 2020-06-05 DIAGNOSIS — I25.10 CORONARY ARTERY DISEASE INVOLVING NATIVE CORONARY ARTERY OF NATIVE HEART WITHOUT ANGINA PECTORIS: Primary | ICD-10-CM

## 2020-06-05 DIAGNOSIS — M79.605 PAIN IN BOTH LOWER EXTREMITIES: ICD-10-CM

## 2020-06-05 DIAGNOSIS — E78.5 DYSLIPIDEMIA: ICD-10-CM

## 2020-06-05 PROCEDURE — 99214 OFFICE O/P EST MOD 30 MIN: CPT | Performed by: INTERNAL MEDICINE

## 2020-06-05 RX ORDER — CETIRIZINE HYDROCHLORIDE 10 MG/1
10 TABLET ORAL DAILY
Qty: 30 TABLET | Refills: 5 | Status: SHIPPED | OUTPATIENT
Start: 2020-06-05 | End: 2020-08-27 | Stop reason: SDUPTHER

## 2020-06-05 NOTE — TELEPHONE ENCOUNTER
----- Message from ANH Medina sent at 6/5/2020  2:58 PM EDT -----  Inform the patient I sent a prescription for antihistamine called yrte to her pharmacy.  It is available over-the-counter as generic cetirizine 10 mg daily.  It is cheaper if she buys it over-the-counter.    ----- Message -----  From: Sophy Carmona MA  Sent: 5/29/2020  10:25 AM EDT  To: ANH Medina    Patient called and wanted to know if you could send her in something for allergies ?

## 2020-06-05 NOTE — PROGRESS NOTES
[facility] Cardiology    Encounter Date: 2020    Patient ID: Monisha Gardner is a 59 y.o. female.  : 1960     PCP: Kimmy Vela PA       Chief Complaint: Coronary Artery Disease and leg cramps/muscle pain      PROBLEM LIST:  1. Coronary artery disease:  a. Previous MI and stent to unknown vessel  2 years ago, incomplete database.   b. Hospital presentation with acute coronary syndrome and features of inferior ST elevations, 2015.  c. [facility], 2015, Dr. Watts: 95% stenosis of the ostial/proximal RCA s/p 3.5 x 26 mm Resolute VITALIY. No other significant disease. EF 65%.  d. [facility], 2017: EF >70%. No evidence of ischemia.  2. Peripheral arterial disease  a. IOANA, 2017: Right IOANA 1.16, Left IOANA 1.19.    3. Hypertension.   4. Dyslipidemia.   5. Type 2 diabetes.   6. Overweight.   7. Osteoarthritis.     History of Present Illness  Patient presents today for a follow-up with a history of coronary artery disease and cardiac risk factors. Since last visit, she has been experiencing intermittent severe leg cramps and myalgias. She has otherwise been feeling well overall from a cardiovascular standpoint and denies chest pain, shortness of breath, palpitations, edema, dizziness, and syncope. Patient was previously took Repatha for her cholesterol but her insurance reportedly stopped paying for it, and she has not been able to afford it.     Allergies   Allergen Reactions   • Ciprofloxacin Anaphylaxis   • Penicillins Shortness Of Breath   • Statins Myalgia   • Zofran [Ondansetron Hcl] Shortness Of Breath   • Fish Oil [Omega-3 Fatty Acids] Nausea And Vomiting         Current Outpatient Medications:   •  albuterol sulfate  (90 Base) MCG/ACT inhaler, Inhale 2 puffs Every 4 (Four) Hours As Needed for Shortness of Air., Disp: 1 inhaler, Rfl: 5  •  allopurinol (ZYLOPRIM) 100 MG tablet, TAKE 1 TABLET BY MOUTH TWICE DAILY, Disp: 60 tablet, Rfl: 5  •  BD PEN NEEDLE  ASAEL U/F 32G X 4 MM misc, , Disp: , Rfl: 0  •  chlorthalidone (HYGROTON) 25 MG tablet, TAKE 1 TABLET BY MOUTH DAILY., Disp: 30 tablet, Rfl: 5  •  DEXILANT 60 MG capsule, Take 1 capsule by mouth Daily., Disp: 30 capsule, Rfl: 2  •  diclofenac (VOLTAREN) 1 % gel gel, Apply 4 g topically to the appropriate area as directed 4 (Four) Times a Day As Needed (joint pain)., Disp: 300 g, Rfl: 5  •  DULoxetine (CYMBALTA) 60 MG capsule, TAKE 1 CAPSULE BY MOUTH ONCE DAILY, Disp: 30 capsule, Rfl: 5  •  EASY TOUCH LANCETS 30G/TWIST misc, USE AS DIRECTED, Disp: 100 each, Rfl: 5  •  Evolocumab 140 MG/ML solution auto-injector, Inject 1 mL under the skin into the appropriate area as directed Every 14 (Fourteen) Days., Disp: 2.1 mL, Rfl: 0  •  fenofibrate 160 MG tablet, TAKE 1 TABLET BY MOUTH DAILY., Disp: 90 tablet, Rfl: 1  •  gabapentin (NEURONTIN) 100 MG capsule, Take 1 capsule by mouth See Admin Instructions. 1 cap qhs x 2 weeks then 1 cap bid therafter, Disp: 60 capsule, Rfl: 2  •  Insulin Glargine (BASAGLAR KWIKPEN) 100 UNIT/ML injection pen, Inject 40 Units under the skin into the appropriate area as directed Daily., Disp: 4 pen, Rfl: 5  •  isosorbide mononitrate (IMDUR) 60 MG 24 hr tablet, TAKE 1 TABLET BY MOUTH ONCE DAILY, Disp: 90 tablet, Rfl: 2  •  losartan (COZAAR) 100 MG tablet, Take 0.5 tablets by mouth 2 (Two) Times a Day. (Patient taking differently: Take 100 mg by mouth 2 (Two) Times a Day. 0.5 tab BID), Disp: 30 tablet, Rfl: 5  •  metFORMIN (GLUCOPHAGE) 500 MG tablet, Take 1 tablet by mouth 2 (Two) Times a Day With Meals., Disp: 120 tablet, Rfl: 3  •  metoprolol tartrate (LOPRESSOR) 50 MG tablet, TAKE 1 TABLET BY MOUTH TWICE DAILY, Disp: 60 tablet, Rfl: 6  •  mupirocin (Bactroban) 2 % ointment, Apply  topically to the appropriate area as directed 3 (Three) Times a Day., Disp: 30 g, Rfl: 1  •  nitroglycerin (NITROSTAT) 0.4 MG SL tablet, Place 1 tablet under the tongue Every 5 (Five) Minutes As Needed for chest pain.,  "Disp: 25 tablet, Rfl: 6  •  rOPINIRole (REQUIP) 2 MG tablet, Take 1 tablet by mouth Every Night., Disp: 30 tablet, Rfl: 5  •  sucralfate (Carafate) 1 g tablet, Take 1 tablet by mouth 4 (Four) Times a Day Before Meals & at Bedtime., Disp: 120 tablet, Rfl: 1  •  TRUE METRIX BLOOD GLUCOSE TEST test strip, 1 each by Other route 3 (Three) Times a Day., Disp: , Rfl:   •  vitamin D (ERGOCALCIFEROL) 1.25 MG (20880 UT) capsule capsule, Take 1 capsule by mouth 1 (One) Time Per Week., Disp: 5 capsule, Rfl: 5    The following portions of the patient's history were reviewed and updated as appropriate: allergies, current medications, past family history, past medical history, past social history, past surgical history and problem list.    ROS  Review of Systems   Constitution: Negative for chills, fever, fatigue, generalized weakness.   Cardiovascular: Negative for chest pain, claudication, dyspnea on exertion, leg swelling, palpitations, orthopnea, and syncope.   Respiratory: Negative for cough, shortness of breath, and wheezing.  HENT: Negative for ear pain, nosebleeds, and tinnitus.  Gastrointestinal: Negative for abdominal pain, constipation, diarrhea, nausea and vomiting.   Genitourinary: No urinary symptoms.  Musculoskeletal: Negative for muscle cramps.  Neurological: Negative for dizziness, headaches, loss of balance, numbness, and symptoms of stroke.  Psychiatric: Normal mental status.     All other systems reviewed and are negative.        Objective:     /60 (BP Location: Left arm, Patient Position: Sitting)   Pulse 64   Ht 162.6 cm (64\")   Wt 79.3 kg (174 lb 12.8 oz)   LMP  (LMP Unknown)   SpO2 97%   BMI 30.00 kg/m²      Physical Exam  Constitutional: Patient appears well-developed and well-nourished.   HENT: HEENT exam unremarkable.   Neck: Neck supple. No JVD present. No carotid bruits.   Cardiovascular: Normal rate, regular rhythm and normal heart sounds. No murmur heard.   2+ DP and 1+ PT " bilaterally  Pulmonary/Chest: Breath sounds normal. Does not exhibit tenderness.   Abdominal: Abdomen benign.   Musculoskeletal: Does not exhibit edema.   Neurological: Neurological exam unremarkable.   Vitals reviewed.    Lab Review:   Lab Results   Component Value Date    GLUCOSE 139 (H) 05/22/2020    BUN 17 05/22/2020    CREATININE 1.05 (H) 05/22/2020    EGFRIFNONA 54 (L) 05/22/2020    BCR 16.2 05/22/2020    K 4.3 05/22/2020    CO2 23.8 05/22/2020    CALCIUM 10.3 05/22/2020    ALBUMIN 4.60 05/22/2020    AST 35 (H) 05/22/2020    ALT 27 05/22/2020     Lab Results   Component Value Date    CHOL 216 (H) 05/22/2020    TRIG 400 (H) 05/22/2020    HDL 33 (L) 05/22/2020     (H) 05/22/2020      Lab Results   Component Value Date    WBC 8.65 05/22/2020    RBC 4.51 05/22/2020    HGB 12.7 05/22/2020    HCT 37.7 05/22/2020    MCV 83.6 05/22/2020     05/22/2020     Lab Results   Component Value Date    HGBA1C 6.90 (H) 05/22/2020        Procedures       Assessment:      Diagnosis Plan   1. Coronary artery disease involving native coronary artery of native heart without angina pectoris  Stable and asymptomatic, continue current medications   2. Essential hypertension  Well-controlled, continue losartan and metoprolol   3. Dyslipidemia  Pt no longer on Repatha due to cost. We will try to renew her Copay card so she can get back on Repatha. Continue fenofibrate.   4. Pain in both lower extremities  Pt advised to request neurology working for leg pain, as she had a normal IOANA in 2017 and has normal peripheral pulses on exam today.     Plan:   Pt advised to request PCP to consider MRI and possibly neurology evaluation for leg pain, as she had a normal IOANA in 2017 and has normal peripheral pulses on exam today.  Pt no longer on Repatha due to cost. We will try to renew her Copay card so she can get back on Repatha.  Continue current medications.   FU in 6 MO, sooner as needed.  Thank you for allowing us to participate in  the care of your patient.     Scribed for Derrick Watts MD by Dolores Arreaga. 6/5/2020  14:57       I, Derrick Watts MD, personally performed the services described in this documentation as scribed by the above named individual in my presence, and it is both accurate and complete.  6/5/2020  16:15        Please note that portions of this note may have been completed with a voice recognition program. Efforts were made to edit the dictations, but occasionally words are mistranscribed.

## 2020-06-11 DIAGNOSIS — E11.40 CONTROLLED TYPE 2 DIABETES MELLITUS WITH NEUROPATHY (HCC): ICD-10-CM

## 2020-06-12 RX ORDER — INSULIN GLARGINE 100 [IU]/ML
40 INJECTION, SOLUTION SUBCUTANEOUS DAILY
Qty: 12 ML | Refills: 5 | Status: SHIPPED | OUTPATIENT
Start: 2020-06-12 | End: 2020-08-27 | Stop reason: SDUPTHER

## 2020-06-16 RX ORDER — PEN NEEDLE, DIABETIC 32GX 5/32"
1 NEEDLE, DISPOSABLE MISCELLANEOUS 3 TIMES DAILY
Qty: 100 EACH | Refills: 5 | Status: SHIPPED | OUTPATIENT
Start: 2020-06-16 | End: 2020-08-27 | Stop reason: SDUPTHER

## 2020-06-19 DIAGNOSIS — E11.8 TYPE 2 DIABETES MELLITUS WITH COMPLICATION, WITHOUT LONG-TERM CURRENT USE OF INSULIN (HCC): Chronic | ICD-10-CM

## 2020-06-30 DIAGNOSIS — R11.0 NAUSEA: Primary | ICD-10-CM

## 2020-06-30 DIAGNOSIS — K20.90 ESOPHAGITIS: ICD-10-CM

## 2020-06-30 RX ORDER — FENOFIBRATE 160 MG/1
160 TABLET ORAL DAILY
Qty: 90 TABLET | Refills: 1 | Status: SHIPPED | OUTPATIENT
Start: 2020-06-30 | End: 2020-08-27 | Stop reason: SDUPTHER

## 2020-06-30 RX ORDER — ONDANSETRON 4 MG/1
4 TABLET, FILM COATED ORAL EVERY 8 HOURS PRN
Qty: 40 TABLET | Refills: 0 | Status: SHIPPED | OUTPATIENT
Start: 2020-06-30 | End: 2021-04-02 | Stop reason: SDUPTHER

## 2020-06-30 RX ORDER — DEXLANSOPRAZOLE 60 MG/1
60 CAPSULE, DELAYED RELEASE ORAL DAILY
Qty: 30 CAPSULE | Refills: 2 | Status: SHIPPED | OUTPATIENT
Start: 2020-06-30 | End: 2020-06-30 | Stop reason: SDUPTHER

## 2020-06-30 RX ORDER — DEXLANSOPRAZOLE 60 MG/1
60 CAPSULE, DELAYED RELEASE ORAL DAILY
Qty: 30 CAPSULE | Refills: 2 | Status: SHIPPED | OUTPATIENT
Start: 2020-06-30 | End: 2020-08-27 | Stop reason: SDUPTHER

## 2020-07-10 ENCOUNTER — TELEPHONE (OUTPATIENT)
Dept: FAMILY MEDICINE CLINIC | Facility: CLINIC | Age: 60
End: 2020-07-10

## 2020-07-10 NOTE — TELEPHONE ENCOUNTER
Pt is aware of this information.     ----- Message from Doug Dixon MD sent at 7/10/2020  2:14 PM EDT -----  No - I dont manage postoperative pain - I defer that to the surgeon who performed the procedure  The only thing that I would have to offer would be acetaminophen  She probably needs to avoid NSAIDS as her renal function is not normal    ----- Message -----  From: Sophy Carmona MA  Sent: 7/10/2020   1:52 PM EDT  To: Doug Dixon MD    Patient of [facility]. She said that a doctor in [place] cut out skin cancer on her arm this morning and did not give her any pain medicine. She said the numbness is wearing off and is in a lot of pain. She wanted to know if there was anything we could prescribe her?

## 2020-08-27 DIAGNOSIS — G25.81 RLS (RESTLESS LEGS SYNDROME): ICD-10-CM

## 2020-08-27 DIAGNOSIS — R11.0 NAUSEA: ICD-10-CM

## 2020-08-27 DIAGNOSIS — E11.40 CONTROLLED TYPE 2 DIABETES MELLITUS WITH NEUROPATHY (HCC): ICD-10-CM

## 2020-08-27 DIAGNOSIS — K20.90 ESOPHAGITIS: ICD-10-CM

## 2020-08-27 DIAGNOSIS — F32.A ANXIETY AND DEPRESSION: ICD-10-CM

## 2020-08-27 DIAGNOSIS — E11.8 TYPE 2 DIABETES MELLITUS WITH COMPLICATION, WITHOUT LONG-TERM CURRENT USE OF INSULIN (HCC): Chronic | ICD-10-CM

## 2020-08-27 DIAGNOSIS — M1A.09X0 IDIOPATHIC CHRONIC GOUT OF MULTIPLE SITES WITHOUT TOPHUS: ICD-10-CM

## 2020-08-27 DIAGNOSIS — M15.9 PRIMARY OSTEOARTHRITIS INVOLVING MULTIPLE JOINTS: ICD-10-CM

## 2020-08-27 DIAGNOSIS — I10 ESSENTIAL HYPERTENSION: ICD-10-CM

## 2020-08-27 DIAGNOSIS — F41.9 ANXIETY AND DEPRESSION: ICD-10-CM

## 2020-08-27 RX ORDER — PEN NEEDLE, DIABETIC 32GX 5/32"
1 NEEDLE, DISPOSABLE MISCELLANEOUS 3 TIMES DAILY
Qty: 300 EACH | Refills: 3 | Status: SHIPPED | OUTPATIENT
Start: 2020-08-27 | End: 2021-09-07 | Stop reason: SDUPTHER

## 2020-08-27 RX ORDER — FENOFIBRATE 160 MG/1
160 TABLET ORAL DAILY
Qty: 90 TABLET | Refills: 3 | Status: SHIPPED | OUTPATIENT
Start: 2020-08-27 | End: 2021-09-07 | Stop reason: SDUPTHER

## 2020-08-27 RX ORDER — ALLOPURINOL 100 MG/1
100 TABLET ORAL 2 TIMES DAILY
Qty: 180 TABLET | Refills: 3 | Status: SHIPPED | OUTPATIENT
Start: 2020-08-27 | End: 2021-09-07 | Stop reason: SDUPTHER

## 2020-08-27 RX ORDER — NITROGLYCERIN 0.4 MG/1
0.4 TABLET SUBLINGUAL
Qty: 75 TABLET | Refills: 3 | Status: SHIPPED | OUTPATIENT
Start: 2020-08-27 | End: 2021-08-20 | Stop reason: SDUPTHER

## 2020-08-27 RX ORDER — ROPINIROLE 2 MG/1
2 TABLET, FILM COATED ORAL NIGHTLY
Qty: 90 TABLET | Refills: 3 | Status: SHIPPED | OUTPATIENT
Start: 2020-08-27 | End: 2021-06-21

## 2020-08-27 RX ORDER — CETIRIZINE HYDROCHLORIDE 10 MG/1
10 TABLET ORAL DAILY
Qty: 90 TABLET | Refills: 3 | Status: SHIPPED | OUTPATIENT
Start: 2020-08-27 | End: 2021-09-07 | Stop reason: SDUPTHER

## 2020-08-27 RX ORDER — ONDANSETRON 4 MG/1
4 TABLET, FILM COATED ORAL EVERY 8 HOURS PRN
Qty: 120 TABLET | Refills: 0 | OUTPATIENT
Start: 2020-08-27

## 2020-08-27 RX ORDER — LANCETS 30 GAUGE
EACH MISCELLANEOUS
Qty: 300 EACH | Refills: 3 | Status: SHIPPED | OUTPATIENT
Start: 2020-08-27 | End: 2021-09-07 | Stop reason: SDUPTHER

## 2020-08-27 RX ORDER — DULOXETIN HYDROCHLORIDE 60 MG/1
60 CAPSULE, DELAYED RELEASE ORAL DAILY
Qty: 90 CAPSULE | Refills: 3 | Status: SHIPPED | OUTPATIENT
Start: 2020-08-27 | End: 2021-08-29 | Stop reason: SDUPTHER

## 2020-08-27 RX ORDER — INSULIN GLARGINE 100 [IU]/ML
40 INJECTION, SOLUTION SUBCUTANEOUS DAILY
Qty: 36 ML | Refills: 3 | Status: SHIPPED | OUTPATIENT
Start: 2020-08-27 | End: 2021-02-15

## 2020-08-27 RX ORDER — CHLORTHALIDONE 25 MG/1
25 TABLET ORAL DAILY
Qty: 90 TABLET | Refills: 3 | Status: SHIPPED | OUTPATIENT
Start: 2020-08-27 | End: 2021-09-07 | Stop reason: SDUPTHER

## 2020-08-27 RX ORDER — ERGOCALCIFEROL 1.25 MG/1
50000 CAPSULE ORAL WEEKLY
Qty: 15 CAPSULE | Refills: 3 | Status: SHIPPED | OUTPATIENT
Start: 2020-08-27 | End: 2020-12-11

## 2020-08-27 RX ORDER — DEXLANSOPRAZOLE 60 MG/1
60 CAPSULE, DELAYED RELEASE ORAL DAILY
Qty: 90 CAPSULE | Refills: 3 | Status: SHIPPED | OUTPATIENT
Start: 2020-08-27 | End: 2021-09-03

## 2020-08-27 RX ORDER — METOPROLOL TARTRATE 50 MG/1
50 TABLET, FILM COATED ORAL 2 TIMES DAILY
Qty: 180 TABLET | Refills: 3 | Status: SHIPPED | OUTPATIENT
Start: 2020-08-27 | End: 2021-09-07 | Stop reason: SDUPTHER

## 2020-08-27 RX ORDER — LOSARTAN POTASSIUM 100 MG/1
100 TABLET ORAL DAILY
Qty: 90 TABLET | Refills: 3 | Status: SHIPPED | OUTPATIENT
Start: 2020-08-27 | End: 2021-09-07 | Stop reason: SDUPTHER

## 2020-08-27 RX ORDER — CALCIUM CITRATE/VITAMIN D3 200MG-6.25
1 TABLET ORAL 3 TIMES DAILY
Qty: 300 EACH | Refills: 3 | Status: SHIPPED | OUTPATIENT
Start: 2020-08-27 | End: 2022-04-14 | Stop reason: SDUPTHER

## 2020-08-27 RX ORDER — ALBUTEROL SULFATE 90 UG/1
2 AEROSOL, METERED RESPIRATORY (INHALATION) EVERY 4 HOURS PRN
Qty: 54 G | Refills: 3 | Status: SHIPPED | OUTPATIENT
Start: 2020-08-27 | End: 2021-04-02

## 2020-08-27 RX ORDER — ISOSORBIDE MONONITRATE 60 MG/1
60 TABLET, EXTENDED RELEASE ORAL DAILY
Qty: 90 TABLET | Refills: 2 | Status: SHIPPED | OUTPATIENT
Start: 2020-08-27 | End: 2021-05-28

## 2020-08-31 RX ORDER — GABAPENTIN 100 MG/1
100 CAPSULE ORAL 2 TIMES DAILY
Qty: 60 CAPSULE | Refills: 3 | Status: SHIPPED | OUTPATIENT
Start: 2020-08-31 | End: 2021-01-08 | Stop reason: SDUPTHER

## 2020-09-11 ENCOUNTER — OFFICE VISIT (OUTPATIENT)
Dept: FAMILY MEDICINE CLINIC | Facility: CLINIC | Age: 60
End: 2020-09-11

## 2020-09-11 VITALS
DIASTOLIC BLOOD PRESSURE: 64 MMHG | TEMPERATURE: 98.1 F | HEIGHT: 64 IN | HEART RATE: 86 BPM | SYSTOLIC BLOOD PRESSURE: 106 MMHG | WEIGHT: 175 LBS | OXYGEN SATURATION: 98 % | BODY MASS INDEX: 29.88 KG/M2

## 2020-09-11 DIAGNOSIS — D04.9 SQUAMOUS CELL CARCINOMA IN SITU (SCCIS) OF SKIN: ICD-10-CM

## 2020-09-11 DIAGNOSIS — E78.2 MIXED HYPERLIPIDEMIA: ICD-10-CM

## 2020-09-11 DIAGNOSIS — F41.9 ANXIETY: Primary | ICD-10-CM

## 2020-09-11 DIAGNOSIS — M19.90 OSTEOARTHRITIS, UNSPECIFIED OSTEOARTHRITIS TYPE, UNSPECIFIED SITE: ICD-10-CM

## 2020-09-11 PROCEDURE — 20610 DRAIN/INJ JOINT/BURSA W/O US: CPT | Performed by: PHYSICIAN ASSISTANT

## 2020-09-11 PROCEDURE — 99214 OFFICE O/P EST MOD 30 MIN: CPT | Performed by: PHYSICIAN ASSISTANT

## 2020-09-11 RX ORDER — SEMAGLUTIDE 1.34 MG/ML
INJECTION, SOLUTION SUBCUTANEOUS WEEKLY
COMMUNITY
Start: 2020-08-22 | End: 2021-02-09

## 2020-09-11 RX ORDER — FLUOROURACIL 50 MG/G
CREAM TOPICAL
COMMUNITY
Start: 2020-08-31 | End: 2022-04-14

## 2020-09-13 PROBLEM — D04.9 SQUAMOUS CELL CARCINOMA IN SITU (SCCIS) OF SKIN: Status: ACTIVE | Noted: 2020-09-13

## 2020-09-13 PROBLEM — E78.2 MIXED HYPERLIPIDEMIA: Status: ACTIVE | Noted: 2020-09-13

## 2020-09-13 NOTE — PROGRESS NOTES
Subjective   Monisha Gardner is a 60 y.o. female.       Chief Complaint -anxiety    History of Present Illness -      Anxiety-  She complains of increased stress and anxiety despite the use of Cymbalta 60 mg daily.  She reports feelings of being overwhelmed and labile moods.  She denies any SI or HI.    Osteoarthritis-  She complains of a left and right severe throbbing knee pain due to osteoarthritis.  Onset of osteoarthritis exacerbation approximately 2 weeks ago.  No known injury.    Squamous cell carcinoma of legs.  She has multiple areas of squamous cell carcinoma that are being followed by dermatology.  She has multiple reddened areas with the use of Efudex.    Hyperlipidemia-  Not at goal since she states she was unable to get her Repatha due to insurance coverage.    The following portions of the patient's history were reviewed and updated as appropriate: allergies, current medications, past family history, past medical history, past social history, past surgical history and problem list.    Review of Systems   Constitutional: Negative for activity change, appetite change, fatigue and fever.   HENT: Negative for ear pain, sinus pressure and sore throat.    Eyes: Negative for pain and visual disturbance.   Respiratory: Negative for cough and chest tightness.    Cardiovascular: Negative for chest pain and palpitations.   Gastrointestinal: Negative for abdominal pain, constipation, diarrhea, nausea and vomiting.   Endocrine: Negative for polydipsia and polyuria.   Genitourinary: Negative for dysuria and frequency.   Musculoskeletal: Positive for arthralgias and gait problem. Negative for back pain and myalgias.   Skin: Positive for color change. Negative for rash.   Allergic/Immunologic: Negative for food allergies and immunocompromised state.   Neurological: Negative for dizziness, syncope and headaches.   Hematological: Negative for adenopathy. Does not bruise/bleed easily.   Psychiatric/Behavioral: Positive  "for dysphoric mood. Negative for hallucinations and suicidal ideas. The patient is nervous/anxious.        /64   Pulse 86   Temp 98.1 °F (36.7 °C)   Ht 162.6 cm (64\")   Wt 79.4 kg (175 lb)   LMP  (LMP Unknown)   SpO2 98%   BMI 30.04 kg/m²   Office Visit on 05/22/2020   Component Date Value Ref Range Status   • Glucose 05/22/2020 139* 65 - 99 mg/dL Final   • BUN 05/22/2020 17  6 - 20 mg/dL Final   • Creatinine 05/22/2020 1.05* 0.57 - 1.00 mg/dL Final   • Sodium 05/22/2020 138  136 - 145 mmol/L Final   • Potassium 05/22/2020 4.3  3.5 - 5.2 mmol/L Final   • Chloride 05/22/2020 99  98 - 107 mmol/L Final   • CO2 05/22/2020 23.8  22.0 - 29.0 mmol/L Final   • Calcium 05/22/2020 10.3  8.6 - 10.5 mg/dL Final   • Total Protein 05/22/2020 7.0  6.0 - 8.5 g/dL Final   • Albumin 05/22/2020 4.60  3.50 - 5.20 g/dL Final   • ALT (SGPT) 05/22/2020 27  1 - 33 U/L Final   • AST (SGOT) 05/22/2020 35* 1 - 32 U/L Final   • Alkaline Phosphatase 05/22/2020 71  39 - 117 U/L Final   • Total Bilirubin 05/22/2020 0.2  0.2 - 1.2 mg/dL Final   • eGFR Non African Amer 05/22/2020 54* >60 mL/min/1.73 Final   • Globulin 05/22/2020 2.4  gm/dL Final   • A/G Ratio 05/22/2020 1.9  g/dL Final   • BUN/Creatinine Ratio 05/22/2020 16.2  7.0 - 25.0 Final   • Anion Gap 05/22/2020 15.2* 5.0 - 15.0 mmol/L Final   • Total Cholesterol 05/22/2020 216* 0 - 200 mg/dL Final   • Triglycerides 05/22/2020 400* 0 - 150 mg/dL Final   • HDL Cholesterol 05/22/2020 33* 40 - 60 mg/dL Final   • LDL Cholesterol  05/22/2020 103* 0 - 100 mg/dL Final   • VLDL Cholesterol 05/22/2020 80* 5 - 40 mg/dL Final   • LDL/HDL Ratio 05/22/2020 3.12   Final   • Microalbumin, Urine 05/22/2020 2.1  mg/dL Final   • Hemoglobin A1C 05/22/2020 6.90* 4.80 - 5.60 % Final   • 25 Hydroxy, Vitamin D 05/22/2020 29.1* 30.0 - 100.0 ng/ml Final   • Methylmalonic Acid 05/22/2020 110  0 - 378 nmol/L Final   • Disclaimer: 05/22/2020 Comment   Final   • WBC 05/22/2020 8.65  3.40 - 10.80 10*3/mm3 " Final   • RBC 05/22/2020 4.51  3.77 - 5.28 10*6/mm3 Final   • Hemoglobin 05/22/2020 12.7  12.0 - 15.9 g/dL Final   • Hematocrit 05/22/2020 37.7  34.0 - 46.6 % Final   • MCV 05/22/2020 83.6  79.0 - 97.0 fL Final   • MCH 05/22/2020 28.2  26.6 - 33.0 pg Final   • MCHC 05/22/2020 33.7  31.5 - 35.7 g/dL Final   • RDW 05/22/2020 13.5  12.3 - 15.4 % Final   • RDW-SD 05/22/2020 41.2  37.0 - 54.0 fl Final   • MPV 05/22/2020 9.6  6.0 - 12.0 fL Final   • Platelets 05/22/2020 414  140 - 450 10*3/mm3 Final   • Neutrophil % 05/22/2020 48.2  42.7 - 76.0 % Final   • Lymphocyte % 05/22/2020 38.8  19.6 - 45.3 % Final   • Monocyte % 05/22/2020 5.5  5.0 - 12.0 % Final   • Eosinophil % 05/22/2020 5.7  0.3 - 6.2 % Final   • Basophil % 05/22/2020 1.3  0.0 - 1.5 % Final   • Immature Grans % 05/22/2020 0.5  0.0 - 0.5 % Final   • Neutrophils, Absolute 05/22/2020 4.17  1.70 - 7.00 10*3/mm3 Final   • Lymphocytes, Absolute 05/22/2020 3.36* 0.70 - 3.10 10*3/mm3 Final   • Monocytes, Absolute 05/22/2020 0.48  0.10 - 0.90 10*3/mm3 Final   • Eosinophils, Absolute 05/22/2020 0.49* 0.00 - 0.40 10*3/mm3 Final   • Basophils, Absolute 05/22/2020 0.11  0.00 - 0.20 10*3/mm3 Final   • Immature Grans, Absolute 05/22/2020 0.04  0.00 - 0.05 10*3/mm3 Final   • nRBC 05/22/2020 0.0  0.0 - 0.2 /100 WBC Final       Physical Exam  Vitals signs and nursing note reviewed.   Constitutional:       General: She is not in acute distress.     Appearance: She is well-developed. She is not ill-appearing.   HENT:      Head: Normocephalic and atraumatic.      Right Ear: Tympanic membrane normal.      Left Ear: Tympanic membrane normal.      Nose: Nose normal.      Mouth/Throat:      Mouth: Mucous membranes are moist.      Pharynx: No oropharyngeal exudate or posterior oropharyngeal erythema.   Eyes:      Conjunctiva/sclera: Conjunctivae normal.      Pupils: Pupils are equal, round, and reactive to light.   Neck:      Musculoskeletal: Normal range of motion and neck supple.       Thyroid: No thyromegaly.      Trachea: No tracheal deviation.   Cardiovascular:      Rate and Rhythm: Normal rate and regular rhythm.      Heart sounds: Normal heart sounds. No murmur.   Pulmonary:      Effort: Pulmonary effort is normal. No respiratory distress.      Breath sounds: Normal breath sounds. No wheezing.   Abdominal:      General: Bowel sounds are normal.      Palpations: Abdomen is soft.      Tenderness: There is no abdominal tenderness. There is no guarding.   Musculoskeletal:         General: Tenderness present. No deformity or signs of injury.      Comments: Coarse crepitus noted with tenderness to manipulation of left and right bilateral knee joints without erythema   Lymphadenopathy:      Cervical: No cervical adenopathy.   Skin:     General: Skin is warm and dry.      Findings: Erythema present. No rash.      Comments: Multiple areas of erythema noted on bilateral lower extremities consistent with use of Efudex for SCC   Neurological:      General: No focal deficit present.      Mental Status: She is alert and oriented to person, place, and time.   Psychiatric:         Behavior: Behavior normal.         Thought Content: Thought content normal.         Judgment: Judgment normal.      Comments: Mood is concerned and anxious         Assessment/Plan     Diagnoses and all orders for this visit:    Anxiety  Comments:  Start Vraylar  Continue Cymbalta 60 mg daily  Advised coping techniques that are conservative to deal with stress    Osteoarthritis, unspecified osteoarthritis type, unspecified site  Comments:  Left and right intra-articular joint injections given today    Squamous cell carcinoma in situ (SCCIS) of skin  Comments:  Continue Efudex as followed by dermatology weekly    Mixed hyperlipidemia  Comments:  Advised low-cholesterol diet  Plan to PA Repatha    Other orders  -     silver sulfadiazine (Silvadene) 1 % cream; Apply  topically to the appropriate area as directed 2 (Two) Times a  Day.      Procedure: Left and right knee intra-articular injections  Provider: Kimmy Vela PA-C  Indication: Osteoarthritis of the left and right knees  Description: The left and right knees were prepped and draped in sterile fashion.  An intra-articular injection was given into each joint using 3 cc 1% lidocaine, 1 cc of triamcinolone, 1 cc of dexamethasone.  Pressure was held sterile dressings were placed.  No complications  Estimated blood loss: Minimal  Patient tolerated procedure well    Lidocaine 1% 500 mg per 50 mL  NDC number 4492-0080-48  Lot #[id number]  Expiration 2/2021    Triamcinolone 40 mg/mL  NDC number [address]-7  Lot number [id number]  Expiration 5/2021    Dexamethasone 4 mg/mL  NDC number 88780-080-25  Lot #[id number]  Expiration 12/20      This document has been electronically signed by:  Kimmy Vela PA-C

## 2020-09-14 ENCOUNTER — PRIOR AUTHORIZATION (OUTPATIENT)
Dept: FAMILY MEDICINE CLINIC | Facility: CLINIC | Age: 60
End: 2020-09-14

## 2020-09-23 ENCOUNTER — OFFICE VISIT (OUTPATIENT)
Dept: FAMILY MEDICINE CLINIC | Facility: CLINIC | Age: 60
End: 2020-09-23

## 2020-09-23 DIAGNOSIS — L03.116 LEFT LEG CELLULITIS: Primary | ICD-10-CM

## 2020-09-23 PROCEDURE — 99441 PR PHYS/QHP TELEPHONE EVALUATION 5-10 MIN: CPT | Performed by: NURSE PRACTITIONER

## 2020-09-23 RX ORDER — GENTAMICIN SULFATE 40 MG/ML
80 INJECTION, SOLUTION INTRAMUSCULAR; INTRAVENOUS ONCE
Status: DISCONTINUED | OUTPATIENT
Start: 2020-09-23 | End: 2021-07-25

## 2020-09-23 RX ORDER — SULFAMETHOXAZOLE AND TRIMETHOPRIM 800; 160 MG/1; MG/1
1 TABLET ORAL EVERY 12 HOURS
Qty: 20 TABLET | Refills: 0 | Status: SHIPPED | OUTPATIENT
Start: 2020-09-23 | End: 2020-10-03

## 2020-09-23 NOTE — PROGRESS NOTES
"You have chosen to receive care through a telephone visit today. Do you consent to use a telephone visit for your medical care today? Yes    Establish patient makes contact with office of APRN to discuss health conditions.  Patient is due for routine checkup but due to current pandemic is not recommended to present to the office for face-to-face evaluation.      Chief Complaint   Patient presents with   • Leg complaint       Brief HPI/ROS obtained as follows:    Leg complaint- she reports left leg.  She is being treated for skin cancer and then got bit by a spider.  She does not have a derm follow up 2 weeks ago.  She reports has not gotten any better \"is getting worse\".  She is worried because of her diabetes.  She reports she has some leg swelling.  She reports no specific warmth.  She is using cream from derm that is \"chemo\".   She reports she felt bactroban did not help much.  She has not used consistently.      The following portions of the patient's history were reviewed and updated as appropriate: CC, ROS, allergies, current medications, past family history, past medical history, past social history, past surgical history and problem list.    Review of Systems   Constitutional: Negative for appetite change, fatigue and fever.   HENT: Negative for congestion, ear pain, nosebleeds, postnasal drip, rhinorrhea, sore throat, trouble swallowing and voice change.    Eyes: Negative for blurred vision, photophobia and visual disturbance.   Respiratory: Negative for cough, chest tightness, shortness of breath and wheezing.    Cardiovascular: Positive for leg swelling. Negative for chest pain and palpitations.   Gastrointestinal: Negative for abdominal pain, blood in stool, constipation, diarrhea and nausea.   Endocrine: Negative for cold intolerance, heat intolerance and polydipsia.   Genitourinary: Negative for dysuria and hematuria.   Musculoskeletal: Negative for arthralgias, back pain, gait problem and myalgias. "   Skin: Negative for color change, pallor and bruise.   Allergic/Immunologic: Negative.    Neurological: Negative for dizziness and headache.   Hematological: Negative.    Psychiatric/Behavioral: Negative for sleep disturbance and suicidal ideas. The patient is not nervous/anxious.    All other systems reviewed and are negative.      Assessment     Physical Exam     Patient alert and oriented.  Able to follow conversation without sounding breathless.  No obvious distress.  Thought processes congruent with conversation.  Answers and ask questions without difficulty.      Problem List Items Addressed This Visit     None      Visit Diagnoses     Left leg cellulitis    -  Primary    Relevant Medications    sulfamethoxazole-trimethoprim (BACTRIM DS,SEPTRA DS) 800-160 MG per tablet          Patient instructed and advised to call if symptoms are increasing or new symptoms occur.    Understands reasons for urgent and emergent care.  Patient (& family) verbalized agreement for treatment plan.     Generalized precautions advised including social distancing, hand washing, cough/sneeze hygiene.     Instructed to complete all of antibiotics for acute illness.  Increase PO fluids, avoid/limit caffeine.  Do not save any of the meds for later use.  Rest PRN    May have Gentamycin 80 mg if not improved by Friday.   RTC PRN 3-5 days for worsening or non resolving symptoms      This visit has been rescheduled as a phone visit to comply with patient safety concerns in accordance with CDC recommendations. Total time of discussion was 10 minutes.        This document has been electronically signed by:  TRUONG Gonzalez FNP-C Dragon disclaimer:  Much of this encounter note is an electronic transcription/translation of spoken language to printed text. The electronic translation of spoken language may permit erroneous, or at times, nonsensical words or phrases to be inadvertently transcribed; Although I have reviewed the note for  such errors, some may still exist.

## 2020-09-25 ENCOUNTER — OFFICE VISIT (OUTPATIENT)
Dept: FAMILY MEDICINE CLINIC | Facility: CLINIC | Age: 60
End: 2020-09-25

## 2020-09-25 VITALS
BODY MASS INDEX: 29.88 KG/M2 | HEART RATE: 75 BPM | SYSTOLIC BLOOD PRESSURE: 118 MMHG | OXYGEN SATURATION: 98 % | HEIGHT: 64 IN | WEIGHT: 175 LBS | TEMPERATURE: 97.3 F | DIASTOLIC BLOOD PRESSURE: 72 MMHG

## 2020-09-25 DIAGNOSIS — L03.116 CELLULITIS OF LEFT LOWER EXTREMITY: Primary | ICD-10-CM

## 2020-09-25 PROCEDURE — 96372 THER/PROPH/DIAG INJ SC/IM: CPT | Performed by: PHYSICIAN ASSISTANT

## 2020-09-25 PROCEDURE — 99213 OFFICE O/P EST LOW 20 MIN: CPT | Performed by: PHYSICIAN ASSISTANT

## 2020-09-25 RX ORDER — CEFTRIAXONE 1 G/1
1 INJECTION, POWDER, FOR SOLUTION INTRAMUSCULAR; INTRAVENOUS ONCE
Status: COMPLETED | OUTPATIENT
Start: 2020-09-25 | End: 2020-09-25

## 2020-09-25 RX ADMIN — CEFTRIAXONE 1 G: 1 INJECTION, POWDER, FOR SOLUTION INTRAMUSCULAR; INTRAVENOUS at 17:19

## 2020-09-27 NOTE — PROGRESS NOTES
Subjective   Monisha Gardner is a 60 y.o. female.       Chief Complaint -cellulitis    History of Present Illness -       Cellulitis-  She complains of a skin infection on her left lower leg.  She has been using Efudex on bilateral lower extremities due to multiple squamous cell carcinoma for several weeks which causes skin irritation and erythema at the site of the squamous cell carcinoma.  She developed an infection several days ago on the left lower extremity.  She states it became much more red than usual and very tender.  She was started on Bactrim but has only taken it for 2 days so far.  She states that the redness has not gotten larger but the area is very tender and is now beginning to ooze.  She denies any fever.    The following portions of the patient's history were reviewed and updated as appropriate: allergies, current medications, past family history, past medical history, past social history, past surgical history and problem list.    Review of Systems   Constitutional: Negative for activity change, appetite change, fatigue and fever.   HENT: Negative for ear pain, sinus pressure and sore throat.    Eyes: Negative for pain and visual disturbance.   Respiratory: Negative for cough and chest tightness.    Cardiovascular: Negative for chest pain and palpitations.   Gastrointestinal: Negative for abdominal pain, constipation, diarrhea, nausea and vomiting.   Endocrine: Negative for polydipsia and polyuria.   Genitourinary: Negative for dysuria and frequency.   Musculoskeletal: Negative for back pain and myalgias.   Skin: Positive for color change. Negative for rash.        Redness and tender area left lower extremity   Allergic/Immunologic: Negative for food allergies and immunocompromised state.   Neurological: Negative for dizziness, syncope and headaches.   Hematological: Negative for adenopathy. Does not bruise/bleed easily.   Psychiatric/Behavioral: Negative for hallucinations and suicidal ideas. The  "patient is not nervous/anxious.        /72   Pulse 75   Temp 97.3 °F (36.3 °C)   Ht 162.6 cm (64\")   Wt 79.4 kg (175 lb)   LMP  (LMP Unknown)   SpO2 98%   BMI 30.04 kg/m²     Physical Exam  Vitals signs and nursing note reviewed.   Constitutional:       Appearance: She is well-developed.   Cardiovascular:      Rate and Rhythm: Normal rate and regular rhythm.      Heart sounds: Normal heart sounds. No murmur.   Pulmonary:      Effort: Pulmonary effort is normal. No respiratory distress.      Breath sounds: Normal breath sounds. No wheezing.   Musculoskeletal:         General: No tenderness.   Skin:     General: Skin is warm and dry.      Findings: Erythema present. No rash.      Comments: Numerous scattered erythematous skin lesions (caused by Efudex effect to SCC) noted on bilateral lower extremities.  Approximately 3 x 3 cm erythematous tender skin noted left medial lower extremity with clear yellow drainage.   Neurological:      Mental Status: She is alert and oriented to person, place, and time.   Psychiatric:         Mood and Affect: Mood normal.         Behavior: Behavior normal.         Thought Content: Thought content normal.         Assessment/Plan     Diagnoses and all orders for this visit:    Cellulitis of left lower extremity  -     mupirocin (Bactroban) 2 % ointment; Apply  topically to the appropriate area as directed 3 (Three) Times a Day.  -     cefTRIAXone (ROCEPHIN) injection 1 g    We discussed the tenderness of the infected skin area.  Since the tenderness is no worse than any other Efudex affected in lesion it does not appear to be worsening since she started Bactrim a few days ago.  I advised her to continue the Bactrim for the full 10-day course.  Rocephin IM will be given today help with antibiotic coverage.  She was advised not to use Efudex on the left lower extremity until this infection has resolved.  Home wound care was advised with the use of Dial soap twice daily and " topical Bactroban.  I will follow-up with her early next week to reevaluate.  She was advised should she develop fever or symptoms worsen over the weekend she should go to the ER or call the on-call line for the clinic for further guidance.          This document has been electronically signed by:  Kimmy Vela PA-C

## 2020-09-27 NOTE — PATIENT INSTRUCTIONS
Wound Care, Adult  Taking care of your wound properly can help to prevent pain, infection, and scarring. It can also help your wound to heal more quickly.  How to care for your wound  Wound care         · Follow instructions from your health care provider about how to take care of your wound. Make sure you:  ? Wash your hands with soap and water before you change the bandage (dressing). If soap and water are not available, use hand .  ? Change your dressing as told by your health care provider.  ? Leave stitches (sutures), skin glue, or adhesive strips in place. These skin closures may need to stay in place for 2 weeks or longer. If adhesive strip edges start to loosen and curl up, you may trim the loose edges. Do not remove adhesive strips completely unless your health care provider tells you to do that.  · Check your wound area every day for signs of infection. Check for:  ? Redness, swelling, or pain.  ? Fluid or blood.  ? Warmth.  ? Pus or a bad smell.  · Ask your health care provider if you should clean the wound with mild soap and water. Doing this may include:  ? Using a clean towel to pat the wound dry after cleaning it. Do not rub or scrub the wound.  ? Applying a cream or ointment. Do this only as told by your health care provider.  ? Covering the incision with a clean dressing.  · Ask your health care provider when you can leave the wound uncovered.  · Keep the dressing dry until your health care provider says it can be removed. Do not take baths, swim, use a hot tub, or do anything that would put the wound underwater until your health care provider approves. Ask your health care provider if you can take showers. You may only be allowed to take sponge baths.  Medicines    · If you were prescribed an antibiotic medicine, cream, or ointment, take or use the antibiotic as told by your health care provider. Do not stop taking or using the antibiotic even if your condition improves.  · Take  over-the-counter and prescription medicines only as told by your health care provider. If you were prescribed pain medicine, take it 30 or more minutes before you do any wound care or as told by your health care provider.  General instructions  · Return to your normal activities as told by your health care provider. Ask your health care provider what activities are safe.  · Do not scratch or pick at the wound.  · Do not use any products that contain nicotine or tobacco, such as cigarettes and e-cigarettes. These may delay wound healing. If you need help quitting, ask your health care provider.  · Keep all follow-up visits as told by your health care provider. This is important.  · Eat a diet that includes protein, vitamin A, vitamin C, and other nutrient-rich foods to help the wound heal.  ? Foods rich in protein include meat, dairy, beans, nuts, and other sources.  ? Foods rich in vitamin A include carrots and dark green, leafy vegetables.  ? Foods rich in vitamin C include citrus, tomatoes, and other fruits and vegetables.  ? Nutrient-rich foods have protein, carbohydrates, fat, vitamins, or minerals. Eat a variety of healthy foods including vegetables, fruits, and whole grains.  Contact a health care provider if:  · You received a tetanus shot and you have swelling, severe pain, redness, or bleeding at the injection site.  · Your pain is not controlled with medicine.  · You have redness, swelling, or pain around the wound.  · You have fluid or blood coming from the wound.  · Your wound feels warm to the touch.  · You have pus or a bad smell coming from the wound.  · You have a fever or chills.  · You are nauseous or you vomit.  · You are dizzy.  Get help right away if:  · You have a red streak going away from your wound.  · The edges of the wound open up and separate.  · Your wound is bleeding, and the bleeding does not stop with gentle pressure.  · You have a rash.  · You faint.  · You have trouble  breathing.  Summary  · Always wash your hands with soap and water before changing your bandage (dressing).  · To help with healing, eat foods that are rich in protein, vitamin A, vitamin C, and other nutrients.  · Check your wound every day for signs of infection. Contact your health care provider if you suspect that your wound is infected.  This information is not intended to replace advice given to you by your health care provider. Make sure you discuss any questions you have with your health care provider.  Document Released: 09/26/2009 Document Revised: 04/06/2020 Document Reviewed: 07/04/2017  [employer] Patient Education © 2020 [employer]

## 2020-11-09 ENCOUNTER — TRANSCRIBE ORDERS (OUTPATIENT)
Dept: ADMINISTRATIVE | Facility: HOSPITAL | Age: 60
End: 2020-11-09

## 2020-11-09 DIAGNOSIS — Z01.818 PRE-OPERATIVE CLEARANCE: Primary | ICD-10-CM

## 2020-11-10 ENCOUNTER — LAB (OUTPATIENT)
Dept: LAB | Facility: HOSPITAL | Age: 60
End: 2020-11-10

## 2020-11-10 DIAGNOSIS — Z01.818 PRE-OPERATIVE CLEARANCE: ICD-10-CM

## 2020-11-10 LAB — SARS-COV-2 RNA RESP QL NAA+PROBE: NOT DETECTED

## 2020-11-10 PROCEDURE — U0004 COV-19 TEST NON-CDC HGH THRU: HCPCS | Performed by: OPHTHALMOLOGY

## 2020-11-10 PROCEDURE — C9803 HOPD COVID-19 SPEC COLLECT: HCPCS

## 2020-12-02 ENCOUNTER — TRANSCRIBE ORDERS (OUTPATIENT)
Dept: ADMINISTRATIVE | Facility: HOSPITAL | Age: 60
End: 2020-12-02

## 2020-12-02 DIAGNOSIS — Z01.818 PRE-OPERATIVE CLEARANCE: Primary | ICD-10-CM

## 2020-12-03 ENCOUNTER — TRANSCRIBE ORDERS (OUTPATIENT)
Dept: ADMINISTRATIVE | Facility: HOSPITAL | Age: 60
End: 2020-12-03

## 2020-12-03 DIAGNOSIS — Z01.818 PREOP EXAMINATION: Primary | ICD-10-CM

## 2020-12-04 ENCOUNTER — LAB (OUTPATIENT)
Dept: LAB | Facility: HOSPITAL | Age: 60
End: 2020-12-04

## 2020-12-04 DIAGNOSIS — Z01.818 PRE-OPERATIVE CLEARANCE: ICD-10-CM

## 2020-12-04 PROCEDURE — U0004 COV-19 TEST NON-CDC HGH THRU: HCPCS | Performed by: OPHTHALMOLOGY

## 2020-12-04 PROCEDURE — C9803 HOPD COVID-19 SPEC COLLECT: HCPCS

## 2020-12-05 LAB — SARS-COV-2 RNA RESP QL NAA+PROBE: NOT DETECTED

## 2020-12-11 ENCOUNTER — OFFICE VISIT (OUTPATIENT)
Dept: CARDIOLOGY | Facility: CLINIC | Age: 60
End: 2020-12-11

## 2020-12-11 VITALS
BODY MASS INDEX: 31.41 KG/M2 | HEART RATE: 73 BPM | DIASTOLIC BLOOD PRESSURE: 60 MMHG | OXYGEN SATURATION: 96 % | SYSTOLIC BLOOD PRESSURE: 98 MMHG | HEIGHT: 64 IN | WEIGHT: 184 LBS | TEMPERATURE: 96.2 F

## 2020-12-11 DIAGNOSIS — I25.118 CORONARY ARTERY DISEASE INVOLVING NATIVE CORONARY ARTERY OF NATIVE HEART WITH OTHER FORM OF ANGINA PECTORIS (HCC): Primary | ICD-10-CM

## 2020-12-11 DIAGNOSIS — E78.2 MIXED HYPERLIPIDEMIA: ICD-10-CM

## 2020-12-11 DIAGNOSIS — I10 ESSENTIAL HYPERTENSION: ICD-10-CM

## 2020-12-11 PROCEDURE — 93000 ELECTROCARDIOGRAM COMPLETE: CPT | Performed by: INTERNAL MEDICINE

## 2020-12-11 PROCEDURE — 99214 OFFICE O/P EST MOD 30 MIN: CPT | Performed by: INTERNAL MEDICINE

## 2020-12-11 RX ORDER — PREDNISOLONE ACETATE 10 MG/ML
SUSPENSION/ DROPS OPHTHALMIC
COMMUNITY
Start: 2020-12-07 | End: 2021-04-02

## 2020-12-11 RX ORDER — ERYTHROMYCIN 5 MG/G
OINTMENT OPHTHALMIC DAILY
COMMUNITY
Start: 2020-10-23 | End: 2021-04-02

## 2020-12-11 NOTE — PROGRESS NOTES
[facility] Cardiology    Encounter Date: 2020    Patient ID: Monisha Gardner is a 60 y.o. female.  : 1960     PCP: Kimmy Vela PA       Chief Complaint: Coronary Artery Disease, Chest Pain, and Fatigue      PROBLEM LIST:  1. Coronary artery disease:  a. Previous MI and stent to unknown vessel  2 years ago, incomplete database.   b. Hospital presentation with acute coronary syndrome and features of inferior ST elevations, 2015.  c. [facility], 2015, Dr. Watts: 95% stenosis of the ostial/proximal RCA s/p 3.5 x 26 mm Resolute VITALIY. No other significant disease. EF 65%.  d. [facility], 2017: EF >70%. No evidence of ischemia.  2. Peripheral arterial disease  a. IOANA, 2017: Right IOANA 1.16, Left IOANA 1.19.    3. Hypertension.   4. Dyslipidemia.   5. Type 2 diabetes.   6. Overweight.   7. Osteoarthritis.     History of Present Illness  Patient presents today for an 6 month follow-up with a history of coronary artery disease, pain in both lower extremities, and cardiac risk factors. Last week, she experienced an episode of chest pressure and pain. The pain extended from her stomach to her neck. She took three nitroglycerins and the pain resolved with no recurrence. The patient has otherwise been feeling well overall from a cardiovascular standpoint. Patient denies shortness of breath, PND, edema, palpitations, syncope, or presyncope at this time.      Allergies   Allergen Reactions   • Ciprofloxacin Anaphylaxis   • Penicillins Shortness Of Breath   • Statins Myalgia   • Fish Oil [Omega-3 Fatty Acids] Nausea And Vomiting         Current Outpatient Medications:   •  albuterol sulfate  (90 Base) MCG/ACT inhaler, Inhale 2 puffs Every 4 (Four) Hours As Needed for Shortness of Air., Disp: 54 g, Rfl: 3  •  allopurinol (ZYLOPRIM) 100 MG tablet, Take 1 tablet by mouth 2 (Two) Times a Day., Disp: 180 tablet, Rfl: 3  •  BD PEN NEEDLE ASAEL U/F 32G X 4 MM misc, 1 Device  by Other route 3 (Three) Times a Day., Disp: 300 each, Rfl: 3  •  cetirizine (zyrTEC) 10 MG tablet, Take 1 tablet by mouth Daily., Disp: 90 tablet, Rfl: 3  •  chlorthalidone (HYGROTON) 25 MG tablet, Take 1 tablet by mouth Daily., Disp: 90 tablet, Rfl: 3  •  DEXILANT 60 MG capsule, Take 1 capsule by mouth Daily., Disp: 90 capsule, Rfl: 3  •  diclofenac (VOLTAREN) 1 % gel gel, Apply 4 g topically to the appropriate area as directed 4 (Four) Times a Day As Needed (joint pain)., Disp: 300 g, Rfl: 5  •  DULoxetine (CYMBALTA) 60 MG capsule, Take 1 capsule by mouth Daily., Disp: 90 capsule, Rfl: 3  •  Easy Touch Lancets 30G/Twist misc, Use tid, Disp: 300 each, Rfl: 3  •  erythromycin (ROMYCIN) 5 MG/GM ophthalmic ointment, Daily., Disp: , Rfl:   •  fenofibrate 160 MG tablet, Take 1 tablet by mouth Daily., Disp: 90 tablet, Rfl: 3  •  fluorouracil (EFUDEX) 5 % cream, , Disp: , Rfl:   •  gabapentin (NEURONTIN) 100 MG capsule, Take 1 capsule by mouth 2 (two) times a day., Disp: 60 capsule, Rfl: 3  •  Insulin Glargine (BASAGLAR KWIKPEN) 100 UNIT/ML injection pen, Inject 40 Units under the skin into the appropriate area as directed Daily., Disp: 36 mL, Rfl: 3  •  isosorbide mononitrate (IMDUR) 60 MG 24 hr tablet, Take 1 tablet by mouth Daily., Disp: 90 tablet, Rfl: 2  •  losartan (COZAAR) 100 MG tablet, Take 1 tablet by mouth Daily., Disp: 90 tablet, Rfl: 3  •  metFORMIN (GLUCOPHAGE) 500 MG tablet, Take 1 tablet by mouth 2 (Two) Times a Day With Meals., Disp: 180 tablet, Rfl: 3  •  metoprolol tartrate (LOPRESSOR) 50 MG tablet, Take 1 tablet by mouth 2 (Two) Times a Day., Disp: 180 tablet, Rfl: 3  •  mupirocin (Bactroban) 2 % ointment, Apply  topically to the appropriate area as directed 3 (Three) Times a Day., Disp: 90 g, Rfl: 1  •  nitroglycerin (Nitrostat) 0.4 MG SL tablet, Place 1 tablet under the tongue Every 5 (Five) Minutes As Needed for Chest Pain., Disp: 75 tablet, Rfl: 3  •  ondansetron (ZOFRAN) 4 MG tablet, Take 1  "tablet by mouth Every 8 (Eight) Hours As Needed for Nausea., Disp: 40 tablet, Rfl: 0  •  OZEMPIC, 0.25 OR 0.5 MG/DOSE, 2 MG/1.5ML solution pen-injector, 1 (One) Time Per Week., Disp: , Rfl:   •  prednisoLONE acetate (PRED FORTE) 1 % ophthalmic suspension, Every 2 (Two) Hours., Disp: , Rfl:   •  rOPINIRole (Requip) 2 MG tablet, Take 1 tablet by mouth Every Night., Disp: 90 tablet, Rfl: 3  •  silver sulfadiazine (Silvadene) 1 % cream, Apply  topically to the appropriate area as directed 2 (Two) Times a Day., Disp: 400 g, Rfl: 0  •  TRUE METRIX BLOOD GLUCOSE TEST test strip, 1 each by Other route 3 (Three) Times a Day., Disp: 300 each, Rfl: 3    Current Facility-Administered Medications:   •  gentamicin (GARAMYCIN) injection 80 mg, 80 mg, Intramuscular, Once, Sandra Taylor APRN    The following portions of the patient's history were reviewed and updated as appropriate: allergies, current medications, past family history, past medical history, past social history, past surgical history and problem list.    ROS  Review of Systems   Constitution: Negative for chills, fever, fatigue, generalized weakness.   Cardiovascular: Negative for dyspnea on exertion, leg swelling, palpitations, orthopnea, and syncope. Positive for chest pain.   Respiratory: Negative for cough, shortness of breath, and wheezing.  HENT: Negative for ear pain, nosebleeds, and tinnitus.  Gastrointestinal: Negative for abdominal pain, constipation, diarrhea, nausea and vomiting.   Genitourinary: No urinary symptoms.  Musculoskeletal: Negative for muscle cramps.  Neurological: Negative for dizziness, headaches, loss of balance, numbness, and symptoms of stroke.  Psychiatric: Normal mental status.     All other systems reviewed and are negative.        Objective:     BP 98/60 (BP Location: Left arm, Patient Position: Sitting)   Pulse 73   Temp 96.2 °F (35.7 °C)   Ht 162.6 cm (64\")   Wt 83.5 kg (184 lb)   LMP  (LMP Unknown)   SpO2 96%   BMI 31.58 " kg/m²      Physical Exam  Constitutional: Patient appears well-developed and well-nourished.   HENT: HEENT exam unremarkable.   Neck: Neck supple. No JVD present. No carotid bruits.   Cardiovascular: Normal rate, regular rhythm and normal heart sounds. No murmur heard.   2+ symmetric pulses.   Pulmonary/Chest: Breath sounds normal. Does not exhibit tenderness.   Abdominal: Abdomen benign.   Musculoskeletal: Does not exhibit edema.   Neurological: Neurological exam unremarkable.   Vitals reviewed.    Data Review:   Lab Results   Component Value Date    GLUCOSE 139 (H) 05/22/2020    BUN 17 05/22/2020    CREATININE 1.05 (H) 05/22/2020    EGFRIFNONA 54 (L) 05/22/2020    BCR 16.2 05/22/2020    K 4.3 05/22/2020    CO2 23.8 05/22/2020    CALCIUM 10.3 05/22/2020    ALBUMIN 4.60 05/22/2020    AST 35 (H) 05/22/2020    ALT 27 05/22/2020     Lab Results   Component Value Date    CHOL 216 (H) 05/22/2020    TRIG 400 (H) 05/22/2020    HDL 33 (L) 05/22/2020     (H) 05/22/2020      Lab Results   Component Value Date    WBC 8.65 05/22/2020    RBC 4.51 05/22/2020    HGB 12.7 05/22/2020    HCT 37.7 05/22/2020    MCV 83.6 05/22/2020     05/22/2020     Lab Results   Component Value Date    HGBA1C 6.90 (H) 05/22/2020          ECG 12 Lead    Date/Time: 12/11/2020 2:06 PM  Performed by: Derrick Watts MD  Authorized by: Derrick Watts MD   Comparison: compared with previous ECG from 4/12/2020  Similar to previous ECG  Rhythm: sinus rhythm  Rate: normal  BPM: 62  Comments: Nonspecific ST segment changes.               Assessment:      Diagnosis Plan   1. Coronary artery disease involving native coronary artery of native heart with other form of angina pectoris (CMS/HCC)  Stress Test With Myocardial Perfusion (1 Day). Continue isosorbide mononitrate daily and nitroglycerin as needed for chest pain.    2. Essential hypertension  Well-controlled; continue current medications.    3. Mixed hyperlipidemia  Acceptable control; continue  fenofibrate 160 mg daily.     Plan:   Cardiolite stress test ordered in [place], [place].   Continue current medications including current optimal antianginal therapy, okay to take sublingual nitroglycerin as needed.  In case of frequent or more severe episodes she is advised to go to the emergency department.   FU in 6 MO, sooner as needed.  Thank you for allowing us to participate in the care of your patient.     Scribed for Derrick Watts MD by Seema Rosen. 12/11/2020  14:16 EST      I, Derrick Watts MD, personally performed the services described in this documentation as scribed by the above named individual in my presence, and it is both accurate and complete.  12/11/2020  16:50 EST        Please note that portions of this note may have been completed with a voice recognition program. Efforts were made to edit the dictations, but occasionally words are mistranscribed.

## 2021-01-05 ENCOUNTER — TRANSCRIBE ORDERS (OUTPATIENT)
Dept: ADMINISTRATIVE | Facility: HOSPITAL | Age: 61
End: 2021-01-05

## 2021-01-05 DIAGNOSIS — H02.833: Primary | ICD-10-CM

## 2021-01-07 ENCOUNTER — TRANSCRIBE ORDERS (OUTPATIENT)
Dept: ADMINISTRATIVE | Facility: HOSPITAL | Age: 61
End: 2021-01-07

## 2021-01-07 DIAGNOSIS — Z01.818 PRE-OPERATIVE CLEARANCE: Primary | ICD-10-CM

## 2021-01-07 RX ORDER — GABAPENTIN 100 MG/1
CAPSULE ORAL
Qty: 60 CAPSULE | Refills: 3 | OUTPATIENT
Start: 2021-01-07

## 2021-01-08 ENCOUNTER — LAB (OUTPATIENT)
Dept: LAB | Facility: HOSPITAL | Age: 61
End: 2021-01-08

## 2021-01-08 ENCOUNTER — HOSPITAL ENCOUNTER (OUTPATIENT)
Dept: NUCLEAR MEDICINE | Facility: HOSPITAL | Age: 61
Discharge: HOME | End: 2021-01-08

## 2021-01-08 ENCOUNTER — OFFICE VISIT (OUTPATIENT)
Dept: FAMILY MEDICINE CLINIC | Facility: CLINIC | Age: 61
End: 2021-01-08

## 2021-01-08 ENCOUNTER — HOSPITAL ENCOUNTER (OUTPATIENT)
Dept: CARDIOLOGY | Facility: HOSPITAL | Age: 61
Discharge: HOME | End: 2021-01-08

## 2021-01-08 VITALS — WEIGHT: 184 LBS | HEIGHT: 64 IN | BODY MASS INDEX: 31.41 KG/M2

## 2021-01-08 DIAGNOSIS — I25.118 CORONARY ARTERY DISEASE INVOLVING NATIVE CORONARY ARTERY OF NATIVE HEART WITH OTHER FORM OF ANGINA PECTORIS (HCC): ICD-10-CM

## 2021-01-08 DIAGNOSIS — H02.833: ICD-10-CM

## 2021-01-08 DIAGNOSIS — E11.42 DIABETIC POLYNEUROPATHY ASSOCIATED WITH TYPE 2 DIABETES MELLITUS (HCC): ICD-10-CM

## 2021-01-08 DIAGNOSIS — E11.65 TYPE 2 DIABETES MELLITUS WITH HYPERGLYCEMIA, WITH LONG-TERM CURRENT USE OF INSULIN (HCC): Primary | ICD-10-CM

## 2021-01-08 DIAGNOSIS — Z79.4 TYPE 2 DIABETES MELLITUS WITH HYPERGLYCEMIA, WITH LONG-TERM CURRENT USE OF INSULIN (HCC): Primary | ICD-10-CM

## 2021-01-08 LAB
BH CV NUCLEAR PRIOR STUDY: 3
BH CV STRESS BP STAGE 1: NORMAL
BH CV STRESS BP STAGE 2: NORMAL
BH CV STRESS COMMENTS STAGE 1: NORMAL
BH CV STRESS COMMENTS STAGE 2: NORMAL
BH CV STRESS DOSE REGADENOSON STAGE 1: 0.4
BH CV STRESS DURATION MIN STAGE 1: 0
BH CV STRESS DURATION MIN STAGE 2: 4
BH CV STRESS DURATION SEC STAGE 1: 10
BH CV STRESS DURATION SEC STAGE 2: 0
BH CV STRESS HR STAGE 1: 92
BH CV STRESS HR STAGE 2: 84
BH CV STRESS PROTOCOL 1: NORMAL
BH CV STRESS RECOVERY BP: NORMAL MMHG
BH CV STRESS RECOVERY HR: 84 BPM
BH CV STRESS STAGE 1: 1
BH CV STRESS STAGE 2: 2
LV EF NUC BP: 80 %
MAXIMAL PREDICTED HEART RATE: 161 BPM
PERCENT MAX PREDICTED HR: 57.14 %
SARS-COV-2 RNA RESP QL NAA+PROBE: NOT DETECTED
STRESS BASELINE BP: NORMAL MMHG
STRESS BASELINE HR: 71 BPM
STRESS PERCENT HR: 67 %
STRESS POST PEAK BP: NORMAL MMHG
STRESS POST PEAK HR: 92 BPM
STRESS TARGET HR: 137 BPM

## 2021-01-08 PROCEDURE — C9803 HOPD COVID-19 SPEC COLLECT: HCPCS

## 2021-01-08 PROCEDURE — 25010000002 REGADENOSON 0.4 MG/5ML SOLUTION: Performed by: INTERNAL MEDICINE

## 2021-01-08 PROCEDURE — A9500 TC99M SESTAMIBI: HCPCS | Performed by: INTERNAL MEDICINE

## 2021-01-08 PROCEDURE — 99213 OFFICE O/P EST LOW 20 MIN: CPT | Performed by: PHYSICIAN ASSISTANT

## 2021-01-08 PROCEDURE — 78452 HT MUSCLE IMAGE SPECT MULT: CPT | Performed by: INTERNAL MEDICINE

## 2021-01-08 PROCEDURE — 78452 HT MUSCLE IMAGE SPECT MULT: CPT

## 2021-01-08 PROCEDURE — 0 TECHNETIUM SESTAMIBI: Performed by: INTERNAL MEDICINE

## 2021-01-08 PROCEDURE — 93018 CV STRESS TEST I&R ONLY: CPT | Performed by: INTERNAL MEDICINE

## 2021-01-08 PROCEDURE — 93017 CV STRESS TEST TRACING ONLY: CPT

## 2021-01-08 PROCEDURE — U0004 COV-19 TEST NON-CDC HGH THRU: HCPCS | Performed by: OPHTHALMOLOGY

## 2021-01-08 RX ORDER — GABAPENTIN 100 MG/1
100 CAPSULE ORAL 2 TIMES DAILY
Qty: 60 CAPSULE | Refills: 3 | Status: SHIPPED | OUTPATIENT
Start: 2021-01-08 | End: 2021-05-28 | Stop reason: SDUPTHER

## 2021-01-08 RX ADMIN — REGADENOSON 0.4 MG: 0.08 INJECTION, SOLUTION INTRAVENOUS at 11:13

## 2021-01-08 RX ADMIN — TECHNETIUM TC 99M SESTAMIBI 1 DOSE: 1 INJECTION INTRAVENOUS at 09:45

## 2021-01-08 RX ADMIN — TECHNETIUM TC 99M SESTAMIBI 1 DOSE: 1 INJECTION INTRAVENOUS at 11:13

## 2021-01-08 NOTE — PROGRESS NOTES
"Subjective   Monisha Gardner is a 60 y.o. female.     Video visit    You have chosen to receive care through a telehealth visit.  Do you consent to use a video/audio connection for your medical care today? Yes    Chief Complaint -hyperglycemia    History of Present Illness -       Hyperglycemia-  Patient complains of elevated blood glucose with fasting glucose sometimes between 300-400.  She states that she is taking Basaglar 40 units nightly along with Ozempic and Metformin.  Uncontrolled  Lab Results   Component Value Date    HGBA1C 6.90 (H) 05/22/2020     Diabetic neuropathy-  Stable with gabapentin.    The following portions of the patient's history were reviewed and updated as appropriate: allergies, current medications, past family history, past medical history, past social history, past surgical history and problem list.    Review of Systems   Constitutional: Negative for activity change, appetite change, fatigue and fever.   HENT: Negative for ear pain, sinus pressure and sore throat.    Eyes: Negative for pain and visual disturbance.   Respiratory: Negative for cough and chest tightness.    Cardiovascular: Negative for chest pain and palpitations.   Gastrointestinal: Negative for abdominal pain, constipation, diarrhea, nausea and vomiting.   Endocrine: Negative for polydipsia and polyuria.   Genitourinary: Negative for dysuria and frequency.   Musculoskeletal: Negative for back pain and myalgias.        Leg pain due to neuropathy - chronic     Skin: Negative for color change and rash.   Allergic/Immunologic: Negative for food allergies and immunocompromised state.   Neurological: Negative for dizziness, syncope and headaches.   Hematological: Negative for adenopathy. Does not bruise/bleed easily.   Psychiatric/Behavioral: Negative for hallucinations and suicidal ideas. The patient is not nervous/anxious.        Ht 162.6 cm (64\")   Wt 83.5 kg (184 lb) Comment: pt reported all vitals today  LMP  (LMP " Unknown)   BMI 31.58 kg/m²     Physical Exam  Vitals signs and nursing note reviewed.   Constitutional:       Appearance: Normal appearance. She is obese.   HENT:      Head: Normocephalic and atraumatic.   Eyes:      Extraocular Movements: Extraocular movements intact.      Conjunctiva/sclera: Conjunctivae normal.   Pulmonary:      Effort: Pulmonary effort is normal. No respiratory distress.      Breath sounds: Normal breath sounds. No wheezing.      Comments: Breath sounds appear normal without the use of accessory muscles or wheezing noted on video today  Skin:     General: Skin is dry.      Findings: No erythema or rash.   Neurological:      Mental Status: She is alert and oriented to person, place, and time.   Psychiatric:         Mood and Affect: Mood normal.         Thought Content: Thought content normal.         Assessment/Plan     Diagnoses and all orders for this visit:    1. Type 2 diabetes mellitus with hyperglycemia, with long-term current use of insulin (CMS/HCC) (Primary)  Comments:  Increase Basaglar 45 units and if blood glucose greater than 200 after 4 days increase Basaglar to 50 units nightly    2. Diabetic polyneuropathy associated with type 2 diabetes mellitus (CMS/HCC)  Comments:  Rx gabapentin 100 mg twice daily #60 with 3 refills  Dru reviewed and is consistent  Plan UDS at next in person visit    She was advised to follow a low carbohydrate diabetic diet  Advised to check blood glucose fasting and 2-hour postprandial and keep a log  I will follow-up with her in 2 weeks time or sooner if needed to reassess        This document has been electronically signed by:  Kimmy Vela PA-C

## 2021-01-14 ENCOUNTER — TELEPHONE (OUTPATIENT)
Dept: CARDIOLOGY | Facility: CLINIC | Age: 61
End: 2021-01-14

## 2021-01-14 NOTE — TELEPHONE ENCOUNTER
----- Message from Derrick Watts MD sent at 1/8/2021  5:12 PM EST -----  Please notify her that we received a report of her stress test from [facility] this shows normal findings, no significant blockage and overall heart function is normal.  Continue same treatment and keep scheduled appointment for follow-up.

## 2021-01-29 ENCOUNTER — TELEPHONE (OUTPATIENT)
Dept: FAMILY MEDICINE CLINIC | Facility: CLINIC | Age: 61
End: 2021-01-29

## 2021-01-29 ENCOUNTER — OFFICE VISIT (OUTPATIENT)
Dept: FAMILY MEDICINE CLINIC | Facility: CLINIC | Age: 61
End: 2021-01-29

## 2021-01-29 VITALS
HEART RATE: 96 BPM | BODY MASS INDEX: 31.86 KG/M2 | DIASTOLIC BLOOD PRESSURE: 70 MMHG | TEMPERATURE: 97 F | SYSTOLIC BLOOD PRESSURE: 156 MMHG | WEIGHT: 186.6 LBS | HEIGHT: 64 IN | OXYGEN SATURATION: 97 %

## 2021-01-29 DIAGNOSIS — E11.65 TYPE 2 DIABETES MELLITUS WITH HYPERGLYCEMIA, WITH LONG-TERM CURRENT USE OF INSULIN (HCC): Primary | Chronic | ICD-10-CM

## 2021-01-29 DIAGNOSIS — I10 ESSENTIAL HYPERTENSION: Chronic | ICD-10-CM

## 2021-01-29 DIAGNOSIS — F41.9 ANXIETY: Chronic | ICD-10-CM

## 2021-01-29 DIAGNOSIS — Z79.4 TYPE 2 DIABETES MELLITUS WITH HYPERGLYCEMIA, WITH LONG-TERM CURRENT USE OF INSULIN (HCC): Primary | Chronic | ICD-10-CM

## 2021-01-29 PROCEDURE — 99214 OFFICE O/P EST MOD 30 MIN: CPT | Performed by: PHYSICIAN ASSISTANT

## 2021-01-29 RX ORDER — SITAGLIPTIN AND METFORMIN HYDROCHLORIDE 1000; 50 MG/1; MG/1
1 TABLET, FILM COATED ORAL 2 TIMES DAILY WITH MEALS
Qty: 60 TABLET | Refills: 5 | Status: SHIPPED | OUTPATIENT
Start: 2021-01-29 | End: 2021-08-20

## 2021-01-29 NOTE — PROGRESS NOTES
Subjective   Monisha Gardner is a 60 y.o. female.       Chief Complaint -hyperglycemia    History of Present Illness -    ROS    Hyperglycemia-  She complains of elevated blood glucose due to uncontrolled diabetes mellitus.  She reports home fasting blood glucose is usually around 200 with this morning fasting glucose of 225.  She reports 2-hour postprandial glucose usually runs around 300.  She is currently using Basaglar 50 units nightly, Ozempic, and Metformin 500 mg twice daily.  She reports that she has had over 10 pound weight gain which she attributes to increased appetite secondary to insulin usage.    Anxiety-  Not at goal with Cymbalta.  She reports increased anxiety and feelings of being overwhelmed.  She denies any SI or HI.    Hypertension-  May be elevated today due to anxiety and stress with hyperglycemia.  Usually stable with chlorthalidone, losartan and metoprolol    The following portions of the patient's history were reviewed and updated as appropriate: allergies, current medications, past family history, past medical history, past social history, past surgical history and problem list.    Review of Systems   Constitutional: Positive for fatigue. Negative for activity change, appetite change and fever.   HENT: Negative for ear pain, sinus pressure and sore throat.    Eyes: Negative for pain and visual disturbance.   Respiratory: Negative for cough and chest tightness.    Cardiovascular: Negative for chest pain and palpitations.   Gastrointestinal: Negative for abdominal pain, constipation, diarrhea, nausea and vomiting.   Endocrine: Positive for polydipsia. Negative for polyuria.   Genitourinary: Negative for dysuria and frequency.   Musculoskeletal: Negative for back pain and myalgias.   Skin: Negative for color change and rash.   Allergic/Immunologic: Negative for food allergies and immunocompromised state.   Neurological: Negative for dizziness, syncope and headaches.   Hematological: Negative  "for adenopathy. Does not bruise/bleed easily.   Psychiatric/Behavioral: Positive for dysphoric mood. Negative for hallucinations and suicidal ideas. The patient is nervous/anxious.        Objective  Vital signs:  /70   Pulse 96   Temp 97 °F (36.1 °C)   Ht 162.6 cm (64.02\")   Wt 84.6 kg (186 lb 9.6 oz)   LMP  (LMP Unknown)   SpO2 97%   BMI 32.01 kg/m²     Physical Exam  Vitals signs and nursing note reviewed.   Constitutional:       Appearance: Normal appearance. She is well-developed. She is obese.   HENT:      Head: Normocephalic and atraumatic.      Right Ear: Tympanic membrane normal.      Left Ear: Tympanic membrane normal.      Nose: Nose normal.      Mouth/Throat:      Mouth: Mucous membranes are moist.      Pharynx: No oropharyngeal exudate or posterior oropharyngeal erythema.   Eyes:      Extraocular Movements: Extraocular movements intact.      Conjunctiva/sclera: Conjunctivae normal.   Neck:      Musculoskeletal: Normal range of motion and neck supple.      Thyroid: No thyromegaly.      Trachea: No tracheal deviation.   Cardiovascular:      Rate and Rhythm: Normal rate and regular rhythm.      Heart sounds: Normal heart sounds. No murmur.   Pulmonary:      Effort: Pulmonary effort is normal. No respiratory distress.      Breath sounds: Normal breath sounds. No wheezing.   Abdominal:      General: Bowel sounds are normal.      Palpations: Abdomen is soft.      Tenderness: There is no abdominal tenderness. There is no guarding.   Musculoskeletal: Normal range of motion.         General: No tenderness.   Lymphadenopathy:      Cervical: No cervical adenopathy.   Skin:     General: Skin is warm and dry.      Findings: No rash.   Neurological:      General: No focal deficit present.      Mental Status: She is alert and oriented to person, place, and time.   Psychiatric:         Behavior: Behavior normal.         Thought Content: Thought content normal.      Comments: Mood is concerned         Result " Review :              Assessment/Plan     Diagnoses and all orders for this visit:    1. Type 2 diabetes mellitus with hyperglycemia, with long-term current use of insulin (CMS/[facility]) (Primary)  Comments:  Discontinue Metformin  Start Janumet 50/1000 twice daily  Continue Basaglar 50 units nightly and Ozempic  Advised low carbohydrate diabetic diet  Orders:  -     sitaGLIPtin-metFORMIN (Janumet)  MG per tablet; Take 1 tablet by mouth 2 (Two) Times a Day With Meals.  Dispense: 60 tablet; Refill: 5    2. Anxiety  Comments:  Start Vraylar 1.5 mg daily  Continue Cymbalta 60 mg daily  Orders:  -     Cariprazine HCl (Vraylar) 1.5 MG capsule capsule; Take 1 capsule by mouth Daily.  Dispense: 30 capsule; Refill: 5    3. Essential hypertension  Comments:  May be elevated due to acute issues  Continue to monitor  Continue chlorthalidone losartan and metoprolol    Other orders  -     Cancel: sitaGLIPtin-metFORMIN (JANUMET)  MG per tablet; Take 1 tablet by mouth 2 (Two) Times a Day With Meals.  Dispense: 60 tablet; Refill: 5          Follow Up     Patient was given instructions and counseling regarding his condition or for health maintenance advice. Please see specific information pulled into the AVS if appropriate      This document has been electronically signed by:  Kimmy Vela PA-C

## 2021-01-29 NOTE — TELEPHONE ENCOUNTER
PATIENT CALLED TO SAY THAT SHE NEEDS PRIOR AUTHS ON THE 2 SCRIPTS THAT WERE SENT TODAY. JANUMET AND VRAYLAR.    PATIENT WAS TOLD TO CALL [facility] BACK IF SHE NEEDED PRIOR AUTHS ON THESE MEDICATIONS.    PLEASE ADVISE AND CALL PATIENT BACK -[phone]

## 2021-02-09 RX ORDER — SEMAGLUTIDE 1.34 MG/ML
INJECTION, SOLUTION SUBCUTANEOUS
Qty: 1.5 ML | Refills: 5 | Status: SHIPPED | OUTPATIENT
Start: 2021-02-09 | End: 2021-06-11 | Stop reason: SDUPTHER

## 2021-02-12 DIAGNOSIS — E11.40 CONTROLLED TYPE 2 DIABETES MELLITUS WITH NEUROPATHY (HCC): ICD-10-CM

## 2021-02-15 RX ORDER — INSULIN GLARGINE 100 [IU]/ML
40 INJECTION, SOLUTION SUBCUTANEOUS DAILY
Qty: 15 ML | Refills: 5 | Status: SHIPPED | OUTPATIENT
Start: 2021-02-15 | End: 2021-04-02 | Stop reason: SDUPTHER

## 2021-03-08 ENCOUNTER — PRIOR AUTHORIZATION (OUTPATIENT)
Dept: FAMILY MEDICINE CLINIC | Facility: CLINIC | Age: 61
End: 2021-03-08

## 2021-03-23 ENCOUNTER — BULK ORDERING (OUTPATIENT)
Dept: CASE MANAGEMENT | Facility: OTHER | Age: 61
End: 2021-03-23

## 2021-03-23 DIAGNOSIS — Z23 IMMUNIZATION DUE: ICD-10-CM

## 2021-04-02 ENCOUNTER — OFFICE VISIT (OUTPATIENT)
Dept: FAMILY MEDICINE CLINIC | Facility: CLINIC | Age: 61
End: 2021-04-02

## 2021-04-02 VITALS
HEART RATE: 81 BPM | BODY MASS INDEX: 30.9 KG/M2 | WEIGHT: 181 LBS | DIASTOLIC BLOOD PRESSURE: 78 MMHG | SYSTOLIC BLOOD PRESSURE: 142 MMHG | OXYGEN SATURATION: 99 % | HEIGHT: 64 IN | TEMPERATURE: 97.2 F

## 2021-04-02 DIAGNOSIS — E78.2 MIXED HYPERLIPIDEMIA: Chronic | ICD-10-CM

## 2021-04-02 DIAGNOSIS — R11.0 NAUSEA: ICD-10-CM

## 2021-04-02 DIAGNOSIS — R53.83 FATIGUE, UNSPECIFIED TYPE: Primary | ICD-10-CM

## 2021-04-02 DIAGNOSIS — I10 ESSENTIAL HYPERTENSION: Chronic | ICD-10-CM

## 2021-04-02 DIAGNOSIS — Z79.4 TYPE 2 DIABETES MELLITUS WITH HYPERGLYCEMIA, WITH LONG-TERM CURRENT USE OF INSULIN (HCC): Chronic | ICD-10-CM

## 2021-04-02 DIAGNOSIS — E11.65 TYPE 2 DIABETES MELLITUS WITH HYPERGLYCEMIA, WITH LONG-TERM CURRENT USE OF INSULIN (HCC): Chronic | ICD-10-CM

## 2021-04-02 DIAGNOSIS — M54.6 ACUTE BILATERAL THORACIC BACK PAIN: ICD-10-CM

## 2021-04-02 DIAGNOSIS — E53.8 VITAMIN B 12 DEFICIENCY: Chronic | ICD-10-CM

## 2021-04-02 DIAGNOSIS — E55.9 VITAMIN D DEFICIENCY: Chronic | ICD-10-CM

## 2021-04-02 PROCEDURE — 99214 OFFICE O/P EST MOD 30 MIN: CPT | Performed by: PHYSICIAN ASSISTANT

## 2021-04-02 PROCEDURE — 20552 NJX 1/MLT TRIGGER POINT 1/2: CPT | Performed by: PHYSICIAN ASSISTANT

## 2021-04-02 RX ORDER — ONDANSETRON 4 MG/1
4 TABLET, FILM COATED ORAL EVERY 8 HOURS PRN
Qty: 40 TABLET | Refills: 0 | Status: SHIPPED | OUTPATIENT
Start: 2021-04-02 | End: 2021-09-07 | Stop reason: SDUPTHER

## 2021-04-02 RX ORDER — INSULIN GLARGINE 100 [IU]/ML
INJECTION, SOLUTION SUBCUTANEOUS
COMMUNITY
Start: 2021-03-26 | End: 2021-06-11 | Stop reason: SDUPTHER

## 2021-04-03 LAB — MICROALBUMIN UR-MCNC: 11.7 UG/ML

## 2021-04-04 NOTE — PROGRESS NOTES
Subjective   Monisha Gardner is a 60 y.o. female.       Chief Complaint -fatigue    History of Present Illness -    ROS    Fatigue-  Patient complains of significantly worse fatigue over the past few months.  She states that she even falls asleep during the day or has to take breaks for a short nap.  She reports that she has never had this much fatigue before.  There is no relation to shortness of breath chest pain diaphoresis or exertion.    Back pain-  She complains of moderate achy midline upper back pain that radiates to her neck.  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  Minimal relief with trying to make her desk more ergonomic.    Hyperlipidemia-stable with fenofibrate    Hypertension-stable with home blood pressure reported less than 130/80 with the use of losartan    B12 deficiency-stable with B12 supplementation    Vitamin D deficiency-stable with vitamin D supplementation    Nausea-she does complain of some nausea intermittently for the past 3 weeks.    The following portions of the patient's history were reviewed and updated as appropriate: allergies, current medications, past family history, past medical history, past social history, past surgical history and problem list.    Review of Systems   Constitutional: Positive for fatigue. Negative for activity change, appetite change and fever.   HENT: Negative for ear pain, sinus pressure and sore throat.    Eyes: Negative for pain and visual disturbance.   Respiratory: Negative for cough and chest tightness.    Cardiovascular: Negative for chest pain and palpitations.   Gastrointestinal: Negative for abdominal pain, constipation, diarrhea, nausea and vomiting.   Endocrine: Negative for polydipsia and polyuria.   Genitourinary: Negative for dysuria and frequency.   Musculoskeletal: Positive for back pain and myalgias.   Skin: Negative for color change and rash.   Allergic/Immunologic: Negative for food allergies and  "immunocompromised state.   Neurological: Negative for dizziness, syncope and headaches.   Hematological: Negative for adenopathy. Does not bruise/bleed easily.   Psychiatric/Behavioral: Negative for hallucinations and suicidal ideas. The patient is not nervous/anxious.        Objective  Vital signs:  /78   Pulse 81   Temp 97.2 °F (36.2 °C) (Temporal)   Ht 162.6 cm (64\")   Wt 82.1 kg (181 lb)   LMP  (LMP Unknown)   SpO2 99%   BMI 31.07 kg/m²     Physical Exam  Vitals and nursing note reviewed.   Constitutional:       Appearance: Normal appearance. She is well-developed. She is obese.   HENT:      Head: Normocephalic and atraumatic.      Right Ear: Tympanic membrane normal.      Left Ear: Tympanic membrane normal.      Nose: Nose normal.      Mouth/Throat:      Mouth: Mucous membranes are moist.      Pharynx: No oropharyngeal exudate or posterior oropharyngeal erythema.   Eyes:      Extraocular Movements: Extraocular movements intact.      Conjunctiva/sclera: Conjunctivae normal.   Neck:      Thyroid: No thyromegaly.      Trachea: No tracheal deviation.   Cardiovascular:      Rate and Rhythm: Normal rate and regular rhythm.      Heart sounds: Normal heart sounds. No murmur heard.     Pulmonary:      Effort: Pulmonary effort is normal. No respiratory distress.      Breath sounds: Normal breath sounds. No wheezing.   Abdominal:      General: Bowel sounds are normal.      Palpations: Abdomen is soft.      Tenderness: There is no abdominal tenderness. There is no guarding.   Musculoskeletal:         General: Tenderness present.      Cervical back: Normal range of motion and neck supple.      Right lower leg: No edema.      Left lower leg: No edema.      Comments: Tenderness noted to bilateral upper thoracic musculature with tightening of the muscles in those areas.  Decreased range of joint motion with thoracic flexion.   Lymphadenopathy:      Cervical: No cervical adenopathy.   Skin:     General: Skin is " warm and dry.      Findings: No rash.   Neurological:      General: No focal deficit present.      Mental Status: She is alert and oriented to person, place, and time.   Psychiatric:         Mood and Affect: Mood normal.         Behavior: Behavior normal.         Thought Content: Thought content normal.         The following data was reviewed by: ANH Medina on 04/02/2021:  CMP    CMP 4/12/20 5/22/20   Glucose 125 (A) 139 (A)   BUN 22 (A) 17   Creatinine 1.54 (A) 1.05 (A)   eGFR Non African Am 35 (A) 54 (A)   Sodium 140 138   Potassium 4.4 4.3   Chloride 99 99   Calcium 11.1 (A) 10.3   Albumin 4.94 4.60   Total Bilirubin 0.4 0.2   Alkaline Phosphatase 97 71   AST (SGOT) 48 (A) 35 (A)   ALT (SGPT) 43 (A) 27   (A) Abnormal value            Lipid Panel    Lipid Panel 5/22/20   Total Cholesterol 216 (A)   Triglycerides 400 (A)   HDL Cholesterol 33 (A)   VLDL Cholesterol 80 (A)   LDL Cholesterol  103 (A)   LDL/HDL Ratio 3.12   (A) Abnormal value            Most Recent A1C    HGBA1C Most Recent 5/22/20   Hemoglobin A1C 6.90 (A)   (A) Abnormal value                   Assessment/Plan     Diagnoses and all orders for this visit:    1. Fatigue, unspecified type (Primary)  Comments:  Lab work ordered for further evaluation    2. Mixed hyperlipidemia  Comments:  Advised a low carbohydrate diet  Continue fenofibrate  Orders:  -     CBC & Differential  -     Lipid Panel    3. Essential hypertension  Comments:  Continue metoprolol and losartan  Continue to monitor  Orders:  -     CBC & Differential  -     Comprehensive Metabolic Panel  -     Lipid Panel  -     TSH    4. Type 2 diabetes mellitus with hyperglycemia, with long-term current use of insulin (CMS/[facility])  Comments:  Continue Lantus Janumet and Ozempic  Advise daily BG monitoring  Orders:  -     Hemoglobin A1c  -     MicroAlbumin, Urine, Random - Urine, Clean Catch    5. Vitamin B 12 deficiency  Comments:  Continue B12 supplementation  Orders:  -     Methylmalonic  Acid, Serum    6. Vitamin D deficiency  Comments:  Continue vitamin D supplementation  Orders:  -     Vitamin D 25 Hydroxy    7. Nausea  Comments:  Advised Zofran as needed  Orders:  -     ondansetron (ZOFRAN) 4 MG tablet; Take 1 tablet by mouth Every 8 (Eight) Hours As Needed for Nausea.  Dispense: 40 tablet; Refill: 0    8. Acute bilateral thoracic back pain  Comments:  Discussed good posture and ergonomic workstation  Trigger point injections thoracic back today      Procedure: Trigger point injection thoracic musculature  Provider: Kimmy Vela PA-C  Indication: Thoracic back pain  Description: The 2 points of maximal tenderness were identified prepped and draped in sterile fashion.  An injection was given into each location using 1 cc of 1% lidocaine.  A second injection was given into each location with 1/2 cc 1% lidocaine 1/2 cc triamcinolone.  Pressure was held and sterile dressing was placed.  No complications  Estimated blood loss: Minimal  Patient tolerated procedure well    1% lidocaine 500 mg per 50 mL  NDC #040 9-47 6-1 7  Lot #-[id number]  Expiration July 1, 2021    Triamcinolone 40 mg/mL  NDC number 8611-3657-99  Lot number [id number]  Expiration February 2022      Patient was given instructions and counseling regarding his condition or for health maintenance advice. Please see specific information pulled into the AVS if appropriate      This document has been electronically signed by:  Kimmy Vela PA-C

## 2021-04-04 NOTE — PATIENT INSTRUCTIONS
Trigger Point Injection  Trigger points are areas where you have pain. A trigger point injection is a shot given in the trigger point to help relieve pain for a few days to a few months. Common places for trigger points include:  · The neck.  · The shoulders.  · The upper back.  · The lower back.  A trigger point injection will not cure long-term (chronic) pain permanently. These injections do not always work for every person. For some people, they can help to relieve pain for a few days to a few months.  Tell a health care provider about:  · Any allergies you have.  · All medicines you are taking, including vitamins, herbs, eye drops, creams, and over-the-counter medicines.  · Any problems you or family members have had with anesthetic medicines.  · Any blood disorders you have.  · Any surgeries you have had.  · Any medical conditions you have.  What are the risks?  Generally, this is a safe procedure. However, problems may occur, including:  · Infection.  · Bleeding or bruising.  · Allergic reaction to the injected medicine.  · Irritation of the skin around the injection site.  What happens before the procedure?  Ask your health care provider about:  · Changing or stopping your regular medicines. This is especially important if you are taking diabetes medicines or blood thinners.  · Taking medicines such as aspirin and ibuprofen. These medicines can thin your blood. Do not take these medicines unless your health care provider tells you to take them.  · Taking over-the-counter medicines, vitamins, herbs, and supplements.  What happens during the procedure?    · Your health care provider will feel for trigger points. A marker may be used to [place] the area for the injection.  · The skin over the trigger point will be washed with a germ-killing (antiseptic) solution.  · A thin needle is used for the injection. You may feel pain or a twitching feeling when the needle enters the trigger point.  · A numbing solution may  be injected into the trigger point. Sometimes a medicine to keep down inflammation is also injected.  · Your health care provider may move the needle around the area where the trigger point is located until the tightness and twitching goes away.  · After the injection, your health care provider may put gentle pressure over the injection site.  · The injection site will be covered with a bandage (dressing).  The procedure may vary among health care providers and hospitals.  What can I expect after treatment?  After treatment, you may have:  · Soreness and stiffness for 1-2 days.  · A dressing. This can be taken off in a few hours or as told by your health care provider.  Follow these instructions at home:  Injection site care  · Remove your dressing as told by your health care provider.  · Check your injection site every day for signs of infection. Check for:  ? Redness, swelling, or pain.  ? Fluid or blood.  ? Warmth.  ? Pus or a bad smell.  Managing pain, stiffness, and swelling  · If directed, put ice on the affected area.  ? Put ice in a plastic bag.  ? Place a towel between your skin and the bag.  ? Leave the ice on for 20 minutes, 2-3 times a day.  General instructions  · If you were asked to stop your regular medicines, ask your health care provider when you may start taking them again.  · Return to your normal activities as told by your health care provider. Ask your health care provider what activities are safe for you.  · Do not take baths, swim, or use a hot tub until your health care provider approves.  · You may be asked to see an occupational or physical therapist for exercises that reduce muscle strain and stretch the area of the trigger point.  · Keep all follow-up visits as told by your health care provider. This is important.  Contact a health care provider if:  · Your pain comes back, and it is worse than before the injection. You may need more injections.  · You have chills or a fever.  · The  injection site becomes more painful, red, swollen, or warm to the touch.  Summary  · A trigger point injection is a shot given in the trigger point to help relieve pain for a few days to a few months.  · Common places for trigger point injections are the neck, shoulder, upper back, and lower back.  · These injections do not always work for every person, but for some people, the injections can help to relieve pain for a few days to a few months.  · Contact a health care provider if symptoms come back or they are worse than before treatment. Also, get help if the injection site becomes more painful, red, swollen, or warm to the touch.  This information is not intended to replace advice given to you by your health care provider. Make sure you discuss any questions you have with your health care provider.  Document Revised: 01/29/2020 Document Reviewed: 01/29/2020  [employer] Patient Education © 2021 [employer]

## 2021-04-10 LAB
25(OH)D3+25(OH)D2 SERPL-MCNC: 31.8 NG/ML (ref 30–100)
ALBUMIN SERPL-MCNC: 4.7 G/DL (ref 3.5–5.2)
ALBUMIN/GLOB SERPL: 2 G/DL
ALP SERPL-CCNC: 103 U/L (ref 39–117)
ALT SERPL-CCNC: 75 U/L (ref 1–33)
AST SERPL-CCNC: 98 U/L (ref 1–32)
BASOPHILS # BLD AUTO: NORMAL 10*3/UL
BASOPHILS # BLD MANUAL: 0.09 10*3/MM3 (ref 0–0.2)
BASOPHILS NFR BLD MANUAL: 1.1 % (ref 0–1.5)
BILIRUB SERPL-MCNC: 0.3 MG/DL (ref 0–1.2)
BUN SERPL-MCNC: 26 MG/DL (ref 6–20)
BUN/CREAT SERPL: 17.1 (ref 7–25)
CALCIUM SERPL-MCNC: 10.8 MG/DL (ref 8.6–10.5)
CHLORIDE SERPL-SCNC: 97 MMOL/L (ref 98–107)
CHOLEST SERPL-MCNC: 226 MG/DL (ref 0–200)
CO2 SERPL-SCNC: 26.9 MMOL/L (ref 22–29)
CREAT SERPL-MCNC: 1.52 MG/DL (ref 0.57–1)
DIFFERENTIAL COMMENT: ABNORMAL
EOSINOPHIL # BLD AUTO: NORMAL 10*3/UL
EOSINOPHIL # BLD MANUAL: 0.44 10*3/MM3 (ref 0–0.4)
EOSINOPHIL NFR BLD AUTO: NORMAL %
EOSINOPHIL NFR BLD MANUAL: 5.4 % (ref 0.3–6.2)
ERYTHROCYTE [DISTWIDTH] IN BLOOD BY AUTOMATED COUNT: 15.4 % (ref 12.3–15.4)
GLOBULIN SER CALC-MCNC: 2.4 GM/DL
GLUCOSE SERPL-MCNC: 154 MG/DL (ref 65–99)
HBA1C MFR BLD: 7.6 % (ref 4.8–5.6)
HCT VFR BLD AUTO: 39.8 % (ref 34–46.6)
HDLC SERPL-MCNC: 27 MG/DL (ref 40–60)
HGB BLD-MCNC: 13.1 G/DL (ref 12–15.9)
LDLC SERPL CALC-MCNC: 107 MG/DL (ref 0–100)
LYMPHOCYTES # BLD AUTO: NORMAL 10*3/UL
LYMPHOCYTES # BLD MANUAL: 2.27 10*3/MM3 (ref 0.7–3.1)
LYMPHOCYTES NFR BLD AUTO: NORMAL %
LYMPHOCYTES NFR BLD MANUAL: 28 % (ref 19.6–45.3)
Lab: NORMAL
MCH RBC QN AUTO: 27.2 PG (ref 26.6–33)
MCHC RBC AUTO-ENTMCNC: 32.9 G/DL (ref 31.5–35.7)
MCV RBC AUTO: 82.7 FL (ref 79–97)
METHYLMALONATE SERPL-SCNC: 274 NMOL/L (ref 0–378)
MONOCYTES # BLD MANUAL: 0 10*3/MM3 (ref 0.1–0.9)
MONOCYTES NFR BLD AUTO: NORMAL %
MONOCYTES NFR BLD MANUAL: 0 % (ref 5–12)
NEUTROPHILS # BLD MANUAL: 5.23 10*3/MM3 (ref 1.7–7)
NEUTROPHILS NFR BLD AUTO: NORMAL %
NEUTROPHILS NFR BLD MANUAL: 64.5 % (ref 42.7–76)
PLATELET # BLD AUTO: 407 10*3/MM3 (ref 140–450)
PLATELET BLD QL SMEAR: ABNORMAL
POTASSIUM SERPL-SCNC: 5 MMOL/L (ref 3.5–5.2)
PROT SERPL-MCNC: 7.1 G/DL (ref 6–8.5)
RBC # BLD AUTO: 4.81 10*6/MM3 (ref 3.77–5.28)
RBC MORPH BLD: ABNORMAL
SODIUM SERPL-SCNC: 138 MMOL/L (ref 136–145)
TRIGL SERPL-MCNC: 531 MG/DL (ref 0–150)
TSH SERPL DL<=0.005 MIU/L-ACNC: 0.87 UIU/ML (ref 0.27–4.2)
VLDLC SERPL CALC-MCNC: 92 MG/DL (ref 5–40)
WBC # BLD AUTO: 8.11 10*3/MM3 (ref 3.4–10.8)

## 2021-04-16 ENCOUNTER — OFFICE VISIT (OUTPATIENT)
Dept: FAMILY MEDICINE CLINIC | Facility: CLINIC | Age: 61
End: 2021-04-16

## 2021-04-16 VITALS
TEMPERATURE: 97.2 F | HEIGHT: 64 IN | BODY MASS INDEX: 30.9 KG/M2 | DIASTOLIC BLOOD PRESSURE: 76 MMHG | WEIGHT: 181 LBS | SYSTOLIC BLOOD PRESSURE: 146 MMHG | OXYGEN SATURATION: 98 % | HEART RATE: 73 BPM

## 2021-04-16 DIAGNOSIS — E78.2 MIXED HYPERLIPIDEMIA: Chronic | ICD-10-CM

## 2021-04-16 DIAGNOSIS — N18.32 STAGE 3B CHRONIC KIDNEY DISEASE (HCC): Primary | Chronic | ICD-10-CM

## 2021-04-16 DIAGNOSIS — Z79.4 TYPE 2 DIABETES MELLITUS WITH HYPERGLYCEMIA, WITH LONG-TERM CURRENT USE OF INSULIN (HCC): Chronic | ICD-10-CM

## 2021-04-16 DIAGNOSIS — E11.65 TYPE 2 DIABETES MELLITUS WITH HYPERGLYCEMIA, WITH LONG-TERM CURRENT USE OF INSULIN (HCC): Chronic | ICD-10-CM

## 2021-04-16 PROCEDURE — 99214 OFFICE O/P EST MOD 30 MIN: CPT | Performed by: PHYSICIAN ASSISTANT

## 2021-04-16 RX ORDER — VALACYCLOVIR HYDROCHLORIDE 500 MG/1
TABLET, FILM COATED ORAL
COMMUNITY
Start: 2021-04-13 | End: 2021-07-30

## 2021-04-18 NOTE — PATIENT INSTRUCTIONS
Carbohydrate Counting for Diabetes Mellitus, Adult  Carbohydrate counting is a method of keeping track of how many carbohydrates you eat. Eating carbohydrates naturally increases the amount of sugar (glucose) in the blood. Counting how many carbohydrates you eat improves your blood glucose control, which helps you manage your diabetes.  It is important to know how many carbohydrates you can safely have in each meal. This is different for every person. A dietitian can help you make a meal plan and calculate how many carbohydrates you should have at each meal and snack.  What foods contain carbohydrates?  Carbohydrates are found in the following foods:  · Grains, such as breads and cereals.  · Dried beans and soy products.  · Starchy vegetables, such as potatoes, peas, and corn.  · Fruit and fruit juices.  · Milk and yogurt.  · Sweets and snack foods, such as cake, cookies, candy, chips, and soft drinks.  How do I count carbohydrates in foods?  There are two ways to count carbohydrates in food. You can read food labels or learn standard serving sizes of foods. You can use either of the methods or a combination of both.  Using the Nutrition Facts label  The Nutrition Facts list is included on the labels of almost all packaged foods and beverages in the [place] It includes:  · The serving size.  · Information about nutrients in each serving, including the grams (g) of carbohydrate per serving.  To use the Nutrition Facts:  · Decide how many servings you will have.  · Multiply the number of servings by the number of carbohydrates per serving.  · The resulting number is the total amount of carbohydrates that you will be having.  Learning the standard serving sizes of foods  When you eat carbohydrate foods that are not packaged or do not include Nutrition Facts on the label, you need to measure the servings in order to count the amount of carbohydrates.  · Measure the foods that you will eat with a food scale or measuring  cup, if needed.  · Decide how many standard-size servings you will eat.  · Multiply the number of servings by 15. For foods that contain carbohydrates, one serving equals 15 g of carbohydrates.  ? For example, if you eat 2 cups or 10 oz (300 g) of strawberries, you will have eaten 2 servings and 30 g of carbohydrates (2 servings x 15 g = 30 g).  · For foods that have more than one food mixed, such as soups and casseroles, you must count the carbohydrates in each food that is included.  The following list contains standard serving sizes of common carbohydrate-rich foods. Each of these servings has about 15 g of carbohydrates:  · 1 slice of bread.  · 1 six-inch (15 cm) tortilla.  · ? cup or 2 oz (53 g) cooked rice or pasta.  · ½ cup or 3 oz (85 g) cooked or canned, drained and rinsed beans or lentils.  · ½ cup or 3 oz (85 g) starchy vegetable, such as peas, corn, or squash.  · ½ cup or 4 oz (120 g) hot cereal.  · ½ cup or 3 oz (85 g) boiled or mashed potatoes, or ¼ or 3 oz (85 g) of a large baked potato.  · ½ cup or 4 fl oz (118 mL) fruit juice.  · 1 cup or 8 fl oz (237 mL) milk.  · 1 small or 4 oz (106 g) apple.  · ½ or 2 oz (63 g) of a medium banana.  · 1 cup or 5 oz (150 g) strawberries.  · 3 cups or 1 oz (24 g) popped popcorn.  What is an example of carbohydrate counting?  To calculate the number of carbohydrates in this sample meal, follow the steps shown below.  Sample meal  · 3 oz (85 g) chicken breast.  · ? cup or 4 oz (106 g) brown rice.  · ½ cup or 3 oz (85 g) corn.  · 1 cup or 8 fl oz (237 mL) milk.  · 1 cup or 5 oz (150 g) strawberries with sugar-free whipped topping.  Carbohydrate calculation  1. Identify the foods that contain carbohydrates:  ? Rice.  ? Corn.  ? Milk.  ? Strawberries.  2. Calculate how many servings you have of each food:  ? 2 servings rice.  ? 1 serving corn.  ? 1 serving milk.  ? 1 serving strawberries.  3. Multiply each number of servings by 15 g:  ? 2 servings rice x 15 g = 30  g.  ? 1 serving corn x 15 g = 15 g.  ? 1 serving milk x 15 g = 15 g.  ? 1 serving strawberries x 15 g = 15 g.  4. Add together all of the amounts to find the total grams of carbohydrates eaten:  ? 30 g + 15 g + 15 g + 15 g = 75 g of carbohydrates total.  What are tips for following this plan?  Shopping  · Develop a meal plan and then make a shopping list.  · Buy fresh and frozen vegetables, fresh and frozen fruit, dairy, eggs, beans, lentils, and whole grains.  · Look at food labels. Choose foods that have more fiber and less sugar.  · Avoid processed foods and foods with added sugars.  Meal planning  · Aim to have the same amount of carbohydrates at each meal and for each snack time.  · Plan to have regular, balanced meals and snacks.  Where to find more information  · American Diabetes Association: www.diabetes.org  · Centers for Disease Control and Prevention: www.cdc.gov  Summary  · Carbohydrate counting is a method of keeping track of how many carbohydrates you eat.  · Eating carbohydrates naturally increases the amount of sugar (glucose) in the blood.  · Counting how many carbohydrates you eat improves your blood glucose control, which helps you manage your diabetes.  · A dietitian can help you make a meal plan and calculate how many carbohydrates you should have at each meal and snack.  This information is not intended to replace advice given to you by your health care provider. Make sure you discuss any questions you have with your health care provider.  Document Revised: 12/18/2020 Document Reviewed: 12/18/2020  [employer] Patient Education © 2021 [employer]

## 2021-04-18 NOTE — PROGRESS NOTES
Subjective   Monisha Gardner is a 60 y.o. female.       Chief Complaint -abnormal lab work    History of Present Illness -    ROS    Abnormal lab work-  We discussed her abnormal lab work today including estimated GFR of 35 and hyperlipidemia.  This does diagnose her with chronic kidney disease stage III which was discussed in detail with her today.    Hyperlipidemia-  Not at goal with diet.  She has failed statin therapy in the past due to elevated liver enzymes.  She has failed fenofibrate since not efficacious for monotherapy.    Diabetes mellitus type 2-  Not at goal with Lantus 45 units daily and Ozempic.  Home blood glucose log revealed fasting blood glucose 136-207 and 2-hour postprandial readings 156-266.      The following portions of the patient's history were reviewed and updated as appropriate: allergies, current medications, past family history, past medical history, past social history, past surgical history and problem list.    Review of Systems   Constitutional: Negative for activity change, appetite change, fatigue and fever.   HENT: Negative for ear pain, sinus pressure and sore throat.    Eyes: Negative for pain and visual disturbance.   Respiratory: Negative for cough and chest tightness.    Cardiovascular: Negative for chest pain and palpitations.   Gastrointestinal: Negative for abdominal pain, constipation, diarrhea, nausea and vomiting.   Endocrine: Negative for polydipsia and polyuria.   Genitourinary: Negative for dysuria and frequency.   Musculoskeletal: Negative for back pain and myalgias.   Skin: Negative for color change and rash.   Allergic/Immunologic: Negative for food allergies and immunocompromised state.   Neurological: Negative for dizziness, syncope and headaches.   Hematological: Negative for adenopathy. Does not bruise/bleed easily.   Psychiatric/Behavioral: Negative for hallucinations and suicidal ideas. The patient is not nervous/anxious.        Objective  Vital signs:  BP  "146/76   Pulse 73   Temp 97.2 °F (36.2 °C) (Temporal)   Ht 162.6 cm (64\")   Wt 82.1 kg (181 lb)   LMP  (LMP Unknown)   SpO2 98%   BMI 31.07 kg/m²     Physical Exam  Vitals and nursing note reviewed.   Constitutional:       Appearance: Normal appearance. She is well-developed. She is obese.   HENT:      Head: Normocephalic and atraumatic.      Right Ear: Tympanic membrane normal.      Left Ear: Tympanic membrane normal.      Nose: Nose normal.      Mouth/Throat:      Mouth: Mucous membranes are moist.      Pharynx: No oropharyngeal exudate or posterior oropharyngeal erythema.   Eyes:      Extraocular Movements: Extraocular movements intact.      Conjunctiva/sclera: Conjunctivae normal.   Neck:      Thyroid: No thyromegaly.      Trachea: No tracheal deviation.   Cardiovascular:      Rate and Rhythm: Normal rate and regular rhythm.      Heart sounds: Normal heart sounds. No murmur heard.     Pulmonary:      Effort: Pulmonary effort is normal. No respiratory distress.      Breath sounds: Normal breath sounds. No wheezing.   Abdominal:      General: Bowel sounds are normal.      Palpations: Abdomen is soft.      Tenderness: There is no abdominal tenderness. There is no guarding.   Musculoskeletal:         General: No tenderness. Normal range of motion.      Cervical back: Normal range of motion and neck supple.   Lymphadenopathy:      Cervical: No cervical adenopathy.   Skin:     General: Skin is warm and dry.      Findings: No rash.   Neurological:      General: No focal deficit present.      Mental Status: She is alert and oriented to person, place, and time.   Psychiatric:         Mood and Affect: Mood normal.         Behavior: Behavior normal.         Thought Content: Thought content normal.         The following data was reviewed by: ANH Medina on 04/16/2021:  CMP    CMP 5/22/20 4/2/21   Glucose 139 (A)    Glucose  154 (A)   BUN 17 26 (A)   Creatinine 1.05 (A) 1.52 (A)   eGFR Non  Am 54 (A) " 35 (A)   eGFR  Am  42 (A)   Sodium 138 138   Potassium 4.3 5.0   Chloride 99 97 (A)   Calcium 10.3 10.8 (A)   Total Protein  7.1   Albumin 4.60 4.70   Globulin  2.4   Total Bilirubin 0.2 0.3   Alkaline Phosphatase 71 103   AST (SGOT) 35 (A) 98 (A)   ALT (SGPT) 27 75 (A)   (A) Abnormal value       Comments are available for some flowsheets but are not being displayed.           CBC w/diff    CBC w/Diff 5/22/20 4/2/21   WBC 8.65 8.11   RBC 4.51 4.81   Hemoglobin 12.7 13.1   Hematocrit 37.7 39.8   MCV 83.6 82.7   MCH 28.2 27.2   MCHC 33.7 32.9   RDW 13.5 15.4   Platelets 414 407   Neutrophil Rel % 48.2 CANCELED   Immature Granulocyte Rel % 0.5    Lymphocyte Rel % 38.8 CANCELED   Monocyte Rel % 5.5 CANCELED   Eosinophil Rel % 5.7 CANCELED   Basophil Rel % 1.3       Comments are available for some flowsheets but are not being displayed.           Lipid Panel    Lipid Panel 5/22/20 4/2/21   Total Cholesterol 216 (A)    Total Cholesterol  226 (A)   Triglycerides 400 (A) 531 (A)   HDL Cholesterol 33 (A) 27 (A)   VLDL Cholesterol 80 (A) 92 (A)   LDL Cholesterol  103 (A) 107 (A)   LDL/HDL Ratio 3.12    (A) Abnormal value       Comments are available for some flowsheets but are not being displayed.           TSH    TSH 4/2/21   TSH 0.870           Most Recent A1C    HGBA1C Most Recent 4/2/21   Hemoglobin A1C 7.60 (A)   (A) Abnormal value       Comments are available for some flowsheets but are not being displayed.           Data reviewed: Home blood glucose log reviewed       Assessment/Plan     Diagnoses and all orders for this visit:    1. Stage 3b chronic kidney disease (CMS/HCC) (Primary)  Comments:  Discussed lab work with patient  Advised adequate hydration  Refer nephrology  Orders:  -     Ambulatory Referral to Nephrology    2. Mixed hyperlipidemia  Comments:  Failed statin therapy-elevated liver enzymes  Failed fenofibrate monotherapy  Start Repatha  Advise low-cholesterol diet  Orders:  -     Evolocumab  (REPATHA) solution prefilled syringe injection; Inject 1 mL under the skin into the appropriate area as directed Every 14 (Fourteen) Days.  Dispense: 2 mL; Refill: 5    3. Type 2 diabetes mellitus with hyperglycemia, with long-term current use of insulin (CMS/[facility])  Comments:  Increase Lantus to 50 units daily  Continue Ozempic  Advised low carbohydrate diabetic diet            Patient was given instructions and counseling regarding his condition or for health maintenance advice. Please see specific information pulled into the AVS if appropriate      This document has been electronically signed by:  Kimmy Vela PA-C

## 2021-05-27 ENCOUNTER — TELEPHONE (OUTPATIENT)
Dept: FAMILY MEDICINE CLINIC | Facility: CLINIC | Age: 61
End: 2021-05-27

## 2021-05-27 NOTE — TELEPHONE ENCOUNTER
Spoke with Monisha. She understood instructions well.        ----- Message from ANH Medina sent at 5/27/2021  4:39 PM EDT -----  Advise her to increase her gabapentin to 200 mg at night as she should have a prescription for 100 mg and can take 2 until she sees me for office visit.  She missed her office visit with me earlier in May please reschedule this.  I need to see her in office to refill gabapentin.  ----- Message -----  From: Brittany Kennedy MA  Sent: 5/26/2021   3:24 PM EDT  To: ANH Medina    I have spoke to monisha she said that her legs are hurting so bad she can not sleep I got her last labs from [facility] and I will make sure they are in chart

## 2021-05-28 RX ORDER — ISOSORBIDE MONONITRATE 60 MG/1
60 TABLET, EXTENDED RELEASE ORAL DAILY
Qty: 90 TABLET | Refills: 2 | Status: SHIPPED | OUTPATIENT
Start: 2021-05-28 | End: 2022-05-12

## 2021-05-28 RX ORDER — GABAPENTIN 100 MG/1
200 CAPSULE ORAL
Qty: 60 CAPSULE | Refills: 0 | Status: SHIPPED | OUTPATIENT
Start: 2021-05-28 | End: 2021-06-11 | Stop reason: SDUPTHER

## 2021-06-11 ENCOUNTER — OFFICE VISIT (OUTPATIENT)
Dept: FAMILY MEDICINE CLINIC | Facility: CLINIC | Age: 61
End: 2021-06-11

## 2021-06-11 VITALS
TEMPERATURE: 96.9 F | WEIGHT: 176 LBS | SYSTOLIC BLOOD PRESSURE: 122 MMHG | DIASTOLIC BLOOD PRESSURE: 80 MMHG | OXYGEN SATURATION: 97 % | HEART RATE: 87 BPM | BODY MASS INDEX: 30.05 KG/M2 | HEIGHT: 64 IN

## 2021-06-11 DIAGNOSIS — D04.9 SQUAMOUS CELL CARCINOMA IN SITU (SCCIS) OF SKIN: ICD-10-CM

## 2021-06-11 DIAGNOSIS — R51.9 FACIAL PAIN: Primary | ICD-10-CM

## 2021-06-11 DIAGNOSIS — E78.2 MIXED HYPERLIPIDEMIA: Chronic | ICD-10-CM

## 2021-06-11 DIAGNOSIS — N18.32 STAGE 3B CHRONIC KIDNEY DISEASE (HCC): Chronic | ICD-10-CM

## 2021-06-11 DIAGNOSIS — Z79.4 TYPE 2 DIABETES MELLITUS WITH HYPERGLYCEMIA, WITH LONG-TERM CURRENT USE OF INSULIN (HCC): Chronic | ICD-10-CM

## 2021-06-11 DIAGNOSIS — E11.65 TYPE 2 DIABETES MELLITUS WITH HYPERGLYCEMIA, WITH LONG-TERM CURRENT USE OF INSULIN (HCC): Chronic | ICD-10-CM

## 2021-06-11 DIAGNOSIS — L03.211 CELLULITIS, FACE: ICD-10-CM

## 2021-06-11 PROCEDURE — 99214 OFFICE O/P EST MOD 30 MIN: CPT | Performed by: PHYSICIAN ASSISTANT

## 2021-06-11 RX ORDER — CEPHALEXIN 500 MG/1
500 CAPSULE ORAL 3 TIMES DAILY
Qty: 30 CAPSULE | Refills: 0 | Status: SHIPPED | OUTPATIENT
Start: 2021-06-11 | End: 2021-07-23

## 2021-06-11 RX ORDER — INSULIN GLARGINE 100 [IU]/ML
45 INJECTION, SOLUTION SUBCUTANEOUS DAILY
Qty: 6 PEN | Refills: 5 | Status: SHIPPED | OUTPATIENT
Start: 2021-06-11 | End: 2022-04-08

## 2021-06-11 RX ORDER — SEMAGLUTIDE 1.34 MG/ML
0.5 INJECTION, SOLUTION SUBCUTANEOUS WEEKLY
Qty: 2 ML | Refills: 5 | Status: SHIPPED | OUTPATIENT
Start: 2021-06-11 | End: 2022-05-06 | Stop reason: SDUPTHER

## 2021-06-11 RX ORDER — LIDOCAINE 50 MG/G
OINTMENT TOPICAL
Qty: 50 G | Refills: 1 | Status: SHIPPED | OUTPATIENT
Start: 2021-06-11 | End: 2021-11-12

## 2021-06-11 NOTE — PROGRESS NOTES
"Subjective   Monisha Gardner is a 60 y.o. female.       Chief Complaint -facial pain    History of Present Illness -    ROS    Facial pain-  Patient had multiple squamous cell carcinomas on her face.  She has been treated by Dr. Rob at [facility] with \"bluelight treatment\" on her face.  Patient reports her third treatment which had been increased to 12 minutes was 1 week ago.  Since that time she reports extreme burning sensation with erythema swelling and blistering of her face.  She reports blistering of her lips which have made it hard to eat.    Diabetes mellitus-  Not at goal with most recent A1c of 7.6.  Patient currently using Janumet, Ozempic 0.25 mg weekly and Lantus 45 units nightly.    Chronic kidney disease-  Not at goal with last estimated GFR of 35.    Hyperlipidemia-not at goal with diet and uncontrolled diabetes    The following portions of the patient's history were reviewed and updated as appropriate: allergies, current medications, past family history, past medical history, past social history, past surgical history and problem list.    Review of Systems   Constitutional: Negative for activity change, appetite change, fatigue and fever.   HENT: Negative for ear pain, sinus pressure and sore throat.    Eyes: Negative for pain and visual disturbance.   Respiratory: Negative for cough and chest tightness.    Cardiovascular: Negative for chest pain and palpitations.   Gastrointestinal: Negative for abdominal pain, constipation, diarrhea, nausea and vomiting.   Endocrine: Negative for polydipsia and polyuria.   Genitourinary: Negative for dysuria and frequency.   Musculoskeletal: Negative for back pain and myalgias.   Skin: Positive for color change. Negative for rash.        Blisters on lips and burn on face   Allergic/Immunologic: Negative for food allergies and immunocompromised state.   Neurological: Negative for dizziness, syncope and headaches.   Hematological: Negative for adenopathy. " "Does not bruise/bleed easily.   Psychiatric/Behavioral: Negative for hallucinations and suicidal ideas. The patient is not nervous/anxious.        Objective  Vital signs:  /80   Pulse 87   Temp 96.9 °F (36.1 °C) (Tympanic)   Ht 162.6 cm (64\")   Wt 79.8 kg (176 lb)   LMP  (LMP Unknown)   SpO2 97%   BMI 30.21 kg/m²     Physical Exam  Vitals and nursing note reviewed.   Constitutional:       Appearance: Normal appearance. She is well-developed.   HENT:      Head: Normocephalic and atraumatic.      Right Ear: Tympanic membrane normal.      Left Ear: Tympanic membrane normal.      Nose: Nose normal.      Mouth/Throat:      Mouth: Mucous membranes are moist.      Pharynx: No oropharyngeal exudate or posterior oropharyngeal erythema.   Eyes:      Extraocular Movements: Extraocular movements intact.      Conjunctiva/sclera: Conjunctivae normal.   Neck:      Thyroid: No thyromegaly.      Trachea: No tracheal deviation.   Cardiovascular:      Rate and Rhythm: Normal rate and regular rhythm.      Heart sounds: Normal heart sounds. No murmur heard.     Pulmonary:      Effort: Pulmonary effort is normal. No respiratory distress.      Breath sounds: Normal breath sounds. No wheezing.   Abdominal:      General: Bowel sounds are normal.      Palpations: Abdomen is soft.      Tenderness: There is no abdominal tenderness. There is no guarding.   Musculoskeletal:         General: No tenderness. Normal range of motion.      Cervical back: Normal range of motion and neck supple.   Lymphadenopathy:      Cervical: No cervical adenopathy.   Skin:     General: Skin is warm.      Findings: Erythema present. No rash.      Comments: Multiple blisters noted on lips with second-degree burns on forehead cheeks nose and chin.  Erythematous tender areas noted on cheeks and nose with skin breakdown.  Peeling noted at edges of jawline bilaterally.   Neurological:      General: No focal deficit present.      Mental Status: She is alert " and oriented to person, place, and time.   Psychiatric:         Mood and Affect: Mood normal.         Behavior: Behavior normal.         Thought Content: Thought content normal.         The following data was reviewed by: ANH Medina on 06/11/2021:  CMP    CMP 4/2/21   Glucose 154 (A)   BUN 26 (A)   Creatinine 1.52 (A)   eGFR Non  Am 35 (A)   eGFR African Am 42 (A)   Sodium 138   Potassium 5.0   Chloride 97 (A)   Calcium 10.8 (A)   Total Protein 7.1   Albumin 4.70   Globulin 2.4   Total Bilirubin 0.3   Alkaline Phosphatase 103   AST (SGOT) 98 (A)   ALT (SGPT) 75 (A)   (A) Abnormal value       Comments are available for some flowsheets but are not being displayed.           Lipid Panel    Lipid Panel 4/2/21   Total Cholesterol 226 (A)   Triglycerides 531 (A)   HDL Cholesterol 27 (A)   VLDL Cholesterol 92 (A)   LDL Cholesterol  107 (A)   (A) Abnormal value       Comments are available for some flowsheets but are not being displayed.           TSH    TSH 4/2/21   TSH 0.870           Most Recent A1C    HGBA1C Most Recent 4/2/21   Hemoglobin A1C 7.60 (A)   (A) Abnormal value       Comments are available for some flowsheets but are not being displayed.                  Assessment/Plan     Diagnoses and all orders for this visit:    1. Facial pain (Primary)  Comments:  Secondary to burn from dermatologic treatment for skin cancer on face  Rx written for lidocaine and advised Aquaphor  Orders:  -     lidocaine (XYLOCAINE) 5 % ointment; Apply  topically to the appropriate area as directed Every 2 (Two) Hours As Needed for Mild Pain .  Dispense: 50 g; Refill: 1    2. Cellulitis, face  Comments:  Start Keflex  Advised home wound care/antibacterial soap wash twice daily  Orders:  -     cephalexin (Keflex) 500 MG capsule; Take 1 capsule by mouth 3 (Three) Times a Day.  Dispense: 30 capsule; Refill: 0    3. Squamous cell carcinoma in situ (SCCIS) of skin  Comments:  Advised to go back to 7-minute treatments as  she did not tolerate 12-minute dermatology facial treatment due to facial blistering and burning    4. Type 2 diabetes mellitus with hyperglycemia, with long-term current use of insulin (CMS/[facility])  Comments:  Increase Ozempic 0.5 mg weekly  Continue Janumet and Lantus at current doses  Discussed Metformin use with CKD and possible discontinuation if needed  Orders:  -     Semaglutide,0.25 or 0.5MG/DOS, (Ozempic, 0.25 or 0.5 MG/DOSE,) 2 MG/1.5ML solution pen-injector; Inject 0.5 mg under the skin into the appropriate area as directed 1 (One) Time Per Week.  Dispense: 2 mL; Refill: 5  -     Lantus SoloStar 100 UNIT/ML injection pen; Inject 45 Units under the skin into the appropriate area as directed Daily.  Dispense: 6 pen; Refill: 5    5. Stage 3b chronic kidney disease (CMS/[facility])  Comments:  Advised patient to keep appointment to follow-up with Dr. Longoria, nephrologist in 6 weeks.    6. Mixed hyperlipidemia  Comments:  Advised a low carbohydrate diet due to hypertriglyceridemia  Discussed starting medication for this if elevation persists            Patient was given instructions and counseling regarding his condition or for health maintenance advice. Please see specific information pulled into the AVS if appropriate      This document has been electronically signed by:  Kimmy Vela PA-C

## 2021-06-11 NOTE — PATIENT INSTRUCTIONS
Burn Care, Adult  A burn is an injury to the skin or the tissues under the skin. There are three types of burns:  · First degree. These burns may cause the skin to be red and a bit swollen.  · Second degree. These burns are very painful and cause the skin to be very red. The skin may also swell, leak fluid, look shiny, and start to have blisters.  · Third degree. These burns cause lasting damage. They turn the skin white or black and make it look charred, dry, and leathery.  Treatment for your burn will depend on the type of burn you have. Taking good care of your burn can help to prevent pain and infection. It can also help the burn heal quickly.  How to care for a first-degree burn  Right after a burn:  · Rinse or soak the burn under cool water for 5 minutes or more. Do not put ice on your burn. That can cause more damage.  · Put a cool, clean, wet cloth on your burn.  · Put lotion or gel with aloe vera on your burn.  Caring for the burn  Clean and care for your burn. Your doctor may tell you:  · To clean the burn using soap and water.  · To pat the burn dry using a clean cloth. Do not rub or scrub the burn.  · To put lotion or gel with aloe vera on your burn.  How to care for a second-degree burn  Right after a burn:  · Rinse or soak the burn under cool water. Do this for 5 to 10 minutes. Do not put ice on your burn. This can cause more damage.  · Remove any jewelry near the burned area.  · Cover the burn with a clean cloth.  Caring for the burn  · Raise (elevate) the burned area above the level of your heart while sitting or lying down.  · Clean and care for your burn. Your doctor may tell you:  ? To clean or rinse your burn.  ? To put a cream or ointment on the burn.  ? To place a germ-free (sterile) dressing over the burn. A dressing is a material that is placed on a burn to help it heal.  How to care for a third-degree burn  Right after a burn:  · Cover the burn with a clean, dry cloth.  · Seek treatment  right away if you have this kind of burn. You may:  ? Need to stay in the hospital.  ? Have surgery to remove burned tissue.  ? Have surgery to put new skin on the burned area.  ? Be given fluids through an IV tube.  Caring for the burn  Clean and care for your burn. Your doctor may tell you:  · To clean or rinse your burn.  · To put a cream or ointment on the burn.  · To put a sterile dressing in the burn. This is called packing.  · To place a sterile dressing over the burn.  Other things to do  · Raise the burned area above the level of your heart while sitting or lying down.  · Wear splints or immobilizers if told by your doctor.  · Rest as told by your doctor. Do not do sports or other activities until your doctor approves.  How to prevent infection when caring for a burn    · Take these steps to prevent infection:  ? Wash your hands with soap and water for at least 20 seconds before and after caring for your burn. If you cannot use soap and water, use hand .  ? Wear clean or sterile gloves as told by your doctor.  ? Do not put butter, oil, toothpaste, or other home remedies on the burn.  ? Do not scratch or pick at the burn.  ? Do not break any blisters.  ? Do not peel the skin.  ? Do not rub your burn, even when you are cleaning it.  · Check your burn every day for these signs of infection:  ? More redness, swelling, or pain.  ? Warmth.  ? Pus or a bad smell.  ? Red streaks around the burn.  Follow these instructions at home    Medicines  · Take over-the-counter and prescription medicines only as told by your doctor.  · If you were prescribed an antibiotic medicine, use it as told by your doctor. Do not stop using the antibiotic even if your condition gets better.  · Your doctor may ask you to take medicine for pain before you change your dressing.  General instructions  · Protect your burn from the sun.  · Drink enough fluid to keep your pee (urine) pale yellow.  · Do not use any products that  contain nicotine or tobacco, such as cigarettes, e-cigarettes, and chewing tobacco. These can delay healing. If you need help quitting, ask your doctor.  · Keep all follow-up visits as told by your doctor. This is important.  Contact a doctor if:  · Your condition does not get better.  · Your condition gets worse.  · You have a fever or chills.  · Your burn feels warm to the touch.  · You have more redness, swelling, or pain on your burn.  · Your burn looks different or starts to have black or red spots on it.  · Your pain does not get better with medicine.  Get help right away if:  · You have more fluid, blood, or pus coming from your burn.  · You have red streaks near the burn.  · You have very bad pain.  Summary  · There are three types of burns. They are first degree, second degree, and third degree. Of these, a third-degree burn is most serious. This must be treated right away.  · Treatment for your burn will depend on the type of burn you have.  · Do not put butter, oil, toothpaste, or other home remedies on the burn. These things can damage your skin.  · Follow instructions from your doctor about how to clean and take care of your burn.  This information is not intended to replace advice given to you by your health care provider. Make sure you discuss any questions you have with your health care provider.  Document Revised: 10/06/2020 Document Reviewed: 10/06/2020  Elsevier Patient Education © 2021 [employer]

## 2021-06-12 RX ORDER — GABAPENTIN 100 MG/1
200 CAPSULE ORAL
Qty: 60 CAPSULE | Refills: 0 | Status: SHIPPED | OUTPATIENT
Start: 2021-06-12 | End: 2021-06-18

## 2021-06-18 DIAGNOSIS — M79.604 PAIN IN BOTH LOWER EXTREMITIES: Primary | ICD-10-CM

## 2021-06-18 DIAGNOSIS — M79.605 PAIN IN BOTH LOWER EXTREMITIES: Primary | ICD-10-CM

## 2021-06-18 NOTE — TELEPHONE ENCOUNTER
Pt is aware of this information.     ----- Message from ANH Medina sent at 6/18/2021  3:31 PM EDT -----  Inform the patient that I sent a prescription for gabapentin 300 mg 3 times daily as this higher dose may help with her leg pain.  I have also placed a referral to neurology's for further evaluation and treatment of her leg pain.  ----- Message -----  From: Sophy Stevens MA  Sent: 6/18/2021   3:25 PM EDT  To: ANH Medina    Patient called and stated that the gabapentin is not helping her leg cramping. She said she has been taking it 3 times a day with no relief. She wanted to know if there is anything else she can try?

## 2021-06-19 DIAGNOSIS — G25.81 RLS (RESTLESS LEGS SYNDROME): ICD-10-CM

## 2021-06-19 RX ORDER — GABAPENTIN 300 MG/1
300 CAPSULE ORAL 3 TIMES DAILY
Qty: 90 CAPSULE | Refills: 2 | Status: SHIPPED | OUTPATIENT
Start: 2021-06-19 | End: 2021-09-08 | Stop reason: SDUPTHER

## 2021-06-21 RX ORDER — ROPINIROLE 2 MG/1
2 TABLET, FILM COATED ORAL NIGHTLY
Qty: 90 TABLET | Refills: 3 | Status: SHIPPED | OUTPATIENT
Start: 2021-06-21 | End: 2021-09-07 | Stop reason: SDUPTHER

## 2021-07-20 ENCOUNTER — TELEPHONE (OUTPATIENT)
Dept: FAMILY MEDICINE CLINIC | Facility: CLINIC | Age: 61
End: 2021-07-20

## 2021-07-20 RX ORDER — TRIAMCINOLONE ACETONIDE 5 MG/G
CREAM TOPICAL 2 TIMES DAILY
Qty: 60 G | Refills: 1 | Status: SHIPPED | OUTPATIENT
Start: 2021-07-20 | End: 2021-11-19

## 2021-07-20 NOTE — TELEPHONE ENCOUNTER
Pt is aware of this information.       ----- Message from ANH Medina sent at 7/20/2021  1:34 PM EDT -----  Inform the patient I sent in a prescription for triamcinolone cream that she can use on her dermatitis.It is a strong prescription of triamcinolone cream so she should not put it on her face unless it is significantly diluted (mixed with regular lotion).  ----- Message -----  From: Sophy Stevens MA  Sent: 7/19/2021   1:44 PM EDT  To: ANH Medina    Patient called in and wanted to know if you could send her in a prescription for triamcinolone cream?

## 2021-07-23 ENCOUNTER — OFFICE VISIT (OUTPATIENT)
Dept: FAMILY MEDICINE CLINIC | Facility: CLINIC | Age: 61
End: 2021-07-23

## 2021-07-23 VITALS
SYSTOLIC BLOOD PRESSURE: 155 MMHG | HEIGHT: 64 IN | WEIGHT: 170 LBS | TEMPERATURE: 96.8 F | BODY MASS INDEX: 29.02 KG/M2 | DIASTOLIC BLOOD PRESSURE: 70 MMHG | HEART RATE: 67 BPM | OXYGEN SATURATION: 98 %

## 2021-07-23 DIAGNOSIS — I10 ESSENTIAL HYPERTENSION: Chronic | ICD-10-CM

## 2021-07-23 DIAGNOSIS — E78.2 MIXED HYPERLIPIDEMIA: Chronic | ICD-10-CM

## 2021-07-23 DIAGNOSIS — M75.52 ACUTE BURSITIS OF LEFT SHOULDER: Primary | ICD-10-CM

## 2021-07-23 DIAGNOSIS — M15.9 PRIMARY OSTEOARTHRITIS INVOLVING MULTIPLE JOINTS: Chronic | ICD-10-CM

## 2021-07-23 PROCEDURE — 20610 DRAIN/INJ JOINT/BURSA W/O US: CPT | Performed by: PHYSICIAN ASSISTANT

## 2021-07-23 PROCEDURE — 99213 OFFICE O/P EST LOW 20 MIN: CPT | Performed by: PHYSICIAN ASSISTANT

## 2021-07-25 NOTE — PATIENT INSTRUCTIONS
Joint Steroid Injection  A joint steroid injection is a procedure to relieve swelling and pain in a joint. Steroids are medicines that reduce inflammation. In this procedure, your health care provider uses a syringe and a needle to inject a steroid medicine into a painful and inflamed joint. A pain-relieving medicine (anesthetic) may be injected along with the steroid. In some cases, your health care provider may use an imaging technique such as ultrasound or fluoroscopy to guide the injection.  Joints that are often treated with steroid injections include the knee, shoulder, hip, and spine. These injections may also be used in the elbow, ankle, and joints of the hands or feet. You may have joint steroid injections as part of your treatment for inflammation caused by:  · Gout.  · Rheumatoid arthritis.  · Advanced wear-and-tear arthritis (osteoarthritis).  · Tendinitis.  · Bursitis.  Joint steroid injections may be repeated, but having them too often can damage a joint or the skin over the joint. You should not have joint steroid injections less than 6 weeks apart or more than four times a year.  Tell a health care provider about:  · Any allergies you have.  · All medicines you are taking, including vitamins, herbs, eye drops, creams, and over-the-counter medicines.  · Any problems you or family members have had with anesthetic medicines.  · Any blood disorders you have.  · Any surgeries you have had.  · Any medical conditions you have.  · Whether you are pregnant or may be pregnant.  What are the risks?  Generally, this is a safe treatment. However, problems may occur, including:  · Infection.  · Bleeding.  · Allergic reactions to medicines.  · Damage to the joint or tissues around the joint.  · Thinning of skin or loss of skin color over the joint.  · Temporary flushing of the face or chest.  · Temporary increase in pain.  · Temporary increase in blood sugar.  · Failure to relieve inflammation or pain.  What  happens before the treatment?  · You may have imaging tests of your joint.  · Ask your health care provider about:  ? Changing or stopping your regular medicines. This is especially important if you are taking diabetes medicines or blood thinners.  ? Taking medicines such as aspirin and ibuprofen. These medicines can thin your blood. Do not take these medicines unless your health care provider tells you to take them.  ? Taking over-the-counter medicines, vitamins, herbs, and supplements.  · Ask your health care provider if you can drive yourself home after the procedure.  What happens during the treatment?    · Your health care provider will position you for the injection and locate the injection site over your joint.  · The skin over the joint will be cleaned with a germ-killing soap.  · Your health care provider may:  ? Spray a numbing solution (topical anesthetic) over the injection site.  ? Inject a local anesthetic under the skin above your joint.  · The needle will be placed through your skin into your joint. Your health care provider may use imaging to guide the needle to the right spot for the injection. If imaging is used, a special contrast dye may be injected to confirm that the needle is in the correct location.  · The steroid medicine will be injected into your joint.  · Anesthetic may be injected along with the steroid. This may be a medicine that relieves pain for a short time (short-acting anesthetic) or for a longer time (long-acting anesthetic).  · The needle will be removed, and an adhesive bandage (dressing) will be placed over the injection site.  The procedure may vary among health care providers and hospitals.  What can I expect after the treatment?  · You will be able to go home after the treatment.  · It is normal to feel slight flushing for a few days after the injection.  · After the treatment, it is common to have an increase in joint pain after the anesthetic has worn off. This may  happen about an hour after a short-acting anesthetic or about 8 hours after a longer-acting anesthetic.  · You should begin to feel relief from joint pain and swelling after 24 to 48 hours.  Follow these instructions at home:  Injection site care  · Leave the adhesive dressing over your injection site in place until your health care provider says you can remove it.  · Check your injection site every day for signs of infection. Check for:  ? Redness, swelling, or pain.  ? Fluid or blood.  ? Warmth.  ? Pus or a bad smell.  Activity  · Return to your normal activities as told by your health care provider. Ask your health care provider what activities are safe for you. You may be asked to limit activities that put stress on the joint for a few days.  · Do joint exercises as told by your health care provider.  · Do not take baths, swim, or use a hot tub until your health care provider approves.  Managing pain, stiffness, and swelling    · If directed, put ice on the joint.  ? Put ice in a plastic bag.  ? Place a towel between your skin and the bag.  ? Leave the ice on for 20 minutes, 2-3 times a day.  · Raise (elevate) your joint above the level of your heart when you are sitting or lying down.  General instructions  · Take over-the-counter and prescription medicines only as told by your health care provider.  · Do not use any products that contain nicotine or tobacco, such as cigarettes, e-cigarettes, and chewing tobacco. These can delay joint healing. If you need help quitting, ask your health care provider.  · If you have diabetes, be aware that your blood sugar may be slightly elevated for several days after the injection.  · Keep all follow-up visits as told by your health care provider. This is important.  Contact a health care provider if you have:  · Chills or a fever.  · Any signs of infection at your injection site.  · Increased pain or swelling or no relief after 2 days.  Summary  · A joint steroid injection  is a treatment to relieve pain and swelling in a joint.  · Steroids are medicines that reduce inflammation. Your health care provider may add an anesthetic along with the steroid.  · You may have joint steroid injections as part of your arthritis treatment.  · Joint steroid injections may be repeated, but having them too often can damage a joint or the skin over the joint.  · Contact your health care provider if you have a fever, chills, or signs of infection or if you get no relief from joint pain or swelling.  This information is not intended to replace advice given to you by your health care provider. Make sure you discuss any questions you have with your health care provider.  Document Revised: 08/20/2019 Document Reviewed: 08/20/2019  Elsevier Patient Education © 2021 [employer]

## 2021-07-25 NOTE — PROGRESS NOTES
Subjective   Monisha Gardner is a 60 y.o. female.       Chief Complaint -left shoulder pain    History of Present Illness -    ROS    Left shoulder pain-  Patient complains of severe sharp pain left shoulder pain that is worse with abduction.  Onset 2-3 weeks.  Minimal relief with Tylenol.  Patient does work at a computer/desk for several hours per day.    Osteoarthritis-not at goal due to left shoulder pain and likely inflammation.  Not at goal with Tylenol    Hypertension-likely elevated today due to pain.  Patient reports home blood pressure less than 130/80 consistently with the use of chlorthalidone losartan and metoprolol    The following portions of the patient's history were reviewed and updated as appropriate: allergies, current medications, past family history, past medical history, past social history, past surgical history and problem list.    Review of Systems   Constitutional: Positive for activity change. Negative for appetite change, fatigue and fever.   HENT: Negative for ear pain, sinus pressure and sore throat.    Eyes: Negative for pain and visual disturbance.   Respiratory: Negative for cough and chest tightness.    Cardiovascular: Negative for chest pain and palpitations.   Gastrointestinal: Negative for abdominal pain, constipation, diarrhea, nausea and vomiting.   Endocrine: Negative for polydipsia and polyuria.   Genitourinary: Negative for dysuria and frequency.   Musculoskeletal: Positive for arthralgias. Negative for back pain and myalgias.   Skin: Negative for color change and rash.   Allergic/Immunologic: Negative for food allergies and immunocompromised state.   Neurological: Negative for dizziness, syncope and headaches.   Hematological: Negative for adenopathy. Does not bruise/bleed easily.   Psychiatric/Behavioral: Negative for hallucinations and suicidal ideas. The patient is not nervous/anxious.        Objective  Vital signs:  /70   Pulse 67   Temp 96.8 °F (36 °C)  "(Temporal)   Ht 162.6 cm (64\")   Wt 77.1 kg (170 lb)   LMP  (LMP Unknown)   SpO2 98%   BMI 29.18 kg/m²     Physical Exam  Vitals and nursing note reviewed.   Constitutional:       Appearance: Normal appearance. She is well-developed.   Eyes:      Extraocular Movements: Extraocular movements intact.      Conjunctiva/sclera: Conjunctivae normal.   Cardiovascular:      Rate and Rhythm: Normal rate and regular rhythm.      Heart sounds: Normal heart sounds. No murmur heard.     Pulmonary:      Effort: Pulmonary effort is normal. No respiratory distress.      Breath sounds: Normal breath sounds. No wheezing.   Musculoskeletal:         General: Tenderness present.      Right lower leg: No edema.      Left lower leg: No edema.      Comments: Decreased range of joint motion in left shoulder with abduction or internal rotation.  Tenderness noted to left shoulder superior deltoid area   Skin:     General: Skin is warm and dry.      Findings: No rash.   Neurological:      Mental Status: She is alert and oriented to person, place, and time.   Psychiatric:         Mood and Affect: Mood normal.         Behavior: Behavior normal.         Thought Content: Thought content normal.         The following data was reviewed by: ANH Medina on 07/23/2021:  CMP    CMP 4/2/21   Glucose 154 (A)   BUN 26 (A)   Creatinine 1.52 (A)   eGFR Non  Am 35 (A)   eGFR African Am 42 (A)   Sodium 138   Potassium 5.0   Chloride 97 (A)   Calcium 10.8 (A)   Total Protein 7.1   Albumin 4.70   Globulin 2.4   Total Bilirubin 0.3   Alkaline Phosphatase 103   AST (SGOT) 98 (A)   ALT (SGPT) 75 (A)   (A) Abnormal value       Comments are available for some flowsheets but are not being displayed.                  Assessment/Plan     Diagnoses and all orders for this visit:    1. Acute bursitis of left shoulder (Primary)  Comments:  Left shoulder intra-articular injection performed today    2. Essential hypertension  Comments:  Continue " chlorthalidone losartan and metoprolol  Continue to monitor as it is likely elevated today due to pain    3. Primary osteoarthritis involving multiple joints  Comments:  Continue Tylenol  Treat left shoulder exacerbation pain today      Procedure: Left shoulder intra-articular injection  Provider: Kimmy Vela PA-C  Indication: Left shoulder bursitis  Timeout was taken to verify correct patient, location, and procedure  Description: The left shoulder was prepped and draped in sterile fashion.  An intra-articular injection was given using 3 cc 1% lidocaine, 1 cc of triamcinolone, and 1 cc of Depo-Medrol 40 g male.  Pressure without erythema.  Blood loss:  Patient tolerated seizure well    Lidocaine 1% 10 mg/mL  NDC number 31608-234-82  Lot #[id number]  Expiration 5/22    Methylprednisone 40 mg/mL  NDC number 6750-5391-71  Lot number [id number]  Expiration 12/21    Triamcinolone 40 mg/mL  NDC number 0483-5082-94  Lot #[id number]  Expiration 2/2023        Patient was given instructions and counseling regarding his condition or for health maintenance advice. Please see specific information pulled into the AVS if appropriate      This document has been electronically signed by:  Kimmy Vela PA-C

## 2021-07-30 RX ORDER — ACYCLOVIR 800 MG/1
800 TABLET ORAL 3 TIMES DAILY
Qty: 6 TABLET | Refills: 0 | Status: SHIPPED | OUTPATIENT
Start: 2021-07-30 | End: 2021-09-07 | Stop reason: SDUPTHER

## 2021-08-20 ENCOUNTER — OFFICE VISIT (OUTPATIENT)
Dept: FAMILY MEDICINE CLINIC | Facility: CLINIC | Age: 61
End: 2021-08-20

## 2021-08-20 VITALS
WEIGHT: 165 LBS | BODY MASS INDEX: 28.17 KG/M2 | HEART RATE: 89 BPM | HEIGHT: 64 IN | SYSTOLIC BLOOD PRESSURE: 148 MMHG | OXYGEN SATURATION: 98 % | TEMPERATURE: 97.1 F | DIASTOLIC BLOOD PRESSURE: 76 MMHG

## 2021-08-20 DIAGNOSIS — I25.118 CORONARY ARTERY DISEASE INVOLVING NATIVE CORONARY ARTERY OF NATIVE HEART WITH OTHER FORM OF ANGINA PECTORIS (HCC): Primary | Chronic | ICD-10-CM

## 2021-08-20 DIAGNOSIS — E11.65 TYPE 2 DIABETES MELLITUS WITH HYPERGLYCEMIA, WITH LONG-TERM CURRENT USE OF INSULIN (HCC): Chronic | ICD-10-CM

## 2021-08-20 DIAGNOSIS — Z79.4 TYPE 2 DIABETES MELLITUS WITH HYPERGLYCEMIA, WITH LONG-TERM CURRENT USE OF INSULIN (HCC): Chronic | ICD-10-CM

## 2021-08-20 DIAGNOSIS — E78.5 DYSLIPIDEMIA: Chronic | ICD-10-CM

## 2021-08-20 DIAGNOSIS — Z12.31 ENCOUNTER FOR SCREENING MAMMOGRAM FOR MALIGNANT NEOPLASM OF BREAST: ICD-10-CM

## 2021-08-20 DIAGNOSIS — E55.9 VITAMIN D DEFICIENCY: Chronic | ICD-10-CM

## 2021-08-20 DIAGNOSIS — I10 ESSENTIAL HYPERTENSION: Chronic | ICD-10-CM

## 2021-08-20 DIAGNOSIS — E78.2 MIXED HYPERLIPIDEMIA: ICD-10-CM

## 2021-08-20 PROCEDURE — 99214 OFFICE O/P EST MOD 30 MIN: CPT | Performed by: PHYSICIAN ASSISTANT

## 2021-08-20 RX ORDER — SITAGLIPTIN AND METFORMIN HYDROCHLORIDE 1000; 50 MG/1; MG/1
TABLET, FILM COATED ORAL
Qty: 60 TABLET | Refills: 5 | Status: SHIPPED | OUTPATIENT
Start: 2021-08-20 | End: 2022-02-21

## 2021-08-20 RX ORDER — NITROGLYCERIN 0.4 MG/1
0.4 TABLET SUBLINGUAL
Qty: 30 TABLET | Refills: 5 | Status: SHIPPED | OUTPATIENT
Start: 2021-08-20

## 2021-08-20 RX ORDER — NITROGLYCERIN 0.4 MG/1
0.4 TABLET SUBLINGUAL
Qty: 75 TABLET | Refills: 3 | Status: CANCELLED | OUTPATIENT
Start: 2021-08-20

## 2021-08-21 DIAGNOSIS — F41.9 ANXIETY: Chronic | ICD-10-CM

## 2021-08-22 NOTE — PATIENT INSTRUCTIONS
"Fat and Cholesterol Restricted Eating Plan  Getting too much fat and cholesterol in your diet may cause health problems. Choosing the right foods helps keep your fat and cholesterol at normal levels. This can keep you from getting certain diseases.  Your doctor may recommend an eating plan that includes:  · Total fat: ______% or less of total calories a day.  · Saturated fat: ______% or less of total calories a day.  · Cholesterol: less than _________mg a day.  · Fiber: ______g a day.  What are tips for following this plan?  Meal planning  · At meals, divide your plate into four equal parts:  ? Fill one-half of your plate with vegetables and green salads.  ? Fill one-fourth of your plate with whole grains.  ? Fill one-fourth of your plate with low-fat (lean) protein foods.  · Eat fish that is high in omega-3 fats at least two times a week. This includes mackerel, tuna, sardines, and salmon.  · Eat foods that are high in fiber, such as whole grains, beans, apples, broccoli, carrots, peas, and barley.  General tips    · Work with your doctor to lose weight if you need to.  · Avoid:  ? Foods with added sugar.  ? Fried foods.  ? Foods with partially hydrogenated oils.  · Limit alcohol intake to no more than 1 drink a day for nonpregnant women and 2 drinks a day for men. One drink equals 12 oz of beer, 5 oz of wine, or 1½ oz of hard liquor.  Reading food labels  · Check food labels for:  ? Trans fats.  ? Partially hydrogenated oils.  ? Saturated fat (g) in each serving.  ? Cholesterol (mg) in each serving.  ? Fiber (g) in each serving.  · Choose foods with healthy fats, such as:  ? Monounsaturated fats.  ? Polyunsaturated fats.  ? Omega-3 fats.  · Choose grain products that have whole grains. Look for the word \"whole\" as the first word in the ingredient list.  Cooking  · Cook foods using low-fat methods. These include baking, boiling, grilling, and broiling.  · Eat more home-cooked foods. Eat at restaurants and buffets " less often.  · Avoid cooking using saturated fats, such as butter, cream, palm oil, palm kernel oil, and coconut oil.  Recommended foods    Fruits  · All fresh, canned (in natural juice), or frozen fruits.  Vegetables  · Fresh or frozen vegetables (raw, steamed, roasted, or grilled). Green salads.  Grains  · Whole grains, such as whole wheat or whole grain breads, crackers, cereals, and pasta. Unsweetened oatmeal, bulgur, barley, quinoa, or brown rice. Corn or whole wheat flour tortillas.  Meats and other protein foods  · Ground beef (85% or leaner), grass-fed beef, or beef trimmed of fat. Skinless chicken or turkey. Ground chicken or turkey. Pork trimmed of fat. All fish and seafood. Egg whites. Dried beans, peas, or lentils. Unsalted nuts or seeds. Unsalted canned beans. Nut butters without added sugar or oil.  Dairy  · Low-fat or nonfat dairy products, such as skim or 1% milk, 2% or reduced-fat cheeses, low-fat and fat-free ricotta or cottage cheese, or plain low-fat and nonfat yogurt.  Fats and oils  · Tub margarine without trans fats. Light or reduced-fat mayonnaise and salad dressings. Avocado. Olive, canola, sesame, or safflower oils.  The items listed above may not be a complete list of foods and beverages you can eat. Contact a dietitian for more information.  Foods to avoid  Fruits  · Canned fruit in heavy syrup. Fruit in cream or butter sauce. Fried fruit.  Vegetables  · Vegetables cooked in cheese, cream, or butter sauce. Fried vegetables.  Grains  · White bread. White pasta. White rice. Cornbread. Bagels, pastries, and croissants. Crackers and snack foods that contain trans fat and hydrogenated oils.  Meats and other protein foods  · Fatty cuts of meat. Ribs, chicken wings, villar, sausage, bologna, salami, chitterlings, fatback, hot dogs, bratwurst, and packaged lunch meats. Liver and organ meats. Whole eggs and egg yolks. Chicken and turkey with skin. Fried meat.  Dairy  · Whole or 2% milk, cream,  "half-and-half, and cream cheese. Whole milk cheeses. Whole-fat or sweetened yogurt. Full-fat cheeses. Nondairy creamers and whipped toppings. Processed cheese, cheese spreads, and cheese curds.  Beverages  · Alcohol. Sugar-sweetened drinks such as sodas, lemonade, and fruit drinks.  Fats and oils  · Butter, stick margarine, lard, shortening, ghee, or villar fat. Coconut, palm kernel, and palm oils.  Sweets and desserts  · Corn syrup, sugars, honey, and molasses. Candy. Jam and jelly. Syrup. Sweetened cereals. Cookies, pies, cakes, donuts, muffins, and ice cream.  The items listed above may not be a complete list of foods and beverages you should avoid. Contact a dietitian for more information.  Summary  · Choosing the right foods helps keep your fat and cholesterol at normal levels. This can keep you from getting certain diseases.  · At meals, fill one-half of your plate with vegetables and green salads.  · Eat high-fiber foods, like whole grains, beans, apples, carrots, peas, and barley.  · Limit added sugar, saturated fats, alcohol, and fried foods.  This information is not intended to replace advice given to you by your health care provider. Make sure you discuss any questions you have with your health care provider.  Document Revised: 08/21/2019 Document Reviewed: 09/04/2018  [email or web address] Patient Education © 2021 [email or web address]      https://www.diabeteseducator.org/docs/default-source/living-with-diabetes/conquering-the-grocery-store-v1.pdf?sfvrsn=4\">   Carbohydrate Counting for Diabetes Mellitus, Adult  Carbohydrate counting is a method of keeping track of how many carbohydrates you eat. Eating carbohydrates naturally increases the amount of sugar (glucose) in the blood. Counting how many carbohydrates you eat improves your blood glucose control, which helps you manage your diabetes.  It is important to know how many carbohydrates you can safely have in each meal. This is different for every person. A dietitian can " help you make a meal plan and calculate how many carbohydrates you should have at each meal and snack.  What foods contain carbohydrates?  Carbohydrates are found in the following foods:  · Grains, such as breads and cereals.  · Dried beans and soy products.  · Starchy vegetables, such as potatoes, peas, and corn.  · Fruit and fruit juices.  · Milk and yogurt.  · Sweets and snack foods, such as cake, cookies, candy, chips, and soft drinks.  How do I count carbohydrates in foods?  There are two ways to count carbohydrates in food. You can read food labels or learn standard serving sizes of foods. You can use either of the methods or a combination of both.  Using the Nutrition Facts label  The Nutrition Facts list is included on the labels of almost all packaged foods and beverages in the [place] It includes:  · The serving size.  · Information about nutrients in each serving, including the grams (g) of carbohydrate per serving.  To use the Nutrition Facts:  · Decide how many servings you will have.  · Multiply the number of servings by the number of carbohydrates per serving.  · The resulting number is the total amount of carbohydrates that you will be having.  Learning the standard serving sizes of foods  When you eat carbohydrate foods that are not packaged or do not include Nutrition Facts on the label, you need to measure the servings in order to count the amount of carbohydrates.  · Measure the foods that you will eat with a food scale or measuring cup, if needed.  · Decide how many standard-size servings you will eat.  · Multiply the number of servings by 15. For foods that contain carbohydrates, one serving equals 15 g of carbohydrates.  ? For example, if you eat 2 cups or 10 oz (300 g) of strawberries, you will have eaten 2 servings and 30 g of carbohydrates (2 servings x 15 g = 30 g).  · For foods that have more than one food mixed, such as soups and casseroles, you must count the carbohydrates in each food  that is included.  The following list contains standard serving sizes of common carbohydrate-rich foods. Each of these servings has about 15 g of carbohydrates:  · 1 slice of bread.  · 1 six-inch (15 cm) tortilla.  · ? cup or 2 oz (53 g) cooked rice or pasta.  · ½ cup or 3 oz (85 g) cooked or canned, drained and rinsed beans or lentils.  · ½ cup or 3 oz (85 g) starchy vegetable, such as peas, corn, or squash.  · ½ cup or 4 oz (120 g) hot cereal.  · ½ cup or 3 oz (85 g) boiled or mashed potatoes, or ¼ or 3 oz (85 g) of a large baked potato.  · ½ cup or 4 fl oz (118 mL) fruit juice.  · 1 cup or 8 fl oz (237 mL) milk.  · 1 small or 4 oz (106 g) apple.  · ½ or 2 oz (63 g) of a medium banana.  · 1 cup or 5 oz (150 g) strawberries.  · 3 cups or 1 oz (24 g) popped popcorn.  What is an example of carbohydrate counting?  To calculate the number of carbohydrates in this sample meal, follow the steps shown below.  Sample meal  · 3 oz (85 g) chicken breast.  · ? cup or 4 oz (106 g) brown rice.  · ½ cup or 3 oz (85 g) corn.  · 1 cup or 8 fl oz (237 mL) milk.  · 1 cup or 5 oz (150 g) strawberries with sugar-free whipped topping.  Carbohydrate calculation  1. Identify the foods that contain carbohydrates:  ? Rice.  ? Corn.  ? Milk.  ? Strawberries.  2. Calculate how many servings you have of each food:  ? 2 servings rice.  ? 1 serving corn.  ? 1 serving milk.  ? 1 serving strawberries.  3. Multiply each number of servings by 15 g:  ? 2 servings rice x 15 g = 30 g.  ? 1 serving corn x 15 g = 15 g.  ? 1 serving milk x 15 g = 15 g.  ? 1 serving strawberries x 15 g = 15 g.  4. Add together all of the amounts to find the total grams of carbohydrates eaten:  ? 30 g + 15 g + 15 g + 15 g = 75 g of carbohydrates total.  What are tips for following this plan?  Shopping  · Develop a meal plan and then make a shopping list.  · Buy fresh and frozen vegetables, fresh and frozen fruit, dairy, eggs, beans, lentils, and whole grains.  · Look at  food labels. Choose foods that have more fiber and less sugar.  · Avoid processed foods and foods with added sugars.  Meal planning  · Aim to have the same amount of carbohydrates at each meal and for each snack time.  · Plan to have regular, balanced meals and snacks.  Where to find more information  · American Diabetes Association: www.diabetes.org  · Centers for Disease Control and Prevention: www.cdc.gov  Summary  · Carbohydrate counting is a method of keeping track of how many carbohydrates you eat.  · Eating carbohydrates naturally increases the amount of sugar (glucose) in the blood.  · Counting how many carbohydrates you eat improves your blood glucose control, which helps you manage your diabetes.  · A dietitian can help you make a meal plan and calculate how many carbohydrates you should have at each meal and snack.  This information is not intended to replace advice given to you by your health care provider. Make sure you discuss any questions you have with your health care provider.  Document Revised: 12/17/2020 Document Reviewed: 12/18/2020  [employer] Patient Education © 2021 [employer]

## 2021-08-22 NOTE — PROGRESS NOTES
"Subjective   Monisha Gardner is a 60 y.o. female.       Chief Complaint -  Coronary artery disease    History of Present Illness -    ROS    CAD- stable with risk factor modification including repatha and fenofibrate    Vitamin D deficiency-  Stable with vit d suplementation    Diabetes-  Stable with Janumet and lantus    Dyslipidemia-  Stable with repatha, fenofibrate and diet    Hypertension-  Stable with losartan and metoprolol    The following portions of the patient's history were reviewed and updated as appropriate: allergies, current medications, past family history, past medical history, past social history, past surgical history and problem list.    Review of Systems   Constitutional: Negative for activity change, appetite change, fatigue and fever.   HENT: Negative for ear pain, sinus pressure and sore throat.    Eyes: Negative for pain and visual disturbance.   Respiratory: Negative for cough and chest tightness.    Cardiovascular: Negative for chest pain and palpitations.   Gastrointestinal: Negative for abdominal pain, constipation, diarrhea, nausea and vomiting.   Endocrine: Negative for polydipsia and polyuria.   Genitourinary: Negative for dysuria and frequency.   Musculoskeletal: Negative for back pain and myalgias.   Skin: Negative for color change and rash.   Allergic/Immunologic: Negative for food allergies and immunocompromised state.   Neurological: Negative for dizziness, syncope and headaches.   Hematological: Negative for adenopathy. Does not bruise/bleed easily.   Psychiatric/Behavioral: Negative for hallucinations and suicidal ideas. The patient is not nervous/anxious.        Objective  Vital signs:  /76   Pulse 89   Temp 97.1 °F (36.2 °C) (Temporal)   Ht 162.6 cm (64\")   Wt 74.8 kg (165 lb)   LMP  (LMP Unknown)   SpO2 98%   BMI 28.32 kg/m²     Physical Exam  Vitals and nursing note reviewed.   Constitutional:       Appearance: Normal appearance. She is well-developed. She is " obese.   HENT:      Head: Normocephalic and atraumatic.      Right Ear: Tympanic membrane normal.      Left Ear: Tympanic membrane normal.      Nose: Nose normal.      Mouth/Throat:      Mouth: Mucous membranes are moist.      Pharynx: No oropharyngeal exudate or posterior oropharyngeal erythema.   Eyes:      Extraocular Movements: Extraocular movements intact.      Conjunctiva/sclera: Conjunctivae normal.   Neck:      Thyroid: No thyromegaly.      Trachea: No tracheal deviation.   Cardiovascular:      Rate and Rhythm: Normal rate and regular rhythm.      Heart sounds: Normal heart sounds. No murmur heard.     Pulmonary:      Effort: Pulmonary effort is normal. No respiratory distress.      Breath sounds: Normal breath sounds. No wheezing.   Abdominal:      General: Bowel sounds are normal.      Palpations: Abdomen is soft.      Tenderness: There is no abdominal tenderness. There is no guarding.   Musculoskeletal:         General: No tenderness. Normal range of motion.      Cervical back: Normal range of motion and neck supple.   Lymphadenopathy:      Cervical: No cervical adenopathy.   Skin:     General: Skin is warm and dry.      Findings: No rash.   Neurological:      General: No focal deficit present.      Mental Status: She is alert and oriented to person, place, and time.   Psychiatric:         Mood and Affect: Mood normal.         Behavior: Behavior normal.         Thought Content: Thought content normal.         The following data was reviewed by: ANH Medina on 08/20/2021:  CMP    CMP 4/2/21   Glucose 154 (A)   BUN 26 (A)   Creatinine 1.52 (A)   eGFR Non  Am 35 (A)   eGFR African Am 42 (A)   Sodium 138   Potassium 5.0   Chloride 97 (A)   Calcium 10.8 (A)   Total Protein 7.1   Albumin 4.70   Globulin 2.4   Total Bilirubin 0.3   Alkaline Phosphatase 103   AST (SGOT) 98 (A)   ALT (SGPT) 75 (A)   (A) Abnormal value       Comments are available for some flowsheets but are not being displayed.            CBC w/diff    CBC w/Diff 4/2/21   WBC 8.11   RBC 4.81   Hemoglobin 13.1   Hematocrit 39.8   MCV 82.7   MCH 27.2   MCHC 32.9   RDW 15.4   Platelets 407   Neutrophil Rel % CANCELED   Lymphocyte Rel % CANCELED   Monocyte Rel % CANCELED   Eosinophil Rel % CANCELED      Comments are available for some flowsheets but are not being displayed.           Lipid Panel    Lipid Panel 4/2/21   Total Cholesterol 226 (A)   Triglycerides 531 (A)   HDL Cholesterol 27 (A)   VLDL Cholesterol 92 (A)   LDL Cholesterol  107 (A)   (A) Abnormal value       Comments are available for some flowsheets but are not being displayed.           TSH    TSH 4/2/21   TSH 0.870           Most Recent A1C    HGBA1C Most Recent 4/2/21   Hemoglobin A1C 7.60 (A)   (A) Abnormal value       Comments are available for some flowsheets but are not being displayed.                  Assessment/Plan     Diagnoses and all orders for this visit:    1. Coronary artery disease involving native coronary artery of native heart with other form of angina pectoris (CMS/[facility]) (Primary)  Comments:  continue risk factor modification including repatha and fenofibrate  Orders:  -     nitroglycerin (Nitrostat) 0.4 MG SL tablet; Place 1 tablet under the tongue Every 5 (Five) Minutes As Needed for Chest Pain.  Dispense: 30 tablet; Refill: 5  -     Comprehensive Metabolic Panel; Future  -     TSH; Future  -     CBC & Differential; Future    2. Encounter for screening mammogram for malignant neoplasm of breast  -     Mammo Screening Digital Tomosynthesis Bilateral With CAD; Future    3. Vitamin D deficiency  Comments:  continue vit d supplementation  Orders:  -     Vitamin D 25 Hydroxy; Future    4. Type 2 diabetes mellitus with hyperglycemia, with long-term current use of insulin (CMS/[facility])  Comments:  advised low carb diet  continue lantus and janumet  Orders:  -     Hemoglobin A1c; Future  -     MicroAlbumin, Urine, Random - Urine, Clean Catch; Future    5.  Dyslipidemia  Comments:  advised low cholesterol diet    6. Essential hypertension  Comments:  continue losartan and metoprolol  continue to monitor  Orders:  -     TSH; Future    7. Mixed hyperlipidemia  -     Lipid Panel; Future            Patient was given instructions and counseling regarding his condition or for health maintenance advice. Please see specific information pulled into the AVS if appropriate      This document has been electronically signed by:  Kimmy Vela PA-C

## 2021-08-23 RX ORDER — CARIPRAZINE 1.5 MG/1
CAPSULE, GELATIN COATED ORAL
Qty: 30 CAPSULE | Refills: 5 | Status: SHIPPED | OUTPATIENT
Start: 2021-08-23 | End: 2021-09-07 | Stop reason: SDUPTHER

## 2021-08-27 ENCOUNTER — OFFICE VISIT (OUTPATIENT)
Dept: FAMILY MEDICINE CLINIC | Facility: CLINIC | Age: 61
End: 2021-08-27

## 2021-08-27 VITALS
TEMPERATURE: 97.3 F | DIASTOLIC BLOOD PRESSURE: 74 MMHG | OXYGEN SATURATION: 99 % | HEIGHT: 64 IN | WEIGHT: 165 LBS | HEART RATE: 87 BPM | BODY MASS INDEX: 28.17 KG/M2 | SYSTOLIC BLOOD PRESSURE: 144 MMHG

## 2021-08-27 DIAGNOSIS — I25.118 CORONARY ARTERY DISEASE INVOLVING NATIVE CORONARY ARTERY OF NATIVE HEART WITH OTHER FORM OF ANGINA PECTORIS (HCC): Chronic | ICD-10-CM

## 2021-08-27 DIAGNOSIS — E11.65 TYPE 2 DIABETES MELLITUS WITH HYPERGLYCEMIA, WITH LONG-TERM CURRENT USE OF INSULIN (HCC): Chronic | ICD-10-CM

## 2021-08-27 DIAGNOSIS — F32.A ANXIETY AND DEPRESSION: Chronic | ICD-10-CM

## 2021-08-27 DIAGNOSIS — G62.89 SMALL FIBER POLYNEUROPATHY: Chronic | ICD-10-CM

## 2021-08-27 DIAGNOSIS — F41.9 ANXIETY AND DEPRESSION: Chronic | ICD-10-CM

## 2021-08-27 DIAGNOSIS — Z79.4 TYPE 2 DIABETES MELLITUS WITH HYPERGLYCEMIA, WITH LONG-TERM CURRENT USE OF INSULIN (HCC): Chronic | ICD-10-CM

## 2021-08-27 DIAGNOSIS — N30.91 CYSTITIS WITH HEMATURIA: Primary | ICD-10-CM

## 2021-08-27 PROCEDURE — 87186 SC STD MICRODIL/AGAR DIL: CPT | Performed by: PHYSICIAN ASSISTANT

## 2021-08-27 PROCEDURE — 87088 URINE BACTERIA CULTURE: CPT | Performed by: PHYSICIAN ASSISTANT

## 2021-08-27 PROCEDURE — 99214 OFFICE O/P EST MOD 30 MIN: CPT | Performed by: PHYSICIAN ASSISTANT

## 2021-08-27 PROCEDURE — 87086 URINE CULTURE/COLONY COUNT: CPT | Performed by: PHYSICIAN ASSISTANT

## 2021-08-27 RX ORDER — PHENAZOPYRIDINE HYDROCHLORIDE 100 MG/1
100 TABLET, FILM COATED ORAL 3 TIMES DAILY PRN
Qty: 30 TABLET | Refills: 0 | Status: SHIPPED | OUTPATIENT
Start: 2021-08-27 | End: 2021-11-19

## 2021-08-27 RX ORDER — SULFAMETHOXAZOLE AND TRIMETHOPRIM 800; 160 MG/1; MG/1
1 TABLET ORAL 2 TIMES DAILY
Qty: 20 TABLET | Refills: 0 | Status: SHIPPED | OUTPATIENT
Start: 2021-08-27 | End: 2021-09-17 | Stop reason: SDUPTHER

## 2021-08-29 LAB — BACTERIA SPEC AEROBE CULT: ABNORMAL

## 2021-08-29 RX ORDER — DULOXETIN HYDROCHLORIDE 30 MG/1
90 CAPSULE, DELAYED RELEASE ORAL DAILY
Qty: 90 CAPSULE | Refills: 5 | Status: SHIPPED | OUTPATIENT
Start: 2021-08-29 | End: 2021-09-07 | Stop reason: SDUPTHER

## 2021-08-29 NOTE — PATIENT INSTRUCTIONS
"https://www.diabeteseducator.org/docs/default-source/living-with-diabetes/conquering-the-grocery-store-v1.pdf?sfvrsn=4\">   Carbohydrate Counting for Diabetes Mellitus, Adult  Carbohydrate counting is a method of keeping track of how many carbohydrates you eat. Eating carbohydrates naturally increases the amount of sugar (glucose) in the blood. Counting how many carbohydrates you eat improves your blood glucose control, which helps you manage your diabetes.  It is important to know how many carbohydrates you can safely have in each meal. This is different for every person. A dietitian can help you make a meal plan and calculate how many carbohydrates you should have at each meal and snack.  What foods contain carbohydrates?  Carbohydrates are found in the following foods:  · Grains, such as breads and cereals.  · Dried beans and soy products.  · Starchy vegetables, such as potatoes, peas, and corn.  · Fruit and fruit juices.  · Milk and yogurt.  · Sweets and snack foods, such as cake, cookies, candy, chips, and soft drinks.  How do I count carbohydrates in foods?  There are two ways to count carbohydrates in food. You can read food labels or learn standard serving sizes of foods. You can use either of the methods or a combination of both.  Using the Nutrition Facts label  The Nutrition Facts list is included on the labels of almost all packaged foods and beverages in the [place] It includes:  · The serving size.  · Information about nutrients in each serving, including the grams (g) of carbohydrate per serving.  To use the Nutrition Facts:  · Decide how many servings you will have.  · Multiply the number of servings by the number of carbohydrates per serving.  · The resulting number is the total amount of carbohydrates that you will be having.  Learning the standard serving sizes of foods  When you eat carbohydrate foods that are not packaged or do not include Nutrition Facts on the label, you need to measure the " servings in order to count the amount of carbohydrates.  · Measure the foods that you will eat with a food scale or measuring cup, if needed.  · Decide how many standard-size servings you will eat.  · Multiply the number of servings by 15. For foods that contain carbohydrates, one serving equals 15 g of carbohydrates.  ? For example, if you eat 2 cups or 10 oz (300 g) of strawberries, you will have eaten 2 servings and 30 g of carbohydrates (2 servings x 15 g = 30 g).  · For foods that have more than one food mixed, such as soups and casseroles, you must count the carbohydrates in each food that is included.  The following list contains standard serving sizes of common carbohydrate-rich foods. Each of these servings has about 15 g of carbohydrates:  · 1 slice of bread.  · 1 six-inch (15 cm) tortilla.  · ? cup or 2 oz (53 g) cooked rice or pasta.  · ½ cup or 3 oz (85 g) cooked or canned, drained and rinsed beans or lentils.  · ½ cup or 3 oz (85 g) starchy vegetable, such as peas, corn, or squash.  · ½ cup or 4 oz (120 g) hot cereal.  · ½ cup or 3 oz (85 g) boiled or mashed potatoes, or ¼ or 3 oz (85 g) of a large baked potato.  · ½ cup or 4 fl oz (118 mL) fruit juice.  · 1 cup or 8 fl oz (237 mL) milk.  · 1 small or 4 oz (106 g) apple.  · ½ or 2 oz (63 g) of a medium banana.  · 1 cup or 5 oz (150 g) strawberries.  · 3 cups or 1 oz (24 g) popped popcorn.  What is an example of carbohydrate counting?  To calculate the number of carbohydrates in this sample meal, follow the steps shown below.  Sample meal  · 3 oz (85 g) chicken breast.  · ? cup or 4 oz (106 g) brown rice.  · ½ cup or 3 oz (85 g) corn.  · 1 cup or 8 fl oz (237 mL) milk.  · 1 cup or 5 oz (150 g) strawberries with sugar-free whipped topping.  Carbohydrate calculation  1. Identify the foods that contain carbohydrates:  ? Rice.  ? Corn.  ? Milk.  ? Strawberries.  2. Calculate how many servings you have of each food:  ? 2 servings rice.  ? 1 serving  corn.  ? 1 serving milk.  ? 1 serving strawberries.  3. Multiply each number of servings by 15 g:  ? 2 servings rice x 15 g = 30 g.  ? 1 serving corn x 15 g = 15 g.  ? 1 serving milk x 15 g = 15 g.  ? 1 serving strawberries x 15 g = 15 g.  4. Add together all of the amounts to find the total grams of carbohydrates eaten:  ? 30 g + 15 g + 15 g + 15 g = 75 g of carbohydrates total.  What are tips for following this plan?  Shopping  · Develop a meal plan and then make a shopping list.  · Buy fresh and frozen vegetables, fresh and frozen fruit, dairy, eggs, beans, lentils, and whole grains.  · Look at food labels. Choose foods that have more fiber and less sugar.  · Avoid processed foods and foods with added sugars.  Meal planning  · Aim to have the same amount of carbohydrates at each meal and for each snack time.  · Plan to have regular, balanced meals and snacks.  Where to find more information  · American Diabetes Association: www.diabetes.org  · Centers for Disease Control and Prevention: www.cdc.gov  Summary  · Carbohydrate counting is a method of keeping track of how many carbohydrates you eat.  · Eating carbohydrates naturally increases the amount of sugar (glucose) in the blood.  · Counting how many carbohydrates you eat improves your blood glucose control, which helps you manage your diabetes.  · A dietitian can help you make a meal plan and calculate how many carbohydrates you should have at each meal and snack.  This information is not intended to replace advice given to you by your health care provider. Make sure you discuss any questions you have with your health care provider.  Document Revised: 12/17/2020 Document Reviewed: 12/18/2020  Elsevier Patient Education © 2021 [employer]      Neuropathic Pain  Neuropathic pain is pain caused by damage to the nerves that are responsible for certain sensations in your body (sensory nerves). The pain can be caused by:  · Damage to the sensory nerves that send  signals to your spinal cord and brain (peripheral nervous system).  · Damage to the sensory nerves in your brain or spinal cord (central nervous system).  Neuropathic pain can make you more sensitive to pain. Even a minor sensation can feel very painful. This is usually a long-term condition that can be difficult to treat. The type of pain differs from person to person. It may:  · Start suddenly (acute), or it may develop slowly and last for a long time (chronic).  · Come and go as damaged nerves heal, or it may stay at the same level for years.  · Cause emotional distress, loss of sleep, and a lower quality of life.  What are the causes?  The most common cause of this condition is diabetes. Many other diseases and conditions can also cause neuropathic pain. Causes of neuropathic pain can be classified as:  · Toxic. This is caused by medicines and chemicals. The most common cause of toxic neuropathic pain is damage from cancer treatments (chemotherapy).  · Metabolic. This can be caused by:  ? Diabetes. This is the most common disease that damages the nerves.  ? Lack of vitamin B from long-term alcohol abuse.  · Traumatic. Any injury that cuts, crushes, or stretches a nerve can cause damage and pain. A common example is feeling pain after losing an arm or leg (phantom limb pain).  · Compression-related. If a sensory nerve gets trapped or compressed for a long period of time, the blood supply to the nerve can be cut off.  · Vascular. Many blood vessel diseases can cause neuropathic pain by decreasing blood supply and oxygen to nerves.  · Autoimmune. This type of pain results from diseases in which the body's defense system (immune system) mistakenly attacks sensory nerves. Examples of autoimmune diseases that can cause neuropathic pain include lupus and multiple sclerosis.  · Infectious. Many types of viral infections can damage sensory nerves and cause pain. Shingles infection is a common cause of this type of  pain.  · Inherited. Neuropathic pain can be a symptom of many diseases that are passed down through families (genetic).  What increases the risk?  You are more likely to develop this condition if:  · You have diabetes.  · You smoke.  · You drink too much alcohol.  · You are taking certain medicines, including medicines that kill cancer cells (chemotherapy) or that treat immune system disorders.  What are the signs or symptoms?  The main symptom is pain. Neuropathic pain is often described as:  · Burning.  · Shock-like.  · Stinging.  · Hot or cold.  · Itching.  How is this diagnosed?  No single test can diagnose neuropathic pain. It is diagnosed based on:  · Physical exam and your symptoms. Your health care provider will ask you about your pain. You may be asked to use a pain scale to describe how bad your pain is.  · Tests. These may be done to see if you have a high sensitivity to pain and to help find the cause and location of any sensory nerve damage. They include:  ? Nerve conduction studies to test how well nerve signals travel through your sensory nerves (electrodiagnostic testing).  ? Stimulating your sensory nerves through electrodes on your skin and measuring the response in your spinal cord and brain (somatosensory evoked potential).  · Imaging studies, such as:  ? X-rays.  ? CT scan.  ? MRI.  How is this treated?  Treatment for neuropathic pain may change over time. You may need to try different treatment options or a combination of treatments. Some options include:  · Treating the underlying cause of the neuropathy, such as diabetes, kidney disease, or vitamin deficiencies.  · Stopping medicines that can cause neuropathy, such as chemotherapy.  · Medicine to relieve pain. Medicines may include:  ? Prescription or over-the-counter pain medicine.  ? Anti-seizure medicine.  ? Antidepressant medicines.  ? Pain-relieving patches that are applied to painful areas of skin.  ? A medicine to numb the area (local  anesthetic), which can be injected as a nerve block.  · Transcutaneous nerve stimulation. This uses electrical currents to block painful nerve signals. The treatment is painless.  · Alternative treatments, such as:  ? Acupuncture.  ? Meditation.  ? Massage.  ? Physical therapy.  ? Pain management programs.  ? Counseling.  Follow these instructions at home:  Medicines    · Take over-the-counter and prescription medicines only as told by your health care provider.  · Do not drive or use heavy machinery while taking prescription pain medicine.  · If you are taking prescription pain medicine, take actions to prevent or treat constipation. Your health care provider may recommend that you:  ? Drink enough fluid to keep your urine pale yellow.  ? Eat foods that are high in fiber, such as fresh fruits and vegetables, whole grains, and beans.  ? Limit foods that are high in fat and processed sugars, such as fried or sweet foods.  ? Take an over-the-counter or prescription medicine for constipation.  Lifestyle    · Have a good support system at home.  · Consider joining a chronic pain support group.  · Do not use any products that contain nicotine or tobacco, such as cigarettes and e-cigarettes. If you need help quitting, ask your health care provider.  · Do not drink alcohol.  General instructions  · Learn as much as you can about your condition.  · Work closely with all your health care providers to find the treatment plan that works best for you.  · Ask your health care provider what activities are safe for you.  · Keep all follow-up visits as told by your health care provider. This is important.  Contact a health care provider if:  · Your pain treatments are not working.  · You are having side effects from your medicines.  · You are struggling with tiredness (fatigue), mood changes, depression, or anxiety.  Summary  · Neuropathic pain is pain caused by damage to the nerves that are responsible for certain sensations in  your body (sensory nerves).  · Neuropathic pain may come and go as damaged nerves heal, or it may stay at the same level for years.  · Neuropathic pain is usually a long-term condition that can be difficult to treat. Consider joining a chronic pain support group.  This information is not intended to replace advice given to you by your health care provider. Make sure you discuss any questions you have with your health care provider.  Document Revised: 04/09/2020 Document Reviewed: 01/04/2019  Elsevier Patient Education © 2021 [employer]

## 2021-09-03 DIAGNOSIS — K20.90 ESOPHAGITIS: ICD-10-CM

## 2021-09-03 RX ORDER — DEXLANSOPRAZOLE 60 MG/1
60 CAPSULE, DELAYED RELEASE ORAL DAILY
Qty: 90 CAPSULE | Refills: 3 | Status: SHIPPED | OUTPATIENT
Start: 2021-09-03 | End: 2021-09-07 | Stop reason: SDUPTHER

## 2021-09-07 DIAGNOSIS — M1A.09X0 IDIOPATHIC CHRONIC GOUT OF MULTIPLE SITES WITHOUT TOPHUS: ICD-10-CM

## 2021-09-07 DIAGNOSIS — K20.90 ESOPHAGITIS: ICD-10-CM

## 2021-09-07 DIAGNOSIS — G25.81 RLS (RESTLESS LEGS SYNDROME): ICD-10-CM

## 2021-09-07 DIAGNOSIS — E78.2 MIXED HYPERLIPIDEMIA: Chronic | ICD-10-CM

## 2021-09-07 DIAGNOSIS — I10 ESSENTIAL HYPERTENSION: ICD-10-CM

## 2021-09-07 DIAGNOSIS — F32.A ANXIETY AND DEPRESSION: Chronic | ICD-10-CM

## 2021-09-07 DIAGNOSIS — F41.9 ANXIETY: Chronic | ICD-10-CM

## 2021-09-07 DIAGNOSIS — G62.89 SMALL FIBER POLYNEUROPATHY: Chronic | ICD-10-CM

## 2021-09-07 DIAGNOSIS — F41.9 ANXIETY AND DEPRESSION: Chronic | ICD-10-CM

## 2021-09-07 DIAGNOSIS — R11.0 NAUSEA: ICD-10-CM

## 2021-09-08 RX ORDER — ONDANSETRON 4 MG/1
4 TABLET, FILM COATED ORAL EVERY 8 HOURS PRN
Qty: 40 TABLET | Refills: 0 | Status: SHIPPED | OUTPATIENT
Start: 2021-09-08 | End: 2021-11-19

## 2021-09-08 RX ORDER — DULOXETIN HYDROCHLORIDE 30 MG/1
90 CAPSULE, DELAYED RELEASE ORAL DAILY
Qty: 90 CAPSULE | Refills: 5 | Status: SHIPPED | OUTPATIENT
Start: 2021-09-08 | End: 2022-04-14 | Stop reason: SDUPTHER

## 2021-09-08 RX ORDER — LOSARTAN POTASSIUM 100 MG/1
100 TABLET ORAL DAILY
Qty: 90 TABLET | Refills: 3 | Status: SHIPPED | OUTPATIENT
Start: 2021-09-08 | End: 2022-08-05 | Stop reason: SDUPTHER

## 2021-09-08 RX ORDER — METOPROLOL TARTRATE 50 MG/1
50 TABLET, FILM COATED ORAL 2 TIMES DAILY
Qty: 180 TABLET | Refills: 3 | Status: SHIPPED | OUTPATIENT
Start: 2021-09-08 | End: 2022-08-05 | Stop reason: SDUPTHER

## 2021-09-08 RX ORDER — FLUOROURACIL 50 MG/G
CREAM TOPICAL
OUTPATIENT
Start: 2021-09-08

## 2021-09-08 RX ORDER — ROPINIROLE 2 MG/1
2 TABLET, FILM COATED ORAL NIGHTLY
Qty: 90 TABLET | Refills: 3 | Status: SHIPPED | OUTPATIENT
Start: 2021-09-08 | End: 2022-08-05 | Stop reason: SDUPTHER

## 2021-09-08 RX ORDER — PEN NEEDLE, DIABETIC 32GX 5/32"
1 NEEDLE, DISPOSABLE MISCELLANEOUS 3 TIMES DAILY
Qty: 300 EACH | Refills: 3 | Status: SHIPPED | OUTPATIENT
Start: 2021-09-08 | End: 2022-04-14 | Stop reason: SDUPTHER

## 2021-09-08 RX ORDER — FENOFIBRATE 160 MG/1
160 TABLET ORAL DAILY
Qty: 90 TABLET | Refills: 3 | Status: SHIPPED | OUTPATIENT
Start: 2021-09-08 | End: 2022-08-05 | Stop reason: SDUPTHER

## 2021-09-08 RX ORDER — LANCETS 30 GAUGE
EACH MISCELLANEOUS
Qty: 300 EACH | Refills: 3 | Status: SHIPPED | OUTPATIENT
Start: 2021-09-08 | End: 2022-04-14 | Stop reason: SDUPTHER

## 2021-09-08 RX ORDER — CETIRIZINE HYDROCHLORIDE 10 MG/1
10 TABLET ORAL DAILY
Qty: 90 TABLET | Refills: 3 | Status: SHIPPED | OUTPATIENT
Start: 2021-09-08 | End: 2022-04-14 | Stop reason: SDUPTHER

## 2021-09-08 RX ORDER — GABAPENTIN 300 MG/1
300 CAPSULE ORAL 3 TIMES DAILY
Qty: 90 CAPSULE | Refills: 2 | Status: CANCELLED | OUTPATIENT
Start: 2021-09-08

## 2021-09-08 RX ORDER — DEXLANSOPRAZOLE 60 MG/1
60 CAPSULE, DELAYED RELEASE ORAL DAILY
Qty: 90 CAPSULE | Refills: 3 | Status: SHIPPED | OUTPATIENT
Start: 2021-09-08 | End: 2022-08-05 | Stop reason: SDUPTHER

## 2021-09-08 RX ORDER — CHLORTHALIDONE 25 MG/1
25 TABLET ORAL DAILY
Qty: 90 TABLET | Refills: 3 | Status: SHIPPED | OUTPATIENT
Start: 2021-09-08 | End: 2021-11-03 | Stop reason: ALTCHOICE

## 2021-09-08 RX ORDER — ACYCLOVIR 800 MG/1
800 TABLET ORAL 3 TIMES DAILY
Qty: 6 TABLET | Refills: 0 | Status: SHIPPED | OUTPATIENT
Start: 2021-09-08 | End: 2021-12-02

## 2021-09-08 RX ORDER — ALLOPURINOL 100 MG/1
100 TABLET ORAL 2 TIMES DAILY
Qty: 180 TABLET | Refills: 3 | Status: SHIPPED | OUTPATIENT
Start: 2021-09-08 | End: 2022-08-05 | Stop reason: SDUPTHER

## 2021-09-12 RX ORDER — GABAPENTIN 300 MG/1
300 CAPSULE ORAL 3 TIMES DAILY
Qty: 90 CAPSULE | Refills: 2 | Status: SHIPPED | OUTPATIENT
Start: 2021-09-12 | End: 2022-01-13

## 2021-09-17 DIAGNOSIS — N30.91 CYSTITIS WITH HEMATURIA: ICD-10-CM

## 2021-09-17 RX ORDER — SULFAMETHOXAZOLE AND TRIMETHOPRIM 800; 160 MG/1; MG/1
1 TABLET ORAL 2 TIMES DAILY
Qty: 20 TABLET | Refills: 0 | Status: SHIPPED | OUTPATIENT
Start: 2021-09-17 | End: 2021-11-03

## 2021-11-03 ENCOUNTER — OFFICE VISIT (OUTPATIENT)
Dept: FAMILY MEDICINE CLINIC | Facility: CLINIC | Age: 61
End: 2021-11-03

## 2021-11-03 VITALS
HEART RATE: 84 BPM | TEMPERATURE: 96.4 F | BODY MASS INDEX: 27.66 KG/M2 | SYSTOLIC BLOOD PRESSURE: 126 MMHG | HEIGHT: 64 IN | OXYGEN SATURATION: 98 % | WEIGHT: 162 LBS | DIASTOLIC BLOOD PRESSURE: 78 MMHG

## 2021-11-03 DIAGNOSIS — R60.0 EDEMA OF RIGHT FOOT: ICD-10-CM

## 2021-11-03 DIAGNOSIS — M15.9 PRIMARY OSTEOARTHRITIS INVOLVING MULTIPLE JOINTS: Primary | ICD-10-CM

## 2021-11-03 PROCEDURE — 20610 DRAIN/INJ JOINT/BURSA W/O US: CPT | Performed by: NURSE PRACTITIONER

## 2021-11-03 PROCEDURE — 99214 OFFICE O/P EST MOD 30 MIN: CPT | Performed by: NURSE PRACTITIONER

## 2021-11-03 RX ORDER — TRIAMCINOLONE ACETONIDE 40 MG/ML
40 INJECTION, SUSPENSION INTRA-ARTICULAR; INTRAMUSCULAR ONCE
Status: COMPLETED | OUTPATIENT
Start: 2021-11-03 | End: 2021-11-03

## 2021-11-03 RX ORDER — ACETAMINOPHEN 500 MG
500 TABLET ORAL EVERY 6 HOURS PRN
COMMUNITY
End: 2023-01-20

## 2021-11-03 RX ORDER — METHYLPREDNISOLONE ACETATE 80 MG/ML
80 INJECTION, SUSPENSION INTRA-ARTICULAR; INTRALESIONAL; INTRAMUSCULAR; SOFT TISSUE ONCE
Status: COMPLETED | OUTPATIENT
Start: 2021-11-03 | End: 2021-11-03

## 2021-11-03 RX ORDER — LIDOCAINE HYDROCHLORIDE 10 MG/ML
3 INJECTION, SOLUTION INFILTRATION; PERINEURAL ONCE
Status: COMPLETED | OUTPATIENT
Start: 2021-11-03 | End: 2021-11-03

## 2021-11-03 RX ADMIN — METHYLPREDNISOLONE ACETATE 80 MG: 80 INJECTION, SUSPENSION INTRA-ARTICULAR; INTRALESIONAL; INTRAMUSCULAR; SOFT TISSUE at 14:57

## 2021-11-03 RX ADMIN — LIDOCAINE HYDROCHLORIDE 3 ML: 10 INJECTION, SOLUTION INFILTRATION; PERINEURAL at 14:55

## 2021-11-03 RX ADMIN — TRIAMCINOLONE ACETONIDE 40 MG: 40 INJECTION, SUSPENSION INTRA-ARTICULAR; INTRAMUSCULAR at 14:56

## 2021-11-03 NOTE — PROGRESS NOTES
Chief Complaint  Knee Pain (Right)    Subjective          Monisha Gardner is a 61 y.o. female who presents today to [facility] for initial evaluation for right knee pain.    HPI:   Knee Pain   There was no injury mechanism. The pain is present in the right knee. The quality of the pain is described as aching. The pain is at a severity of 8/10. The pain is moderate. The pain has been constant since onset. Associated symptoms include an inability to bear weight. The symptoms are aggravated by weight bearing. She has tried ice, rest, heat and acetaminophen for the symptoms. The treatment provided mild relief.   Patient states she has arthritis in other joints. Right knee she states feels stiff, and has limited range of motion in. She does not stand on her feet for prolonged period of time, her job is mostly a sitting job.  Right Foot Edema- patient states that her right foot has had increased swelling. She follows up with nephrology and cardiology as recommended and states that she seen the nephrologist on Friday and he stopped her chlorthalidone. She states that her kidney function has improved tremendously but she is concerned about the swelling in the right foot. She states the edema was occurring before she stopped the chlorthalidone.     Objective     Problem List:  Patient Active Problem List   Diagnosis   • CAD (coronary artery disease)   • Hypertension   • Dyslipidemia   • Type 2 diabetes mellitus with hyperglycemia, with long-term current use of insulin (HCC)   • Body mass index (bmi) 30.0-30.9, adult   • Osteoarthritis   • Atypical angina (HCC)   • Elevated liver enzymes   • Esophagitis   • Vitamin D deficiency   • Hx of pancreatitis   • Menopausal symptoms   • Vitamin B 12 deficiency   • Nocturnal leg cramps   • Anxiety   • Left arm weakness   • Chronic gout of multiple sites   • Squamous cell carcinoma in situ (SCCIS) of skin   • Mixed hyperlipidemia       Allergy:    Allergies   Allergen Reactions   • Ciprofloxacin Anaphylaxis   • Penicillins Shortness Of Breath   • Statins Myalgia   • Fish Oil [Omega-3 Fatty Acids] Nausea And Vomiting        Discontinued Medications:  Medications Discontinued During This Encounter   Medication Reason   • chlorthalidone (HYGROTON) 25 MG tablet Discontinued by another clinician   • sulfamethoxazole-trimethoprim (Bactrim DS) 800-160 MG per tablet *Therapy completed       Current Medications:   Current Outpatient Medications   Medication Sig Dispense Refill   • acetaminophen (TYLENOL) 500 MG tablet Take 500 mg by mouth Every 6 (Six) Hours As Needed for Mild Pain .     • acyclovir (ZOVIRAX) 800 MG tablet Take 1 tablet by mouth 3 (Three) Times a Day. 6 tablet 0   • allopurinol (ZYLOPRIM) 100 MG tablet Take 1 tablet by mouth 2 (Two) Times a Day. 180 tablet 3   • BD Pen Needle Raquel U/F 32G X 4 MM misc 1 Device by Other route 3 (Three) Times a Day. 300 each 3   • Cariprazine HCl (Vraylar) 1.5 MG capsule capsule Take 1 capsule by mouth Daily. 30 capsule 5   • cetirizine (zyrTEC) 10 MG tablet Take 1 tablet by mouth Daily. 90 tablet 3   • Dexilant 60 MG capsule Take 1 capsule by mouth Daily. 90 capsule 3   • diclofenac (VOLTAREN) 1 % gel gel Apply 4 g topically to the appropriate area as directed 4 (Four) Times a Day As Needed (joint pain). 300 g 5   • DULoxetine (CYMBALTA) 30 MG capsule Take 3 capsules by mouth Daily. 90 capsule 5   • Easy Touch Lancets 30G/Twist misc Use tid 300 each 3   • Evolocumab (REPATHA) solution prefilled syringe injection Inject 1 mL under the skin into the appropriate area as directed Every 14 (Fourteen) Days. 2 mL 5   • fenofibrate 160 MG tablet Take 1 tablet by mouth Daily. 90 tablet 3   • fluorouracil (EFUDEX) 5 % cream      • gabapentin (NEURONTIN) 300 MG capsule Take 1 capsule by mouth 3 (Three) Times a Day. 90 capsule 2   • isosorbide mononitrate (IMDUR) 60 MG 24 hr tablet TAKE 1 TABLET BY MOUTH DAILY. 90 tablet 2   •  Janumet  MG per tablet TAKE 1 TABLET BY MOUTH TWICE DAILY WITH MEALS 60 tablet 5   • Lantus SoloStar 100 UNIT/ML injection pen Inject 45 Units under the skin into the appropriate area as directed Daily. 6 pen 5   • lidocaine (XYLOCAINE) 5 % ointment Apply  topically to the appropriate area as directed Every 2 (Two) Hours As Needed for Mild Pain . 50 g 1   • losartan (COZAAR) 100 MG tablet Take 1 tablet by mouth Daily. 90 tablet 3   • metoprolol tartrate (LOPRESSOR) 50 MG tablet Take 1 tablet by mouth 2 (Two) Times a Day. 180 tablet 3   • mupirocin (Bactroban) 2 % ointment Apply  topically to the appropriate area as directed 3 (Three) Times a Day. 90 g 1   • nitroglycerin (Nitrostat) 0.4 MG SL tablet Place 1 tablet under the tongue Every 5 (Five) Minutes As Needed for Chest Pain. 30 tablet 5   • ondansetron (ZOFRAN) 4 MG tablet Take 1 tablet by mouth Every 8 (Eight) Hours As Needed for Nausea. 40 tablet 0   • phenazopyridine (Pyridium) 100 MG tablet Take 1 tablet by mouth 3 (Three) Times a Day As Needed for Bladder Spasms. 30 tablet 0   • rOPINIRole (REQUIP) 2 MG tablet Take 1 tablet by mouth Every Night. 90 tablet 3   • Semaglutide,0.25 or 0.5MG/DOS, (Ozempic, 0.25 or 0.5 MG/DOSE,) 2 MG/1.5ML solution pen-injector Inject 0.5 mg under the skin into the appropriate area as directed 1 (One) Time Per Week. 2 mL 5   • silver sulfadiazine (Silvadene) 1 % cream Apply  topically to the appropriate area as directed 2 (Two) Times a Day. (Patient taking differently: Apply  topically to the appropriate area as directed 2 (Two) Times a Day. As needed.) 400 g 0   • triamcinolone (KENALOG) 0.5 % cream Apply  topically to the appropriate area as directed 2 (Two) Times a Day. 60 g 1   • TRUE METRIX BLOOD GLUCOSE TEST test strip 1 each by Other route 3 (Three) Times a Day. 300 each 3     No current facility-administered medications for this visit.       Past Medical History:  Past Medical History:   Diagnosis Date   • Anxiety     • Arthritis    • Diabetes mellitus (HCC)    • Diverticulosis    • Gout    • Hyperlipidemia    • Hypertension    • Myocardial infarction (HCC)        Past Surgical History:  Past Surgical History:   Procedure Laterality Date   • APPENDECTOMY     • CARDIAC CATHETERIZATION     •  SECTION     • CHOLECYSTECTOMY     • CORONARY STENT PLACEMENT      Stenting of the RCA with 3.5 x 26 mm Resolute drug-eluting stent   • HYSTERECTOMY     • JOINT REPLACEMENT     • TONSILLECTOMY         Review of Systems:  Review of Systems   Constitutional: Negative.    HENT: Negative.    Eyes: Negative.    Respiratory: Negative.    Cardiovascular: Positive for leg swelling (right foot).   Gastrointestinal: Negative.    Genitourinary: Negative.    Musculoskeletal: Positive for arthralgias.   Skin: Negative.    Neurological: Negative.    Psychiatric/Behavioral: Negative.    All other systems reviewed and are negative.      Physical Exam:  Physical Exam  Vitals and nursing note reviewed.   Constitutional:       General: She is not in acute distress.     Appearance: Normal appearance. She is not ill-appearing.      Interventions: Face mask in place.   HENT:      Head: Normocephalic and atraumatic.      Nose: Nose normal.      Mouth/Throat:      Mouth: Mucous membranes are moist.      Pharynx: Oropharynx is clear.   Eyes:      Pupils: Pupils are equal, round, and reactive to light.   Cardiovascular:      Rate and Rhythm: Normal rate and regular rhythm.      Pulses: Normal pulses.   Pulmonary:      Effort: Pulmonary effort is normal. No respiratory distress.      Breath sounds: Normal breath sounds.   Abdominal:      Palpations: Abdomen is soft.   Musculoskeletal:         General: Normal range of motion.      Cervical back: Normal range of motion and neck supple.      Right knee: Crepitus present. No swelling. Tenderness present.   Skin:     General: Skin is warm and dry.      Capillary Refill: Capillary refill takes less than 2 seconds.  "  Neurological:      Mental Status: She is alert and oriented to person, place, and time.   Psychiatric:         Mood and Affect: Mood normal.         Behavior: Behavior normal.         Vital Signs:   /78 (BP Location: Right arm, Patient Position: Sitting)   Pulse 84   Temp 96.4 °F (35.8 °C)   Ht 162.6 cm (64\")   Wt 73.5 kg (162 lb)   SpO2 98%   BMI 27.81 kg/m²      Lab Results:   Office Visit on 08/27/2021   Component Date Value Ref Range Status   • Urine Culture 08/27/2021 >100,000 CFU/mL Escherichia coli*  Final       EKG Results:  No orders to display       Imaging Results:  No Images in the past 120 days found..              Assessment and Plan   Diagnoses and all orders for this visit:    1. Primary osteoarthritis involving multiple joints (Primary)  -     lidocaine (XYLOCAINE) 1 % injection 3 mL  -     triamcinolone acetonide (KENALOG-40) injection 40 mg  -     methylPREDNISolone acetate (DEPO-medrol) injection 80 mg   2.   Edema of right foot   R60.0 782.3        Comment: educated on use of compression stockings, avoiding diet with increased sodium, elevation of lower extremity throughout the day  Will not add or adjust diuretics at this time, patient is followed closely by nephrology who discontinue chlorthalidone recently, will continue to monitor and advise non-pharmacological interventions          Knee Joint Injection:  Risks/benefits/possible side effects of procedure including pain, bleeding, infection, steroid flare, failure of procedure/failure to alleviate symptoms, etc. discussed with patient. Consent signed and scanned into chart.     Procedure note: The area was prepped in the usual sterile manner using iodine. The needle was inserted into the right knee joint using the medial approach. The solution of 3 cc Lidocaine 1% without epinephrine and 80 mg Dexamethasone and 40 mg kenalog was injected. Band Aid applied. Minimal to no bleeding.     Follow-up: The patient tolerated the " procedure well without complications. They were able to walk well with no problems following procedure. Standard post-procedure care is explained and return precautions are given.      Meds ordered during this visit:  New Medications Ordered This Visit   Medications   • lidocaine (XYLOCAINE) 1 % injection 3 mL   • triamcinolone acetonide (KENALOG-40) injection 40 mg   • methylPREDNISolone acetate (DEPO-medrol) injection 80 mg       Patient Instructions:  Patient instructions given for the following visit diagnosis:    ICD-10-CM ICD-9-CM   1. Primary osteoarthritis involving multiple joints  M89.49 715.98   2. Edema of right foot  R60.0 782.3       Follow Up   Return if symptoms worsen or fail to improve.        This document has been electronically signed by TRUONG Alonzo  November 3, 2021 16:24 EDT    Patient was given instructions and counseling regarding her condition or for health maintenance advice. Please see specific information pulled into the AVS if appropriate.     Part of this note may be an electronic transcription/translation of spoken language to printed text using the Dragon Dictation System.

## 2021-11-03 NOTE — PATIENT INSTRUCTIONS
Acute Knee Pain, Adult  Acute knee pain is sudden and may be caused by damage, swelling, or irritation of the muscles and tissues that support the knee. Pain may result from:  · A fall.  · An injury to the knee from twisting motions.  · A hit to the knee.  · Infection.  Acute knee pain may go away on its own with time and rest. If it does not, your health care provider may order tests to find the cause of the pain. These may include:  · Imaging tests, such as an X-ray, MRI, CT scan, or ultrasound.  · Joint aspiration. In this test, fluid is removed from the knee and evaluated.  · Arthroscopy. In this test, a lighted tube is inserted into the knee and an image is projected onto a TV screen.  · Biopsy. In this test, a sample of tissue is removed from the body and studied under a microscope.  Follow these instructions at home:  If you have a knee sleeve or brace:    · Wear the knee sleeve or brace as told by your health care provider. Remove it only as told by your health care provider.  · Loosen it if your toes tingle, become numb, or turn cold and blue.  · Keep it clean.  · If the knee sleeve or brace is not waterproof:  ? Do not let it get wet.  ? Cover it with a watertight covering when you take a bath or shower.    Activity  · Rest your knee.  · Do not do things that cause pain or make pain worse.  · Avoid high-impact activities or exercises, such as running, jumping rope, or doing jumping jacks.  · Work with a physical therapist to make a safe exercise program, as recommended by your health care provider. Do exercises as told by your physical therapist.  Managing pain, stiffness, and swelling    · If directed, put ice on the affected knee. To do this:  ? If you have a removable knee sleeve or brace, remove it as told by your health care provider.  ? Put ice in a plastic bag.  ? Place a towel between your skin and the bag.  ? Leave the ice on for 20 minutes, 2-3 times a day.  ? Remove the ice if your skin turns  bright red. This is very important. If you cannot feel pain, heat, or cold, you have a greater risk of damage to the area.  · If directed, use an elastic bandage to put pressure (compression) on your injured knee. This may control swelling, give support, and help with discomfort.  · Raise (elevate) your knee above the level of your heart while you are sitting or lying down.  · Sleep with a pillow under your knee.    General instructions  · Take over-the-counter and prescription medicines only as told by your health care provider.  · Do not use any products that contain nicotine or tobacco, such as cigarettes, e-cigarettes, and chewing tobacco. If you need help quitting, ask your health care provider.  · If you are overweight, work with your health care provider and a dietitian to set a weight-loss goal that is healthy and reasonable for you. Extra weight can put pressure on your knee.  · Pay attention to any changes in your symptoms.  · Keep all follow-up visits. This is important.  Contact a health care provider if:  · Your knee pain continues, changes, or gets worse.  · You have a fever along with knee pain.  · Your knee feels warm to the touch or is red.  · Your knee jose or locks up.  Get help right away if:  · Your knee swells, and the swelling becomes worse.  · You cannot move your knee.  · You have severe pain in your knee that cannot be managed with pain medicine.  Summary  · Acute knee pain can be caused by a fall, an injury, an infection, or damage, swelling, or irritation of the tissues that support your knee.  · Your health care provider may perform tests to find out the cause of the pain.  · Pay attention to any changes in your symptoms. Relieve your pain with rest, medicines, light activity, and the use of ice.  · Get help right away if your knee swells, you cannot move your knee, or you have severe pain that cannot be managed with medicine.  This information is not intended to replace advice given  to you by your health care provider. Make sure you discuss any questions you have with your health care provider.  Document Revised: 06/02/2021 Document Reviewed: 06/02/2021  Elsevier Patient Education © 2021 [employer]

## 2021-11-12 ENCOUNTER — OFFICE VISIT (OUTPATIENT)
Dept: FAMILY MEDICINE CLINIC | Facility: CLINIC | Age: 61
End: 2021-11-12

## 2021-11-12 VITALS
HEART RATE: 88 BPM | DIASTOLIC BLOOD PRESSURE: 72 MMHG | TEMPERATURE: 98 F | BODY MASS INDEX: 26.98 KG/M2 | HEIGHT: 64 IN | SYSTOLIC BLOOD PRESSURE: 132 MMHG | OXYGEN SATURATION: 99 % | WEIGHT: 158 LBS

## 2021-11-12 DIAGNOSIS — Z79.4 TYPE 2 DIABETES MELLITUS WITH HYPERGLYCEMIA, WITH LONG-TERM CURRENT USE OF INSULIN (HCC): Chronic | ICD-10-CM

## 2021-11-12 DIAGNOSIS — I10 ESSENTIAL HYPERTENSION: Chronic | ICD-10-CM

## 2021-11-12 DIAGNOSIS — I25.118 CORONARY ARTERY DISEASE INVOLVING NATIVE CORONARY ARTERY OF NATIVE HEART WITH OTHER FORM OF ANGINA PECTORIS (HCC): Chronic | ICD-10-CM

## 2021-11-12 DIAGNOSIS — E11.65 TYPE 2 DIABETES MELLITUS WITH HYPERGLYCEMIA, WITH LONG-TERM CURRENT USE OF INSULIN (HCC): Chronic | ICD-10-CM

## 2021-11-12 DIAGNOSIS — M17.11 PRIMARY OSTEOARTHRITIS OF RIGHT KNEE: Primary | Chronic | ICD-10-CM

## 2021-11-12 DIAGNOSIS — E55.9 VITAMIN D DEFICIENCY: Chronic | ICD-10-CM

## 2021-11-12 PROCEDURE — 20610 DRAIN/INJ JOINT/BURSA W/O US: CPT | Performed by: PHYSICIAN ASSISTANT

## 2021-11-12 PROCEDURE — 99214 OFFICE O/P EST MOD 30 MIN: CPT | Performed by: PHYSICIAN ASSISTANT

## 2021-11-12 RX ORDER — LIDOCAINE 5% 5 G/100G
1 CREAM TOPICAL EVERY 4 HOURS PRN
Qty: 30 G | Refills: 5 | Status: SHIPPED | OUTPATIENT
Start: 2021-11-12 | End: 2022-04-14 | Stop reason: SDUPTHER

## 2021-11-13 LAB
25(OH)D3+25(OH)D2 SERPL-MCNC: 31.4 NG/ML (ref 30–100)
ALBUMIN SERPL-MCNC: 4.9 G/DL (ref 3.5–5.2)
ALBUMIN/GLOB SERPL: 2.1 G/DL
ALP SERPL-CCNC: 75 U/L (ref 39–117)
ALT SERPL-CCNC: 28 U/L (ref 1–33)
AST SERPL-CCNC: 31 U/L (ref 1–32)
BASOPHILS # BLD AUTO: 0.1 10*3/MM3 (ref 0–0.2)
BASOPHILS NFR BLD AUTO: 0.9 % (ref 0–1.5)
BILIRUB SERPL-MCNC: 0.3 MG/DL (ref 0–1.2)
BUN SERPL-MCNC: 13 MG/DL (ref 8–23)
BUN/CREAT SERPL: 14.6 (ref 7–25)
CALCIUM SERPL-MCNC: 9.9 MG/DL (ref 8.6–10.5)
CHLORIDE SERPL-SCNC: 104 MMOL/L (ref 98–107)
CHOLEST SERPL-MCNC: 227 MG/DL (ref 0–200)
CO2 SERPL-SCNC: 24.3 MMOL/L (ref 22–29)
CREAT SERPL-MCNC: 0.89 MG/DL (ref 0.57–1)
EOSINOPHIL # BLD AUTO: 0.21 10*3/MM3 (ref 0–0.4)
EOSINOPHIL NFR BLD AUTO: 2 % (ref 0.3–6.2)
ERYTHROCYTE [DISTWIDTH] IN BLOOD BY AUTOMATED COUNT: 16.1 % (ref 12.3–15.4)
GLOBULIN SER CALC-MCNC: 2.3 GM/DL
GLUCOSE SERPL-MCNC: 96 MG/DL (ref 65–99)
HBA1C MFR BLD: 5.7 % (ref 4.8–5.6)
HCT VFR BLD AUTO: 41.6 % (ref 34–46.6)
HDLC SERPL-MCNC: 43 MG/DL (ref 40–60)
HGB BLD-MCNC: 13.2 G/DL (ref 12–15.9)
IMM GRANULOCYTES # BLD AUTO: 0.08 10*3/MM3 (ref 0–0.05)
IMM GRANULOCYTES NFR BLD AUTO: 0.8 % (ref 0–0.5)
LDLC SERPL CALC-MCNC: 124 MG/DL (ref 0–100)
LYMPHOCYTES # BLD AUTO: 4.08 10*3/MM3 (ref 0.7–3.1)
LYMPHOCYTES NFR BLD AUTO: 38.6 % (ref 19.6–45.3)
MCH RBC QN AUTO: 27.7 PG (ref 26.6–33)
MCHC RBC AUTO-ENTMCNC: 31.7 G/DL (ref 31.5–35.7)
MCV RBC AUTO: 87.2 FL (ref 79–97)
MONOCYTES # BLD AUTO: 0.55 10*3/MM3 (ref 0.1–0.9)
MONOCYTES NFR BLD AUTO: 5.2 % (ref 5–12)
NEUTROPHILS # BLD AUTO: 5.56 10*3/MM3 (ref 1.7–7)
NEUTROPHILS NFR BLD AUTO: 52.5 % (ref 42.7–76)
NRBC BLD AUTO-RTO: 0 /100 WBC (ref 0–0.2)
PLATELET # BLD AUTO: 477 10*3/MM3 (ref 140–450)
POTASSIUM SERPL-SCNC: 4.3 MMOL/L (ref 3.5–5.2)
PROT SERPL-MCNC: 7.2 G/DL (ref 6–8.5)
RBC # BLD AUTO: 4.77 10*6/MM3 (ref 3.77–5.28)
SODIUM SERPL-SCNC: 141 MMOL/L (ref 136–145)
TRIGL SERPL-MCNC: 339 MG/DL (ref 0–150)
TSH SERPL DL<=0.005 MIU/L-ACNC: 1.46 UIU/ML (ref 0.27–4.2)
VLDLC SERPL CALC-MCNC: 60 MG/DL (ref 5–40)
WBC # BLD AUTO: 10.58 10*3/MM3 (ref 3.4–10.8)

## 2021-11-14 NOTE — PROGRESS NOTES
"Subjective   Monisha Gardner is a 61 y.o. female.       Chief Complaint -right knee pain    History of Present Illness -    ROS    Right knee pain-  Patient complains of pain in the right knee secondary to osteoarthritis.  She reports pain is moderate to severe aching and worse with ambulation and recent cold weather changes.    Vitamin D deficiency-stable with vitamin D supplementation    Diabetes mellitus type 2-stable with Lantus and Janumet    Coronary artery disease-stable with risk factor modification including Repatha and fenofibrate    Hypertension-stable with losartan and metoprolol        The following portions of the patient's history were reviewed and updated as appropriate: allergies, current medications, past family history, past medical history, past social history, past surgical history and problem list.    Review of Systems    Objective  Vital signs:  /72 (BP Location: Right arm, Patient Position: Sitting, Cuff Size: Adult)   Pulse 88   Temp 98 °F (36.7 °C) (Temporal)   Ht 162.6 cm (64\")   Wt 71.7 kg (158 lb)   LMP  (LMP Unknown)   SpO2 99%   BMI 27.12 kg/m²     Physical Exam  Vitals and nursing note reviewed.   Constitutional:       Appearance: Normal appearance. She is well-developed.   HENT:      Head: Normocephalic and atraumatic.      Right Ear: Tympanic membrane normal.      Left Ear: Tympanic membrane normal.      Nose: Nose normal.      Mouth/Throat:      Mouth: Mucous membranes are moist.      Pharynx: No oropharyngeal exudate or posterior oropharyngeal erythema.   Eyes:      Extraocular Movements: Extraocular movements intact.      Conjunctiva/sclera: Conjunctivae normal.   Neck:      Thyroid: No thyromegaly.      Trachea: No tracheal deviation.   Cardiovascular:      Rate and Rhythm: Normal rate and regular rhythm.      Heart sounds: Normal heart sounds. No murmur heard.      Pulmonary:      Effort: Pulmonary effort is normal. No respiratory distress.      Breath sounds: " Normal breath sounds. No wheezing.   Abdominal:      General: Bowel sounds are normal.      Palpations: Abdomen is soft.      Tenderness: There is no abdominal tenderness. There is no guarding.   Musculoskeletal:         General: Tenderness present.      Cervical back: Normal range of motion and neck supple.      Comments: Tenderness noted right knee with manipulation.  Localized swelling noted with coarse crepitus   Lymphadenopathy:      Cervical: No cervical adenopathy.   Skin:     General: Skin is warm and dry.      Findings: No rash.   Neurological:      General: No focal deficit present.      Mental Status: She is alert and oriented to person, place, and time.   Psychiatric:         Mood and Affect: Mood normal.         Behavior: Behavior normal.         Thought Content: Thought content normal.         The following data was reviewed by: ANH Medina on 11/12/2021:  CMP    CMP 4/2/21 11/12/21   Glucose 154 (A) 96   BUN 26 (A) 13   Creatinine 1.52 (A) 0.89   eGFR Non  Am 35 (A) 65   eGFR  Am 42 (A) 78   Sodium 138 141   Potassium 5.0 4.3   Chloride 97 (A) 104   Calcium 10.8 (A) 9.9   Total Protein 7.1 7.2   Albumin 4.70 4.90   Globulin 2.4 2.3   Total Bilirubin 0.3 0.3   Alkaline Phosphatase 103 75   AST (SGOT) 98 (A) 31   ALT (SGPT) 75 (A) 28   (A) Abnormal value       Comments are available for some flowsheets but are not being displayed.           CBC w/diff    CBC w/Diff 4/2/21 11/12/21   WBC 8.11 10.58   RBC 4.81 4.77   Hemoglobin 13.1 13.2   Hematocrit 39.8 41.6   MCV 82.7 87.2   MCH 27.2 27.7   MCHC 32.9 31.7   RDW 15.4 16.1 (A)   Platelets 407 477 (A)   Neutrophil Rel % CANCELED 52.5   Lymphocyte Rel % CANCELED 38.6   Monocyte Rel % CANCELED 5.2   Eosinophil Rel % CANCELED 2.0   Basophil Rel %  0.9   (A) Abnormal value       Comments are available for some flowsheets but are not being displayed.             TSH    TSH 4/2/21 11/12/21   TSH 0.870 1.460                   Assessment/Plan     Diagnoses and all orders for this visit:    1. Primary osteoarthritis of right knee (Primary)  Comments:  Right knee intra-articular injection performed today  Risks and benefits discussed with the patient and shared decision making  Orders:  -     Lidocaine 5 % cream; Apply 1 each topically Every 4 (Four) Hours As Needed (JOINT PAIN).  Dispense: 30 g; Refill: 5  -     Lipid panel  -     CBC w AUTO Differential    2. Vitamin D deficiency  Comments:  continue vit d supplementation  Orders:  -     Vitamin D 25 Hydroxy  -     Lipid panel  -     CBC w AUTO Differential    3. Type 2 diabetes mellitus with hyperglycemia, with long-term current use of insulin (HCC)  Comments:  advised low carb diet  continue lantus and janumet  Orders:  -     MicroAlbumin, Urine, Random - Urine, Clean Catch  -     Hemoglobin A1c  -     Lipid panel  -     CBC w AUTO Differential    4. Coronary artery disease involving native coronary artery of native heart with other form of angina pectoris (HCC)  Comments:  continue risk factor modification including repatha and fenofibrate  Orders:  -     TSH  -     Comprehensive Metabolic Panel  -     Lipid panel  -     CBC w AUTO Differential    5. Essential hypertension  Comments:  continue losartan and metoprolol  continue to monitor  Orders:  -     TSH  -     Lipid panel  -     CBC w AUTO Differential      Procedure: Right knee intra-articular injection  Provider: Kimmy Vela PA-C  Indication: Right knee pain secondary to acute exacerbation of osteoarthritis  Timeout was taken to verify correct patient procedure and location  Description:  The right knee was prepped and draped in sterile fashion.  An intra-articular injection was given using 3 cc 1% lidocaine, 1 cc of triamcinolone, and 1 cc of dexamethasone.  Pressure was held and sterile dressing was placed.  No complications  Estimated blood loss: Minimal  Patient tolerated procedure well    Lidocaine 1% 500 mg per 50  mL  NDC # 8422-5410-33  Lot #[id number]  Expiration 5/2023    Triamcinolone 40 mg/mL  NDC number 7858-1105-89  Lot number [id number]  Expiration February 2023    Dexamethasone 4 mg/mL  NDC number 48906-362-27 4  Lot #[id number]  Expiration 9/22        Patient was given instructions and counseling regarding his condition or for health maintenance advice. Please see specific information pulled into the AVS if appropriate      This document has been electronically signed by:  Kimmy Vela PA-C

## 2021-11-14 NOTE — PATIENT INSTRUCTIONS
Joint Steroid Injection  A joint steroid injection is a procedure to relieve swelling and pain in a joint. Steroids are medicines that reduce inflammation. In this procedure, your health care provider uses a syringe and a needle to inject a steroid medicine into a painful and inflamed joint. A pain-relieving medicine (anesthetic) may be injected along with the steroid. In some cases, your health care provider may use an imaging technique such as ultrasound or fluoroscopy to guide the injection.  Joints that are often treated with steroid injections include the knee, shoulder, hip, and spine. These injections may also be used in the elbow, ankle, and joints of the hands or feet. You may have joint steroid injections as part of your treatment for inflammation caused by:  · Gout.  · Rheumatoid arthritis.  · Advanced wear-and-tear arthritis (osteoarthritis).  · Tendinitis.  · Bursitis.  Joint steroid injections may be repeated, but having them too often can damage a joint or the skin over the joint. You should not have joint steroid injections less than 6 weeks apart or more than four times a year.  Tell a health care provider about:  · Any allergies you have.  · All medicines you are taking, including vitamins, herbs, eye drops, creams, and over-the-counter medicines.  · Any problems you or family members have had with anesthetic medicines.  · Any blood disorders you have.  · Any surgeries you have had.  · Any medical conditions you have.  · Whether you are pregnant or may be pregnant.  What are the risks?  Generally, this is a safe treatment. However, problems may occur, including:  · Infection.  · Bleeding.  · Allergic reactions to medicines.  · Damage to the joint or tissues around the joint.  · Thinning of skin or loss of skin color over the joint.  · Temporary flushing of the face or chest.  · Temporary increase in pain.  · Temporary increase in blood sugar.  · Failure to relieve inflammation or pain.  What  happens before the treatment?  Medicines  Ask your health care provider about:  · Changing or stopping your regular medicines. This is especially important if you are taking diabetes medicines or blood thinners.  · Taking medicines such as aspirin and ibuprofen. These medicines can thin your blood. Do not take these medicines unless your health care provider tells you to take them.  · Taking over-the-counter medicines, vitamins, herbs, and supplements.  General instructions  · You may have imaging tests of your joint.  · Ask your health care provider if you can drive yourself home after the procedure.  What happens during the treatment?    · Your health care provider will position you for the injection and locate the injection site over your joint.  · The skin over the joint will be cleaned with a germ-killing soap.  · Your health care provider may:  ? Spray a numbing solution (topical anesthetic) over the injection site.  ? Inject a local anesthetic under the skin above your joint.  · The needle will be placed through your skin into your joint. Your health care provider may use imaging to guide the needle to the right spot for the injection. If imaging is used, a special contrast dye may be injected to confirm that the needle is in the correct location.  · The steroid medicine will be injected into your joint.  · Anesthetic may be injected along with the steroid. This may be a medicine that relieves pain for a short time (short-acting anesthetic) or for a longer time (long-acting anesthetic).  · The needle will be removed, and an adhesive bandage (dressing) will be placed over the injection site.  The procedure may vary among health care providers and hospitals.  What can I expect after the treatment?  · You will be able to go home after the treatment.  · It is normal to feel slight flushing for a few days after the injection.  · After the treatment, it is common to have an increase in joint pain after the  anesthetic has worn off. This may happen about an hour after a short-acting anesthetic or about 8 hours after a longer-acting anesthetic.  · You should begin to feel relief from joint pain and swelling after 24 to 48 hours. Contact your health care provider if you do not begin to feel relief after 2 days.  Follow these instructions at home:  Injection site care  · Leave the adhesive dressing over your injection site in place until your health care provider says you can remove it.  · Check your injection site every day for signs of infection. Check for:  ? More redness, swelling, or pain.  ? Fluid or blood.  ? Warmth.  ? Pus or a bad smell.  Activity  · Return to your normal activities as told by your health care provider. Ask your health care provider what activities are safe for you. You may be asked to limit activities that put stress on the joint for a few days.  · Do joint exercises as told by your health care provider.  · Do not take baths, swim, or use a hot tub until your health care provider approves. Ask your health care provider if you may take showers. You may only be allowed to take sponge baths.  Managing pain, stiffness, and swelling    · If directed, put ice on the joint. To do this:  ? Put ice in a plastic bag.  ? Place a towel between your skin and the bag.  ? Leave the ice on for 20 minutes, 2-3 times a day.  ? Remove the ice if your skin turns bright red. This is very important. If you cannot feel pain, heat, or cold, you have a greater risk of damage to the area.  · Raise (elevate) your joint above the level of your heart when you are sitting or lying down.    General instructions  · Take over-the-counter and prescription medicines only as told by your health care provider.  · Do not use any products that contain nicotine or tobacco, such as cigarettes, e-cigarettes, and chewing tobacco. These can delay joint healing. If you need help quitting, ask your health care provider.  · If you have  diabetes, be aware that your blood sugar may be slightly elevated for several days after the injection.  · Keep all follow-up visits. This is important.  Contact a health care provider if you have:  · Chills or a fever.  · Any signs of infection at your injection site.  · Increased pain or swelling or no relief after 2 days.  Summary  · A joint steroid injection is a treatment to relieve pain and swelling in a joint.  · Steroids are medicines that reduce inflammation. Your health care provider may add an anesthetic along with the steroid.  · You may have joint steroid injections as part of your arthritis treatment.  · Joint steroid injections may be repeated, but having them too often can damage a joint or the skin over the joint.  · Contact your health care provider if you have a fever, chills, or signs of infection, or if you get no relief from joint pain or swelling.  This information is not intended to replace advice given to you by your health care provider. Make sure you discuss any questions you have with your health care provider.  Document Revised: 05/28/2021 Document Reviewed: 05/28/2021  Elsevier Patient Education © 2021 [employer]

## 2021-11-19 ENCOUNTER — OFFICE VISIT (OUTPATIENT)
Dept: FAMILY MEDICINE CLINIC | Facility: CLINIC | Age: 61
End: 2021-11-19

## 2021-11-19 VITALS
HEIGHT: 64 IN | TEMPERATURE: 97.1 F | OXYGEN SATURATION: 98 % | SYSTOLIC BLOOD PRESSURE: 142 MMHG | BODY MASS INDEX: 26.63 KG/M2 | DIASTOLIC BLOOD PRESSURE: 68 MMHG | HEART RATE: 89 BPM | WEIGHT: 156 LBS

## 2021-11-19 DIAGNOSIS — I10 PRIMARY HYPERTENSION: Chronic | ICD-10-CM

## 2021-11-19 DIAGNOSIS — K52.9 CHRONIC DIARRHEA: Primary | Chronic | ICD-10-CM

## 2021-11-19 DIAGNOSIS — E78.5 DYSLIPIDEMIA: Chronic | ICD-10-CM

## 2021-11-19 DIAGNOSIS — F41.9 ANXIETY: Chronic | ICD-10-CM

## 2021-11-19 PROCEDURE — 99214 OFFICE O/P EST MOD 30 MIN: CPT | Performed by: PHYSICIAN ASSISTANT

## 2021-11-19 RX ORDER — LIDOCAINE 50 MG/G
OINTMENT TOPICAL
COMMUNITY
Start: 2021-11-12 | End: 2022-04-14

## 2021-11-21 NOTE — PATIENT INSTRUCTIONS
"[email or web address]   Generalized Anxiety Disorder, Adult  Generalized anxiety disorder (INDIRA) is a mental health condition. Unlike normal worries, anxiety related to INDIRA is not triggered by a specific event. These worries do not fade or get better with time. INDIRA interferes with relationships, work, and school.  INDIRA symptoms can vary from mild to severe. People with severe INDIRA can have intense waves of anxiety with physical symptoms that are similar to panic attacks.  What are the causes?  The exact cause of INDIRA is not known, but the following are believed to have an impact:  · Differences in natural brain chemicals.  · Genes passed down from parents to children.  · Differences in the way threats are perceived.  · Development during childhood.  · Personality.  What increases the risk?  The following factors may make you more likely to develop this condition:  · Being female.  · Having a family history of anxiety disorders.  · Being very shy.  · Experiencing very stressful life events, such as the death of a loved one.  · Having a very stressful family environment.  What are the signs or symptoms?  People with INDIRA often worry excessively about many things in their lives, such as their health and family. Symptoms may also include:  · Mental and emotional symptoms:  ? Worrying excessively about natural disasters.  ? Fear of being late.  ? Difficulty concentrating.  ? Fears that others are judging your performance.  · Physical symptoms:  ? Fatigue.  ? Headaches, muscle tension, muscle twitches, trembling, or feeling shaky.  ? Feeling like your heart is pounding or beating very fast.  ? Feeling out of breath or like you cannot take a deep breath.  ? Having trouble falling asleep or staying asleep, or experiencing restlessness.  ? Sweating.  ? Nausea, diarrhea, or irritable bowel syndrome (IBS).  · Behavioral symptoms:  ? Experiencing erratic moods or irritability.  ? Avoidance of new situations.  ? Avoidance of " people.  ? Extreme difficulty making decisions.  How is this diagnosed?  This condition is diagnosed based on your symptoms and medical history. You will also have a physical exam. Your health care provider may perform tests to rule out other possible causes of your symptoms.  To be diagnosed with INDIRA, a person must have anxiety that:  · Is out of his or her control.  · Affects several different aspects of his or her life, such as work and relationships.  · Causes distress that makes him or her unable to take part in normal activities.  · Includes at least three symptoms of INDIRA, such as restlessness, fatigue, trouble concentrating, irritability, muscle tension, or sleep problems.  Before your health care provider can confirm a diagnosis of INDIRA, these symptoms must be present more days than they are not, and they must last for 6 months or longer.  How is this treated?  This condition may be treated with:  · Medicine. Antidepressant medicine is usually prescribed for long-term daily control. Anti-anxiety medicines may be added in severe cases, especially when panic attacks occur.  · Talk therapy (psychotherapy). Certain types of talk therapy can be helpful in treating INDIRA by providing support, education, and guidance. Options include:  ? Cognitive behavioral therapy (CBT). People learn coping skills and self-calming techniques to ease their physical symptoms. They learn to identify unrealistic thoughts and behaviors and to replace them with more appropriate thoughts and behaviors.  ? Acceptance and commitment therapy (ACT). This treatment teaches people how to be mindful as a way to cope with unwanted thoughts and feelings.  ? Biofeedback. This process trains you to manage your body's response (physiological response) through breathing techniques and relaxation methods. You will work with a therapist while machines are used to monitor your physical symptoms.  · Stress management techniques. These include yoga,  meditation, and exercise.  A mental health specialist can help determine which treatment is best for you. Some people see improvement with one type of therapy. However, other people require a combination of therapies.  Follow these instructions at home:  Lifestyle  · Maintain a consistent routine and schedule.  · Anticipate stressful situations. Create a plan, and allow extra time to work with your plan.  · Practice stress management or self-calming techniques that you have learned from your therapist or your health care provider.  General instructions  · Take over-the-counter and prescription medicines only as told by your health care provider.  · Understand that you are likely to have setbacks. Accept this and be kind to yourself as you persist to take better care of yourself.  · Recognize and accept your accomplishments, even if you  them as small.  · Keep all follow-up visits as told by your health care provider. This is important.  Contact a health care provider if:  · Your symptoms do not get better.  · Your symptoms get worse.  · You have signs of depression, such as:  ? A persistently sad or irritable mood.  ? Loss of enjoyment in activities that used to bring you joan.  ? Change in weight or eating.  ? Changes in sleeping habits.  ? Avoiding friends or family members.  ? Loss of energy for normal tasks.  ? Feelings of guilt or worthlessness.  Get help right away if:  · You have serious thoughts about hurting yourself or others.  If you ever feel like you may hurt yourself or others, or have thoughts about taking your own life, get help right away. Go to your nearest emergency department or:  · Call your local emergency services (451 in the [place]  · Call a suicide crisis helpline, such as the National Suicide Prevention Lifeline at 1-121.611.4242. This is open 24 hours a day in the [place]  · Text the Crisis Text Line at 672032 (in the [place]  Summary  · Generalized anxiety disorder (INDIRA) is a mental  health condition that involves worry that is not triggered by a specific event.  · People with INDIRA often worry excessively about many things in their lives, such as their health and family.  · INDIRA may cause symptoms such as restlessness, trouble concentrating, sleep problems, frequent sweating, nausea, diarrhea, headaches, and trembling or muscle twitching.  · A mental health specialist can help determine which treatment is best for you. Some people see improvement with one type of therapy. However, other people require a combination of therapies.  This information is not intended to replace advice given to you by your health care provider. Make sure you discuss any questions you have with your health care provider.  Document Revised: 10/07/2020 Document Reviewed: 10/07/2020  Elsevier Patient Education © 2021 [employer] Inc.

## 2021-11-21 NOTE — PROGRESS NOTES
"Subjective   Mnoisha Gardner is a 61 y.o. female.       Chief Complaint -diarrhea    History of Present Illness -    ROS    Diarrhea-  Patient complains of diarrhea that has been occurring intermittently for months.  Diarrhea is worse when patient is nervous.      Anxiety-  Not at goal.  She is currently on Vraylar 1.5 mg daily and Cymbalta 90 mg daily.  She does report emotional lability and states that she has been easily irritable.  She does have increased stress due to lack of time for herself with helping care for family members while working full-time and now the added stress of the holidays.    Dyslipidemia-stable with diet    Hypertension-  Likely elevated today due to stress and anxiety.  Patient reports home blood pressures usually less than 140/90 with use of losartan and metoprolol      The following portions of the patient's history were reviewed and updated as appropriate: allergies, current medications, past family history, past medical history, past social history, past surgical history and problem list.    Review of Systems    Objective  Vital signs:  /68   Pulse 89   Temp 97.1 °F (36.2 °C) (Temporal)   Ht 162.6 cm (64\")   Wt 70.8 kg (156 lb)   LMP  (LMP Unknown)   SpO2 98%   BMI 26.78 kg/m²     Physical Exam  Vitals and nursing note reviewed.   Constitutional:       Appearance: Normal appearance. She is well-developed.   Eyes:      Extraocular Movements: Extraocular movements intact.      Conjunctiva/sclera: Conjunctivae normal.   Cardiovascular:      Rate and Rhythm: Normal rate and regular rhythm.      Heart sounds: Normal heart sounds. No murmur heard.      Pulmonary:      Effort: Pulmonary effort is normal. No respiratory distress.      Breath sounds: Normal breath sounds. No wheezing.   Musculoskeletal:         General: No tenderness.   Skin:     General: Skin is warm and dry.      Findings: No rash.   Neurological:      Mental Status: She is alert and oriented to person, place, " and time.   Psychiatric:         Mood and Affect: Mood normal.         Behavior: Behavior normal.         Thought Content: Thought content normal.         The following data was reviewed by: ANH Medina on 11/19/2021:  CMP    CMP 4/2/21 11/12/21   Glucose 154 (A) 96   BUN 26 (A) 13   Creatinine 1.52 (A) 0.89   eGFR Non  Am 35 (A) 65   eGFR  Am 42 (A) 78   Sodium 138 141   Potassium 5.0 4.3   Chloride 97 (A) 104   Calcium 10.8 (A) 9.9   Total Protein 7.1 7.2   Albumin 4.70 4.90   Globulin 2.4 2.3   Total Bilirubin 0.3 0.3   Alkaline Phosphatase 103 75   AST (SGOT) 98 (A) 31   ALT (SGPT) 75 (A) 28   (A) Abnormal value       Comments are available for some flowsheets but are not being displayed.           CBC w/diff    CBC w/Diff 4/2/21 11/12/21   WBC 8.11 10.58   RBC 4.81 4.77   Hemoglobin 13.1 13.2   Hematocrit 39.8 41.6   MCV 82.7 87.2   MCH 27.2 27.7   MCHC 32.9 31.7   RDW 15.4 16.1 (A)   Platelets 407 477 (A)   Neutrophil Rel % CANCELED 52.5   Lymphocyte Rel % CANCELED 38.6   Monocyte Rel % CANCELED 5.2   Eosinophil Rel % CANCELED 2.0   Basophil Rel %  0.9   (A) Abnormal value       Comments are available for some flowsheets but are not being displayed.           Lipid Panel    Lipid Panel 4/2/21 11/12/21   Total Cholesterol 226 (A) 227 (A)   Triglycerides 531 (A) 339 (A)   HDL Cholesterol 27 (A) 43   VLDL Cholesterol 92 (A) 60 (A)   LDL Cholesterol  107 (A) 124 (A)   (A) Abnormal value       Comments are available for some flowsheets but are not being displayed.           TSH    TSH 4/2/21 11/12/21   TSH 0.870 1.460                  Assessment/Plan     Diagnoses and all orders for this visit:    1. Chronic diarrhea (Primary)  Comments:  Discussed differential diagnosis including association with anxiety  Plan to treat underlying anxiety and see if this resolves  Orders:  -     Stool Culture (Reference Lab) - Stool, Per Rectum; Future    2. Anxiety  Comments:  Increase Vraylar 3 mg  daily  Continue Cymbalta 90 mg daily  Advised conservative measures for dealing with stress and decreasing responsibilities  Orders:  -     Cariprazine HCl (Vraylar) 3 MG capsule capsule; Take 1 capsule by mouth Daily.  Dispense: 30 capsule; Refill: 5    3. Dyslipidemia  Comments:  Advised low-cholesterol diet    4. Primary hypertension  Comments:  Continue losartan and metoprolol  Likely elevated today due to stress and anxiety            Patient was given instructions and counseling regarding his condition or for health maintenance advice. Please see specific information pulled into the AVS if appropriate      This document has been electronically signed by:  Kimmy Vela PA-C

## 2021-11-29 ENCOUNTER — OFFICE VISIT (OUTPATIENT)
Dept: FAMILY MEDICINE CLINIC | Facility: CLINIC | Age: 61
End: 2021-11-29

## 2021-11-29 VITALS — BODY MASS INDEX: 26.63 KG/M2 | WEIGHT: 156 LBS | HEIGHT: 64 IN

## 2021-11-29 DIAGNOSIS — U07.1 COVID-19: Primary | ICD-10-CM

## 2021-11-29 PROCEDURE — 99213 OFFICE O/P EST LOW 20 MIN: CPT | Performed by: PHYSICIAN ASSISTANT

## 2021-11-29 NOTE — PROGRESS NOTES
"Subjective   Monisha Gardner is a 61 y.o. female.     Video Visit    You have chosen to receive care through a telehealth visit.  Do you consent to use a video/audio connection for your medical care today? Yes    Chief Complaint -cough    History of Present Illness -    ROS    Cough-  She complains of productive cough wheezing and headache for the past 4 days.  She went to urgent care in [place] where she tested positive for COVID-19 over the weekend.  She was prescribed Tessalon Perles, Robitussin cough syrup, Ventolin and given a steroid injection.  Patient states she did have chest x-ray at the urgent care which was clear.  She has been vaccinated as well as had her booster for COVID-19.    The following portions of the patient's history were reviewed and updated as appropriate: allergies, current medications, past family history, past medical history, past social history, past surgical history and problem list.    Review of Systems    Objective  Vital signs:  Ht 162.6 cm (64\")   Wt 70.8 kg (156 lb)   LMP  (LMP Unknown)   BMI 26.78 kg/m²     Physical Exam  Vitals and nursing note reviewed.   Constitutional:       Appearance: Normal appearance.      Comments: Patient appears lethargic   HENT:      Head: Normocephalic and atraumatic.      Mouth/Throat:      Mouth: Mucous membranes are moist.      Pharynx: Oropharynx is clear.   Eyes:      Extraocular Movements: Extraocular movements intact.      Conjunctiva/sclera: Conjunctivae normal.   Pulmonary:      Effort: Pulmonary effort is normal.      Breath sounds: Normal breath sounds. No wheezing.      Comments: Breath sounds appear normal without use of accessory muscles or wheezing appreciated today  Musculoskeletal:         General: Normal range of motion.      Cervical back: Normal range of motion and neck supple.      Right lower leg: No edema.      Left lower leg: No edema.   Skin:     General: Skin is dry.      Findings: No erythema or rash.   Neurological:      " General: No focal deficit present.      Mental Status: She is oriented to person, place, and time.   Psychiatric:         Mood and Affect: Mood normal.         Thought Content: Thought content normal.                    Assessment/Plan     Diagnoses and all orders for this visit:    1. COVID-19 (Primary)  Comments:  Continue Ventolin, cough suppressant and advised to take Mucinex as well as vitamin C and zinc  Ordered Covid antibody infusion  RTC if symptoms persist 1 week    Plan to request COVID-19 positive result from urgent care [place]  Patient request Covid antibody infusion to be done at [facility] so orders will be faxed        Patient was given instructions and counseling regarding his condition or for health maintenance advice. Please see specific information pulled into the AVS if appropriate      This document has been electronically signed by:  Kimmy Vela PA-C

## 2021-11-30 NOTE — PATIENT INSTRUCTIONS

## 2021-12-02 ENCOUNTER — OFFICE VISIT (OUTPATIENT)
Dept: FAMILY MEDICINE CLINIC | Facility: CLINIC | Age: 61
End: 2021-12-02

## 2021-12-02 VITALS — HEIGHT: 64 IN | WEIGHT: 152 LBS | BODY MASS INDEX: 25.95 KG/M2

## 2021-12-02 DIAGNOSIS — U07.1 COVID-19: Primary | ICD-10-CM

## 2021-12-02 DIAGNOSIS — Z79.4 TYPE 2 DIABETES MELLITUS WITH HYPERGLYCEMIA, WITH LONG-TERM CURRENT USE OF INSULIN (HCC): Chronic | ICD-10-CM

## 2021-12-02 DIAGNOSIS — K20.90 ESOPHAGITIS: Chronic | ICD-10-CM

## 2021-12-02 DIAGNOSIS — E11.65 TYPE 2 DIABETES MELLITUS WITH HYPERGLYCEMIA, WITH LONG-TERM CURRENT USE OF INSULIN (HCC): Chronic | ICD-10-CM

## 2021-12-02 PROCEDURE — 99214 OFFICE O/P EST MOD 30 MIN: CPT | Performed by: PHYSICIAN ASSISTANT

## 2021-12-02 NOTE — PATIENT INSTRUCTIONS
How to Quarantine at Home  Information for Patients and Families    These instructions are for people with confirmed or suspected COVID-19 who do not need to be hospitalized and those with confirmed COVID-19 who were hospitalized and discharged to care for themselves at home.    If you were tested through the Health Department  The Health Department will monitor your wellbeing.  If it is determined that you do not need to be hospitalized and can be isolated at home, you will be monitored by staff from your local or state health department.     If you were tested through a Commercial [facility]  You will need to monitor yourself and report changes in your symptoms to your doctor.  See the section below called Monitor Your Symptoms.    Follow these steps until a healthcare provider or local or state health department says you can return to your normal activities.    Stay home except to get medical care  • Restrict activities outside your home, except for getting medical care.   • Do not go to work, school, or public areas.   • Avoid using public transportation, ride-sharing, or taxis.    Separate yourself from other people and animals in your home  People  As much as possible, you should stay in a specific room and away from other people in your home. Also, you should use a separate bathroom, if available.    Animals  You should restrict contact with pets and other animals while you are sick with COVID-19, just like you would around other people. When possible, have another member of your household care for your animals while you are sick. If you are sick with COVID-19, avoid contact with your pet, including petting, snuggling, being kissed or licked, and sharing food. If you must care for your pet or be around animals while you are sick, wash your hands before and after you interact with pets and wear a facemask. See COVID-19 and Animals for more information.    Call ahead before visiting your doctor  If you have a medical  appointment, call the healthcare provider and tell them that you have or may have COVID-19. This information will help the healthcare provider’s office take steps to keep other people from getting infected or exposed.    Wear a facemask  You should wear a facemask when you are around other people (e.g., sharing a room or vehicle) or pets and before you enter a healthcare provider’s office.     If you are not able to wear a facemask (for example, because it causes trouble breathing), then people who live with you should not stay in the same room with you, or they should wear a facemask if they enter your room.    Cover your coughs and sneezes  • Cover your mouth and nose with a tissue when you cough or sneeze.   • Throw used tissues in a lined trash can.   • Immediately wash your hands with soap and water for at least 20 seconds or, if soap and water are not available, clean your hands with an alcohol-based hand  that contains at least 60% alcohol.    Clean your hands often  • Wash your hands often with soap and water for at least 20 seconds, especially after blowing your nose, coughing, or sneezing; going to the bathroom; and before eating or preparing food.     • If soap and water are not readily available, use an alcohol-based hand  with at least 60% alcohol, covering all surfaces of your hands and rubbing them together until they feel dry.    • Soap and water are the best option if hands are visibly dirty. Avoid touching your eyes, nose, and mouth with unwashed hands.    Avoid sharing personal household items  • You should not share dishes, drinking glasses, cups, eating utensils, towels, or bedding with other people or pets in your home.   • After using these items, they should be washed thoroughly with soap and water.    Clean all “high-touch” surfaces everyday  • High touch surfaces include counters, tabletops, doorknobs, bathroom fixtures, toilets, phones, keyboards, tablets, and bedside  tables.   • Also, clean any surfaces that may have blood, stool, or body fluids on them.   • Use a household cleaning spray or wipe, according to the label instructions. Labels contain instructions for safe and effective use of the cleaning product, including precautions you should take when applying the product, such as wearing gloves and making sure you have good ventilation during use of the product.    Monitor your symptoms  • Seek prompt medical attention if your illness is worsening (e.g., difficulty breathing).   • Before seeking care, call your healthcare provider and tell them that you have, or are being evaluated for, COVID-19.   • Put on a facemask before you enter the facility.     • These steps will help the healthcare provider’s office to keep other people in the office or waiting room from getting infected or exposed.   • Persons who are placed under active monitoring or facilitated self-monitoring should follow instructions provided by their local health department or occupational health professionals, as appropriate.  • If you have a medical emergency and need to call 911, notify the dispatch personnel that you have, or are being evaluated for COVID-19. If possible, put on a facemask before emergency medical services arrive.    Discontinuing home isolation  Patients with confirmed COVID-19 should remain under home isolation precautions until the risk of secondary transmission to others is thought to be low. The decision to discontinue home isolation precautions should be made on a case-by-case basis, in consultation with healthcare providers and state and local health departments.    The below content are for household members, intimate partners, and caregivers of a patient with symptomatic laboratory-confirmed COVID-19 or a patient under investigation:    Household members, intimate partners, and caregivers may have close contact with a person with symptomatic, laboratory-confirmed COVID-19 or a  person under investigation.     Close contacts should monitor their health; they should call their healthcare provider right away if they develop symptoms suggestive of COVID-19 (e.g., fever, cough, shortness of breath)     Close contacts should also follow these recommendations:  • Make sure that you understand and can help the patient follow their healthcare provider’s instructions for medication(s) and care. You should help the patient with basic needs in the home and provide support for getting groceries, prescriptions, and other personal needs.  • Monitor the patient’s symptoms. If the patient is getting sicker, call his or her healthcare provider and tell them that the patient has laboratory-confirmed COVID-19. This will help the healthcare provider’s office take steps to keep other people in the office or waiting room from getting infected. Ask the healthcare provider to call the local or [facility] health department for additional guidance. If the patient has a medical emergency and you need to call 911, notify the dispatch personnel that the patient has, or is being evaluated for COVID-19.  • Household members should stay in another room or be  from the patient as much as possible. Household members should use a separate bedroom and bathroom, if available.  • Prohibit visitors who do not have an essential need to be in the home.  • Household members should care for any pets in the home. Do not handle pets or other animals while sick.  For more information, see COVID-19 and Animals.  • Make sure that shared spaces in the home have good air flow, such as by an air conditioner or an opened window, weather permitting.  • Perform hand hygiene frequently. Wash your hands often with soap and water for at least 20 seconds or use an alcohol-based hand  that contains 60 to 95% alcohol, covering all surfaces of your hands and rubbing them together until they feel dry. Soap and water should be used  preferentially if hands are visibly dirty.  • Avoid touching your eyes, nose, and mouth with unwashed hands.  • The patient should wear a facemask when you are around other people. If the patient is not able to wear a facemask (for example, because it causes trouble breathing), you, as the caregiver, should wear a mask when you are in the same room as the patient.  • Wear a disposable facemask and gloves when you touch or have contact with the patient’s blood, stool, or body fluids, such as saliva, sputum, nasal mucus, vomit, or urine.   o Throw out disposable facemasks and gloves after using them. Do not reuse.  o When removing personal protective equipment, first remove and dispose of gloves. Then, immediately clean your hands with soap and water or alcohol-based hand . Next, remove and dispose of facemask, and immediately clean your hands again with soap and water or alcohol-based hand .  • Avoid sharing household items with the patient. You should not share dishes, drinking glasses, cups, eating utensils, towels, bedding, or other items. After the patient uses these items, you should wash them thoroughly (see below “Wash laundry thoroughly”).  • Clean all “high-touch” surfaces, such as counters, tabletops, doorknobs, bathroom fixtures, toilets, phones, keyboards, tablets, and bedside tables, every day. Also, clean any surfaces that may have blood, stool, or body fluids on them.   o Use a household cleaning spray or wipe, according to the label instructions. Labels contain instructions for safe and effective use of the cleaning product including precautions you should take when applying the product, such as wearing gloves and making sure you have good ventilation during use of the product.  • Wash laundry thoroughly.   o Immediately remove and wash clothes or bedding that have blood, stool, or body fluids on them.  o Wear disposable gloves while handling soiled items and keep soiled items away  "from your body. Clean your hands (with soap and water or an alcohol-based hand ) immediately after removing your gloves.  o Read and follow directions on labels of laundry or clothing items and detergent. In general, using a normal laundry detergent according to washing machine instructions and dry thoroughly using the warmest temperatures recommended on the clothing label.  • Place all used disposable gloves, facemasks, and other contaminated items in a lined container before disposing of them with other household waste. Clean your hands (with soap and water or an alcohol-based hand ) immediately after handling these items. Soap and water should be used preferentially if hands are visibly dirty.  • Discuss any additional questions with your state or local health department or healthcare provider.    Adapted from information provided by the Centers for Disease Control and Prevention.  For more information, visit https://www.cdc.gov/coronavirus/2019-ncov/hcp/guidance-prevent-spread.html    https://www.diabeteseducator.org/docs/default-source/living-with-diabetes/conquering-the-grocery-store-v1.pdf?sfvrsn=4\">   Carbohydrate Counting for Diabetes Mellitus, Adult  Carbohydrate counting is a method of keeping track of how many carbohydrates you eat. Eating carbohydrates naturally increases the amount of sugar (glucose) in the blood. Counting how many carbohydrates you eat improves your blood glucose control, which helps you manage your diabetes.  It is important to know how many carbohydrates you can safely have in each meal. This is different for every person. A dietitian can help you make a meal plan and calculate how many carbohydrates you should have at each meal and snack.  What foods contain carbohydrates?  Carbohydrates are found in the following foods:  · Grains, such as breads and cereals.  · Dried beans and soy products.  · Starchy vegetables, such as potatoes, peas, and corn.  · Fruit and " fruit juices.  · Milk and yogurt.  · Sweets and snack foods, such as cake, cookies, candy, chips, and soft drinks.  How do I count carbohydrates in foods?  There are two ways to count carbohydrates in food. You can read food labels or learn standard serving sizes of foods. You can use either of the methods or a combination of both.  Using the Nutrition Facts label  The Nutrition Facts list is included on the labels of almost all packaged foods and beverages in the [place] It includes:  · The serving size.  · Information about nutrients in each serving, including the grams (g) of carbohydrate per serving.  To use the Nutrition Facts:  · Decide how many servings you will have.  · Multiply the number of servings by the number of carbohydrates per serving.  · The resulting number is the total amount of carbohydrates that you will be having.  Learning the standard serving sizes of foods  When you eat carbohydrate foods that are not packaged or do not include Nutrition Facts on the label, you need to measure the servings in order to count the amount of carbohydrates.  · Measure the foods that you will eat with a food scale or measuring cup, if needed.  · Decide how many standard-size servings you will eat.  · Multiply the number of servings by 15. For foods that contain carbohydrates, one serving equals 15 g of carbohydrates.  ? For example, if you eat 2 cups or 10 oz (300 g) of strawberries, you will have eaten 2 servings and 30 g of carbohydrates (2 servings x 15 g = 30 g).  · For foods that have more than one food mixed, such as soups and casseroles, you must count the carbohydrates in each food that is included.  The following list contains standard serving sizes of common carbohydrate-rich foods. Each of these servings has about 15 g of carbohydrates:  · 1 slice of bread.  · 1 six-inch (15 cm) tortilla.  · ? cup or 2 oz (53 g) cooked rice or pasta.  · ½ cup or 3 oz (85 g) cooked or canned, drained and rinsed beans or  lentils.  · ½ cup or 3 oz (85 g) starchy vegetable, such as peas, corn, or squash.  · ½ cup or 4 oz (120 g) hot cereal.  · ½ cup or 3 oz (85 g) boiled or mashed potatoes, or ¼ or 3 oz (85 g) of a large baked potato.  · ½ cup or 4 fl oz (118 mL) fruit juice.  · 1 cup or 8 fl oz (237 mL) milk.  · 1 small or 4 oz (106 g) apple.  · ½ or 2 oz (63 g) of a medium banana.  · 1 cup or 5 oz (150 g) strawberries.  · 3 cups or 1 oz (24 g) popped popcorn.  What is an example of carbohydrate counting?  To calculate the number of carbohydrates in this sample meal, follow the steps shown below.  Sample meal  · 3 oz (85 g) chicken breast.  · ? cup or 4 oz (106 g) brown rice.  · ½ cup or 3 oz (85 g) corn.  · 1 cup or 8 fl oz (237 mL) milk.  · 1 cup or 5 oz (150 g) strawberries with sugar-free whipped topping.  Carbohydrate calculation  1. Identify the foods that contain carbohydrates:  ? Rice.  ? Corn.  ? Milk.  ? Strawberries.  2. Calculate how many servings you have of each food:  ? 2 servings rice.  ? 1 serving corn.  ? 1 serving milk.  ? 1 serving strawberries.  3. Multiply each number of servings by 15 g:  ? 2 servings rice x 15 g = 30 g.  ? 1 serving corn x 15 g = 15 g.  ? 1 serving milk x 15 g = 15 g.  ? 1 serving strawberries x 15 g = 15 g.  4. Add together all of the amounts to find the total grams of carbohydrates eaten:  ? 30 g + 15 g + 15 g + 15 g = 75 g of carbohydrates total.  What are tips for following this plan?  Shopping  · Develop a meal plan and then make a shopping list.  · Buy fresh and frozen vegetables, fresh and frozen fruit, dairy, eggs, beans, lentils, and whole grains.  · Look at food labels. Choose foods that have more fiber and less sugar.  · Avoid processed foods and foods with added sugars.  Meal planning  · Aim to have the same amount of carbohydrates at each meal and for each snack time.  · Plan to have regular, balanced meals and snacks.  Where to find more information  · American Diabetes  Association: www.diabetes.org  · Centers for Disease Control and Prevention: www.cdc.gov  Summary  · Carbohydrate counting is a method of keeping track of how many carbohydrates you eat.  · Eating carbohydrates naturally increases the amount of sugar (glucose) in the blood.  · Counting how many carbohydrates you eat improves your blood glucose control, which helps you manage your diabetes.  · A dietitian can help you make a meal plan and calculate how many carbohydrates you should have at each meal and snack.  This information is not intended to replace advice given to you by your health care provider. Make sure you discuss any questions you have with your health care provider.  Document Revised: 12/17/2020 Document Reviewed: 12/18/2020  Elsevier Patient Education © 2021 [employer]

## 2021-12-02 NOTE — PROGRESS NOTES
"Subjective   Monisha Gardner is a 61 y.o. female.     Video visit    You have chosen to receive care through a telehealth visit.  Do you consent to use a video/audio connection for your medical care today? Yes    Chief Complaint -COVID-19    History of Present Illness -    ROS    COVID-19-  Improved after antibody infusion 3 days ago.  Patient states that her cough is still present but controlled with Tessalon Perles.  She states that she does sleep on her belly which helps.  She has also started taking zinc and vitamin supplements.  Patient reports that she is working remotely from home as of today for a [employer].    Gastroesophageal reflux disease-  Patient's esophagitis is controlled with use of Dexilant and dietary modification    Diabetes mellitus type 2-controlled with most recent hemoglobin A1c of 5.7 with the use of dietary modification, Janumet and Lantus.    The following portions of the patient's history were reviewed and updated as appropriate: allergies, current medications, past family history, past medical history, past social history, past surgical history and problem list.    Review of Systems    Objective  Vital signs:  Ht 162.6 cm (64\")   Wt 68.9 kg (152 lb)   LMP  (LMP Unknown)   BMI 26.09 kg/m²     Physical Exam  Vitals and nursing note reviewed.   Constitutional:       Appearance: Normal appearance.   HENT:      Head: Normocephalic and atraumatic.      Mouth/Throat:      Mouth: Mucous membranes are moist.      Pharynx: Oropharynx is clear.   Eyes:      Extraocular Movements: Extraocular movements intact.      Conjunctiva/sclera: Conjunctivae normal.   Pulmonary:      Effort: Pulmonary effort is normal.      Breath sounds: No wheezing.      Comments: Breath sounds appear normal without use of accessory muscles or wheezing appreciated today.  Patient is coughing intermittently during video visit  Musculoskeletal:         General: Normal range of motion.      Cervical back: Normal range of " motion and neck supple.      Right lower leg: No edema.      Left lower leg: No edema.   Skin:     General: Skin is dry.      Findings: No erythema or rash.   Neurological:      General: No focal deficit present.      Mental Status: She is alert and oriented to person, place, and time.   Psychiatric:         Mood and Affect: Mood normal.         Thought Content: Thought content normal.         The following data was reviewed by: ANH Medina on 12/02/2021:  CMP    CMP 4/2/21 11/12/21   Glucose 154 (A) 96   BUN 26 (A) 13   Creatinine 1.52 (A) 0.89   eGFR Non  Am 35 (A) 65   eGFR  Am 42 (A) 78   Sodium 138 141   Potassium 5.0 4.3   Chloride 97 (A) 104   Calcium 10.8 (A) 9.9   Total Protein 7.1 7.2   Albumin 4.70 4.90   Globulin 2.4 2.3   Total Bilirubin 0.3 0.3   Alkaline Phosphatase 103 75   AST (SGOT) 98 (A) 31   ALT (SGPT) 75 (A) 28   (A) Abnormal value       Comments are available for some flowsheets but are not being displayed.           TSH    TSH 4/2/21 11/12/21   TSH 0.870 1.460           Lab Results   Component Value Date    HGBA1C 5.70 (H) 11/12/2021              Assessment/Plan     Diagnoses and all orders for this visit:    1. COVID-19 (Primary)  Comments:  Continue symptomatic care and advised continued quarantine until asymptomatic and negative test after Monday  Suggested chat feature if coughing prevents voice     2. Type 2 diabetes mellitus with hyperglycemia, with long-term current use of insulin (HCC)  Comments:  Advised a low carbohydrate diabetic diet  Continue Janumet and Lantus    3. Esophagitis  Comments:  Continue Dexilant  Advised to avoid known trigger foods            Patient was given instructions and counseling regarding his condition or for health maintenance advice. Please see specific information pulled into the AVS if appropriate      This document has been electronically signed by:  Kimmy Vela PA-C

## 2022-01-13 RX ORDER — GABAPENTIN 300 MG/1
CAPSULE ORAL
Qty: 90 CAPSULE | Refills: 2 | Status: SHIPPED | OUTPATIENT
Start: 2022-01-13 | End: 2022-04-14 | Stop reason: SDUPTHER

## 2022-02-14 ENCOUNTER — TRANSCRIBE ORDERS (OUTPATIENT)
Dept: ADMINISTRATIVE | Facility: HOSPITAL | Age: 62
End: 2022-02-14

## 2022-02-14 DIAGNOSIS — Z01.818 PRE-OPERATIVE CLEARANCE: Primary | ICD-10-CM

## 2022-02-19 DIAGNOSIS — E11.65 TYPE 2 DIABETES MELLITUS WITH HYPERGLYCEMIA, WITH LONG-TERM CURRENT USE OF INSULIN: Chronic | ICD-10-CM

## 2022-02-19 DIAGNOSIS — Z79.4 TYPE 2 DIABETES MELLITUS WITH HYPERGLYCEMIA, WITH LONG-TERM CURRENT USE OF INSULIN: Chronic | ICD-10-CM

## 2022-02-21 RX ORDER — SITAGLIPTIN AND METFORMIN HYDROCHLORIDE 1000; 50 MG/1; MG/1
TABLET, FILM COATED ORAL
Qty: 60 TABLET | Refills: 5 | Status: SHIPPED | OUTPATIENT
Start: 2022-02-21 | End: 2022-04-14 | Stop reason: SDUPTHER

## 2022-04-07 DIAGNOSIS — Z79.4 TYPE 2 DIABETES MELLITUS WITH HYPERGLYCEMIA, WITH LONG-TERM CURRENT USE OF INSULIN: Chronic | ICD-10-CM

## 2022-04-07 DIAGNOSIS — E11.65 TYPE 2 DIABETES MELLITUS WITH HYPERGLYCEMIA, WITH LONG-TERM CURRENT USE OF INSULIN: Chronic | ICD-10-CM

## 2022-04-08 RX ORDER — INSULIN GLARGINE 100 [IU]/ML
45 INJECTION, SOLUTION SUBCUTANEOUS DAILY
Qty: 15 ML | Refills: 5 | Status: SHIPPED | OUTPATIENT
Start: 2022-04-08 | End: 2022-04-14 | Stop reason: SDUPTHER

## 2022-04-14 ENCOUNTER — OFFICE VISIT (OUTPATIENT)
Dept: FAMILY MEDICINE CLINIC | Facility: CLINIC | Age: 62
End: 2022-04-14

## 2022-04-14 VITALS
WEIGHT: 165 LBS | TEMPERATURE: 98.6 F | HEART RATE: 84 BPM | HEIGHT: 64 IN | BODY MASS INDEX: 28.17 KG/M2 | SYSTOLIC BLOOD PRESSURE: 130 MMHG | OXYGEN SATURATION: 99 % | DIASTOLIC BLOOD PRESSURE: 80 MMHG

## 2022-04-14 DIAGNOSIS — M25.561 CHRONIC PAIN OF BOTH KNEES: ICD-10-CM

## 2022-04-14 DIAGNOSIS — F41.9 ANXIETY: Chronic | ICD-10-CM

## 2022-04-14 DIAGNOSIS — M25.562 CHRONIC PAIN OF BOTH KNEES: ICD-10-CM

## 2022-04-14 DIAGNOSIS — F51.04 PSYCHOPHYSIOLOGICAL INSOMNIA: Chronic | ICD-10-CM

## 2022-04-14 DIAGNOSIS — J30.1 SEASONAL ALLERGIC RHINITIS DUE TO POLLEN: Chronic | ICD-10-CM

## 2022-04-14 DIAGNOSIS — F32.A ANXIETY AND DEPRESSION: Chronic | ICD-10-CM

## 2022-04-14 DIAGNOSIS — F41.9 ANXIETY AND DEPRESSION: Chronic | ICD-10-CM

## 2022-04-14 DIAGNOSIS — Z79.4 TYPE 2 DIABETES MELLITUS WITH HYPERGLYCEMIA, WITH LONG-TERM CURRENT USE OF INSULIN: Chronic | ICD-10-CM

## 2022-04-14 DIAGNOSIS — E11.65 TYPE 2 DIABETES MELLITUS WITH HYPERGLYCEMIA, WITH LONG-TERM CURRENT USE OF INSULIN: Chronic | ICD-10-CM

## 2022-04-14 DIAGNOSIS — G89.29 CHRONIC PAIN OF BOTH KNEES: ICD-10-CM

## 2022-04-14 DIAGNOSIS — G62.89 SMALL FIBER POLYNEUROPATHY: Chronic | ICD-10-CM

## 2022-04-14 DIAGNOSIS — M15.9 PRIMARY OSTEOARTHRITIS INVOLVING MULTIPLE JOINTS: Primary | Chronic | ICD-10-CM

## 2022-04-14 PROCEDURE — 99214 OFFICE O/P EST MOD 30 MIN: CPT | Performed by: PHYSICIAN ASSISTANT

## 2022-04-14 RX ORDER — GABAPENTIN 300 MG/1
CAPSULE ORAL
Qty: 90 CAPSULE | Refills: 2 | OUTPATIENT
Start: 2022-04-14

## 2022-04-14 RX ORDER — CALCIUM CITRATE/VITAMIN D3 200MG-6.25
1 TABLET ORAL 3 TIMES DAILY
Qty: 300 EACH | Refills: 3 | Status: SHIPPED | OUTPATIENT
Start: 2022-04-14

## 2022-04-14 RX ORDER — HYDROXYZINE PAMOATE 50 MG/1
50 CAPSULE ORAL
Qty: 30 CAPSULE | Refills: 5 | Status: SHIPPED | OUTPATIENT
Start: 2022-04-14 | End: 2022-08-05 | Stop reason: SDUPTHER

## 2022-04-14 RX ORDER — PEN NEEDLE, DIABETIC 32GX 5/32"
1 NEEDLE, DISPOSABLE MISCELLANEOUS 3 TIMES DAILY
Qty: 300 EACH | Refills: 3 | Status: SHIPPED | OUTPATIENT
Start: 2022-04-14

## 2022-04-14 RX ORDER — GABAPENTIN 300 MG/1
300 CAPSULE ORAL 3 TIMES DAILY
Qty: 90 CAPSULE | Refills: 2 | Status: SHIPPED | OUTPATIENT
Start: 2022-04-14 | End: 2022-08-05 | Stop reason: SDUPTHER

## 2022-04-14 RX ORDER — DULOXETIN HYDROCHLORIDE 30 MG/1
90 CAPSULE, DELAYED RELEASE ORAL DAILY
Qty: 90 CAPSULE | Refills: 5 | Status: SHIPPED | OUTPATIENT
Start: 2022-04-14

## 2022-04-14 RX ORDER — SITAGLIPTIN AND METFORMIN HYDROCHLORIDE 1000; 50 MG/1; MG/1
1 TABLET, FILM COATED ORAL 2 TIMES DAILY WITH MEALS
Qty: 60 TABLET | Refills: 5 | Status: SHIPPED | OUTPATIENT
Start: 2022-04-14 | End: 2022-08-05 | Stop reason: SDUPTHER

## 2022-04-14 RX ORDER — INSULIN GLARGINE 100 [IU]/ML
34 INJECTION, SOLUTION SUBCUTANEOUS DAILY
Qty: 15 ML | Refills: 5 | Status: SHIPPED | OUTPATIENT
Start: 2022-04-14 | End: 2022-09-30

## 2022-04-14 RX ORDER — LANCETS 30 GAUGE
EACH MISCELLANEOUS
Qty: 300 EACH | Refills: 3 | Status: SHIPPED | OUTPATIENT
Start: 2022-04-14

## 2022-04-14 RX ORDER — CETIRIZINE HYDROCHLORIDE 10 MG/1
10 TABLET ORAL DAILY
Qty: 90 TABLET | Refills: 3 | Status: SHIPPED | OUTPATIENT
Start: 2022-04-14 | End: 2022-11-04

## 2022-04-14 RX ORDER — LIDOCAINE 5% 5 G/100G
1 CREAM TOPICAL EVERY 4 HOURS PRN
Qty: 30 G | Refills: 5 | Status: SHIPPED | OUTPATIENT
Start: 2022-04-14 | End: 2023-01-20

## 2022-04-15 NOTE — PROGRESS NOTES
"Subjective   Monisha Gardner is a 61 y.o. female.       Chief Complaint -knee pain    History of Present Illness -    ROS    Knee pain-  Patient complains of pain in bilateral knees with right knee pain greater than left knee pain.  Onset several years.  Pain is described as intermittent severe and achy.  Pain is worse after prolonged sitting and changing positions or first getting up to walk.  No known injury.  Patient does have history of osteoarthritis.    Anxiety-stable with Vraylar.  She denies any SI HI or hallucinations.    Neuropathy-stable with Cymbalta and gabapentin    Diabetes mellitus-  Stable with Ozempic and Janumet and Lantus.    Runny nose-  Patient complains of runny nose and allergies.  Worse with seasonal allergens    Insomnia-  Patient complains of trouble staying asleep.  She reports nightmares and has had some pain as she had her eyelids surgery performed recently.    The following portions of the patient's history were reviewed and updated as appropriate: allergies, current medications, past family history, past medical history, past social history, past surgical history and problem list.    Review of Systems    Objective  Vital signs:  /80   Pulse 84   Temp 98.6 °F (37 °C) (Temporal)   Ht 162.6 cm (64\")   Wt 74.8 kg (165 lb)   LMP  (LMP Unknown)   SpO2 99%   BMI 28.32 kg/m²     Physical Exam  Vitals and nursing note reviewed.   Constitutional:       General: She is not in acute distress.     Appearance: Normal appearance. She is well-developed. She is not diaphoretic.   HENT:      Head: Normocephalic and atraumatic.      Right Ear: Tympanic membrane, ear canal and external ear normal.      Left Ear: Tympanic membrane, ear canal and external ear normal.      Nose: Rhinorrhea present.      Mouth/Throat:      Mouth: Mucous membranes are moist.      Pharynx: No oropharyngeal exudate or posterior oropharyngeal erythema.   Eyes:      Extraocular Movements: Extraocular movements intact. "      Conjunctiva/sclera: Conjunctivae normal.   Neck:      Thyroid: No thyromegaly.   Cardiovascular:      Rate and Rhythm: Normal rate and regular rhythm.      Heart sounds: Normal heart sounds. No murmur heard.  Pulmonary:      Effort: Pulmonary effort is normal. No respiratory distress.      Breath sounds: Normal breath sounds. No wheezing or rales.   Musculoskeletal:         General: No tenderness.      Cervical back: Normal range of motion and neck supple.   Lymphadenopathy:      Cervical: No cervical adenopathy.   Skin:     General: Skin is warm and dry.      Findings: No rash.      Comments: Pink scarring noted along eyelids due to surgery last month   Neurological:      Mental Status: She is alert and oriented to person, place, and time.   Psychiatric:         Mood and Affect: Mood normal.         Behavior: Behavior normal.         Thought Content: Thought content normal.         The following data was reviewed by: ANH Medina on 04/14/2022:  CMP    CMP 11/12/21   Glucose 96   BUN 13   Creatinine 0.89   eGFR Non  Am 65   eGFR  Am 78   Sodium 141   Potassium 4.3   Chloride 104   Calcium 9.9   Total Protein 7.2   Albumin 4.90   Globulin 2.3   Total Bilirubin 0.3   Alkaline Phosphatase 75   AST (SGOT) 31   ALT (SGPT) 28      Comments are available for some flowsheets but are not being displayed.           CBC w/diff    CBC w/Diff 11/12/21   WBC 10.58   RBC 4.77   Hemoglobin 13.2   Hematocrit 41.6   MCV 87.2   MCH 27.7   MCHC 31.7   RDW 16.1 (A)   Platelets 477 (A)   Neutrophil Rel % 52.5   Lymphocyte Rel % 38.6   Monocyte Rel % 5.2   Eosinophil Rel % 2.0   Basophil Rel % 0.9   (A) Abnormal value            Lipid Panel    Lipid Panel 11/12/21   Total Cholesterol 227 (A)   Triglycerides 339 (A)   HDL Cholesterol 43   VLDL Cholesterol 60 (A)   LDL Cholesterol  124 (A)   (A) Abnormal value       Comments are available for some flowsheets but are not being displayed.           TSH    TSH  11/12/21   TSH 1.460           Most Recent A1C    HGBA1C Most Recent 11/12/21   Hemoglobin A1C 5.70 (A)   (A) Abnormal value       Comments are available for some flowsheets but are not being displayed.                  Assessment/Plan     Diagnoses and all orders for this visit:    1. Primary osteoarthritis involving multiple joints (Primary)  Comments:  Knee pain likely secondary to osteoarthritis discussed  Order x-rays for further evaluation  Refer to orthopedic specialist  Orders:  -     XR Knee 3 View Bilateral; Future  -     Lidocaine 5 % cream; Apply 1 each topically Every 4 (Four) Hours As Needed (JOINT PAIN).  Dispense: 30 g; Refill: 5    2. Chronic pain of both knees  -     Ambulatory Referral to Orthopedic Surgery  -     Urine Drug Screen - Urine, Clean Catch; Future    3. Anxiety  -     Cariprazine HCl (Vraylar) 3 MG capsule capsule; Take 1 capsule by mouth Daily.  Dispense: 30 capsule; Refill: 5    4. Small fiber polyneuropathy  Comments:  Continue Cymbalta 90 mg daily  Continue gabapentin  UDS today  Orders:  -     DULoxetine (CYMBALTA) 30 MG capsule; Take 3 capsules by mouth Daily.  Dispense: 90 capsule; Refill: 5    5. Anxiety and depression  Comments:  discontinue lexapro and hydroxizine due to hypersomnolence   Increase Cymbalta 90 mg daily due to polyneuropathy  Orders:  -     DULoxetine (CYMBALTA) 30 MG capsule; Take 3 capsules by mouth Daily.  Dispense: 90 capsule; Refill: 5  -     Comprehensive Metabolic Panel; Future  -     Urine Drug Screen - Urine, Clean Catch; Future    6. Type 2 diabetes mellitus with hyperglycemia, with long-term current use of insulin (HCC)  Comments:  Continue Ozempic, Janumet and Lantus   Advised a low carbohydrate diabetic diet  Discussed Metformin use with CKD and possible discontinuation if needed  Orders:  -     Easy Touch Lancets 30G/Twist misc; Use tid  Dispense: 300 each; Refill: 3  -     sitaGLIPtin-metFORMIN (Janumet)  MG per tablet; Take 1 tablet by  mouth 2 (Two) Times a Day With Meals.  Dispense: 60 tablet; Refill: 5  -     Lantus SoloStar 100 UNIT/ML injection pen; Inject 34 Units under the skin into the appropriate area as directed Daily.  Dispense: 15 mL; Refill: 5  -     BD Pen Needle Raquel U/F 32G X 4 MM misc; 1 Device by Other route 3 (Three) Times a Day.  Dispense: 300 each; Refill: 3  -     True Metrix Blood Glucose Test test strip; 1 each by Other route 3 (Three) Times a Day.  Dispense: 300 each; Refill: 3  -     Comprehensive Metabolic Panel; Future  -     Lipid Panel; Future  -     MicroAlbumin, Urine, Random - Urine, Clean Catch; Future  -     Hemoglobin A1c; Future  -     Urine Drug Screen - Urine, Clean Catch; Future    7. Psychophysiological insomnia  Comments:  Start Vistaril  Discussed how pain can negatively affect sleep  Discussed starting Minipress for nightmares if needed in the future  Orders:  -     hydrOXYzine pamoate (Vistaril) 50 MG capsule; Take 1 capsule by mouth every night at bedtime.  Dispense: 30 capsule; Refill: 5    8. Seasonal allergic rhinitis due to pollen  Comments:  Start Zyrtec  Advised to avoid known environmental allergens when possible  Orders:  -     cetirizine (zyrTEC) 10 MG tablet; Take 1 tablet by mouth Daily.  Dispense: 90 tablet; Refill: 3            Patient was given instructions and counseling regarding his condition or for health maintenance advice. Please see specific information pulled into the AVS if appropriate      This document has been electronically signed by:  Kimmy Vela PA-C

## 2022-04-19 ENCOUNTER — TELEPHONE (OUTPATIENT)
Dept: FAMILY MEDICINE CLINIC | Facility: CLINIC | Age: 62
End: 2022-04-19

## 2022-04-22 ENCOUNTER — HOSPITAL ENCOUNTER (OUTPATIENT)
Dept: GENERAL RADIOLOGY | Facility: HOSPITAL | Age: 62
Discharge: HOME | End: 2022-04-22
Admitting: PHYSICIAN ASSISTANT

## 2022-04-22 DIAGNOSIS — M15.9 PRIMARY OSTEOARTHRITIS INVOLVING MULTIPLE JOINTS: Chronic | ICD-10-CM

## 2022-04-22 PROCEDURE — 73562 X-RAY EXAM OF KNEE 3: CPT

## 2022-04-22 PROCEDURE — 73562 X-RAY EXAM OF KNEE 3: CPT | Performed by: RADIOLOGY

## 2022-05-06 ENCOUNTER — OFFICE VISIT (OUTPATIENT)
Dept: FAMILY MEDICINE CLINIC | Facility: CLINIC | Age: 62
End: 2022-05-06

## 2022-05-06 VITALS
TEMPERATURE: 96.8 F | HEIGHT: 64 IN | SYSTOLIC BLOOD PRESSURE: 127 MMHG | DIASTOLIC BLOOD PRESSURE: 78 MMHG | WEIGHT: 162.6 LBS | OXYGEN SATURATION: 97 % | BODY MASS INDEX: 27.76 KG/M2 | HEART RATE: 85 BPM

## 2022-05-06 DIAGNOSIS — Z79.4 TYPE 2 DIABETES MELLITUS WITH HYPERGLYCEMIA, WITH LONG-TERM CURRENT USE OF INSULIN: Primary | Chronic | ICD-10-CM

## 2022-05-06 DIAGNOSIS — F41.9 ANXIETY AND DEPRESSION: Chronic | ICD-10-CM

## 2022-05-06 DIAGNOSIS — E11.65 TYPE 2 DIABETES MELLITUS WITH HYPERGLYCEMIA, WITH LONG-TERM CURRENT USE OF INSULIN: Primary | Chronic | ICD-10-CM

## 2022-05-06 DIAGNOSIS — Z12.31 ENCOUNTER FOR SCREENING MAMMOGRAM FOR MALIGNANT NEOPLASM OF BREAST: ICD-10-CM

## 2022-05-06 DIAGNOSIS — E11.65 TYPE 2 DIABETES MELLITUS WITH HYPERGLYCEMIA, WITH LONG-TERM CURRENT USE OF INSULIN: Chronic | ICD-10-CM

## 2022-05-06 DIAGNOSIS — H68.011 EUSTACHIAN SALPINGITIS, ACUTE, RIGHT: ICD-10-CM

## 2022-05-06 DIAGNOSIS — F32.A ANXIETY AND DEPRESSION: Chronic | ICD-10-CM

## 2022-05-06 DIAGNOSIS — M17.0 PRIMARY OSTEOARTHRITIS OF BOTH KNEES: Chronic | ICD-10-CM

## 2022-05-06 DIAGNOSIS — Z79.4 TYPE 2 DIABETES MELLITUS WITH HYPERGLYCEMIA, WITH LONG-TERM CURRENT USE OF INSULIN: Chronic | ICD-10-CM

## 2022-05-06 PROCEDURE — 80053 COMPREHEN METABOLIC PANEL: CPT | Performed by: PHYSICIAN ASSISTANT

## 2022-05-06 PROCEDURE — 20610 DRAIN/INJ JOINT/BURSA W/O US: CPT | Performed by: PHYSICIAN ASSISTANT

## 2022-05-06 PROCEDURE — 83036 HEMOGLOBIN GLYCOSYLATED A1C: CPT | Performed by: PHYSICIAN ASSISTANT

## 2022-05-06 PROCEDURE — 82043 UR ALBUMIN QUANTITATIVE: CPT | Performed by: PHYSICIAN ASSISTANT

## 2022-05-06 PROCEDURE — 99214 OFFICE O/P EST MOD 30 MIN: CPT | Performed by: PHYSICIAN ASSISTANT

## 2022-05-06 RX ORDER — SEMAGLUTIDE 1.34 MG/ML
1 INJECTION, SOLUTION SUBCUTANEOUS WEEKLY
Qty: 2 PEN | Refills: 5 | Status: SHIPPED | OUTPATIENT
Start: 2022-05-06 | End: 2022-06-03

## 2022-05-06 RX ORDER — CHLORTHALIDONE 25 MG/1
TABLET ORAL
COMMUNITY
Start: 2022-05-04 | End: 2022-08-05

## 2022-05-06 RX ORDER — FLUTICASONE PROPIONATE 50 MCG
2 SPRAY, SUSPENSION (ML) NASAL DAILY
Qty: 18.2 ML | Refills: 1 | Status: SHIPPED | OUTPATIENT
Start: 2022-05-06 | End: 2022-11-17 | Stop reason: SDUPTHER

## 2022-05-07 LAB
ALBUMIN SERPL-MCNC: 4.5 G/DL (ref 3.5–5.2)
ALBUMIN UR-MCNC: 1.9 MG/DL
ALBUMIN/GLOB SERPL: 1.7 G/DL
ALP SERPL-CCNC: 104 U/L (ref 39–117)
ALT SERPL W P-5'-P-CCNC: 29 U/L (ref 1–33)
ANION GAP SERPL CALCULATED.3IONS-SCNC: 19.3 MMOL/L (ref 5–15)
AST SERPL-CCNC: 32 U/L (ref 1–32)
BILIRUB SERPL-MCNC: 0.2 MG/DL (ref 0–1.2)
BUN SERPL-MCNC: 9 MG/DL (ref 8–23)
BUN/CREAT SERPL: 9.7 (ref 7–25)
CALCIUM SPEC-SCNC: 10 MG/DL (ref 8.6–10.5)
CHLORIDE SERPL-SCNC: 99 MMOL/L (ref 98–107)
CO2 SERPL-SCNC: 21.7 MMOL/L (ref 22–29)
CREAT SERPL-MCNC: 0.93 MG/DL (ref 0.57–1)
EGFRCR SERPLBLD CKD-EPI 2021: 70.5 ML/MIN/1.73
GLOBULIN UR ELPH-MCNC: 2.7 GM/DL
GLUCOSE SERPL-MCNC: 111 MG/DL (ref 65–99)
HBA1C MFR BLD: 5.9 % (ref 4.8–5.6)
POTASSIUM SERPL-SCNC: 4.4 MMOL/L (ref 3.5–5.2)
PROT SERPL-MCNC: 7.2 G/DL (ref 6–8.5)
SODIUM SERPL-SCNC: 140 MMOL/L (ref 136–145)

## 2022-05-08 NOTE — PROGRESS NOTES
"Subjective   Monisha Gardner is a 61 y.o. female.       Chief Complaint -diabetes    History of Present Illness -    ROS    Diabetes-  Patient reports some improvement in blood glucose with the use of Ozempic.  She states she does have a very minimal decrease in appetite.  She continues to use Janumet, Lantus, and follow carbohydrate diet.    Knee pain-  Patient complains of Acute exacerbation of osteoarthritis in bilateral knees.  Pain is described as moderate to severe aching in bilateral knees with right knee pain greater than left knee pain.  Pain is worse with walking.  Onset 2 weeks.  No known new injury.    4/22/2022 x-ray of bilateral knees reveal medial compartment bilateral joint space narrowing.    Anxiety-stable with Cymbalta.  She denies any hallucinations, SI or HI.    Right ear pressure-  She complains of pressure and congestion in the right ear.  She states of feeling as if something is \"crawling.\"  Onset 3 days.  Minimal relief with Zyrtec.    The following portions of the patient's history were reviewed and updated as appropriate: allergies, current medications, past family history, past medical history, past social history, past surgical history and problem list.    Review of Systems    Objective  Vital signs:  /78   Pulse 85   Temp 96.8 °F (36 °C)   Ht 162.6 cm (64.02\")   Wt 73.8 kg (162 lb 9.6 oz)   LMP  (LMP Unknown)   SpO2 97%   BMI 27.90 kg/m²     Physical Exam  Vitals and nursing note reviewed.   Constitutional:       Appearance: Normal appearance. She is well-developed.   HENT:      Head: Normocephalic and atraumatic.      Right Ear: Ear canal normal. There is no impacted cerumen.      Left Ear: Tympanic membrane and ear canal normal. There is no impacted cerumen.      Ears:      Comments: Small amount of clear fluid noted behind right TM without bulging or erythema appreciated     Nose: Congestion present.      Mouth/Throat:      Mouth: Mucous membranes are moist.      Pharynx: " No oropharyngeal exudate or posterior oropharyngeal erythema.   Eyes:      Extraocular Movements: Extraocular movements intact.      Conjunctiva/sclera: Conjunctivae normal.   Neck:      Thyroid: No thyromegaly.      Trachea: No tracheal deviation.   Cardiovascular:      Rate and Rhythm: Normal rate and regular rhythm.      Heart sounds: Normal heart sounds. No murmur heard.  Pulmonary:      Effort: Pulmonary effort is normal. No respiratory distress.      Breath sounds: Normal breath sounds. No wheezing.   Musculoskeletal:         General: Tenderness present.      Cervical back: Normal range of motion and neck supple.      Comments: Coarse crepitus noted bilateral knee joints with flexion/extension.  Negative to varus and valgus stress.  Negative anterior/posterior drawer sign.  Pain reproducible with palpation or manipulation of knee joints.  Mild swelling noted anteriorly bilateral knee joints without erythema.   Lymphadenopathy:      Cervical: No cervical adenopathy.   Skin:     General: Skin is warm and dry.      Findings: No rash.   Neurological:      General: No focal deficit present.      Mental Status: She is alert and oriented to person, place, and time.   Psychiatric:         Mood and Affect: Mood normal.         Behavior: Behavior normal.         Thought Content: Thought content normal.         The following data was reviewed by: ANH Medina on 05/06/2022:  CMP    CMP 11/12/21 5/6/22   Glucose 96 111 (A)   BUN 13 9   Creatinine 0.89 0.93   eGFR Non  Am 65    eGFR  Am 78    Sodium 141 140   Potassium 4.3 4.4   Chloride 104 99   Calcium 9.9 10.0   Total Protein 7.2    Albumin 4.90 4.50   Globulin 2.3    Total Bilirubin 0.3 0.2   Alkaline Phosphatase 75 104   AST (SGOT) 31 32   ALT (SGPT) 28 29   (A) Abnormal value       Comments are available for some flowsheets but are not being displayed.           CBC w/diff    CBC w/Diff 11/12/21   WBC 10.58   RBC 4.77   Hemoglobin 13.2    Hematocrit 41.6   MCV 87.2   MCH 27.7   MCHC 31.7   RDW 16.1 (A)   Platelets 477 (A)   Neutrophil Rel % 52.5   Lymphocyte Rel % 38.6   Monocyte Rel % 5.2   Eosinophil Rel % 2.0   Basophil Rel % 0.9   (A) Abnormal value            Lipid Panel    Lipid Panel 11/12/21   Total Cholesterol 227 (A)   Triglycerides 339 (A)   HDL Cholesterol 43   VLDL Cholesterol 60 (A)   LDL Cholesterol  124 (A)   (A) Abnormal value       Comments are available for some flowsheets but are not being displayed.           TSH    TSH 11/12/21   TSH 1.460           Most Recent A1C    HGBA1C Most Recent 5/6/22   Hemoglobin A1C 5.90 (A)   (A) Abnormal value                   Assessment/Plan     Diagnoses and all orders for this visit:    1. Type 2 diabetes mellitus with hyperglycemia, with long-term current use of insulin (HCC) (Primary)  Comments:  Increase Ozempic 1mg weekly  Continue Janumet and Lantus at current doses  Discussed Metformin use with CKD and possible discontinuation if needed  Orders:  -     Semaglutide,0.25 or 0.5MG/DOS, (Ozempic, 0.25 or 0.5 MG/DOSE,) 2 MG/1.5ML solution pen-injector; Inject 1 mg under the skin into the appropriate area as directed 1 (One) Time Per Week.  Dispense: 2 pen; Refill: 5  -     Comprehensive Metabolic Panel  -     MicroAlbumin, Urine, Random - Urine, Clean Catch  -     Hemoglobin A1c    2. Encounter for screening mammogram for malignant neoplasm of breast  -     Mammo Screening Digital Tomosynthesis Bilateral With CAD; Future    3. Primary osteoarthritis of both knees  Comments:  Bilateral intra-articular knee injections performed today.  Refer to orthopedic specialist for further evaluation and treatment  Orders:  -     Ambulatory Referral to Orthopedic Surgery    4. Eustachian salpingitis, acute, right  Comments:  Start Flonase  Continue Zyrtec  Advised to avoid known environmental allergens when possible  Orders:  -     fluticasone (Flonase) 50 MCG/ACT nasal spray; 2 sprays into the nostril(s)  as directed by provider Daily.  Dispense: 18.2 mL; Refill: 1    5. Anxiety and depression  Comments:  discontinued lexapro and hydroxizine due to hypersomnolence   Continue Cymbalta 90 mg daily due to polyneuropathy  Orders:  -     Comprehensive Metabolic Panel    6. Type 2 diabetes mellitus with hyperglycemia, with long-term current use of insulin (HCC)  Comments:  Advise low carbohydrate diabetic diet  Orders:  -     Semaglutide,0.25 or 0.5MG/DOS, (Ozempic, 0.25 or 0.5 MG/DOSE,) 2 MG/1.5ML solution pen-injector; Inject 1 mg under the skin into the appropriate area as directed 1 (One) Time Per Week.  Dispense: 2 pen; Refill: 5  -     Comprehensive Metabolic Panel  -     MicroAlbumin, Urine, Random - Urine, Clean Catch  -     Hemoglobin A1c      Procedure: Bilateral intra-articular knee joint injections  Provider: iKmmy Vela PA-C  Indication: Bilateral knee pain secondary to exacerbation of osteoarthritis  Timeout was taken to verify correct patient procedure and location  Description: The bilateral knee joints were prepped and draped in sterile fashion.  An intra-articular injection was given into each knee joint using 3 cc 1% lidocaine, 1 cc of triamcinolone and 1 cc of dexamethasone.  Pressure was held and sterile dressings were placed.  No complications  Estimated blood loss: Minimal  Patient tolerated procedure well.    Lidocaine 1% 500 mg 50 mL  NDC number 39715-419-96  Lot #[id number]  Expiration 1/24    Dexamethasone 4 mg/mL  NDC #86089- 165-02  Lot #[id number]  Expiration 9/22    Triamcinolone 40 mg/mL  NDC number 3181-7431-04  Lot number [id number]  Expiration March 2023        Patient was given instructions and counseling regarding his condition or for health maintenance advice. Please see specific information pulled into the AVS if appropriate      This document has been electronically signed by:  Kimmy Vela PA-C

## 2022-05-08 NOTE — PATIENT INSTRUCTIONS
Fall Prevention in the Home, Adult  Falls can cause injuries and can happen to people of all ages. There are many things you can do to make your home safe and to help prevent falls. Ask for help when making these changes.  What actions can I take to prevent falls?  General Instructions  Use good lighting in all rooms. Replace any light bulbs that burn out.  Turn on the lights in dark areas. Use night-lights.  Keep items that you use often in easy-to-reach places. Lower the shelves around your home if needed.  Set up your furniture so you have a clear path. Avoid moving your furniture around.  Do not have throw rugs or other things on the floor that can make you trip.  Avoid walking on wet floors.  If any of your floors are uneven, fix them.  Add color or contrast paint or tape to clearly elda and help you see:  Grab bars or handrails.  First and last steps of staircases.  Where the edge of each step is.  If you use a stepladder:  Make sure that it is fully opened. Do not climb a closed stepladder.  Make sure the sides of the stepladder are locked in place.  Ask someone to hold the stepladder while you use it.  Know where your pets are when moving through your home.  What can I do in the bathroom?         Keep the floor dry. Clean up any water on the floor right away.  Remove soap buildup in the tub or shower.  Use nonskid mats or decals on the floor of the tub or shower.  Attach bath mats securely with double-sided, nonslip rug tape.  If you need to sit down in the shower, use a plastic, nonslip stool.  Install grab bars by the toilet and in the tub and shower. Do not use towel bars as grab bars.  What can I do in the bedroom?  Make sure that you have a light by your bed that is easy to reach.  Do not use any sheets or blankets for your bed that hang to the floor.  Have a firm chair with side arms that you can use for support when you get dressed.  What can I do in the kitchen?  Clean up any spills right away.  If  you need to reach something above you, use a step stool with a grab bar.  Keep electrical cords out of the way.  Do not use floor polish or wax that makes floors slippery.  What can I do with my stairs?  Do not leave any items on the stairs.  Make sure that you have a light switch at the top and the bottom of the stairs.  Make sure that there are handrails on both sides of the stairs. Fix handrails that are broken or loose.  Install nonslip stair treads on all your stairs.  Avoid having throw rugs at the top or bottom of the stairs.  Choose a carpet that does not hide the edge of the steps on the stairs.  Check carpeting to make sure that it is firmly attached to the stairs. Fix carpet that is loose or worn.  What can I do on the outside of my home?  Use bright outdoor lighting.  Fix the edges of walkways and driveways and fix any cracks.  Remove anything that might make you trip as you walk through a door, such as a raised step or threshold.  Trim any bushes or trees on paths to your home.  Check to see if handrails are loose or broken and that both sides of all steps have handrails.  Install guardrails along the edges of any raised decks and porches.  Clear paths of anything that can make you trip, such as tools or rocks.  Have leaves, snow, or ice cleared regularly.  Use sand or salt on paths during winter.  Clean up any spills in your garage right away. This includes grease or oil spills.  What other actions can I take?  Wear shoes that:  Have a low heel. Do not wear high heels.  Have rubber bottoms.  Feel good on your feet and fit well.  Are closed at the toe. Do not wear open-toe sandals.  Use tools that help you move around if needed. These include:  Canes.  Walkers.  Scooters.  Crutches.  Review your medicines with your doctor. Some medicines can make you feel dizzy. This can increase your chance of falling.  Ask your doctor what else you can do to help prevent falls.  Where to find more information  Centers  for Disease Control and Prevention, [place]: www.cdc.gov  [place] [employer] A[place]: www.racquel.nih.gov  Contact a doctor if:  You are afraid of falling at home.  You feel weak, drowsy, or dizzy at home.  You fall at home.  Summary  There are many simple things that you can do to make your home safe and to help prevent falls.  Ways to make your home safe include removing things that can make you trip and installing grab bars in the bathroom.  Ask for help when making these changes in your home.  This information is not intended to replace advice given to you by your health care provider. Make sure you discuss any questions you have with your health care provider.  Document Revised: 07/21/2021 Document Reviewed: 07/21/2021  [employer] Patient Education © 2021 [employer]  Joint Steroid Injection  A joint steroid injection is a procedure to relieve swelling and pain in a joint. Steroids are medicines that reduce inflammation. In this procedure, your health care provider uses a syringe and a needle to inject a steroid medicine into a painful and inflamed joint. A pain-relieving medicine (anesthetic) may be injected along with the steroid. In some cases, your health care provider may use an imaging technique such as ultrasound or fluoroscopy to guide the injection.  Joints that are often treated with steroid injections include the knee, shoulder, hip, and spine. These injections may also be used in the elbow, ankle, and joints of the hands or feet. You may have joint steroid injections as part of your treatment for inflammation caused by:  Gout.  Rheumatoid arthritis.  Advanced wear-and-tear arthritis (osteoarthritis).  Tendinitis.  Bursitis.  Joint steroid injections may be repeated, but having them too often can damage a joint or the skin over the joint. You should not have joint steroid injections less than 6 weeks apart or more than four times a year.  Tell a health care provider about:  Any allergies you have.  All  medicines you are taking, including vitamins, herbs, eye drops, creams, and over-the-counter medicines.  Any problems you or family members have had with anesthetic medicines.  Any blood disorders you have.  Any surgeries you have had.  Any medical conditions you have.  Whether you are pregnant or may be pregnant.  What are the risks?  Generally, this is a safe treatment. However, problems may occur, including:  Infection.  Bleeding.  Allergic reactions to medicines.  Damage to the joint or tissues around the joint.  Thinning of skin or loss of skin color over the joint.  Temporary flushing of the face or chest.  Temporary increase in pain.  Temporary increase in blood sugar.  Failure to relieve inflammation or pain.  What happens before the treatment?  Medicines  Ask your health care provider about:  Changing or stopping your regular medicines. This is especially important if you are taking diabetes medicines or blood thinners.  Taking medicines such as aspirin and ibuprofen. These medicines can thin your blood. Do not take these medicines unless your health care provider tells you to take them.  Taking over-the-counter medicines, vitamins, herbs, and supplements.  General instructions  You may have imaging tests of your joint.  Ask your health care provider if you can drive yourself home after the procedure.  What happens during the treatment?    Your health care provider will position you for the injection and locate the injection site over your joint.  The skin over the joint will be cleaned with a germ-killing soap.  Your health care provider may:  Spray a numbing solution (topical anesthetic) over the injection site.  Inject a local anesthetic under the skin above your joint.  The needle will be placed through your skin into your joint. Your health care provider may use imaging to guide the needle to the right spot for the injection. If imaging is used, a special contrast dye may be injected to confirm that  the needle is in the correct location.  The steroid medicine will be injected into your joint.  Anesthetic may be injected along with the steroid. This may be a medicine that relieves pain for a short time (short-acting anesthetic) or for a longer time (long-acting anesthetic).  The needle will be removed, and an adhesive bandage (dressing) will be placed over the injection site.  The procedure may vary among health care providers and hospitals.  What can I expect after the treatment?  You will be able to go home after the treatment.  It is normal to feel slight flushing for a few days after the injection.  After the treatment, it is common to have an increase in joint pain after the anesthetic has worn off. This may happen about an hour after a short-acting anesthetic or about 8 hours after a longer-acting anesthetic.  You should begin to feel relief from joint pain and swelling after 24 to 48 hours. Contact your health care provider if you do not begin to feel relief after 2 days.  Follow these instructions at home:  Injection site care  Leave the adhesive dressing over your injection site in place until your health care provider says you can remove it.  Check your injection site every day for signs of infection. Check for:  More redness, swelling, or pain.  Fluid or blood.  Warmth.  Pus or a bad smell.  Activity  Return to your normal activities as told by your health care provider. Ask your health care provider what activities are safe for you. You may be asked to limit activities that put stress on the joint for a few days.  Do joint exercises as told by your health care provider.  Do not take baths, swim, or use a hot tub until your health care provider approves. Ask your health care provider if you may take showers. You may only be allowed to take sponge baths.  Managing pain, stiffness, and swelling    If directed, put ice on the joint. To do this:  Put ice in a plastic bag.  Place a towel between your skin  and the bag.  Leave the ice on for 20 minutes, 2-3 times a day.  Remove the ice if your skin turns bright red. This is very important. If you cannot feel pain, heat, or cold, you have a greater risk of damage to the area.  Raise (elevate) your joint above the level of your heart when you are sitting or lying down.    General instructions  Take over-the-counter and prescription medicines only as told by your health care provider.  Do not use any products that contain nicotine or tobacco, such as cigarettes, e-cigarettes, and chewing tobacco. These can delay joint healing. If you need help quitting, ask your health care provider.  If you have diabetes, be aware that your blood sugar may be slightly elevated for several days after the injection.  Keep all follow-up visits. This is important.  Contact a health care provider if you have:  Chills or a fever.  Any signs of infection at your injection site.  Increased pain or swelling or no relief after 2 days.  Summary  A joint steroid injection is a treatment to relieve pain and swelling in a joint.  Steroids are medicines that reduce inflammation. Your health care provider may add an anesthetic along with the steroid.  You may have joint steroid injections as part of your arthritis treatment.  Joint steroid injections may be repeated, but having them too often can damage a joint or the skin over the joint.  Contact your health care provider if you have a fever, chills, or signs of infection, or if you get no relief from joint pain or swelling.  This information is not intended to replace advice given to you by your health care provider. Make sure you discuss any questions you have with your health care provider.  Document Revised: 05/28/2021 Document Reviewed: 05/28/2021  [employer] Patient Education © 2021 [employer]

## 2022-05-12 RX ORDER — ISOSORBIDE MONONITRATE 60 MG/1
60 TABLET, EXTENDED RELEASE ORAL DAILY
Qty: 90 TABLET | Refills: 2 | Status: SHIPPED | OUTPATIENT
Start: 2022-05-12 | End: 2023-02-13

## 2022-05-27 ENCOUNTER — TELEPHONE (OUTPATIENT)
Dept: FAMILY MEDICINE CLINIC | Facility: CLINIC | Age: 62
End: 2022-05-27

## 2022-06-03 ENCOUNTER — OFFICE VISIT (OUTPATIENT)
Dept: FAMILY MEDICINE CLINIC | Facility: CLINIC | Age: 62
End: 2022-06-03

## 2022-06-03 VITALS
DIASTOLIC BLOOD PRESSURE: 88 MMHG | BODY MASS INDEX: 27.49 KG/M2 | WEIGHT: 161 LBS | SYSTOLIC BLOOD PRESSURE: 170 MMHG | HEIGHT: 64 IN | TEMPERATURE: 96.8 F | OXYGEN SATURATION: 98 % | HEART RATE: 67 BPM

## 2022-06-03 DIAGNOSIS — I10 PRIMARY HYPERTENSION: Chronic | ICD-10-CM

## 2022-06-03 DIAGNOSIS — G47.62 NOCTURNAL LEG CRAMPS: Chronic | ICD-10-CM

## 2022-06-03 DIAGNOSIS — E11.65 TYPE 2 DIABETES MELLITUS WITH HYPERGLYCEMIA, WITH LONG-TERM CURRENT USE OF INSULIN: Primary | Chronic | ICD-10-CM

## 2022-06-03 DIAGNOSIS — Z79.4 TYPE 2 DIABETES MELLITUS WITH HYPERGLYCEMIA, WITH LONG-TERM CURRENT USE OF INSULIN: Primary | Chronic | ICD-10-CM

## 2022-06-03 PROCEDURE — 99214 OFFICE O/P EST MOD 30 MIN: CPT | Performed by: PHYSICIAN ASSISTANT

## 2022-06-03 RX ORDER — SEMAGLUTIDE 1.34 MG/ML
INJECTION, SOLUTION SUBCUTANEOUS
COMMUNITY
Start: 2022-05-20 | End: 2022-11-04

## 2022-06-03 NOTE — PROGRESS NOTES
"Subjective   Monisha Gardner is a 61 y.o. female.       Chief Complaint -diabetes    History of Present Illness -    ROS    Diabetes-  Blood glucose is improved with the use of Ozempic.  Patient continues to use Janumet and Lantus along with a low carbohydrate diet.    Hypertension-May be elevated today due to increased stress and anxiety this morning with external stressors.  Patient reports blood pressure is usually less than 130/80 consistently with the use of metoprolol and losartan    Nocturnal leg cramps-controlled with Requip    The following portions of the patient's history were reviewed and updated as appropriate: allergies, current medications, past family history, past medical history, past social history, past surgical history and problem list.    Review of Systems    Objective  Vital signs:  /88   Pulse 67   Temp 96.8 °F (36 °C) (Temporal)   Ht 162.6 cm (64.02\")   Wt 73 kg (161 lb)   LMP  (LMP Unknown)   SpO2 98%   BMI 27.62 kg/m²     Physical Exam  Vitals and nursing note reviewed.   Constitutional:       Appearance: Normal appearance. She is well-developed.   Eyes:      Extraocular Movements: Extraocular movements intact.      Conjunctiva/sclera: Conjunctivae normal.   Cardiovascular:      Rate and Rhythm: Normal rate and regular rhythm.      Heart sounds: Normal heart sounds. No murmur heard.  Pulmonary:      Effort: Pulmonary effort is normal. No respiratory distress.      Breath sounds: Normal breath sounds. No wheezing.   Musculoskeletal:         General: No tenderness.   Skin:     General: Skin is warm and dry.      Findings: No rash.   Neurological:      Mental Status: She is alert and oriented to person, place, and time.   Psychiatric:         Mood and Affect: Mood normal.         Behavior: Behavior normal.         Thought Content: Thought content normal.         The following data was reviewed by: ANH Medina on 06/03/2022:  CMP    CMP 11/12/21 5/6/22   Glucose 96 111 " (A)   BUN 13 9   Creatinine 0.89 0.93   eGFR Non  Am 65    eGFR  Am 78    Sodium 141 140   Potassium 4.3 4.4   Chloride 104 99   Calcium 9.9 10.0   Total Protein 7.2    Albumin 4.90 4.50   Globulin 2.3    Total Bilirubin 0.3 0.2   Alkaline Phosphatase 75 104   AST (SGOT) 31 32   ALT (SGPT) 28 29   (A) Abnormal value       Comments are available for some flowsheets but are not being displayed.           CBC w/diff    CBC w/Diff 11/12/21   WBC 10.58   RBC 4.77   Hemoglobin 13.2   Hematocrit 41.6   MCV 87.2   MCH 27.7   MCHC 31.7   RDW 16.1 (A)   Platelets 477 (A)   Neutrophil Rel % 52.5   Lymphocyte Rel % 38.6   Monocyte Rel % 5.2   Eosinophil Rel % 2.0   Basophil Rel % 0.9   (A) Abnormal value            Lipid Panel    Lipid Panel 11/12/21   Total Cholesterol 227 (A)   Triglycerides 339 (A)   HDL Cholesterol 43   VLDL Cholesterol 60 (A)   LDL Cholesterol  124 (A)   (A) Abnormal value       Comments are available for some flowsheets but are not being displayed.           TSH    TSH 11/12/21   TSH 1.460           Most Recent A1C    HGBA1C Most Recent 5/6/22   Hemoglobin A1C 5.90 (A)   (A) Abnormal value                   Assessment & Plan     Diagnoses and all orders for this visit:    1. Type 2 diabetes mellitus with hyperglycemia, with long-term current use of insulin (HCC) (Primary)  Comments:  Continue Ozempic Janumet and Lantus  Advised low carbohydrate diet    2. Primary hypertension  Comments:  Continue to monitor this is likely temporarily elevated today due to stress  Continue metoprolol and losartan  Continue to monitor    3. Nocturnal leg cramps  Comments:  Continue Requip            Patient was given instructions and counseling regarding his condition or for health maintenance advice. Please see specific information pulled into the AVS if appropriate      This document has been electronically signed by:  Kimmy Vela PA-C

## 2022-06-03 NOTE — PATIENT INSTRUCTIONS
"https://www.diabeteseducator.org/docs/default-source/living-with-diabetes/conquering-the-grocery-store-v1.pdf?sfvrsn=4\">   Carbohydrate Counting for Diabetes Mellitus, Adult  Carbohydrate counting is a method of keeping track of how many carbohydrates you eat. Eating carbohydrates naturally increases the amount of sugar (glucose) in the blood. Counting how many carbohydrates you eat improves your blood glucose control, which helps you manage your diabetes.  It is important to know how many carbohydrates you can safely have in each meal. This is different for every person. A dietitian can help you make a meal plan and calculate how many carbohydrates you should have at each meal and snack.  What foods contain carbohydrates?  Carbohydrates are found in the following foods:  Grains, such as breads and cereals.  Dried beans and soy products.  Starchy vegetables, such as potatoes, peas, and corn.  Fruit and fruit juices.  Milk and yogurt.  Sweets and snack foods, such as cake, cookies, candy, chips, and soft drinks.  How do I count carbohydrates in foods?  There are two ways to count carbohydrates in food. You can read food labels or learn standard serving sizes of foods. You can use either of the methods or a combination of both.  Using the Nutrition Facts label  The Nutrition Facts list is included on the labels of almost all packaged foods and beverages in the [place] It includes:  The serving size.  Information about nutrients in each serving, including the grams (g) of carbohydrate per serving.  To use the Nutrition Facts:  Decide how many servings you will have.  Multiply the number of servings by the number of carbohydrates per serving.  The resulting number is the total amount of carbohydrates that you will be having.  Learning the standard serving sizes of foods  When you eat carbohydrate foods that are not packaged or do not include Nutrition Facts on the label, you need to measure the servings in order to count " the amount of carbohydrates.  Measure the foods that you will eat with a food scale or measuring cup, if needed.  Decide how many standard-size servings you will eat.  Multiply the number of servings by 15. For foods that contain carbohydrates, one serving equals 15 g of carbohydrates.  For example, if you eat 2 cups or 10 oz (300 g) of strawberries, you will have eaten 2 servings and 30 g of carbohydrates (2 servings x 15 g = 30 g).  For foods that have more than one food mixed, such as soups and casseroles, you must count the carbohydrates in each food that is included.  The following list contains standard serving sizes of common carbohydrate-rich foods. Each of these servings has about 15 g of carbohydrates:  1 slice of bread.  1 six-inch (15 cm) tortilla.  ? cup or 2 oz (53 g) cooked rice or pasta.  ½ cup or 3 oz (85 g) cooked or canned, drained and rinsed beans or lentils.  ½ cup or 3 oz (85 g) starchy vegetable, such as peas, corn, or squash.  ½ cup or 4 oz (120 g) hot cereal.  ½ cup or 3 oz (85 g) boiled or mashed potatoes, or ¼ or 3 oz (85 g) of a large baked potato.  ½ cup or 4 fl oz (118 mL) fruit juice.  1 cup or 8 fl oz (237 mL) milk.  1 small or 4 oz (106 g) apple.  ½ or 2 oz (63 g) of a medium banana.  1 cup or 5 oz (150 g) strawberries.  3 cups or 1 oz (24 g) popped popcorn.  What is an example of carbohydrate counting?  To calculate the number of carbohydrates in this sample meal, follow the steps shown below.  Sample meal  3 oz (85 g) chicken breast.  ? cup or 4 oz (106 g) brown rice.  ½ cup or 3 oz (85 g) corn.  1 cup or 8 fl oz (237 mL) milk.  1 cup or 5 oz (150 g) strawberries with sugar-free whipped topping.  Carbohydrate calculation  Identify the foods that contain carbohydrates:  Rice.  Corn.  Milk.  Strawberries.  Calculate how many servings you have of each food:  2 servings rice.  1 serving corn.  1 serving milk.  1 serving strawberries.  Multiply each number of servings by 15   servings rice x 15 g = 30 g.  1 serving corn x 15 g = 15 g.  1 serving milk x 15 g = 15 g.  1 serving strawberries x 15 g = 15 g.  Add together all of the amounts to find the total grams of carbohydrates eaten:  30 g + 15 g + 15 g + 15 g = 75 g of carbohydrates total.  What are tips for following this plan?  Shopping  Develop a meal plan and then make a shopping list.  Buy fresh and frozen vegetables, fresh and frozen fruit, dairy, eggs, beans, lentils, and whole grains.  Look at food labels. Choose foods that have more fiber and less sugar.  Avoid processed foods and foods with added sugars.  Meal planning  Aim to have the same amount of carbohydrates at each meal and for each snack time.  Plan to have regular, balanced meals and snacks.  Where to find more information  American Diabetes Association: www.diabetes.org  Centers for Disease Control and Prevention: www.cdc.gov  Summary  Carbohydrate counting is a method of keeping track of how many carbohydrates you eat.  Eating carbohydrates naturally increases the amount of sugar (glucose) in the blood.  Counting how many carbohydrates you eat improves your blood glucose control, which helps you manage your diabetes.  A dietitian can help you make a meal plan and calculate how many carbohydrates you should have at each meal and snack.  This information is not intended to replace advice given to you by your health care provider. Make sure you discuss any questions you have with your health care provider.  Document Revised: 12/17/2020 Document Reviewed: 12/18/2020  [employer] Patient Education © 2021 [employer]

## 2022-06-15 ENCOUNTER — PRIOR AUTHORIZATION (OUTPATIENT)
Dept: FAMILY MEDICINE CLINIC | Facility: CLINIC | Age: 62
End: 2022-06-15

## 2022-07-21 ENCOUNTER — TELEPHONE (OUTPATIENT)
Dept: FAMILY MEDICINE CLINIC | Facility: CLINIC | Age: 62
End: 2022-07-21

## 2022-07-21 RX ORDER — AZITHROMYCIN 250 MG/1
TABLET, FILM COATED ORAL
Qty: 6 TABLET | Refills: 0 | Status: SHIPPED | OUTPATIENT
Start: 2022-07-21 | End: 2022-08-05

## 2022-07-21 NOTE — TELEPHONE ENCOUNTER
Pt is aware of this information.       ----- Message from ANH Medina sent at 7/21/2022  1:49 PM EDT -----  Inform the patient I sent in prescription for azithromycin (Z-Hayder) which should be sufficient for predental procedure endocarditis prophylaxis.    ----- Message -----  From: Sophy Stevens MA  Sent: 7/21/2022  11:25 AM EDT  To: ANH Medina    Patient called and stated that she needed an antibiotic before she goes to the dentist tomorrow. She said she has had a hip replacement and they told her she needed an antibiotic every time she goes to the dentist.

## 2022-08-04 DIAGNOSIS — F41.9 ANXIETY: Chronic | ICD-10-CM

## 2022-08-04 RX ORDER — GABAPENTIN 300 MG/1
CAPSULE ORAL
Qty: 90 CAPSULE | Refills: 2 | OUTPATIENT
Start: 2022-08-04

## 2022-08-04 RX ORDER — CARIPRAZINE 1.5 MG/1
CAPSULE, GELATIN COATED ORAL
Qty: 30 CAPSULE | Refills: 5 | OUTPATIENT
Start: 2022-08-04

## 2022-08-05 ENCOUNTER — OFFICE VISIT (OUTPATIENT)
Dept: FAMILY MEDICINE CLINIC | Facility: CLINIC | Age: 62
End: 2022-08-05

## 2022-08-05 DIAGNOSIS — I49.3 PVC (PREMATURE VENTRICULAR CONTRACTION): Chronic | ICD-10-CM

## 2022-08-05 DIAGNOSIS — I25.118 CORONARY ARTERY DISEASE INVOLVING NATIVE CORONARY ARTERY OF NATIVE HEART WITH OTHER FORM OF ANGINA PECTORIS: ICD-10-CM

## 2022-08-05 DIAGNOSIS — F51.04 PSYCHOPHYSIOLOGICAL INSOMNIA: Chronic | ICD-10-CM

## 2022-08-05 DIAGNOSIS — T30.0 BURN: Primary | ICD-10-CM

## 2022-08-05 DIAGNOSIS — Z79.4 TYPE 2 DIABETES MELLITUS WITH HYPERGLYCEMIA, WITH LONG-TERM CURRENT USE OF INSULIN: Chronic | ICD-10-CM

## 2022-08-05 DIAGNOSIS — E11.65 TYPE 2 DIABETES MELLITUS WITH HYPERGLYCEMIA, WITH LONG-TERM CURRENT USE OF INSULIN: Chronic | ICD-10-CM

## 2022-08-05 DIAGNOSIS — K20.90 ESOPHAGITIS: ICD-10-CM

## 2022-08-05 DIAGNOSIS — G25.81 RLS (RESTLESS LEGS SYNDROME): Chronic | ICD-10-CM

## 2022-08-05 DIAGNOSIS — Z12.31 ENCOUNTER FOR SCREENING MAMMOGRAM FOR MALIGNANT NEOPLASM OF BREAST: ICD-10-CM

## 2022-08-05 DIAGNOSIS — E78.2 MIXED HYPERLIPIDEMIA: Chronic | ICD-10-CM

## 2022-08-05 DIAGNOSIS — M1A.09X0 IDIOPATHIC CHRONIC GOUT OF MULTIPLE SITES WITHOUT TOPHUS: ICD-10-CM

## 2022-08-05 DIAGNOSIS — I10 ESSENTIAL HYPERTENSION: ICD-10-CM

## 2022-08-05 PROCEDURE — 99214 OFFICE O/P EST MOD 30 MIN: CPT | Performed by: PHYSICIAN ASSISTANT

## 2022-08-05 PROCEDURE — 93000 ELECTROCARDIOGRAM COMPLETE: CPT | Performed by: PHYSICIAN ASSISTANT

## 2022-08-05 RX ORDER — DEXLANSOPRAZOLE 60 MG/1
60 CAPSULE, DELAYED RELEASE ORAL DAILY
Qty: 90 CAPSULE | Refills: 3 | Status: SHIPPED | OUTPATIENT
Start: 2022-08-05

## 2022-08-05 RX ORDER — HYDROXYZINE PAMOATE 50 MG/1
50 CAPSULE ORAL
Qty: 90 CAPSULE | Refills: 3 | Status: SHIPPED | OUTPATIENT
Start: 2022-08-05 | End: 2022-11-04

## 2022-08-05 RX ORDER — ROPINIROLE 2 MG/1
2 TABLET, FILM COATED ORAL NIGHTLY
Qty: 90 TABLET | Refills: 3 | Status: SHIPPED | OUTPATIENT
Start: 2022-08-05

## 2022-08-05 RX ORDER — FENOFIBRATE 160 MG/1
160 TABLET ORAL DAILY
Qty: 90 TABLET | Refills: 3 | Status: SHIPPED | OUTPATIENT
Start: 2022-08-05

## 2022-08-05 RX ORDER — ALLOPURINOL 100 MG/1
100 TABLET ORAL 2 TIMES DAILY
Qty: 180 TABLET | Refills: 3 | Status: SHIPPED | OUTPATIENT
Start: 2022-08-05

## 2022-08-05 RX ORDER — SITAGLIPTIN AND METFORMIN HYDROCHLORIDE 1000; 50 MG/1; MG/1
1 TABLET, FILM COATED ORAL 2 TIMES DAILY WITH MEALS
Qty: 180 TABLET | Refills: 3 | Status: SHIPPED | OUTPATIENT
Start: 2022-08-05 | End: 2022-10-13 | Stop reason: SDUPTHER

## 2022-08-05 RX ORDER — LOSARTAN POTASSIUM 100 MG/1
100 TABLET ORAL DAILY
Qty: 90 TABLET | Refills: 3 | Status: SHIPPED | OUTPATIENT
Start: 2022-08-05 | End: 2023-02-23 | Stop reason: SDUPTHER

## 2022-08-05 RX ORDER — METOPROLOL TARTRATE 50 MG/1
50 TABLET, FILM COATED ORAL 2 TIMES DAILY
Qty: 180 TABLET | Refills: 3 | Status: SHIPPED | OUTPATIENT
Start: 2022-08-05 | End: 2022-08-19

## 2022-08-05 RX ORDER — GABAPENTIN 300 MG/1
300 CAPSULE ORAL 3 TIMES DAILY
Qty: 90 CAPSULE | Refills: 2 | Status: SHIPPED | OUTPATIENT
Start: 2022-08-05 | End: 2022-11-04 | Stop reason: SDUPTHER

## 2022-08-07 VITALS
WEIGHT: 160 LBS | HEART RATE: 101 BPM | TEMPERATURE: 98.1 F | DIASTOLIC BLOOD PRESSURE: 68 MMHG | HEIGHT: 64 IN | OXYGEN SATURATION: 98 % | SYSTOLIC BLOOD PRESSURE: 142 MMHG | BODY MASS INDEX: 27.31 KG/M2

## 2022-08-07 PROBLEM — G25.81 RLS (RESTLESS LEGS SYNDROME): Status: ACTIVE | Noted: 2022-08-07

## 2022-08-07 PROBLEM — I49.3 PVC (PREMATURE VENTRICULAR CONTRACTION): Status: ACTIVE | Noted: 2022-08-07

## 2022-08-07 PROBLEM — F51.04 PSYCHOPHYSIOLOGICAL INSOMNIA: Chronic | Status: ACTIVE | Noted: 2022-08-07

## 2022-08-07 NOTE — PATIENT INSTRUCTIONS
"Fat and Cholesterol Restricted Eating Plan  Getting too much fat and cholesterol in your diet may cause health problems. Choosing the right foods helps keep your fat and cholesterol at normal levels. This can keep you from getting certain diseases.  Your doctor may recommend an eating plan that includes:  Total fat: ______% or less of total calories a day.  Saturated fat: ______% or less of total calories a day.  Cholesterol: less than _________mg a day.  Fiber: ______g a day.  What are tips for following this plan?  Meal planning  At meals, divide your plate into four equal parts:  Fill one-half of your plate with vegetables and green salads.  Fill one-fourth of your plate with whole grains.  Fill one-fourth of your plate with low-fat (lean) protein foods.  Eat fish that is high in omega-3 fats at least two times a week. This includes mackerel, tuna, sardines, and salmon.  Eat foods that are high in fiber, such as whole grains, beans, apples, broccoli, carrots, peas, and barley.  General tips    Work with your doctor to lose weight if you need to.  Avoid:  Foods with added sugar.  Fried foods.  Foods with partially hydrogenated oils.  Limit alcohol intake to no more than 1 drink a day for nonpregnant women and 2 drinks a day for men. One drink equals 12 oz of beer, 5 oz of wine, or 1½ oz of hard liquor.    Reading food labels  Check food labels for:  Trans fats.  Partially hydrogenated oils.  Saturated fat (g) in each serving.  Cholesterol (mg) in each serving.  Fiber (g) in each serving.  Choose foods with healthy fats, such as:  Monounsaturated fats.  Polyunsaturated fats.  Omega-3 fats.  Choose grain products that have whole grains. Look for the word \"whole\" as the first word in the ingredient list.  Cooking  Cook foods using low-fat methods. These include baking, boiling, grilling, and broiling.  Eat more home-cooked foods. Eat at restaurants and buffets less often.  Avoid cooking using saturated fats, such as " butter, cream, palm oil, palm kernel oil, and coconut oil.  Recommended foods    Fruits  All fresh, canned (in natural juice), or frozen fruits.  Vegetables  Fresh or frozen vegetables (raw, steamed, roasted, or grilled). Green salads.  Grains  Whole grains, such as whole wheat or whole grain breads, crackers, cereals, and pasta. Unsweetened oatmeal, bulgur, barley, quinoa, or brown rice. Corn or whole wheat flour tortillas.  Meats and other protein foods  Ground beef (85% or leaner), grass-fed beef, or beef trimmed of fat. Skinless chicken or turkey. Ground chicken or turkey. Pork trimmed of fat. All fish and seafood. Egg whites. Dried beans, peas, or lentils. Unsalted nuts or seeds. Unsalted canned beans. Nut butters without added sugar or oil.  Dairy  Low-fat or nonfat dairy products, such as skim or 1% milk, 2% or reduced-fat cheeses, low-fat and fat-free ricotta or cottage cheese, or plain low-fat and nonfat yogurt.  Fats and oils  Tub margarine without trans fats. Light or reduced-fat mayonnaise and salad dressings. Avocado. Olive, canola, sesame, or safflower oils.  The items listed above may not be a complete list of foods and beverages you can eat. Contact a dietitian for more information.  Foods to avoid  Fruits  Canned fruit in heavy syrup. Fruit in cream or butter sauce. Fried fruit.  Vegetables  Vegetables cooked in cheese, cream, or butter sauce. Fried vegetables.  Grains  White bread. White pasta. White rice. Cornbread. Bagels, pastries, and croissants. Crackers and snack foods that contain trans fat and hydrogenated oils.  Meats and other protein foods  Fatty cuts of meat. Ribs, chicken wings, villar, sausage, bologna, salami, chitterlings, fatback, hot dogs, bratwurst, and packaged lunch meats. Liver and organ meats. Whole eggs and egg yolks. Chicken and turkey with skin. Fried meat.  Dairy  Whole or 2% milk, cream, half-and-half, and cream cheese. Whole milk cheeses. Whole-fat or sweetened yogurt.  Full-fat cheeses. Nondairy creamers and whipped toppings. Processed cheese, cheese spreads, and cheese curds.  Beverages  Alcohol. Sugar-sweetened drinks such as sodas, lemonade, and fruit drinks.  Fats and oils  Butter, stick margarine, lard, shortening, ghee, or villar fat. Coconut, palm kernel, and palm oils.  Sweets and desserts  Corn syrup, sugars, honey, and molasses. Candy. Jam and jelly. Syrup. Sweetened cereals. Cookies, pies, cakes, donuts, muffins, and ice cream.  The items listed above may not be a complete list of foods and beverages you should avoid. Contact a dietitian for more information.  Summary  Choosing the right foods helps keep your fat and cholesterol at normal levels. This can keep you from getting certain diseases.  At meals, fill one-half of your plate with vegetables and green salads.  Eat high-fiber foods, like whole grains, beans, apples, carrots, peas, and barley.  Limit added sugar, saturated fats, alcohol, and fried foods.  This information is not intended to replace advice given to you by your health care provider. Make sure you discuss any questions you have with your health care provider.  Document Revised: 04/21/2021 Document Reviewed: 04/21/2021  Elsevier Patient Education © 2021 [employer]

## 2022-08-07 NOTE — PROGRESS NOTES
"Subjective   Monisha Gardner is a 61 y.o. female.       Chief Complaint - burn    History of Present Illness -    ROS    burn-  Patient complains of sunburn.  She has used Silvadene in the past for similar burns that was efficacious.    Gout-  Stable with allopurinol for gout prophylaxis    Esophagitis-stable with Dexilant and dietary modification    Hyperlipidemia-stable with Repatha and low-cholesterol diet    Insomnia-stable with Vistaril and ropinirole    Hypertension-stable with losartan and metoprolol    Restless leg syndrome-stable with ropinirole    Diabetes mellitus type 2-stable with Ozempic Janumet and Lantus along with diet    Coronary artery disease-stable with risk factor modification including ropinirole and fenofibrate.    The following portions of the patient's history were reviewed and updated as appropriate: allergies, current medications, past family history, past medical history, past social history, past surgical history and problem list.    Review of Systems    Objective  Vital signs:  /68 (BP Location: Left arm)   Pulse 101   Temp 98.1 °F (36.7 °C) (Temporal)   Ht 162.6 cm (64.02\")   Wt 72.6 kg (160 lb)   LMP  (LMP Unknown)   SpO2 98%   BMI 27.45 kg/m²     Physical Exam  Vitals and nursing note reviewed.   Constitutional:       Appearance: Normal appearance. She is well-developed.   Eyes:      Extraocular Movements: Extraocular movements intact.      Conjunctiva/sclera: Conjunctivae normal.   Cardiovascular:      Rate and Rhythm: Normal rate. Rhythm irregular.      Heart sounds: Normal heart sounds. No murmur heard.     Comments: Irregular rhythm noted on physical exam today although patient is asymptomatic  Pulmonary:      Effort: Pulmonary effort is normal. No respiratory distress.      Breath sounds: Normal breath sounds. No wheezing.   Musculoskeletal:         General: No tenderness.   Skin:     General: Skin is warm and dry.      Findings: No rash.      Comments: Erythema and " sunburn noted on bilateral arms   Neurological:      Mental Status: She is alert and oriented to person, place, and time.   Psychiatric:         Mood and Affect: Mood normal.         Behavior: Behavior normal.         Thought Content: Thought content normal.         The following data was reviewed by: ANH Medina on 08/05/2022:  CMP    CMP 11/12/21 5/6/22   Glucose 96 111 (A)   BUN 13 9   Creatinine 0.89 0.93   eGFR Non  Am 65    eGFR  Am 78    Sodium 141 140   Potassium 4.3 4.4   Chloride 104 99   Calcium 9.9 10.0   Total Protein 7.2    Albumin 4.90 4.50   Globulin 2.3    Total Bilirubin 0.3 0.2   Alkaline Phosphatase 75 104   AST (SGOT) 31 32   ALT (SGPT) 28 29   (A) Abnormal value       Comments are available for some flowsheets but are not being displayed.           CBC w/diff    CBC w/Diff 11/12/21   WBC 10.58   RBC 4.77   Hemoglobin 13.2   Hematocrit 41.6   MCV 87.2   MCH 27.7   MCHC 31.7   RDW 16.1 (A)   Platelets 477 (A)   Neutrophil Rel % 52.5   Lymphocyte Rel % 38.6   Monocyte Rel % 5.2   Eosinophil Rel % 2.0   Basophil Rel % 0.9   (A) Abnormal value            Lipid Panel    Lipid Panel 11/12/21   Total Cholesterol 227 (A)   Triglycerides 339 (A)   HDL Cholesterol 43   VLDL Cholesterol 60 (A)   LDL Cholesterol  124 (A)   (A) Abnormal value       Comments are available for some flowsheets but are not being displayed.           TSH    TSH 11/12/21   TSH 1.460           Most Recent A1C    HGBA1C Most Recent 5/6/22   Hemoglobin A1C 5.90 (A)   (A) Abnormal value            Data reviewed: EKG performed and reviewed today       Assessment & Plan     Diagnoses and all orders for this visit:    1. Burn (Primary)  Comments:  apply to affected areas of sunburn  Orders:  -     silver sulfadiazine (Silvadene) 1 % cream; Apply 1 application topically to the appropriate area as directed 2 (Two) Times a Day.  Dispense: 400 g; Refill: 0    2. Idiopathic chronic gout of multiple sites without  tophus  -     allopurinol (ZYLOPRIM) 100 MG tablet; Take 1 tablet by mouth 2 (Two) Times a Day.  Dispense: 180 tablet; Refill: 3    3. Esophagitis  Comments:  continue dexilant  Orders:  -     Dexilant 60 MG capsule; Take 1 capsule by mouth Daily.  Dispense: 90 capsule; Refill: 3    4. Mixed hyperlipidemia  Comments:  Failed statin therapy-elevated liver enzymes  Failed fenofibrate monotherapy  Continue Repatha  Advise low-cholesterol diet  Orders:  -     Evolocumab (REPATHA) solution prefilled syringe injection; Inject 1 mL under the skin into the appropriate area as directed Every 14 (Fourteen) Days.  Dispense: 2 mL; Refill: 5  -     fenofibrate 160 MG tablet; Take 1 tablet by mouth Daily.  Dispense: 90 tablet; Refill: 3    5. Psychophysiological insomnia  Comments:  Continue Vistaril and ropinirole  Discussed how pain can negatively affect sleep  Discussed starting Minipress for nightmares if needed in the future  Orders:  -     hydrOXYzine pamoate (Vistaril) 50 MG capsule; Take 1 capsule by mouth every night at bedtime.  Dispense: 90 capsule; Refill: 3  -     Urine Drug Screen - Urine, Clean Catch; Future    6. Essential hypertension  Comments:  Continue losartan and metoprolol  Orders:  -     losartan (COZAAR) 100 MG tablet; Take 1 tablet by mouth Daily.  Dispense: 90 tablet; Refill: 3  -     metoprolol tartrate (LOPRESSOR) 50 MG tablet; Take 1 tablet by mouth 2 (Two) Times a Day.  Dispense: 180 tablet; Refill: 3    7. RLS (restless legs syndrome)  Comments:  Continue ropinirole 2 mg nightly  Orders:  -     rOPINIRole (REQUIP) 2 MG tablet; Take 1 tablet by mouth Every Night.  Dispense: 90 tablet; Refill: 3    8. Type 2 diabetes mellitus with hyperglycemia, with long-term current use of insulin (HCC)  Comments:  Continue Ozempic, Janumet and Lantus   Advised a low carbohydrate diabetic diet  Discussed Metformin use with CKD and possible discontinuation if needed  Orders:  -     sitaGLIPtin-metFORMIN (Janumet)   MG per tablet; Take 1 tablet by mouth 2 (Two) Times a Day With Meals.  Dispense: 180 tablet; Refill: 3  -     Urine Drug Screen - Urine, Clean Catch; Future    9. Encounter for screening mammogram for malignant neoplasm of breast  -     Mammo Screening Digital Tomosynthesis Bilateral With CAD; Future    10. Coronary artery disease involving native coronary artery of native heart with other form of angina pectoris (HCC)  -     Ambulatory Referral to Cardiology    11. PVC (premature ventricular contraction)  Comments:  Discussed EKG findings of PVC with patient today  Refer to cardiology for further evaluation  Orders:  -     Ambulatory Referral to Cardiology  -     ECG 12 Lead      8/5/2022 at 11:23 AM  EKG  Performed by: Rafaela Cha CMA  Interpreted by: Kimmy Vela PA-C  Indication: Irregular cardiac rhythm on physical exam  Comparison: Compared with EKG 12/11/2020 showing new PVCs  Rate: 94 bpm  Rhythm: Sinus rhythm with occasional PVCs noted  Interpretation: Sinus rhythm with occasional PVCs and marked left axis deviation.  Old septal infarct changes noted.        Patient was given instructions and counseling regarding his condition or for health maintenance advice. Please see specific information pulled into the AVS if appropriate      This document has been electronically signed by:  Kimmy Vela PA-C

## 2022-08-12 ENCOUNTER — TELEPHONE (OUTPATIENT)
Dept: CARDIOLOGY | Facility: CLINIC | Age: 62
End: 2022-08-12

## 2022-08-12 NOTE — TELEPHONE ENCOUNTER
Patient called office under the direction of her PCP which did an EKG. Patient reports that her PCP said she had PVC's and instructed her to see her Cardiologist. Patient given appointment this Monday but states that she can only come on a Friday. Appointment moved to Friday per patient request. Patient states that she feels fine and is not having any symptoms at this time. Instructed to call the office with any changes or concerns prior to Friday appt. Patient verbalized understanding.

## 2022-08-19 ENCOUNTER — APPOINTMENT (OUTPATIENT)
Dept: MAMMOGRAPHY | Facility: HOSPITAL | Age: 62
End: 2022-08-19

## 2022-08-19 ENCOUNTER — OFFICE VISIT (OUTPATIENT)
Dept: CARDIOLOGY | Facility: CLINIC | Age: 62
End: 2022-08-19

## 2022-08-19 VITALS
WEIGHT: 165 LBS | OXYGEN SATURATION: 96 % | DIASTOLIC BLOOD PRESSURE: 70 MMHG | BODY MASS INDEX: 28.17 KG/M2 | HEART RATE: 93 BPM | HEIGHT: 64 IN | SYSTOLIC BLOOD PRESSURE: 160 MMHG

## 2022-08-19 DIAGNOSIS — R00.2 PALPITATIONS: Primary | ICD-10-CM

## 2022-08-19 PROCEDURE — 93000 ELECTROCARDIOGRAM COMPLETE: CPT | Performed by: PHYSICIAN ASSISTANT

## 2022-08-19 PROCEDURE — 99214 OFFICE O/P EST MOD 30 MIN: CPT | Performed by: PHYSICIAN ASSISTANT

## 2022-08-19 RX ORDER — METOPROLOL TARTRATE 50 MG/1
75 TABLET, FILM COATED ORAL 2 TIMES DAILY
Qty: 180 TABLET | Refills: 3 | Status: SHIPPED | OUTPATIENT
Start: 2022-08-19 | End: 2022-12-02

## 2022-08-19 NOTE — PROGRESS NOTES
"[facility] Cardiology    Encounter Date: 2020    Patient ID: Monisha Gardner is a 61 y.o. female.  : 1960     PCP: Kimmy Vela PA       Chief Complaint: PVC's, Coronary Artery Disease, Hypertension, and Hyperlipidemia      PROBLEM LIST:  1. Coronary artery disease:  a. Previous MI and stent to unknown vessel  2 years ago, incomplete database.   b. Hospital presentation with acute coronary syndrome and features of inferior ST elevations, 2015.  c. [facility], 2015, Dr. Watts: 95% stenosis of the ostial/proximal RCA s/p 3.5 x 26 mm Resolute VITALIY. No other significant disease. EF 65%.  d. [facility], 2017: EF >70%. No evidence of ischemia.  e. [facility]'S 2021, No Iscemia   2. Peripheral arterial disease  a. IOANA, 2017: Right IOANA 1.16, Left IOANA 1.19.    3. Hypertension.   4. Dyslipidemia. On Repatha   5. Type 2 diabetes. On Insulin.   6. Overweight.   7. Osteoarthritis.     History of Present Illness  Pleasant 61-year-old female presents today for follow-up at the request of her primary care provider regarding \"PVCs\".  She states she saw her provider and she was told she had PVCs and that she need to go see cardiology.  Patient denies frequent palpitations.  She states is not overly bothersome to her.  She has not had any dizziness near syncope or syncope associate with palpitations.  She states overall she feels doing well she walks for exercise with no angina symptoms.  Occasionally she has some atypical noncardiac type chest pain.  She is compliant with her medication.  She does report that her blood pressure been elevated recently 140s 150s systolic range.    Allergies   Allergen Reactions   • Ciprofloxacin Anaphylaxis   • Penicillins Shortness Of Breath   • Statins Myalgia   • Fish Oil [Omega-3 Fatty Acids] Nausea And Vomiting         Current Outpatient Medications:   •  allopurinol (ZYLOPRIM) 100 MG tablet, Take 1 tablet by mouth 2 (Two) Times a Day., " Disp: 180 tablet, Rfl: 3  •  BD Pen Needle Raquel U/F 32G X 4 MM misc, 1 Device by Other route 3 (Three) Times a Day., Disp: 300 each, Rfl: 3  •  Cariprazine HCl (Vraylar) 3 MG capsule capsule, Take 1 capsule by mouth Daily., Disp: 30 capsule, Rfl: 5  •  cetirizine (zyrTEC) 10 MG tablet, Take 1 tablet by mouth Daily., Disp: 90 tablet, Rfl: 3  •  Dexilant 60 MG capsule, Take 1 capsule by mouth Daily., Disp: 90 capsule, Rfl: 3  •  DULoxetine (CYMBALTA) 30 MG capsule, Take 3 capsules by mouth Daily., Disp: 90 capsule, Rfl: 5  •  Easy Touch Lancets 30G/Twist misc, Use tid, Disp: 300 each, Rfl: 3  •  Evolocumab (REPATHA) solution prefilled syringe injection, Inject 1 mL under the skin into the appropriate area as directed Every 14 (Fourteen) Days., Disp: 2 mL, Rfl: 5  •  fenofibrate 160 MG tablet, Take 1 tablet by mouth Daily., Disp: 90 tablet, Rfl: 3  •  gabapentin (NEURONTIN) 300 MG capsule, Take 1 capsule by mouth 3 (Three) Times a Day., Disp: 90 capsule, Rfl: 2  •  hydrOXYzine pamoate (Vistaril) 50 MG capsule, Take 1 capsule by mouth every night at bedtime., Disp: 90 capsule, Rfl: 3  •  isosorbide mononitrate (IMDUR) 60 MG 24 hr tablet, TAKE 1 TABLET BY MOUTH DAILY., Disp: 90 tablet, Rfl: 2  •  Lantus SoloStar 100 UNIT/ML injection pen, Inject 34 Units under the skin into the appropriate area as directed Daily., Disp: 15 mL, Rfl: 5  •  losartan (COZAAR) 100 MG tablet, Take 1 tablet by mouth Daily. (Patient taking differently: Take 50 mg by mouth Daily.), Disp: 90 tablet, Rfl: 3  •  nitroglycerin (Nitrostat) 0.4 MG SL tablet, Place 1 tablet under the tongue Every 5 (Five) Minutes As Needed for Chest Pain., Disp: 30 tablet, Rfl: 5  •  Ozempic, 1 MG/DOSE, 4 MG/3ML solution pen-injector, , Disp: , Rfl:   •  rOPINIRole (REQUIP) 2 MG tablet, Take 1 tablet by mouth Every Night., Disp: 90 tablet, Rfl: 3  •  sitaGLIPtin-metFORMIN (Janumet)  MG per tablet, Take 1 tablet by mouth 2 (Two) Times a Day With Meals., Disp: 180  "tablet, Rfl: 3  •  True Metrix Blood Glucose Test test strip, 1 each by Other route 3 (Three) Times a Day., Disp: 300 each, Rfl: 3  •  acetaminophen (TYLENOL) 500 MG tablet, Take 500 mg by mouth Every 6 (Six) Hours As Needed for Mild Pain ., Disp: , Rfl:   •  fluticasone (Flonase) 50 MCG/ACT nasal spray, 2 sprays into the nostril(s) as directed by provider Daily., Disp: 18.2 mL, Rfl: 1  •  Lidocaine 5 % cream, Apply 1 each topically Every 4 (Four) Hours As Needed (JOINT PAIN)., Disp: 30 g, Rfl: 5  •  metoprolol tartrate (LOPRESSOR) 50 MG tablet, Take 1.5 tablets by mouth 2 (Two) Times a Day., Disp: 180 tablet, Rfl: 3  •  silver sulfadiazine (Silvadene) 1 % cream, Apply 1 application topically to the appropriate area as directed 2 (Two) Times a Day., Disp: 400 g, Rfl: 0    The following portions of the patient's history were reviewed and updated as appropriate: allergies, current medications, past family history, past medical history, past social history, past surgical history and problem list.  All other systems reviewed and are negative.        Objective:     /70 (BP Location: Right arm, Patient Position: Sitting)   Pulse 93   Ht 162.6 cm (64.02\")   Wt 74.8 kg (165 lb)   LMP  (LMP Unknown)   SpO2 96%   BMI 28.31 kg/m²      Physical Exam  Constitutional: Patient appears well-developed and well-nourished.   HENT: HEENT exam unremarkable.   Neck: Neck supple. No JVD present. No carotid bruits.   Cardiovascular: Normal rate, regular rhythm and normal heart sounds. No murmur heard.   2+ symmetric pulses.   Pulmonary/Chest: Breath sounds normal. Does not exhibit tenderness.   Abdominal: Abdomen benign.   Musculoskeletal: Does not exhibit edema.   Neurological: Neurological exam unremarkable.   Vitals reviewed.    Data Review:   Lab Results   Component Value Date    GLUCOSE 111 (H) 05/06/2022    BUN 9 05/06/2022    CREATININE 0.93 05/06/2022    EGFRIFNONA 65 11/12/2021    EGFRIFAFRI 78 11/12/2021    BCR 9.7 " 05/06/2022    K 4.4 05/06/2022    CO2 21.7 (L) 05/06/2022    CALCIUM 10.0 05/06/2022    ALBUMIN 4.50 05/06/2022    AST 32 05/06/2022    ALT 29 05/06/2022     Lab Results   Component Value Date    CHOL 216 (H) 05/22/2020    TRIG 339 (H) 11/12/2021    HDL 43 11/12/2021     (H) 11/12/2021      Lab Results   Component Value Date    WBC 10.58 11/12/2021    RBC 4.77 11/12/2021    HGB 13.2 11/12/2021    HCT 41.6 11/12/2021    MCV 87.2 11/12/2021     (H) 11/12/2021     Lab Results   Component Value Date    HGBA1C 5.90 (H) 05/06/2022          ECG 12 Lead    Date/Time: 8/19/2022 10:42 AM  Performed by: Merlin Garcia PA  Authorized by: Merlin Garcia PA   Rhythm: sinus tachycardia  Rate: tachycardic  Conduction: conduction normal  Conduction: non-specific intraventricular conduction delay  ST Segments: ST segments normal  T Waves: T waves normal    Clinical impression: abnormal EKG               Assessment:      Diagnosis Plan   1. Coronary artery disease involving native coronary artery of native heart with other form of angina pectoris (CMS/HCC)  Stress Test With Myocardial Perfusion (1 Day). Continue isosorbide mononitrate daily and nitroglycerin as needed for chest pain.    2. Essential hypertension  Well-controlled; continue current medications.    3. Mixed hyperlipidemia  Acceptable control; continue fenofibrate 160 mg daily.     Plan:   zio monitor  Lopressor 75mg BID, target improved blood pressure control with systolic less than 130.  Follow up FLP with PCP Continue Repatha   Follow up with Dr. Watts 8 months.   Electronically signed by ANH Hernandez, 08/19/22, 10:44 AM EDT..

## 2022-09-16 ENCOUNTER — OFFICE VISIT (OUTPATIENT)
Dept: FAMILY MEDICINE CLINIC | Facility: CLINIC | Age: 62
End: 2022-09-16

## 2022-09-16 ENCOUNTER — TELEPHONE (OUTPATIENT)
Dept: CARDIOLOGY | Facility: CLINIC | Age: 62
End: 2022-09-16

## 2022-09-16 VITALS
BODY MASS INDEX: 27.14 KG/M2 | OXYGEN SATURATION: 99 % | HEIGHT: 64 IN | WEIGHT: 159 LBS | HEART RATE: 83 BPM | DIASTOLIC BLOOD PRESSURE: 80 MMHG | SYSTOLIC BLOOD PRESSURE: 132 MMHG | TEMPERATURE: 97.3 F

## 2022-09-16 DIAGNOSIS — I10 ESSENTIAL HYPERTENSION: Chronic | ICD-10-CM

## 2022-09-16 DIAGNOSIS — R30.0 DYSURIA: ICD-10-CM

## 2022-09-16 DIAGNOSIS — G25.81 RLS (RESTLESS LEGS SYNDROME): Chronic | ICD-10-CM

## 2022-09-16 DIAGNOSIS — N30.90 CYSTITIS: Primary | ICD-10-CM

## 2022-09-16 LAB
BILIRUB BLD-MCNC: ABNORMAL MG/DL
CLARITY, POC: ABNORMAL
COLOR UR: ABNORMAL
EXPIRATION DATE: ABNORMAL
GLUCOSE UR STRIP-MCNC: NEGATIVE MG/DL
KETONES UR QL: NEGATIVE
LEUKOCYTE EST, POC: ABNORMAL
Lab: ABNORMAL
NITRITE UR-MCNC: NEGATIVE MG/ML
PH UR: 5.5 [PH] (ref 5–8)
PROT UR STRIP-MCNC: ABNORMAL MG/DL
RBC # UR STRIP: ABNORMAL /UL
SP GR UR: 1.02 (ref 1–1.03)
UROBILINOGEN UR QL: NORMAL

## 2022-09-16 PROCEDURE — 81003 URINALYSIS AUTO W/O SCOPE: CPT | Performed by: PHYSICIAN ASSISTANT

## 2022-09-16 PROCEDURE — 99214 OFFICE O/P EST MOD 30 MIN: CPT | Performed by: PHYSICIAN ASSISTANT

## 2022-09-16 RX ORDER — CHLORTHALIDONE 25 MG/1
TABLET ORAL
COMMUNITY
Start: 2022-09-02 | End: 2022-09-30

## 2022-09-16 RX ORDER — NITROFURANTOIN 25; 75 MG/1; MG/1
100 CAPSULE ORAL 2 TIMES DAILY
Qty: 20 CAPSULE | Refills: 0 | Status: SHIPPED | OUTPATIENT
Start: 2022-09-16 | End: 2022-11-04

## 2022-09-16 RX ORDER — PHENAZOPYRIDINE HYDROCHLORIDE 200 MG/1
200 TABLET, FILM COATED ORAL 3 TIMES DAILY PRN
Qty: 15 TABLET | Refills: 0 | Status: SHIPPED | OUTPATIENT
Start: 2022-09-16 | End: 2022-11-04

## 2022-09-16 NOTE — TELEPHONE ENCOUNTER
Patient called office inquiring about holter monitor results. Returned call. No answer. Left voicemail stating that results are not back yet, but I will call her when they result. Instructed her to call with any further questions or concerns.

## 2022-09-21 NOTE — PROGRESS NOTES
"Subjective   Monisha Gardner is a 62 y.o. female.       Chief Complaint -dysuria    History of Present Illness -    ROS    Dysuria-  Patient complains of burning pain with urination, achy low back pain and urinary urgency for the past week.    Hypertension- controlled with chlorthalidone, metoprolol, and losartan    Restless leg syndrome-stable with Requip    The following portions of the patient's history were reviewed and updated as appropriate: allergies, current medications, past family history, past medical history, past social history, past surgical history and problem list.    Review of Systems    Objective  Vital signs:  /80   Pulse 83   Temp 97.3 °F (36.3 °C) (Temporal)   Ht 162.6 cm (64.02\")   Wt 72.1 kg (159 lb)   LMP  (LMP Unknown)   SpO2 99%   BMI 27.28 kg/m²     Physical Exam  Vitals and nursing note reviewed.   Constitutional:       Appearance: Normal appearance. She is well-developed.   Eyes:      Extraocular Movements: Extraocular movements intact.      Conjunctiva/sclera: Conjunctivae normal.   Cardiovascular:      Rate and Rhythm: Normal rate and regular rhythm.      Heart sounds: Normal heart sounds. No murmur heard.  Pulmonary:      Effort: Pulmonary effort is normal. No respiratory distress.      Breath sounds: Normal breath sounds. No wheezing.   Musculoskeletal:         General: No tenderness.   Skin:     General: Skin is warm and dry.      Findings: No rash.   Neurological:      Mental Status: She is alert and oriented to person, place, and time.   Psychiatric:         Mood and Affect: Mood normal.         Behavior: Behavior normal.         Thought Content: Thought content normal.         The following data was reviewed by: ANH Medina on 09/16/2022:  CMP    CMP 11/12/21 5/6/22   Glucose 96 111 (A)   BUN 13 9   Creatinine 0.89 0.93   eGFR Non  Am 65    eGFR  Am 78    Sodium 141 140   Potassium 4.3 4.4   Chloride 104 99   Calcium 9.9 10.0   Total " Protein 7.2    Albumin 4.90 4.50   Globulin 2.3    Total Bilirubin 0.3 0.2   Alkaline Phosphatase 75 104   AST (SGOT) 31 32   ALT (SGPT) 28 29   (A) Abnormal value       Comments are available for some flowsheets but are not being displayed.           CBC w/diff    CBC w/Diff 11/12/21   WBC 10.58   RBC 4.77   Hemoglobin 13.2   Hematocrit 41.6   MCV 87.2   MCH 27.7   MCHC 31.7   RDW 16.1 (A)   Platelets 477 (A)   Neutrophil Rel % 52.5   Lymphocyte Rel % 38.6   Monocyte Rel % 5.2   Eosinophil Rel % 2.0   Basophil Rel % 0.9   (A) Abnormal value            Lipid Panel    Lipid Panel 11/12/21   Total Cholesterol 227 (A)   Triglycerides 339 (A)   HDL Cholesterol 43   VLDL Cholesterol 60 (A)   LDL Cholesterol  124 (A)   (A) Abnormal value       Comments are available for some flowsheets but are not being displayed.           TSH    TSH 11/12/21   TSH 1.460           Most Recent A1C    HGBA1C Most Recent 5/6/22   Hemoglobin A1C 5.90 (A)   (A) Abnormal value            Data reviewed: Urinalysis       Assessment & Plan     Diagnoses and all orders for this visit:    1. Cystitis (Primary)  Comments:  Start Macrobid and Pyridium  Advised to drink plenty of water and report if symptoms persist greater than 1 week  Orders:  -     nitrofurantoin, macrocrystal-monohydrate, (Macrobid) 100 MG capsule; Take 1 capsule by mouth 2 (Two) Times a Day.  Dispense: 20 capsule; Refill: 0  -     phenazopyridine (Pyridium) 200 MG tablet; Take 1 tablet by mouth 3 (Three) Times a Day As Needed for Bladder Spasms.  Dispense: 15 tablet; Refill: 0    2. Dysuria  -     POCT urinalysis dipstick, automated  -     Urine Culture - Urine, Urine, Clean Catch    3. Essential hypertension  Comments:  Continue chlorthalidone metoprolol and losartan    4. RLS (restless legs syndrome)  Comments:  Continue Requip  Advised tips for healthy sleep            Patient was given instructions and counseling regarding his condition or for health maintenance advice.  Please see specific information pulled into the AVS if appropriate      This document has been electronically signed by:  Kimmy Vela PA-C

## 2022-09-23 ENCOUNTER — LAB (OUTPATIENT)
Dept: FAMILY MEDICINE CLINIC | Facility: CLINIC | Age: 62
End: 2022-09-23

## 2022-09-23 ENCOUNTER — TELEPHONE (OUTPATIENT)
Dept: FAMILY MEDICINE CLINIC | Facility: CLINIC | Age: 62
End: 2022-09-23

## 2022-09-23 PROCEDURE — 87086 URINE CULTURE/COLONY COUNT: CPT | Performed by: PHYSICIAN ASSISTANT

## 2022-09-23 RX ORDER — SULFAMETHOXAZOLE AND TRIMETHOPRIM 800; 160 MG/1; MG/1
1 TABLET ORAL 2 TIMES DAILY
Qty: 14 TABLET | Refills: 0 | Status: SHIPPED | OUTPATIENT
Start: 2022-09-23 | End: 2022-09-30

## 2022-09-23 NOTE — TELEPHONE ENCOUNTER
Left message on patient's voicemail stating that holter results were normal. Her symptoms did not correlate with any PVC's or any of fast beats.  Instructed her to call the office with any further questions or concerns.

## 2022-09-23 NOTE — TELEPHONE ENCOUNTER
Pt is aware of this information.     ----- Message from Doug Dixon MD sent at 9/23/2022  2:33 PM EDT -----  Emailed bactrim  We can send another urine but it probably wont be helpful give that she has been on an antibiotic (even though symptoms persist the antibiotic will likely prevent culture growth)    ----- Message -----  From: Sophy Stevens MA  Sent: 9/23/2022   1:56 PM EDT  To: Doug Dixon MD    Patient was seen last Friday for a UTI infection. She did a urine dip but for some reason her urine was not sent for culture. She was started on macrobid but she said it has not helped at all. She is going to come by and give us another urine sample to send for a culture. Could you send her a different antibiotic until we get those results back?

## 2022-09-24 LAB — BACTERIA SPEC AEROBE CULT: NO GROWTH

## 2022-09-30 DIAGNOSIS — E11.65 TYPE 2 DIABETES MELLITUS WITH HYPERGLYCEMIA, WITH LONG-TERM CURRENT USE OF INSULIN: Chronic | ICD-10-CM

## 2022-09-30 DIAGNOSIS — Z79.4 TYPE 2 DIABETES MELLITUS WITH HYPERGLYCEMIA, WITH LONG-TERM CURRENT USE OF INSULIN: Chronic | ICD-10-CM

## 2022-09-30 RX ORDER — INSULIN GLARGINE 100 [IU]/ML
INJECTION, SOLUTION SUBCUTANEOUS
Qty: 15 ML | Refills: 5 | Status: SHIPPED | OUTPATIENT
Start: 2022-09-30 | End: 2022-10-06

## 2022-09-30 RX ORDER — CHLORTHALIDONE 25 MG/1
TABLET ORAL
Qty: 30 TABLET | Refills: 3 | Status: SHIPPED | OUTPATIENT
Start: 2022-09-30 | End: 2022-11-04

## 2022-10-06 DIAGNOSIS — Z79.4 TYPE 2 DIABETES MELLITUS WITH HYPERGLYCEMIA, WITH LONG-TERM CURRENT USE OF INSULIN: Chronic | ICD-10-CM

## 2022-10-06 DIAGNOSIS — E11.65 TYPE 2 DIABETES MELLITUS WITH HYPERGLYCEMIA, WITH LONG-TERM CURRENT USE OF INSULIN: Chronic | ICD-10-CM

## 2022-10-06 RX ORDER — INSULIN GLARGINE 100 [IU]/ML
INJECTION, SOLUTION SUBCUTANEOUS
Qty: 15 ML | Refills: 5 | Status: SHIPPED | OUTPATIENT
Start: 2022-10-06

## 2022-10-13 DIAGNOSIS — Z79.4 TYPE 2 DIABETES MELLITUS WITH HYPERGLYCEMIA, WITH LONG-TERM CURRENT USE OF INSULIN: Chronic | ICD-10-CM

## 2022-10-13 DIAGNOSIS — E11.65 TYPE 2 DIABETES MELLITUS WITH HYPERGLYCEMIA, WITH LONG-TERM CURRENT USE OF INSULIN: Chronic | ICD-10-CM

## 2022-10-13 RX ORDER — SITAGLIPTIN AND METFORMIN HYDROCHLORIDE 1000; 50 MG/1; MG/1
1 TABLET, FILM COATED ORAL 2 TIMES DAILY WITH MEALS
Qty: 180 TABLET | Refills: 3 | Status: SHIPPED | OUTPATIENT
Start: 2022-10-13 | End: 2022-12-30

## 2022-11-04 ENCOUNTER — OFFICE VISIT (OUTPATIENT)
Dept: FAMILY MEDICINE CLINIC | Facility: CLINIC | Age: 62
End: 2022-11-04

## 2022-11-04 VITALS
BODY MASS INDEX: 26.98 KG/M2 | DIASTOLIC BLOOD PRESSURE: 84 MMHG | WEIGHT: 158 LBS | OXYGEN SATURATION: 98 % | TEMPERATURE: 97.1 F | SYSTOLIC BLOOD PRESSURE: 122 MMHG | HEIGHT: 64 IN | HEART RATE: 78 BPM

## 2022-11-04 DIAGNOSIS — H65.02 NON-RECURRENT ACUTE SEROUS OTITIS MEDIA OF LEFT EAR: ICD-10-CM

## 2022-11-04 DIAGNOSIS — E11.65 TYPE 2 DIABETES MELLITUS WITH HYPERGLYCEMIA, WITH LONG-TERM CURRENT USE OF INSULIN: Primary | Chronic | ICD-10-CM

## 2022-11-04 DIAGNOSIS — R68.2 DRY MOUTH: ICD-10-CM

## 2022-11-04 DIAGNOSIS — Z79.4 TYPE 2 DIABETES MELLITUS WITH DIABETIC AUTONOMIC NEUROPATHY, WITH LONG-TERM CURRENT USE OF INSULIN: Chronic | ICD-10-CM

## 2022-11-04 DIAGNOSIS — I10 ESSENTIAL HYPERTENSION: Chronic | ICD-10-CM

## 2022-11-04 DIAGNOSIS — Z79.4 TYPE 2 DIABETES MELLITUS WITH HYPERGLYCEMIA, WITH LONG-TERM CURRENT USE OF INSULIN: Primary | Chronic | ICD-10-CM

## 2022-11-04 DIAGNOSIS — E11.43 TYPE 2 DIABETES MELLITUS WITH DIABETIC AUTONOMIC NEUROPATHY, WITH LONG-TERM CURRENT USE OF INSULIN: Chronic | ICD-10-CM

## 2022-11-04 PROCEDURE — 99214 OFFICE O/P EST MOD 30 MIN: CPT | Performed by: PHYSICIAN ASSISTANT

## 2022-11-04 RX ORDER — GABAPENTIN 300 MG/1
300 CAPSULE ORAL 3 TIMES DAILY
Qty: 90 CAPSULE | Refills: 2 | Status: SHIPPED | OUTPATIENT
Start: 2022-11-04 | End: 2023-01-20 | Stop reason: SDUPTHER

## 2022-11-04 RX ORDER — AZITHROMYCIN 250 MG/1
TABLET, FILM COATED ORAL
Qty: 6 TABLET | Refills: 0 | Status: SHIPPED | OUTPATIENT
Start: 2022-11-04 | End: 2022-11-17

## 2022-11-04 RX ORDER — TIRZEPATIDE 2.5 MG/.5ML
2.5 INJECTION, SOLUTION SUBCUTANEOUS
Qty: 2 ML | Refills: 2 | Status: SHIPPED | OUTPATIENT
Start: 2022-11-04 | End: 2022-12-30

## 2022-11-04 NOTE — PROGRESS NOTES
"Subjective   Monisha Gardner is a 62 y.o. female.       Chief Complaint -diabetes    History of Present Illness -    ROS    Diabetes-  Not at goal with Lantus 34 units nightly and Janumet.  She states that she does eat smaller portion sizes and low carbohydrate food options.    Diabetic peripheral neuropathy-  Stable with gabapentin    Earache-  She complains of sharp moderate pain in the left ear for the past 2 days.    Dry mouth-  Patient complains of dry mouth-  She reports that she had discontinued metoprolol in the past secondary to dry mouth in the symptoms resolved.  She started back on metoprolol a few months ago and now states that her mouth is extremely dry again.  She states that she had spoken with cardiology and was advised to continue metoprolol.    Hypertension-  Controlled with losartan    The following portions of the patient's history were reviewed and updated as appropriate: allergies, current medications, past family history, past medical history, past social history, past surgical history and problem list.    Review of Systems    Objective  Vital signs:  /84   Pulse 78   Temp 97.1 °F (36.2 °C) (Temporal)   Ht 162.6 cm (64.02\")   Wt 71.7 kg (158 lb)   LMP  (LMP Unknown)   SpO2 98%   BMI 27.10 kg/m²     Physical Exam  Vitals and nursing note reviewed.   Constitutional:       General: She is not in acute distress.     Appearance: Normal appearance. She is well-developed. She is not diaphoretic.   HENT:      Head: Normocephalic and atraumatic.      Right Ear: Tympanic membrane, ear canal and external ear normal.      Left Ear: Ear canal and external ear normal.      Ears:      Comments: Left TM erythematous with cloudy fluid behind TM     Nose: Nose normal.      Mouth/Throat:      Mouth: Mucous membranes are moist.      Pharynx: No oropharyngeal exudate or posterior oropharyngeal erythema.   Eyes:      Extraocular Movements: Extraocular movements intact.      Conjunctiva/sclera: " Conjunctivae normal.   Neck:      Thyroid: No thyromegaly.   Cardiovascular:      Rate and Rhythm: Normal rate and regular rhythm.      Heart sounds: Normal heart sounds. No murmur heard.  Pulmonary:      Effort: Pulmonary effort is normal. No respiratory distress.      Breath sounds: Normal breath sounds. No wheezing or rales.   Musculoskeletal:         General: No tenderness.      Cervical back: Normal range of motion and neck supple.   Lymphadenopathy:      Cervical: Cervical adenopathy present.   Skin:     General: Skin is warm and dry.      Findings: No rash.   Neurological:      Mental Status: She is alert and oriented to person, place, and time.   Psychiatric:         Mood and Affect: Mood normal.         Behavior: Behavior normal.         Thought Content: Thought content normal.         The following data was reviewed by: ANH Medina on 11/04/2022:  CMP    CMP 11/12/21 5/6/22   Glucose 96 111 (A)   BUN 13 9   Creatinine 0.89 0.93   eGFR Non  Am 65    eGFR  Am 78    Sodium 141 140   Potassium 4.3 4.4   Chloride 104 99   Calcium 9.9 10.0   Total Protein 7.2    Albumin 4.90 4.50   Globulin 2.3    Total Bilirubin 0.3 0.2   Alkaline Phosphatase 75 104   AST (SGOT) 31 32   ALT (SGPT) 28 29   (A) Abnormal value       Comments are available for some flowsheets but are not being displayed.           CBC w/diff    CBC w/Diff 11/12/21   WBC 10.58   RBC 4.77   Hemoglobin 13.2   Hematocrit 41.6   MCV 87.2   MCH 27.7   MCHC 31.7   RDW 16.1 (A)   Platelets 477 (A)   Neutrophil Rel % 52.5   Lymphocyte Rel % 38.6   Monocyte Rel % 5.2   Eosinophil Rel % 2.0   Basophil Rel % 0.9   (A) Abnormal value            Lipid Panel    Lipid Panel 11/12/21   Total Cholesterol 227 (A)   Triglycerides 339 (A)   HDL Cholesterol 43   VLDL Cholesterol 60 (A)   LDL Cholesterol  124 (A)   (A) Abnormal value       Comments are available for some flowsheets but are not being displayed.           TSH    TSH 11/12/21   TSH  1.460           Most Recent A1C    HGBA1C Most Recent 5/6/22   Hemoglobin A1C 5.90 (A)   (A) Abnormal value                   Assessment & Plan     Diagnoses and all orders for this visit:    1. Type 2 diabetes mellitus with hyperglycemia, with long-term current use of insulin ([facility]) (Primary)  Comments:  start Mounjaro  Decrease Lantus 17 units nightly  Continue Janumet  Advise low carbohydrate diabetic diet  Orders:  -     Tirzepatide (Mounjaro) 2.5 MG/0.5ML solution pen-injector; Inject 2.5 mg under the skin into the appropriate area as directed Every 7 (Seven) Days.  Dispense: 2 mL; Refill: 2    2. Non-recurrent acute serous otitis media of left ear  Comments:  Start azithromycin  RTC if symptoms do not improve within 1 week  Orders:  -     azithromycin (Zithromax Z-Hayder) 250 MG tablet; Take 2 tablets by mouth on day 1, then 1 tablet daily on days 2-5  Dispense: 6 tablet; Refill: 0    3. Type 2 diabetes mellitus with diabetic autonomic neuropathy, with long-term current use of insulin (HCC)  Comments:  Refill gabapentin    Orders:  -     gabapentin (NEURONTIN) 300 MG capsule; Take 1 capsule by mouth 3 (Three) Times a Day.  Dispense: 90 capsule; Refill: 2    4. Dry mouth  Comments:  May be side effect of metoprolol  Discontinue Vistaril and Zyrtec  Advised Biotene OTC    5. Essential hypertension  Comments:  Continue losartan  Advised to check BP daily and report any consistent readings greater 130/80            Patient was given instructions and counseling regarding his condition or for health maintenance advice. Please see specific information pulled into the AVS if appropriate      This document has been electronically signed by:  Kimmy Vela PA-C

## 2022-11-06 PROBLEM — H65.02 NON-RECURRENT ACUTE SEROUS OTITIS MEDIA OF LEFT EAR: Status: ACTIVE | Noted: 2022-11-06

## 2022-11-06 PROBLEM — R68.2 DRY MOUTH: Status: ACTIVE | Noted: 2022-11-06

## 2022-11-06 PROBLEM — E11.43 TYPE 2 DIABETES MELLITUS WITH DIABETIC AUTONOMIC NEUROPATHY, WITH LONG-TERM CURRENT USE OF INSULIN: Status: ACTIVE | Noted: 2022-11-06

## 2022-11-06 PROBLEM — Z79.4 TYPE 2 DIABETES MELLITUS WITH DIABETIC AUTONOMIC NEUROPATHY, WITH LONG-TERM CURRENT USE OF INSULIN: Status: ACTIVE | Noted: 2022-11-06

## 2022-11-17 ENCOUNTER — OFFICE VISIT (OUTPATIENT)
Dept: FAMILY MEDICINE CLINIC | Facility: CLINIC | Age: 62
End: 2022-11-17

## 2022-11-17 VITALS
TEMPERATURE: 97.8 F | DIASTOLIC BLOOD PRESSURE: 60 MMHG | OXYGEN SATURATION: 93 % | HEIGHT: 64 IN | BODY MASS INDEX: 26.63 KG/M2 | WEIGHT: 156 LBS | SYSTOLIC BLOOD PRESSURE: 130 MMHG | HEART RATE: 114 BPM

## 2022-11-17 DIAGNOSIS — J10.1 INFLUENZA A: ICD-10-CM

## 2022-11-17 DIAGNOSIS — R50.9 FEVER, UNSPECIFIED FEVER CAUSE: Primary | ICD-10-CM

## 2022-11-17 LAB
B PARAPERT DNA SPEC QL NAA+PROBE: NOT DETECTED
B PERT DNA SPEC QL NAA+PROBE: NOT DETECTED
C PNEUM DNA NPH QL NAA+NON-PROBE: NOT DETECTED
EXPIRATION DATE: ABNORMAL
FLUAV AG NPH QL: POSITIVE
FLUAV H1 2009 PAND RNA NPH QL NAA+PROBE: DETECTED
FLUBV AG NPH QL: NEGATIVE
FLUBV RNA ISLT QL NAA+PROBE: NOT DETECTED
HADV DNA SPEC NAA+PROBE: NOT DETECTED
HCOV 229E RNA SPEC QL NAA+PROBE: NOT DETECTED
HCOV HKU1 RNA SPEC QL NAA+PROBE: NOT DETECTED
HCOV NL63 RNA SPEC QL NAA+PROBE: NOT DETECTED
HCOV OC43 RNA SPEC QL NAA+PROBE: NOT DETECTED
HMPV RNA NPH QL NAA+NON-PROBE: NOT DETECTED
HPIV1 RNA ISLT QL NAA+PROBE: NOT DETECTED
HPIV2 RNA SPEC QL NAA+PROBE: NOT DETECTED
HPIV3 RNA NPH QL NAA+PROBE: NOT DETECTED
HPIV4 P GENE NPH QL NAA+PROBE: NOT DETECTED
INTERNAL CONTROL: ABNORMAL
Lab: ABNORMAL
M PNEUMO IGG SER IA-ACNC: NOT DETECTED
RHINOVIRUS RNA SPEC NAA+PROBE: NOT DETECTED
RSV RNA NPH QL NAA+NON-PROBE: NOT DETECTED
SARS-COV-2 RNA NPH QL NAA+NON-PROBE: NOT DETECTED

## 2022-11-17 PROCEDURE — 99213 OFFICE O/P EST LOW 20 MIN: CPT | Performed by: NURSE PRACTITIONER

## 2022-11-17 PROCEDURE — 0202U NFCT DS 22 TRGT SARS-COV-2: CPT | Performed by: NURSE PRACTITIONER

## 2022-11-17 PROCEDURE — 87804 INFLUENZA ASSAY W/OPTIC: CPT | Performed by: NURSE PRACTITIONER

## 2022-11-17 RX ORDER — PROMETHAZINE HYDROCHLORIDE 6.25 MG/5ML
6.25-12.5 SYRUP ORAL EVERY 6 HOURS PRN
Qty: 60 ML | Refills: 0 | Status: SHIPPED | OUTPATIENT
Start: 2022-11-17 | End: 2022-11-23 | Stop reason: SDUPTHER

## 2022-11-17 RX ORDER — FLUTICASONE PROPIONATE 50 MCG
2 SPRAY, SUSPENSION (ML) NASAL DAILY
Qty: 18.2 ML | Refills: 1 | Status: SHIPPED | OUTPATIENT
Start: 2022-11-17 | End: 2023-01-20

## 2022-11-17 RX ORDER — LORATADINE 10 MG/1
10 TABLET ORAL DAILY
Qty: 30 TABLET | Refills: 0 | Status: SHIPPED | OUTPATIENT
Start: 2022-11-17

## 2022-11-17 RX ORDER — GUAIFENESIN AND DEXTROMETHORPHAN HYDROBROMIDE 100; 10 MG/5ML; MG/5ML
5-10 SOLUTION ORAL EVERY 6 HOURS PRN
Qty: 60 ML | Refills: 0 | Status: SHIPPED | OUTPATIENT
Start: 2022-11-17 | End: 2022-11-23 | Stop reason: SDUPTHER

## 2022-11-17 NOTE — PROGRESS NOTES
Subjective   Monisha Gardner is a 62 y.o. female.     Chief Complaint   Patient presents with   • Cough   • Nasal Congestion   • Fever   • Fatigue       History of Present Illness     Acute illness-patient presents acutely to the clinic today with cough and nasal congestion.  She also reports a fever at home as well as fatigue and myalgia.  Symptoms began on Monday.  She reports she is dizzy, HA, sore throat is also present.  She reports no vomiting but she has had diarrhea for several days.      The following portions of the patient's history were reviewed and updated as appropriate: CC, ROS, allergies, current medications, past family history, past medical history, past social history, past surgical history and problem list.    Review of Systems   Constitutional: Positive for appetite change, chills, fatigue and fever.   HENT: Positive for congestion, rhinorrhea and sore throat. Negative for ear pain, nosebleeds, postnasal drip, tinnitus, trouble swallowing and voice change.    Eyes: Negative for blurred vision, photophobia and visual disturbance.   Respiratory: Positive for cough. Negative for chest tightness, shortness of breath and wheezing.    Cardiovascular: Negative for chest pain and palpitations.   Gastrointestinal: Positive for diarrhea (since sunday). Negative for abdominal pain, blood in stool, constipation, nausea and GERD.   Endocrine: Negative for cold intolerance, heat intolerance and polydipsia.   Genitourinary: Negative for decreased urine volume, difficulty urinating, dysuria and hematuria.   Musculoskeletal: Negative for arthralgias, back pain, gait problem and myalgias.   Skin: Negative for color change, pallor and wound.   Allergic/Immunologic: Negative.    Neurological: Positive for dizziness and headache. Negative for syncope and numbness.   Hematological: Negative.    Psychiatric/Behavioral: Negative for decreased concentration, sleep disturbance, suicidal ideas and depressed mood. The  "patient is not nervous/anxious.    All other systems reviewed and are negative.      Objective     /60   Pulse 114   Temp 97.8 °F (36.6 °C) (Temporal)   Ht 162.6 cm (64.02\")   Wt 70.8 kg (156 lb)   LMP  (LMP Unknown)   SpO2 93%   BMI 26.76 kg/m²     Physical Exam  Vitals reviewed.   Constitutional:       General: She is not in acute distress.     Appearance: She is ill-appearing.   HENT:      Head: Normocephalic and atraumatic.      Right Ear: Ear canal normal. Tympanic membrane is injected.      Left Ear: Ear canal normal. Tympanic membrane is injected.      Nose: Mucosal edema and rhinorrhea present.      Right Sinus: No maxillary sinus tenderness or frontal sinus tenderness.      Left Sinus: No maxillary sinus tenderness or frontal sinus tenderness.      Mouth/Throat:      Pharynx: Oropharyngeal exudate (thin, clear PND noted) and posterior oropharyngeal erythema (injected) present.   Eyes:      Conjunctiva/sclera: Conjunctivae normal.      Pupils: Pupils are equal, round, and reactive to light.   Neck:      Thyroid: No thyromegaly.   Cardiovascular:      Rate and Rhythm: Normal rate and regular rhythm.      Heart sounds: Normal heart sounds.   Pulmonary:      Effort: Pulmonary effort is normal.      Breath sounds: No stridor.   Chest:      Chest wall: Tenderness (chest wall tenderness due to cough) present.   Abdominal:      General: Bowel sounds are normal.      Palpations: Abdomen is soft.      Tenderness: There is no abdominal tenderness.   Musculoskeletal:         General: Tenderness present.      Cervical back: Normal range of motion.   Lymphadenopathy:      Cervical: Cervical adenopathy (firm, mobile nodes) present.      Right cervical: Superficial cervical adenopathy present.      Left cervical: Superficial cervical adenopathy present.   Skin:     General: Skin is warm and dry.   Neurological:      Mental Status: She is alert and oriented to person, place, and time.   Psychiatric:         " Behavior: Behavior normal.           Diagnoses and all orders for this visit:    1. Fever, unspecified fever cause (Primary)  -     Respiratory Panel PCR w/COVID-19(SARS-CoV-2) SUNNY/SAVANA/ANABEL/PAD/COR/MAD/RASHI In-House, NP Swab in UTM/VTM, 3-4 HR TAT - Swab, Nasopharynx; Future  -     POCT Influenza A/B  -     Respiratory Panel PCR w/COVID-19(SARS-CoV-2) SUNNY/SAVANA/ANABEL/PAD/COR/MAD/RASHI In-House, NP Swab in UTM/VTM, 3-4 HR TAT - Swab, Nasopharynx    2. Influenza A  -     albuterol (PROVENTIL,VENTOLIN) 2 MG/5ML syrup; Take 5-10 mL by mouth Every 6 (Six) Hours As Needed (cough). Mix with Tussin DM and promethazine syrup for 3 way cough  Dispense: 60 mL; Refill: 0  -     promethazine (PHENERGAN) 6.25 MG/5ML syrup; Take 5-10 mL by mouth Every 6 (Six) Hours As Needed for Nausea or Vomiting. Mix with Tussin DM and albuterol syrup for 3 way cough.  Dispense: 60 mL; Refill: 0  -     dextromethorphan-guaifenesin (Tussin DM)  MG/5ML syrup; Take 5-10 mL by mouth Every 6 (Six) Hours As Needed (cough). Mix with albuterol syrup and Promethazine syrup for 3 way cough  Dispense: 60 mL; Refill: 0  -     fluticasone (Flonase) 50 MCG/ACT nasal spray; 2 sprays into the nostril(s) as directed by provider Daily.  Dispense: 18.2 mL; Refill: 1  -     loratadine (CLARITIN) 10 MG tablet; Take 1 tablet by mouth Daily.  Dispense: 30 tablet; Refill: 0           Understands disease processes and need for medications.  Understands reasons for urgent and emergent care.  Patient (& family) verbalized agreement for treatment plan.   Emotional support and active listening provided.  Patient provided time to verbalize feelings.    Discussed with patient she is too many days past antiviral treatment.  We will plan symptomatic treatment.  Patient to report any worsening symptoms.  Encouraged to push fluids, to practice deep breathing exercises and cough to clear secretions from airway.  As needed Tylenol for fever.  And encouraged to rest.    RTC 3 to 5  days, sooner if needed for problems or concerns          This document has been electronically signed by:  TRUONG Gonzalez, SHARLENEC    Dragon disclaimer:  Part of this note may be an electronic transcription/translation of spoken language to printed text using the Dragon Dictation System.

## 2022-11-18 DIAGNOSIS — R19.8 GI SYMPTOMS: Primary | ICD-10-CM

## 2022-11-18 RX ORDER — ONDANSETRON 4 MG/1
TABLET, FILM COATED ORAL
Qty: 20 TABLET | Refills: 1 | Status: SHIPPED | OUTPATIENT
Start: 2022-11-18 | End: 2023-01-20

## 2022-11-18 RX ORDER — LOPERAMIDE HYDROCHLORIDE 2 MG/1
2 TABLET ORAL 3 TIMES DAILY PRN
Qty: 20 TABLET | Refills: 0 | Status: SHIPPED | OUTPATIENT
Start: 2022-11-18 | End: 2023-01-20

## 2022-11-20 ENCOUNTER — HOSPITAL ENCOUNTER (EMERGENCY)
Facility: HOSPITAL | Age: 62
Discharge: HOME | End: 2022-11-20
Attending: EMERGENCY MEDICINE | Admitting: EMERGENCY MEDICINE

## 2022-11-20 ENCOUNTER — APPOINTMENT (OUTPATIENT)
Dept: GENERAL RADIOLOGY | Facility: HOSPITAL | Age: 62
End: 2022-11-20

## 2022-11-20 VITALS
SYSTOLIC BLOOD PRESSURE: 106 MMHG | HEIGHT: 64 IN | BODY MASS INDEX: 27.31 KG/M2 | RESPIRATION RATE: 17 BRPM | HEART RATE: 84 BPM | OXYGEN SATURATION: 97 % | DIASTOLIC BLOOD PRESSURE: 73 MMHG | WEIGHT: 160 LBS | TEMPERATURE: 97.9 F

## 2022-11-20 DIAGNOSIS — J10.1 INFLUENZA A: Primary | ICD-10-CM

## 2022-11-20 LAB
ALBUMIN SERPL-MCNC: 4.41 G/DL (ref 3.5–5.2)
ALBUMIN/GLOB SERPL: 1.5 G/DL
ALP SERPL-CCNC: 81 U/L (ref 39–117)
ALT SERPL W P-5'-P-CCNC: 17 U/L (ref 1–33)
ANION GAP SERPL CALCULATED.3IONS-SCNC: 16.4 MMOL/L (ref 5–15)
AST SERPL-CCNC: 24 U/L (ref 1–32)
BACTERIA UR QL AUTO: ABNORMAL /HPF
BASOPHILS # BLD AUTO: 0.04 10*3/MM3 (ref 0–0.2)
BASOPHILS NFR BLD AUTO: 0.5 % (ref 0–1.5)
BILIRUB SERPL-MCNC: 0.2 MG/DL (ref 0–1.2)
BILIRUB UR QL STRIP: NEGATIVE
BUN SERPL-MCNC: 12 MG/DL (ref 8–23)
BUN/CREAT SERPL: 15.8 (ref 7–25)
CALCIUM SPEC-SCNC: 10.1 MG/DL (ref 8.6–10.5)
CHLORIDE SERPL-SCNC: 96 MMOL/L (ref 98–107)
CK SERPL-CCNC: 62 U/L (ref 20–180)
CLARITY UR: ABNORMAL
CO2 SERPL-SCNC: 23.6 MMOL/L (ref 22–29)
COLOR UR: YELLOW
CREAT SERPL-MCNC: 0.76 MG/DL (ref 0.57–1)
CRP SERPL-MCNC: 1.1 MG/DL (ref 0–0.5)
D-LACTATE SERPL-SCNC: 3.5 MMOL/L (ref 0.5–2)
DEPRECATED RDW RBC AUTO: 43.9 FL (ref 37–54)
EGFRCR SERPLBLD CKD-EPI 2021: 89.3 ML/MIN/1.73
EOSINOPHIL # BLD AUTO: 0.24 10*3/MM3 (ref 0–0.4)
EOSINOPHIL NFR BLD AUTO: 3 % (ref 0.3–6.2)
ERYTHROCYTE [DISTWIDTH] IN BLOOD BY AUTOMATED COUNT: 14.9 % (ref 12.3–15.4)
GLOBULIN UR ELPH-MCNC: 2.9 GM/DL
GLUCOSE SERPL-MCNC: 97 MG/DL (ref 65–99)
GLUCOSE UR STRIP-MCNC: NEGATIVE MG/DL
HCT VFR BLD AUTO: 38.7 % (ref 34–46.6)
HGB BLD-MCNC: 12.9 G/DL (ref 12–15.9)
HGB UR QL STRIP.AUTO: NEGATIVE
HOLD SPECIMEN: NORMAL
HOLD SPECIMEN: NORMAL
HYALINE CASTS UR QL AUTO: ABNORMAL /LPF
IMM GRANULOCYTES # BLD AUTO: 0.03 10*3/MM3 (ref 0–0.05)
IMM GRANULOCYTES NFR BLD AUTO: 0.4 % (ref 0–0.5)
KETONES UR QL STRIP: NEGATIVE
LEUKOCYTE ESTERASE UR QL STRIP.AUTO: ABNORMAL
LYMPHOCYTES # BLD AUTO: 4.25 10*3/MM3 (ref 0.7–3.1)
LYMPHOCYTES NFR BLD AUTO: 52.5 % (ref 19.6–45.3)
MAGNESIUM SERPL-MCNC: 1.6 MG/DL (ref 1.6–2.4)
MCH RBC QN AUTO: 26.9 PG (ref 26.6–33)
MCHC RBC AUTO-ENTMCNC: 33.3 G/DL (ref 31.5–35.7)
MCV RBC AUTO: 80.8 FL (ref 79–97)
MONOCYTES # BLD AUTO: 0.38 10*3/MM3 (ref 0.1–0.9)
MONOCYTES NFR BLD AUTO: 4.7 % (ref 5–12)
NEUTROPHILS NFR BLD AUTO: 3.16 10*3/MM3 (ref 1.7–7)
NEUTROPHILS NFR BLD AUTO: 38.9 % (ref 42.7–76)
NITRITE UR QL STRIP: NEGATIVE
NRBC BLD AUTO-RTO: 0 /100 WBC (ref 0–0.2)
PH UR STRIP.AUTO: 6 [PH] (ref 5–8)
PLATELET # BLD AUTO: 322 10*3/MM3 (ref 140–450)
PMV BLD AUTO: 9.2 FL (ref 6–12)
POTASSIUM SERPL-SCNC: 4.2 MMOL/L (ref 3.5–5.2)
PROCALCITONIN SERPL-MCNC: 0.06 NG/ML (ref 0–0.25)
PROT SERPL-MCNC: 7.3 G/DL (ref 6–8.5)
PROT UR QL STRIP: NEGATIVE
RBC # BLD AUTO: 4.79 10*6/MM3 (ref 3.77–5.28)
RBC # UR STRIP: ABNORMAL /HPF
REF LAB TEST METHOD: ABNORMAL
SODIUM SERPL-SCNC: 136 MMOL/L (ref 136–145)
SP GR UR STRIP: 1.01 (ref 1–1.03)
SQUAMOUS #/AREA URNS HPF: ABNORMAL /HPF
UROBILINOGEN UR QL STRIP: ABNORMAL
WBC # UR STRIP: ABNORMAL /HPF
WBC NRBC COR # BLD: 8.1 10*3/MM3 (ref 3.4–10.8)
WHOLE BLOOD HOLD COAG: NORMAL
WHOLE BLOOD HOLD SPECIMEN: NORMAL

## 2022-11-20 PROCEDURE — 81001 URINALYSIS AUTO W/SCOPE: CPT | Performed by: PHYSICIAN ASSISTANT

## 2022-11-20 PROCEDURE — 86140 C-REACTIVE PROTEIN: CPT | Performed by: PHYSICIAN ASSISTANT

## 2022-11-20 PROCEDURE — 82550 ASSAY OF CK (CPK): CPT | Performed by: PHYSICIAN ASSISTANT

## 2022-11-20 PROCEDURE — 83605 ASSAY OF LACTIC ACID: CPT | Performed by: PHYSICIAN ASSISTANT

## 2022-11-20 PROCEDURE — 84145 PROCALCITONIN (PCT): CPT | Performed by: PHYSICIAN ASSISTANT

## 2022-11-20 PROCEDURE — 71046 X-RAY EXAM CHEST 2 VIEWS: CPT

## 2022-11-20 PROCEDURE — 99283 EMERGENCY DEPT VISIT LOW MDM: CPT

## 2022-11-20 PROCEDURE — 25010000002 KETOROLAC TROMETHAMINE PER 15 MG: Performed by: PHYSICIAN ASSISTANT

## 2022-11-20 PROCEDURE — 80053 COMPREHEN METABOLIC PANEL: CPT | Performed by: PHYSICIAN ASSISTANT

## 2022-11-20 PROCEDURE — 96374 THER/PROPH/DIAG INJ IV PUSH: CPT

## 2022-11-20 PROCEDURE — 85025 COMPLETE CBC W/AUTO DIFF WBC: CPT | Performed by: PHYSICIAN ASSISTANT

## 2022-11-20 PROCEDURE — 83735 ASSAY OF MAGNESIUM: CPT | Performed by: PHYSICIAN ASSISTANT

## 2022-11-20 RX ORDER — SODIUM CHLORIDE 0.9 % (FLUSH) 0.9 %
10 SYRINGE (ML) INJECTION AS NEEDED
Status: DISCONTINUED | OUTPATIENT
Start: 2022-11-20 | End: 2022-11-20 | Stop reason: HOSPADM

## 2022-11-20 RX ORDER — KETOROLAC TROMETHAMINE 30 MG/ML
15 INJECTION, SOLUTION INTRAMUSCULAR; INTRAVENOUS ONCE
Status: COMPLETED | OUTPATIENT
Start: 2022-11-20 | End: 2022-11-20

## 2022-11-20 RX ADMIN — KETOROLAC TROMETHAMINE 15 MG: 30 INJECTION, SOLUTION INTRAMUSCULAR; INTRAVENOUS at 19:57

## 2022-11-20 RX ADMIN — SODIUM CHLORIDE 1000 ML: 9 INJECTION, SOLUTION INTRAVENOUS at 19:11

## 2022-11-20 NOTE — ED NOTES
MEDICAL SCREENING:    Reason for Visit: diagnosed with the flu about 4 days ago, no improvement, having diarrhea, fever and dry mouth.    Patient initially seen in triage.  The patient was advised further evaluation and diagnostic testing will be needed, some of the treatment and testing will be initiated in the lobby in order to begin the process.  The patient will be returned to the waiting area for the time being and possibly be re-assessed by a subsequent ED provider.  The patient will be brought back to the treatment area in as timely manner as possible.       Ruth Gamino PA  11/20/22 1942

## 2022-11-21 NOTE — DISCHARGE INSTRUCTIONS
Rest at home, make sure you are drinking plenty of fluids.  Continue taking Tylenol or ibuprofen as needed for fever and body aches.  Follow-up with your family doctor in the next 1 or 2 days.  Return to the ED if symptoms change or worsen.

## 2022-11-21 NOTE — ED PROVIDER NOTES
Subjective   History of Present Illness  62-year-old female presents to the ED today for symptoms related to influenza.  She states she has been sick for about 6 days.  She tested positive for influenza a 4 days ago.  She states she is continuing to be symptomatic.  She states she is having a lot of body aches and fever.  She states she has had diarrhea for the last 5 days.  She is had nausea and cough.  She states her mouth feels dry and raw.  She states she has been taking Tylenol and cough medication at home for her symptoms.  She denies any chest pain or difficulty breathing.    History provided by:  Patient  ENRIQUETAI  Presenting symptoms: congestion, cough, fatigue and fever    Severity:  Moderate  Onset quality:  Gradual  Duration:  6 days  Timing:  Constant  Progression:  Unchanged  Chronicity:  New  Relieved by:  Nothing  Worsened by:  Nothing  Ineffective treatments:  OTC medications  Associated symptoms: myalgias    Risk factors: diabetes mellitus        Review of Systems   Constitutional: Positive for appetite change, fatigue and fever.   HENT: Positive for congestion.    Eyes: Negative.    Respiratory: Positive for cough.    Cardiovascular: Negative.    Gastrointestinal: Positive for diarrhea and nausea. Negative for abdominal pain and vomiting.   Genitourinary: Negative.    Musculoskeletal: Positive for myalgias.   Skin: Negative.    Neurological: Negative.    Psychiatric/Behavioral: Negative.    All other systems reviewed and are negative.      Past Medical History:   Diagnosis Date   • Anxiety    • Arthritis    • Diabetes mellitus (HCC)    • Diverticulosis    • Gout    • Hyperlipidemia    • Hypertension    • Myocardial infarction (HCC)        Allergies   Allergen Reactions   • Ciprofloxacin Anaphylaxis   • Penicillins Shortness Of Breath   • Statins Myalgia   • Fish Oil [Omega-3 Fatty Acids] Nausea And Vomiting       Past Surgical History:   Procedure Laterality Date   • APPENDECTOMY     • CARDIAC  CATHETERIZATION     •  SECTION     • CHOLECYSTECTOMY     • CORONARY STENT PLACEMENT      Stenting of the RCA with 3.5 x 26 mm Resolute drug-eluting stent   • HYSTERECTOMY     • JOINT REPLACEMENT     • TONSILLECTOMY         Family History   Problem Relation Age of Onset   • Arthritis Mother    • Heart disease Mother    • Heart disease Sister    • Vision loss Daughter    • Heart disease Father    • Heart disease Sister    • Heart disease Sister    • Heart disease Sister        Social History     Socioeconomic History   • Marital status:    • Number of children: 2   • Years of education: 12   Tobacco Use   • Smoking status: Never   • Smokeless tobacco: Never   Vaping Use   • Vaping Use: Never used   Substance and Sexual Activity   • Alcohol use: No   • Drug use: No   • Sexual activity: Defer           Objective   Physical Exam  Vitals and nursing note reviewed.   Constitutional:       General: She is not in acute distress.     Appearance: Normal appearance. She is not diaphoretic.   HENT:      Head: Normocephalic and atraumatic.      Right Ear: External ear normal.      Left Ear: External ear normal.      Nose: Congestion present.      Mouth/Throat:      Mouth: Mucous membranes are dry.      Pharynx: Oropharynx is clear. No oropharyngeal exudate or posterior oropharyngeal erythema.   Eyes:      General:         Right eye: No discharge.         Left eye: No discharge.      Extraocular Movements: Extraocular movements intact.      Conjunctiva/sclera: Conjunctivae normal.      Pupils: Pupils are equal, round, and reactive to light.   Cardiovascular:      Rate and Rhythm: Normal rate and regular rhythm.      Pulses: Normal pulses.      Heart sounds: Normal heart sounds.   Pulmonary:      Effort: Pulmonary effort is normal.      Breath sounds: Normal breath sounds.   Abdominal:      General: Bowel sounds are normal.      Palpations: Abdomen is soft.      Tenderness: There is no abdominal tenderness.    Musculoskeletal:         General: Normal range of motion.      Cervical back: Normal range of motion and neck supple.   Lymphadenopathy:      Cervical: No cervical adenopathy.   Skin:     General: Skin is warm and dry.      Capillary Refill: Capillary refill takes less than 2 seconds.   Neurological:      General: No focal deficit present.      Mental Status: She is alert and oriented to person, place, and time.   Psychiatric:         Mood and Affect: Mood normal.         Procedures           ED Course  ED Course as of 11/20/22 2003   Sun Nov 20, 2022   1841 XR Chest 2 View  FINDINGS:   PA and lateral views of the chest.  Heart and mediastinal contours are normal. The lungs are clear except for linear scarring or atelectasis in the right middle lobe seen on the lateral view only. No pneumothorax or pleural effusion.       IMPRESSION:  No acute cardiopulmonary findings. []   1959 Patient does have an elevated lactate but there is no evidence of sepsis.  She has received IV fluids.  She does take metformin for her diabetes.  I advised her to drink plenty of fluids and follow-up with her PCP in the next 1 or 2 days.  She will return to the ED if her symptoms change or worsen. []      ED Course User Index  [] Ruth Gamino PA                                           MDM  Number of Diagnoses or Management Options     Amount and/or Complexity of Data Reviewed  Clinical lab tests: reviewed  Tests in the radiology section of CPT®: reviewed  Discuss the patient with other providers: yes (Dr. Aguilar)    Patient Progress  Patient progress: improved      Final diagnoses:   Influenza A       ED Disposition  ED Disposition     ED Disposition   Discharge    Condition   Stable    Comment   --             Kimmy Vela PA  [address] [facility] [address] [phone]    Schedule an appointment as soon as possible for a visit in 2 days           Medication List      Changed    losartan 100 MG tablet  Commonly known  as: COZAAR  Take 1 tablet by mouth Daily.  What changed: how much to take             Ruth Gamino PA  11/20/22 2003

## 2022-11-23 DIAGNOSIS — J10.1 INFLUENZA A: ICD-10-CM

## 2022-11-23 RX ORDER — PROMETHAZINE HYDROCHLORIDE 6.25 MG/5ML
6.25-12.5 SYRUP ORAL EVERY 6 HOURS PRN
Qty: 60 ML | Refills: 0 | Status: SHIPPED | OUTPATIENT
Start: 2022-11-23 | End: 2022-12-30

## 2022-11-23 RX ORDER — GUAIFENESIN AND DEXTROMETHORPHAN HYDROBROMIDE 100; 10 MG/5ML; MG/5ML
5-10 SOLUTION ORAL EVERY 6 HOURS PRN
Qty: 60 ML | Refills: 0 | Status: SHIPPED | OUTPATIENT
Start: 2022-11-23 | End: 2022-11-29

## 2022-11-29 ENCOUNTER — DOCUMENTATION (OUTPATIENT)
Dept: FAMILY MEDICINE CLINIC | Facility: CLINIC | Age: 62
End: 2022-11-29

## 2022-11-29 ENCOUNTER — TELEPHONE (OUTPATIENT)
Dept: FAMILY MEDICINE CLINIC | Facility: CLINIC | Age: 62
End: 2022-11-29

## 2022-11-29 DIAGNOSIS — J11.1 INFLUENZA: Primary | ICD-10-CM

## 2022-11-29 RX ORDER — OSELTAMIVIR PHOSPHATE 75 MG/1
75 CAPSULE ORAL 2 TIMES DAILY
Qty: 10 CAPSULE | Refills: 0 | Status: SHIPPED | OUTPATIENT
Start: 2022-11-29 | End: 2022-12-30

## 2022-11-29 RX ORDER — PREDNISONE 20 MG/1
TABLET ORAL
Qty: 10 TABLET | Refills: 0 | Status: SHIPPED | OUTPATIENT
Start: 2022-11-29 | End: 2022-12-09

## 2022-11-29 RX ORDER — GUAIFENESIN AND CODEINE PHOSPHATE 100; 10 MG/5ML; MG/5ML
5 SOLUTION ORAL 3 TIMES DAILY PRN
Qty: 180 ML | Refills: 0 | Status: SHIPPED | OUTPATIENT
Start: 2022-11-29 | End: 2022-12-30 | Stop reason: SDUPTHER

## 2022-11-29 NOTE — PROGRESS NOTES
3:40 PM on 11/29/2022  I called and spoke with the patient who complained of productive cough, body aches chills headache and generalized malaise and fatigue.  She states that she was diagnosed with influenza A and bronchitis.  Symptoms began sudden onset 5 days ago and she has finished the fifth day of a Z-Hayder today.  I sent in prescription for Tamiflu, prednisone and codeine cough syrup.  She was advised to use albuterol HFA inhaler that she has at home every 6 hours as needed.  Patient was advised that if symptoms persist or worsen that she should come into the office or do a telehealth visit before the end of the week

## 2022-11-29 NOTE — TELEPHONE ENCOUNTER
Caller: Monisha Gardner    Relationship: Self    Best call back number: [phone]     What medication are you requesting: ZPACK    What are your current symptoms: PATIENT WAS DIAGNOSED WITH THE FLU AND BRONCHITIS AT THE URGENT CARE. PATIENT IS HAVING HEADACHE, BODY ACHES, AND COUGH   How long have you been experiencing symptoms: 3 WEEKS    Have you had these symptoms before:    [x] Yes  [] No    Have you been treated for these symptoms before:   [x] Yes  [] No    If a prescription is needed, what is your preferred pharmacy and phone number:  [facility] [place], [place] - [address] - [phone] [facility] [phone] FX    Additional notes: PATIENT HAS BEEN WORKING

## 2022-12-02 RX ORDER — METOPROLOL TARTRATE 50 MG/1
TABLET, FILM COATED ORAL
Qty: 180 TABLET | Refills: 3 | Status: SHIPPED | OUTPATIENT
Start: 2022-12-02 | End: 2023-03-17

## 2022-12-30 ENCOUNTER — OFFICE VISIT (OUTPATIENT)
Dept: FAMILY MEDICINE CLINIC | Facility: CLINIC | Age: 62
End: 2022-12-30
Payer: COMMERCIAL

## 2022-12-30 VITALS
TEMPERATURE: 98.1 F | WEIGHT: 156 LBS | SYSTOLIC BLOOD PRESSURE: 162 MMHG | HEIGHT: 64 IN | DIASTOLIC BLOOD PRESSURE: 96 MMHG | OXYGEN SATURATION: 98 % | BODY MASS INDEX: 26.63 KG/M2 | HEART RATE: 82 BPM

## 2022-12-30 DIAGNOSIS — Z79.4 TYPE 2 DIABETES MELLITUS WITH HYPERGLYCEMIA, WITH LONG-TERM CURRENT USE OF INSULIN: Chronic | ICD-10-CM

## 2022-12-30 DIAGNOSIS — E11.65 TYPE 2 DIABETES MELLITUS WITH HYPERGLYCEMIA, WITH LONG-TERM CURRENT USE OF INSULIN: Chronic | ICD-10-CM

## 2022-12-30 DIAGNOSIS — R05.3 CHRONIC COUGH: Primary | ICD-10-CM

## 2022-12-30 DIAGNOSIS — I10 ESSENTIAL HYPERTENSION: Chronic | ICD-10-CM

## 2022-12-30 LAB
ALBUMIN SERPL-MCNC: 4.4 G/DL (ref 3.5–5.2)
ALBUMIN/GLOB SERPL: 1.9 G/DL
ALP SERPL-CCNC: 87 U/L (ref 39–117)
ALT SERPL W P-5'-P-CCNC: 11 U/L (ref 1–33)
ANION GAP SERPL CALCULATED.3IONS-SCNC: 15 MMOL/L (ref 5–15)
ANISOCYTOSIS BLD QL: ABNORMAL
AST SERPL-CCNC: 14 U/L (ref 1–32)
BILIRUB SERPL-MCNC: 0.3 MG/DL (ref 0–1.2)
BUN SERPL-MCNC: 11 MG/DL (ref 8–23)
BUN/CREAT SERPL: 13.9 (ref 7–25)
CALCIUM SPEC-SCNC: 9.8 MG/DL (ref 8.6–10.5)
CHLORIDE SERPL-SCNC: 101 MMOL/L (ref 98–107)
CO2 SERPL-SCNC: 26 MMOL/L (ref 22–29)
CREAT SERPL-MCNC: 0.79 MG/DL (ref 0.57–1)
DEPRECATED RDW RBC AUTO: 46.6 FL (ref 37–54)
EGFRCR SERPLBLD CKD-EPI 2021: 85.2 ML/MIN/1.73
ERYTHROCYTE [DISTWIDTH] IN BLOOD BY AUTOMATED COUNT: 15.8 % (ref 12.3–15.4)
EXPIRATION DATE: NORMAL
GLOBULIN UR ELPH-MCNC: 2.3 GM/DL
GLUCOSE SERPL-MCNC: 139 MG/DL (ref 65–99)
HBA1C MFR BLD: 5.7 %
HCT VFR BLD AUTO: 41.2 % (ref 34–46.6)
HGB BLD-MCNC: 13 G/DL (ref 12–15.9)
LYMPHOCYTES # BLD MANUAL: 2.19 10*3/MM3 (ref 0.7–3.1)
LYMPHOCYTES NFR BLD MANUAL: 7 % (ref 5–12)
Lab: NORMAL
MCH RBC QN AUTO: 26.1 PG (ref 26.6–33)
MCHC RBC AUTO-ENTMCNC: 31.6 G/DL (ref 31.5–35.7)
MCV RBC AUTO: 82.6 FL (ref 79–97)
MICROCYTES BLD QL: ABNORMAL
MONOCYTES # BLD: 0.9 10*3/MM3 (ref 0.1–0.9)
NEUTROPHILS # BLD AUTO: 9.78 10*3/MM3 (ref 1.7–7)
NEUTROPHILS NFR BLD MANUAL: 76 % (ref 42.7–76)
NRBC BLD AUTO-RTO: 0.2 /100 WBC (ref 0–0.2)
PLAT MORPH BLD: NORMAL
PLATELET # BLD AUTO: 468 10*3/MM3 (ref 140–450)
PMV BLD AUTO: 9.6 FL (ref 6–12)
POTASSIUM SERPL-SCNC: 3.7 MMOL/L (ref 3.5–5.2)
PROT SERPL-MCNC: 6.7 G/DL (ref 6–8.5)
RBC # BLD AUTO: 4.99 10*6/MM3 (ref 3.77–5.28)
SODIUM SERPL-SCNC: 142 MMOL/L (ref 136–145)
TSH SERPL DL<=0.05 MIU/L-ACNC: 0.26 UIU/ML (ref 0.27–4.2)
VARIANT LYMPHS NFR BLD MANUAL: 17 % (ref 19.6–45.3)
WBC MORPH BLD: NORMAL
WBC NRBC COR # BLD: 12.87 10*3/MM3 (ref 3.4–10.8)

## 2022-12-30 PROCEDURE — 80053 COMPREHEN METABOLIC PANEL: CPT | Performed by: PHYSICIAN ASSISTANT

## 2022-12-30 PROCEDURE — 82043 UR ALBUMIN QUANTITATIVE: CPT | Performed by: PHYSICIAN ASSISTANT

## 2022-12-30 PROCEDURE — 3077F SYST BP >= 140 MM HG: CPT | Performed by: PHYSICIAN ASSISTANT

## 2022-12-30 PROCEDURE — 85025 COMPLETE CBC W/AUTO DIFF WBC: CPT | Performed by: PHYSICIAN ASSISTANT

## 2022-12-30 PROCEDURE — 83036 HEMOGLOBIN GLYCOSYLATED A1C: CPT | Performed by: PHYSICIAN ASSISTANT

## 2022-12-30 PROCEDURE — 99214 OFFICE O/P EST MOD 30 MIN: CPT | Performed by: PHYSICIAN ASSISTANT

## 2022-12-30 PROCEDURE — 85007 BL SMEAR W/DIFF WBC COUNT: CPT | Performed by: PHYSICIAN ASSISTANT

## 2022-12-30 PROCEDURE — 84443 ASSAY THYROID STIM HORMONE: CPT | Performed by: PHYSICIAN ASSISTANT

## 2022-12-30 RX ORDER — CETIRIZINE HYDROCHLORIDE 10 MG/1
TABLET ORAL
COMMUNITY
Start: 2022-11-04 | End: 2023-01-20

## 2022-12-30 RX ORDER — BENZONATATE 100 MG/1
CAPSULE ORAL
COMMUNITY
Start: 2022-12-26 | End: 2023-01-20

## 2022-12-30 RX ORDER — HYDROXYZINE PAMOATE 50 MG/1
CAPSULE ORAL
COMMUNITY
Start: 2022-12-10 | End: 2023-01-20

## 2022-12-30 RX ORDER — PREDNISONE 20 MG/1
TABLET ORAL
COMMUNITY
Start: 2022-12-26 | End: 2023-01-20

## 2022-12-30 RX ORDER — GUAIFENESIN AND CODEINE PHOSPHATE 100; 10 MG/5ML; MG/5ML
5 SOLUTION ORAL 3 TIMES DAILY PRN
Qty: 180 ML | Refills: 0 | Status: SHIPPED | OUTPATIENT
Start: 2022-12-30 | End: 2023-01-20

## 2022-12-30 RX ORDER — CEFUROXIME AXETIL 500 MG/1
TABLET ORAL
COMMUNITY
Start: 2022-12-26 | End: 2023-01-20

## 2022-12-30 RX ORDER — TIRZEPATIDE 5 MG/.5ML
5 INJECTION, SOLUTION SUBCUTANEOUS
Qty: 2 ML | Refills: 2 | Status: SHIPPED | OUTPATIENT
Start: 2022-12-30 | End: 2023-02-23

## 2022-12-30 RX ORDER — DEXTROMETHORPHAN HYDROBROMIDE AND PROMETHAZINE HYDROCHLORIDE 15; 6.25 MG/5ML; MG/5ML
SYRUP ORAL
COMMUNITY
Start: 2022-11-26 | End: 2022-12-30

## 2022-12-30 RX ORDER — METFORMIN HYDROCHLORIDE EXTENDED-RELEASE TABLETS 1000 MG/1
1000 TABLET, FILM COATED, EXTENDED RELEASE ORAL 2 TIMES DAILY WITH MEALS
Qty: 60 TABLET | Refills: 5 | Status: SHIPPED | OUTPATIENT
Start: 2022-12-30

## 2022-12-31 LAB — ALBUMIN UR-MCNC: 19.2 MG/DL

## 2023-01-02 NOTE — PATIENT INSTRUCTIONS
Carbohydrate Counting for Diabetes Mellitus, Adult  Carbohydrate counting is a method of keeping track of how many carbohydrates you eat. Eating carbohydrates increases the amount of sugar (glucose) in the blood. Counting how many carbohydrates you eat improves how well you manage your blood glucose. This, in turn, helps you manage your diabetes.  Carbohydrates are measured in grams (g) per serving. It is important to know how many carbohydrates (in grams or by serving size) you can have in each meal. This is different for every person. A dietitian can help you make a meal plan and calculate how many carbohydrates you should have at each meal and snack.  What foods contain carbohydrates?  Carbohydrates are found in the following foods:  Grains, such as breads and cereals.  Dried beans and soy products.  Starchy vegetables, such as potatoes, peas, and corn.  Fruit and fruit juices.  Milk and yogurt.  Sweets and snack foods, such as cake, cookies, candy, chips, and soft drinks.  How do I count carbohydrates in foods?  There are two ways to count carbohydrates in food. You can read food labels or learn standard serving sizes of foods. You can use either of these methods or a combination of both.  Using the Nutrition Facts label  The Nutrition Facts list is included on the labels of almost all packaged foods and beverages in the [place]. It includes:  The serving size.  Information about nutrients in each serving, including the grams of carbohydrate per serving.  To use the Nutrition Facts, decide how many servings you will have. Then, multiply the number of servings by the number of carbohydrates per serving. The resulting number is the total grams of carbohydrates that you will be having.  Learning the standard serving sizes of foods  When you eat carbohydrate foods that are not packaged or do not include Nutrition Facts on the label, you need to measure the servings in order to count the grams of  carbohydrates.  Measure the foods that you will eat with a food scale or measuring cup, if needed.  Decide how many standard-size servings you will eat.  Multiply the number of servings by 15. For foods that contain carbohydrates, one serving equals 15 g of carbohydrates.  For example, if you eat 2 cups or 10 oz (300 g) of strawberries, you will have eaten 2 servings and 30 g of carbohydrates (2 servings x 15 g = 30 g).  For foods that have more than one food mixed, such as soups and casseroles, you must count the carbohydrates in each food that is included.  The following list contains standard serving sizes of common carbohydrate-rich foods. Each of these servings has about 15 g of carbohydrates:  1 slice of bread.  1 six-inch (15 cm) tortilla.  ? cup or 2 oz (53 g) cooked rice or pasta.  ½ cup or 3 oz (85 g) cooked or canned, drained and rinsed beans or lentils.  ½ cup or 3 oz (85 g) starchy vegetable, such as peas, corn, or squash.  ½ cup or 4 oz (120 g) hot cereal.  ½ cup or 3 oz (85 g) boiled or mashed potatoes, or ¼ or 3 oz (85 g) of a large baked potato.  ½ cup or 4 fl oz (118 mL) fruit juice.  1 cup or 8 fl oz (237 mL) milk.  1 small or 4 oz (106 g) apple.  ½ or 2 oz (63 g) of a medium banana.  1 cup or 5 oz (150 g) strawberries.  3 cups or 1 oz (28.3 g) popped popcorn.  What is an example of carbohydrate counting?  To calculate the grams of carbohydrates in this sample meal, follow the steps shown below.  Sample meal  3 oz (85 g) chicken breast.  ? cup or 4 oz (106 g) brown rice.  ½ cup or 3 oz (85 g) corn.  1 cup or 8 fl oz (237 mL) milk.  1 cup or 5 oz (150 g) strawberries with sugar-free whipped topping.  Carbohydrate calculation  Identify the foods that contain carbohydrates:  Rice.  Corn.  Milk.  Strawberries.  Calculate how many servings you have of each food:  2 servings rice.  1 serving corn.  1 serving milk.  1 serving strawberries.  Multiply each number of servings by 15  servings rice x 15  g = 30 g.  1 serving corn x 15 g = 15 g.  1 serving milk x 15 g = 15 g.  1 serving strawberries x 15 g = 15 g.  Add together all of the amounts to find the total grams of carbohydrates eaten:  30 g + 15 g + 15 g + 15 g = 75 g of carbohydrates total.  What are tips for following this plan?  Shopping  Develop a meal plan and then make a shopping list.  Buy fresh and frozen vegetables, fresh and frozen fruit, dairy, eggs, beans, lentils, and whole grains.  Look at food labels. Choose foods that have more fiber and less sugar.  Avoid processed foods and foods with added sugars.  Meal planning  Aim to have the same number of grams of carbohydrates at each meal and for each snack time.  Plan to have regular, balanced meals and snacks.  Where to find more information  American Diabetes Association: diabetes.org  Centers for Disease Control and Prevention: cdc.gov  Academy of Nutrition and Dietetics: eatright.org  Association of Diabetes Care & Education Specialists: diabeteseducator.org  Summary  Carbohydrate counting is a method of keeping track of how many carbohydrates you eat.  Eating carbohydrates increases the amount of sugar (glucose) in your blood.  Counting how many carbohydrates you eat improves how well you manage your blood glucose. This helps you manage your diabetes.  A dietitian can help you make a meal plan and calculate how many carbohydrates you should have at each meal and snack.  This information is not intended to replace advice given to you by your health care provider. Make sure you discuss any questions you have with your health care provider.  Document Revised: 07/21/2021 Document Reviewed: 07/21/2021  Elsevier Patient Education © 2022 [employer]vier Inc.

## 2023-01-02 NOTE — PROGRESS NOTES
Subjective   Monisha Gardner is a 62 y.o. female.       Chief Complaint -cough    History of Present Illness -    ROS    Cough-  Patient complains of continued cough with intermittent productivity, shortness of breath and fatigue.  She is currently on cefuroxime 500 mg antibiotic and steroid therapy with some relief but the cough is persistent.    Hypertension-  Not at goal as patient states that she is having increased concern and pain due to the coughing and continued ill health.  She reports home blood pressure is usually less than 130/80 with the use of losartan and metoprolol    Diabetes mellitus type 2-  Stable as she has been doing well with mounjaro and her Lantus dose of 17 units along with Janumet.  Surprisingly her blood glucose has been stable despite the persistent illness.    The following portions of the patient's history were reviewed and updated as appropriate: allergies, current medications, past family history, past medical history, past social history, past surgical history and problem list.    Review of Systems    Objective  Vital signs:  /96   Pulse 82   Temp 98.1 °F (36.7 °C) (Temporal)   Ht 162.6 cm (64\")   Wt 70.8 kg (156 lb)   LMP  (LMP Unknown)   SpO2 98%   BMI 26.78 kg/m²     Physical Exam  Vitals and nursing note reviewed.   Constitutional:       General: She is not in acute distress.     Appearance: Normal appearance. She is well-developed. She is not diaphoretic.   HENT:      Head: Normocephalic and atraumatic.      Right Ear: Tympanic membrane, ear canal and external ear normal.      Left Ear: Tympanic membrane, ear canal and external ear normal.      Nose: Nose normal.      Mouth/Throat:      Mouth: Mucous membranes are moist.      Pharynx: No oropharyngeal exudate or posterior oropharyngeal erythema.   Neck:      Thyroid: No thyromegaly.   Cardiovascular:      Rate and Rhythm: Normal rate and regular rhythm.      Heart sounds: Normal heart sounds. No murmur  heard.  Pulmonary:      Effort: Pulmonary effort is normal.      Breath sounds: No wheezing or rales.      Comments: Generalized bronchovesicular breath sounds noted bilaterally  Musculoskeletal:      Cervical back: Normal range of motion and neck supple.   Lymphadenopathy:      Cervical: No cervical adenopathy.   Skin:     General: Skin is warm and dry.      Findings: No rash.   Neurological:      Mental Status: She is alert and oriented to person, place, and time.   Psychiatric:         Mood and Affect: Mood normal.         Behavior: Behavior normal.         Thought Content: Thought content normal.         The following data was reviewed by: ANH Medina on 12/30/2022:  CMP    CMP 5/6/22 11/20/22 12/30/22   Glucose 111 (A) 97 139 (A)   BUN 9 12 11   Creatinine 0.93 0.76 0.79   eGFR 70.5 89.3 85.2   Sodium 140 136 142   Potassium 4.4 4.2 3.7   Chloride 99 96 (A) 101   Calcium 10.0 10.1 9.8   Total Protein 7.2 7.3 6.7   Albumin 4.50 4.41 4.4   Globulin 2.7 2.9 2.3   Total Bilirubin 0.2 0.2 0.3   Alkaline Phosphatase 104 81 87   AST (SGOT) 32 24 14   ALT (SGPT) 29 17 11   Albumin/Globulin Ratio 1.7 1.5 1.9   BUN/Creatinine Ratio 9.7 15.8 13.9   Anion Gap 19.3 (A) 16.4 (A) 15.0   (A) Abnormal value       Comments are available for some flowsheets but are not being displayed.           CBC w/diff    CBC w/Diff 11/20/22 12/30/22   WBC 8.10 12.87 (A)   RBC 4.79 4.99   Hemoglobin 12.9 13.0   Hematocrit 38.7 41.2   MCV 80.8 82.6   MCH 26.9 26.1 (A)   MCHC 33.3 31.6   RDW 14.9 15.8 (A)   Platelets 322 468 (A)   Neutrophil Rel % 38.9 (A)    Immature Granulocyte Rel % 0.4    Lymphocyte Rel % 52.5 (A)    Monocyte Rel % 4.7 (A)    Eosinophil Rel % 3.0    Basophil Rel % 0.5    (A) Abnormal value                TSH    TSH 12/30/22   TSH 0.257 (A)   (A) Abnormal value            Most Recent A1C    HGBA1C Most Recent 12/30/22   Hemoglobin A1C 5.7                  Assessment & Plan     Diagnoses and all orders for this  visit:    1. Chronic cough (Primary)  Comments:  Continue cefuroxime and steroid therapy until gone  Ordered CT chest and referral to pulmonology for further evaluation  Orders:  -     CT Chest With & Without Contrast; Future  -     Ambulatory Referral to Pulmonology  -     guaiFENesin-codeine (GUAIFENESIN AC) 100-10 MG/5ML liquid; Take 5 mL by mouth 3 (Three) Times a Day As Needed for Cough.  Dispense: 180 mL; Refill: 0  -     MicroAlbumin, Urine, Random - Urine, Clean Catch; Future  -     MicroAlbumin, Urine, Random - Urine, Clean Catch    2. Essential hypertension  Comments:  Likely elevated today due to acute illness and cough  Continue losartan and metoprolol and report any consistent readings greater than 130/80  Orders:  -     CBC & Differential  -     Comprehensive Metabolic Panel  -     TSH  -     MicroAlbumin, Urine, Random - Urine, Clean Catch; Future  -     MicroAlbumin, Urine, Random - Urine, Clean Catch  -     Manual Differential    3. Type 2 diabetes mellitus with hyperglycemia, with long-term current use of insulin (HCC)  Comments:  Discontinue Janumet  Start metformin and decrease Lantus 10 units daily  Increase Mounjaro 5 mg weekly  Advised low-carb diet  Orders:  -     CBC & Differential  -     Comprehensive Metabolic Panel  -     Tirzepatide (Mounjaro) 5 MG/0.5ML solution pen-injector; Inject 0.5 mL under the skin into the appropriate area as directed Every 7 (Seven) Days.  Dispense: 2 mL; Refill: 2  -     metFORMIN (FORTAMET) 1000 MG (OSM) 24 hr tablet; Take 1 tablet by mouth 2 (Two) Times a Day With Meals.  Dispense: 60 tablet; Refill: 5  -     POCT glycated hemoglobin, total  -     MicroAlbumin, Urine, Random - Urine, Clean Catch; Future  -     MicroAlbumin, Urine, Random - Urine, Clean Catch  -     Manual Differential            Patient was given instructions and counseling regarding his condition or for health maintenance advice. Please see specific information pulled into the AVS if  appropriate      This document has been electronically signed by:  Kimmy Vela PA-C

## 2023-01-10 NOTE — PROGRESS NOTES
Pt has been enrolled into the  [employer] Patient Assistance Program. The program is getting patient's insurance info so med can be arranged to be delivered. SLY   Surgical letter refaxed to [phone]

## 2023-01-16 ENCOUNTER — TELEPHONE (OUTPATIENT)
Dept: FAMILY MEDICINE CLINIC | Facility: CLINIC | Age: 63
End: 2023-01-16

## 2023-01-16 DIAGNOSIS — M10.9 ACUTE GOUT OF KNEE, UNSPECIFIED CAUSE, UNSPECIFIED LATERALITY: Primary | ICD-10-CM

## 2023-01-16 RX ORDER — COLCHICINE 0.6 MG/1
0.6 TABLET ORAL SEE ADMIN INSTRUCTIONS
Qty: 4 TABLET | Refills: 0 | Status: SHIPPED | OUTPATIENT
Start: 2023-01-16 | End: 2023-01-20

## 2023-01-16 NOTE — TELEPHONE ENCOUNTER
PATIENT HAS GOUT IN HER KNEE AND CAN'T MOVE.  WOULD LIKE A PHONE CALL FROM THE NURSE.     PLEASE CALL [phone]

## 2023-01-20 ENCOUNTER — OFFICE VISIT (OUTPATIENT)
Dept: FAMILY MEDICINE CLINIC | Facility: CLINIC | Age: 63
End: 2023-01-20
Payer: COMMERCIAL

## 2023-01-20 VITALS
DIASTOLIC BLOOD PRESSURE: 70 MMHG | OXYGEN SATURATION: 96 % | HEART RATE: 76 BPM | SYSTOLIC BLOOD PRESSURE: 140 MMHG | HEIGHT: 64 IN | BODY MASS INDEX: 26.63 KG/M2 | WEIGHT: 156 LBS | TEMPERATURE: 98.6 F

## 2023-01-20 DIAGNOSIS — M10.062 ACUTE IDIOPATHIC GOUT OF LEFT KNEE: Primary | ICD-10-CM

## 2023-01-20 DIAGNOSIS — E11.43 TYPE 2 DIABETES MELLITUS WITH DIABETIC AUTONOMIC NEUROPATHY, WITH LONG-TERM CURRENT USE OF INSULIN: Chronic | ICD-10-CM

## 2023-01-20 DIAGNOSIS — I10 ESSENTIAL HYPERTENSION: Chronic | ICD-10-CM

## 2023-01-20 DIAGNOSIS — Z79.4 TYPE 2 DIABETES MELLITUS WITH DIABETIC AUTONOMIC NEUROPATHY, WITH LONG-TERM CURRENT USE OF INSULIN: Chronic | ICD-10-CM

## 2023-01-20 DIAGNOSIS — R94.6 ABNORMAL THYROID FUNCTION TEST: Chronic | ICD-10-CM

## 2023-01-20 DIAGNOSIS — F41.9 ANXIETY: Chronic | ICD-10-CM

## 2023-01-20 PROCEDURE — 96372 THER/PROPH/DIAG INJ SC/IM: CPT | Performed by: PHYSICIAN ASSISTANT

## 2023-01-20 PROCEDURE — 99214 OFFICE O/P EST MOD 30 MIN: CPT | Performed by: PHYSICIAN ASSISTANT

## 2023-01-20 RX ORDER — GABAPENTIN 300 MG/1
300 CAPSULE ORAL 3 TIMES DAILY
Qty: 90 CAPSULE | Refills: 2 | Status: SHIPPED | OUTPATIENT
Start: 2023-01-20

## 2023-01-20 RX ORDER — DEXAMETHASONE SODIUM PHOSPHATE 4 MG/ML
8 INJECTION, SOLUTION INTRA-ARTICULAR; INTRALESIONAL; INTRAMUSCULAR; INTRAVENOUS; SOFT TISSUE ONCE
Status: COMPLETED | OUTPATIENT
Start: 2023-01-20 | End: 2023-01-20

## 2023-01-20 RX ORDER — METFORMIN HYDROCHLORIDE 500 MG/1
TABLET, EXTENDED RELEASE ORAL
COMMUNITY
Start: 2022-12-30 | End: 2023-01-20

## 2023-01-20 RX ADMIN — DEXAMETHASONE SODIUM PHOSPHATE 8 MG: 4 INJECTION, SOLUTION INTRA-ARTICULAR; INTRALESIONAL; INTRAMUSCULAR; INTRAVENOUS; SOFT TISSUE at 09:39

## 2023-01-22 NOTE — PROGRESS NOTES
"Subjective   Monisha Gardner is a 62 y.o. female.       Chief Complaint -gout    History of Present Illness -    ROS    Gout-  Patient complains of pain in her left knee sudden onset moderate to severe and sharp that began approximately 6 days ago.  Patient has had gout flares in the past and this is consistent.  Some relief with colchicine but not completely relief.    Anxiety-  Stable with Cymbalta and Vraylar.  She denies any hallucinations SI or HI    Diabetes mellitus type 2-  Well controlled since starting mounjaro 5 mg weekly along with metformin 1000 mg twice daily.  She has been able to decrease her insulin dose to 12 units in the evening.  She does have associated diabetic peripheral neuropathy that is controlled with gabapentin    Abnormal TSH-  Stable without thyroid supplementation at this time.    Hypertension-  Stable with metoprolol and losartan    The following portions of the patient's history were reviewed and updated as appropriate: allergies, current medications, past family history, past medical history, past social history, past surgical history and problem list.    Review of Systems    Objective  Vital signs:  /70   Pulse 76   Temp 98.6 °F (37 °C) (Temporal)   Ht 162.6 cm (64\")   Wt 70.8 kg (156 lb)   LMP  (LMP Unknown)   SpO2 96%   BMI 26.78 kg/m²     Physical Exam  Vitals and nursing note reviewed.   Constitutional:       Appearance: Normal appearance. She is well-developed.   Eyes:      Extraocular Movements: Extraocular movements intact.      Conjunctiva/sclera: Conjunctivae normal.   Cardiovascular:      Rate and Rhythm: Normal rate and regular rhythm.      Heart sounds: Normal heart sounds. No murmur heard.  Pulmonary:      Effort: Pulmonary effort is normal. No respiratory distress.      Breath sounds: Normal breath sounds. No wheezing.   Musculoskeletal:         General: Tenderness (left knee) present.   Skin:     General: Skin is warm and dry.      Findings: No rash. "   Neurological:      Mental Status: She is alert and oriented to person, place, and time.   Psychiatric:         Mood and Affect: Mood normal.         Behavior: Behavior normal.         Thought Content: Thought content normal.         The following data was reviewed by: ANH Medina on 01/20/2023:  CMP    CMP 5/6/22 11/20/22 12/30/22   Glucose 111 (A) 97 139 (A)   BUN 9 12 11   Creatinine 0.93 0.76 0.79   eGFR 70.5 89.3 85.2   Sodium 140 136 142   Potassium 4.4 4.2 3.7   Chloride 99 96 (A) 101   Calcium 10.0 10.1 9.8   Total Protein 7.2 7.3 6.7   Albumin 4.50 4.41 4.4   Globulin 2.7 2.9 2.3   Total Bilirubin 0.2 0.2 0.3   Alkaline Phosphatase 104 81 87   AST (SGOT) 32 24 14   ALT (SGPT) 29 17 11   Albumin/Globulin Ratio 1.7 1.5 1.9   BUN/Creatinine Ratio 9.7 15.8 13.9   Anion Gap 19.3 (A) 16.4 (A) 15.0   (A) Abnormal value       Comments are available for some flowsheets but are not being displayed.           CBC w/diff    CBC w/Diff 11/20/22 12/30/22   WBC 8.10 12.87 (A)   RBC 4.79 4.99   Hemoglobin 12.9 13.0   Hematocrit 38.7 41.2   MCV 80.8 82.6   MCH 26.9 26.1 (A)   MCHC 33.3 31.6   RDW 14.9 15.8 (A)   Platelets 322 468 (A)   Neutrophil Rel % 38.9 (A)    Immature Granulocyte Rel % 0.4    Lymphocyte Rel % 52.5 (A)    Monocyte Rel % 4.7 (A)    Eosinophil Rel % 3.0    Basophil Rel % 0.5    (A) Abnormal value                TSH    TSH 12/30/22   TSH 0.257 (A)   (A) Abnormal value            Most Recent A1C    HGBA1C Most Recent 12/30/22   Hemoglobin A1C 5.7                  Assessment & Plan     Diagnoses and all orders for this visit:    1. Acute idiopathic gout of left knee (Primary)  Comments:  Steroid injection today  Advised low purine diet  Orders:  -     dexamethasone (DECADRON) injection 8 mg    2. Anxiety  Comments:  Continue Cymbalta and Vraylar  Orders:  -     Cariprazine HCl (Vraylar) 3 MG capsule capsule; Take 1 capsule by mouth Daily.  Dispense: 30 capsule; Refill: 5  -     TSH; Future  -      T4, Free; Future    3. Type 2 diabetes mellitus with diabetic autonomic neuropathy, with long-term current use of insulin (HCC)  Comments:  Continue Lantus 12 units in the evening, metformin, mounjaro and low carbohydrate diabetic diet.  Refill gabapentin    Orders:  -     gabapentin (NEURONTIN) 300 MG capsule; Take 1 capsule by mouth 3 (Three) Times a Day.  Dispense: 90 capsule; Refill: 2  -     CBC & Differential; Future  -     Comprehensive Metabolic Panel; Future  -     Hemoglobin A1c; Future  -     Lipid Panel; Future  -     MicroAlbumin, Urine, Random - Urine, Clean Catch; Future    4. Abnormal thyroid function test  Comments:  Patient asymptomatic without medication  Continue to monitor  Orders:  -     TSH; Future  -     T4, Free; Future    5. Essential hypertension  Comments:  Continue metoprolol and losartan  Advise daily BP monitoring and report any consistent readings greater 130/80  Orders:  -     TSH; Future  -     T4, Free; Future            Patient was given instructions and counseling regarding his condition or for health maintenance advice. Please see specific information pulled into the AVS if appropriate      This document has been electronically signed by:  Kimmy Vela PA-C

## 2023-02-03 ENCOUNTER — PRIOR AUTHORIZATION (OUTPATIENT)
Dept: FAMILY MEDICINE CLINIC | Facility: CLINIC | Age: 63
End: 2023-02-03
Payer: COMMERCIAL

## 2023-02-03 ENCOUNTER — TELEPHONE (OUTPATIENT)
Dept: FAMILY MEDICINE CLINIC | Facility: CLINIC | Age: 63
End: 2023-02-03

## 2023-02-03 DIAGNOSIS — Z79.4 TYPE 2 DIABETES MELLITUS WITH DIABETIC AUTONOMIC NEUROPATHY, WITH LONG-TERM CURRENT USE OF INSULIN: Chronic | ICD-10-CM

## 2023-02-03 DIAGNOSIS — E11.43 TYPE 2 DIABETES MELLITUS WITH DIABETIC AUTONOMIC NEUROPATHY, WITH LONG-TERM CURRENT USE OF INSULIN: Chronic | ICD-10-CM

## 2023-02-03 RX ORDER — GABAPENTIN 300 MG/1
CAPSULE ORAL
Qty: 90 CAPSULE | Refills: 2 | OUTPATIENT
Start: 2023-02-03

## 2023-02-03 NOTE — TELEPHONE ENCOUNTER
I sent a prescription for quantity 90 gabapentin with 2 refills to her pharmacy in January 2023.  Please call her pharmacy and confirm that she should still have 2 refills.

## 2023-02-03 NOTE — TELEPHONE ENCOUNTER
Caller: Monisha Gardner    Relationship: Self    Best call back number: [phone]    Requested Prescriptions:   Requested Prescriptions      No prescriptions requested or ordered in this encounter      gabapentin (NEURONTIN) 300 MG capsule    Tirzepatide (Mounjaro) 5 MG/0.5ML solution pen-injector    Pharmacy where request should be sent:      Additional details provided by patient:     NEEDS PRIOR AUTHORIZATION ON MOUNJARO.  SHE IS COMPLETELY OUT OF INSULIN.    Does the patient have less than a 3 day supply:  [x] Yes  [] No    Would you like a call back once the refill request has been completed: [] Yes [x] No    If the office needs to give you a call back, can they leave a voicemail: [] Yes [x] No    Mark Villareal Rep   02/03/23 09:42 EST

## 2023-02-13 RX ORDER — ISOSORBIDE MONONITRATE 60 MG/1
60 TABLET, EXTENDED RELEASE ORAL DAILY
Qty: 30 TABLET | Refills: 0 | Status: SHIPPED | OUTPATIENT
Start: 2023-02-13 | End: 2023-03-17

## 2023-02-13 NOTE — TELEPHONE ENCOUNTER
Pt was last seen on 1/20/2023.   Pt of [place], she is out of the office this week. Could you refill this?

## 2023-02-14 ENCOUNTER — TELEPHONE (OUTPATIENT)
Dept: FAMILY MEDICINE CLINIC | Facility: CLINIC | Age: 63
End: 2023-02-14

## 2023-02-14 NOTE — TELEPHONE ENCOUNTER
Caller: Monisha Gardner    Relationship: Self    Best call back number: [phone]    What medications are you currently taking:   Current Outpatient Medications on File Prior to Visit   Medication Sig Dispense Refill   • allopurinol (ZYLOPRIM) 100 MG tablet Take 1 tablet by mouth 2 (Two) Times a Day. 180 tablet 3   • BD Pen Needle Raquel U/F 32G X 4 MM misc 1 Device by Other route 3 (Three) Times a Day. 300 each 3   • Cariprazine HCl (Vraylar) 3 MG capsule capsule Take 1 capsule by mouth Daily. 30 capsule 5   • Dexilant 60 MG capsule Take 1 capsule by mouth Daily. 90 capsule 3   • DULoxetine (CYMBALTA) 30 MG capsule Take 3 capsules by mouth Daily. 90 capsule 5   • Easy Touch Lancets 30G/Twist misc Use tid 300 each 3   • fenofibrate 160 MG tablet Take 1 tablet by mouth Daily. 90 tablet 3   • gabapentin (NEURONTIN) 300 MG capsule Take 1 capsule by mouth 3 (Three) Times a Day. 90 capsule 2   • isosorbide mononitrate (IMDUR) 60 MG 24 hr tablet TAKE 1 TABLET BY MOUTH DAILY. 30 tablet 0   • Lantus SoloStar 100 UNIT/ML injection pen INJECT 45 UNITS UNDER THE SKIN INTO THE APPROPRIATE AREA AS DIRECTED DAILY. 15 mL 5   • loratadine (CLARITIN) 10 MG tablet Take 1 tablet by mouth Daily. 30 tablet 0   • losartan (COZAAR) 100 MG tablet Take 1 tablet by mouth Daily. (Patient taking differently: Take 50 mg by mouth Daily.) 90 tablet 3   • metFORMIN (FORTAMET) 1000 MG (OSM) 24 hr tablet Take 1 tablet by mouth 2 (Two) Times a Day With Meals. 60 tablet 5   • metoprolol tartrate (LOPRESSOR) 50 MG tablet TAKE 1 TABLET BY MOUTH TWICE DAILY 180 tablet 3   • nitroglycerin (Nitrostat) 0.4 MG SL tablet Place 1 tablet under the tongue Every 5 (Five) Minutes As Needed for Chest Pain. 30 tablet 5   • rOPINIRole (REQUIP) 2 MG tablet Take 1 tablet by mouth Every Night. 90 tablet 3   • Tirzepatide (Mounjaro) 5 MG/0.5ML solution pen-injector Inject 0.5 mL under the skin into the appropriate area as directed Every 7 (Seven) Days. 2 mL 2   •  True Metrix Blood Glucose Test test strip 1 each by Other route 3 (Three) Times a Day. 300 each 3     No current facility-administered medications on file prior to visit.        Which medication are you concerned about:     Dexilant 60 MG capsule    What are your concerns:     PHARMACY STATES MEDICATION NEEDS A PRIOR AUTHORIZATION.  PATIENT HAS BEEN OUT OF THIS MEDICATION SINCE Thursday 2/9

## 2023-02-15 ENCOUNTER — TELEPHONE (OUTPATIENT)
Dept: FAMILY MEDICINE CLINIC | Facility: CLINIC | Age: 63
End: 2023-02-15
Payer: COMMERCIAL

## 2023-02-23 ENCOUNTER — OFFICE VISIT (OUTPATIENT)
Dept: FAMILY MEDICINE CLINIC | Facility: CLINIC | Age: 63
End: 2023-02-23
Payer: COMMERCIAL

## 2023-02-23 VITALS
SYSTOLIC BLOOD PRESSURE: 146 MMHG | BODY MASS INDEX: 27.49 KG/M2 | HEART RATE: 86 BPM | WEIGHT: 161 LBS | TEMPERATURE: 97.3 F | DIASTOLIC BLOOD PRESSURE: 80 MMHG | HEIGHT: 64 IN | OXYGEN SATURATION: 98 %

## 2023-02-23 DIAGNOSIS — G25.81 RLS (RESTLESS LEGS SYNDROME): Chronic | ICD-10-CM

## 2023-02-23 DIAGNOSIS — Z79.4 TYPE 2 DIABETES MELLITUS WITH HYPERGLYCEMIA, WITH LONG-TERM CURRENT USE OF INSULIN: Chronic | ICD-10-CM

## 2023-02-23 DIAGNOSIS — I10 ESSENTIAL HYPERTENSION: Primary | Chronic | ICD-10-CM

## 2023-02-23 DIAGNOSIS — E11.65 TYPE 2 DIABETES MELLITUS WITH HYPERGLYCEMIA, WITH LONG-TERM CURRENT USE OF INSULIN: Chronic | ICD-10-CM

## 2023-02-23 PROCEDURE — 99214 OFFICE O/P EST MOD 30 MIN: CPT | Performed by: PHYSICIAN ASSISTANT

## 2023-02-23 RX ORDER — LOSARTAN POTASSIUM 100 MG/1
100 TABLET ORAL DAILY
Qty: 90 TABLET | Refills: 3 | Status: SHIPPED | OUTPATIENT
Start: 2023-02-23

## 2023-02-23 RX ORDER — HYDROXYZINE PAMOATE 50 MG/1
CAPSULE ORAL
COMMUNITY
Start: 2023-02-03

## 2023-02-23 RX ORDER — SEMAGLUTIDE 1.34 MG/ML
0.5 INJECTION, SOLUTION SUBCUTANEOUS WEEKLY
Qty: 2 ML | Refills: 1 | Status: SHIPPED | OUTPATIENT
Start: 2023-02-23

## 2023-02-23 NOTE — PROGRESS NOTES
"Subjective   Monisha Gardner is a 62 y.o. female.       Chief Complaint -ER follow-up/hypertension    History of Present Illness -    ROS    Hypertension-  Not at goal.  Patient went to [facility] ER on 2/18/2023 due to high blood pressure.  At that time blood pressure was 204/103 and came down to 138/80.  She was diagnosed with headache thought likely to be causing the hypertensive urgency.  Patient reports intermittent headache.  She was started on naproxen to treat headache.    2/18/2023 CT of head revealed no bleed.  No mass.  No midline shift  Chest x-ray revealed no infiltrate.  No pulmonary edema  EKG revealed sinus rhythm with rate of 75 bpm with no acute changes noted.    Diabetes-  Not at goal as patient states she has significantly increased appetite with the use of Mounjaro.  Patient does continue to use metformin.  She states that she is following a low carbohydrate diet    Restless leg syndrome-  Stable with ropinirole    The following portions of the patient's history were reviewed and updated as appropriate: allergies, current medications, past family history, past medical history, past social history, past surgical history and problem list.    Review of Systems    Objective  Vital signs:  /80   Pulse 86   Temp 97.3 °F (36.3 °C) (Temporal)   Ht 162.6 cm (64\")   Wt 73 kg (161 lb)   LMP  (LMP Unknown)   SpO2 98%   BMI 27.64 kg/m²     Physical Exam  Vitals and nursing note reviewed.   Constitutional:       Appearance: Normal appearance. She is well-developed.   Eyes:      Extraocular Movements: Extraocular movements intact.      Conjunctiva/sclera: Conjunctivae normal.   Cardiovascular:      Rate and Rhythm: Normal rate and regular rhythm.      Heart sounds: Normal heart sounds. No murmur heard.  Pulmonary:      Effort: Pulmonary effort is normal. No respiratory distress.      Breath sounds: Normal breath sounds. No wheezing.   Musculoskeletal:         General: No tenderness. "   Skin:     General: Skin is warm and dry.      Findings: No rash.   Neurological:      Mental Status: She is alert and oriented to person, place, and time.   Psychiatric:         Mood and Affect: Mood normal.         Behavior: Behavior normal.         Thought Content: Thought content normal.         The following data was reviewed by: ANH Medina on 02/23/2023:  CMP    CMP 5/6/22 11/20/22 12/30/22   Glucose 111 (A) 97 139 (A)   BUN 9 12 11   Creatinine 0.93 0.76 0.79   eGFR 70.5 89.3 85.2   Sodium 140 136 142   Potassium 4.4 4.2 3.7   Chloride 99 96 (A) 101   Calcium 10.0 10.1 9.8   Total Protein 7.2 7.3 6.7   Albumin 4.50 4.41 4.4   Globulin 2.7 2.9 2.3   Total Bilirubin 0.2 0.2 0.3   Alkaline Phosphatase 104 81 87   AST (SGOT) 32 24 14   ALT (SGPT) 29 17 11   Albumin/Globulin Ratio 1.7 1.5 1.9   BUN/Creatinine Ratio 9.7 15.8 13.9   Anion Gap 19.3 (A) 16.4 (A) 15.0   (A) Abnormal value       Comments are available for some flowsheets but are not being displayed.           CBC w/diff    CBC w/Diff 11/20/22 12/30/22   WBC 8.10 12.87 (A)   RBC 4.79 4.99   Hemoglobin 12.9 13.0   Hematocrit 38.7 41.2   MCV 80.8 82.6   MCH 26.9 26.1 (A)   MCHC 33.3 31.6   RDW 14.9 15.8 (A)   Platelets 322 468 (A)   Neutrophil Rel % 38.9 (A)    Immature Granulocyte Rel % 0.4    Lymphocyte Rel % 52.5 (A)    Monocyte Rel % 4.7 (A)    Eosinophil Rel % 3.0    Basophil Rel % 0.5    (A) Abnormal value                TSH    TSH 12/30/22   TSH 0.257 (A)   (A) Abnormal value            Most Recent A1C    HGBA1C Most Recent 12/30/22   Hemoglobin A1C 5.7           Data reviewed: Radiologic studies CT scans and x-rays as listed above and Recent hospitalization notes [facility] ER records reviewed       Assessment & Plan     Diagnoses and all orders for this visit:    1. Essential hypertension (Primary)  Comments:  Increase losartan 100 mg daily  Continue metoprolol 50 mg twice daily  Discussed headache with relation to  hypertension  Monitor BP daily  Orders:  -     losartan (COZAAR) 100 MG tablet; Take 1 tablet by mouth Daily.  Dispense: 90 tablet; Refill: 3    2. Type 2 diabetes mellitus with hyperglycemia, with long-term current use of insulin (HCC)  Comments:  Discontinue Mounjaro  Start Ozempic  Continue metformin  Advised low carbohydrate diabetic diet  Orders:  -     Semaglutide,0.25 or 0.5MG/DOS, (Ozempic, 0.25 or 0.5 MG/DOSE,) 2 MG/1.5ML solution pen-injector; Inject 0.5 mg under the skin into the appropriate area as directed 1 (One) Time Per Week.  Dispense: 2 mL; Refill: 1    3. RLS (restless legs syndrome)  Comments:  Continue ropinirole  Advised tips for healthy sleep            Patient was given instructions and counseling regarding his condition or for health maintenance advice. Please see specific information pulled into the AVS if appropriate      This document has been electronically signed by:  Kimmy Vela PA-C

## 2023-02-24 ENCOUNTER — TELEPHONE (OUTPATIENT)
Dept: FAMILY MEDICINE CLINIC | Facility: CLINIC | Age: 63
End: 2023-02-24
Payer: COMMERCIAL

## 2023-02-27 ENCOUNTER — TELEPHONE (OUTPATIENT)
Dept: FAMILY MEDICINE CLINIC | Facility: CLINIC | Age: 63
End: 2023-02-27

## 2023-02-27 NOTE — TELEPHONE ENCOUNTER
Provider: REBA     Caller: AUSTYN GONGORA     Phone Number: [phone]    Reason for Call: PATIENT REPORTS THAT HER BLOOD PRESSURE IS STILL HIGH 189/102 THIS MORNING AND CURRENTLY 155/132

## 2023-02-27 NOTE — TELEPHONE ENCOUNTER
FYI-  2/27/2023 at 2:30 PM  I called and spoke with the patient.  She took her blood pressure while we were on the phone and current blood pressure is 162/88 with a pulse of 87.  She does report that blood pressure was elevated over 200 systolic over the weekend.  Patient was advised to increase metoprolol 100 mg twice daily and continue all other medication.  She was advised that if systolic greater than 180 or diastolic greater than 110 she should go to the ER for emergent care at [facility] in [place].  Shared decision-making was employed today and patient agrees with the plan.  We discussed potential risks associated with elevated blood pressure including stroke or death.

## 2023-03-17 ENCOUNTER — OFFICE VISIT (OUTPATIENT)
Dept: FAMILY MEDICINE CLINIC | Facility: CLINIC | Age: 63
End: 2023-03-17
Payer: COMMERCIAL

## 2023-03-17 VITALS
SYSTOLIC BLOOD PRESSURE: 158 MMHG | TEMPERATURE: 96.8 F | HEART RATE: 72 BPM | OXYGEN SATURATION: 100 % | HEIGHT: 64 IN | WEIGHT: 158 LBS | DIASTOLIC BLOOD PRESSURE: 80 MMHG | BODY MASS INDEX: 26.98 KG/M2

## 2023-03-17 DIAGNOSIS — Z79.4 TYPE 2 DIABETES MELLITUS WITH HYPERGLYCEMIA, WITH LONG-TERM CURRENT USE OF INSULIN: Chronic | ICD-10-CM

## 2023-03-17 DIAGNOSIS — I10 ESSENTIAL HYPERTENSION: Primary | Chronic | ICD-10-CM

## 2023-03-17 DIAGNOSIS — E11.65 TYPE 2 DIABETES MELLITUS WITH HYPERGLYCEMIA, WITH LONG-TERM CURRENT USE OF INSULIN: Chronic | ICD-10-CM

## 2023-03-17 DIAGNOSIS — M19.012 PRIMARY OSTEOARTHRITIS, LEFT SHOULDER: Chronic | ICD-10-CM

## 2023-03-17 DIAGNOSIS — M17.11 PRIMARY OSTEOARTHRITIS OF RIGHT KNEE: Chronic | ICD-10-CM

## 2023-03-17 PROCEDURE — 20610 DRAIN/INJ JOINT/BURSA W/O US: CPT | Performed by: PHYSICIAN ASSISTANT

## 2023-03-17 PROCEDURE — 99214 OFFICE O/P EST MOD 30 MIN: CPT | Performed by: PHYSICIAN ASSISTANT

## 2023-03-17 PROCEDURE — 96372 THER/PROPH/DIAG INJ SC/IM: CPT | Performed by: PHYSICIAN ASSISTANT

## 2023-03-17 RX ORDER — ISOSORBIDE MONONITRATE 60 MG/1
60 TABLET, EXTENDED RELEASE ORAL DAILY
Qty: 30 TABLET | Refills: 0 | Status: SHIPPED | OUTPATIENT
Start: 2023-03-17

## 2023-03-17 RX ORDER — METOPROLOL TARTRATE 100 MG/1
100 TABLET ORAL 2 TIMES DAILY
Qty: 60 TABLET | Refills: 5 | Status: SHIPPED | OUTPATIENT
Start: 2023-03-17

## 2023-03-17 NOTE — PROGRESS NOTES
"Subjective   Monisha Gardner is a 62 y.o. female.       Chief Complaint -hypertension    History of Present Illness -    ROS    Hypertension-  Uncontrolled with losartan 100 mg daily metoprolol 50 mg twice daily.  Patient states that blood pressure has stayed less than 200 systolic since the last visit.  She does report some intermittent headaches with elevated blood pressure    Shoulder pain-  Patient complains of moderate to severe achy pain in the left shoulder.  She does have decreased range of joint motion in that shoulder.  Patient declines physical therapy stating she does not have time.  She does have osteoarthritis in the shoulder.  No known specific injury.    Right knee pain-  Patient complains of moderate achy pain in the right knee.  Pain worse for the past week.  No known specific injury.  Minimal relief with Tylenol or NSAIDs over-the-counter    4/22/2022 x-ray of bilateral knees revealed mild medial compartment bilateral joint space narrowing.    Diabetes mellitus type 2-  Improved with metformin and Ozempic 0.5 mg weekly along with low-carb diet    The following portions of the patient's history were reviewed and updated as appropriate: allergies, current medications, past family history, past medical history, past social history, past surgical history and problem list.    Review of Systems    Objective  Vital signs:  /80   Pulse 72   Temp 96.8 °F (36 °C) (Temporal)   Ht 162.6 cm (64\")   Wt 71.7 kg (158 lb)   LMP  (LMP Unknown)   SpO2 100%   BMI 27.12 kg/m²     Physical Exam  Vitals and nursing note reviewed.   Constitutional:       Appearance: Normal appearance. She is well-developed.   Eyes:      Extraocular Movements: Extraocular movements intact.      Conjunctiva/sclera: Conjunctivae normal.   Cardiovascular:      Rate and Rhythm: Normal rate and regular rhythm.      Heart sounds: Normal heart sounds. No murmur heard.  Pulmonary:      Effort: Pulmonary effort is normal. No " respiratory distress.      Breath sounds: Normal breath sounds. No wheezing.   Musculoskeletal:         General: Tenderness present.      Right lower leg: No edema.      Left lower leg: No edema.      Comments: Decreased range of joint motion in left shoulder with abduction approximately 150 degrees.  Coarse crepitus noted in left shoulder.  Coarse crepitus noted in right knee with tenderness to medial palpation without erythema.   Skin:     General: Skin is warm and dry.      Findings: No rash.   Neurological:      Mental Status: She is alert and oriented to person, place, and time.   Psychiatric:         Mood and Affect: Mood normal.         Behavior: Behavior normal.         Thought Content: Thought content normal.         The following data was reviewed by: ANH Medina on 03/17/2023:  CMP    CMP 5/6/22 11/20/22 12/30/22   Glucose 111 (A) 97 139 (A)   BUN 9 12 11   Creatinine 0.93 0.76 0.79   eGFR 70.5 89.3 85.2   Sodium 140 136 142   Potassium 4.4 4.2 3.7   Chloride 99 96 (A) 101   Calcium 10.0 10.1 9.8   Total Protein 7.2 7.3 6.7   Albumin 4.50 4.41 4.4   Globulin 2.7 2.9 2.3   Total Bilirubin 0.2 0.2 0.3   Alkaline Phosphatase 104 81 87   AST (SGOT) 32 24 14   ALT (SGPT) 29 17 11   Albumin/Globulin Ratio 1.7 1.5 1.9   BUN/Creatinine Ratio 9.7 15.8 13.9   Anion Gap 19.3 (A) 16.4 (A) 15.0   (A) Abnormal value       Comments are available for some flowsheets but are not being displayed.           CBC w/diff    CBC w/Diff 11/20/22 12/30/22   WBC 8.10 12.87 (A)   RBC 4.79 4.99   Hemoglobin 12.9 13.0   Hematocrit 38.7 41.2   MCV 80.8 82.6   MCH 26.9 26.1 (A)   MCHC 33.3 31.6   RDW 14.9 15.8 (A)   Platelets 322 468 (A)   Neutrophil Rel % 38.9 (A)    Immature Granulocyte Rel % 0.4    Lymphocyte Rel % 52.5 (A)    Monocyte Rel % 4.7 (A)    Eosinophil Rel % 3.0    Basophil Rel % 0.5    (A) Abnormal value                TSH    TSH 12/30/22   TSH 0.257 (A)   (A) Abnormal value            Most Recent A1C    HGBA1C  Most Recent 12/30/22   Hemoglobin A1C 5.7           Data reviewed: Radiologic studies X-ray knees       Assessment & Plan     Diagnoses and all orders for this visit:    1. Essential hypertension (Primary)  Comments:  Increase metoprolol 100 mg twice daily  Continue losartan 100 mg daily  Advise daily BP and pulse monitoring  Orders:  -     metoprolol tartrate (LOPRESSOR) 100 MG tablet; Take 1 tablet by mouth 2 (Two) Times a Day.  Dispense: 60 tablet; Refill: 5    2. Primary osteoarthritis, left shoulder  Comments:  Left shoulder intra-articular injection performed today  Patient declines physical therapy  Patient taught ROM exercises to do at home to improve mobility    3. Primary osteoarthritis of right knee  Comments:  Right knee intra-articular injection for osteoarthritis exacerbation today    4. Type 2 diabetes mellitus with hyperglycemia, with long-term current use of insulin (HCC)  Comments:  Continue Ozempic 0.5 mg weekly and metformin  Advised a low carbohydrate diabetic diet      Procedure: Left shoulder intra-articular injection  Provider: Kimmy Vlea PA-C  Indication: Left shoulder pain secondary to osteoarthritis  Timeout was taken to verify correct patient procedure and location  Description: The left shoulder was prepped and draped in sterile fashion.  An intra-articular injection was given using 3 cc 1% lidocaine, 1 cc of Kenalog and 1 cc of Depo-Medrol.  Pressure was held and sterile dressing was placed.  No complications  Estimated blood loss: Minimal  Patient tolerated procedure well    Procedure: Right knee intra-articular injection  Provider: Kimmy Vela PA-C  Indication: Right knee pain secondary to osteoarthritis  Timeout was taken to verify correct patient procedure and location  Description: The right knee was prepped and draped in sterile fashion.  An intra-articular injection was given using 3 cc 1% lidocaine, 1 cc of Kenalog and 1 cc of Depo-Medrol.  Pressure was held and sterile  dressing was placed.  No complications  Estimated blood loss: Minimal    Lidocaine 1% 10 mg/mL  NDC number 30899-678-16  Lot #[id number]  Expiration 2/26    Triamcinolone 40 mg/mL  NDC number 0827-3141-72  Lot #[id number]  Expiration February 2024    Depo-Medrol 80 mg/mL  NDC #000 9-0 306-02  Lot #[id number]  Expiration 5/25        Patient was given instructions and counseling regarding his condition or for health maintenance advice. Please see specific information pulled into the AVS if appropriate      This document has been electronically signed by:  Kimmy Vela PA-C

## 2023-04-07 ENCOUNTER — LAB (OUTPATIENT)
Dept: FAMILY MEDICINE CLINIC | Facility: CLINIC | Age: 63
End: 2023-04-07
Payer: COMMERCIAL

## 2023-04-07 DIAGNOSIS — E11.43 TYPE 2 DIABETES MELLITUS WITH DIABETIC AUTONOMIC NEUROPATHY, WITH LONG-TERM CURRENT USE OF INSULIN: ICD-10-CM

## 2023-04-07 DIAGNOSIS — F41.9 ANXIETY: Chronic | ICD-10-CM

## 2023-04-07 DIAGNOSIS — I10 ESSENTIAL HYPERTENSION: Chronic | ICD-10-CM

## 2023-04-07 DIAGNOSIS — Z79.4 TYPE 2 DIABETES MELLITUS WITH DIABETIC AUTONOMIC NEUROPATHY, WITH LONG-TERM CURRENT USE OF INSULIN: ICD-10-CM

## 2023-04-07 DIAGNOSIS — R94.6 ABNORMAL THYROID FUNCTION TEST: Chronic | ICD-10-CM

## 2023-04-07 LAB
ALBUMIN SERPL-MCNC: 4.8 G/DL (ref 3.5–5.2)
ALBUMIN UR-MCNC: 2.5 MG/DL
ALBUMIN/GLOB SERPL: 1.8 G/DL
ALP SERPL-CCNC: 105 U/L (ref 39–117)
ALT SERPL W P-5'-P-CCNC: 16 U/L (ref 1–33)
ANION GAP SERPL CALCULATED.3IONS-SCNC: 18 MMOL/L (ref 5–15)
AST SERPL-CCNC: 18 U/L (ref 1–32)
BASOPHILS # BLD AUTO: 0.07 10*3/MM3 (ref 0–0.2)
BASOPHILS NFR BLD AUTO: 0.8 % (ref 0–1.5)
BILIRUB SERPL-MCNC: 0.2 MG/DL (ref 0–1.2)
BUN SERPL-MCNC: 12 MG/DL (ref 8–23)
BUN/CREAT SERPL: 12.5 (ref 7–25)
CALCIUM SPEC-SCNC: 10.3 MG/DL (ref 8.6–10.5)
CHLORIDE SERPL-SCNC: 97 MMOL/L (ref 98–107)
CHOLEST SERPL-MCNC: 299 MG/DL (ref 0–200)
CO2 SERPL-SCNC: 25 MMOL/L (ref 22–29)
CREAT SERPL-MCNC: 0.96 MG/DL (ref 0.57–1)
DEPRECATED RDW RBC AUTO: 45.6 FL (ref 37–54)
EGFRCR SERPLBLD CKD-EPI 2021: 67.5 ML/MIN/1.73
EOSINOPHIL # BLD AUTO: 0.18 10*3/MM3 (ref 0–0.4)
EOSINOPHIL NFR BLD AUTO: 2 % (ref 0.3–6.2)
ERYTHROCYTE [DISTWIDTH] IN BLOOD BY AUTOMATED COUNT: 16 % (ref 12.3–15.4)
GLOBULIN UR ELPH-MCNC: 2.7 GM/DL
GLUCOSE SERPL-MCNC: 151 MG/DL (ref 65–99)
HBA1C MFR BLD: 6.6 % (ref 4.8–5.6)
HCT VFR BLD AUTO: 42 % (ref 34–46.6)
HDLC SERPL-MCNC: 49 MG/DL (ref 40–60)
HGB BLD-MCNC: 13.9 G/DL (ref 12–15.9)
IMM GRANULOCYTES # BLD AUTO: 0.04 10*3/MM3 (ref 0–0.05)
IMM GRANULOCYTES NFR BLD AUTO: 0.4 % (ref 0–0.5)
LDLC SERPL CALC-MCNC: 143 MG/DL (ref 0–100)
LDLC/HDLC SERPL: 2.78 {RATIO}
LYMPHOCYTES # BLD AUTO: 3.82 10*3/MM3 (ref 0.7–3.1)
LYMPHOCYTES NFR BLD AUTO: 42.7 % (ref 19.6–45.3)
MCH RBC QN AUTO: 26.4 PG (ref 26.6–33)
MCHC RBC AUTO-ENTMCNC: 33.1 G/DL (ref 31.5–35.7)
MCV RBC AUTO: 79.7 FL (ref 79–97)
MONOCYTES # BLD AUTO: 0.56 10*3/MM3 (ref 0.1–0.9)
MONOCYTES NFR BLD AUTO: 6.3 % (ref 5–12)
NEUTROPHILS NFR BLD AUTO: 4.27 10*3/MM3 (ref 1.7–7)
NEUTROPHILS NFR BLD AUTO: 47.8 % (ref 42.7–76)
NRBC BLD AUTO-RTO: 0 /100 WBC (ref 0–0.2)
PLATELET # BLD AUTO: 355 10*3/MM3 (ref 140–450)
PMV BLD AUTO: 9.4 FL (ref 6–12)
POTASSIUM SERPL-SCNC: 4.9 MMOL/L (ref 3.5–5.2)
PROT SERPL-MCNC: 7.5 G/DL (ref 6–8.5)
RBC # BLD AUTO: 5.27 10*6/MM3 (ref 3.77–5.28)
SODIUM SERPL-SCNC: 140 MMOL/L (ref 136–145)
T4 FREE SERPL-MCNC: 0.99 NG/DL (ref 0.93–1.7)
TRIGL SERPL-MCNC: 569 MG/DL (ref 0–150)
TSH SERPL DL<=0.05 MIU/L-ACNC: 0.84 UIU/ML (ref 0.27–4.2)
VLDLC SERPL-MCNC: 107 MG/DL (ref 5–40)
WBC NRBC COR # BLD: 8.94 10*3/MM3 (ref 3.4–10.8)

## 2023-04-07 PROCEDURE — 36415 COLL VENOUS BLD VENIPUNCTURE: CPT | Performed by: PHYSICIAN ASSISTANT

## 2023-04-07 PROCEDURE — 80061 LIPID PANEL: CPT | Performed by: PHYSICIAN ASSISTANT

## 2023-04-07 PROCEDURE — 83036 HEMOGLOBIN GLYCOSYLATED A1C: CPT | Performed by: PHYSICIAN ASSISTANT

## 2023-04-07 PROCEDURE — 84439 ASSAY OF FREE THYROXINE: CPT | Performed by: PHYSICIAN ASSISTANT

## 2023-04-07 PROCEDURE — 80053 COMPREHEN METABOLIC PANEL: CPT | Performed by: PHYSICIAN ASSISTANT

## 2023-04-07 PROCEDURE — 85025 COMPLETE CBC W/AUTO DIFF WBC: CPT | Performed by: PHYSICIAN ASSISTANT

## 2023-04-07 PROCEDURE — 82043 UR ALBUMIN QUANTITATIVE: CPT | Performed by: PHYSICIAN ASSISTANT

## 2023-04-07 PROCEDURE — 84443 ASSAY THYROID STIM HORMONE: CPT | Performed by: PHYSICIAN ASSISTANT

## 2023-04-14 ENCOUNTER — PRIOR AUTHORIZATION (OUTPATIENT)
Dept: FAMILY MEDICINE CLINIC | Facility: CLINIC | Age: 63
End: 2023-04-14
Payer: COMMERCIAL

## 2023-04-14 ENCOUNTER — OFFICE VISIT (OUTPATIENT)
Dept: FAMILY MEDICINE CLINIC | Facility: CLINIC | Age: 63
End: 2023-04-14
Payer: COMMERCIAL

## 2023-04-14 VITALS
HEIGHT: 64 IN | HEART RATE: 78 BPM | OXYGEN SATURATION: 98 % | DIASTOLIC BLOOD PRESSURE: 70 MMHG | WEIGHT: 159 LBS | TEMPERATURE: 97.9 F | SYSTOLIC BLOOD PRESSURE: 130 MMHG | BODY MASS INDEX: 27.14 KG/M2

## 2023-04-14 DIAGNOSIS — Z12.31 ENCOUNTER FOR SCREENING MAMMOGRAM FOR MALIGNANT NEOPLASM OF BREAST: ICD-10-CM

## 2023-04-14 DIAGNOSIS — F41.9 ANXIETY AND DEPRESSION: Chronic | ICD-10-CM

## 2023-04-14 DIAGNOSIS — K20.90 ESOPHAGITIS: Chronic | ICD-10-CM

## 2023-04-14 DIAGNOSIS — E11.40 TYPE 2 DIABETES MELLITUS WITH DIABETIC NEUROPATHY, WITH LONG-TERM CURRENT USE OF INSULIN: Chronic | ICD-10-CM

## 2023-04-14 DIAGNOSIS — R20.0 NUMBNESS AND TINGLING IN BOTH HANDS: Chronic | ICD-10-CM

## 2023-04-14 DIAGNOSIS — R20.2 NUMBNESS AND TINGLING IN BOTH HANDS: Chronic | ICD-10-CM

## 2023-04-14 DIAGNOSIS — Z79.4 TYPE 2 DIABETES MELLITUS WITH DIABETIC NEUROPATHY, WITH LONG-TERM CURRENT USE OF INSULIN: Chronic | ICD-10-CM

## 2023-04-14 DIAGNOSIS — E78.2 MIXED HYPERLIPIDEMIA: Primary | Chronic | ICD-10-CM

## 2023-04-14 DIAGNOSIS — F32.A ANXIETY AND DEPRESSION: Chronic | ICD-10-CM

## 2023-04-14 DIAGNOSIS — G62.89 SMALL FIBER POLYNEUROPATHY: Chronic | ICD-10-CM

## 2023-04-14 PROCEDURE — 99214 OFFICE O/P EST MOD 30 MIN: CPT | Performed by: PHYSICIAN ASSISTANT

## 2023-04-14 RX ORDER — DEXLANSOPRAZOLE 60 MG/1
60 CAPSULE, DELAYED RELEASE ORAL DAILY
Qty: 90 CAPSULE | Refills: 3 | Status: SHIPPED | OUTPATIENT
Start: 2023-04-14

## 2023-04-14 RX ORDER — PROCHLORPERAZINE 25 MG/1
1 SUPPOSITORY RECTAL
Qty: 1 EACH | Refills: 5 | Status: SHIPPED | OUTPATIENT
Start: 2023-04-14 | End: 2023-04-15

## 2023-04-14 RX ORDER — FENOFIBRATE 160 MG/1
160 TABLET ORAL DAILY
Qty: 90 TABLET | Refills: 3 | Status: SHIPPED | OUTPATIENT
Start: 2023-04-14

## 2023-04-14 RX ORDER — DULOXETIN HYDROCHLORIDE 30 MG/1
90 CAPSULE, DELAYED RELEASE ORAL DAILY
Qty: 90 CAPSULE | Refills: 5 | Status: SHIPPED | OUTPATIENT
Start: 2023-04-14

## 2023-04-14 RX ORDER — PROCHLORPERAZINE 25 MG/1
1 SUPPOSITORY RECTAL
Qty: 1 EACH | Refills: 5 | Status: SHIPPED | OUTPATIENT
Start: 2023-04-14

## 2023-04-14 RX ORDER — PROCHLORPERAZINE 25 MG/1
SUPPOSITORY RECTAL
Qty: 3 EACH | Refills: 5 | Status: SHIPPED | OUTPATIENT
Start: 2023-04-14

## 2023-04-14 NOTE — PROGRESS NOTES
"Subjective   Monisha Gardner is a 62 y.o. female.       Chief Complaint -hyperlipidemia    History of Present Illness -    ROS    Hyperlipidemia-  Not at goal as patient's triglycerides are greater than 550 with a total cholesterol of 299.  She failed fenofibrate monotherapy.  She failed statin therapy secondary to elevated liver enzymes.  She was doing well on Repatha in the past but stopped taking it due to insurance coverage issues.    Diabetes mellitus-  Worse with hemoglobin A1c elevated to 6.6 from previous 5.7 in December.  She states that she has been following a lower carbohydrate diet.  She is on Ozempic 0.5 mg weekly.    Numbness-  Patient complains of numbness and tingling in bilateral hands and feet.  She does have known diabetic peripheral neuropathy.  She is on gabapentin.  She reports that the numbness in her hands has become more frequent over the last few months.    GERD with esophagitis-  Stable with use of      The following portions of the patient's history were reviewed and updated as appropriate: allergies, current medications, past family history, past medical history, past social history, past surgical history and problem list.    Review of Systems    Objective  Vital signs:  /70   Pulse 78   Temp 97.9 °F (36.6 °C) (Temporal)   Ht 162.6 cm (64\")   Wt 72.1 kg (159 lb)   LMP  (LMP Unknown)   SpO2 98%   BMI 27.29 kg/m²     Physical Exam  Vitals and nursing note reviewed.   Constitutional:       Appearance: Normal appearance. She is well-developed.   Eyes:      Extraocular Movements: Extraocular movements intact.      Conjunctiva/sclera: Conjunctivae normal.   Cardiovascular:      Rate and Rhythm: Normal rate and regular rhythm.      Heart sounds: Normal heart sounds. No murmur heard.  Pulmonary:      Effort: Pulmonary effort is normal. No respiratory distress.      Breath sounds: Normal breath sounds. No wheezing.   Musculoskeletal:         General: No tenderness.   Skin:     " General: Skin is warm and dry.      Findings: No rash.   Neurological:      Mental Status: She is alert and oriented to person, place, and time.   Psychiatric:         Mood and Affect: Mood normal.         Behavior: Behavior normal.         Thought Content: Thought content normal.         The following data was reviewed by: ANH Medina on 04/14/2023:  CMP        11/20/2022    19:00 12/30/2022    09:44 4/7/2023    08:20   CMP   Glucose 97   139   151     BUN 12   11   12     Creatinine 0.76   0.79   0.96     EGFR 89.3   85.2   67.5     Sodium 136   142   140     Potassium 4.2   3.7   4.9     Chloride 96   101   97     Calcium 10.1   9.8   10.3     Total Protein 7.3   6.7   7.5     Albumin 4.41   4.4   4.8     Globulin 2.9   2.3   2.7     Total Bilirubin 0.2   0.3   0.2     Alkaline Phosphatase 81   87   105     AST (SGOT) 24   14   18     ALT (SGPT) 17   11   16     Albumin/Globulin Ratio 1.5   1.9   1.8     BUN/Creatinine Ratio 15.8   13.9   12.5     Anion Gap 16.4   15.0   18.0       CBC w/diff        11/20/2022    19:00 12/30/2022    09:44 4/7/2023    08:20   CBC w/Diff   WBC 8.10   12.87   8.94     RBC 4.79   4.99   5.27     Hemoglobin 12.9   13.0   13.9     Hematocrit 38.7   41.2   42.0     MCV 80.8   82.6   79.7     MCH 26.9   26.1   26.4     MCHC 33.3   31.6   33.1     RDW 14.9   15.8   16.0     Platelets 322   468   355     Neutrophil Rel % 38.9    47.8     Immature Granulocyte Rel % 0.4    0.4     Lymphocyte Rel % 52.5    42.7     Monocyte Rel % 4.7    6.3     Eosinophil Rel % 3.0    2.0     Basophil Rel % 0.5    0.8       Lipid Panel        4/7/2023    08:20   Lipid Panel   Total Cholesterol 299     Triglycerides 569     HDL Cholesterol 49     VLDL Cholesterol 107     LDL Cholesterol  143     LDL/HDL Ratio 2.78       TSH        12/30/2022    09:44 4/7/2023    08:20   TSH   TSH 0.257   0.838       Most Recent A1C        4/7/2023    08:20   HGBA1C Most Recent   Hemoglobin A1C 6.60               Assessment & Plan     Diagnoses and all orders for this visit:    1. Mixed hyperlipidemia (Primary)  Comments:  Failed statin therapy-elevated liver enzymes  Failed fenofibrate monotherapy  Start Repatha/we will try to PA this  Advise low-cholesterol diet  Orders:  -     fenofibrate 160 MG tablet; Take 1 tablet by mouth Daily.  Dispense: 90 tablet; Refill: 3  -     Evolocumab (REPATHA) solution prefilled syringe injection; Inject 1 mL under the skin into the appropriate area as directed Every 14 (Fourteen) Days.  Dispense: 2 mL; Refill: 0    2. Numbness and tingling in both hands  Comments:  Numbness in hands and feet  Refer to neurology for further evaluation  Continue gabapentin  Orders:  -     Ambulatory Referral to Neurology    3. Type 2 diabetes mellitus with diabetic neuropathy, with long-term current use of insulin  Comments:  Increase Ozempic 1 mg weekly  Advise low carbohydrate diabetic diet  Orders:  -     Ambulatory Referral to Neurology  -     Continuous Blood Gluc Sensor (Dexcom G6 Sensor); Every 10 (Ten) Days.  Dispense: 3 each; Refill: 5  -     Continuous Blood Gluc Transmit (Dexcom G6 Transmitter) misc; 1 each Every 30 (Thirty) Days.  Dispense: 1 each; Refill: 5  -     Continuous Blood Gluc  (Dexcom G6 ) device; 1 each Every 30 (Thirty) Days for 1 dose.  Dispense: 1 each; Refill: 5  -     Evolocumab (REPATHA) solution prefilled syringe injection; Inject 1 mL under the skin into the appropriate area as directed Every 14 (Fourteen) Days.  Dispense: 1 mL; Refill: 5  -     Semaglutide, 1 MG/DOSE, (OZEMPIC) 4 MG/3ML solution pen-injector; Inject 1 mg under the skin into the appropriate area as directed 1 (One) Time Per Week.  Dispense: 3 mL; Refill: 5  -     Dexilant 60 MG capsule; Take 1 capsule by mouth Daily.  Dispense: 90 capsule; Refill: 3    4. Esophagitis  Comments:  continue dexilant  Advised to avoid known trigger foods  Orders:  -     Dexilant 60 MG capsule; Take 1 capsule  by mouth Daily.  Dispense: 90 capsule; Refill: 3    5. Encounter for screening mammogram for malignant neoplasm of breast  -     Mammo Screening Digital Tomosynthesis Bilateral With CAD; Future            Patient was given instructions and counseling regarding his condition or for health maintenance advice. Please see specific information pulled into the AVS if appropriate      This document has been electronically signed by:  Kimmy Vela PA-C

## 2023-04-14 NOTE — PATIENT INSTRUCTIONS
"Fat and Cholesterol Restricted Eating Plan  Getting too much fat and cholesterol in your diet may cause health problems. Choosing the right foods helps keep your fat and cholesterol at normal levels. This can keep you from getting certain diseases.  Your doctor may recommend an eating plan that includes:  Total fat: ______% or less of total calories a day. This is ______g of fat a day.  Saturated fat: ______% or less of total calories a day. This is ______g of saturated fat a day.  Cholesterol: less than _________mg a day.  Fiber: ______g a day.  What are tips for following this plan?  General tips  Work with your doctor to lose weight if you need to.  Avoid:  Foods with added sugar.  Fried foods.  Foods with trans fat or partially hydrogenated oils. This includes some margarines and baked goods.  If you drink alcohol:  Limit how much you have to:  0-1 drink a day for women who are not pregnant.  0-2 drinks a day for men.  Know how much alcohol is in a drink. In the [place] one drink equals one 12 oz bottle of beer (355 mL), one 5 oz glass of wine (148 mL), or one 1½ oz glass of hard liquor (44 mL).  Reading food labels  Check food labels for:  Trans fats.  Partially hydrogenated oils.  Saturated fat (g) in each serving.  Cholesterol (mg) in each serving.  Fiber (g) in each serving.  Choose foods with healthy fats, such as:  Monounsaturated fats and polyunsaturated fats. These include olive and canola oil, flaxseeds, walnuts, almonds, and seeds.  Omega-3 fats. These are found in certain fish, flaxseed oil, and ground flaxseeds.  Choose grain products that have whole grains. Look for the word \"whole\" as the first word in the ingredient list.  Cooking  Cook foods using low-fat methods. These include baking, boiling, grilling, and broiling.  Eat more home-cooked foods. Eat at restaurants and buffets less often. Eat less fast food.  Avoid cooking using saturated fats, such as butter, cream, palm oil, palm kernel oil, and " coconut oil.  Meal planning    At meals, divide your plate into four equal parts:  Fill one-half of your plate with vegetables, green salads, and fruit.  Fill one-fourth of your plate with whole grains.  Fill one-fourth of your plate with low-fat (lean) protein foods.  Eat fish that is high in omega-3 fats at least two times a week. This includes mackerel, tuna, sardines, and salmon.  Eat foods that are high in fiber, such as whole grains, beans, apples, pears, berries, broccoli, carrots, peas, and barley.  What foods should I eat?  Fruits  All fresh, canned (in natural juice), or frozen fruits.  Vegetables  Fresh or frozen vegetables (raw, steamed, roasted, or grilled). Green salads.  Grains  Whole grains, such as whole wheat or whole grain breads, crackers, cereals, and pasta. Unsweetened oatmeal, bulgur, barley, quinoa, or brown rice. Corn or whole wheat flour tortillas.  Meats and other protein foods  Ground beef (85% or leaner), grass-fed beef, or beef trimmed of fat. Skinless chicken or turkey. Ground chicken or turkey. Pork trimmed of fat. All fish and seafood. Egg whites. Dried beans, peas, or lentils. Unsalted nuts or seeds. Unsalted canned beans. Nut butters without added sugar or oil.  Dairy  Low-fat or nonfat dairy products, such as skim or 1% milk, 2% or reduced-fat cheeses, low-fat and fat-free ricotta or cottage cheese, or plain low-fat and nonfat yogurt.  Fats and oils  Tub margarine without trans fats. Light or reduced-fat mayonnaise and salad dressings. Avocado. Olive, canola, sesame, or safflower oils.  The items listed above may not be a complete list of foods and beverages you can eat. Contact a dietitian for more information.  What foods should I avoid?  Fruits  Canned fruit in heavy syrup. Fruit in cream or butter sauce. Fried fruit.  Vegetables  Vegetables cooked in cheese, cream, or butter sauce. Fried vegetables.  Grains  White bread. White pasta. White rice. Cornbread. Bagels, pastries,  and croissants. Crackers and snack foods that contain trans fat and hydrogenated oils.  Meats and other protein foods  Fatty cuts of meat. Ribs, chicken wings, villar, sausage, bologna, salami, chitterlings, fatback, hot dogs, bratwurst, and packaged lunch meats. Liver and organ meats. Whole eggs and egg yolks. Chicken and turkey with skin. Fried meat.  Dairy  Whole or 2% milk, cream, half-and-half, and cream cheese. Whole milk cheeses. Whole-fat or sweetened yogurt. Full-fat cheeses. Nondairy creamers and whipped toppings. Processed cheese, cheese spreads, and cheese curds.  Fats and oils  Butter, stick margarine, lard, shortening, ghee, or villar fat. Coconut, palm kernel, and palm oils.  Beverages  Alcohol. Sugar-sweetened drinks such as sodas, lemonade, and fruit drinks.  Sweets and desserts  Corn syrup, sugars, honey, and molasses. Candy. Jam and jelly. Syrup. Sweetened cereals. Cookies, pies, cakes, donuts, muffins, and ice cream.  The items listed above may not be a complete list of foods and beverages you should avoid. Contact a dietitian for more information.  Summary  Choosing the right foods helps keep your fat and cholesterol at normal levels. This can keep you from getting certain diseases.  At meals, fill one-half of your plate with vegetables, green salads, and fruits.  Eat high fiber foods, like whole grains, beans, apples, pears, berries, carrots, peas, and barley.  Limit added sugar, saturated fats, alcohol, and fried foods.  This information is not intended to replace advice given to you by your health care provider. Make sure you discuss any questions you have with your health care provider.  Document Revised: 04/29/2022 Document Reviewed: 04/29/2022  Elsevier Patient Education © 2022 Elsevier Inc.  Carbohydrate Counting for Diabetes Mellitus, Adult  Carbohydrate counting is a method of keeping track of how many carbohydrates you eat. Eating carbohydrates increases the amount of sugar (glucose) in  the blood. Counting how many carbohydrates you eat improves how well you manage your blood glucose. This, in turn, helps you manage your diabetes.  Carbohydrates are measured in grams (g) per serving. It is important to know how many carbohydrates (in grams or by serving size) you can have in each meal. This is different for every person. A dietitian can help you make a meal plan and calculate how many carbohydrates you should have at each meal and snack.  What foods contain carbohydrates?  Carbohydrates are found in the following foods:  Grains, such as breads and cereals.  Dried beans and soy products.  Starchy vegetables, such as potatoes, peas, and corn.  Fruit and fruit juices.  Milk and yogurt.  Sweets and snack foods, such as cake, cookies, candy, chips, and soft drinks.  How do I count carbohydrates in foods?  There are two ways to count carbohydrates in food. You can read food labels or learn standard serving sizes of foods. You can use either of these methods or a combination of both.  Using the Nutrition Facts label  The Nutrition Facts list is included on the labels of almost all packaged foods and beverages in the [place]. It includes:  The serving size.  Information about nutrients in each serving, including the grams of carbohydrate per serving.  To use the Nutrition Facts, decide how many servings you will have. Then, multiply the number of servings by the number of carbohydrates per serving. The resulting number is the total grams of carbohydrates that you will be having.  Learning the standard serving sizes of foods  When you eat carbohydrate foods that are not packaged or do not include Nutrition Facts on the label, you need to measure the servings in order to count the grams of carbohydrates.  Measure the foods that you will eat with a food scale or measuring cup, if needed.  Decide how many standard-size servings you will eat.  Multiply the number of servings by 15. For foods that contain  carbohydrates, one serving equals 15 g of carbohydrates.  For example, if you eat 2 cups or 10 oz (300 g) of strawberries, you will have eaten 2 servings and 30 g of carbohydrates (2 servings x 15 g = 30 g).  For foods that have more than one food mixed, such as soups and casseroles, you must count the carbohydrates in each food that is included.  The following list contains standard serving sizes of common carbohydrate-rich foods. Each of these servings has about 15 g of carbohydrates:  1 slice of bread.  1 six-inch (15 cm) tortilla.  ? cup or 2 oz (53 g) cooked rice or pasta.  ½ cup or 3 oz (85 g) cooked or canned, drained and rinsed beans or lentils.  ½ cup or 3 oz (85 g) starchy vegetable, such as peas, corn, or squash.  ½ cup or 4 oz (120 g) hot cereal.  ½ cup or 3 oz (85 g) boiled or mashed potatoes, or ¼ or 3 oz (85 g) of a large baked potato.  ½ cup or 4 fl oz (118 mL) fruit juice.  1 cup or 8 fl oz (237 mL) milk.  1 small or 4 oz (106 g) apple.  ½ or 2 oz (63 g) of a medium banana.  1 cup or 5 oz (150 g) strawberries.  3 cups or 1 oz (28.3 g) popped popcorn.  What is an example of carbohydrate counting?  To calculate the grams of carbohydrates in this sample meal, follow the steps shown below.  Sample meal  3 oz (85 g) chicken breast.  ? cup or 4 oz (106 g) brown rice.  ½ cup or 3 oz (85 g) corn.  1 cup or 8 fl oz (237 mL) milk.  1 cup or 5 oz (150 g) strawberries with sugar-free whipped topping.  Carbohydrate calculation  Identify the foods that contain carbohydrates:  Rice.  Corn.  Milk.  Strawberries.  Calculate how many servings you have of each food:  2 servings rice.  1 serving corn.  1 serving milk.  1 serving strawberries.  Multiply each number of servings by 15  servings rice x 15 g = 30 g.  1 serving corn x 15 g = 15 g.  1 serving milk x 15 g = 15 g.  1 serving strawberries x 15 g = 15 g.  Add together all of the amounts to find the total grams of carbohydrates eaten:  30 g + 15 g + 15 g + 15  g = 75 g of carbohydrates total.  What are tips for following this plan?  Shopping  Develop a meal plan and then make a shopping list.  Buy fresh and frozen vegetables, fresh and frozen fruit, dairy, eggs, beans, lentils, and whole grains.  Look at food labels. Choose foods that have more fiber and less sugar.  Avoid processed foods and foods with added sugars.  Meal planning  Aim to have the same number of grams of carbohydrates at each meal and for each snack time.  Plan to have regular, balanced meals and snacks.  Where to find more information  American Diabetes Association: diabetes.org  Centers for Disease Control and Prevention: cdc.gov  Academy of Nutrition and Dietetics: eatright.org  Association of Diabetes Care & Education Specialists: diabeteseducator.org  Summary  Carbohydrate counting is a method of keeping track of how many carbohydrates you eat.  Eating carbohydrates increases the amount of sugar (glucose) in your blood.  Counting how many carbohydrates you eat improves how well you manage your blood glucose. This helps you manage your diabetes.  A dietitian can help you make a meal plan and calculate how many carbohydrates you should have at each meal and snack.  This information is not intended to replace advice given to you by your health care provider. Make sure you discuss any questions you have with your health care provider.  Document Revised: 07/21/2021 Document Reviewed: 07/21/2021  Elsevier Patient Education © 2022 Elsevier Inc.

## 2023-04-17 NOTE — PROGRESS NOTES
[facility] Cardiology    Encounter Date: 2023    Patient ID: Monisha Gardner is a 62 y.o. female.  : 1960     PCP: Kimmy Vela PA       Chief Complaint: Coronary artery disease involving native coronary artery of and hand numbness      PROBLEM LIST:  1. Coronary artery disease:  a. Previous MI and stent to unknown vessel  2 years ago, incomplete database.   b. Hospital presentation with acute coronary syndrome and features of inferior ST elevations, 2015.  c. [facility], 2015, Dr. Watts: 95% stenosis of the ostial/proximal RCA s/p 3.5 x 26 mm Resolute VITALIY. No other significant disease. EF 65%.  d. [facility], 2017: EF >70%. No evidence of ischemia.  e. [facility], 2021: EF >70%. No evidence of ischemia.   2. Palpitations  a. Holter, 2022: SR. Rare APC and PVC. Rare short SVT up to 3 beats. No symptoms reported.   3. Peripheral arterial disease  a. [facility], 2017: Right IOANA 1.16, Left IOANA 1.19.    4. Hypertension.   5. Dyslipidemia.   6. Type 2 diabetes.   7. Overweight.   8. Osteoarthritis.     History of Present Illness  Patient presents today for a follow-up with a history of CAD and cardiac risk factors. Since last visit, patient has been doing well overall from a cardiovascular standpoint. However, she has been experiencing dizziness.  States that this is a sensation of swimmy headedness which is worse when she lays down at night.  He does think it is comparable to vertigo.  She has no palpitations.  Denies current chest pain shortness of breath edema or syncope.  States that she had 1 ER visit for elevated blood pressure however subsequently it has been normal.  During the visit she also had headache and had a recent CT scan of the brain which was negative.     Allergies   Allergen Reactions   • Ciprofloxacin Anaphylaxis   • Penicillins Shortness Of Breath   • Statins Myalgia   • Fish Oil [Omega-3 Fatty Acids] Nausea And Vomiting         Current  Outpatient Medications:   •  allopurinol (ZYLOPRIM) 100 MG tablet, Take 1 tablet by mouth 2 (Two) Times a Day., Disp: 180 tablet, Rfl: 3  •  BD Pen Needle Raquel U/F 32G X 4 MM misc, 1 Device by Other route 3 (Three) Times a Day., Disp: 300 each, Rfl: 3  •  Cariprazine HCl (Vraylar) 3 MG capsule capsule, Take 1 capsule by mouth Daily., Disp: 30 capsule, Rfl: 5  •  Continuous Blood Gluc Sensor (Dexcom G6 Sensor), Every 10 (Ten) Days., Disp: 3 each, Rfl: 5  •  Continuous Blood Gluc Transmit (Dexcom G6 Transmitter) misc, 1 each Every 30 (Thirty) Days., Disp: 1 each, Rfl: 5  •  Dexilant 60 MG capsule, Take 1 capsule by mouth Daily., Disp: 90 capsule, Rfl: 3  •  DULoxetine (CYMBALTA) 30 MG capsule, TAKE 3 CAPSULES BY MOUTH DAILY., Disp: 90 capsule, Rfl: 5  •  Easy Touch Lancets 30G/Twist misc, Use tid, Disp: 300 each, Rfl: 3  •  Evolocumab (REPATHA) solution prefilled syringe injection, Inject 1 mL under the skin into the appropriate area as directed Every 14 (Fourteen) Days., Disp: 1 mL, Rfl: 5  •  Evolocumab (REPATHA) solution prefilled syringe injection, Inject 1 mL under the skin into the appropriate area as directed Every 14 (Fourteen) Days., Disp: 2 mL, Rfl: 0  •  fenofibrate 160 MG tablet, Take 1 tablet by mouth Daily., Disp: 90 tablet, Rfl: 3  •  gabapentin (NEURONTIN) 300 MG capsule, Take 1 capsule by mouth 3 (Three) Times a Day., Disp: 90 capsule, Rfl: 2  •  hydrOXYzine pamoate (VISTARIL) 50 MG capsule, , Disp: , Rfl:   •  isosorbide mononitrate (IMDUR) 60 MG 24 hr tablet, TAKE 1 TABLET BY MOUTH DAILY., Disp: 30 tablet, Rfl: 0  •  Lantus SoloStar 100 UNIT/ML injection pen, INJECT 45 UNITS UNDER THE SKIN INTO THE APPROPRIATE AREA AS DIRECTED DAILY., Disp: 15 mL, Rfl: 5  •  loratadine (CLARITIN) 10 MG tablet, Take 1 tablet by mouth Daily., Disp: 30 tablet, Rfl: 0  •  losartan (COZAAR) 100 MG tablet, Take 1 tablet by mouth Daily., Disp: 90 tablet, Rfl: 3  •  metFORMIN (FORTAMET) 1000 MG (OSM) 24 hr tablet, Take 1  "tablet by mouth 2 (Two) Times a Day With Meals., Disp: 60 tablet, Rfl: 5  •  metoprolol tartrate (LOPRESSOR) 100 MG tablet, Take 1 tablet by mouth 2 (Two) Times a Day., Disp: 60 tablet, Rfl: 5  •  nitroglycerin (Nitrostat) 0.4 MG SL tablet, Place 1 tablet under the tongue Every 5 (Five) Minutes As Needed for Chest Pain., Disp: 30 tablet, Rfl: 5  •  rOPINIRole (REQUIP) 2 MG tablet, Take 1 tablet by mouth Every Night., Disp: 90 tablet, Rfl: 3  •  Semaglutide, 1 MG/DOSE, (OZEMPIC) 4 MG/3ML solution pen-injector, Inject 1 mg under the skin into the appropriate area as directed 1 (One) Time Per Week., Disp: 3 mL, Rfl: 5  •  True Metrix Blood Glucose Test test strip, 1 each by Other route 3 (Three) Times a Day., Disp: 300 each, Rfl: 3  •  aspirin 81 MG EC tablet, Take 1 tablet by mouth Daily., Disp: 100 tablet, Rfl: 3  •  neomycin-polymyxin-dexamethasone (MAXITROL) 3.5-58880-1.1 ophthalmic suspension, As Needed., Disp: , Rfl:     The following portions of the patient's history were reviewed and updated as appropriate: allergies, current medications, past family history, past medical history, past social history, past surgical history and problem list.    ROS  Review of Systems   14 point ROS negative except for that listed in the HPI.         Objective:     /66   Pulse 82   Ht 162.6 cm (64\")   Wt 71.8 kg (158 lb 3.2 oz)   LMP  (LMP Unknown)   SpO2 95%   BMI 27.15 kg/m²      Physical Exam  Constitutional: Patient appears well-developed and well-nourished.   HENT: HEENT exam unremarkable.   Neck: Neck supple. No JVD present. No carotid bruits.   Cardiovascular: Normal rate, regular rhythm and normal heart sounds. No murmur heard.   2+ symmetric pulses.   Pulmonary/Chest: Breath sounds normal. Does not exhibit tenderness.   Abdominal: Abdomen benign.   Musculoskeletal: Does not exhibit edema.   Neurological: Neurological exam unremarkable.   Vitals reviewed.    Data Review:   Lab Results   Component Value Date    " GLUCOSE 151 (H) 04/07/2023    BUN 12 04/07/2023    CREATININE 0.96 04/07/2023    EGFR 67.5 04/07/2023    BCR 12.5 04/07/2023     04/07/2023    K 4.9 04/07/2023    CO2 25.0 04/07/2023    CALCIUM 10.3 04/07/2023    ALBUMIN 4.8 04/07/2023    AST 18 04/07/2023    ALT 16 04/07/2023     Lab Results   Component Value Date    CHOL 299 (H) 04/07/2023    TRIG 569 (H) 04/07/2023    HDL 49 04/07/2023     (H) 04/07/2023      Lab Results   Component Value Date    WBC 8.94 04/07/2023    RBC 5.27 04/07/2023    HGB 13.9 04/07/2023    HCT 42.0 04/07/2023    MCV 79.7 04/07/2023     04/07/2023     Lab Results   Component Value Date    TSH 0.838 04/07/2023     Lab Results   Component Value Date    HGBA1C 6.60 (H) 04/07/2023     Assessment:      Diagnosis Plan   1. Coronary artery disease involving native coronary artery of native heart with other form of angina pectoris   we noted that patient has not been taking aspirin, she was advised to start taking aspirin 81 mg daily, continue rest of the current GDMT.  She is stable and without angina or CHF symptoms   2. Essential hypertension   controlled, continue current antihypertensive therapy.   3. Mixed hyperlipidemia   continue current dyslipidemia management, follow-up with PCP for monitoring   4. Dizziness   nonspecific, symptoms appear consistent with vertigo, we will obtain a 48-hour Holter for further assessment, patient advised to follow-up with PCP for further assessment and the neurology referral.     Plan:   Stable from cardiac standpoint, no current angina or CHF symptoms  Dizziness appears consistent with vertigo, we will however obtain a 48-hour Holter to make sure there is no cardiac etiology  Continue current medications, follow-up with PCP for further assessment of dizziness and neurology referral if Holter monitor is unremarkable  Cardiology follow-up in 6 MO, sooner as needed.  Thank you for allowing us to participate in the care of your patient.      Scribed for Derrick Watts MD by Damaris Shukla. 4/21/2023 10:29 EDT    I, Derrick Watts MD, personally performed the services described in this documentation as scribed by the above named individual in my presence, and it is both accurate and complete.  4/21/2023  10:41 EDT      Please note that portions of this note may have been completed with a voice recognition program. Efforts were made to edit the dictations, but occasionally words are mistranscribed.

## 2023-04-21 ENCOUNTER — OFFICE VISIT (OUTPATIENT)
Dept: CARDIOLOGY | Facility: CLINIC | Age: 63
End: 2023-04-21
Payer: COMMERCIAL

## 2023-04-21 VITALS
DIASTOLIC BLOOD PRESSURE: 66 MMHG | OXYGEN SATURATION: 95 % | BODY MASS INDEX: 27.01 KG/M2 | HEART RATE: 82 BPM | WEIGHT: 158.2 LBS | SYSTOLIC BLOOD PRESSURE: 126 MMHG | HEIGHT: 64 IN

## 2023-04-21 DIAGNOSIS — R42 DIZZINESS: ICD-10-CM

## 2023-04-21 DIAGNOSIS — I49.3 PVC (PREMATURE VENTRICULAR CONTRACTION): Primary | ICD-10-CM

## 2023-04-21 DIAGNOSIS — I10 ESSENTIAL HYPERTENSION: ICD-10-CM

## 2023-04-21 DIAGNOSIS — E78.2 MIXED HYPERLIPIDEMIA: ICD-10-CM

## 2023-04-21 DIAGNOSIS — I25.118 CORONARY ARTERY DISEASE INVOLVING NATIVE CORONARY ARTERY OF NATIVE HEART WITH OTHER FORM OF ANGINA PECTORIS: Primary | ICD-10-CM

## 2023-04-21 RX ORDER — ASPIRIN 81 MG/1
81 TABLET ORAL DAILY
Qty: 100 TABLET | Refills: 3 | Status: SHIPPED | OUTPATIENT
Start: 2023-04-21

## 2023-04-21 RX ORDER — NEOMYCIN SULFATE, POLYMYXIN B SULFATE AND DEXAMETHASONE 3.5; 10000; 1 MG/ML; [USP'U]/ML; MG/ML
SUSPENSION/ DROPS OPHTHALMIC AS NEEDED
COMMUNITY
Start: 2023-04-14

## 2023-04-29 DIAGNOSIS — E11.65 TYPE 2 DIABETES MELLITUS WITH HYPERGLYCEMIA, WITH LONG-TERM CURRENT USE OF INSULIN: Chronic | ICD-10-CM

## 2023-04-29 DIAGNOSIS — Z79.4 TYPE 2 DIABETES MELLITUS WITH HYPERGLYCEMIA, WITH LONG-TERM CURRENT USE OF INSULIN: Chronic | ICD-10-CM

## 2023-05-01 RX ORDER — ISOSORBIDE MONONITRATE 60 MG/1
60 TABLET, EXTENDED RELEASE ORAL DAILY
Qty: 30 TABLET | Refills: 0 | Status: SHIPPED | OUTPATIENT
Start: 2023-05-01

## 2023-05-01 RX ORDER — INSULIN GLARGINE 100 [IU]/ML
INJECTION, SOLUTION SUBCUTANEOUS
Qty: 15 ML | Refills: 5 | Status: SHIPPED | OUTPATIENT
Start: 2023-05-01

## 2023-05-02 ENCOUNTER — TELEPHONE (OUTPATIENT)
Dept: CARDIOLOGY | Facility: CLINIC | Age: 63
End: 2023-05-02

## 2023-05-02 NOTE — TELEPHONE ENCOUNTER
Called patient to relay normal holter results. Patient states she does have a little bit of dizziness still and will discuss with PCP to see if she needs that Neurology referral.     Patient also wondering about help for repatha. Got copay card for patient but pharmacy said that she didn't have a copay.   Left patient a message with this information.

## 2023-05-05 ENCOUNTER — OFFICE VISIT (OUTPATIENT)
Dept: NEUROLOGY | Facility: CLINIC | Age: 63
End: 2023-05-05
Payer: COMMERCIAL

## 2023-05-05 ENCOUNTER — LAB (OUTPATIENT)
Dept: LAB | Facility: HOSPITAL | Age: 63
End: 2023-05-05
Payer: COMMERCIAL

## 2023-05-05 VITALS
BODY MASS INDEX: 26.98 KG/M2 | DIASTOLIC BLOOD PRESSURE: 62 MMHG | HEIGHT: 64 IN | OXYGEN SATURATION: 97 % | HEART RATE: 75 BPM | SYSTOLIC BLOOD PRESSURE: 122 MMHG | WEIGHT: 158 LBS

## 2023-05-05 DIAGNOSIS — R20.2 NUMBNESS AND TINGLING OF UPPER AND LOWER EXTREMITIES OF BOTH SIDES: Primary | ICD-10-CM

## 2023-05-05 DIAGNOSIS — R42 DIZZINESS: ICD-10-CM

## 2023-05-05 DIAGNOSIS — R20.0 NUMBNESS AND TINGLING OF UPPER AND LOWER EXTREMITIES OF BOTH SIDES: ICD-10-CM

## 2023-05-05 DIAGNOSIS — R20.0 NUMBNESS AND TINGLING OF UPPER AND LOWER EXTREMITIES OF BOTH SIDES: Primary | ICD-10-CM

## 2023-05-05 DIAGNOSIS — R20.2 NUMBNESS AND TINGLING OF UPPER AND LOWER EXTREMITIES OF BOTH SIDES: ICD-10-CM

## 2023-05-05 LAB
CHROMATIN AB SERPL-ACNC: 14.1 IU/ML (ref 0–14)
CRP SERPL-MCNC: <0.3 MG/DL (ref 0–0.5)
ERYTHROCYTE [SEDIMENTATION RATE] IN BLOOD: 4 MM/HR (ref 0–30)
FOLATE SERPL-MCNC: 19.9 NG/ML (ref 4.78–24.2)
MAGNESIUM SERPL-MCNC: 1.9 MG/DL (ref 1.6–2.4)
VIT B12 BLD-MCNC: 261 PG/ML (ref 211–946)

## 2023-05-05 PROCEDURE — 86431 RHEUMATOID FACTOR QUANT: CPT

## 2023-05-05 PROCEDURE — 86592 SYPHILIS TEST NON-TREP QUAL: CPT

## 2023-05-05 PROCEDURE — 83735 ASSAY OF MAGNESIUM: CPT

## 2023-05-05 PROCEDURE — 84207 ASSAY OF VITAMIN B-6: CPT

## 2023-05-05 PROCEDURE — 83921 ORGANIC ACID SINGLE QUANT: CPT

## 2023-05-05 PROCEDURE — 84155 ASSAY OF PROTEIN SERUM: CPT

## 2023-05-05 PROCEDURE — 84165 PROTEIN E-PHORESIS SERUM: CPT

## 2023-05-05 PROCEDURE — 86235 NUCLEAR ANTIGEN ANTIBODY: CPT

## 2023-05-05 PROCEDURE — 82784 ASSAY IGA/IGD/IGG/IGM EACH: CPT

## 2023-05-05 PROCEDURE — 85652 RBC SED RATE AUTOMATED: CPT

## 2023-05-05 PROCEDURE — 86334 IMMUNOFIX E-PHORESIS SERUM: CPT

## 2023-05-05 PROCEDURE — 86038 ANTINUCLEAR ANTIBODIES: CPT

## 2023-05-05 PROCEDURE — 86140 C-REACTIVE PROTEIN: CPT

## 2023-05-05 PROCEDURE — 36415 COLL VENOUS BLD VENIPUNCTURE: CPT

## 2023-05-05 PROCEDURE — 82746 ASSAY OF FOLIC ACID SERUM: CPT

## 2023-05-05 PROCEDURE — 86225 DNA ANTIBODY NATIVE: CPT

## 2023-05-05 PROCEDURE — 82607 VITAMIN B-12: CPT

## 2023-05-05 RX ORDER — EVOLOCUMAB 140 MG/ML
INJECTION, SOLUTION SUBCUTANEOUS
COMMUNITY
Start: 2023-05-02

## 2023-05-05 RX ORDER — AMMONIUM LACTATE 12 G/100G
CREAM TOPICAL
COMMUNITY
Start: 2023-04-29

## 2023-05-05 RX ORDER — PROCHLORPERAZINE 25 MG/1
SUPPOSITORY RECTAL
COMMUNITY
Start: 2023-04-20

## 2023-05-05 NOTE — PROGRESS NOTES
Neuro Office Visit      Encounter Date: 2023   Patient Name: Monisha Gardner  : 1960   MRN: [id number]   PCP:  Kimmy Vela PA     Chief Complaint:    Chief Complaint   Patient presents with   • Numbness and tingling       History of Present Illness: Monisha Gardner is a 62 y.o. female who is here today in Neurology to establish care for numbness and tingling in hands and feet    Referred by Kimmy Vela.  PMH of coronary artery disease, hypertension, dyslipidemia, atypical angina, mixed hyperlipidemia, PVC, type 2 diabetes with hyperglycemia, vitamin D deficiency, vitamin B12 deficiency, elevated liver enzymes, history of pancreatitis, menopausal symptoms, squamous cell carcinoma in situ of skin, anxiety, osteoarthritis, left arm weakness, chronic gout of multiple sites, restless leg syndrome, insomnia, and dry mouth.    Monisha Gardner is here today to discuss concern for numbness and tingling in her hands and feet, she states that has been worsening for over a year,  she does have diabetes but overall is well controlled, last A1c was 6.60.  She takes gabapentin for diabetic neuropathy.  She has previously been diagnosed with diabetic neuropathy she denies any history of heavy alcohol use.  She does have a history of vitamin D deficiency and vitamin B12 deficiency.  She states that the numbness is present all the time but fluctuates in severity and that at times she has difficulty which she is holding in her hands because the numbness is so severe.  She also reports that over the last year she has fallen due to numbness and not feeling her feet on the ground very well.  Patient also reports that at times she feels that her legs are cramping, she is not sure if her legs are restless associated with this, of note she is taking Requip per PCP for restless legs.    She is also concerned that she has been experiencing dizziness over the last few months, she has discussed this concern  with her cardiologist, Dr. Watts, who felt that she was experiencing vertigo and that it was not from a cardiac cause, she has not had any physical therapy for this.  She has had a Holter monitor which did not show any concerning arrhythmias.  She also associates the dizziness episodes with nausea.  She also reports that she had an episode several months ago of blurry vision in her left eye that lasted about 2 and half hours before resolving, she did discuss this with her eye doctor and underwent a dilated eye exam which she was told was normal.  She has not had any recurrence of visual issues since that time.      Subjective      Review of Systems   Constitutional: Negative.    HENT: Negative.    Eyes: Negative.    Respiratory: Negative.    Cardiovascular: Negative.    Gastrointestinal: Negative.    Endocrine: Negative.    Genitourinary: Negative.    Musculoskeletal: Negative.    Allergic/Immunologic: Positive for food allergies.   Neurological: Positive for dizziness and numbness. Negative for speech difficulty, weakness and headaches.   Hematological: Negative.    Psychiatric/Behavioral: Negative.           Past Medical History:   Past Medical History:   Diagnosis Date   • Anxiety    • Arthritis    • Diabetes mellitus    • Diverticulosis    • Gout    • Hyperlipidemia    • Hypertension    • Myocardial infarction        Past Surgical History:   Past Surgical History:   Procedure Laterality Date   • APPENDECTOMY     • CARDIAC CATHETERIZATION     •  SECTION     • CHOLECYSTECTOMY     • CORONARY STENT PLACEMENT      Stenting of the RCA with 3.5 x 26 mm Resolute drug-eluting stent   • HYSTERECTOMY     • JOINT REPLACEMENT     • TONSILLECTOMY         Family History:   Family History   Problem Relation Age of Onset   • Arthritis Mother    • Heart disease Mother    • Heart disease Sister    • Vision loss Daughter    • Heart disease Father    • Heart disease Sister    • Heart disease Sister    • Heart disease Sister         Social History:   Social History     Socioeconomic History   • Marital status:    • Number of children: 2   • Years of education: 12   Tobacco Use   • Smoking status: Never     Passive exposure: Never   • Smokeless tobacco: Never   Vaping Use   • Vaping Use: Never used   Substance and Sexual Activity   • Alcohol use: No   • Drug use: No   • Sexual activity: Defer       Medications:     Current Outpatient Medications:   •  allopurinol (ZYLOPRIM) 100 MG tablet, Take 1 tablet by mouth 2 (Two) Times a Day., Disp: 180 tablet, Rfl: 3  •  ammonium lactate (AMLACTIN) 12 % cream, , Disp: , Rfl:   •  aspirin 81 MG EC tablet, Take 1 tablet by mouth Daily., Disp: 100 tablet, Rfl: 3  •  BD Pen Needle Raquel U/F 32G X 4 MM misc, 1 Device by Other route 3 (Three) Times a Day., Disp: 300 each, Rfl: 3  •  Cariprazine HCl (Vraylar) 3 MG capsule capsule, Take 1 capsule by mouth Daily., Disp: 30 capsule, Rfl: 5  •  Continuous Blood Gluc  (Dexcom G6 ) device, , Disp: , Rfl:   •  Continuous Blood Gluc Sensor (Dexcom G6 Sensor), Every 10 (Ten) Days., Disp: 3 each, Rfl: 5  •  Continuous Blood Gluc Transmit (Dexcom G6 Transmitter) misc, 1 each Every 30 (Thirty) Days., Disp: 1 each, Rfl: 5  •  Dexilant 60 MG capsule, Take 1 capsule by mouth Daily., Disp: 90 capsule, Rfl: 3  •  DULoxetine (CYMBALTA) 30 MG capsule, TAKE 3 CAPSULES BY MOUTH DAILY., Disp: 90 capsule, Rfl: 5  •  Easy Touch Lancets 30G/Twist misc, Use tid, Disp: 300 each, Rfl: 3  •  fenofibrate 160 MG tablet, Take 1 tablet by mouth Daily., Disp: 90 tablet, Rfl: 3  •  gabapentin (NEURONTIN) 300 MG capsule, Take 1 capsule by mouth 3 (Three) Times a Day., Disp: 90 capsule, Rfl: 2  •  hydrOXYzine pamoate (VISTARIL) 50 MG capsule, , Disp: , Rfl:   •  isosorbide mononitrate (IMDUR) 60 MG 24 hr tablet, TAKE 1 TABLET BY MOUTH DAILY., Disp: 30 tablet, Rfl: 0  •  Lantus SoloStar 100 UNIT/ML injection pen, INJECT 45 UNITS SUBQ DAILY AS DIRECTED., Disp: 15 mL, Rfl:  "5  •  loratadine (CLARITIN) 10 MG tablet, Take 1 tablet by mouth Daily., Disp: 30 tablet, Rfl: 0  •  losartan (COZAAR) 100 MG tablet, Take 1 tablet by mouth Daily., Disp: 90 tablet, Rfl: 3  •  metFORMIN (FORTAMET) 1000 MG (OSM) 24 hr tablet, Take 1 tablet by mouth 2 (Two) Times a Day With Meals., Disp: 60 tablet, Rfl: 5  •  metoprolol tartrate (LOPRESSOR) 100 MG tablet, Take 1 tablet by mouth 2 (Two) Times a Day., Disp: 60 tablet, Rfl: 5  •  neomycin-polymyxin-dexamethasone (MAXITROL) 3.5-35473-4.1 ophthalmic suspension, As Needed., Disp: , Rfl:   •  nitroglycerin (Nitrostat) 0.4 MG SL tablet, Place 1 tablet under the tongue Every 5 (Five) Minutes As Needed for Chest Pain., Disp: 30 tablet, Rfl: 5  •  Repatha SureClick solution auto-injector SureClick injection, , Disp: , Rfl:   •  rOPINIRole (REQUIP) 2 MG tablet, Take 1 tablet by mouth Every Night., Disp: 90 tablet, Rfl: 3  •  Semaglutide, 1 MG/DOSE, (OZEMPIC) 4 MG/3ML solution pen-injector, Inject 1 mg under the skin into the appropriate area as directed 1 (One) Time Per Week., Disp: 3 mL, Rfl: 5  •  True Metrix Blood Glucose Test test strip, 1 each by Other route 3 (Three) Times a Day., Disp: 300 each, Rfl: 3    Allergies:   Allergies   Allergen Reactions   • Ciprofloxacin Anaphylaxis   • Penicillins Shortness Of Breath   • Statins Myalgia   • Fish Oil [Omega-3 Fatty Acids] Nausea And Vomiting         STEADI Fall Risk Assessment has not been completed.    Objective     Objective:    /62   Pulse 75   Ht 162.6 cm (64\")   Wt 71.7 kg (158 lb)   LMP  (LMP Unknown)   SpO2 97%   BMI 27.12 kg/m²   Body mass index is 27.12 kg/m².    Physical Exam  Vitals reviewed.   Constitutional:       Appearance: Normal appearance.   HENT:      Head: Normocephalic and atraumatic.      Mouth/Throat:      Mouth: Mucous membranes are moist.      Pharynx: Oropharynx is clear.   Pulmonary:      Effort: Pulmonary effort is normal. No respiratory distress.   Musculoskeletal:      " Right lower leg: No edema.      Left lower leg: No edema.   Skin:     General: Skin is warm and dry.   Neurological:      Mental Status: She is alert.          Neurology Exam:    General apperance: NAD.     Mental status: Alert, awake and oriented to time place and person.    Fund of knowledge:  Normal.     Language and Speech: No aphasia or dysarthria.    Naming , Repetition and Comprehension:  Can name objects, repeat a sentence and follow commands. Speech is clear and fluent with good repetition, comprehension, and naming.    Cranial Nerves:   CN II: Visual fields are full. Intact. Pupils - PERRLA  CN III, IV and VI: Extraocular movements are intact. Normal saccades.   CN V: Facial sensation is intact.   CN VII: Muscles of facial expression reveal no asymmetry. Intact.   CN VIII: Hearing is intact.   CN IX and X: Palate elevates symmetrically. Intact  CN XI: Shoulder shrug is intact.   CN XII: Tongue is midline without evidence of atrophy or fasciculation.     Motor:  Right UE muscle strength 5/5. Normal tone.     Left UE muscle strength 5/5. Normal tone.      Right LE muscle strength 5/5. Normal tone.     Left LE muscle strength 5/5. Normal tone.      Sensory: Decreased sensation to light touch in hands and feet, intact sensation to vibratory sense BUE/BLE    DTRs: 2+ bilaterally in upper and lower extremities.    Coordination: Normal finger-to-nose, heel to shin B/L.    Rhomberg: Negative.    Gait: Normal.        Results:   Imaging:   No Images in the past 120 days found..     Labs:   WBC   Date Value Ref Range Status   04/07/2023 8.94 3.40 - 10.80 10*3/mm3 Final   11/12/2021 10.58 3.40 - 10.80 10*3/mm3 Final     RBC   Date Value Ref Range Status   04/07/2023 5.27 3.77 - 5.28 10*6/mm3 Final   11/12/2021 4.77 3.77 - 5.28 10*6/mm3 Final     Hemoglobin   Date Value Ref Range Status   04/07/2023 13.9 12.0 - 15.9 g/dL Final     Hematocrit   Date Value Ref Range Status   04/07/2023 42.0 34.0 - 46.6 % Final     MCV    Date Value Ref Range Status   04/07/2023 79.7 79.0 - 97.0 fL Final     MCH   Date Value Ref Range Status   04/07/2023 26.4 (L) 26.6 - 33.0 pg Final     MCHC   Date Value Ref Range Status   04/07/2023 33.1 31.5 - 35.7 g/dL Final     RDW   Date Value Ref Range Status   04/07/2023 16.0 (H) 12.3 - 15.4 % Final     RDW-SD   Date Value Ref Range Status   04/07/2023 45.6 37.0 - 54.0 fl Final     MPV   Date Value Ref Range Status   04/07/2023 9.4 6.0 - 12.0 fL Final     Platelets   Date Value Ref Range Status   04/07/2023 355 140 - 450 10*3/mm3 Final     Neutrophil %   Date Value Ref Range Status   04/07/2023 47.8 42.7 - 76.0 % Final     Lymphocyte %   Date Value Ref Range Status   04/07/2023 42.7 19.6 - 45.3 % Final     Monocyte %   Date Value Ref Range Status   04/07/2023 6.3 5.0 - 12.0 % Final     Eosinophil %   Date Value Ref Range Status   04/07/2023 2.0 0.3 - 6.2 % Final     Basophil %   Date Value Ref Range Status   04/07/2023 0.8 0.0 - 1.5 % Final     Immature Grans %   Date Value Ref Range Status   04/07/2023 0.4 0.0 - 0.5 % Final     Neutrophils, Absolute   Date Value Ref Range Status   04/07/2023 4.27 1.70 - 7.00 10*3/mm3 Final     Lymphocytes, Absolute   Date Value Ref Range Status   04/07/2023 3.82 (H) 0.70 - 3.10 10*3/mm3 Final     Monocytes, Absolute   Date Value Ref Range Status   04/07/2023 0.56 0.10 - 0.90 10*3/mm3 Final     Eosinophils, Absolute   Date Value Ref Range Status   04/07/2023 0.18 0.00 - 0.40 10*3/mm3 Final     Basophils, Absolute   Date Value Ref Range Status   04/07/2023 0.07 0.00 - 0.20 10*3/mm3 Final     Immature Grans, Absolute   Date Value Ref Range Status   04/07/2023 0.04 0.00 - 0.05 10*3/mm3 Final     nRBC   Date Value Ref Range Status   04/07/2023 0.0 0.0 - 0.2 /100 WBC Final     Lab Results   Component Value Date    GLUCOSE 151 (H) 04/07/2023    BUN 12 04/07/2023    CREATININE 0.96 04/07/2023    EGFR 67.5 04/07/2023    BCR 12.5 04/07/2023    K 4.9 04/07/2023    CO2 25.0 04/07/2023     CALCIUM 10.3 04/07/2023    PROTENTOTREF 7.2 11/12/2021    ALBUMIN 4.8 04/07/2023    BILITOT 0.2 04/07/2023    AST 18 04/07/2023    ALT 16 04/07/2023     Hemoglobin A1C 6.60  TSH 0.838  T4 0.99    Assessment / Plan      Assessment/Plan:   Diagnoses and all orders for this visit:    1. Numbness and tingling of upper and lower extremities of both sides (Primary)  -     ELINOR by IFA, Reflex 9-biomarkers profile; Future  -     CAROL + PE; Future  -     Methylmalonic Acid, Serum; Future  -     Rheumatoid Factor; Future  -     Sedimentation Rate; Future  -     Vitamin B12; Future  -     Folate; Future  -     Vitamin B6; Future  -     C-reactive Protein; Future  -     Anti-DNA Antibody, Double-stranded; Future  -     RPR; Future  -     Sjogren's Antibody, Anti-SS-A / -SS-B; Future  -     Magnesium; Future  -     EMG & Nerve Conduction Test; Future    2. Dizziness  -     MRI Brain Without Contrast; Future     Monisha Gardner is in neurology clinic today for evaluation of numbness and tingling along with dizziness.  She does have diabetic neuropathy but feels that it has significantly worsened over the last year despite her A1c being well controlled overall, most recent A1c was 6.60.  We did have a discussion today regarding the importance of continued glucose control to help minimize progression of diabetic neuropathy.  We will perform screening blood work and EMG to rule out any additional causes of neuropathy.  Patient also reports dizziness and a transient episode of blurry vision, she has been told by her cardiologist that her dizziness is not from a cardiac cause so I will order MRI brain to further assess.  I offered vestibular physical therapy for patient for vertigo symptoms and she stated that she currently does not have the time in her schedule to go to physical therapy, if this changes in the future a referral can be placed.  We will plan to see patient back in clinic in approximately 8 weeks or sooner for any  questions or concerns.      Patient Education:       Reviewed medications, potential side effects and signs and symptoms to report. Discussed risk versus benefits of treatment plan with patient and/or family-including medications, labs and radiology that may be ordered. Addressed questions and concerns during visit. Patient and/or family verbalized understanding and agree with plan. Instructed to call the office with any questions and report to ER with any life-threatening symptoms.     Follow Up:   Return in about 8 weeks (around 6/30/2023).    I spent 45 minutes face to face with the patient. I personally spent 50 percent of this time counseling and discussing diagnostic testing, evaluation, current status, treatment options and management .       During this visit the following were done:  Labs Reviewed [x]    Labs Ordered [x]    Radiology Reports Reviewed []    Radiology Ordered [x]    PCP Records Reviewed [x]    Referring Provider Records Reviewed [x]    ER Records Reviewed []    Hospital Records Reviewed []    History Obtained From Family []    Radiology Images Reviewed []    Other Reviewed []    Records Requested []      TRUONG Rivers  [facility]  [address] [address] [phone]

## 2023-05-06 LAB — RPR SER QL: NORMAL

## 2023-05-08 DIAGNOSIS — E11.43 TYPE 2 DIABETES MELLITUS WITH DIABETIC AUTONOMIC NEUROPATHY, WITH LONG-TERM CURRENT USE OF INSULIN: Chronic | ICD-10-CM

## 2023-05-08 DIAGNOSIS — Z79.4 TYPE 2 DIABETES MELLITUS WITH DIABETIC AUTONOMIC NEUROPATHY, WITH LONG-TERM CURRENT USE OF INSULIN: Chronic | ICD-10-CM

## 2023-05-08 LAB
ALBUMIN SERPL ELPH-MCNC: 3.8 G/DL (ref 2.9–4.4)
ALBUMIN/GLOB SERPL: 1.5 {RATIO} (ref 0.7–1.7)
ALPHA1 GLOB SERPL ELPH-MCNC: 0.2 G/DL (ref 0–0.4)
ALPHA2 GLOB SERPL ELPH-MCNC: 0.8 G/DL (ref 0.4–1)
ANA SER QL IF: NEGATIVE
B-GLOBULIN SERPL ELPH-MCNC: 1.2 G/DL (ref 0.7–1.3)
DSDNA AB SER-ACNC: <1 IU/ML (ref 0–9)
ENA SS-A AB SER-ACNC: <0.2 AI (ref 0–0.9)
ENA SS-B AB SER-ACNC: <0.2 AI (ref 0–0.9)
GAMMA GLOB SERPL ELPH-MCNC: 0.5 G/DL (ref 0.4–1.8)
GLOBULIN SER-MCNC: 2.7 G/DL (ref 2.2–3.9)
IGA SERPL-MCNC: 172 MG/DL (ref 87–352)
IGG SERPL-MCNC: 448 MG/DL (ref 586–1602)
IGM SERPL-MCNC: 24 MG/DL (ref 26–217)
INTERPRETATION SERPL IEP-IMP: ABNORMAL
LABORATORY COMMENT REPORT: ABNORMAL
LABORATORY COMMENT REPORT: NORMAL
M PROTEIN SERPL ELPH-MCNC: ABNORMAL G/DL
PROT SERPL-MCNC: 6.5 G/DL (ref 6–8.5)

## 2023-05-08 RX ORDER — GABAPENTIN 300 MG/1
CAPSULE ORAL
Qty: 90 CAPSULE | Refills: 2 | Status: SHIPPED | OUTPATIENT
Start: 2023-05-08

## 2023-05-08 NOTE — PROGRESS NOTES
Amaury Bearden - your labs show that the rheumatoid factor is elevated which we can see with rheumatoid arthritis, it looks like this was previously elevated as well. Your sjogren's antibody was negative, your ELINOR was negative - these both look for autoimmune diseases. Your vitamin B12 level was on the low end of normal which may be contributing to your numbness/tingling. I would like for you to start taking Vitamin B12 1000 mcg daily.   If you do not see rheumatology we can also refer to them for further management of your arthritis if you are interested, please let me know what you would like to do.  Subjective:      Patient ID: Johnny Farrell is a 29 y.o. male. HPI  Anxiety  Patient is here for evaluation of anxiety. He has the following anxiety symptoms: difficulty concentrating, feelings of losing control, psychomotor agitation. Onset of symptoms was approximately several years ago. Symptoms have been gradually worsening since that time. He denies current suicidal and homicidal ideation. Family history significant for anxiety and depression. Possible organic causes contributing are: none. Risk factors: negative life event quit his job because of a panic attack he had  Previous treatment includes medication benzodiazepines klonopin and Lexapro. He complains of the following medication side effects: felt suicidal with ssri  . Review of Systems  Above    Objective:  Blood pressure 108/66, pulse 104, temperature 98.4 °F (36.9 °C), temperature source Tympanic, resp. rate 16, height 5' 11.5\" (1.816 m), weight 132 lb 9.6 oz (60.1 kg), SpO2 98 %. Physical Exam  Constitutional:       General: He is not in acute distress. Appearance: Normal appearance. He is not ill-appearing, toxic-appearing or diaphoretic. HENT:      Head: Normocephalic and atraumatic. Pulmonary:      Effort: No respiratory distress. Musculoskeletal:      Cervical back: Normal range of motion. Neurological:      General: No focal deficit present. Mental Status: He is alert and oriented to person, place, and time. Mental status is at baseline. Psychiatric:         Attention and Perception: Attention and perception normal. He does not perceive visual hallucinations. Mood and Affect: Mood is anxious and depressed. Affect is flat. Speech: Speech normal.         Behavior: Behavior normal. Behavior is not agitated, slowed, aggressive or hyperactive. Thought Content:  Thought content normal. Thought content is not paranoid or delusional. Thought content does not include homicidal or suicidal ideation. Thought content does not include homicidal or suicidal plan. Cognition and Memory: Cognition normal.         Judgment: Judgment normal.         Assessment:       Diagnosis Orders   1.  BENJI (generalized anxiety disorder)  busPIRone (BUSPAR) 5 MG tablet    External Referral to Psychiatry           Plan:      Deteriorated   Intolerant to ssri   Trial with buspar tid   Referred to psychiatrist   patient agrees and verbalizes understanding    Fu 4 weeks          Franc Shah MD

## 2023-05-09 ENCOUNTER — TELEPHONE (OUTPATIENT)
Dept: NEUROLOGY | Facility: CLINIC | Age: 63
End: 2023-05-09
Payer: COMMERCIAL

## 2023-05-09 LAB — PYRIDOXAL PHOS SERPL-MCNC: 10 UG/L (ref 3.4–65.2)

## 2023-05-09 NOTE — TELEPHONE ENCOUNTER
----- Message from TRUONG Rivers sent at 5/8/2023  3:54 PM EDT -----  Hi Monisha - your labs show that the rheumatoid factor is elevated which we can see with rheumatoid arthritis, it looks like this was previously elevated as well. Your sjogren's antibody was negative, your ELINOR was negative - these both look for autoimmune diseases. Your vitamin B12 level was on the low end of normal which may be contributing to your numbness/tingling. I would like for you to start taking Vitamin B12 1000 mcg daily.   If you do not see rheumatology we can also refer to them for further management of your arthritis if you are interested, please let me know what you would like to do.       Janina Cunha APRN Dieruf, Stephanie S, MA  Please notify patient that her vitamin B6 is normal, thank you.

## 2023-05-09 NOTE — TELEPHONE ENCOUNTER
Relayed results to Monisha Roa and she states understanding and will start the vitamin B12 OTC. She does not feel she needs to see a rheumatologist at this time and would like to stick with current plan Janina's got layed out.     She asked if she still needs to have the MRI and EEG and notified yes this is all part of Janina's care plan. Patient states understanding.

## 2023-05-12 ENCOUNTER — OFFICE VISIT (OUTPATIENT)
Dept: FAMILY MEDICINE CLINIC | Facility: CLINIC | Age: 63
End: 2023-05-12
Payer: COMMERCIAL

## 2023-05-12 VITALS
OXYGEN SATURATION: 97 % | TEMPERATURE: 96.9 F | SYSTOLIC BLOOD PRESSURE: 130 MMHG | HEART RATE: 84 BPM | DIASTOLIC BLOOD PRESSURE: 78 MMHG | BODY MASS INDEX: 27.66 KG/M2 | HEIGHT: 64 IN | WEIGHT: 162 LBS

## 2023-05-12 DIAGNOSIS — Z79.899 LONG TERM CURRENT USE OF THERAPEUTIC DRUG: ICD-10-CM

## 2023-05-12 DIAGNOSIS — I10 ESSENTIAL HYPERTENSION: Chronic | ICD-10-CM

## 2023-05-12 DIAGNOSIS — Z79.4 TYPE 2 DIABETES MELLITUS WITH HYPERGLYCEMIA, WITH LONG-TERM CURRENT USE OF INSULIN: Primary | Chronic | ICD-10-CM

## 2023-05-12 DIAGNOSIS — F41.9 ANXIETY: Chronic | ICD-10-CM

## 2023-05-12 DIAGNOSIS — E11.65 TYPE 2 DIABETES MELLITUS WITH HYPERGLYCEMIA, WITH LONG-TERM CURRENT USE OF INSULIN: Primary | Chronic | ICD-10-CM

## 2023-05-12 LAB — METHYLMALONATE SERPL-SCNC: 228 NMOL/L (ref 0–378)

## 2023-05-12 PROCEDURE — 99214 OFFICE O/P EST MOD 30 MIN: CPT | Performed by: PHYSICIAN ASSISTANT

## 2023-05-12 NOTE — PROGRESS NOTES
"Subjective   Monisha Gardner is a 62 y.o. female.       Chief Complaint -diabetes    History of Present Illness -       Diabetes-  Stable with most recent hemoglobin A1c of 6.6.  She is taking Ozempic 1 mg weekly along with metformin.  She has gained 4 pounds since the last visit.  We discussed her eating habits.  She states that she does not eat lunch and then eats her largest meal at 630.  She goes to sleep around 8:00 or 8:30 PM.    Generalized anxiety disorder-  Controlled with Cymbalta and Vraylar.  She denies any hallucinations SI or HI.    Hypertension-  Controlled with losartan and metoprolol    The following portions of the patient's history were reviewed and updated as appropriate: allergies, current medications, past family history, past medical history, past social history, past surgical history and problem list.        Objective  Vital signs:  /78   Pulse 84   Temp 96.9 °F (36.1 °C) (Temporal)   Ht 162.6 cm (64\")   Wt 73.5 kg (162 lb)   LMP  (LMP Unknown)   SpO2 97%   BMI 27.81 kg/m²     Physical Exam  Vitals and nursing note reviewed.   Constitutional:       Appearance: Normal appearance. She is well-developed.   Eyes:      Extraocular Movements: Extraocular movements intact.      Conjunctiva/sclera: Conjunctivae normal.   Cardiovascular:      Rate and Rhythm: Normal rate and regular rhythm.      Heart sounds: Normal heart sounds. No murmur heard.  Pulmonary:      Effort: Pulmonary effort is normal. No respiratory distress.      Breath sounds: Normal breath sounds. No wheezing.   Musculoskeletal:         General: No tenderness.   Skin:     General: Skin is warm and dry.      Findings: No rash.   Neurological:      Mental Status: She is alert and oriented to person, place, and time.   Psychiatric:         Mood and Affect: Mood normal.         Behavior: Behavior normal.         Thought Content: Thought content normal.         The following data was reviewed by Kimmy Vela PA-C:     "     Lab Results   Component Value Date    BUN 12 04/07/2023    CREATININE 0.96 04/07/2023    EGFR 67.5 04/07/2023    ALT 16 04/07/2023    AST 18 04/07/2023    WBC 8.94 04/07/2023    HGB 13.9 04/07/2023    HCT 42.0 04/07/2023     04/07/2023    CHOL 299 (H) 04/07/2023    TRIG 569 (H) 04/07/2023    HDL 49 04/07/2023     (H) 04/07/2023    TSH 0.838 04/07/2023    HGBA1C 6.60 (H) 04/07/2023           Assessment & Plan     Diagnoses and all orders for this visit:    1. Type 2 diabetes mellitus with hyperglycemia, with long-term current use of insulin (Primary)  Comments:  Increase Ozempic 2 mg weekly  Advised eating largest meal at lunch and only protein shake for supper  Orders:  -     Semaglutide, 2 MG/DOSE, (OZEMPIC) 8 MG/3ML solution pen-injector; Inject 2 mg under the skin into the appropriate area as directed 1 (One) Time Per Week.  Dispense: 3 mL; Refill: 2    2. Anxiety  Comments:  Continue Cymbalta and Vraylar  Orders:  -     Cariprazine HCl (Vraylar) 3 MG capsule capsule; Take 1 capsule by mouth Daily.  Dispense: 90 capsule; Refill: 3    3. Long term current use of therapeutic drug  Comments:  UDS today since patient does take gabapentin  Orders:  -     Urine Drug Screen - Urine, Clean Catch; Future    4. Essential hypertension  Comments:  Continue losartan and metoprolol        BMI is >= 25 and <30. (Overweight) The following options were offered after discussion;: exercise counseling/recommendations and nutrition counseling/recommendations          Patient was given instructions and counseling regarding his condition or for health maintenance advice. Please see specific information pulled into the AVS if appropriate      This document has been electronically signed by:  Kimmy Vela PA-C

## 2023-05-15 ENCOUNTER — TELEPHONE (OUTPATIENT)
Dept: NEUROLOGY | Facility: CLINIC | Age: 63
End: 2023-05-15
Payer: COMMERCIAL

## 2023-05-15 NOTE — TELEPHONE ENCOUNTER
Called and spoke with Monisha and informed her per Janina that her methylmalonic acid level was normal and so was her Vitamin B level.  She was in good understanding of results.     ----- Message from TRUONG Rivers sent at 5/12/2023  5:16 PM EDT -----  Please notify patient that her methylmalonic acid level was normal, thank you!

## 2023-05-25 ENCOUNTER — HOSPITAL ENCOUNTER (OUTPATIENT)
Dept: MRI IMAGING | Facility: HOSPITAL | Age: 63
Discharge: HOME | End: 2023-05-25
Payer: COMMERCIAL

## 2023-05-25 DIAGNOSIS — R42 DIZZINESS: ICD-10-CM

## 2023-05-25 PROCEDURE — 70551 MRI BRAIN STEM W/O DYE: CPT

## 2023-05-25 PROCEDURE — 70551 MRI BRAIN STEM W/O DYE: CPT | Performed by: RADIOLOGY

## 2023-05-26 ENCOUNTER — TELEPHONE (OUTPATIENT)
Dept: NEUROLOGY | Facility: CLINIC | Age: 63
End: 2023-05-26
Payer: COMMERCIAL

## 2023-05-26 NOTE — TELEPHONE ENCOUNTER
Left voicemail for Monisha to return call at her convenience.    HUB TO READ: Your MRI brain results are back and it is normal, please let Janina know if you have any questions or concerns, thank you!

## 2023-05-26 NOTE — TELEPHONE ENCOUNTER
----- Message from TRUONG Rivers sent at 5/25/2023  3:05 PM EDT -----  Your MRI brain was normal, please let me know if you have any questions or concerns, thank you!

## 2023-06-02 ENCOUNTER — HOSPITAL ENCOUNTER (OUTPATIENT)
Dept: MAMMOGRAPHY | Facility: HOSPITAL | Age: 63
Discharge: HOME | End: 2023-06-02
Admitting: PHYSICIAN ASSISTANT

## 2023-06-02 ENCOUNTER — TELEPHONE (OUTPATIENT)
Dept: FAMILY MEDICINE CLINIC | Facility: CLINIC | Age: 63
End: 2023-06-02

## 2023-06-02 DIAGNOSIS — Z12.31 ENCOUNTER FOR SCREENING MAMMOGRAM FOR MALIGNANT NEOPLASM OF BREAST: ICD-10-CM

## 2023-06-02 PROCEDURE — 77063 BREAST TOMOSYNTHESIS BI: CPT

## 2023-06-02 PROCEDURE — 77063 BREAST TOMOSYNTHESIS BI: CPT | Performed by: RADIOLOGY

## 2023-06-02 PROCEDURE — 77067 SCR MAMMO BI INCL CAD: CPT | Performed by: RADIOLOGY

## 2023-06-02 PROCEDURE — 77067 SCR MAMMO BI INCL CAD: CPT

## 2023-06-02 RX ORDER — ACYCLOVIR 800 MG/1
TABLET ORAL
Qty: 6 TABLET | Refills: 0 | Status: SHIPPED | OUTPATIENT
Start: 2023-06-02

## 2023-06-02 NOTE — TELEPHONE ENCOUNTER
Caller: Monisha Gardner    Relationship: Self    Best call back number: [phone]     What was the call regarding: PATIENT CALLED ASKING IF A PRESCRIPTION WAS SENT INTO THE PHARMACY.  PATIENT DIDN'T KNOW THE NAME BUT STATED THAT IT WAS SOMETHING FOR HERPES.    [place], [place] - [address] - [phone]  - [phone] FX        Is it okay if the provider responds through MyChart: NO

## 2023-06-09 ENCOUNTER — OFFICE VISIT (OUTPATIENT)
Dept: FAMILY MEDICINE CLINIC | Facility: CLINIC | Age: 63
End: 2023-06-09
Payer: COMMERCIAL

## 2023-06-09 VITALS
TEMPERATURE: 97.6 F | HEART RATE: 77 BPM | BODY MASS INDEX: 27.49 KG/M2 | WEIGHT: 161 LBS | DIASTOLIC BLOOD PRESSURE: 62 MMHG | SYSTOLIC BLOOD PRESSURE: 138 MMHG | HEIGHT: 64 IN | OXYGEN SATURATION: 97 %

## 2023-06-09 DIAGNOSIS — I10 ESSENTIAL HYPERTENSION: Primary | Chronic | ICD-10-CM

## 2023-06-09 DIAGNOSIS — K20.90 ESOPHAGITIS: Chronic | ICD-10-CM

## 2023-06-09 DIAGNOSIS — G25.81 RLS (RESTLESS LEGS SYNDROME): Chronic | ICD-10-CM

## 2023-06-09 DIAGNOSIS — M17.11 PRIMARY OSTEOARTHRITIS OF RIGHT KNEE: ICD-10-CM

## 2023-06-09 DIAGNOSIS — E11.43 TYPE 2 DIABETES MELLITUS WITH DIABETIC AUTONOMIC NEUROPATHY, WITH LONG-TERM CURRENT USE OF INSULIN: Chronic | ICD-10-CM

## 2023-06-09 DIAGNOSIS — Z79.4 TYPE 2 DIABETES MELLITUS WITH DIABETIC AUTONOMIC NEUROPATHY, WITH LONG-TERM CURRENT USE OF INSULIN: Chronic | ICD-10-CM

## 2023-06-09 DIAGNOSIS — G62.89 SMALL FIBER POLYNEUROPATHY: Chronic | ICD-10-CM

## 2023-06-09 DIAGNOSIS — Z79.4 TYPE 2 DIABETES MELLITUS WITH DIABETIC NEUROPATHY, WITH LONG-TERM CURRENT USE OF INSULIN: Chronic | ICD-10-CM

## 2023-06-09 DIAGNOSIS — I25.118 CORONARY ARTERY DISEASE INVOLVING NATIVE CORONARY ARTERY OF NATIVE HEART WITH OTHER FORM OF ANGINA PECTORIS: ICD-10-CM

## 2023-06-09 DIAGNOSIS — F41.9 ANXIETY AND DEPRESSION: Chronic | ICD-10-CM

## 2023-06-09 DIAGNOSIS — E78.2 MIXED HYPERLIPIDEMIA: Chronic | ICD-10-CM

## 2023-06-09 DIAGNOSIS — F32.A ANXIETY AND DEPRESSION: Chronic | ICD-10-CM

## 2023-06-09 DIAGNOSIS — F41.9 ANXIETY: Chronic | ICD-10-CM

## 2023-06-09 DIAGNOSIS — E11.40 TYPE 2 DIABETES MELLITUS WITH DIABETIC NEUROPATHY, WITH LONG-TERM CURRENT USE OF INSULIN: Chronic | ICD-10-CM

## 2023-06-09 DIAGNOSIS — Z79.4 TYPE 2 DIABETES MELLITUS WITH HYPERGLYCEMIA, WITH LONG-TERM CURRENT USE OF INSULIN: Chronic | ICD-10-CM

## 2023-06-09 DIAGNOSIS — M1A.09X0 IDIOPATHIC CHRONIC GOUT OF MULTIPLE SITES WITHOUT TOPHUS: ICD-10-CM

## 2023-06-09 DIAGNOSIS — E11.65 TYPE 2 DIABETES MELLITUS WITH HYPERGLYCEMIA, WITH LONG-TERM CURRENT USE OF INSULIN: Chronic | ICD-10-CM

## 2023-06-09 RX ORDER — LOSARTAN POTASSIUM 100 MG/1
100 TABLET ORAL DAILY
Qty: 90 TABLET | Refills: 3 | Status: SHIPPED | OUTPATIENT
Start: 2023-06-09

## 2023-06-09 RX ORDER — INSULIN GLARGINE 100 [IU]/ML
45 INJECTION, SOLUTION SUBCUTANEOUS DAILY
Qty: 15 ML | Refills: 5 | Status: SHIPPED | OUTPATIENT
Start: 2023-06-09

## 2023-06-09 RX ORDER — FENOFIBRATE 160 MG/1
160 TABLET ORAL DAILY
Qty: 90 TABLET | Refills: 3 | Status: SHIPPED | OUTPATIENT
Start: 2023-06-09

## 2023-06-09 RX ORDER — DULOXETIN HYDROCHLORIDE 30 MG/1
90 CAPSULE, DELAYED RELEASE ORAL DAILY
Qty: 270 CAPSULE | Refills: 3 | Status: SHIPPED | OUTPATIENT
Start: 2023-06-09

## 2023-06-09 RX ORDER — EVOLOCUMAB 140 MG/ML
140 INJECTION, SOLUTION SUBCUTANEOUS
Qty: 12 ML | Refills: 3 | Status: SHIPPED | OUTPATIENT
Start: 2023-06-09

## 2023-06-09 RX ORDER — ISOSORBIDE MONONITRATE 60 MG/1
60 TABLET, EXTENDED RELEASE ORAL DAILY
Qty: 90 TABLET | Refills: 3 | Status: SHIPPED | OUTPATIENT
Start: 2023-06-09

## 2023-06-09 RX ORDER — DEXLANSOPRAZOLE 60 MG/1
60 CAPSULE, DELAYED RELEASE ORAL DAILY
Qty: 90 CAPSULE | Refills: 3 | Status: SHIPPED | OUTPATIENT
Start: 2023-06-09

## 2023-06-09 RX ORDER — ROPINIROLE 2 MG/1
2 TABLET, FILM COATED ORAL NIGHTLY
Qty: 90 TABLET | Refills: 3 | Status: SHIPPED | OUTPATIENT
Start: 2023-06-09

## 2023-06-09 RX ORDER — ALLOPURINOL 100 MG/1
100 TABLET ORAL 2 TIMES DAILY
Qty: 180 TABLET | Refills: 3 | Status: SHIPPED | OUTPATIENT
Start: 2023-06-09

## 2023-06-09 RX ORDER — GABAPENTIN 300 MG/1
300 CAPSULE ORAL 3 TIMES DAILY
Qty: 270 CAPSULE | Refills: 1 | Status: SHIPPED | OUTPATIENT
Start: 2023-06-09

## 2023-06-09 RX ORDER — METFORMIN HYDROCHLORIDE EXTENDED-RELEASE TABLETS 1000 MG/1
1000 TABLET, FILM COATED, EXTENDED RELEASE ORAL 2 TIMES DAILY WITH MEALS
Qty: 180 TABLET | Refills: 3 | Status: SHIPPED | OUTPATIENT
Start: 2023-06-09

## 2023-06-09 RX ORDER — LABETALOL 200 MG/1
200 TABLET, FILM COATED ORAL 2 TIMES DAILY
Qty: 180 TABLET | Refills: 1 | Status: SHIPPED | OUTPATIENT
Start: 2023-06-09

## 2023-06-09 NOTE — PROGRESS NOTES
"Subjective   Monisha Gardner is a 62 y.o. female.       Chief Complaint -hypertension    History of Present Illness -       Hypertension-  Uncontrolled with losartan 100 mg twice daily and metoprolol 100 mg twice daily.  Patient reports that blood pressure yesterday was greater than 200 systolic and she called an ambulance.  She went to [facility] emergency room where she was given nitroglycerin and aspirin.  Patient reports that blood pressure has stabilized and she was discharged on her home medication.  She reports blood pressure has been stable this morning.    Osteoarthritis right knee-  Patient complains of moderate to severe achy pain in the right knee that is worse with ambulation due to osteoarthritis.  Onset 1 week of acute exacerbation of pain due to osteoarthritis.  No new injury reported.    Anxiety-  Stable with Cymbalta and Vraylar.  She denies any hallucinations SI or HI.    Chronic gout-  Stable with allopurinol for gout prophylaxis    Diabetes mellitus type 2-  Stable with Ozempic, Lantus, and metformin.  She does have associated diabetic peripheral neuropathy that is stable with the use of gabapentin    Gastroesophageal reflux disease-  Acid reflux with esophagitis is controlled with the use of Dexilant        The following portions of the patient's history were reviewed and updated as appropriate: allergies, current medications, past family history, past medical history, past social history, past surgical history and problem list.        Objective  Vital signs:  /62   Pulse 77   Temp 97.6 °F (36.4 °C) (Temporal)   Ht 162.6 cm (64\")   Wt 73 kg (161 lb)   LMP  (LMP Unknown)   SpO2 97%   BMI 27.64 kg/m²     Physical Exam  Vitals and nursing note reviewed.   Constitutional:       Appearance: Normal appearance. She is well-developed.   Eyes:      Extraocular Movements: Extraocular movements intact.      Conjunctiva/sclera: Conjunctivae normal.   Cardiovascular:      Rate and " Rhythm: Normal rate and regular rhythm.      Heart sounds: Normal heart sounds. No murmur heard.  Pulmonary:      Effort: Pulmonary effort is normal. No respiratory distress.      Breath sounds: Normal breath sounds. No wheezing.   Musculoskeletal:         General: Tenderness present.      Comments: Right knee tender to lateral anterior palpation without erythema noted.  Minimal swelling appreciated.  Coarse crepitus appreciated right knee joint with manipulation.   Skin:     General: Skin is warm and dry.      Findings: No rash.   Neurological:      Mental Status: She is alert and oriented to person, place, and time.   Psychiatric:         Mood and Affect: Mood normal.         Behavior: Behavior normal.         Thought Content: Thought content normal.       The following data was reviewed by Kimmy Vela PA-C:         Lab Results   Component Value Date    BUN 12 04/07/2023    CREATININE 0.96 04/07/2023    EGFR 67.5 04/07/2023    ALT 16 04/07/2023    AST 18 04/07/2023    WBC 8.94 04/07/2023    HGB 13.9 04/07/2023    HCT 42.0 04/07/2023     04/07/2023    CHOL 299 (H) 04/07/2023    TRIG 569 (H) 04/07/2023    HDL 49 04/07/2023     (H) 04/07/2023    TSH 0.838 04/07/2023    HGBA1C 6.60 (H) 04/07/2023           Assessment & Plan     Diagnoses and all orders for this visit:    1. Essential hypertension (Primary)  Comments:  Discontinue metoprolol  Start labetalol 200 mg twice daily  Continue losartan 100 mg twice daily  Advise daily BP and pulse monitoring  Orders:  -     labetalol (NORMODYNE) 200 MG tablet; Take 1 tablet by mouth 2 (Two) Times a Day.  Dispense: 180 tablet; Refill: 1  -     losartan (COZAAR) 100 MG tablet; Take 1 tablet by mouth Daily.  Dispense: 90 tablet; Refill: 3  -     isosorbide mononitrate (IMDUR) 60 MG 24 hr tablet; Take 1 tablet by mouth Daily.  Dispense: 90 tablet; Refill: 3  -     Ambulatory Referral to Cardiology    2. Primary osteoarthritis of right knee  Comments:  Acute  exacerbation of chronic osteoarthritis/knee pain  Right knee intra-articular injection performed today    3. Anxiety  Comments:  Continue Cymbalta and Vraylar  Orders:  -     Cariprazine HCl (Vraylar) 3 MG capsule capsule; Take 1 capsule by mouth Daily.  Dispense: 90 capsule; Refill: 3    4. Idiopathic chronic gout of multiple sites without tophus  -     allopurinol (ZYLOPRIM) 100 MG tablet; Take 1 tablet by mouth 2 (Two) Times a Day.  Dispense: 180 tablet; Refill: 3    5. Type 2 diabetes mellitus with diabetic neuropathy, with long-term current use of insulin  Comments:  Continue Ozempic and metformin  Continue gabapentin  Advise low carbohydrate diabetic diet  Orders:  -     Dexilant 60 MG capsule; Take 1 capsule by mouth Daily.  Dispense: 90 capsule; Refill: 3  -     metFORMIN (FORTAMET) 1000 MG (OSM) 24 hr tablet; Take 1 tablet by mouth 2 (Two) Times a Day With Meals.  Dispense: 180 tablet; Refill: 3  -     Semaglutide, 2 MG/DOSE, (OZEMPIC) 8 MG/3ML solution pen-injector; Inject 2 mg under the skin into the appropriate area as directed 1 (One) Time Per Week.  Dispense: 9 mL; Refill: 1    6. Esophagitis  Comments:  continue dexilant  Advised to avoid known trigger foods  Orders:  -     Dexilant 60 MG capsule; Take 1 capsule by mouth Daily.  Dispense: 90 capsule; Refill: 3    7. Small fiber polyneuropathy  Comments:  Continue Cymbalta and gabapentin  [facility] [id number] consistent on 5/15/2023  UDS consistent 5/12/2023    Orders:  -     DULoxetine (CYMBALTA) 30 MG capsule; Take 3 capsules by mouth Daily.  Dispense: 270 capsule; Refill: 3    8. Anxiety and depression  Comments:  discontinue lexapro and hydroxizine due to hypersomnolence   Continue Cymbalta 90 mg daily due to polyneuropathy  Orders:  -     DULoxetine (CYMBALTA) 30 MG capsule; Take 3 capsules by mouth Daily.  Dispense: 270 capsule; Refill: 3    9. Mixed hyperlipidemia  Comments:  Failed statin therapy-elevated liver enzymes  Failed fenofibrate  monotherapy  Continue Repatha  Advise low-cholesterol diet  Orders:  -     fenofibrate 160 MG tablet; Take 1 tablet by mouth Daily.  Dispense: 90 tablet; Refill: 3  -     Ambulatory Referral to Cardiology  -     Repatha SureClick solution auto-injector SureClick injection; Inject 1 mL under the skin into the appropriate area as directed Every 14 (Fourteen) Days.  Dispense: 12 mL; Refill: 3    10. Type 2 diabetes mellitus with diabetic autonomic neuropathy, with long-term current use of insulin  Comments:  Continue Lantus 12 units in the evening, metformin, mounjaro and low carbohydrate diabetic diet.  Refill gabapentin    Orders:  -     gabapentin (NEURONTIN) 300 MG capsule; Take 1 capsule by mouth 3 (Three) Times a Day.  Dispense: 270 capsule; Refill: 1    11. Type 2 diabetes mellitus with hyperglycemia, with long-term current use of insulin  Comments:  Continue Ozempic metformin and Lantus  Advised a low carbohydrate diabetic diet  Discussed Metformin use with CKD and possible discontinuation if needed  Orders:  -     Lantus SoloStar 100 UNIT/ML injection pen; Inject 45 Units under the skin into the appropriate area as directed Daily.  Dispense: 15 mL; Refill: 5    12. RLS (restless legs syndrome)  Comments:  Continue ropinirole 2 mg nightly  Orders:  -     rOPINIRole (REQUIP) 2 MG tablet; Take 1 tablet by mouth Every Night.  Dispense: 90 tablet; Refill: 3    13. Coronary artery disease involving native coronary artery of native heart with other form of angina pectoris  -     Ambulatory Referral to Cardiology      Procedure: Right knee intra-articular injection  Provider: Kimmy Vela PA-C  Indication: Right knee pain secondary to osteoarthritis  Timeout was taken to verify correct patient procedure and location  Description: The right knee was prepped and draped in sterile fashion.  An intra-articular injection was given using 3 cc 1% lidocaine, 1 cc of triamcinolone and 1 cc of Depo-Medrol.  Pressure was held  and a sterile dressing was placed.  No complications  Estimated blood loss: Minimal  Patient tolerated procedure well    Lidocaine 1% 10 mg/mL  NDC number 85773-871-39  Lot #[id number]  Expiration 2/26    Depo-Medrol 80 mg/mL  NDC number 7093-0921-66  Lot #[id number]  Expiration 5/2025    Triamcinolone 40 mg/mL  NDC number 8647-1984-84  Lot #[id number]  Expiration June 2024               Patient was given instructions and counseling regarding his condition or for health maintenance advice. Please see specific information pulled into the AVS if appropriate      This document has been electronically signed by:  Kimmy Vela PA-C

## 2023-06-23 PROBLEM — K21.00 GASTROESOPHAGEAL REFLUX DISEASE WITH ESOPHAGITIS: Status: ACTIVE | Noted: 2017-01-19

## 2023-07-14 ENCOUNTER — TELEPHONE (OUTPATIENT)
Dept: FAMILY MEDICINE CLINIC | Facility: CLINIC | Age: 63
End: 2023-07-14

## 2023-07-14 DIAGNOSIS — Z79.4 TYPE 2 DIABETES MELLITUS WITH DIABETIC NEUROPATHY, WITH LONG-TERM CURRENT USE OF INSULIN: Chronic | ICD-10-CM

## 2023-07-14 DIAGNOSIS — E11.40 TYPE 2 DIABETES MELLITUS WITH DIABETIC NEUROPATHY, WITH LONG-TERM CURRENT USE OF INSULIN: Chronic | ICD-10-CM

## 2023-07-14 DIAGNOSIS — K20.90 ESOPHAGITIS: Chronic | ICD-10-CM

## 2023-07-14 RX ORDER — DEXLANSOPRAZOLE 60 MG/1
60 CAPSULE, DELAYED RELEASE ORAL DAILY
Qty: 90 CAPSULE | Refills: 3 | Status: CANCELLED | OUTPATIENT
Start: 2023-07-14

## 2023-07-14 NOTE — TELEPHONE ENCOUNTER
Caller: Monisha Gardner    Relationship: Self    Best call back number: [phone]     What was the call regarding: PATIENT CALLED TO REQUEST A PRIOR AUTHORIZATION FOR DEXILANT.  PATIENT IS OUT OF MEDICATION.     [address] - [phone]  - [phone] F

## 2023-07-28 ENCOUNTER — OFFICE VISIT (OUTPATIENT)
Dept: FAMILY MEDICINE CLINIC | Facility: CLINIC | Age: 63
End: 2023-07-28
Payer: COMMERCIAL

## 2023-07-28 VITALS
SYSTOLIC BLOOD PRESSURE: 128 MMHG | HEIGHT: 64 IN | WEIGHT: 161 LBS | TEMPERATURE: 96.6 F | DIASTOLIC BLOOD PRESSURE: 60 MMHG | HEART RATE: 111 BPM | BODY MASS INDEX: 27.49 KG/M2 | OXYGEN SATURATION: 96 %

## 2023-07-28 DIAGNOSIS — Z79.4 TYPE 2 DIABETES MELLITUS WITH HYPERGLYCEMIA, WITH LONG-TERM CURRENT USE OF INSULIN: Primary | Chronic | ICD-10-CM

## 2023-07-28 DIAGNOSIS — I25.118 CORONARY ARTERY DISEASE INVOLVING NATIVE CORONARY ARTERY OF NATIVE HEART WITH OTHER FORM OF ANGINA PECTORIS: Chronic | ICD-10-CM

## 2023-07-28 DIAGNOSIS — K21.00 GASTROESOPHAGEAL REFLUX DISEASE WITH ESOPHAGITIS WITHOUT HEMORRHAGE: Chronic | ICD-10-CM

## 2023-07-28 DIAGNOSIS — Z23 IMMUNIZATION DUE: ICD-10-CM

## 2023-07-28 DIAGNOSIS — E11.65 TYPE 2 DIABETES MELLITUS WITH HYPERGLYCEMIA, WITH LONG-TERM CURRENT USE OF INSULIN: Primary | Chronic | ICD-10-CM

## 2023-07-28 RX ORDER — TIRZEPATIDE 5 MG/.5ML
5 INJECTION, SOLUTION SUBCUTANEOUS
Qty: 2 ML | Refills: 2 | Status: SHIPPED | OUTPATIENT
Start: 2023-07-28

## 2023-07-28 NOTE — PATIENT INSTRUCTIONS
Carbohydrate Counting for Diabetes Mellitus, Adult  Carbohydrate counting is a method of keeping track of how many carbohydrates you eat. Eating carbohydrates increases the amount of sugar (glucose) in the blood. Counting how many carbohydrates you eat improves how well you manage your blood glucose. This, in turn, helps you manage your diabetes.  Carbohydrates are measured in grams (g) per serving. It is important to know how many carbohydrates (in grams or by serving size) you can have in each meal. This is different for every person. A dietitian can help you make a meal plan and calculate how many carbohydrates you should have at each meal and snack.  What foods contain carbohydrates?  Carbohydrates are found in the following foods:  Grains, such as breads and cereals.  Dried beans and soy products.  Starchy vegetables, such as potatoes, peas, and corn.  Fruit and fruit juices.  Milk and yogurt.  Sweets and snack foods, such as cake, cookies, candy, chips, and soft drinks.  How do I count carbohydrates in foods?  There are two ways to count carbohydrates in food. You can read food labels or learn standard serving sizes of foods. You can use either of these methods or a combination of both.  Using the Nutrition Facts label  The Nutrition Facts list is included on the labels of almost all packaged foods and beverages in the [place]. It includes:  The serving size.  Information about nutrients in each serving, including the grams of carbohydrate per serving.  To use the Nutrition Facts, decide how many servings you will have. Then, multiply the number of servings by the number of carbohydrates per serving. The resulting number is the total grams of carbohydrates that you will be having.  Learning the standard serving sizes of foods  When you eat carbohydrate foods that are not packaged or do not include Nutrition Facts on the label, you need to measure the servings in order to count the grams of  carbohydrates.  Measure the foods that you will eat with a food scale or measuring cup, if needed.  Decide how many standard-size servings you will eat.  Multiply the number of servings by 15. For foods that contain carbohydrates, one serving equals 15 g of carbohydrates.  For example, if you eat 2 cups or 10 oz (300 g) of strawberries, you will have eaten 2 servings and 30 g of carbohydrates (2 servings x 15 g = 30 g).  For foods that have more than one food mixed, such as soups and casseroles, you must count the carbohydrates in each food that is included.  The following list contains standard serving sizes of common carbohydrate-rich foods. Each of these servings has about 15 g of carbohydrates:  1 slice of bread.  1 six-inch (15 cm) tortilla.  ? cup or 2 oz (53 g) cooked rice or pasta.  ½ cup or 3 oz (85 g) cooked or canned, drained and rinsed beans or lentils.  ½ cup or 3 oz (85 g) starchy vegetable, such as peas, corn, or squash.  ½ cup or 4 oz (120 g) hot cereal.  ½ cup or 3 oz (85 g) boiled or mashed potatoes, or ¼ or 3 oz (85 g) of a large baked potato.  ½ cup or 4 fl oz (118 mL) fruit juice.  1 cup or 8 fl oz (237 mL) milk.  1 small or 4 oz (106 g) apple.  ½ or 2 oz (63 g) of a medium banana.  1 cup or 5 oz (150 g) strawberries.  3 cups or 1 oz (28.3 g) popped popcorn.  What is an example of carbohydrate counting?  To calculate the grams of carbohydrates in this sample meal, follow the steps shown below.  Sample meal  3 oz (85 g) chicken breast.  ? cup or 4 oz (106 g) brown rice.  ½ cup or 3 oz (85 g) corn.  1 cup or 8 fl oz (237 mL) milk.  1 cup or 5 oz (150 g) strawberries with sugar-free whipped topping.  Carbohydrate calculation  Identify the foods that contain carbohydrates:  Rice.  Corn.  Milk.  Strawberries.  Calculate how many servings you have of each food:  2 servings rice.  1 serving corn.  1 serving milk.  1 serving strawberries.  Multiply each number of servings by 15  servings rice x 15  g = 30 g.  1 serving corn x 15 g = 15 g.  1 serving milk x 15 g = 15 g.  1 serving strawberries x 15 g = 15 g.  Add together all of the amounts to find the total grams of carbohydrates eaten:  30 g + 15 g + 15 g + 15 g = 75 g of carbohydrates total.  What are tips for following this plan?  Shopping  Develop a meal plan and then make a shopping list.  Buy fresh and frozen vegetables, fresh and frozen fruit, dairy, eggs, beans, lentils, and whole grains.  Look at food labels. Choose foods that have more fiber and less sugar.  Avoid processed foods and foods with added sugars.  Meal planning  Aim to have the same number of grams of carbohydrates at each meal and for each snack time.  Plan to have regular, balanced meals and snacks.  Where to find more information  American Diabetes Association: diabetes.org  Centers for Disease Control and Prevention: cdc.gov  Academy of Nutrition and Dietetics: eatright.org  Association of Diabetes Care & Education Specialists: diabeteseducator.org  Summary  Carbohydrate counting is a method of keeping track of how many carbohydrates you eat.  Eating carbohydrates increases the amount of sugar (glucose) in your blood.  Counting how many carbohydrates you eat improves how well you manage your blood glucose. This helps you manage your diabetes.  A dietitian can help you make a meal plan and calculate how many carbohydrates you should have at each meal and snack.  This information is not intended to replace advice given to you by your health care provider. Make sure you discuss any questions you have with your health care provider.  Document Revised: 07/21/2021 Document Reviewed: 07/21/2021  Elsevier Patient Education © 2023 [employer] Inc.

## 2023-07-28 NOTE — PROGRESS NOTES
"Subjective   Monisha Gardner is a 62 y.o. female.       Chief Complaint -diabetes    History of Present Illness -       Diabetes-  Stable with Mounjaro 2.5 mg daily but patient is not having any appetite suppression.  She reports that she has been exercising as well as eating low carbohydrate healthier foods with only a 1 pound weight loss.    Gastroesophageal reflux disease-  Controlled with Dexilant    Coronary artery disease-stable with risk factor modification including amlodipine, aspirin, fenofibrate and losartan    The following portions of the patient's history were reviewed and updated as appropriate: allergies, current medications, past family history, past medical history, past social history, past surgical history and problem list.        Objective  Vital signs:  /60   Pulse 111   Temp 96.6 °F (35.9 °C) (Temporal)   Ht 162.6 cm (64\")   Wt 73 kg (161 lb)   LMP  (LMP Unknown)   SpO2 96%   BMI 27.64 kg/m²     Physical Exam    The following data was reviewed by Kimmy Vela PA-C:         Lab Results   Component Value Date    BUN 12 04/07/2023    CREATININE 0.96 04/07/2023    EGFR 67.5 04/07/2023    ALT 16 04/07/2023    AST 18 04/07/2023    WBC 8.94 04/07/2023    HGB 13.9 04/07/2023    HCT 42.0 04/07/2023     04/07/2023    CHOL 299 (H) 04/07/2023    TRIG 569 (H) 04/07/2023    HDL 49 04/07/2023     (H) 04/07/2023    TSH 0.838 04/07/2023    HGBA1C 6.60 (H) 04/07/2023           Assessment & Plan     Diagnoses and all orders for this visit:    1. Type 2 diabetes mellitus with hyperglycemia, with long-term current use of insulin (Primary)  Comments:  Increase Mounjaro 7.5 mg weekly  Advise low carbohydrate diabetic diet  Orders:  -     Tirzepatide (Mounjaro) 5 MG/0.5ML solution pen-injector; Inject 0.5 mL under the skin into the appropriate area as directed Every 7 (Seven) Days.  Dispense: 2 mL; Refill: 2    2. Immunization due  -     Pneumococcal Conjugate Vaccine 20-Valent " (PCV20)    3. Coronary artery disease involving native coronary artery of native heart with other form of angina pectoris  Comments:  Continue risk factor modification including fenofibrate losartan and aspirin    4. Gastroesophageal reflux disease with esophagitis without hemorrhage  Comments:  Continue Dexilant  Advised to avoid known trigger foods                   Patient was given instructions and counseling regarding his condition or for health maintenance advice. Please see specific information pulled into the AVS if appropriate      This document has been electronically signed by:  Kimmy Vela PA-C

## 2023-08-01 ENCOUNTER — TELEPHONE (OUTPATIENT)
Dept: CARDIOLOGY | Facility: CLINIC | Age: 63
End: 2023-08-01
Payer: COMMERCIAL

## 2023-08-01 ENCOUNTER — TREATMENT (OUTPATIENT)
Dept: CARDIOLOGY | Facility: CLINIC | Age: 63
End: 2023-08-01
Payer: COMMERCIAL

## 2023-08-01 DIAGNOSIS — R00.2 PALPITATIONS: ICD-10-CM

## 2023-08-11 ENCOUNTER — HOSPITAL ENCOUNTER (OUTPATIENT)
Dept: NUCLEAR MEDICINE | Facility: HOSPITAL | Age: 63
Discharge: HOME | End: 2023-08-11
Payer: COMMERCIAL

## 2023-08-11 ENCOUNTER — HOSPITAL ENCOUNTER (OUTPATIENT)
Dept: CARDIOLOGY | Facility: HOSPITAL | Age: 63
Discharge: HOME | End: 2023-08-11
Payer: COMMERCIAL

## 2023-08-11 ENCOUNTER — LAB (OUTPATIENT)
Dept: LAB | Facility: HOSPITAL | Age: 63
End: 2023-08-11
Payer: COMMERCIAL

## 2023-08-11 DIAGNOSIS — R06.00 DYSPNEA, UNSPECIFIED TYPE: ICD-10-CM

## 2023-08-11 DIAGNOSIS — R07.9 CHEST PAIN, UNSPECIFIED TYPE: ICD-10-CM

## 2023-08-11 DIAGNOSIS — I25.118 CORONARY ARTERY DISEASE INVOLVING NATIVE CORONARY ARTERY OF NATIVE HEART WITH OTHER FORM OF ANGINA PECTORIS: ICD-10-CM

## 2023-08-11 LAB
ALBUMIN SERPL-MCNC: 4.3 G/DL (ref 3.5–5.2)
ALBUMIN/GLOB SERPL: 2.2 G/DL
ALP SERPL-CCNC: 40 U/L (ref 39–117)
ALT SERPL W P-5'-P-CCNC: 16 U/L (ref 1–33)
ANION GAP SERPL CALCULATED.3IONS-SCNC: 10 MMOL/L (ref 5–15)
AST SERPL-CCNC: 23 U/L (ref 1–32)
BH CV ECHO MEAS - ACS: 1.5 CM
BH CV ECHO MEAS - AI P1/2T: 556.8 MSEC
BH CV ECHO MEAS - AO MAX PG: 12.9 MMHG
BH CV ECHO MEAS - AO MEAN PG: 7 MMHG
BH CV ECHO MEAS - AO ROOT DIAM: 2.4 CM
BH CV ECHO MEAS - AO V2 MAX: 179.7 CM/SEC
BH CV ECHO MEAS - AO V2 VTI: 36.9 CM
BH CV ECHO MEAS - AVA(I,D): 2.43 CM2
BH CV ECHO MEAS - EDV(CUBED): 42.9 ML
BH CV ECHO MEAS - EDV(MOD-SP4): 38.2 ML
BH CV ECHO MEAS - EF(MOD-SP4): 63.4 %
BH CV ECHO MEAS - ESV(CUBED): 15.6 ML
BH CV ECHO MEAS - ESV(MOD-SP4): 14 ML
BH CV ECHO MEAS - FS: 28.6 %
BH CV ECHO MEAS - IVS/LVPW: 1 CM
BH CV ECHO MEAS - IVSD: 1 CM
BH CV ECHO MEAS - LA DIMENSION: 3.8 CM
BH CV ECHO MEAS - LAT PEAK E' VEL: 6.7 CM/SEC
BH CV ECHO MEAS - LV DIASTOLIC VOL/BSA (35-75): 21.4 CM2
BH CV ECHO MEAS - LV MASS(C)D: 103.4 GRAMS
BH CV ECHO MEAS - LV MAX PG: 5.9 MMHG
BH CV ECHO MEAS - LV MEAN PG: 3 MMHG
BH CV ECHO MEAS - LV SYSTOLIC VOL/BSA (12-30): 7.8 CM2
BH CV ECHO MEAS - LV V1 MAX: 121 CM/SEC
BH CV ECHO MEAS - LV V1 VTI: 28.5 CM
BH CV ECHO MEAS - LVIDD: 3.5 CM
BH CV ECHO MEAS - LVIDS: 2.5 CM
BH CV ECHO MEAS - LVOT AREA: 3.1 CM2
BH CV ECHO MEAS - LVOT DIAM: 2 CM
BH CV ECHO MEAS - LVPWD: 1 CM
BH CV ECHO MEAS - MED PEAK E' VEL: 4.5 CM/SEC
BH CV ECHO MEAS - MV A MAX VEL: 117 CM/SEC
BH CV ECHO MEAS - MV E MAX VEL: 101 CM/SEC
BH CV ECHO MEAS - MV E/A: 0.86
BH CV ECHO MEAS - PA ACC TIME: 0.11 SEC
BH CV ECHO MEAS - SI(MOD-SP4): 13.6 ML/M2
BH CV ECHO MEAS - SV(LVOT): 89.5 ML
BH CV ECHO MEAS - SV(MOD-SP4): 24.2 ML
BH CV ECHO MEAS - TAPSE (>1.6): 1.95 CM
BH CV ECHO MEASUREMENTS AVERAGE E/E' RATIO: 18.04
BH CV NUCLEAR PRIOR STUDY: 3
BH CV REST NUCLEAR ISOTOPE DOSE: 9.4 MCI
BH CV STRESS BP STAGE 1: NORMAL
BH CV STRESS COMMENTS STAGE 1: NORMAL
BH CV STRESS DOSE REGADENOSON STAGE 1: 0.4
BH CV STRESS DURATION MIN STAGE 1: 0
BH CV STRESS DURATION SEC STAGE 1: 10
BH CV STRESS HR STAGE 1: 85
BH CV STRESS NUCLEAR ISOTOPE DOSE: 27.5 MCI
BH CV STRESS PROTOCOL 1: NORMAL
BH CV STRESS RECOVERY BP: NORMAL MMHG
BH CV STRESS RECOVERY HR: 81 BPM
BH CV STRESS STAGE 1: 1
BILIRUB SERPL-MCNC: <0.2 MG/DL (ref 0–1.2)
BUN SERPL-MCNC: 13 MG/DL (ref 8–23)
BUN/CREAT SERPL: 11.8 (ref 7–25)
CALCIUM SPEC-SCNC: 10 MG/DL (ref 8.6–10.5)
CHLORIDE SERPL-SCNC: 108 MMOL/L (ref 98–107)
CHOLEST SERPL-MCNC: 102 MG/DL (ref 0–200)
CK SERPL-CCNC: 50 U/L (ref 20–180)
CO2 SERPL-SCNC: 27 MMOL/L (ref 22–29)
CREAT SERPL-MCNC: 1.1 MG/DL (ref 0.57–1)
EGFRCR SERPLBLD CKD-EPI 2021: 57.3 ML/MIN/1.73
GLOBULIN UR ELPH-MCNC: 2 GM/DL
GLUCOSE SERPL-MCNC: 87 MG/DL (ref 65–99)
HDLC SERPL-MCNC: 42 MG/DL (ref 40–60)
LDLC SERPL CALC-MCNC: 29 MG/DL (ref 0–100)
LDLC/HDLC SERPL: 0.5 {RATIO}
LEFT ATRIUM VOLUME INDEX: 38.2 ML/M2
LV EF NUC BP: 69 %
MAXIMAL PREDICTED HEART RATE: 159 BPM
PERCENT MAX PREDICTED HR: 53.46 %
POTASSIUM SERPL-SCNC: 4.3 MMOL/L (ref 3.5–5.2)
PROT SERPL-MCNC: 6.3 G/DL (ref 6–8.5)
SODIUM SERPL-SCNC: 145 MMOL/L (ref 136–145)
STRESS BASELINE BP: NORMAL MMHG
STRESS BASELINE HR: 65 BPM
STRESS PERCENT HR: 63 %
STRESS POST PEAK BP: NORMAL MMHG
STRESS POST PEAK HR: 85 BPM
STRESS TARGET HR: 135 BPM
TRIGL SERPL-MCNC: 196 MG/DL (ref 0–150)
VLDLC SERPL-MCNC: 31 MG/DL (ref 5–40)

## 2023-08-11 PROCEDURE — 80061 LIPID PANEL: CPT

## 2023-08-11 PROCEDURE — 0 TECHNETIUM SESTAMIBI: Performed by: NURSE PRACTITIONER

## 2023-08-11 PROCEDURE — 93306 TTE W/DOPPLER COMPLETE: CPT

## 2023-08-11 PROCEDURE — 82550 ASSAY OF CK (CPK): CPT

## 2023-08-11 PROCEDURE — 25010000002 REGADENOSON 0.4 MG/5ML SOLUTION: Performed by: NURSE PRACTITIONER

## 2023-08-11 PROCEDURE — 80053 COMPREHEN METABOLIC PANEL: CPT

## 2023-08-11 PROCEDURE — A9500 TC99M SESTAMIBI: HCPCS | Performed by: NURSE PRACTITIONER

## 2023-08-11 PROCEDURE — 93017 CV STRESS TEST TRACING ONLY: CPT

## 2023-08-11 PROCEDURE — 78452 HT MUSCLE IMAGE SPECT MULT: CPT

## 2023-08-11 PROCEDURE — 36415 COLL VENOUS BLD VENIPUNCTURE: CPT

## 2023-08-11 RX ORDER — REGADENOSON 0.08 MG/ML
0.4 INJECTION, SOLUTION INTRAVENOUS
Status: COMPLETED | OUTPATIENT
Start: 2023-08-11 | End: 2023-08-11

## 2023-08-11 RX ADMIN — TECHNETIUM TC 99M SESTAMIBI 1 DOSE: 1 INJECTION INTRAVENOUS at 12:03

## 2023-08-11 RX ADMIN — REGADENOSON 0.4 MG: 0.08 INJECTION, SOLUTION INTRAVENOUS at 12:03

## 2023-08-11 RX ADMIN — TECHNETIUM TC 99M SESTAMIBI 1 DOSE: 1 INJECTION INTRAVENOUS at 10:53

## 2023-08-18 ENCOUNTER — TELEPHONE (OUTPATIENT)
Dept: CARDIOLOGY | Facility: CLINIC | Age: 63
End: 2023-08-18

## 2023-08-18 ENCOUNTER — LAB (OUTPATIENT)
Dept: LAB | Facility: HOSPITAL | Age: 63
End: 2023-08-18
Payer: COMMERCIAL

## 2023-08-18 ENCOUNTER — OFFICE VISIT (OUTPATIENT)
Dept: CARDIOLOGY | Facility: CLINIC | Age: 63
End: 2023-08-18
Payer: COMMERCIAL

## 2023-08-18 VITALS
OXYGEN SATURATION: 98 % | HEART RATE: 66 BPM | DIASTOLIC BLOOD PRESSURE: 57 MMHG | WEIGHT: 163.8 LBS | HEIGHT: 64 IN | BODY MASS INDEX: 27.96 KG/M2 | SYSTOLIC BLOOD PRESSURE: 94 MMHG

## 2023-08-18 DIAGNOSIS — E11.65 TYPE 2 DIABETES MELLITUS WITH HYPERGLYCEMIA, WITH LONG-TERM CURRENT USE OF INSULIN: ICD-10-CM

## 2023-08-18 DIAGNOSIS — I35.0 AORTIC STENOSIS, MILD: ICD-10-CM

## 2023-08-18 DIAGNOSIS — I35.1 MODERATE AORTIC VALVE REGURGITATION: ICD-10-CM

## 2023-08-18 DIAGNOSIS — R06.00 DYSPNEA, UNSPECIFIED TYPE: ICD-10-CM

## 2023-08-18 DIAGNOSIS — I25.118 CORONARY ARTERY DISEASE INVOLVING NATIVE CORONARY ARTERY OF NATIVE HEART WITH OTHER FORM OF ANGINA PECTORIS: Primary | ICD-10-CM

## 2023-08-18 DIAGNOSIS — Z79.4 TYPE 2 DIABETES MELLITUS WITH HYPERGLYCEMIA, WITH LONG-TERM CURRENT USE OF INSULIN: ICD-10-CM

## 2023-08-18 DIAGNOSIS — I51.89 DIASTOLIC DYSFUNCTION: ICD-10-CM

## 2023-08-18 DIAGNOSIS — I10 ESSENTIAL HYPERTENSION: ICD-10-CM

## 2023-08-18 DIAGNOSIS — I10 ESSENTIAL HYPERTENSION: Primary | ICD-10-CM

## 2023-08-18 DIAGNOSIS — E78.5 DYSLIPIDEMIA: ICD-10-CM

## 2023-08-18 LAB
ANION GAP SERPL CALCULATED.3IONS-SCNC: 14.3 MMOL/L (ref 5–15)
BUN SERPL-MCNC: 21 MG/DL (ref 8–23)
BUN/CREAT SERPL: 11.7 (ref 7–25)
CALCIUM SPEC-SCNC: 10.1 MG/DL (ref 8.6–10.5)
CHLORIDE SERPL-SCNC: 100 MMOL/L (ref 98–107)
CO2 SERPL-SCNC: 22.7 MMOL/L (ref 22–29)
CREAT SERPL-MCNC: 1.79 MG/DL (ref 0.57–1)
EGFRCR SERPLBLD CKD-EPI 2021: 31.9 ML/MIN/1.73
GLUCOSE SERPL-MCNC: 100 MG/DL (ref 65–99)
NT-PROBNP SERPL-MCNC: 315.4 PG/ML (ref 0–900)
POTASSIUM SERPL-SCNC: 4.8 MMOL/L (ref 3.5–5.2)
SODIUM SERPL-SCNC: 137 MMOL/L (ref 136–145)

## 2023-08-18 PROCEDURE — 36415 COLL VENOUS BLD VENIPUNCTURE: CPT

## 2023-08-18 PROCEDURE — 83880 ASSAY OF NATRIURETIC PEPTIDE: CPT

## 2023-08-18 PROCEDURE — 80048 BASIC METABOLIC PNL TOTAL CA: CPT

## 2023-08-18 NOTE — TELEPHONE ENCOUNTER
Called pt and informed her of the following per Asia Hawkins, APRN   1. BNP no signs of acute heart failure, continue with current plan of care  2. Kidney function has significantly worsened, potassium is normal. This could be due to doubling up on medications this am. Encourage in adequate hydration today and recheck BMP first of next week. (Order placed )  Pt verbalized understanding

## 2023-08-18 NOTE — LETTER
August 28, 2023     ANH Medina  [address] [facility] [address]    Patient: Monisha Gardner   YOB: 1960   Date of Visit: 8/18/2023       Dear ANH Medina    Monisha Gardner was in my office today. Below is a copy of my note.    If you have questions, please do not hesitate to call me. I look forward to following Monisha along with you.         Sincerely,        TRUONG Briones        CC: No Recipients    Subjective    Monisha Gardner is a 62 y.o. female.   Chief Complaint   Patient presents with    Chest Pain     Follow up      History of Present Illness   Monisha Gardner is a 62 y.o. female who presents to the clinic today for cardiology follow up.     CAD currently on ASA, Imdur, labetalol and Repatha.  She recently completed cardiovascular testing which she would like to discuss today.  She denies chest pain or dyspnea.       Hypertension currently on losartan 100 mg daily, labetalol 200 mg twice daily and Norvasc 2.5 mg daily.  She reports BPs have improved with recent medication changes.  She reports this a.m. she accidentally took 2 doses of her morning medications which she feels maybe contributing to her lower blood pressure today.  She reports home BPs are generally less than 140/90.     Hyperlipidemia with recent elevated labs in early April and was restarted on repatha near the end of April.  She reports she remains on fenofibrate.  She has not had labs reevaluated since reinitiation of Repatha.  She reports tolerating medication well and denies medicine side effects.  She has been statin intolerant.  Her LDL goal is less than 70.  She had recent labs which she would like to discuss today.     She recently completed a monitor due to palpitations and frequent PVCs which she would like to discuss today. She tries to limit her caffeine.  Her primary has recently made changes in her beta-blocker therapy to help control her BP.  She denies worsening  palpitations, dizziness, tachycardia, bradycardia or syncope.    Patient Active Problem List   Diagnosis    CAD (coronary artery disease)    Essential hypertension    Dyslipidemia    Type 2 diabetes mellitus with hyperglycemia, with long-term current use of insulin    Body mass index (bmi) 30.0-30.9, adult    Osteoarthritis    Atypical angina    Elevated liver enzymes    Gastroesophageal reflux disease with esophagitis    Vitamin D deficiency    Hx of pancreatitis    Menopausal symptoms    Vitamin B 12 deficiency    Nocturnal leg cramps    Anxiety    Left arm weakness    Chronic gout of multiple sites    Squamous cell carcinoma in situ (SCCIS) of skin    Mixed hyperlipidemia    PVC (premature ventricular contraction)    RLS (restless legs syndrome)    Psychophysiological insomnia    Dry mouth    Type 2 diabetes mellitus with diabetic autonomic neuropathy, with long-term current use of insulin    Non-recurrent acute serous otitis media of left ear     Past Medical History:   Diagnosis Date    Anxiety     Arthritis     Diabetes mellitus     Diverticulosis     Gout     Hyperlipidemia     Hypertension     Myocardial infarction      Past Surgical History:   Procedure Laterality Date    APPENDECTOMY      BREAST BIOPSY Right     benign    CARDIAC CATHETERIZATION       SECTION      CHOLECYSTECTOMY      CORONARY STENT PLACEMENT      Stenting of the RCA with 3.5 x 26 mm Resolute drug-eluting stent    HYSTERECTOMY      JOINT REPLACEMENT      TONSILLECTOMY       Family History   Problem Relation Age of Onset    Arthritis Mother     Heart disease Mother     Heart disease Sister     Vision loss Daughter     Heart disease Father     Heart disease Sister     Heart disease Sister     Heart disease Sister      Social History     Tobacco Use    Smoking status: Never     Passive exposure: Never    Smokeless tobacco: Never   Vaping Use    Vaping Use: Never used   Substance Use  Topics    Alcohol use: No    Drug use: No     The following portions of the patient's history were reviewed and updated as appropriate: allergies, current medications, past family history, past medical history, past social history, past surgical history and problem list.    Allergies   Allergen Reactions    Ciprofloxacin Anaphylaxis    Penicillins Shortness Of Breath    Statins Myalgia    Fish Oil [Omega-3 Fatty Acids] Nausea And Vomiting       Current Outpatient Medications:     acyclovir (ZOVIRAX) 800 MG tablet, TAKE 1 TABLET BY MOUTH THREE TIMES DAILY, Disp: 6 tablet, Rfl: 0    allopurinol (ZYLOPRIM) 100 MG tablet, Take 1 tablet by mouth 2 (Two) Times a Day., Disp: 180 tablet, Rfl: 3    amLODIPine (NORVASC) 2.5 MG tablet, Take 1 tablet by mouth Daily., Disp: 30 tablet, Rfl: 3    ammonium lactate (AMLACTIN) 12 % cream, , Disp: , Rfl:     aspirin 81 MG EC tablet, Take 1 tablet by mouth Daily., Disp: 100 tablet, Rfl: 3    Cariprazine HCl (Vraylar) 3 MG capsule capsule, Take 1 capsule by mouth Daily., Disp: 90 capsule, Rfl: 3    Continuous Blood Gluc  (Dexcom G6 ) device, , Disp: , Rfl:     Continuous Blood Gluc Sensor (Dexcom G6 Sensor), Every 10 (Ten) Days., Disp: 3 each, Rfl: 5    Continuous Blood Gluc Transmit (Dexcom G6 Transmitter) misc, 1 each Every 30 (Thirty) Days., Disp: 1 each, Rfl: 5    Dexilant 60 MG capsule, Take 1 capsule by mouth Daily., Disp: 90 capsule, Rfl: 3    DULoxetine (CYMBALTA) 30 MG capsule, Take 3 capsules by mouth Daily., Disp: 270 capsule, Rfl: 3    Easy Touch Lancets 30G/Twist misc, Use tid, Disp: 300 each, Rfl: 3    fenofibrate 160 MG tablet, Take 1 tablet by mouth Daily., Disp: 90 tablet, Rfl: 3    gabapentin (NEURONTIN) 300 MG capsule, Take 1 capsule by mouth 3 (Three) Times a Day., Disp: 270 capsule, Rfl: 1    hydrOXYzine pamoate (VISTARIL) 50 MG capsule, , Disp: , Rfl:     isosorbide mononitrate (IMDUR) 60 MG 24 hr tablet, Take 1 tablet by mouth  Daily., Disp: 90 tablet, Rfl: 3    labetalol (NORMODYNE) 200 MG tablet, Take 1 tablet by mouth 2 (Two) Times a Day., Disp: 180 tablet, Rfl: 1    Lantus SoloStar 100 UNIT/ML injection pen, Inject 45 Units under the skin into the appropriate area as directed Daily., Disp: 15 mL, Rfl: 5    loratadine (CLARITIN) 10 MG tablet, Take 1 tablet by mouth Daily., Disp: 30 tablet, Rfl: 0    losartan (COZAAR) 100 MG tablet, Take 1 tablet by mouth Daily., Disp: 90 tablet, Rfl: 3    metFORMIN (FORTAMET) 1000 MG (OSM) 24 hr tablet, Take 1 tablet by mouth 2 (Two) Times a Day With Meals., Disp: 180 tablet, Rfl: 3    mupirocin (BACTROBAN) 2 % ointment, apply TO surgical site TWICE DAILY AS DIRECTED, Disp: , Rfl:     neomycin-polymyxin-dexamethasone (MAXITROL) 3.5-95203-5.1 ophthalmic suspension, As Needed., Disp: , Rfl:     nitroglycerin (Nitrostat) 0.4 MG SL tablet, Place 1 tablet under the tongue Every 5 (Five) Minutes As Needed for Chest Pain., Disp: 30 tablet, Rfl: 5    Repatha SureClick solution auto-injector SureClick injection, Inject 1 mL under the skin into the appropriate area as directed Every 14 (Fourteen) Days., Disp: 12 mL, Rfl: 3    rOPINIRole (REQUIP) 2 MG tablet, Take 1 tablet by mouth Every Night., Disp: 90 tablet, Rfl: 3    Tirzepatide (Mounjaro) 5 MG/0.5ML solution pen-injector, Inject 0.5 mL under the skin into the appropriate area as directed Every 7 (Seven) Days., Disp: 2 mL, Rfl: 2    True Metrix Blood Glucose Test test strip, 1 each by Other route 3 (Three) Times a Day., Disp: 300 each, Rfl: 3    Review of Systems   Constitutional:  Negative for activity change, appetite change, chills, diaphoresis, fatigue and fever.   HENT:  Negative for congestion, drooling, ear discharge, ear pain, mouth sores, nosebleeds, postnasal drip, rhinorrhea, sinus pressure, sneezing and sore throat.    Eyes:  Negative for pain, discharge and visual disturbance.   Respiratory:  Negative for cough, chest tightness,  "shortness of breath and wheezing.    Cardiovascular:  Negative for chest pain, palpitations and leg swelling.   Gastrointestinal:  Negative for abdominal pain, constipation, diarrhea, nausea and vomiting.   Endocrine: Negative for cold intolerance, heat intolerance, polydipsia, polyphagia and polyuria.   Musculoskeletal:  Negative for arthralgias, myalgias and neck pain.   Skin:  Negative for rash and wound.   Neurological:  Negative for dizziness, syncope, speech difficulty, weakness, light-headedness and headaches.   Hematological:  Negative for adenopathy. Does not bruise/bleed easily.   Psychiatric/Behavioral:  Negative for confusion, dysphoric mood and sleep disturbance. The patient is not nervous/anxious.    All other systems reviewed and are negative.    BP 94/57 (BP Location: Right arm, Patient Position: Sitting, Cuff Size: Adult)   Pulse 66   Ht 162.6 cm (64\")   Wt 74.3 kg (163 lb 12.8 oz)   LMP  (LMP Unknown)   SpO2 98%   BMI 28.12 kg/mý     Objective  Allergies   Allergen Reactions    Ciprofloxacin Anaphylaxis    Penicillins Shortness Of Breath    Statins Myalgia    Fish Oil [Omega-3 Fatty Acids] Nausea And Vomiting       Physical Exam  Vitals reviewed.   Constitutional:       Appearance: Normal appearance. She is well-developed.   HENT:      Head: Normocephalic.   Eyes:      Conjunctiva/sclera: Conjunctivae normal.   Neck:      Thyroid: No thyromegaly.      Vascular: No carotid bruit or JVD.   Cardiovascular:      Rate and Rhythm: Normal rate and regular rhythm.   Pulmonary:      Effort: Pulmonary effort is normal.      Breath sounds: Normal breath sounds.   Musculoskeletal:      Cervical back: Neck supple.      Right lower leg: No edema.      Left lower leg: No edema.   Skin:     General: Skin is warm and dry.   Neurological:      Mental Status: She is alert and oriented to person, place, and time.   Psychiatric:         Attention and Perception: Attention normal.         Mood and Affect: Mood " normal.         Speech: Speech normal.         Behavior: Behavior normal. Behavior is cooperative.         Cognition and Memory: Cognition normal.       LABS  WBC   Date Value Ref Range Status   04/07/2023 8.94 3.40 - 10.80 10*3/mm3 Final   11/12/2021 10.58 3.40 - 10.80 10*3/mm3 Final     RBC   Date Value Ref Range Status   04/07/2023 5.27 3.77 - 5.28 10*6/mm3 Final   11/12/2021 4.77 3.77 - 5.28 10*6/mm3 Final     Hemoglobin   Date Value Ref Range Status   04/07/2023 13.9 12.0 - 15.9 g/dL Final     Hematocrit   Date Value Ref Range Status   04/07/2023 42.0 34.0 - 46.6 % Final     MCV   Date Value Ref Range Status   04/07/2023 79.7 79.0 - 97.0 fL Final     MCH   Date Value Ref Range Status   04/07/2023 26.4 (L) 26.6 - 33.0 pg Final     MCHC   Date Value Ref Range Status   04/07/2023 33.1 31.5 - 35.7 g/dL Final     RDW   Date Value Ref Range Status   04/07/2023 16.0 (H) 12.3 - 15.4 % Final     RDW-SD   Date Value Ref Range Status   04/07/2023 45.6 37.0 - 54.0 fl Final     MPV   Date Value Ref Range Status   04/07/2023 9.4 6.0 - 12.0 fL Final     Platelets   Date Value Ref Range Status   04/07/2023 355 140 - 450 10*3/mm3 Final     Neutrophil %   Date Value Ref Range Status   04/07/2023 47.8 42.7 - 76.0 % Final     Lymphocyte %   Date Value Ref Range Status   04/07/2023 42.7 19.6 - 45.3 % Final     Monocyte %   Date Value Ref Range Status   04/07/2023 6.3 5.0 - 12.0 % Final     Eosinophil %   Date Value Ref Range Status   04/07/2023 2.0 0.3 - 6.2 % Final     Basophil %   Date Value Ref Range Status   04/07/2023 0.8 0.0 - 1.5 % Final     Immature Grans %   Date Value Ref Range Status   04/07/2023 0.4 0.0 - 0.5 % Final     Neutrophils, Absolute   Date Value Ref Range Status   04/07/2023 4.27 1.70 - 7.00 10*3/mm3 Final     Lymphocytes, Absolute   Date Value Ref Range Status   04/07/2023 3.82 (H) 0.70 - 3.10 10*3/mm3 Final     Monocytes, Absolute   Date Value Ref Range Status   04/07/2023 0.56 0.10 - 0.90 10*3/mm3 Final      Eosinophils, Absolute   Date Value Ref Range Status   04/07/2023 0.18 0.00 - 0.40 10*3/mm3 Final     Basophils, Absolute   Date Value Ref Range Status   04/07/2023 0.07 0.00 - 0.20 10*3/mm3 Final     Immature Grans, Absolute   Date Value Ref Range Status   04/07/2023 0.04 0.00 - 0.05 10*3/mm3 Final     nRBC   Date Value Ref Range Status   04/07/2023 0.0 0.0 - 0.2 /100 WBC Final       Total Cholesterol   Date Value Ref Range Status   08/11/2023 102 0 - 200 mg/dL Final     Triglycerides   Date Value Ref Range Status   08/11/2023 196 (H) 0 - 150 mg/dL Final     HDL Cholesterol   Date Value Ref Range Status   08/11/2023 42 40 - 60 mg/dL Final     LDL Cholesterol    Date Value Ref Range Status   08/11/2023 29 0 - 100 mg/dL Final     LDL Chol Calc (NIH)   Date Value Ref Range Status   11/12/2021 124 (H) 0 - 100 mg/dL Final     IMAGING   MRI Brain Without Contrast    Result Date: 5/25/2023    No acute findings in the head/brain.  This report was finalized on 5/25/2023 10:46 AM by Dr. Javier Khan MD.      Mammo Screening Digital Tomosynthesis Bilateral With CAD    Result Date: 6/2/2023  BIRADS 2: Benign findings  RECOMMENDATION: Recommend routine follow-up for continued breast evaluation.  Today's study was evaluated in conjunction with computer aided detection.  PLEASE NOTE THE FOLLOWING ACR RECOMMENDATIONS: A:  Only 80% of breast cancers can be diagnosed by mammography. B:  A negative mammogram does not exclude cancer in clinically palpable abnormalities.  Biopsy should be done based on ultrasound findings and clinical judgment. C:  A mammogram has limited value in detecting cancer in patients with adenosis or dense breasts.  [employer] GUIDELINES For breast cancer detection in asymptomatic women: Age  ACR Recommendations 35-40  Baseline Mammogram 40-49  Annual or Biannual Mammogram 50+  Annual Mammogram   Annual physical and frequent self breast examination.  Patient has been entered into an  automatic reminder system.  This report was finalized on 6/2/2023 11:03 AM by Dr. Blayne Contreras MD.      Nuclear Stress Test 8/11/2023  Interpretation Summary       A pharmacological stress test was performed using regadenoson without low-level exercise.    Resting EKG showed sinus rhythm at a rate of 65 bpm, intraventricular conduction delay, old septal myocardial infarction and nonspecific ST and T wave changes were noted.    ST segments did not show any diagnostic changes.  No significant arrhythmia detected.    Left ventricular ejection fraction is normal (Calculated EF = 69%).    Myocardial perfusion imaging indicates a normal myocardial perfusion study with no evidence of ischemia.    Impressions are consistent with a low risk study.    Compared to the prior study from 1/8/2021 the current study reveals no changes.      Echocardiogram 8/11/2023  Interpretation Summary    Normal left ventricular cavity size, wall thickness and wall motion noted.    Left ventricular systolic function is normal. Left ventricular ejection fraction appears to be 61 - 65%.    Left ventricular diastolic function is consistent with (grade I) impaired relaxation.    Mild aortic valve stenosis is present. Ao max PG 12.9 mmHg Ao mean PG 7 mmHg    Moderate aortic valve regurgitation is present.    No pericardial effusion detected.              Assessment & Plan  Diagnoses and all orders for this visit:    1. Coronary artery disease involving native coronary artery of native heart with other form of angina pectoris (Primary)  Discussed and reviewed nuclear stress test  Plan to continue aspirin, Repatha(statin intolerant), Tricor, labetalol and losartan.  2.  Moderate aortic valve regurgitation  Continue to monitor, will plan to repeat echocardiogram in about 6 months    3. Aortic stenosis, mild  Continue to monitor, will plan to repeat echocardiogram in about 6 months    4. Essential hypertension  Controlled, continue current  therapy.  BP slightly lower in clinic today but patient reports doubling up on medications by accident this am. Encourage no p.m. medicines today and push fluids today and continue to closely monitor BP.  Call the clinic with further concerns    5. Dyslipidemia  Continue on Repatha and fenofibrate.  LDL is at 29.  Triglycerides remain at 196.  Heart healthy diet    6. Diastolic dysfunction  Recommend BN and BMP, sodium restrictions, BP control  Clinically asymptomatic, will continue to monitor    7. Dyspnea, unspecified type  -     BNP; Future  -     Basic Metabolic Panel; Future    8. Type 2 diabetes mellitus with hyperglycemia, with long-term current use of insulin  Continue on current medications as recommended by PCP, encouraged in tight glycemic control for overall health benefit      BMI 28.12 - lifestyle modifications recommended including heart healthy diet, regular exercise, maintenance of desirable body weight and avoidance of tobacco products.    Discussed and reviewed testing with patient today.    Follow-up in 3 months, sooner if needed

## 2023-08-18 NOTE — TELEPHONE ENCOUNTER
----- Message from TRUONG Berger sent at 8/18/2023 12:00 PM EDT -----  1. BNP no signs of acute heart failure, continue with current plan of care   2. Kidney function has significantly worsened, potassium is normal. This could be due to doubling up on medications this am. Encourage in adequate hydration today and recheck BMP first of next week. (Order placed )

## 2023-08-18 NOTE — PROGRESS NOTES
Subjective     Monisha Gardner is a 62 y.o. female.   Chief Complaint   Patient presents with    Chest Pain     Follow up      History of Present Illness   Monisha Gardner is a 62 y.o. female who presents to the clinic today for cardiology follow up.     CAD currently on ASA, Imdur, labetalol and Repatha.  She recently completed cardiovascular testing which she would like to discuss today.  She denies chest pain or dyspnea.       Hypertension currently on losartan 100 mg daily, labetalol 200 mg twice daily and Norvasc 2.5 mg daily.  She reports BPs have improved with recent medication changes.  She reports this a.m. she accidentally took 2 doses of her morning medications which she feels maybe contributing to her lower blood pressure today.  She reports home BPs are generally less than 140/90.     Hyperlipidemia with recent elevated labs in early April and was restarted on repatha near the end of April.  She reports she remains on fenofibrate.  She has not had labs reevaluated since reinitiation of Repatha.  She reports tolerating medication well and denies medicine side effects.  She has been statin intolerant.  Her LDL goal is less than 70.  She had recent labs which she would like to discuss today.     She recently completed a monitor due to palpitations and frequent PVCs which she would like to discuss today. She tries to limit her caffeine.  Her primary has recently made changes in her beta-blocker therapy to help control her BP.  She denies worsening palpitations, dizziness, tachycardia, bradycardia or syncope.    Patient Active Problem List   Diagnosis    CAD (coronary artery disease)    Essential hypertension    Dyslipidemia    Type 2 diabetes mellitus with hyperglycemia, with long-term current use of insulin    Body mass index (bmi) 30.0-30.9, adult    Osteoarthritis    Atypical angina    Elevated liver enzymes    Gastroesophageal reflux disease with esophagitis    Vitamin D deficiency    Hx of  pancreatitis    Menopausal symptoms    Vitamin B 12 deficiency    Nocturnal leg cramps    Anxiety    Left arm weakness    Chronic gout of multiple sites    Squamous cell carcinoma in situ (SCCIS) of skin    Mixed hyperlipidemia    PVC (premature ventricular contraction)    RLS (restless legs syndrome)    Psychophysiological insomnia    Dry mouth    Type 2 diabetes mellitus with diabetic autonomic neuropathy, with long-term current use of insulin    Non-recurrent acute serous otitis media of left ear     Past Medical History:   Diagnosis Date    Anxiety     Arthritis     Diabetes mellitus     Diverticulosis     Gout     Hyperlipidemia     Hypertension     Myocardial infarction      Past Surgical History:   Procedure Laterality Date    APPENDECTOMY      BREAST BIOPSY Right     benign    CARDIAC CATHETERIZATION       SECTION      CHOLECYSTECTOMY      CORONARY STENT PLACEMENT      Stenting of the RCA with 3.5 x 26 mm Resolute drug-eluting stent    HYSTERECTOMY      JOINT REPLACEMENT      TONSILLECTOMY       Family History   Problem Relation Age of Onset    Arthritis Mother     Heart disease Mother     Heart disease Sister     Vision loss Daughter     Heart disease Father     Heart disease Sister     Heart disease Sister     Heart disease Sister      Social History     Tobacco Use    Smoking status: Never     Passive exposure: Never    Smokeless tobacco: Never   Vaping Use    Vaping Use: Never used   Substance Use Topics    Alcohol use: No    Drug use: No     The following portions of the patient's history were reviewed and updated as appropriate: allergies, current medications, past family history, past medical history, past social history, past surgical history and problem list.    Allergies   Allergen Reactions    Ciprofloxacin Anaphylaxis    Penicillins Shortness Of Breath    Statins Myalgia    Fish Oil [Omega-3 Fatty Acids] Nausea And Vomiting       Current Outpatient Medications:     acyclovir (ZOVIRAX) 800  MG tablet, TAKE 1 TABLET BY MOUTH THREE TIMES DAILY, Disp: 6 tablet, Rfl: 0    allopurinol (ZYLOPRIM) 100 MG tablet, Take 1 tablet by mouth 2 (Two) Times a Day., Disp: 180 tablet, Rfl: 3    amLODIPine (NORVASC) 2.5 MG tablet, Take 1 tablet by mouth Daily., Disp: 30 tablet, Rfl: 3    ammonium lactate (AMLACTIN) 12 % cream, , Disp: , Rfl:     aspirin 81 MG EC tablet, Take 1 tablet by mouth Daily., Disp: 100 tablet, Rfl: 3    Cariprazine HCl (Vraylar) 3 MG capsule capsule, Take 1 capsule by mouth Daily., Disp: 90 capsule, Rfl: 3    Continuous Blood Gluc  (Dexcom G6 ) device, , Disp: , Rfl:     Continuous Blood Gluc Sensor (Dexcom G6 Sensor), Every 10 (Ten) Days., Disp: 3 each, Rfl: 5    Continuous Blood Gluc Transmit (Dexcom G6 Transmitter) misc, 1 each Every 30 (Thirty) Days., Disp: 1 each, Rfl: 5    Dexilant 60 MG capsule, Take 1 capsule by mouth Daily., Disp: 90 capsule, Rfl: 3    DULoxetine (CYMBALTA) 30 MG capsule, Take 3 capsules by mouth Daily., Disp: 270 capsule, Rfl: 3    Easy Touch Lancets 30G/Twist misc, Use tid, Disp: 300 each, Rfl: 3    fenofibrate 160 MG tablet, Take 1 tablet by mouth Daily., Disp: 90 tablet, Rfl: 3    gabapentin (NEURONTIN) 300 MG capsule, Take 1 capsule by mouth 3 (Three) Times a Day., Disp: 270 capsule, Rfl: 1    hydrOXYzine pamoate (VISTARIL) 50 MG capsule, , Disp: , Rfl:     isosorbide mononitrate (IMDUR) 60 MG 24 hr tablet, Take 1 tablet by mouth Daily., Disp: 90 tablet, Rfl: 3    labetalol (NORMODYNE) 200 MG tablet, Take 1 tablet by mouth 2 (Two) Times a Day., Disp: 180 tablet, Rfl: 1    Lantus SoloStar 100 UNIT/ML injection pen, Inject 45 Units under the skin into the appropriate area as directed Daily., Disp: 15 mL, Rfl: 5    loratadine (CLARITIN) 10 MG tablet, Take 1 tablet by mouth Daily., Disp: 30 tablet, Rfl: 0    losartan (COZAAR) 100 MG tablet, Take 1 tablet by mouth Daily., Disp: 90 tablet, Rfl: 3    metFORMIN (FORTAMET) 1000 MG (OSM) 24 hr tablet, Take 1  tablet by mouth 2 (Two) Times a Day With Meals., Disp: 180 tablet, Rfl: 3    mupirocin (BACTROBAN) 2 % ointment, apply TO surgical site TWICE DAILY AS DIRECTED, Disp: , Rfl:     neomycin-polymyxin-dexamethasone (MAXITROL) 3.5-15378-9.1 ophthalmic suspension, As Needed., Disp: , Rfl:     nitroglycerin (Nitrostat) 0.4 MG SL tablet, Place 1 tablet under the tongue Every 5 (Five) Minutes As Needed for Chest Pain., Disp: 30 tablet, Rfl: 5    Repatha SureClick solution auto-injector SureClick injection, Inject 1 mL under the skin into the appropriate area as directed Every 14 (Fourteen) Days., Disp: 12 mL, Rfl: 3    rOPINIRole (REQUIP) 2 MG tablet, Take 1 tablet by mouth Every Night., Disp: 90 tablet, Rfl: 3    Tirzepatide (Mounjaro) 5 MG/0.5ML solution pen-injector, Inject 0.5 mL under the skin into the appropriate area as directed Every 7 (Seven) Days., Disp: 2 mL, Rfl: 2    True Metrix Blood Glucose Test test strip, 1 each by Other route 3 (Three) Times a Day., Disp: 300 each, Rfl: 3    Review of Systems   Constitutional:  Negative for activity change, appetite change, chills, diaphoresis, fatigue and fever.   HENT:  Negative for congestion, drooling, ear discharge, ear pain, mouth sores, nosebleeds, postnasal drip, rhinorrhea, sinus pressure, sneezing and sore throat.    Eyes:  Negative for pain, discharge and visual disturbance.   Respiratory:  Negative for cough, chest tightness, shortness of breath and wheezing.    Cardiovascular:  Negative for chest pain, palpitations and leg swelling.   Gastrointestinal:  Negative for abdominal pain, constipation, diarrhea, nausea and vomiting.   Endocrine: Negative for cold intolerance, heat intolerance, polydipsia, polyphagia and polyuria.   Musculoskeletal:  Negative for arthralgias, myalgias and neck pain.   Skin:  Negative for rash and wound.   Neurological:  Negative for dizziness, syncope, speech difficulty, weakness, light-headedness and headaches.   Hematological:   "Negative for adenopathy. Does not bruise/bleed easily.   Psychiatric/Behavioral:  Negative for confusion, dysphoric mood and sleep disturbance. The patient is not nervous/anxious.    All other systems reviewed and are negative.    BP 94/57 (BP Location: Right arm, Patient Position: Sitting, Cuff Size: Adult)   Pulse 66   Ht 162.6 cm (64\")   Wt 74.3 kg (163 lb 12.8 oz)   LMP  (LMP Unknown)   SpO2 98%   BMI 28.12 kg/mý     Objective   Allergies   Allergen Reactions    Ciprofloxacin Anaphylaxis    Penicillins Shortness Of Breath    Statins Myalgia    Fish Oil [Omega-3 Fatty Acids] Nausea And Vomiting       Physical Exam  Vitals reviewed.   Constitutional:       Appearance: Normal appearance. She is well-developed.   HENT:      Head: Normocephalic.   Eyes:      Conjunctiva/sclera: Conjunctivae normal.   Neck:      Thyroid: No thyromegaly.      Vascular: No carotid bruit or JVD.   Cardiovascular:      Rate and Rhythm: Normal rate and regular rhythm.   Pulmonary:      Effort: Pulmonary effort is normal.      Breath sounds: Normal breath sounds.   Musculoskeletal:      Cervical back: Neck supple.      Right lower leg: No edema.      Left lower leg: No edema.   Skin:     General: Skin is warm and dry.   Neurological:      Mental Status: She is alert and oriented to person, place, and time.   Psychiatric:         Attention and Perception: Attention normal.         Mood and Affect: Mood normal.         Speech: Speech normal.         Behavior: Behavior normal. Behavior is cooperative.         Cognition and Memory: Cognition normal.       LABS  WBC   Date Value Ref Range Status   04/07/2023 8.94 3.40 - 10.80 10*3/mm3 Final   11/12/2021 10.58 3.40 - 10.80 10*3/mm3 Final     RBC   Date Value Ref Range Status   04/07/2023 5.27 3.77 - 5.28 10*6/mm3 Final   11/12/2021 4.77 3.77 - 5.28 10*6/mm3 Final     Hemoglobin   Date Value Ref Range Status   04/07/2023 13.9 12.0 - 15.9 g/dL Final     Hematocrit   Date Value Ref Range " Status   04/07/2023 42.0 34.0 - 46.6 % Final     MCV   Date Value Ref Range Status   04/07/2023 79.7 79.0 - 97.0 fL Final     MCH   Date Value Ref Range Status   04/07/2023 26.4 (L) 26.6 - 33.0 pg Final     MCHC   Date Value Ref Range Status   04/07/2023 33.1 31.5 - 35.7 g/dL Final     RDW   Date Value Ref Range Status   04/07/2023 16.0 (H) 12.3 - 15.4 % Final     RDW-SD   Date Value Ref Range Status   04/07/2023 45.6 37.0 - 54.0 fl Final     MPV   Date Value Ref Range Status   04/07/2023 9.4 6.0 - 12.0 fL Final     Platelets   Date Value Ref Range Status   04/07/2023 355 140 - 450 10*3/mm3 Final     Neutrophil %   Date Value Ref Range Status   04/07/2023 47.8 42.7 - 76.0 % Final     Lymphocyte %   Date Value Ref Range Status   04/07/2023 42.7 19.6 - 45.3 % Final     Monocyte %   Date Value Ref Range Status   04/07/2023 6.3 5.0 - 12.0 % Final     Eosinophil %   Date Value Ref Range Status   04/07/2023 2.0 0.3 - 6.2 % Final     Basophil %   Date Value Ref Range Status   04/07/2023 0.8 0.0 - 1.5 % Final     Immature Grans %   Date Value Ref Range Status   04/07/2023 0.4 0.0 - 0.5 % Final     Neutrophils, Absolute   Date Value Ref Range Status   04/07/2023 4.27 1.70 - 7.00 10*3/mm3 Final     Lymphocytes, Absolute   Date Value Ref Range Status   04/07/2023 3.82 (H) 0.70 - 3.10 10*3/mm3 Final     Monocytes, Absolute   Date Value Ref Range Status   04/07/2023 0.56 0.10 - 0.90 10*3/mm3 Final     Eosinophils, Absolute   Date Value Ref Range Status   04/07/2023 0.18 0.00 - 0.40 10*3/mm3 Final     Basophils, Absolute   Date Value Ref Range Status   04/07/2023 0.07 0.00 - 0.20 10*3/mm3 Final     Immature Grans, Absolute   Date Value Ref Range Status   04/07/2023 0.04 0.00 - 0.05 10*3/mm3 Final     nRBC   Date Value Ref Range Status   04/07/2023 0.0 0.0 - 0.2 /100 WBC Final       Total Cholesterol   Date Value Ref Range Status   08/11/2023 102 0 - 200 mg/dL Final     Triglycerides   Date Value Ref Range Status   08/11/2023 196  (H) 0 - 150 mg/dL Final     HDL Cholesterol   Date Value Ref Range Status   08/11/2023 42 40 - 60 mg/dL Final     LDL Cholesterol    Date Value Ref Range Status   08/11/2023 29 0 - 100 mg/dL Final     LDL Chol Calc (NIH)   Date Value Ref Range Status   11/12/2021 124 (H) 0 - 100 mg/dL Final     IMAGING   MRI Brain Without Contrast    Result Date: 5/25/2023    No acute findings in the head/brain.  This report was finalized on 5/25/2023 10:46 AM by Dr. Javier Khan MD.      Mammo Screening Digital Tomosynthesis Bilateral With CAD    Result Date: 6/2/2023  BIRADS 2: Benign findings  RECOMMENDATION: Recommend routine follow-up for continued breast evaluation.  Today's study was evaluated in conjunction with computer aided detection.  PLEASE NOTE THE FOLLOWING ACR RECOMMENDATIONS: A:  Only 80% of breast cancers can be diagnosed by mammography. B:  A negative mammogram does not exclude cancer in clinically palpable abnormalities.  Biopsy should be done based on ultrasound findings and clinical judgment. C:  A mammogram has limited value in detecting cancer in patients with adenosis or dense breasts.  [employer] GUIDELINES For breast cancer detection in asymptomatic women: Age  ACR Recommendations 35-40  Baseline Mammogram 40-49  Annual or Biannual Mammogram 50+  Annual Mammogram   Annual physical and frequent self breast examination.  Patient has been entered into an automatic reminder system.  This report was finalized on 6/2/2023 11:03 AM by Dr. Blayne Contreras MD.      Nuclear Stress Test 8/11/2023  Interpretation Summary       A pharmacological stress test was performed using regadenoson without low-level exercise.    Resting EKG showed sinus rhythm at a rate of 65 bpm, intraventricular conduction delay, old septal myocardial infarction and nonspecific ST and T wave changes were noted.    ST segments did not show any diagnostic changes.  No significant arrhythmia detected.    Left ventricular  ejection fraction is normal (Calculated EF = 69%).    Myocardial perfusion imaging indicates a normal myocardial perfusion study with no evidence of ischemia.    Impressions are consistent with a low risk study.    Compared to the prior study from 1/8/2021 the current study reveals no changes.      Echocardiogram 8/11/2023  Interpretation Summary    Normal left ventricular cavity size, wall thickness and wall motion noted.    Left ventricular systolic function is normal. Left ventricular ejection fraction appears to be 61 - 65%.    Left ventricular diastolic function is consistent with (grade I) impaired relaxation.    Mild aortic valve stenosis is present. Ao max PG 12.9 mmHg Ao mean PG 7 mmHg    Moderate aortic valve regurgitation is present.    No pericardial effusion detected.              Assessment & Plan   Diagnoses and all orders for this visit:    1. Coronary artery disease involving native coronary artery of native heart with other form of angina pectoris (Primary)  Discussed and reviewed nuclear stress test  Plan to continue aspirin, Repatha(statin intolerant), Tricor, labetalol and losartan.  2.  Moderate aortic valve regurgitation  Continue to monitor, will plan to repeat echocardiogram in about 6 months    3. Aortic stenosis, mild  Continue to monitor, will plan to repeat echocardiogram in about 6 months    4. Essential hypertension  Controlled, continue current therapy.  BP slightly lower in clinic today but patient reports doubling up on medications by accident this am. Encourage no p.m. medicines today and push fluids today and continue to closely monitor BP.  Call the clinic with further concerns    5. Dyslipidemia  Continue on Repatha and fenofibrate.  LDL is at 29.  Triglycerides remain at 196.  Heart healthy diet    6. Diastolic dysfunction  Recommend BN and BMP, sodium restrictions, BP control  Clinically asymptomatic, will continue to monitor    7. Dyspnea, unspecified type  -     BNP;  Future  -     Basic Metabolic Panel; Future    8. Type 2 diabetes mellitus with hyperglycemia, with long-term current use of insulin  Continue on current medications as recommended by PCP, encouraged in tight glycemic control for overall health benefit      BMI 28.12 - lifestyle modifications recommended including heart healthy diet, regular exercise, maintenance of desirable body weight and avoidance of tobacco products.    Discussed and reviewed testing with patient today.    Follow-up in 3 months, sooner if needed

## 2023-08-19 ENCOUNTER — LAB (OUTPATIENT)
Dept: LAB | Facility: HOSPITAL | Age: 63
End: 2023-08-19
Payer: COMMERCIAL

## 2023-08-19 DIAGNOSIS — I10 ESSENTIAL HYPERTENSION: ICD-10-CM

## 2023-08-19 PROCEDURE — 36415 COLL VENOUS BLD VENIPUNCTURE: CPT

## 2023-08-20 LAB
ANION GAP SERPL CALCULATED.3IONS-SCNC: 12.8 MMOL/L (ref 5–15)
BUN SERPL-MCNC: 19 MG/DL (ref 8–23)
BUN/CREAT SERPL: 14.4 (ref 7–25)
CALCIUM SPEC-SCNC: 10.2 MG/DL (ref 8.6–10.5)
CHLORIDE SERPL-SCNC: 105 MMOL/L (ref 98–107)
CO2 SERPL-SCNC: 21.2 MMOL/L (ref 22–29)
CREAT SERPL-MCNC: 1.32 MG/DL (ref 0.57–1)
EGFRCR SERPLBLD CKD-EPI 2021: 46 ML/MIN/1.73
GLUCOSE SERPL-MCNC: 84 MG/DL (ref 65–99)
POTASSIUM SERPL-SCNC: 4.7 MMOL/L (ref 3.5–5.2)
SODIUM SERPL-SCNC: 139 MMOL/L (ref 136–145)

## 2023-08-20 PROCEDURE — 80048 BASIC METABOLIC PNL TOTAL CA: CPT

## 2023-08-21 ENCOUNTER — TELEPHONE (OUTPATIENT)
Dept: CARDIOLOGY | Facility: CLINIC | Age: 63
End: 2023-08-21
Payer: COMMERCIAL

## 2023-08-21 DIAGNOSIS — I10 ESSENTIAL HYPERTENSION: Primary | ICD-10-CM

## 2023-08-21 NOTE — TELEPHONE ENCOUNTER
Called pt left vm of the following per Asia Hawkins APRN   Kidney function is better but still elevated. Continue to push fluids and recommend a recheck in 2 weeks (order placed)

## 2023-08-21 NOTE — TELEPHONE ENCOUNTER
----- Message from TRUONG Berger sent at 8/21/2023  8:24 AM EDT -----  Kidney function is better but still elevated. Continue to push fluids and recommend a recheck in 2 weeks (order placed)

## 2023-08-22 ENCOUNTER — TELEPHONE (OUTPATIENT)
Dept: CARDIOLOGY | Facility: CLINIC | Age: 63
End: 2023-08-22
Payer: COMMERCIAL

## 2023-08-22 NOTE — TELEPHONE ENCOUNTER
called pt and informed her of the following per Asia Hawkins, APRN -  Kidney function is better but still elevated. Continue to push fluids and recommend a recheck in 2 weeks (order placed)

## 2023-08-25 ENCOUNTER — LAB (OUTPATIENT)
Dept: LAB | Facility: HOSPITAL | Age: 63
End: 2023-08-25
Payer: COMMERCIAL

## 2023-08-25 DIAGNOSIS — I10 ESSENTIAL HYPERTENSION: ICD-10-CM

## 2023-08-25 LAB
ANION GAP SERPL CALCULATED.3IONS-SCNC: 15.7 MMOL/L (ref 5–15)
BUN SERPL-MCNC: 25 MG/DL (ref 8–23)
BUN/CREAT SERPL: 13 (ref 7–25)
CALCIUM SPEC-SCNC: 10.4 MG/DL (ref 8.6–10.5)
CHLORIDE SERPL-SCNC: 101 MMOL/L (ref 98–107)
CO2 SERPL-SCNC: 23.3 MMOL/L (ref 22–29)
CREAT SERPL-MCNC: 1.92 MG/DL (ref 0.57–1)
EGFRCR SERPLBLD CKD-EPI 2021: 29.4 ML/MIN/1.73
GLUCOSE SERPL-MCNC: 147 MG/DL (ref 65–99)
POTASSIUM SERPL-SCNC: 4.6 MMOL/L (ref 3.5–5.2)
SODIUM SERPL-SCNC: 140 MMOL/L (ref 136–145)

## 2023-08-25 PROCEDURE — 36415 COLL VENOUS BLD VENIPUNCTURE: CPT

## 2023-08-25 PROCEDURE — 80048 BASIC METABOLIC PNL TOTAL CA: CPT

## 2023-08-28 ENCOUNTER — TELEPHONE (OUTPATIENT)
Dept: CARDIOLOGY | Facility: CLINIC | Age: 63
End: 2023-08-28
Payer: COMMERCIAL

## 2023-08-28 DIAGNOSIS — I10 ESSENTIAL HYPERTENSION: Primary | ICD-10-CM

## 2023-08-28 NOTE — TELEPHONE ENCOUNTER
Called pt and inform her of the following per Asia Hawkins APRN   Her kidney function has declined significantly. She is on several medications that could impact her kidney function. I have not prescribed any medications that should impact her kidneys. If her BP is low or she is feeling bad she need to go to the ER. If she is asymptomatic she needs to follow up with PCP soon to further discuss her medications and close monitoring of kidney function. She needs nephrology evaluation. At minimum recommend recheck kidney function today (orders placed)    Left message to call back

## 2023-08-28 NOTE — TELEPHONE ENCOUNTER
----- Message from TRUONG Berger sent at 8/28/2023  8:35 AM EDT -----  Her kidney function has declined significantly. She is on several medications that could impact her kidney function. I have not prescribed any medications that should impact her kidneys. If her BP is low or she is feeling bad she need to go to the ER. If she is asymptomatic she needs to follow up with PCP soon to further discuss her medications and close monitoring of kidney function. She needs nephrology evaluation. At minimum recommend recheck kidney function today (orders placed)

## 2023-09-01 ENCOUNTER — OFFICE VISIT (OUTPATIENT)
Dept: FAMILY MEDICINE CLINIC | Facility: CLINIC | Age: 63
End: 2023-09-01
Payer: COMMERCIAL

## 2023-09-01 VITALS
OXYGEN SATURATION: 97 % | TEMPERATURE: 97.3 F | WEIGHT: 164 LBS | SYSTOLIC BLOOD PRESSURE: 112 MMHG | HEIGHT: 64 IN | BODY MASS INDEX: 28 KG/M2 | HEART RATE: 86 BPM | DIASTOLIC BLOOD PRESSURE: 58 MMHG

## 2023-09-01 DIAGNOSIS — Z79.899 LONG TERM CURRENT USE OF THERAPEUTIC DRUG: ICD-10-CM

## 2023-09-01 DIAGNOSIS — N18.4 STAGE 4 CHRONIC KIDNEY DISEASE: Primary | Chronic | ICD-10-CM

## 2023-09-01 DIAGNOSIS — I10 ESSENTIAL HYPERTENSION: Chronic | ICD-10-CM

## 2023-09-01 DIAGNOSIS — Z79.4 TYPE 2 DIABETES MELLITUS WITH DIABETIC AUTONOMIC NEUROPATHY, WITH LONG-TERM CURRENT USE OF INSULIN: Chronic | ICD-10-CM

## 2023-09-01 DIAGNOSIS — E11.43 TYPE 2 DIABETES MELLITUS WITH DIABETIC AUTONOMIC NEUROPATHY, WITH LONG-TERM CURRENT USE OF INSULIN: Chronic | ICD-10-CM

## 2023-09-01 PROCEDURE — 80048 BASIC METABOLIC PNL TOTAL CA: CPT | Performed by: NURSE PRACTITIONER

## 2023-09-01 RX ORDER — GABAPENTIN 300 MG/1
300 CAPSULE ORAL 3 TIMES DAILY
Qty: 270 CAPSULE | Refills: 1 | Status: SHIPPED | OUTPATIENT
Start: 2023-09-01

## 2023-09-01 NOTE — PATIENT INSTRUCTIONS
"Fat and Cholesterol Restricted Eating Plan  Getting too much fat and cholesterol in your diet may cause health problems. Choosing the right foods helps keep your fat and cholesterol at normal levels. This can keep you from getting certain diseases.  Your doctor may recommend an eating plan that includes:  Total fat: ______% or less of total calories a day. This is ______g of fat a day.  Saturated fat: ______% or less of total calories a day. This is ______g of saturated fat a day.  Cholesterol: less than _________mg a day.  Fiber: ______g a day.  What are tips for following this plan?  General tips  Work with your doctor to lose weight if you need to.  Avoid:  Foods with added sugar.  Fried foods.  Foods with trans fat or partially hydrogenated oils. This includes some margarines and baked goods.  If you drink alcohol:  Limit how much you have to:  0-1 drink a day for women who are not pregnant.  0-2 drinks a day for men.  Know how much alcohol is in a drink. In the [place] one drink equals one 12 oz bottle of beer (355 mL), one 5 oz glass of wine (148 mL), or one 1« oz glass of hard liquor (44 mL).  Reading food labels  Check food labels for:  Trans fats.  Partially hydrogenated oils.  Saturated fat (g) in each serving.  Cholesterol (mg) in each serving.  Fiber (g) in each serving.  Choose foods with healthy fats, such as:  Monounsaturated fats and polyunsaturated fats. These include olive and canola oil, flaxseeds, walnuts, almonds, and seeds.  Omega-3 fats. These are found in certain fish, flaxseed oil, and ground flaxseeds.  Choose grain products that have whole grains. Look for the word \"whole\" as the first word in the ingredient list.  Cooking  Cook foods using low-fat methods. These include baking, boiling, grilling, and broiling.  Eat more home-cooked foods. Eat at restaurants and buffets less often. Eat less fast food.  Avoid cooking using saturated fats, such as butter, cream, palm oil, palm kernel oil, and " coconut oil.  Meal planning    At meals, divide your plate into four equal parts:  Fill one-half of your plate with vegetables, green salads, and fruit.  Fill one-fourth of your plate with whole grains.  Fill one-fourth of your plate with low-fat (lean) protein foods.  Eat fish that is high in omega-3 fats at least two times a week. This includes mackerel, tuna, sardines, and salmon.  Eat foods that are high in fiber, such as whole grains, beans, apples, pears, berries, broccoli, carrots, peas, and barley.  What foods should I eat?  Fruits  All fresh, canned (in natural juice), or frozen fruits.  Vegetables  Fresh or frozen vegetables (raw, steamed, roasted, or grilled). Green salads.  Grains  Whole grains, such as whole wheat or whole grain breads, crackers, cereals, and pasta. Unsweetened oatmeal, bulgur, barley, quinoa, or brown rice. Corn or whole wheat flour tortillas.  Meats and other protein foods  Ground beef (85% or leaner), grass-fed beef, or beef trimmed of fat. Skinless chicken or turkey. Ground chicken or turkey. Pork trimmed of fat. All fish and seafood. Egg whites. Dried beans, peas, or lentils. Unsalted nuts or seeds. Unsalted canned beans. Nut butters without added sugar or oil.  Dairy  Low-fat or nonfat dairy products, such as skim or 1% milk, 2% or reduced-fat cheeses, low-fat and fat-free ricotta or cottage cheese, or plain low-fat and nonfat yogurt.  Fats and oils  Tub margarine without trans fats. Light or reduced-fat mayonnaise and salad dressings. Avocado. Olive, canola, sesame, or safflower oils.  The items listed above may not be a complete list of foods and beverages you can eat. Contact a dietitian for more information.  What foods should I avoid?  Fruits  Canned fruit in heavy syrup. Fruit in cream or butter sauce. Fried fruit.  Vegetables  Vegetables cooked in cheese, cream, or butter sauce. Fried vegetables.  Grains  White bread. White pasta. White rice. Cornbread. Bagels, pastries,  and croissants. Crackers and snack foods that contain trans fat and hydrogenated oils.  Meats and other protein foods  Fatty cuts of meat. Ribs, chicken wings, villar, sausage, bologna, salami, chitterlings, fatback, hot dogs, bratwurst, and packaged lunch meats. Liver and organ meats. Whole eggs and egg yolks. Chicken and turkey with skin. Fried meat.  Dairy  Whole or 2% milk, cream, half-and-half, and cream cheese. Whole milk cheeses. Whole-fat or sweetened yogurt. Full-fat cheeses. Nondairy creamers and whipped toppings. Processed cheese, cheese spreads, and cheese curds.  Fats and oils  Butter, stick margarine, lard, shortening, ghee, or villar fat. Coconut, palm kernel, and palm oils.  Beverages  Alcohol. Sugar-sweetened drinks such as sodas, lemonade, and fruit drinks.  Sweets and desserts  Corn syrup, sugars, honey, and molasses. Candy. Jam and jelly. Syrup. Sweetened cereals. Cookies, pies, cakes, donuts, muffins, and ice cream.  The items listed above may not be a complete list of foods and beverages you should avoid. Contact a dietitian for more information.  Summary  Choosing the right foods helps keep your fat and cholesterol at normal levels. This can keep you from getting certain diseases.  At meals, fill one-half of your plate with vegetables, green salads, and fruits.  Eat high fiber foods, like whole grains, beans, apples, pears, berries, carrots, peas, and barley.  Limit added sugar, saturated fats, alcohol, and fried foods.  This information is not intended to replace advice given to you by your health care provider. Make sure you discuss any questions you have with your health care provider.  Document Revised: 04/29/2022 Document Reviewed: 04/29/2022  Elsevier Patient Education c 2023 [employer]  Carbohydrate Counting for Diabetes Mellitus, Adult  Carbohydrate counting is a method of keeping track of how many carbohydrates you eat. Eating carbohydrates increases the amount of sugar (glucose) in  the blood. Counting how many carbohydrates you eat improves how well you manage your blood glucose. This, in turn, helps you manage your diabetes.  Carbohydrates are measured in grams (g) per serving. It is important to know how many carbohydrates (in grams or by serving size) you can have in each meal. This is different for every person. A dietitian can help you make a meal plan and calculate how many carbohydrates you should have at each meal and snack.  What foods contain carbohydrates?  Carbohydrates are found in the following foods:  Grains, such as breads and cereals.  Dried beans and soy products.  Starchy vegetables, such as potatoes, peas, and corn.  Fruit and fruit juices.  Milk and yogurt.  Sweets and snack foods, such as cake, cookies, candy, chips, and soft drinks.  How do I count carbohydrates in foods?  There are two ways to count carbohydrates in food. You can read food labels or learn standard serving sizes of foods. You can use either of these methods or a combination of both.  Using the Nutrition Facts label  The Nutrition Facts list is included on the labels of almost all packaged foods and beverages in the [place]. It includes:  The serving size.  Information about nutrients in each serving, including the grams of carbohydrate per serving.  To use the Nutrition Facts, decide how many servings you will have. Then, multiply the number of servings by the number of carbohydrates per serving. The resulting number is the total grams of carbohydrates that you will be having.  Learning the standard serving sizes of foods  When you eat carbohydrate foods that are not packaged or do not include Nutrition Facts on the label, you need to measure the servings in order to count the grams of carbohydrates.  Measure the foods that you will eat with a food scale or measuring cup, if needed.  Decide how many standard-size servings you will eat.  Multiply the number of servings by 15. For foods that contain  carbohydrates, one serving equals 15 g of carbohydrates.  For example, if you eat 2 cups or 10 oz (300 g) of strawberries, you will have eaten 2 servings and 30 g of carbohydrates (2 servings x 15 g = 30 g).  For foods that have more than one food mixed, such as soups and casseroles, you must count the carbohydrates in each food that is included.  The following list contains standard serving sizes of common carbohydrate-rich foods. Each of these servings has about 15 g of carbohydrates:  1 slice of bread.  1 six-inch (15 cm) tortilla.  ? cup or 2 oz (53 g) cooked rice or pasta.  « cup or 3 oz (85 g) cooked or canned, drained and rinsed beans or lentils.  « cup or 3 oz (85 g) starchy vegetable, such as peas, corn, or squash.  « cup or 4 oz (120 g) hot cereal.  « cup or 3 oz (85 g) boiled or mashed potatoes, or ¬ or 3 oz (85 g) of a large baked potato.  « cup or 4 fl oz (118 mL) fruit juice.  1 cup or 8 fl oz (237 mL) milk.  1 small or 4 oz (106 g) apple.  « or 2 oz (63 g) of a medium banana.  1 cup or 5 oz (150 g) strawberries.  3 cups or 1 oz (28.3 g) popped popcorn.  What is an example of carbohydrate counting?  To calculate the grams of carbohydrates in this sample meal, follow the steps shown below.  Sample meal  3 oz (85 g) chicken breast.  ? cup or 4 oz (106 g) brown rice.  « cup or 3 oz (85 g) corn.  1 cup or 8 fl oz (237 mL) milk.  1 cup or 5 oz (150 g) strawberries with sugar-free whipped topping.  Carbohydrate calculation  Identify the foods that contain carbohydrates:  Rice.  Corn.  Milk.  Strawberries.  Calculate how many servings you have of each food:  2 servings rice.  1 serving corn.  1 serving milk.  1 serving strawberries.  Multiply each number of servings by 15  servings rice x 15 g = 30 g.  1 serving corn x 15 g = 15 g.  1 serving milk x 15 g = 15 g.  1 serving strawberries x 15 g = 15 g.  Add together all of the amounts to find the total grams of carbohydrates eaten:  30 g + 15 g + 15 g + 15  g = 75 g of carbohydrates total.  What are tips for following this plan?  Shopping  Develop a meal plan and then make a shopping list.  Buy fresh and frozen vegetables, fresh and frozen fruit, dairy, eggs, beans, lentils, and whole grains.  Look at food labels. Choose foods that have more fiber and less sugar.  Avoid processed foods and foods with added sugars.  Meal planning  Aim to have the same number of grams of carbohydrates at each meal and for each snack time.  Plan to have regular, balanced meals and snacks.  Where to find more information  American Diabetes Association: diabetes.org  Centers for Disease Control and Prevention: cdc.gov  Academy of Nutrition and Dietetics: eatright.org  Association of Diabetes Care & Education Specialists: diabeteseducator.org  Summary  Carbohydrate counting is a method of keeping track of how many carbohydrates you eat.  Eating carbohydrates increases the amount of sugar (glucose) in your blood.  Counting how many carbohydrates you eat improves how well you manage your blood glucose. This helps you manage your diabetes.  A dietitian can help you make a meal plan and calculate how many carbohydrates you should have at each meal and snack.  This information is not intended to replace advice given to you by your health care provider. Make sure you discuss any questions you have with your health care provider.  Document Revised: 07/21/2021 Document Reviewed: 07/21/2021  [employer] Patient Education c 2023 [employer]

## 2023-09-01 NOTE — PROGRESS NOTES
"Subjective   Monisha Gardner is a 63 y.o. female.       Chief Complaint -kidney disease    History of Present Illness -       Kidney disease-  Chronic kidney disease is not at goal.  She was noted to have significant renal insufficiency recently with estimated GFR of 29.  She has seen nephrology in the past that has been over a year.  Patient is currently taking metformin.  She states that she is drinking significant amount of water per day.  She took her Mounjaro injection yesterday.    Hypertension-  Controlled with amlodipine and losartan    Diabetes mellitus-  Controlled with metformin and Mounjaro along with dietary modification.  We discussed her renal function with relation to these medications and the need for medication adjustment today.  She also uses Lantus.    The following portions of the patient's history were reviewed and updated as appropriate: allergies, current medications, past family history, past medical history, past social history, past surgical history and problem list.        Objective  Vital signs:  /58   Pulse 86   Temp 97.3 øF (36.3 øC) (Temporal)   Ht 162.6 cm (64\")   Wt 74.4 kg (164 lb)   LMP  (LMP Unknown)   SpO2 97%   BMI 28.15 kg/mý     Physical Exam  Vitals and nursing note reviewed.   Constitutional:       Appearance: Normal appearance. She is well-developed.   Eyes:      Extraocular Movements: Extraocular movements intact.      Conjunctiva/sclera: Conjunctivae normal.   Cardiovascular:      Rate and Rhythm: Normal rate and regular rhythm.      Heart sounds: Normal heart sounds. No murmur heard.  Pulmonary:      Effort: Pulmonary effort is normal. No respiratory distress.      Breath sounds: Normal breath sounds. No wheezing.   Musculoskeletal:         General: No tenderness.   Skin:     General: Skin is warm and dry.      Findings: No rash.   Neurological:      Mental Status: She is alert and oriented to person, place, and time.   Psychiatric:         Mood and " Affect: Mood normal.         Behavior: Behavior normal.         Thought Content: Thought content normal.       The following data was reviewed by Kimmy Vela PA-C:         Lab Results   Component Value Date    BUN 25 (H) 08/25/2023    CREATININE 1.92 (H) 08/25/2023    EGFR 29.4 (L) 08/25/2023    ALT 16 08/11/2023    AST 23 08/11/2023    WBC 8.94 04/07/2023    HGB 13.9 04/07/2023    HCT 42.0 04/07/2023     04/07/2023    CHOL 102 08/11/2023    TRIG 196 (H) 08/11/2023    HDL 42 08/11/2023    LDL 29 08/11/2023    TSH 0.838 04/07/2023    HGBA1C 6.60 (H) 04/07/2023           Assessment & Plan     Diagnoses and all orders for this visit:    1. Stage 4 chronic kidney disease (Primary)  Comments:  Stop metformin  Discussed potential side effect of Mounjaro is acute kidney injury so discussed holding future Mounjaro injections  Orders:  -     Comprehensive Metabolic Panel; Future  -     Ambulatory Referral to Nephrology    2. Long term current use of therapeutic drug  -     Urine Drug Screen - Urine, Clean Catch; Future    3. Essential hypertension  Comments:  Continue amlodipine and losartan  Continue to monitor  Orders:  -     Basic Metabolic Panel    4. Type 2 diabetes mellitus with diabetic autonomic neuropathy, with long-term current use of insulin  Comments:  Stop metformin secondary to renal function  Continue Lantus and Mounjaro  Advised low carbohydrate diet  Orders:  -     gabapentin (NEURONTIN) 300 MG capsule; Take 1 capsule by mouth 3 (Three) Times a Day.  Dispense: 270 capsule; Refill: 1                   Patient was given instructions and counseling regarding his condition or for health maintenance advice. Please see specific information pulled into the AVS if appropriate      This document has been electronically signed by:  Kimmy Vela PA-C

## 2023-09-02 LAB
ANION GAP SERPL CALCULATED.3IONS-SCNC: 15 MMOL/L (ref 5–15)
BUN SERPL-MCNC: 21 MG/DL (ref 8–23)
BUN/CREAT SERPL: 17.1 (ref 7–25)
CALCIUM SPEC-SCNC: 10.3 MG/DL (ref 8.6–10.5)
CHLORIDE SERPL-SCNC: 103 MMOL/L (ref 98–107)
CO2 SERPL-SCNC: 23 MMOL/L (ref 22–29)
CREAT SERPL-MCNC: 1.23 MG/DL (ref 0.57–1)
EGFRCR SERPLBLD CKD-EPI 2021: 49.8 ML/MIN/1.73
GLUCOSE SERPL-MCNC: 72 MG/DL (ref 65–99)
POTASSIUM SERPL-SCNC: 4.8 MMOL/L (ref 3.5–5.2)
SODIUM SERPL-SCNC: 141 MMOL/L (ref 136–145)

## 2023-09-05 ENCOUNTER — TELEPHONE (OUTPATIENT)
Dept: CARDIOLOGY | Facility: CLINIC | Age: 63
End: 2023-09-05
Payer: COMMERCIAL

## 2023-09-05 NOTE — TELEPHONE ENCOUNTER
----- Message from TRUONG Berger sent at 9/5/2023  8:43 AM EDT -----  Kidney function is better, continue to follow up primary recommendations

## 2023-09-05 NOTE — TELEPHONE ENCOUNTER
Given message per ANUSHKA GUERIN.          Asia Hawkins APRN  [facility] [place] [facility]  Kidney function is better, continue to follow up primary recommendations

## 2023-09-05 NOTE — TELEPHONE ENCOUNTER
Let a detailed vm.        Asia Hawkins, TRU[facility] TERRY [facility] [place] [facility]  Kidney function is better, continue to follow up primary recommendations

## 2023-09-07 ENCOUNTER — TELEPHONE (OUTPATIENT)
Dept: FAMILY MEDICINE CLINIC | Facility: CLINIC | Age: 63
End: 2023-09-07
Payer: COMMERCIAL

## 2023-09-07 RX ORDER — ACYCLOVIR 800 MG/1
800 TABLET ORAL 3 TIMES DAILY
Qty: 6 TABLET | Refills: 0 | Status: SHIPPED | OUTPATIENT
Start: 2023-09-07

## 2023-09-07 NOTE — TELEPHONE ENCOUNTER
Pt notified.    ----- Message from ANH Medina sent at 9/7/2023  4:51 PM EDT -----  Prescription was sent for acyclovir to treat herpes to her pharmacy.  Referral to nephrology was made at her office visit last week.    ----- Message -----  From: Daniella Cheng MA  Sent: 9/7/2023   4:46 PM EDT  To: ANH Medina    Pt made aware, voiced understanding. Asked if you can rx something for herpes and refer to nephrology.   ----- Message -----  From: Kimmy Vela PA  Sent: 9/7/2023  10:25 AM EDT  To: Sayra Felix [facility], [place]     Inform the patient that her recent lab work showed that renal function has improved but is still not in the normal range.  Continue with the plan we discussed at her visit including stopping metformin.  I do want her to continue taking the Mounjaro.  Recheck labs as we discussed

## 2023-09-08 ENCOUNTER — LAB (OUTPATIENT)
Dept: FAMILY MEDICINE CLINIC | Facility: CLINIC | Age: 63
End: 2023-09-08
Payer: COMMERCIAL

## 2023-09-08 DIAGNOSIS — N18.4 STAGE 4 CHRONIC KIDNEY DISEASE: Chronic | ICD-10-CM

## 2023-09-08 LAB
ALBUMIN SERPL-MCNC: 4.6 G/DL (ref 3.5–5.2)
ALBUMIN/GLOB SERPL: 2 G/DL
ALP SERPL-CCNC: 45 U/L (ref 39–117)
ALT SERPL W P-5'-P-CCNC: 18 U/L (ref 1–33)
ANION GAP SERPL CALCULATED.3IONS-SCNC: 10.7 MMOL/L (ref 5–15)
AST SERPL-CCNC: 25 U/L (ref 1–32)
BILIRUB SERPL-MCNC: 0.2 MG/DL (ref 0–1.2)
BUN SERPL-MCNC: 23 MG/DL (ref 8–23)
BUN/CREAT SERPL: 15.6 (ref 7–25)
CALCIUM SPEC-SCNC: 10.1 MG/DL (ref 8.6–10.5)
CHLORIDE SERPL-SCNC: 103 MMOL/L (ref 98–107)
CO2 SERPL-SCNC: 25.3 MMOL/L (ref 22–29)
CREAT SERPL-MCNC: 1.47 MG/DL (ref 0.57–1)
EGFRCR SERPLBLD CKD-EPI 2021: 40.2 ML/MIN/1.73
GLOBULIN UR ELPH-MCNC: 2.3 GM/DL
GLUCOSE SERPL-MCNC: 160 MG/DL (ref 65–99)
POTASSIUM SERPL-SCNC: 4.7 MMOL/L (ref 3.5–5.2)
PROT SERPL-MCNC: 6.9 G/DL (ref 6–8.5)
SODIUM SERPL-SCNC: 139 MMOL/L (ref 136–145)

## 2023-09-08 PROCEDURE — 80053 COMPREHEN METABOLIC PANEL: CPT | Performed by: PHYSICIAN ASSISTANT

## 2023-09-15 ENCOUNTER — OFFICE VISIT (OUTPATIENT)
Dept: FAMILY MEDICINE CLINIC | Facility: CLINIC | Age: 63
End: 2023-09-15
Payer: COMMERCIAL

## 2023-09-15 VITALS
WEIGHT: 161 LBS | SYSTOLIC BLOOD PRESSURE: 126 MMHG | DIASTOLIC BLOOD PRESSURE: 72 MMHG | TEMPERATURE: 97.5 F | OXYGEN SATURATION: 97 % | BODY MASS INDEX: 27.49 KG/M2 | HEIGHT: 64 IN | HEART RATE: 86 BPM

## 2023-09-15 DIAGNOSIS — N18.32 STAGE 3B CHRONIC KIDNEY DISEASE: Chronic | ICD-10-CM

## 2023-09-15 DIAGNOSIS — E11.65 TYPE 2 DIABETES MELLITUS WITH HYPERGLYCEMIA, WITH LONG-TERM CURRENT USE OF INSULIN: Chronic | ICD-10-CM

## 2023-09-15 DIAGNOSIS — I10 ESSENTIAL HYPERTENSION: Chronic | ICD-10-CM

## 2023-09-15 DIAGNOSIS — Z79.4 TYPE 2 DIABETES MELLITUS WITH HYPERGLYCEMIA, WITH LONG-TERM CURRENT USE OF INSULIN: Chronic | ICD-10-CM

## 2023-09-15 DIAGNOSIS — M17.11 PRIMARY OSTEOARTHRITIS OF RIGHT KNEE: Primary | Chronic | ICD-10-CM

## 2023-09-15 NOTE — PROGRESS NOTES
"Chief Complaint -knee pain    History of Present Illness -     Monisha Gardner is a 63 y.o. female.     Knee pain-  Patient complains of moderate to severe achy right knee pain.  Pain is secondary to acute exacerbation of osteoarthritis.  She denies any falls or recent injury.  Minimal relief with anti-inflammatory medication or Tylenol.    Chronic kidney disease-  Not at goal as most recent GFR was 40.  Patient has discontinued metformin a week ago.  She continues to use Mounjaro.    Hypertension  Controlled with amlodipine and losartan    Diabetes mellitus type 2-  Stable with Mounjaro and dietary modification.  We discussed the potential side effect of Mounjaro for renal insufficiency.    The following portions of the patient's history were reviewed and updated as appropriate: allergies, current medications, past family history, past medical history, past social history, past surgical history and problem list.        Objective  Vital signs:  /72   Pulse 86   Temp 97.5 °F (36.4 °C) (Temporal)   Ht 162.6 cm (64\")   Wt 73 kg (161 lb)   LMP  (LMP Unknown)   SpO2 97%   BMI 27.64 kg/m²     Physical Exam  Vitals and nursing note reviewed.   Constitutional:       Appearance: Normal appearance. She is well-developed.   Eyes:      Extraocular Movements: Extraocular movements intact.      Conjunctiva/sclera: Conjunctivae normal.   Cardiovascular:      Rate and Rhythm: Normal rate and regular rhythm.      Heart sounds: Normal heart sounds. No murmur heard.  Pulmonary:      Effort: Pulmonary effort is normal. No respiratory distress.      Breath sounds: Normal breath sounds. No wheezing.   Musculoskeletal:         General: Tenderness present.      Comments: Right knee crepitus and tenderness noted   Skin:     General: Skin is warm and dry.      Findings: No rash.   Neurological:      Mental Status: She is alert and oriented to person, place, and time.   Psychiatric:         Mood and Affect: Mood normal.        "  Behavior: Behavior normal.         Thought Content: Thought content normal.       The following data was reviewed by Kimmy Vela PA-C:         Lab Results   Component Value Date    BUN 23 09/08/2023    CREATININE 1.47 (H) 09/08/2023    EGFR 40.2 (L) 09/08/2023    ALT 18 09/08/2023    AST 25 09/08/2023    WBC 8.94 04/07/2023    HGB 13.9 04/07/2023    HCT 42.0 04/07/2023     04/07/2023    CHOL 102 08/11/2023    TRIG 196 (H) 08/11/2023    HDL 42 08/11/2023    LDL 29 08/11/2023    TSH 0.838 04/07/2023    HGBA1C 6.60 (H) 04/07/2023           Assessment & Plan     Diagnoses and all orders for this visit:    1. Primary osteoarthritis of right knee (Primary)  Comments:  Right knee intra-articular injection performed today    2. Stage 3b chronic kidney disease  Comments:  Discontinue metformin and Mounjaro  Recheck labs in 1 month  Refer to nephrology  Orders:  -     Ambulatory Referral to Nephrology  -     Comprehensive Metabolic Panel; Future    3. Essential hypertension  Comments:  Continue amlodipine and losartan    4. Type 2 diabetes mellitus with hyperglycemia, with long-term current use of insulin  Comments:  Discontinue metformin and Mounjaro secondary to renal insufficiency  Advised a low carbohydrate diabetic diet      Procedure: Right knee intra-articular injection  Provider: Kimmy Vela PA-C  Indication: Right knee pain secondary to osteoarthritis  Timeout was taken to verify correct patient procedure and location  Description: The right knee was prepped and draped in sterile fashion.  An intra-articular injection was given using 3 cc 1% lidocaine, 1 cc of Depo-Medrol and 1 cc of triamcinolone.  Pressure was held and a sterile dressing was placed.  No complications  Estimated blood loss: Minimal  Patient tolerated the procedure well    Lidocaine 1% 10 mg/mL  NDC number 01417-729-58  Lot #[id number]  Expiration 2/26    Depo-Medrol 80 mg/mL  NDC number 009-0306-02  Lot #[id number]  Expiration  5/2025    Triamcinolone 40 mg/mL  NDC #0003-029 3-0 5  Lot #[id number]  Expiration June 2024               Patient was given instructions and counseling regarding his condition or for health maintenance advice. Please see specific information pulled into the AVS if appropriate      This document has been electronically signed by:  Kimmy Vela PA-C

## 2023-09-26 ENCOUNTER — TELEPHONE (OUTPATIENT)
Dept: FAMILY MEDICINE CLINIC | Facility: CLINIC | Age: 63
End: 2023-09-26

## 2023-09-26 NOTE — TELEPHONE ENCOUNTER
I placed a referral to nephrology on 9/15/2023.  Please check on this referral and let the patient know when it is scheduled.

## 2023-09-26 NOTE — TELEPHONE ENCOUNTER
Caller: Monisha Gardner    Relationship to patient: Self    Best call back number: [phone]     PATIENT STATES THAT SHE WAS TO HAVE A REFERRAL TO A KIDNEY SPECIALIST A ND SHE HAS NOT RECEIVE ANY INFORMATION.

## 2023-09-27 NOTE — TELEPHONE ENCOUNTER
Patient was scheduled for an appointment this week with local provider but stated she did not want to see anyone in that office. Sent referral to [facility]. Called their office and referral  was not available, left message requesting call back on the status of this referral

## 2023-10-06 ENCOUNTER — LAB (OUTPATIENT)
Dept: LAB | Facility: HOSPITAL | Age: 63
End: 2023-10-06
Payer: COMMERCIAL

## 2023-10-06 ENCOUNTER — TRANSCRIBE ORDERS (OUTPATIENT)
Dept: GENERAL RADIOLOGY | Facility: HOSPITAL | Age: 63
End: 2023-10-06
Payer: COMMERCIAL

## 2023-10-06 ENCOUNTER — TELEPHONE (OUTPATIENT)
Dept: FAMILY MEDICINE CLINIC | Facility: CLINIC | Age: 63
End: 2023-10-06

## 2023-10-06 DIAGNOSIS — N28.9 ABNORMAL RENAL FUNCTION: ICD-10-CM

## 2023-10-06 DIAGNOSIS — N28.9 ABNORMAL RENAL FUNCTION: Primary | ICD-10-CM

## 2023-10-06 PROCEDURE — 82570 ASSAY OF URINE CREATININE: CPT

## 2023-10-06 PROCEDURE — 81001 URINALYSIS AUTO W/SCOPE: CPT

## 2023-10-06 PROCEDURE — 80069 RENAL FUNCTION PANEL: CPT

## 2023-10-06 PROCEDURE — 85025 COMPLETE CBC W/AUTO DIFF WBC: CPT

## 2023-10-06 RX ORDER — SUCRALFATE 1 G/1
1 TABLET ORAL 4 TIMES DAILY
Qty: 120 TABLET | Refills: 0 | Status: SHIPPED | OUTPATIENT
Start: 2023-10-06

## 2023-10-06 NOTE — TELEPHONE ENCOUNTER
Caller: Monisha Gardner    Relationship: Self    Best call back number: [phone]    What medication are you requesting: SOMETHING FOR SEVERE HEART BURN    What are your current symptoms: HEART BURN    How long have you been experiencing symptoms: 2 WEEKS    Have you had these symptoms before:    [x] Yes  [] No    Have you been treated for these symptoms before:   [x] Yes  [] No    If a prescription is needed, what is your preferred pharmacy and phone number:    [facility]  [phone]  Additional notes:    THE PATIENT WOULD LIKE TO GET THIS TODAY.    
Spoke with patient and she is in understanding.   
none

## 2023-10-07 LAB
ALBUMIN SERPL-MCNC: 4.3 G/DL (ref 3.5–5.2)
ANION GAP SERPL CALCULATED.3IONS-SCNC: 12.2 MMOL/L (ref 5–15)
BACTERIA UR QL AUTO: NORMAL /HPF
BASOPHILS # BLD AUTO: 0.07 10*3/MM3 (ref 0–0.2)
BASOPHILS NFR BLD AUTO: 1 % (ref 0–1.5)
BILIRUB UR QL STRIP: NEGATIVE
BUN SERPL-MCNC: 18 MG/DL (ref 8–23)
BUN/CREAT SERPL: 11.9 (ref 7–25)
CALCIUM SPEC-SCNC: 10.1 MG/DL (ref 8.6–10.5)
CHLORIDE SERPL-SCNC: 108 MMOL/L (ref 98–107)
CLARITY UR: CLEAR
CO2 SERPL-SCNC: 22.8 MMOL/L (ref 22–29)
COLOR UR: YELLOW
CREAT SERPL-MCNC: 1.51 MG/DL (ref 0.57–1)
CREAT UR-MCNC: 110.3 MG/DL
DEPRECATED RDW RBC AUTO: 42.1 FL (ref 37–54)
EGFRCR SERPLBLD CKD-EPI 2021: 38.9 ML/MIN/1.73
EOSINOPHIL # BLD AUTO: 0.21 10*3/MM3 (ref 0–0.4)
EOSINOPHIL NFR BLD AUTO: 2.9 % (ref 0.3–6.2)
ERYTHROCYTE [DISTWIDTH] IN BLOOD BY AUTOMATED COUNT: 14.4 % (ref 12.3–15.4)
GLUCOSE SERPL-MCNC: 80 MG/DL (ref 65–99)
GLUCOSE UR STRIP-MCNC: NEGATIVE MG/DL
HCT VFR BLD AUTO: 34.4 % (ref 34–46.6)
HGB BLD-MCNC: 11.2 G/DL (ref 12–15.9)
HGB UR QL STRIP.AUTO: NEGATIVE
HYALINE CASTS UR QL AUTO: NORMAL /LPF
IMM GRANULOCYTES # BLD AUTO: 0.03 10*3/MM3 (ref 0–0.05)
IMM GRANULOCYTES NFR BLD AUTO: 0.4 % (ref 0–0.5)
KETONES UR QL STRIP: NEGATIVE
LEUKOCYTE ESTERASE UR QL STRIP.AUTO: ABNORMAL
LYMPHOCYTES # BLD AUTO: 2.76 10*3/MM3 (ref 0.7–3.1)
LYMPHOCYTES NFR BLD AUTO: 38.5 % (ref 19.6–45.3)
MCH RBC QN AUTO: 26.5 PG (ref 26.6–33)
MCHC RBC AUTO-ENTMCNC: 32.6 G/DL (ref 31.5–35.7)
MCV RBC AUTO: 81.5 FL (ref 79–97)
MONOCYTES # BLD AUTO: 0.35 10*3/MM3 (ref 0.1–0.9)
MONOCYTES NFR BLD AUTO: 4.9 % (ref 5–12)
NEUTROPHILS NFR BLD AUTO: 3.75 10*3/MM3 (ref 1.7–7)
NEUTROPHILS NFR BLD AUTO: 52.3 % (ref 42.7–76)
NITRITE UR QL STRIP: NEGATIVE
NRBC BLD AUTO-RTO: 0 /100 WBC (ref 0–0.2)
PH UR STRIP.AUTO: 5.5 [PH] (ref 5–8)
PHOSPHATE SERPL-MCNC: 3.2 MG/DL (ref 2.5–4.5)
PLATELET # BLD AUTO: 452 10*3/MM3 (ref 140–450)
PMV BLD AUTO: 9.5 FL (ref 6–12)
POTASSIUM SERPL-SCNC: 4.5 MMOL/L (ref 3.5–5.2)
PROT UR QL STRIP: NEGATIVE
RBC # BLD AUTO: 4.22 10*6/MM3 (ref 3.77–5.28)
RBC # UR STRIP: NORMAL /HPF
REF LAB TEST METHOD: NORMAL
SODIUM SERPL-SCNC: 143 MMOL/L (ref 136–145)
SP GR UR STRIP: 1.01 (ref 1–1.03)
SQUAMOUS #/AREA URNS HPF: NORMAL /HPF
UROBILINOGEN UR QL STRIP: ABNORMAL
WBC # UR STRIP: NORMAL /HPF
WBC NRBC COR # BLD: 7.17 10*3/MM3 (ref 3.4–10.8)

## 2023-10-11 ENCOUNTER — TRANSCRIBE ORDERS (OUTPATIENT)
Dept: ADMINISTRATIVE | Facility: HOSPITAL | Age: 63
End: 2023-10-11
Payer: COMMERCIAL

## 2023-10-11 DIAGNOSIS — I10 ESSENTIAL HYPERTENSION: Primary | ICD-10-CM

## 2023-10-13 ENCOUNTER — OFFICE VISIT (OUTPATIENT)
Dept: FAMILY MEDICINE CLINIC | Facility: CLINIC | Age: 63
End: 2023-10-13
Payer: COMMERCIAL

## 2023-10-13 VITALS
OXYGEN SATURATION: 97 % | BODY MASS INDEX: 27.49 KG/M2 | HEIGHT: 64 IN | HEART RATE: 73 BPM | TEMPERATURE: 97.6 F | WEIGHT: 161 LBS | DIASTOLIC BLOOD PRESSURE: 70 MMHG | SYSTOLIC BLOOD PRESSURE: 116 MMHG

## 2023-10-13 DIAGNOSIS — I10 ESSENTIAL HYPERTENSION: Chronic | ICD-10-CM

## 2023-10-13 DIAGNOSIS — K21.9 GASTROESOPHAGEAL REFLUX DISEASE WITHOUT ESOPHAGITIS: Primary | ICD-10-CM

## 2023-10-13 DIAGNOSIS — E11.40 TYPE 2 DIABETES MELLITUS WITH DIABETIC NEUROPATHY, WITH LONG-TERM CURRENT USE OF INSULIN: Chronic | ICD-10-CM

## 2023-10-13 DIAGNOSIS — E11.43 TYPE 2 DIABETES MELLITUS WITH DIABETIC AUTONOMIC NEUROPATHY, WITH LONG-TERM CURRENT USE OF INSULIN: Chronic | ICD-10-CM

## 2023-10-13 DIAGNOSIS — Z79.4 TYPE 2 DIABETES MELLITUS WITH DIABETIC AUTONOMIC NEUROPATHY, WITH LONG-TERM CURRENT USE OF INSULIN: Chronic | ICD-10-CM

## 2023-10-13 DIAGNOSIS — R63.2 INCREASED APPETITE: ICD-10-CM

## 2023-10-13 DIAGNOSIS — Z79.4 TYPE 2 DIABETES MELLITUS WITH DIABETIC NEUROPATHY, WITH LONG-TERM CURRENT USE OF INSULIN: Chronic | ICD-10-CM

## 2023-10-13 LAB — HBA1C MFR BLD: 6 % (ref 4.5–5.7)

## 2023-10-13 RX ORDER — DEXLANSOPRAZOLE 60 MG/1
60 CAPSULE, DELAYED RELEASE ORAL DAILY
Qty: 90 CAPSULE | Refills: 3 | Status: SHIPPED | OUTPATIENT
Start: 2023-10-13

## 2023-10-13 RX ORDER — TOPIRAMATE 25 MG/1
25 TABLET ORAL SEE ADMIN INSTRUCTIONS
Qty: 60 TABLET | Refills: 1 | Status: SHIPPED | OUTPATIENT
Start: 2023-10-13

## 2023-10-13 RX ORDER — GABAPENTIN 600 MG/1
600 TABLET ORAL 2 TIMES DAILY
Qty: 60 TABLET | Refills: 2 | Status: SHIPPED | OUTPATIENT
Start: 2023-10-13

## 2023-10-13 NOTE — PATIENT INSTRUCTIONS
Carbohydrate Counting for Diabetes Mellitus, Adult  Carbohydrate counting is a method of keeping track of how many carbohydrates you eat. Eating carbohydrates increases the amount of sugar (glucose) in the blood. Counting how many carbohydrates you eat improves how well you manage your blood glucose. This, in turn, helps you manage your diabetes.  Carbohydrates are measured in grams (g) per serving. It is important to know how many carbohydrates (in grams or by serving size) you can have in each meal. This is different for every person. A dietitian can help you make a meal plan and calculate how many carbohydrates you should have at each meal and snack.  What foods contain carbohydrates?  Carbohydrates are found in the following foods:  Grains, such as breads and cereals.  Dried beans and soy products.  Starchy vegetables, such as potatoes, peas, and corn.  Fruit and fruit juices.  Milk and yogurt.  Sweets and snack foods, such as cake, cookies, candy, chips, and soft drinks.  How do I count carbohydrates in foods?  There are two ways to count carbohydrates in food. You can read food labels or learn standard serving sizes of foods. You can use either of these methods or a combination of both.  Using the Nutrition Facts label  The Nutrition Facts list is included on the labels of almost all packaged foods and beverages in the [place]. It includes:  The serving size.  Information about nutrients in each serving, including the grams of carbohydrate per serving.  To use the Nutrition Facts, decide how many servings you will have. Then, multiply the number of servings by the number of carbohydrates per serving. The resulting number is the total grams of carbohydrates that you will be having.  Learning the standard serving sizes of foods  When you eat carbohydrate foods that are not packaged or do not include Nutrition Facts on the label, you need to measure the servings in order to count the grams of  carbohydrates.  Measure the foods that you will eat with a food scale or measuring cup, if needed.  Decide how many standard-size servings you will eat.  Multiply the number of servings by 15. For foods that contain carbohydrates, one serving equals 15 g of carbohydrates.  For example, if you eat 2 cups or 10 oz (300 g) of strawberries, you will have eaten 2 servings and 30 g of carbohydrates (2 servings x 15 g = 30 g).  For foods that have more than one food mixed, such as soups and casseroles, you must count the carbohydrates in each food that is included.  The following list contains standard serving sizes of common carbohydrate-rich foods. Each of these servings has about 15 g of carbohydrates:  1 slice of bread.  1 six-inch (15 cm) tortilla.  ? cup or 2 oz (53 g) cooked rice or pasta.  « cup or 3 oz (85 g) cooked or canned, drained and rinsed beans or lentils.  « cup or 3 oz (85 g) starchy vegetable, such as peas, corn, or squash.  « cup or 4 oz (120 g) hot cereal.  « cup or 3 oz (85 g) boiled or mashed potatoes, or ¬ or 3 oz (85 g) of a large baked potato.  « cup or 4 fl oz (118 mL) fruit juice.  1 cup or 8 fl oz (237 mL) milk.  1 small or 4 oz (106 g) apple.  « or 2 oz (63 g) of a medium banana.  1 cup or 5 oz (150 g) strawberries.  3 cups or 1 oz (28.3 g) popped popcorn.  What is an example of carbohydrate counting?  To calculate the grams of carbohydrates in this sample meal, follow the steps shown below.  Sample meal  3 oz (85 g) chicken breast.  ? cup or 4 oz (106 g) brown rice.  « cup or 3 oz (85 g) corn.  1 cup or 8 fl oz (237 mL) milk.  1 cup or 5 oz (150 g) strawberries with sugar-free whipped topping.  Carbohydrate calculation  Identify the foods that contain carbohydrates:  Rice.  Corn.  Milk.  Strawberries.  Calculate how many servings you have of each food:  2 servings rice.  1 serving corn.  1 serving milk.  1 serving strawberries.  Multiply each number of servings by 15  servings rice x 15  g = 30 g.  1 serving corn x 15 g = 15 g.  1 serving milk x 15 g = 15 g.  1 serving strawberries x 15 g = 15 g.  Add together all of the amounts to find the total grams of carbohydrates eaten:  30 g + 15 g + 15 g + 15 g = 75 g of carbohydrates total.  What are tips for following this plan?  Shopping  Develop a meal plan and then make a shopping list.  Buy fresh and frozen vegetables, fresh and frozen fruit, dairy, eggs, beans, lentils, and whole grains.  Look at food labels. Choose foods that have more fiber and less sugar.  Avoid processed foods and foods with added sugars.  Meal planning  Aim to have the same number of grams of carbohydrates at each meal and for each snack time.  Plan to have regular, balanced meals and snacks.  Where to find more information  American Diabetes Association: diabetes.org  Centers for Disease Control and Prevention: cdc.gov  Academy of Nutrition and Dietetics: eatright.org  Association of Diabetes Care & Education Specialists: diabeteseducator.org  Summary  Carbohydrate counting is a method of keeping track of how many carbohydrates you eat.  Eating carbohydrates increases the amount of sugar (glucose) in your blood.  Counting how many carbohydrates you eat improves how well you manage your blood glucose. This helps you manage your diabetes.  A dietitian can help you make a meal plan and calculate how many carbohydrates you should have at each meal and snack.  This information is not intended to replace advice given to you by your health care provider. Make sure you discuss any questions you have with your health care provider.  Document Revised: 07/21/2021 Document Reviewed: 07/21/2021  [employer] Patient Education c 2023 [employer]

## 2023-10-13 NOTE — PROGRESS NOTES
"Chief Complaint -heartburn    History of Present Illness -     Monisha Gardner is a 63 y.o. female.     Heartburn-  Heartburn due to gastroesophageal reflux disease is not at goal.  Patient complains of acidic taste in her mouth and moderate burning substernal pain after eating.  Minimal relief with lansoprazole, pantoprazole, Nexium, famotidine, and omeprazole.  She has had good relief with  In the past    Diabetes mellitus-  Blood glucose is controlled with diet.  Patient discontinued metformin and Mounjaro due to a decrease in renal function.    She does complain of increased appetite since stopping the Mounjaro and Ozempic.  She has had success with weight loss and has concerned that she may gain the weight back.    Diabetic peripheral neuropathy-  Not at goal with gabapentin which she mainly takes in the evenings    Hypertension-  Controlled with amlodipine, labetalol, and losartan      The following portions of the patient's history were reviewed and updated as appropriate: allergies, current medications, past family history, past medical history, past social history, past surgical history and problem list.        Objective  Vital signs:  /70   Pulse 73   Temp 97.6 øF (36.4 øC) (Temporal)   Ht 162.6 cm (64\")   Wt 73 kg (161 lb)   LMP  (LMP Unknown)   SpO2 97%   BMI 27.64 kg/mý     Physical Exam  Vitals and nursing note reviewed.   Constitutional:       Appearance: Normal appearance. She is well-developed.   Eyes:      Extraocular Movements: Extraocular movements intact.      Conjunctiva/sclera: Conjunctivae normal.   Cardiovascular:      Rate and Rhythm: Normal rate and regular rhythm.      Heart sounds: Normal heart sounds. No murmur heard.  Pulmonary:      Effort: Pulmonary effort is normal. No respiratory distress.      Breath sounds: Normal breath sounds. No wheezing.   Musculoskeletal:         General: No tenderness.   Skin:     General: Skin is warm and dry.      Findings: No rash. "   Neurological:      Mental Status: She is alert and oriented to person, place, and time.   Psychiatric:         Mood and Affect: Mood normal.         Behavior: Behavior normal.         Thought Content: Thought content normal.         The following data was reviewed by Kimmy Vela PA-C:         Lab Results   Component Value Date    BUN 18 10/06/2023    CREATININE 1.51 (H) 10/06/2023    EGFR 38.9 (L) 10/06/2023    ALT 18 09/08/2023    AST 25 09/08/2023    WBC 7.17 10/06/2023    HGB 11.2 (L) 10/06/2023    HCT 34.4 10/06/2023     (H) 10/06/2023    CHOL 102 08/11/2023    TRIG 196 (H) 08/11/2023    HDL 42 08/11/2023    LDL 29 08/11/2023    TSH 0.838 04/07/2023    HGBA1C 6.0 (A) 10/13/2023           Assessment & Plan     Diagnoses and all orders for this visit:    1. Gastroesophageal reflux disease without esophagitis (Primary)  Comments:  Failed multiple PPIs in the past as well as famotidine  We will try to PA Dexilant  Advised to avoid known trigger foods  Orders:  -     Dexilant 60 MG capsule; Take 1 capsule by mouth Daily.  Dispense: 90 capsule; Refill: 3    2. Type 2 diabetes mellitus with diabetic neuropathy, with long-term current use of insulin  Comments:  Increase gabapentin 600 mg twice daily as needed  Advise low carbohydrate diabetic diet  Orders:  -     Dexilant 60 MG capsule; Take 1 capsule by mouth Daily.  Dispense: 90 capsule; Refill: 3  -     gabapentin (NEURONTIN) 600 MG tablet; Take 1 tablet by mouth 2 (Two) Times a Day.  Dispense: 60 tablet; Refill: 2  -     POCT glycated hemoglobin, total    3. Increased appetite  Comments:  Start Topamax to help with appetite suppression and maintain weight loss  Orders:  -     topiramate (TOPAMAX) 25 MG tablet; Take 1 tablet by mouth See Admin Instructions. 1 tab qhs x 2 weeks then 1 tab bid  Dispense: 60 tablet; Refill: 1    4. Type 2 diabetes mellitus with diabetic autonomic neuropathy, with long-term current use of insulin  Comments:  Stop metformin  secondary to renal function  Continue Lantus and Mounjaro  Advised low carbohydrate diet    5. Essential hypertension  Comments:  Continue amlodipine labetalol and losartan  Advise daily BP monitoring                   Patient was given instructions and counseling regarding his condition or for health maintenance advice. Please see specific information pulled into the AVS if appropriate      This document has been electronically signed by:  Kimmy Vela PA-C

## 2023-10-16 ENCOUNTER — PRIOR AUTHORIZATION (OUTPATIENT)
Dept: FAMILY MEDICINE CLINIC | Facility: CLINIC | Age: 63
End: 2023-10-16
Payer: COMMERCIAL

## 2023-10-16 RX ORDER — SUCRALFATE 1 G/1
1 TABLET ORAL 4 TIMES DAILY
Qty: 120 TABLET | Refills: 0 | Status: SHIPPED | OUTPATIENT
Start: 2023-10-16

## 2023-10-16 NOTE — TELEPHONE ENCOUNTER
PA started for Dexilant 60MG dr capsules. PA cannot be ran through covermymeds. I spoke with patient and she said she spoke with her insurance and it's not on the formulary and she will need something else called in.

## 2023-10-23 ENCOUNTER — TELEPHONE (OUTPATIENT)
Dept: FAMILY MEDICINE CLINIC | Facility: CLINIC | Age: 63
End: 2023-10-23
Payer: COMMERCIAL

## 2023-10-23 RX ORDER — PANTOPRAZOLE SODIUM 40 MG/1
40 TABLET, DELAYED RELEASE ORAL DAILY
Qty: 30 TABLET | Refills: 5 | Status: SHIPPED | OUTPATIENT
Start: 2023-10-23

## 2023-10-26 ENCOUNTER — TELEPHONE (OUTPATIENT)
Dept: FAMILY MEDICINE CLINIC | Facility: CLINIC | Age: 63
End: 2023-10-26
Payer: COMMERCIAL

## 2023-10-26 NOTE — TELEPHONE ENCOUNTER
Spoke with patient and she is in understanding.    ----- Message from ANH Medina sent at 10/26/2023  8:59 AM EDT -----  No she can stop the carafate     ----- Message -----  From: Angelia Long MA  Sent: 10/23/2023  12:51 PM EDT  To: ANH Medina    She wants to know does she continue the carafate with the pantoprazole?  ----- Message -----  From: Kimmy Vela PA  Sent: 10/23/2023  12:44 PM EDT  To: Angelia Long MA    Inform the patient that I sent a prescription to start pantoprazole since Dexilant is not on her insurance formulary    ----- Message -----  From: Angelia Long MA  Sent: 10/23/2023  11:47 AM EDT  To: ANH Medina    I spoke with her and she said she spoke with her insurance and they told her the Dexilant is not on the formulary. She said she will need something else called in.

## 2023-11-03 RX ORDER — METOPROLOL TARTRATE 100 MG/1
100 TABLET ORAL 2 TIMES DAILY
Qty: 180 TABLET | OUTPATIENT
Start: 2023-11-03

## 2023-12-01 ENCOUNTER — OFFICE VISIT (OUTPATIENT)
Dept: FAMILY MEDICINE CLINIC | Facility: CLINIC | Age: 63
End: 2023-12-01
Payer: COMMERCIAL

## 2023-12-01 VITALS
BODY MASS INDEX: 28.17 KG/M2 | OXYGEN SATURATION: 97 % | DIASTOLIC BLOOD PRESSURE: 70 MMHG | WEIGHT: 165 LBS | SYSTOLIC BLOOD PRESSURE: 126 MMHG | HEIGHT: 64 IN | TEMPERATURE: 97.4 F | HEART RATE: 58 BPM

## 2023-12-01 DIAGNOSIS — M89.8X9 BONE MASS: Primary | Chronic | ICD-10-CM

## 2023-12-01 DIAGNOSIS — E11.65 TYPE 2 DIABETES MELLITUS WITH HYPERGLYCEMIA, WITH LONG-TERM CURRENT USE OF INSULIN: Chronic | ICD-10-CM

## 2023-12-01 DIAGNOSIS — K21.00 GASTROESOPHAGEAL REFLUX DISEASE WITH ESOPHAGITIS WITHOUT HEMORRHAGE: Chronic | ICD-10-CM

## 2023-12-01 DIAGNOSIS — Z79.4 TYPE 2 DIABETES MELLITUS WITH HYPERGLYCEMIA, WITH LONG-TERM CURRENT USE OF INSULIN: Chronic | ICD-10-CM

## 2023-12-01 DIAGNOSIS — I10 ESSENTIAL HYPERTENSION: Chronic | ICD-10-CM

## 2023-12-01 PROCEDURE — 99214 OFFICE O/P EST MOD 30 MIN: CPT | Performed by: PHYSICIAN ASSISTANT

## 2023-12-01 RX ORDER — PANTOPRAZOLE SODIUM 40 MG/1
40 TABLET, DELAYED RELEASE ORAL DAILY
Qty: 90 TABLET | Refills: 3 | Status: SHIPPED | OUTPATIENT
Start: 2023-12-01

## 2023-12-01 RX ORDER — INSULIN GLARGINE 100 [IU]/ML
45 INJECTION, SOLUTION SUBCUTANEOUS DAILY
Qty: 15 ML | Refills: 5 | Status: SHIPPED | OUTPATIENT
Start: 2023-12-01

## 2023-12-01 RX ORDER — ACYCLOVIR 800 MG/1
800 TABLET ORAL 3 TIMES DAILY
Qty: 6 TABLET | Refills: 0 | Status: SHIPPED | OUTPATIENT
Start: 2023-12-01

## 2023-12-04 PROBLEM — M89.8X9 BONE MASS: Chronic | Status: ACTIVE | Noted: 2023-12-04

## 2023-12-04 NOTE — PROGRESS NOTES
"Chief Complaint -mandibular mass    History of Present Illness -     Monisha Gardner is a 63 y.o. female.     Mandibular mass-  Patient states that she went to the dentist for tooth pain which had onset of several months.  X-rays revealed a mandibular mass.  She was referred to Dr. Salvador Bonner, maxillofacial surgeon at [facility].  She is scheduled for biopsy next week.    Hypertension-  Controlled with amlodipine, labetalol, and losartan    Diabetes mellitus-  Stable with low carbohydrate diet and Lantus    Gastroesophageal reflux disease-  Stable with pantoprazole and dietary modification      The following portions of the patient's history were reviewed and updated as appropriate: allergies, current medications, past family history, past medical history, past social history, past surgical history and problem list.        Objective  Vital signs:  /70   Pulse 58   Temp 97.4 °F (36.3 °C) (Temporal)   Ht 162.6 cm (64\")   Wt 74.8 kg (165 lb)   LMP  (LMP Unknown)   SpO2 97%   BMI 28.32 kg/m²     Physical Exam  Vitals and nursing note reviewed.   Constitutional:       Appearance: Normal appearance. She is well-developed.   Eyes:      Extraocular Movements: Extraocular movements intact.      Conjunctiva/sclera: Conjunctivae normal.   Cardiovascular:      Rate and Rhythm: Normal rate and regular rhythm.      Heart sounds: Normal heart sounds. No murmur heard.  Pulmonary:      Effort: Pulmonary effort is normal. No respiratory distress.      Breath sounds: Normal breath sounds. No wheezing.   Musculoskeletal:         General: No tenderness.   Skin:     General: Skin is warm and dry.      Findings: No rash.   Neurological:      Mental Status: She is alert and oriented to person, place, and time.   Psychiatric:         Mood and Affect: Mood normal.         Behavior: Behavior normal.         Thought Content: Thought content normal.         The following data was reviewed by Kimmy Vela PA-C:         Lab " Results   Component Value Date    BUN 18 10/06/2023    CREATININE 1.51 (H) 10/06/2023    EGFR 38.9 (L) 10/06/2023    ALT 18 09/08/2023    AST 25 09/08/2023    WBC 7.17 10/06/2023    HGB 11.2 (L) 10/06/2023    HCT 34.4 10/06/2023     (H) 10/06/2023    CHOL 102 08/11/2023    TRIG 196 (H) 08/11/2023    HDL 42 08/11/2023    LDL 29 08/11/2023    TSH 0.838 04/07/2023    HGBA1C 6.0 (A) 10/13/2023           Assessment & Plan     Diagnoses and all orders for this visit:    1. Bone mass (Primary)  Comments:  Mandibular mass  Advised patient to keep follow-up with maxillofacial surgeon Dr. Salvador Bonner at [facility]    2. Essential hypertension  Comments:  Continue labetalol, losartan, and amlodipine  Advise daily BP and pulse monitoring    3. Type 2 diabetes mellitus with hyperglycemia, with long-term current use of insulin  Comments:  Continue Lantus  Advised a low carbohydrate diabetic diet  Orders:  -     Lantus SoloStar 100 UNIT/ML injection pen; Inject 45 Units under the skin into the appropriate area as directed Daily.  Dispense: 15 mL; Refill: 5    4. Gastroesophageal reflux disease with esophagitis without hemorrhage  Comments:  Continue pantoprazole  Advised to avoid known trigger foods  Orders:  -     pantoprazole (PROTONIX) 40 MG EC tablet; Take 1 tablet by mouth Daily.  Dispense: 90 tablet; Refill: 3    Other orders  -     acyclovir (ZOVIRAX) 800 MG tablet; Take 1 tablet by mouth 3 (Three) Times a Day.  Dispense: 6 tablet; Refill: 0                   Patient was given instructions and counseling regarding his condition or for health maintenance advice. Please see specific information pulled into the AVS if appropriate      This document has been electronically signed by:  Kimmy Vela PA-C

## 2023-12-08 ENCOUNTER — TELEPHONE (OUTPATIENT)
Dept: FAMILY MEDICINE CLINIC | Facility: CLINIC | Age: 63
End: 2023-12-08

## 2023-12-08 RX ORDER — CEFDINIR 300 MG/1
600 CAPSULE ORAL DAILY
Qty: 20 CAPSULE | Refills: 0 | Status: SHIPPED | OUTPATIENT
Start: 2023-12-08 | End: 2023-12-18

## 2023-12-08 NOTE — TELEPHONE ENCOUNTER
LAVONNEI-  I called the patient and she does need a preventative antibiotic before any dental procedures because she has had hip replacement.  She has tolerated Omnicef in the past even though she is allergic to penicillin.  I sent in Omnicef for this reason.  Patient is aware.

## 2023-12-08 NOTE — TELEPHONE ENCOUNTER
Caller: Monisha Gardner    Relationship: Self    Best call back number: [phone]     What medication are you requesting: ANTIBIOTIC    What are your current symptoms: PATIENT IS HAVING DENTAL SURGERY DEC 14    How long have you been experiencing symptoms: 2 WEEKS AGO     Have you had these symptoms before:    [x] Yes  [] No    Have you been treated for these symptoms before:   [x] Yes  [] No    If a prescription is needed, what is your preferred pharmacy and phone number:    [facility]. [phone]

## 2023-12-14 DIAGNOSIS — I25.10 ASCVD (ARTERIOSCLEROTIC CARDIOVASCULAR DISEASE): Primary | ICD-10-CM

## 2023-12-21 RX ORDER — TOPIRAMATE 25 MG/1
TABLET ORAL
Qty: 60 TABLET | Refills: 2 | Status: SHIPPED | OUTPATIENT
Start: 2023-12-21

## 2024-01-05 ENCOUNTER — OFFICE VISIT (OUTPATIENT)
Dept: FAMILY MEDICINE CLINIC | Facility: CLINIC | Age: 64
End: 2024-01-05
Payer: COMMERCIAL

## 2024-01-05 VITALS
TEMPERATURE: 97.7 F | HEIGHT: 64 IN | HEART RATE: 61 BPM | WEIGHT: 163 LBS | SYSTOLIC BLOOD PRESSURE: 126 MMHG | DIASTOLIC BLOOD PRESSURE: 70 MMHG | OXYGEN SATURATION: 98 % | BODY MASS INDEX: 27.83 KG/M2

## 2024-01-05 DIAGNOSIS — F41.9 ANXIETY: Primary | Chronic | ICD-10-CM

## 2024-01-05 DIAGNOSIS — E78.2 MIXED HYPERLIPIDEMIA: Chronic | ICD-10-CM

## 2024-01-05 DIAGNOSIS — R22.0 MASS OF MANDIBLE: Chronic | ICD-10-CM

## 2024-01-05 DIAGNOSIS — Z79.899 LONG TERM CURRENT USE OF THERAPEUTIC DRUG: ICD-10-CM

## 2024-01-05 DIAGNOSIS — I10 ESSENTIAL HYPERTENSION: Chronic | ICD-10-CM

## 2024-01-05 PROCEDURE — 99214 OFFICE O/P EST MOD 30 MIN: CPT | Performed by: PHYSICIAN ASSISTANT

## 2024-01-05 RX ORDER — HYDROXYZINE PAMOATE 50 MG/1
50 CAPSULE ORAL 3 TIMES DAILY PRN
Qty: 270 CAPSULE | Refills: 1 | Status: SHIPPED | OUTPATIENT
Start: 2024-01-05

## 2024-01-05 NOTE — PROGRESS NOTES
"Chief Complaint -anxiety    History of Present Illness -     Monisha Gardner is a 63 y.o. female.     Anxiety-  Patient reports increased anxiety, Vraylar, and nervousness as she has gotten the specifics regarding her upcoming surgery for left mandibular mass.  She went to [facility] yesterday and states that there are 6 surgeons going to be involved in the surgery later this month.  Her bottom teeth will be excised and the mass removed with bone graft from another area placed back into the mandible.  She is very nervous and anxious about this surgery and losing her teeth.  She denies any hallucinations, SI or HI    Mandibular mass-  Patient has a mass in the left mandible that has put pressure onto the nerve causing numbness in her lower chin and lip.  Not at goal and requiring excision.  She has been followed by [facility] and plans to have surgery later in January    Hypertension-  Stable with amlodipine and losartan    Hyperlipidemia-  Stable with fenofibrate and Repatha along with low-cholesterol diet      The following portions of the patient's history were reviewed and updated as appropriate: allergies, current medications, past family history, past medical history, past social history, past surgical history and problem list.        Objective  Vital signs:  /70   Pulse 61   Temp 97.7 °F (36.5 °C) (Temporal)   Ht 162.6 cm (64\")   Wt 73.9 kg (163 lb)   LMP  (LMP Unknown)   SpO2 98%   BMI 27.98 kg/m²     Physical Exam  Vitals and nursing note reviewed.   Constitutional:       Appearance: Normal appearance. She is well-developed.   Eyes:      Extraocular Movements: Extraocular movements intact.      Conjunctiva/sclera: Conjunctivae normal.   Cardiovascular:      Rate and Rhythm: Normal rate and regular rhythm.      Heart sounds: Normal heart sounds. No murmur heard.  Pulmonary:      Effort: Pulmonary effort is normal. No respiratory distress.      Breath sounds: Normal breath sounds. No " wheezing.   Musculoskeletal:         General: No tenderness.   Skin:     General: Skin is warm and dry.      Findings: No rash.   Neurological:      Mental Status: She is alert and oriented to person, place, and time.   Psychiatric:         Mood and Affect: Mood normal.         Behavior: Behavior normal.         Thought Content: Thought content normal.         The following data was reviewed by Kimmy Vela PA-C:         Lab Results   Component Value Date    BUN 18 10/06/2023    CREATININE 1.51 (H) 10/06/2023    EGFR 38.9 (L) 10/06/2023    ALT 18 09/08/2023    AST 25 09/08/2023    WBC 7.17 10/06/2023    HGB 11.2 (L) 10/06/2023    HCT 34.4 10/06/2023     (H) 10/06/2023    CHOL 102 08/11/2023    TRIG 196 (H) 08/11/2023    HDL 42 08/11/2023    LDL 29 08/11/2023    TSH 0.838 04/07/2023    HGBA1C 6.0 (A) 10/13/2023           Assessment & Plan     Diagnoses and all orders for this visit:    1. Anxiety (Primary)  Comments:  Continue Cymbalta and Vraylar  Start Vistaril as needed  Advised patient to contact [employer] members, family, or friends for support  Orders:  -     hydrOXYzine pamoate (VISTARIL) 50 MG capsule; Take 1 capsule by mouth 3 (Three) Times a Day As Needed for Itching.  Dispense: 270 capsule; Refill: 1    2. Long term current use of therapeutic drug  -     Urine Drug Screen - Urine, Clean Catch; Future    3. Mass of mandible  Comments:  Benign left mass mandible  Continue to pursue excision and reconstruction through surgery at [facility]    4. Essential hypertension  Comments:  Continue amlodipine and losartan  Advise daily BP and pulse monitoring    5. Mixed hyperlipidemia  Comments:  Continue fenofibrate and Repatha  Advised low-cholesterol diet                   Patient was given instructions and counseling regarding his condition or for health maintenance advice. Please see specific information pulled into the AVS if appropriate      This document has been electronically signed by:   Kimmy Vela PA-C

## 2024-01-19 ENCOUNTER — TELEPHONE (OUTPATIENT)
Dept: FAMILY MEDICINE CLINIC | Facility: CLINIC | Age: 64
End: 2024-01-19
Payer: COMMERCIAL

## 2024-01-19 DIAGNOSIS — I10 ESSENTIAL HYPERTENSION: Chronic | ICD-10-CM

## 2024-01-19 RX ORDER — METOPROLOL TARTRATE 100 MG/1
100 TABLET ORAL 2 TIMES DAILY
Qty: 180 TABLET | OUTPATIENT
Start: 2024-01-19

## 2024-01-19 RX ORDER — LOSARTAN POTASSIUM 100 MG/1
100 TABLET ORAL DAILY
Qty: 90 TABLET | Refills: 3 | Status: SHIPPED | OUTPATIENT
Start: 2024-01-19

## 2024-01-19 NOTE — TELEPHONE ENCOUNTER
Can you clarify that medication for accuracy?  Medication list states she is taking labetalol but this refill request is for metoprolol.

## 2024-01-22 RX ORDER — METOPROLOL TARTRATE 100 MG/1
100 TABLET ORAL 2 TIMES DAILY
Qty: 180 TABLET | Refills: 3 | Status: SHIPPED | OUTPATIENT
Start: 2024-01-22

## 2024-01-23 RX ORDER — AMLODIPINE BESYLATE 2.5 MG/1
2.5 TABLET ORAL DAILY
Qty: 30 TABLET | Refills: 3 | Status: SHIPPED | OUTPATIENT
Start: 2024-01-23

## 2024-01-23 NOTE — TELEPHONE ENCOUNTER
Caller: Monisha Gardner    Relationship: Self    Best call back number: [phone]     Requested Prescriptions:   Requested Prescriptions     Pending Prescriptions Disp Refills    amLODIPine (NORVASC) 2.5 MG tablet 30 tablet 3     Sig: Take 1 tablet by mouth Daily.        Pharmacy where request should be sent: [facility][id number] [address] - [phone] PH - [phone] FX     Last office visit with prescribing clinician: 1/5/2024   Last telemedicine visit with prescribing clinician: Visit date not found   Next office visit with prescribing clinician: 3/7/2024     Additional details provided by patient: PATIENT IS OUT OF MEDICATION.  PATIENT IS HAVING SURGERY ON WEDNESDAY.  PATIENT WOULD LIKE A CALL BACK FROM ZEENAT BRITTON.     Does the patient have less than a 3 day supply:  [x] Yes  [] No    Would you like a call back once the refill request has been completed: [x] Yes [] No    If the office needs to give you a call back, can they leave a voicemail: [x] Yes [] No    Mark Chambers Rep   01/23/24 09:00 EST

## 2024-02-09 DIAGNOSIS — Z79.4 TYPE 2 DIABETES MELLITUS WITH DIABETIC NEUROPATHY, WITH LONG-TERM CURRENT USE OF INSULIN: Chronic | ICD-10-CM

## 2024-02-09 DIAGNOSIS — E11.40 TYPE 2 DIABETES MELLITUS WITH DIABETIC NEUROPATHY, WITH LONG-TERM CURRENT USE OF INSULIN: Chronic | ICD-10-CM

## 2024-02-09 RX ORDER — GABAPENTIN 600 MG/1
600 TABLET ORAL 2 TIMES DAILY
Qty: 60 TABLET | Refills: 2 | Status: SHIPPED | OUTPATIENT
Start: 2024-02-09

## 2024-02-09 RX ORDER — GABAPENTIN 300 MG/1
300 CAPSULE ORAL 3 TIMES DAILY
Qty: 270 CAPSULE | OUTPATIENT
Start: 2024-02-09

## 2024-02-09 NOTE — TELEPHONE ENCOUNTER
Caller: Monisha Gardner    Relationship: Self    Best call back number: [phone]    Requested Prescriptions:   Requested Prescriptions     Pending Prescriptions Disp Refills    gabapentin (NEURONTIN) 300 MG capsule [Pharmacy Med Name: GABAPENTIN 300MG CAPSULES] 270 capsule      Sig: TAKE 1 CAPSULE BY MOUTH THREE TIMES DAILY    gabapentin (NEURONTIN) 600 MG tablet 60 tablet 2     Sig: Take 1 tablet by mouth 2 (Two) Times a Day.    PLEASE  REFILL ANYTHING  THAT SHE WILL NEED; SHE DOES NOT HAVE HER MED LIST WITH HER      Pharmacy where request should be sent: [facility] #[id number] [address] - [phone] PH - [phone] FX     Last office visit with prescribing clinician: 1/5/2024   Last telemedicine visit with prescribing clinician: Visit date not found   Next office visit with prescribing clinician: 3/7/2024     Additional details provided by patient:     Does the patient have less than a 3 day supply:  [x] Yes  [] No    Would you like a call back once the refill request has been completed: [x] Yes [] No    If the office needs to give you a call back, can they leave a voicemail: [x] Yes [] No    Mark Duke Rep   02/09/24 13:10 EST

## 2024-02-16 ENCOUNTER — TELEPHONE (OUTPATIENT)
Dept: FAMILY MEDICINE CLINIC | Facility: CLINIC | Age: 64
End: 2024-02-16
Payer: COMMERCIAL

## 2024-02-16 DIAGNOSIS — I10 ESSENTIAL HYPERTENSION: Chronic | ICD-10-CM

## 2024-02-16 DIAGNOSIS — G25.81 RLS (RESTLESS LEGS SYNDROME): Chronic | ICD-10-CM

## 2024-02-16 RX ORDER — FLUCONAZOLE 150 MG/1
150 TABLET ORAL DAILY
Qty: 3 TABLET | Refills: 0 | Status: SHIPPED | OUTPATIENT
Start: 2024-02-16

## 2024-02-16 RX ORDER — ROPINIROLE 2 MG/1
2 TABLET, FILM COATED ORAL NIGHTLY
Qty: 90 TABLET | Refills: 3 | Status: SHIPPED | OUTPATIENT
Start: 2024-02-16

## 2024-02-16 RX ORDER — ISOSORBIDE MONONITRATE 60 MG/1
60 TABLET, EXTENDED RELEASE ORAL DAILY
Qty: 90 TABLET | Refills: 3 | Status: SHIPPED | OUTPATIENT
Start: 2024-02-16

## 2024-02-19 ENCOUNTER — TELEPHONE (OUTPATIENT)
Dept: FAMILY MEDICINE CLINIC | Facility: CLINIC | Age: 64
End: 2024-02-19
Payer: COMMERCIAL

## 2024-02-19 NOTE — TELEPHONE ENCOUNTER
----- Message from ANH Medina sent at 2/16/2024  3:32 PM EST -----  Inform patient Diflucan was sent to her pharmacy  ----- Message -----  From: Sophy Stevens MA  Sent: 2/16/2024  11:13 AM EST  To: ANH Medina    Pt has a yeast infection. She needs some diflucan sent in please.

## 2024-02-28 ENCOUNTER — TELEPHONE (OUTPATIENT)
Dept: CARDIOLOGY | Facility: CLINIC | Age: 64
End: 2024-02-28

## 2024-02-28 NOTE — TELEPHONE ENCOUNTER
Caller: Monisha Gardner    Relationship to patient: Self    Best call back number: [phone]     Chief complaint: MED CHECK     Type of visit: ROUTINE F/U     Requested date: ANYTIME     Additional notes : LAST SEEN 8/18/23 Return in about 3 months (around 11/18/2023).    STATES SHE HAS BEEN GOING THOUGH MED PROBLEMS, RECENTLY HAD A TUMOR REMOVED FROM [facility]. STATES SHE IS NEEDING INFO ON HER BLOOD PRESSURE MEDS AND WHAT SHE SHOULD BE TAKING/ HOW. STATES SHE IS ON 5 DIFF BP MEDS.

## 2024-02-29 ENCOUNTER — OFFICE VISIT (OUTPATIENT)
Dept: CARDIOLOGY | Facility: CLINIC | Age: 64
End: 2024-02-29
Payer: COMMERCIAL

## 2024-02-29 VITALS
BODY MASS INDEX: 28.71 KG/M2 | OXYGEN SATURATION: 94 % | DIASTOLIC BLOOD PRESSURE: 45 MMHG | HEART RATE: 67 BPM | SYSTOLIC BLOOD PRESSURE: 84 MMHG | WEIGHT: 168.2 LBS | HEIGHT: 64 IN

## 2024-02-29 DIAGNOSIS — Z79.4 TYPE 2 DIABETES MELLITUS WITH HYPERGLYCEMIA, WITH LONG-TERM CURRENT USE OF INSULIN: ICD-10-CM

## 2024-02-29 DIAGNOSIS — I35.0 AORTIC STENOSIS, MILD: ICD-10-CM

## 2024-02-29 DIAGNOSIS — I10 ESSENTIAL HYPERTENSION: Primary | Chronic | ICD-10-CM

## 2024-02-29 DIAGNOSIS — E11.65 TYPE 2 DIABETES MELLITUS WITH HYPERGLYCEMIA, WITH LONG-TERM CURRENT USE OF INSULIN: ICD-10-CM

## 2024-02-29 DIAGNOSIS — E78.5 DYSLIPIDEMIA: ICD-10-CM

## 2024-02-29 DIAGNOSIS — R06.81 APNEA: ICD-10-CM

## 2024-02-29 DIAGNOSIS — I25.118 CORONARY ARTERY DISEASE INVOLVING NATIVE CORONARY ARTERY OF NATIVE HEART WITH OTHER FORM OF ANGINA PECTORIS: ICD-10-CM

## 2024-02-29 PROCEDURE — 99214 OFFICE O/P EST MOD 30 MIN: CPT | Performed by: NURSE PRACTITIONER

## 2024-02-29 RX ORDER — TRAMADOL HYDROCHLORIDE 50 MG/1
TABLET ORAL
COMMUNITY
Start: 2024-01-05

## 2024-02-29 RX ORDER — ISOSORBIDE MONONITRATE 60 MG/1
30 TABLET, EXTENDED RELEASE ORAL DAILY
Start: 2024-02-29

## 2024-02-29 RX ORDER — LOSARTAN POTASSIUM 100 MG/1
50 TABLET ORAL 2 TIMES DAILY
Start: 2024-02-29

## 2024-02-29 NOTE — LETTER
March 1, 2024     ANH Medina  [address] [facility] [address]    Patient: Monisha Gardner   YOB: 1960   Date of Visit: 2/29/2024       Dear ANH Medina    Monisha Gardner was in my office today. Below is a copy of my note.    If you have questions, please do not hesitate to call me. I look forward to following Monisha along with you.         Sincerely,        TRUONG Briones        CC: No Recipients    Subjective    Monisha Gardner is a 63 y.o. female.   Chief Complaint   Patient presents with   • Hypotension     History of Present Illness   Monisha Gardner is a 63-year-old female who presents to clinic today for cardiology follow-up.    CAD currently on ASA, Imdur, Metoprolol and Repatha (statin intolerant).  She denies chest pain, dyspnea, palpitations or syncope.  Reports compliance with medicine.     Hypertension currently on losartan 100 mg daily, metoprolol 100 mg twice daily, Norvasc 2.5 mg daily.  She reports home BPs were well-controlled but over the last several weeks she has noticed BPs have been running low.  She brings in home readings today with elevations in the morning lows in midday and elevations in the evening.  She is symptomatic with her low BPs.  She has been trying to push fluids.  She has not held any medications.    Hyperlipidemia with statin intolerance.  She reports continuing on Repatha.  No recent labs available for review.  Her last LDL was not at goal. She remains on fenofibrate.      Valvular heart disease which has been stable.  She denies shortness of air orthopnea or swelling.    She reports a jaw mass and recently underwent surgery in [place].  She states things went well.  She is recovering.  Her diet is limited to soft/liquid foods however she is pushing fluids.  She also reports during hospitalization for surgery she was placed on oxygen at night due to significant desaturations.  She has not been on therapy since coming home  and is concerned she may need to continue to utilize oxygen.         Patient Active Problem List   Diagnosis   • ASCVD (arteriosclerotic cardiovascular disease)   • Essential hypertension   • Dyslipidemia   • Type 2 diabetes mellitus with hyperglycemia, with long-term current use of insulin   • Body mass index (bmi) 30.0-30.9, adult   • Osteoarthritis   • Atypical angina   • Elevated liver enzymes   • Gastroesophageal reflux disease with esophagitis   • Vitamin D deficiency   • Hx of pancreatitis   • Menopausal symptoms   • Vitamin B 12 deficiency   • Nocturnal leg cramps   • Anxiety   • Left arm weakness   • Chronic gout of multiple sites   • Squamous cell carcinoma in situ (SCCIS) of skin   • Mixed hyperlipidemia   • PVC (premature ventricular contraction)   • RLS (restless legs syndrome)   • Psychophysiological insomnia   • Dry mouth   • Type 2 diabetes mellitus with diabetic autonomic neuropathy, with long-term current use of insulin   • Non-recurrent acute serous otitis media of left ear   • Bone mass   • Mass of mandible     Past Medical History:   Diagnosis Date   • Anxiety    • Arthritis    • Diabetes mellitus    • Diverticulosis    • Gout    • Hyperlipidemia    • Hypertension    • Myocardial infarction      Past Surgical History:   Procedure Laterality Date   • APPENDECTOMY     • BREAST BIOPSY Right     benign   • CARDIAC CATHETERIZATION     •  SECTION     • CHOLECYSTECTOMY     • CORONARY STENT PLACEMENT      Stenting of the RCA with 3.5 x 26 mm Resolute drug-eluting stent   • HYSTERECTOMY     • JOINT REPLACEMENT     • TONSILLECTOMY     • TUMOR REMOVAL  02/15/2024    FACIAL TUMOR     Family History   Problem Relation Age of Onset   • Arthritis Mother    • Heart disease Mother    • Heart disease Sister    • Vision loss Daughter    • Heart disease Father    • Heart disease Sister    • Heart disease Sister    • Heart disease Sister      Social History     Tobacco Use   • Smoking status: Never      Passive exposure: Never   • Smokeless tobacco: Never   Vaping Use   • Vaping Use: Never used   Substance Use Topics   • Alcohol use: No   • Drug use: No     The following portions of the patient's history were reviewed and updated as appropriate: allergies, current medications, past family history, past medical history, past social history, past surgical history and problem list.    Allergies   Allergen Reactions   • Ciprofloxacin Anaphylaxis   • Penicillins Shortness Of Breath   • Statins Myalgia   • Fish Oil [Omega-3 Fatty Acids] Nausea And Vomiting       Current Outpatient Medications:   •  allopurinol (ZYLOPRIM) 100 MG tablet, Take 1 tablet by mouth 2 (Two) Times a Day., Disp: 180 tablet, Rfl: 3  •  aspirin 81 MG EC tablet, Take 1 tablet by mouth Daily., Disp: 100 tablet, Rfl: 3  •  Continuous Blood Gluc  (Dexcom G6 ) device, , Disp: , Rfl:   •  Continuous Blood Gluc Sensor (Dexcom G6 Sensor), Every 10 (Ten) Days., Disp: 3 each, Rfl: 5  •  Continuous Blood Gluc Transmit (Dexcom G6 Transmitter) misc, 1 each Every 30 (Thirty) Days., Disp: 1 each, Rfl: 5  •  DULoxetine (CYMBALTA) 30 MG capsule, Take 3 capsules by mouth Daily., Disp: 270 capsule, Rfl: 3  •  Easy Touch Lancets 30G/Twist misc, Use tid, Disp: 300 each, Rfl: 3  •  fenofibrate 160 MG tablet, Take 1 tablet by mouth Daily., Disp: 90 tablet, Rfl: 3  •  gabapentin (NEURONTIN) 600 MG tablet, Take 1 tablet by mouth 2 (Two) Times a Day., Disp: 60 tablet, Rfl: 2  •  hydrOXYzine pamoate (VISTARIL) 50 MG capsule, Take 1 capsule by mouth 3 (Three) Times a Day As Needed for Itching., Disp: 270 capsule, Rfl: 1  •  isosorbide mononitrate (IMDUR) 60 MG 24 hr tablet, Take 0.5 tablets by mouth Daily., Disp: , Rfl:   •  Lantus SoloStar 100 UNIT/ML injection pen, Inject 45 Units under the skin into the appropriate area as directed Daily., Disp: 15 mL, Rfl: 5  •  losartan (COZAAR) 100 MG tablet, Take 0.5 tablets by mouth 2 (Two) Times a Day., Disp: , Rfl:    •  metoprolol tartrate (LOPRESSOR) 100 MG tablet, TAKE 1 TABLET BY MOUTH TWICE DAILY, Disp: 180 tablet, Rfl: 3  •  nitroglycerin (Nitrostat) 0.4 MG SL tablet, Place 1 tablet under the tongue Every 5 (Five) Minutes As Needed for Chest Pain., Disp: 30 tablet, Rfl: 5  •  pantoprazole (PROTONIX) 40 MG EC tablet, Take 1 tablet by mouth Daily., Disp: 90 tablet, Rfl: 3  •  rOPINIRole (REQUIP) 2 MG tablet, Take 1 tablet by mouth Every Night., Disp: 90 tablet, Rfl: 3  •  traMADol (ULTRAM) 50 MG tablet, TAKE 1 TABLET BY MOUTH EVERY 8 HOURS AS NEEDED FOR 7 DAYS, Disp: , Rfl:   •  True Metrix Blood Glucose Test test strip, 1 each by Other route 3 (Three) Times a Day., Disp: 300 each, Rfl: 3  •  acyclovir (ZOVIRAX) 800 MG tablet, Take 1 tablet by mouth 3 (Three) Times a Day. (Patient not taking: Reported on 2/29/2024), Disp: 6 tablet, Rfl: 0  •  ammonium lactate (AMLACTIN) 12 % cream, , Disp: , Rfl:   •  Cariprazine HCl (Vraylar) 3 MG capsule capsule, Take 1 capsule by mouth Daily. (Patient not taking: Reported on 2/29/2024), Disp: 90 capsule, Rfl: 3  •  fluconazole (Diflucan) 150 MG tablet, Take 1 tablet by mouth Daily. (Patient not taking: Reported on 2/29/2024), Disp: 3 tablet, Rfl: 0  •  Repatha SureClick solution auto-injector SureClick injection, Inject 1 mL under the skin into the appropriate area as directed Every 14 (Fourteen) Days. (Patient not taking: Reported on 2/29/2024), Disp: 12 mL, Rfl: 3    Review of Systems   Constitutional:  Negative for activity change, appetite change, chills, diaphoresis, fatigue and fever.   HENT:  Negative for congestion, drooling, ear discharge, ear pain, mouth sores, nosebleeds, postnasal drip, rhinorrhea, sinus pressure, sneezing and sore throat.    Eyes:  Negative for pain, discharge and visual disturbance.   Respiratory:  Negative for cough, chest tightness, shortness of breath and wheezing.    Cardiovascular:  Negative for chest pain, palpitations and leg swelling.  "  Gastrointestinal:  Negative for abdominal pain, constipation, diarrhea, nausea and vomiting.   Endocrine: Negative for cold intolerance, heat intolerance, polydipsia, polyphagia and polyuria.   Musculoskeletal:  Negative for arthralgias, myalgias and neck pain.   Skin:  Negative for rash and wound.   Neurological:  Positive for dizziness. Negative for syncope, speech difficulty, weakness, light-headedness and headaches.   Hematological:  Negative for adenopathy. Does not bruise/bleed easily.   Psychiatric/Behavioral:  Negative for confusion, dysphoric mood and sleep disturbance. The patient is not nervous/anxious.    All other systems reviewed and are negative.    BP (!) 84/45 (BP Location: Left arm, Patient Position: Sitting, Cuff Size: Large Adult)   Pulse 67   Ht 162.6 cm (64\")   Wt 76.3 kg (168 lb 3.2 oz)   LMP  (LMP Unknown)   SpO2 94%   BMI 28.87 kg/m²     Objective  Allergies   Allergen Reactions   • Ciprofloxacin Anaphylaxis   • Penicillins Shortness Of Breath   • Statins Myalgia   • Fish Oil [Omega-3 Fatty Acids] Nausea And Vomiting     Physical Exam  Vitals reviewed.   Constitutional:       Appearance: Normal appearance. She is well-developed.   HENT:      Head: Normocephalic.   Eyes:      Conjunctiva/sclera: Conjunctivae normal.   Neck:      Thyroid: No thyromegaly.      Vascular: No carotid bruit or JVD.   Cardiovascular:      Rate and Rhythm: Normal rate and regular rhythm.   Pulmonary:      Effort: Pulmonary effort is normal.      Breath sounds: Normal breath sounds.   Musculoskeletal:      Cervical back: Neck supple.      Right lower leg: No edema.      Left lower leg: No edema.   Skin:     General: Skin is warm and dry.   Neurological:      Mental Status: She is alert and oriented to person, place, and time.   Psychiatric:         Attention and Perception: Attention normal.         Mood and Affect: Mood normal.         Speech: Speech normal.         Behavior: Behavior normal. Behavior is " cooperative.         Cognition and Memory: Cognition normal.             LABS  WBC   Date Value Ref Range Status   10/06/2023 7.17 3.40 - 10.80 10*3/mm3 Final   11/12/2021 10.58 3.40 - 10.80 10*3/mm3 Final     RBC   Date Value Ref Range Status   10/06/2023 4.22 3.77 - 5.28 10*6/mm3 Final   11/12/2021 4.77 3.77 - 5.28 10*6/mm3 Final     Hemoglobin   Date Value Ref Range Status   10/06/2023 11.2 (L) 12.0 - 15.9 g/dL Final     Hematocrit   Date Value Ref Range Status   10/06/2023 34.4 34.0 - 46.6 % Final     MCV   Date Value Ref Range Status   10/06/2023 81.5 79.0 - 97.0 fL Final     MCH   Date Value Ref Range Status   10/06/2023 26.5 (L) 26.6 - 33.0 pg Final     MCHC   Date Value Ref Range Status   10/06/2023 32.6 31.5 - 35.7 g/dL Final     RDW   Date Value Ref Range Status   10/06/2023 14.4 12.3 - 15.4 % Final     RDW-SD   Date Value Ref Range Status   10/06/2023 42.1 37.0 - 54.0 fl Final     MPV   Date Value Ref Range Status   10/06/2023 9.5 6.0 - 12.0 fL Final     Platelets   Date Value Ref Range Status   10/06/2023 452 (H) 140 - 450 10*3/mm3 Final     Neutrophil %   Date Value Ref Range Status   10/06/2023 52.3 42.7 - 76.0 % Final     Lymphocyte %   Date Value Ref Range Status   10/06/2023 38.5 19.6 - 45.3 % Final     Monocyte %   Date Value Ref Range Status   10/06/2023 4.9 (L) 5.0 - 12.0 % Final     Eosinophil %   Date Value Ref Range Status   10/06/2023 2.9 0.3 - 6.2 % Final     Basophil %   Date Value Ref Range Status   10/06/2023 1.0 0.0 - 1.5 % Final     Immature Grans %   Date Value Ref Range Status   10/06/2023 0.4 0.0 - 0.5 % Final     Neutrophils, Absolute   Date Value Ref Range Status   10/06/2023 3.75 1.70 - 7.00 10*3/mm3 Final     Lymphocytes, Absolute   Date Value Ref Range Status   10/06/2023 2.76 0.70 - 3.10 10*3/mm3 Final     Monocytes, Absolute   Date Value Ref Range Status   10/06/2023 0.35 0.10 - 0.90 10*3/mm3 Final     Eosinophils, Absolute   Date Value Ref Range Status   10/06/2023 0.21 0.00  - 0.40 10*3/mm3 Final     Basophils, Absolute   Date Value Ref Range Status   10/06/2023 0.07 0.00 - 0.20 10*3/mm3 Final     Immature Grans, Absolute   Date Value Ref Range Status   10/06/2023 0.03 0.00 - 0.05 10*3/mm3 Final     nRBC   Date Value Ref Range Status   10/06/2023 0.0 0.0 - 0.2 /100 WBC Final       Total Cholesterol   Date Value Ref Range Status   08/11/2023 102 0 - 200 mg/dL Final     Triglycerides   Date Value Ref Range Status   08/11/2023 196 (H) 0 - 150 mg/dL Final     HDL Cholesterol   Date Value Ref Range Status   08/11/2023 42 40 - 60 mg/dL Final     LDL Cholesterol    Date Value Ref Range Status   08/11/2023 29 0 - 100 mg/dL Final     LDL Chol Calc (NIH)   Date Value Ref Range Status   11/12/2021 124 (H) 0 - 100 mg/dL Final       Assessment & Plan  Diagnoses and all orders for this visit:    1. Essential hypertension (Primary)  -     isosorbide mononitrate (IMDUR) 60 MG 24 hr tablet; Take 0.5 tablets by mouth Daily.  -     losartan (COZAAR) 100 MG tablet; Take 0.5 tablets by mouth 2 (Two) Times a Day.  Symptomatic hypotension midday  Change losartan from 100 mg daily to 50 mg twice daily  Hold amlodipine  Continue to closely monitor BP, call the clinic with concerns  Encourage adequate hydration    2. Coronary artery disease involving native coronary artery of native heart with other form of angina pectoris  -     CBC & Differential; Future  Clinically asymptomatic, will continue with aspirin, Imdur, beta-blocker and Repatha (she is statin intolerant)  Decrease Imdur from 60 to 30 mg daily    3. Dyslipidemia  -     Comprehensive Metabolic Panel; Future  -     Lipid Panel; Future  -     CK; Future  Patient is due for laboratory testing, she reports compliance with Repatha, will plan to recheck with further recommendations pending  Heart healthy diet  LDL goal less than 70    4. Apnea  -     Overnight Sleep Oximetry Study; Future  Arrange for an overnight oximetry study    5. Aortic stenosis,  mild  Will continue to monitor    6. Type 2 diabetes mellitus with hyperglycemia, with long-term current use of insulin  -     Hemoglobin A1c; Future  Recommend tight glycemic control for overall health benefit      Lifestyle modifications including heart healthy diet, regular exercise, maintenance of desirable body weight and avoidance of tobacco product    Follow-up in 1 week for BP recheck, sooner if needed.

## 2024-02-29 NOTE — PROGRESS NOTES
Subjective     Monisha Gardner is a 63 y.o. female.   Chief Complaint   Patient presents with    Hypotension     History of Present Illness   Monisha Gardner is a 63-year-old female who presents to clinic today for cardiology follow-up.    CAD currently on ASA, Imdur, Metoprolol and Repatha (statin intolerant).  She denies chest pain, dyspnea, palpitations or syncope.  Reports compliance with medicine.     Hypertension currently on losartan 100 mg daily, metoprolol 100 mg twice daily, Norvasc 2.5 mg daily.  She reports home BPs were well-controlled but over the last several weeks she has noticed BPs have been running low.  She brings in home readings today with elevations in the morning lows in midday and elevations in the evening.  She is symptomatic with her low BPs.  She has been trying to push fluids.  She has not held any medications.    Hyperlipidemia with statin intolerance.  She reports continuing on Repatha.  No recent labs available for review.  Her last LDL was not at goal. She remains on fenofibrate.      Valvular heart disease which has been stable.  She denies shortness of air orthopnea or swelling.    She reports a jaw mass and recently underwent surgery in [place].  She states things went well.  She is recovering.  Her diet is limited to soft/liquid foods however she is pushing fluids.  She also reports during hospitalization for surgery she was placed on oxygen at night due to significant desaturations.  She has not been on therapy since coming home and is concerned she may need to continue to utilize oxygen.         Patient Active Problem List   Diagnosis    ASCVD (arteriosclerotic cardiovascular disease)    Essential hypertension    Dyslipidemia    Type 2 diabetes mellitus with hyperglycemia, with long-term current use of insulin    Body mass index (bmi) 30.0-30.9, adult    Osteoarthritis    Atypical angina    Elevated liver enzymes    Gastroesophageal reflux disease with esophagitis     Vitamin D deficiency    Hx of pancreatitis    Menopausal symptoms    Vitamin B 12 deficiency    Nocturnal leg cramps    Anxiety    Left arm weakness    Chronic gout of multiple sites    Squamous cell carcinoma in situ (SCCIS) of skin    Mixed hyperlipidemia    PVC (premature ventricular contraction)    RLS (restless legs syndrome)    Psychophysiological insomnia    Dry mouth    Type 2 diabetes mellitus with diabetic autonomic neuropathy, with long-term current use of insulin    Non-recurrent acute serous otitis media of left ear    Bone mass    Mass of mandible     Past Medical History:   Diagnosis Date    Anxiety     Arthritis     Diabetes mellitus     Diverticulosis     Gout     Hyperlipidemia     Hypertension     Myocardial infarction      Past Surgical History:   Procedure Laterality Date    APPENDECTOMY      BREAST BIOPSY Right     benign    CARDIAC CATHETERIZATION       SECTION      CHOLECYSTECTOMY      CORONARY STENT PLACEMENT      Stenting of the RCA with 3.5 x 26 mm Resolute drug-eluting stent    HYSTERECTOMY      JOINT REPLACEMENT      TONSILLECTOMY      TUMOR REMOVAL  02/15/2024    FACIAL TUMOR     Family History   Problem Relation Age of Onset    Arthritis Mother     Heart disease Mother     Heart disease Sister     Vision loss Daughter     Heart disease Father     Heart disease Sister     Heart disease Sister     Heart disease Sister      Social History     Tobacco Use    Smoking status: Never     Passive exposure: Never    Smokeless tobacco: Never   Vaping Use    Vaping Use: Never used   Substance Use Topics    Alcohol use: No    Drug use: No     The following portions of the patient's history were reviewed and updated as appropriate: allergies, current medications, past family history, past medical history, past social history, past surgical history and problem list.    Allergies   Allergen Reactions    Ciprofloxacin Anaphylaxis    Penicillins Shortness Of Breath    Statins Myalgia    Fish Oil  [Omega-3 Fatty Acids] Nausea And Vomiting       Current Outpatient Medications:     allopurinol (ZYLOPRIM) 100 MG tablet, Take 1 tablet by mouth 2 (Two) Times a Day., Disp: 180 tablet, Rfl: 3    aspirin 81 MG EC tablet, Take 1 tablet by mouth Daily., Disp: 100 tablet, Rfl: 3    Continuous Blood Gluc  (Dexcom G6 ) device, , Disp: , Rfl:     Continuous Blood Gluc Sensor (Dexcom G6 Sensor), Every 10 (Ten) Days., Disp: 3 each, Rfl: 5    Continuous Blood Gluc Transmit (Dexcom G6 Transmitter) misc, 1 each Every 30 (Thirty) Days., Disp: 1 each, Rfl: 5    DULoxetine (CYMBALTA) 30 MG capsule, Take 3 capsules by mouth Daily., Disp: 270 capsule, Rfl: 3    Easy Touch Lancets 30G/Twist misc, Use tid, Disp: 300 each, Rfl: 3    fenofibrate 160 MG tablet, Take 1 tablet by mouth Daily., Disp: 90 tablet, Rfl: 3    gabapentin (NEURONTIN) 600 MG tablet, Take 1 tablet by mouth 2 (Two) Times a Day., Disp: 60 tablet, Rfl: 2    hydrOXYzine pamoate (VISTARIL) 50 MG capsule, Take 1 capsule by mouth 3 (Three) Times a Day As Needed for Itching., Disp: 270 capsule, Rfl: 1    isosorbide mononitrate (IMDUR) 60 MG 24 hr tablet, Take 0.5 tablets by mouth Daily., Disp: , Rfl:     Lantus SoloStar 100 UNIT/ML injection pen, Inject 45 Units under the skin into the appropriate area as directed Daily., Disp: 15 mL, Rfl: 5    losartan (COZAAR) 100 MG tablet, Take 0.5 tablets by mouth 2 (Two) Times a Day., Disp: , Rfl:     metoprolol tartrate (LOPRESSOR) 100 MG tablet, TAKE 1 TABLET BY MOUTH TWICE DAILY, Disp: 180 tablet, Rfl: 3    nitroglycerin (Nitrostat) 0.4 MG SL tablet, Place 1 tablet under the tongue Every 5 (Five) Minutes As Needed for Chest Pain., Disp: 30 tablet, Rfl: 5    pantoprazole (PROTONIX) 40 MG EC tablet, Take 1 tablet by mouth Daily., Disp: 90 tablet, Rfl: 3    rOPINIRole (REQUIP) 2 MG tablet, Take 1 tablet by mouth Every Night., Disp: 90 tablet, Rfl: 3    traMADol (ULTRAM) 50 MG tablet, TAKE 1 TABLET BY MOUTH EVERY 8  HOURS AS NEEDED FOR 7 DAYS, Disp: , Rfl:     True Metrix Blood Glucose Test test strip, 1 each by Other route 3 (Three) Times a Day., Disp: 300 each, Rfl: 3    acyclovir (ZOVIRAX) 800 MG tablet, Take 1 tablet by mouth 3 (Three) Times a Day. (Patient not taking: Reported on 2/29/2024), Disp: 6 tablet, Rfl: 0    ammonium lactate (AMLACTIN) 12 % cream, , Disp: , Rfl:     Cariprazine HCl (Vraylar) 3 MG capsule capsule, Take 1 capsule by mouth Daily. (Patient not taking: Reported on 2/29/2024), Disp: 90 capsule, Rfl: 3    fluconazole (Diflucan) 150 MG tablet, Take 1 tablet by mouth Daily. (Patient not taking: Reported on 2/29/2024), Disp: 3 tablet, Rfl: 0    Repatha SureClick solution auto-injector SureClick injection, Inject 1 mL under the skin into the appropriate area as directed Every 14 (Fourteen) Days. (Patient not taking: Reported on 2/29/2024), Disp: 12 mL, Rfl: 3    Review of Systems   Constitutional:  Negative for activity change, appetite change, chills, diaphoresis, fatigue and fever.   HENT:  Negative for congestion, drooling, ear discharge, ear pain, mouth sores, nosebleeds, postnasal drip, rhinorrhea, sinus pressure, sneezing and sore throat.    Eyes:  Negative for pain, discharge and visual disturbance.   Respiratory:  Negative for cough, chest tightness, shortness of breath and wheezing.    Cardiovascular:  Negative for chest pain, palpitations and leg swelling.   Gastrointestinal:  Negative for abdominal pain, constipation, diarrhea, nausea and vomiting.   Endocrine: Negative for cold intolerance, heat intolerance, polydipsia, polyphagia and polyuria.   Musculoskeletal:  Negative for arthralgias, myalgias and neck pain.   Skin:  Negative for rash and wound.   Neurological:  Positive for dizziness. Negative for syncope, speech difficulty, weakness, light-headedness and headaches.   Hematological:  Negative for adenopathy. Does not bruise/bleed easily.   Psychiatric/Behavioral:  Negative for confusion,  "dysphoric mood and sleep disturbance. The patient is not nervous/anxious.    All other systems reviewed and are negative.    BP (!) 84/45 (BP Location: Left arm, Patient Position: Sitting, Cuff Size: Large Adult)   Pulse 67   Ht 162.6 cm (64\")   Wt 76.3 kg (168 lb 3.2 oz)   LMP  (LMP Unknown)   SpO2 94%   BMI 28.87 kg/m²     Objective   Allergies   Allergen Reactions    Ciprofloxacin Anaphylaxis    Penicillins Shortness Of Breath    Statins Myalgia    Fish Oil [Omega-3 Fatty Acids] Nausea And Vomiting     Physical Exam  Vitals reviewed.   Constitutional:       Appearance: Normal appearance. She is well-developed.   HENT:      Head: Normocephalic.   Eyes:      Conjunctiva/sclera: Conjunctivae normal.   Neck:      Thyroid: No thyromegaly.      Vascular: No carotid bruit or JVD.   Cardiovascular:      Rate and Rhythm: Normal rate and regular rhythm.   Pulmonary:      Effort: Pulmonary effort is normal.      Breath sounds: Normal breath sounds.   Musculoskeletal:      Cervical back: Neck supple.      Right lower leg: No edema.      Left lower leg: No edema.   Skin:     General: Skin is warm and dry.   Neurological:      Mental Status: She is alert and oriented to person, place, and time.   Psychiatric:         Attention and Perception: Attention normal.         Mood and Affect: Mood normal.         Speech: Speech normal.         Behavior: Behavior normal. Behavior is cooperative.         Cognition and Memory: Cognition normal.             LABS  WBC   Date Value Ref Range Status   10/06/2023 7.17 3.40 - 10.80 10*3/mm3 Final   11/12/2021 10.58 3.40 - 10.80 10*3/mm3 Final     RBC   Date Value Ref Range Status   10/06/2023 4.22 3.77 - 5.28 10*6/mm3 Final   11/12/2021 4.77 3.77 - 5.28 10*6/mm3 Final     Hemoglobin   Date Value Ref Range Status   10/06/2023 11.2 (L) 12.0 - 15.9 g/dL Final     Hematocrit   Date Value Ref Range Status   10/06/2023 34.4 34.0 - 46.6 % Final     MCV   Date Value Ref Range Status   10/06/2023 " 81.5 79.0 - 97.0 fL Final     MCH   Date Value Ref Range Status   10/06/2023 26.5 (L) 26.6 - 33.0 pg Final     MCHC   Date Value Ref Range Status   10/06/2023 32.6 31.5 - 35.7 g/dL Final     RDW   Date Value Ref Range Status   10/06/2023 14.4 12.3 - 15.4 % Final     RDW-SD   Date Value Ref Range Status   10/06/2023 42.1 37.0 - 54.0 fl Final     MPV   Date Value Ref Range Status   10/06/2023 9.5 6.0 - 12.0 fL Final     Platelets   Date Value Ref Range Status   10/06/2023 452 (H) 140 - 450 10*3/mm3 Final     Neutrophil %   Date Value Ref Range Status   10/06/2023 52.3 42.7 - 76.0 % Final     Lymphocyte %   Date Value Ref Range Status   10/06/2023 38.5 19.6 - 45.3 % Final     Monocyte %   Date Value Ref Range Status   10/06/2023 4.9 (L) 5.0 - 12.0 % Final     Eosinophil %   Date Value Ref Range Status   10/06/2023 2.9 0.3 - 6.2 % Final     Basophil %   Date Value Ref Range Status   10/06/2023 1.0 0.0 - 1.5 % Final     Immature Grans %   Date Value Ref Range Status   10/06/2023 0.4 0.0 - 0.5 % Final     Neutrophils, Absolute   Date Value Ref Range Status   10/06/2023 3.75 1.70 - 7.00 10*3/mm3 Final     Lymphocytes, Absolute   Date Value Ref Range Status   10/06/2023 2.76 0.70 - 3.10 10*3/mm3 Final     Monocytes, Absolute   Date Value Ref Range Status   10/06/2023 0.35 0.10 - 0.90 10*3/mm3 Final     Eosinophils, Absolute   Date Value Ref Range Status   10/06/2023 0.21 0.00 - 0.40 10*3/mm3 Final     Basophils, Absolute   Date Value Ref Range Status   10/06/2023 0.07 0.00 - 0.20 10*3/mm3 Final     Immature Grans, Absolute   Date Value Ref Range Status   10/06/2023 0.03 0.00 - 0.05 10*3/mm3 Final     nRBC   Date Value Ref Range Status   10/06/2023 0.0 0.0 - 0.2 /100 WBC Final       Total Cholesterol   Date Value Ref Range Status   08/11/2023 102 0 - 200 mg/dL Final     Triglycerides   Date Value Ref Range Status   08/11/2023 196 (H) 0 - 150 mg/dL Final     HDL Cholesterol   Date Value Ref Range Status   08/11/2023 42 40 -  60 mg/dL Final     LDL Cholesterol    Date Value Ref Range Status   08/11/2023 29 0 - 100 mg/dL Final     LDL Chol Calc (NIH)   Date Value Ref Range Status   11/12/2021 124 (H) 0 - 100 mg/dL Final       Assessment & Plan   Diagnoses and all orders for this visit:    1. Essential hypertension (Primary)  -     isosorbide mononitrate (IMDUR) 60 MG 24 hr tablet; Take 0.5 tablets by mouth Daily.  -     losartan (COZAAR) 100 MG tablet; Take 0.5 tablets by mouth 2 (Two) Times a Day.  Symptomatic hypotension midday  Change losartan from 100 mg daily to 50 mg twice daily  Hold amlodipine  Continue to closely monitor BP, call the clinic with concerns  Encourage adequate hydration    2. Coronary artery disease involving native coronary artery of native heart with other form of angina pectoris  -     CBC & Differential; Future  Clinically asymptomatic, will continue with aspirin, Imdur, beta-blocker and Repatha (she is statin intolerant)  Decrease Imdur from 60 to 30 mg daily    3. Dyslipidemia  -     Comprehensive Metabolic Panel; Future  -     Lipid Panel; Future  -     CK; Future  Patient is due for laboratory testing, she reports compliance with Repatha, will plan to recheck with further recommendations pending  Heart healthy diet  LDL goal less than 70    4. Apnea  -     Overnight Sleep Oximetry Study; Future  Arrange for an overnight oximetry study    5. Aortic stenosis, mild  Will continue to monitor    6. Type 2 diabetes mellitus with hyperglycemia, with long-term current use of insulin  -     Hemoglobin A1c; Future  Recommend tight glycemic control for overall health benefit      Lifestyle modifications including heart healthy diet, regular exercise, maintenance of desirable body weight and avoidance of tobacco product    Follow-up in 1 week for BP recheck, sooner if needed.

## 2024-03-01 ENCOUNTER — LAB (OUTPATIENT)
Dept: FAMILY MEDICINE CLINIC | Facility: CLINIC | Age: 64
End: 2024-03-01
Payer: COMMERCIAL

## 2024-03-01 DIAGNOSIS — I25.118 CORONARY ARTERY DISEASE INVOLVING NATIVE CORONARY ARTERY OF NATIVE HEART WITH OTHER FORM OF ANGINA PECTORIS: ICD-10-CM

## 2024-03-01 DIAGNOSIS — Z79.4 TYPE 2 DIABETES MELLITUS WITH HYPERGLYCEMIA, WITH LONG-TERM CURRENT USE OF INSULIN: ICD-10-CM

## 2024-03-01 DIAGNOSIS — E78.5 DYSLIPIDEMIA: ICD-10-CM

## 2024-03-01 DIAGNOSIS — E11.65 TYPE 2 DIABETES MELLITUS WITH HYPERGLYCEMIA, WITH LONG-TERM CURRENT USE OF INSULIN: ICD-10-CM

## 2024-03-01 LAB
ALBUMIN SERPL-MCNC: 4.4 G/DL (ref 3.5–5.2)
ALBUMIN/GLOB SERPL: 1.8 G/DL
ALP SERPL-CCNC: 60 U/L (ref 39–117)
ALT SERPL W P-5'-P-CCNC: 16 U/L (ref 1–33)
ANION GAP SERPL CALCULATED.3IONS-SCNC: 19.3 MMOL/L (ref 5–15)
AST SERPL-CCNC: 30 U/L (ref 1–32)
BASOPHILS # BLD AUTO: 0.08 10*3/MM3 (ref 0–0.2)
BASOPHILS NFR BLD AUTO: 1.3 % (ref 0–1.5)
BILIRUB SERPL-MCNC: 0.3 MG/DL (ref 0–1.2)
BUN SERPL-MCNC: 39 MG/DL (ref 8–23)
BUN/CREAT SERPL: 21.1 (ref 7–25)
CALCIUM SPEC-SCNC: 10.3 MG/DL (ref 8.6–10.5)
CHLORIDE SERPL-SCNC: 101 MMOL/L (ref 98–107)
CHOLEST SERPL-MCNC: 193 MG/DL (ref 0–200)
CK SERPL-CCNC: 32 U/L (ref 20–180)
CO2 SERPL-SCNC: 19.7 MMOL/L (ref 22–29)
CREAT SERPL-MCNC: 1.85 MG/DL (ref 0.57–1)
DEPRECATED RDW RBC AUTO: 44.3 FL (ref 37–54)
EGFRCR SERPLBLD CKD-EPI 2021: 30.5 ML/MIN/1.73
EOSINOPHIL # BLD AUTO: 0.31 10*3/MM3 (ref 0–0.4)
EOSINOPHIL NFR BLD AUTO: 5 % (ref 0.3–6.2)
ERYTHROCYTE [DISTWIDTH] IN BLOOD BY AUTOMATED COUNT: 15.1 % (ref 12.3–15.4)
GLOBULIN UR ELPH-MCNC: 2.4 GM/DL
GLUCOSE SERPL-MCNC: 103 MG/DL (ref 65–99)
HBA1C MFR BLD: 7.2 % (ref 4.8–5.6)
HCT VFR BLD AUTO: 34.9 % (ref 34–46.6)
HDLC SERPL-MCNC: 30 MG/DL (ref 40–60)
HGB BLD-MCNC: 11.4 G/DL (ref 12–15.9)
IMM GRANULOCYTES # BLD AUTO: 0.07 10*3/MM3 (ref 0–0.05)
IMM GRANULOCYTES NFR BLD AUTO: 1.1 % (ref 0–0.5)
LDLC SERPL CALC-MCNC: 101 MG/DL (ref 0–100)
LDLC/HDLC SERPL: 3.01 {RATIO}
LYMPHOCYTES # BLD AUTO: 2.94 10*3/MM3 (ref 0.7–3.1)
LYMPHOCYTES NFR BLD AUTO: 47.5 % (ref 19.6–45.3)
MCH RBC QN AUTO: 27 PG (ref 26.6–33)
MCHC RBC AUTO-ENTMCNC: 32.7 G/DL (ref 31.5–35.7)
MCV RBC AUTO: 82.7 FL (ref 79–97)
MONOCYTES # BLD AUTO: 0.52 10*3/MM3 (ref 0.1–0.9)
MONOCYTES NFR BLD AUTO: 8.4 % (ref 5–12)
NEUTROPHILS NFR BLD AUTO: 2.27 10*3/MM3 (ref 1.7–7)
NEUTROPHILS NFR BLD AUTO: 36.7 % (ref 42.7–76)
NRBC BLD AUTO-RTO: 0 /100 WBC (ref 0–0.2)
PLATELET # BLD AUTO: 504 10*3/MM3 (ref 140–450)
PMV BLD AUTO: 9.9 FL (ref 6–12)
POTASSIUM SERPL-SCNC: 5.5 MMOL/L (ref 3.5–5.2)
PROT SERPL-MCNC: 6.8 G/DL (ref 6–8.5)
RBC # BLD AUTO: 4.22 10*6/MM3 (ref 3.77–5.28)
SODIUM SERPL-SCNC: 140 MMOL/L (ref 136–145)
TRIGL SERPL-MCNC: 363 MG/DL (ref 0–150)
VLDLC SERPL-MCNC: 62 MG/DL (ref 5–40)
WBC NRBC COR # BLD AUTO: 6.19 10*3/MM3 (ref 3.4–10.8)

## 2024-03-01 PROCEDURE — 80061 LIPID PANEL: CPT | Performed by: NURSE PRACTITIONER

## 2024-03-01 PROCEDURE — 80053 COMPREHEN METABOLIC PANEL: CPT | Performed by: NURSE PRACTITIONER

## 2024-03-01 PROCEDURE — 83036 HEMOGLOBIN GLYCOSYLATED A1C: CPT | Performed by: NURSE PRACTITIONER

## 2024-03-01 PROCEDURE — 36415 COLL VENOUS BLD VENIPUNCTURE: CPT | Performed by: NURSE PRACTITIONER

## 2024-03-01 PROCEDURE — 82550 ASSAY OF CK (CPK): CPT | Performed by: NURSE PRACTITIONER

## 2024-03-01 PROCEDURE — 85025 COMPLETE CBC W/AUTO DIFF WBC: CPT | Performed by: NURSE PRACTITIONER

## 2024-03-04 DIAGNOSIS — I10 ESSENTIAL HYPERTENSION: Primary | ICD-10-CM

## 2024-03-05 ENCOUNTER — LAB (OUTPATIENT)
Dept: FAMILY MEDICINE CLINIC | Facility: CLINIC | Age: 64
End: 2024-03-05
Payer: COMMERCIAL

## 2024-03-05 ENCOUNTER — TELEPHONE (OUTPATIENT)
Dept: CARDIOLOGY | Facility: CLINIC | Age: 64
End: 2024-03-05
Payer: COMMERCIAL

## 2024-03-05 DIAGNOSIS — I10 ESSENTIAL HYPERTENSION: ICD-10-CM

## 2024-03-05 LAB
ANION GAP SERPL CALCULATED.3IONS-SCNC: 15.1 MMOL/L (ref 5–15)
BUN SERPL-MCNC: 24 MG/DL (ref 8–23)
BUN/CREAT SERPL: 16.1 (ref 7–25)
CALCIUM SPEC-SCNC: 10.3 MG/DL (ref 8.6–10.5)
CHLORIDE SERPL-SCNC: 101 MMOL/L (ref 98–107)
CO2 SERPL-SCNC: 21.9 MMOL/L (ref 22–29)
CREAT SERPL-MCNC: 1.49 MG/DL (ref 0.57–1)
EGFRCR SERPLBLD CKD-EPI 2021: 39.6 ML/MIN/1.73
GLUCOSE SERPL-MCNC: 142 MG/DL (ref 65–99)
POTASSIUM SERPL-SCNC: 5 MMOL/L (ref 3.5–5.2)
SODIUM SERPL-SCNC: 138 MMOL/L (ref 136–145)

## 2024-03-05 PROCEDURE — 36415 COLL VENOUS BLD VENIPUNCTURE: CPT

## 2024-03-05 PROCEDURE — 80048 BASIC METABOLIC PNL TOTAL CA: CPT | Performed by: NURSE PRACTITIONER

## 2024-03-05 NOTE — TELEPHONE ENCOUNTER
----- Message from TRUONG Berger sent at 3/4/2024  8:44 AM EST -----  1. CMP - kidney function is slightly worsened and potassium is slightly high. Continue to push fluids, she needs to make a follow up appt with nephrology, low potassium diet and needs to recheck labs in 2 days.   2. Cholesterol is elevated - can further discuss at follow up appt   3. Sugar is up 7.2 - recommend close follow up with pcp   4. CBC with stable hemoglobin and elevated platelets - ensure she is following up with PCP.     How is BP doing? If BP remains low and kidney function abnormal may have to cut back on Losartan, will further discuss at follow up appt.

## 2024-03-05 NOTE — TELEPHONE ENCOUNTER
Called and given message per ANUSHKA GUERIN.  Monisha will get her labs today at her PCP.  She v/u.     Asia Hawkins APRN [facility]  1. CMP - kidney function is slightly worsened and potassium is slightly high. Continue to push fluids, she needs to make a follow up appt with nephrology, low potassium diet and needs to recheck labs in 2 days.  2. Cholesterol is elevated - can further discuss at follow up appt  3. Sugar is up 7.2 - recommend close follow up with pcp  4. CBC with stable hemoglobin and elevated platelets - ensure she is following up with PCP.    How is BP doing? If BP remains low and kidney function abnormal may have to cut back on Losartan, will further discuss at follow up appt.

## 2024-03-07 ENCOUNTER — OFFICE VISIT (OUTPATIENT)
Dept: CARDIOLOGY | Facility: CLINIC | Age: 64
End: 2024-03-07
Payer: COMMERCIAL

## 2024-03-07 ENCOUNTER — OFFICE VISIT (OUTPATIENT)
Dept: FAMILY MEDICINE CLINIC | Facility: CLINIC | Age: 64
End: 2024-03-07
Payer: COMMERCIAL

## 2024-03-07 VITALS
WEIGHT: 161 LBS | TEMPERATURE: 97.5 F | OXYGEN SATURATION: 92 % | HEART RATE: 64 BPM | HEIGHT: 64 IN | SYSTOLIC BLOOD PRESSURE: 118 MMHG | BODY MASS INDEX: 27.49 KG/M2 | DIASTOLIC BLOOD PRESSURE: 70 MMHG

## 2024-03-07 VITALS
WEIGHT: 164 LBS | HEART RATE: 72 BPM | BODY MASS INDEX: 28 KG/M2 | HEIGHT: 64 IN | DIASTOLIC BLOOD PRESSURE: 70 MMHG | SYSTOLIC BLOOD PRESSURE: 116 MMHG | OXYGEN SATURATION: 97 %

## 2024-03-07 DIAGNOSIS — Z79.4 TYPE 2 DIABETES MELLITUS WITH HYPERGLYCEMIA, WITH LONG-TERM CURRENT USE OF INSULIN: Primary | Chronic | ICD-10-CM

## 2024-03-07 DIAGNOSIS — E11.65 TYPE 2 DIABETES MELLITUS WITH HYPERGLYCEMIA, WITH LONG-TERM CURRENT USE OF INSULIN: Primary | Chronic | ICD-10-CM

## 2024-03-07 DIAGNOSIS — I35.0 AORTIC STENOSIS, MILD: ICD-10-CM

## 2024-03-07 DIAGNOSIS — I25.118 CORONARY ARTERY DISEASE INVOLVING NATIVE CORONARY ARTERY OF NATIVE HEART WITH OTHER FORM OF ANGINA PECTORIS: ICD-10-CM

## 2024-03-07 DIAGNOSIS — E11.65 TYPE 2 DIABETES MELLITUS WITH HYPERGLYCEMIA, WITH LONG-TERM CURRENT USE OF INSULIN: ICD-10-CM

## 2024-03-07 DIAGNOSIS — I10 ESSENTIAL HYPERTENSION: Primary | ICD-10-CM

## 2024-03-07 DIAGNOSIS — N18.32 STAGE 3B CHRONIC KIDNEY DISEASE: Chronic | ICD-10-CM

## 2024-03-07 DIAGNOSIS — I10 ESSENTIAL HYPERTENSION: Chronic | ICD-10-CM

## 2024-03-07 DIAGNOSIS — R06.81 APNEA: ICD-10-CM

## 2024-03-07 DIAGNOSIS — E78.5 DYSLIPIDEMIA: ICD-10-CM

## 2024-03-07 DIAGNOSIS — Z79.4 TYPE 2 DIABETES MELLITUS WITH HYPERGLYCEMIA, WITH LONG-TERM CURRENT USE OF INSULIN: ICD-10-CM

## 2024-03-07 DIAGNOSIS — M89.8X9 BONE MASS: Chronic | ICD-10-CM

## 2024-03-07 DIAGNOSIS — N18.32 STAGE 3B CHRONIC KIDNEY DISEASE: ICD-10-CM

## 2024-03-07 DIAGNOSIS — M19.012 PRIMARY OSTEOARTHRITIS OF LEFT SHOULDER: ICD-10-CM

## 2024-03-07 RX ORDER — ACYCLOVIR 400 MG/1
1 TABLET ORAL
Qty: 2 EACH | Refills: 5 | Status: SHIPPED | OUTPATIENT
Start: 2024-03-07 | End: 2024-03-07 | Stop reason: SDUPTHER

## 2024-03-07 RX ORDER — ACYCLOVIR 400 MG/1
1 TABLET ORAL DAILY
Qty: 1 EACH | Refills: 1 | Status: SHIPPED | OUTPATIENT
Start: 2024-03-07

## 2024-03-07 RX ORDER — ACYCLOVIR 400 MG/1
1 TABLET ORAL
Qty: 1 EACH | Refills: 1 | Status: SHIPPED | OUTPATIENT
Start: 2024-03-07 | End: 2024-03-07 | Stop reason: SDUPTHER

## 2024-03-07 RX ORDER — ACYCLOVIR 400 MG/1
1 TABLET ORAL
Qty: 9 EACH | Refills: 3 | Status: SHIPPED | OUTPATIENT
Start: 2024-03-07

## 2024-03-07 NOTE — PROGRESS NOTES
"Chief Complaint -diabetes    History of Present Illness -     Monisha Gardner is a 63 y.o. female.     Diabetes-  Not at goal as patient has recently had surgery requiring a feeding tube.  She has been on restricted diet for the past week after feeding tube was removed.  She has been drinking 3 high-protein drinks daily.  She does not currently have a home blood glucose monitor and is interested in the Dexcom.    Chronic kidney disease-  Not at goal as her creatinine is 1.49 with an estimated GFR of 39.  She does have a pending appointment with nephrology next week.    Bone mass-  Patient had left mandibular bone mass excised including most of her bottom teeth.  She does still have 3-4 teeth on the right bottom area.  She reports it was much more extensive than they originally thought but they feel as though they have gotten all of the mass.  She had to have a bone graft from the right hip placed into the left mandible.  She is doing well since the feeding tube was removed 1 week ago.  She is now able to have a liquid diet and some soft foods including mashed potatoes.    Hypertension-  Controlled with metoprolol and losartan    Left shoulder pain-  Patient complains of pain in her left shoulder with decreased range of joint motion with abduction.  Onset 1 week.  Pain likely secondary to acute exacerbation of osteoarthritis    The following portions of the patient's history were reviewed and updated as appropriate: allergies, current medications, past family history, past medical history, past social history, past surgical history and problem list.        Objective  Vital signs:  /70   Pulse 64   Temp 97.5 °F (36.4 °C) (Temporal)   Ht 162.6 cm (64\")   Wt 73 kg (161 lb)   LMP  (LMP Unknown)   SpO2 92%   BMI 27.64 kg/m²     Physical Exam  Vitals and nursing note reviewed.   Constitutional:       Appearance: Normal appearance. She is well-developed.   HENT:      Mouth/Throat:      Comments: Speech " impediment noted with absence of most of her bottom teeth.  She does have a few teeth left on the right bottom.  Her upper teeth are intact.  There is some tenderness with mild swelling noted left mandibular area.  Significant scar noted anterior cervical area.  Eyes:      Extraocular Movements: Extraocular movements intact.      Conjunctiva/sclera: Conjunctivae normal.   Cardiovascular:      Rate and Rhythm: Normal rate and regular rhythm.      Heart sounds: Normal heart sounds. No murmur heard.  Pulmonary:      Effort: Pulmonary effort is normal. No respiratory distress.      Breath sounds: Normal breath sounds. No wheezing.   Musculoskeletal:      Comments: Left shoulder pain reproducible with abduction.  No significant swelling or erythema appreciated.  Decreased range of motion approximately 90 degrees with abduction   Skin:     General: Skin is warm and dry.      Findings: No rash.   Neurological:      Mental Status: She is alert and oriented to person, place, and time.   Psychiatric:         Mood and Affect: Mood normal.         Behavior: Behavior normal.         Thought Content: Thought content normal.         The following data was reviewed by Kimmy Vela PA-C:         Lab Results   Component Value Date    BUN 24 (H) 03/05/2024    CREATININE 1.49 (H) 03/05/2024    EGFR 39.6 (L) 03/05/2024    ALT 16 03/01/2024    AST 30 03/01/2024    WBC 6.19 03/01/2024    HGB 11.4 (L) 03/01/2024    HCT 34.9 03/01/2024     (H) 03/01/2024    CHOL 193 03/01/2024    TRIG 363 (H) 03/01/2024    HDL 30 (L) 03/01/2024     (H) 03/01/2024    TSH 0.838 04/07/2023    HGBA1C 7.20 (H) 03/01/2024           Assessment & Plan     Diagnoses and all orders for this visit:    1. Type 2 diabetes mellitus with hyperglycemia, with long-term current use of insulin (Primary)  Comments:  Prescription written to start Dexcom 7  and sensor.The patient does have a smart phone to download the Dexcom sunni.  Sample Dexcom 7 sensor  given  Orders:  -     Discontinue: Continuous Blood Gluc Sensor (Dexcom G7 Sensor) misc; Use 1 each Every 14 (Fourteen) Days.  Dispense: 2 each; Refill: 5  -     Discontinue: Continuous Blood Gluc  (Dexcom G7 ) device; Use 1 each Every 14 (Fourteen) Days.  Dispense: 1 each; Refill: 1  -     Hemoglobin A1c; Future  -     Continuous Blood Gluc Sensor (Dexcom G7 Sensor) misc; Use 1 each Every 10 (Ten) Days.  Dispense: 9 each; Refill: 3  -     Continuous Blood Gluc  (Dexcom G7 ) device; Use 1 each Daily.  Dispense: 1 each; Refill: 1    2. Stage 3b chronic kidney disease  Comments:  Discussed renal function by recent lab work  Advised to keep appointment with nephrology next week  Orders:  -     Comprehensive Metabolic Panel; Future    3. 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  Advised to keep follow-up appointment with surgeon    4. Essential hypertension  Comments:  Continue metoprolol and losartan  Continue to monitor    5. Primary osteoarthritis of left shoulder  Comments:  Left shoulder intra-articular injection performed today      Procedure: Left shoulder intra-articular injection  Provider: Kimmy Vela PA-C  Indication: Left shoulder osteoarthritis  Timeout was taken to verify correct patient procedure and location  Description: The left shoulder was prepped and draped in sterile fashion.  An intra-articular injection was given using 3 cc 1% lidocaine, 1 cc of triamcinolone and 1 cc of Depo-Medrol.  Pressure was held and sterile dressing was placed.  No complications  Estimated blood loss: Minimal  Patient tolerated procedure well    Lidocaine 1% 10 mg/mL  NDC number 26818-096-99  Lot #[id number]  Expiration 2/26    Triamcinolone 40 mg/mL  NDC number 3743-6099-65  Lot #[id number]  Expiration January 2026    Depo-Medrol 80 mg/mL  NDC number 009-0306-02  Lot #[id number]  Expiration 5/2025               Patient was  given instructions and counseling regarding his condition or for health maintenance advice. Please see specific information pulled into the AVS if appropriate      This document has been electronically signed by:  Kimmy Vela PA-C

## 2024-03-07 NOTE — PROGRESS NOTES
Subjective     Monisha Gardner is a 63 y.o. female.   Chief Complaint   Patient presents with    Hypertension     History of Present Illness   Monisha Gardner is a 63-year-old female who presents to clinic today for cardiology follow-up.    Hypertension with recent episodes of hypotension.  At last visit medication changes were made and she remains on losartan 50 mg twice daily and metoprolol 100 mg twice daily currently.  Her Norvasc was placed on hold.  She states since making medication changes her blood pressure has been much better and she denies any dizziness.  She brings in a list of BPs today which are improved and some readings are slightly elevated however it is before medication.     CAD currently on ASA, Imdur, Metoprolol and Repatha (statin intolerant).  She denies chest pain, dyspnea, palpitations or syncope.  Reports compliance with medicine.  She recently had labs and would like to discuss this today.       Hyperlipidemia with statin intolerance.  She reports continuing on Repatha.  She does report missing some Repatha due to insurance coverage.  She had labs which she would like to discuss today, her last LDL was not at goal. She remains on fenofibrate.      Valvular heart disease which has been stable.  She denies shortness of air orthopnea or swelling.     She reports a jaw mass and recently underwent surgery in [place].  She states things went well.  She is recovering.  Her diet is limited to soft/liquid foods however she is pushing fluids.  She also reports during hospitalization for surgery she was placed on oxygen at night due to significant desaturations.  She has not been on therapy since coming home and is concerned she may need to continue to utilize oxygen.    CKD and was previously following with nephrology but has not recently been seen.     Patient Active Problem List   Diagnosis    ASCVD (arteriosclerotic cardiovascular disease)    Essential hypertension    Dyslipidemia     Type 2 diabetes mellitus with hyperglycemia, with long-term current use of insulin    Body mass index (bmi) 30.0-30.9, adult    Osteoarthritis    Atypical angina    Elevated liver enzymes    Gastroesophageal reflux disease with esophagitis    Vitamin D deficiency    Hx of pancreatitis    Menopausal symptoms    Vitamin B 12 deficiency    Nocturnal leg cramps    Anxiety    Left arm weakness    Chronic gout of multiple sites    Squamous cell carcinoma in situ (SCCIS) of skin    Mixed hyperlipidemia    PVC (premature ventricular contraction)    RLS (restless legs syndrome)    Psychophysiological insomnia    Dry mouth    Type 2 diabetes mellitus with diabetic autonomic neuropathy, with long-term current use of insulin    Non-recurrent acute serous otitis media of left ear    Bone mass    Mass of mandible     Past Medical History:   Diagnosis Date    Anxiety     Arthritis     Diabetes mellitus     Diverticulosis     Gout     Hyperlipidemia     Hypertension     Myocardial infarction      Past Surgical History:   Procedure Laterality Date    APPENDECTOMY      BREAST BIOPSY Right     benign    CARDIAC CATHETERIZATION       SECTION      CHOLECYSTECTOMY      CORONARY STENT PLACEMENT      Stenting of the RCA with 3.5 x 26 mm Resolute drug-eluting stent    HYSTERECTOMY      JOINT REPLACEMENT      TONSILLECTOMY      TUMOR REMOVAL  02/15/2024    FACIAL TUMOR     Family History   Problem Relation Age of Onset    Arthritis Mother     Heart disease Mother     Heart disease Sister     Vision loss Daughter     Heart disease Father     Heart disease Sister     Heart disease Sister     Heart disease Sister      Social History     Tobacco Use    Smoking status: Never     Passive exposure: Never    Smokeless tobacco: Never   Vaping Use    Vaping status: Never Used   Substance Use Topics    Alcohol use: No    Drug use: No     The following portions of the patient's history were reviewed and updated as appropriate: allergies, current  medications, past family history, past medical history, past social history, past surgical history and problem list.    Allergies   Allergen Reactions    Ciprofloxacin Anaphylaxis    Penicillins Shortness Of Breath    Statins Myalgia    Fish Oil [Omega-3 Fatty Acids] Nausea And Vomiting         Current Outpatient Medications:     acyclovir (ZOVIRAX) 800 MG tablet, Take 1 tablet by mouth 3 (Three) Times a Day., Disp: 6 tablet, Rfl: 0    allopurinol (ZYLOPRIM) 100 MG tablet, Take 1 tablet by mouth 2 (Two) Times a Day., Disp: 180 tablet, Rfl: 3    ammonium lactate (AMLACTIN) 12 % cream, , Disp: , Rfl:     aspirin 81 MG EC tablet, Take 1 tablet by mouth Daily., Disp: 100 tablet, Rfl: 3    Cariprazine HCl (Vraylar) 3 MG capsule capsule, Take 1 capsule by mouth Daily., Disp: 90 capsule, Rfl: 3    Continuous Blood Gluc  (Dexcom G7 ) device, Use 1 each Daily., Disp: 1 each, Rfl: 1    Continuous Blood Gluc Sensor (Dexcom G7 Sensor) misc, Use 1 each Every 10 (Ten) Days., Disp: 9 each, Rfl: 3    DULoxetine (CYMBALTA) 30 MG capsule, Take 3 capsules by mouth Daily., Disp: 270 capsule, Rfl: 3    Easy Touch Lancets 30G/Twist misc, Use tid, Disp: 300 each, Rfl: 3    fenofibrate 160 MG tablet, Take 1 tablet by mouth Daily., Disp: 90 tablet, Rfl: 3    gabapentin (NEURONTIN) 600 MG tablet, Take 1 tablet by mouth 2 (Two) Times a Day., Disp: 60 tablet, Rfl: 2    hydrOXYzine pamoate (VISTARIL) 50 MG capsule, Take 1 capsule by mouth 3 (Three) Times a Day As Needed for Itching., Disp: 270 capsule, Rfl: 1    isosorbide mononitrate (IMDUR) 60 MG 24 hr tablet, Take 0.5 tablets by mouth Daily., Disp: , Rfl:     Lantus SoloStar 100 UNIT/ML injection pen, Inject 45 Units under the skin into the appropriate area as directed Daily., Disp: 15 mL, Rfl: 5    losartan (COZAAR) 100 MG tablet, Take 0.5 tablets by mouth 2 (Two) Times a Day., Disp: , Rfl:     metoprolol tartrate (LOPRESSOR) 100 MG tablet, TAKE 1 TABLET BY MOUTH TWICE  DAILY, Disp: 180 tablet, Rfl: 3    nitroglycerin (Nitrostat) 0.4 MG SL tablet, Place 1 tablet under the tongue Every 5 (Five) Minutes As Needed for Chest Pain., Disp: 30 tablet, Rfl: 5    pantoprazole (PROTONIX) 40 MG EC tablet, Take 1 tablet by mouth Daily., Disp: 90 tablet, Rfl: 3    Repatha SureClick solution auto-injector SureClick injection, Inject 1 mL under the skin into the appropriate area as directed Every 14 (Fourteen) Days., Disp: 12 mL, Rfl: 3    rOPINIRole (REQUIP) 2 MG tablet, Take 1 tablet by mouth Every Night., Disp: 90 tablet, Rfl: 3    traMADol (ULTRAM) 50 MG tablet, TAKE 1 TABLET BY MOUTH EVERY 8 HOURS AS NEEDED FOR 7 DAYS, Disp: , Rfl:     True Metrix Blood Glucose Test test strip, 1 each by Other route 3 (Three) Times a Day., Disp: 300 each, Rfl: 3    Review of Systems   Constitutional:  Negative for activity change, appetite change, chills, diaphoresis, fatigue and fever.   HENT:  Negative for congestion, drooling, ear discharge, ear pain, mouth sores, nosebleeds, postnasal drip, rhinorrhea, sinus pressure, sneezing and sore throat.    Eyes:  Negative for pain, discharge and visual disturbance.   Respiratory:  Negative for cough, chest tightness, shortness of breath and wheezing.    Cardiovascular:  Negative for chest pain, palpitations and leg swelling.   Gastrointestinal:  Negative for abdominal pain, constipation, diarrhea, nausea and vomiting.   Endocrine: Negative for cold intolerance, heat intolerance, polydipsia, polyphagia and polyuria.   Musculoskeletal:  Negative for arthralgias, myalgias and neck pain.   Skin:  Negative for rash and wound.   Neurological:  Negative for dizziness, syncope, speech difficulty, weakness, light-headedness and headaches.   Hematological:  Negative for adenopathy. Does not bruise/bleed easily.   Psychiatric/Behavioral:  Negative for confusion, dysphoric mood and sleep disturbance. The patient is not nervous/anxious.    All other systems reviewed and are  "negative.    /70 (BP Location: Left arm, Patient Position: Sitting, Cuff Size: Adult)   Pulse 72   Ht 162.6 cm (64\")   Wt 74.4 kg (164 lb)   LMP  (LMP Unknown)   SpO2 97%   BMI 28.15 kg/m²     Objective   Allergies   Allergen Reactions    Ciprofloxacin Anaphylaxis    Penicillins Shortness Of Breath    Statins Myalgia    Fish Oil [Omega-3 Fatty Acids] Nausea And Vomiting       Physical Exam  Vitals reviewed.   Constitutional:       Appearance: Normal appearance. She is well-developed and overweight.   HENT:      Head: Normocephalic.      Mouth/Throat:        Comments: Several teeth missing from recent surgery   Eyes:      Conjunctiva/sclera: Conjunctivae normal.   Neck:      Thyroid: No thyromegaly.      Vascular: No carotid bruit or JVD.   Cardiovascular:      Rate and Rhythm: Normal rate and regular rhythm.   Pulmonary:      Effort: Pulmonary effort is normal.      Breath sounds: Normal breath sounds.   Musculoskeletal:      Cervical back: Neck supple.      Right lower leg: No edema.      Left lower leg: No edema.   Skin:     General: Skin is warm and dry.   Neurological:      Mental Status: She is alert and oriented to person, place, and time.   Psychiatric:         Attention and Perception: Attention normal.         Mood and Affect: Mood normal.         Speech: Speech normal.         Behavior: Behavior normal. Behavior is cooperative.         Cognition and Memory: Cognition normal.           LABS  WBC   Date Value Ref Range Status   03/01/2024 6.19 3.40 - 10.80 10*3/mm3 Final   11/12/2021 10.58 3.40 - 10.80 10*3/mm3 Final     RBC   Date Value Ref Range Status   03/01/2024 4.22 3.77 - 5.28 10*6/mm3 Final   11/12/2021 4.77 3.77 - 5.28 10*6/mm3 Final     Hemoglobin   Date Value Ref Range Status   03/01/2024 11.4 (L) 12.0 - 15.9 g/dL Final     Hematocrit   Date Value Ref Range Status   03/01/2024 34.9 34.0 - 46.6 % Final     MCV   Date Value Ref Range Status   03/01/2024 82.7 79.0 - 97.0 fL Final     MCH "   Date Value Ref Range Status   03/01/2024 27.0 26.6 - 33.0 pg Final     MCHC   Date Value Ref Range Status   03/01/2024 32.7 31.5 - 35.7 g/dL Final     RDW   Date Value Ref Range Status   03/01/2024 15.1 12.3 - 15.4 % Final     RDW-SD   Date Value Ref Range Status   03/01/2024 44.3 37.0 - 54.0 fl Final     MPV   Date Value Ref Range Status   03/01/2024 9.9 6.0 - 12.0 fL Final     Platelets   Date Value Ref Range Status   03/01/2024 504 (H) 140 - 450 10*3/mm3 Final     Neutrophil %   Date Value Ref Range Status   03/01/2024 36.7 (L) 42.7 - 76.0 % Final     Lymphocyte %   Date Value Ref Range Status   03/01/2024 47.5 (H) 19.6 - 45.3 % Final     Monocyte %   Date Value Ref Range Status   03/01/2024 8.4 5.0 - 12.0 % Final     Eosinophil %   Date Value Ref Range Status   03/01/2024 5.0 0.3 - 6.2 % Final     Basophil %   Date Value Ref Range Status   03/01/2024 1.3 0.0 - 1.5 % Final     Immature Grans %   Date Value Ref Range Status   03/01/2024 1.1 (H) 0.0 - 0.5 % Final     Neutrophils, Absolute   Date Value Ref Range Status   03/01/2024 2.27 1.70 - 7.00 10*3/mm3 Final     Lymphocytes, Absolute   Date Value Ref Range Status   03/01/2024 2.94 0.70 - 3.10 10*3/mm3 Final     Monocytes, Absolute   Date Value Ref Range Status   03/01/2024 0.52 0.10 - 0.90 10*3/mm3 Final     Eosinophils, Absolute   Date Value Ref Range Status   03/01/2024 0.31 0.00 - 0.40 10*3/mm3 Final     Basophils, Absolute   Date Value Ref Range Status   03/01/2024 0.08 0.00 - 0.20 10*3/mm3 Final     Immature Grans, Absolute   Date Value Ref Range Status   03/01/2024 0.07 (H) 0.00 - 0.05 10*3/mm3 Final     nRBC   Date Value Ref Range Status   03/01/2024 0.0 0.0 - 0.2 /100 WBC Final       Total Cholesterol   Date Value Ref Range Status   03/01/2024 193 0 - 200 mg/dL Final     Triglycerides   Date Value Ref Range Status   03/01/2024 363 (H) 0 - 150 mg/dL Final     HDL Cholesterol   Date Value Ref Range Status   03/01/2024 30 (L) 40 - 60 mg/dL Final     LDL  Cholesterol    Date Value Ref Range Status   03/01/2024 101 (H) 0 - 100 mg/dL Final     LDL Chol Calc ([facility])   Date Value Ref Range Status   11/12/2021 124 (H) 0 - 100 mg/dL Final           Assessment & Plan   Diagnoses and all orders for this visit:    1. Essential hypertension (Primary)  Improved with recent discontinuation of Norvasc.  Continue on losartan and metoprolol  Recommend continuing close monitoring of BP and adequate hydration  Sodium restrictions recommended    2. Coronary artery disease involving native coronary artery of native heart with other form of angina pectoris  Asymptomatic, continue on aspirin and beta-blocker.  Patient is statin intolerant and has previously been on Repatha, will plan to reinitiate     3. Apnea  Awaiting further evaluation    4. Dyslipidemia  Discussed and reviewed recent laboratory testing  Refer to medical management clinic to reinitiate Repatha  Continue on fenofibrate  Heart healthy diet  LDL goal < 70    5. Aortic stenosis, mild  Clinically asymptomatic, will continue routine echo surveillance    6. Type 2 diabetes mellitus with hyperglycemia, with long-term current use of insulin  recommend tight glycemic control for overall health benefit, management by PCP     7. CKD  Repeat labs show an improved creatinine and normal potassium, she has upcoming appointment with nephrology, will continue    Review of medical record    Lifestyle modifications including healthy diet, regular exercise, maintenance of desirable body weight and avoidance of tobacco products    Recommend follow-up with PCP for CBC abnormalities     Follow-up in 3 months, sooner if needed

## 2024-03-07 NOTE — LETTER
March 7, 2024     ANH Medina  [address] [facility] [address]    Patient: Monisha Gardner   YOB: 1960   Date of Visit: 3/7/2024       Dear ANH Medina    Monisha Gardner was in my office today. Below is a copy of my note.    If you have questions, please do not hesitate to call me. I look forward to following Monisha along with you.         Sincerely,        TRUONG Briones        CC: No Recipients    Subjective    Monisha Gardner is a 63 y.o. female.   Chief Complaint   Patient presents with   • Hypertension     History of Present Illness   Monisha Gardner is a 63-year-old female who presents to clinic today for cardiology follow-up.    Hypertension with recent episodes of hypotension.  At last visit medication changes were made and she remains on losartan 50 mg twice daily and metoprolol 100 mg twice daily currently.  Her Norvasc was placed on hold.  She states since making medication changes her blood pressure has been much better and she denies any dizziness.  She brings in a list of BPs today which are improved and some readings are slightly elevated however it is before medication.     CAD currently on ASA, Imdur, Metoprolol and Repatha (statin intolerant).  She denies chest pain, dyspnea, palpitations or syncope.  Reports compliance with medicine.  She recently had labs and would like to discuss this today.       Hyperlipidemia with statin intolerance.  She reports continuing on Repatha.  She does report missing some Repatha due to insurance coverage.  She had labs which she would like to discuss today, her last LDL was not at goal. She remains on fenofibrate.      Valvular heart disease which has been stable.  She denies shortness of air orthopnea or swelling.     She reports a jaw mass and recently underwent surgery in [place].  She states things went well.  She is recovering.  Her diet is limited to soft/liquid foods however she is pushing fluids.  She  also reports during hospitalization for surgery she was placed on oxygen at night due to significant desaturations.  She has not been on therapy since coming home and is concerned she may need to continue to utilize oxygen.    CKD and was previously following with nephrology but has not recently been seen.     Patient Active Problem List   Diagnosis   • ASCVD (arteriosclerotic cardiovascular disease)   • Essential hypertension   • Dyslipidemia   • Type 2 diabetes mellitus with hyperglycemia, with long-term current use of insulin   • Body mass index (bmi) 30.0-30.9, adult   • Osteoarthritis   • Atypical angina   • Elevated liver enzymes   • Gastroesophageal reflux disease with esophagitis   • Vitamin D deficiency   • Hx of pancreatitis   • Menopausal symptoms   • Vitamin B 12 deficiency   • Nocturnal leg cramps   • Anxiety   • Left arm weakness   • Chronic gout of multiple sites   • Squamous cell carcinoma in situ (SCCIS) of skin   • Mixed hyperlipidemia   • PVC (premature ventricular contraction)   • RLS (restless legs syndrome)   • Psychophysiological insomnia   • Dry mouth   • Type 2 diabetes mellitus with diabetic autonomic neuropathy, with long-term current use of insulin   • Non-recurrent acute serous otitis media of left ear   • Bone mass   • Mass of mandible     Past Medical History:   Diagnosis Date   • Anxiety    • Arthritis    • Diabetes mellitus    • Diverticulosis    • Gout    • Hyperlipidemia    • Hypertension    • Myocardial infarction      Past Surgical History:   Procedure Laterality Date   • APPENDECTOMY     • BREAST BIOPSY Right     benign   • CARDIAC CATHETERIZATION     •  SECTION     • CHOLECYSTECTOMY     • CORONARY STENT PLACEMENT      Stenting of the RCA with 3.5 x 26 mm Resolute drug-eluting stent   • HYSTERECTOMY     • JOINT REPLACEMENT     • TONSILLECTOMY     • TUMOR REMOVAL  02/15/2024    FACIAL TUMOR     Family History   Problem Relation Age of Onset   • Arthritis Mother    • Heart  disease Mother    • Heart disease Sister    • Vision loss Daughter    • Heart disease Father    • Heart disease Sister    • Heart disease Sister    • Heart disease Sister      Social History     Tobacco Use   • Smoking status: Never     Passive exposure: Never   • Smokeless tobacco: Never   Vaping Use   • Vaping status: Never Used   Substance Use Topics   • Alcohol use: No   • Drug use: No     The following portions of the patient's history were reviewed and updated as appropriate: allergies, current medications, past family history, past medical history, past social history, past surgical history and problem list.    Allergies   Allergen Reactions   • Ciprofloxacin Anaphylaxis   • Penicillins Shortness Of Breath   • Statins Myalgia   • Fish Oil [Omega-3 Fatty Acids] Nausea And Vomiting         Current Outpatient Medications:   •  acyclovir (ZOVIRAX) 800 MG tablet, Take 1 tablet by mouth 3 (Three) Times a Day., Disp: 6 tablet, Rfl: 0  •  allopurinol (ZYLOPRIM) 100 MG tablet, Take 1 tablet by mouth 2 (Two) Times a Day., Disp: 180 tablet, Rfl: 3  •  ammonium lactate (AMLACTIN) 12 % cream, , Disp: , Rfl:   •  aspirin 81 MG EC tablet, Take 1 tablet by mouth Daily., Disp: 100 tablet, Rfl: 3  •  Cariprazine HCl (Vraylar) 3 MG capsule capsule, Take 1 capsule by mouth Daily., Disp: 90 capsule, Rfl: 3  •  Continuous Blood Gluc  (Dexcom G7 ) device, Use 1 each Daily., Disp: 1 each, Rfl: 1  •  Continuous Blood Gluc Sensor (Dexcom G7 Sensor) misc, Use 1 each Every 10 (Ten) Days., Disp: 9 each, Rfl: 3  •  DULoxetine (CYMBALTA) 30 MG capsule, Take 3 capsules by mouth Daily., Disp: 270 capsule, Rfl: 3  •  Easy Touch Lancets 30G/Twist misc, Use tid, Disp: 300 each, Rfl: 3  •  fenofibrate 160 MG tablet, Take 1 tablet by mouth Daily., Disp: 90 tablet, Rfl: 3  •  gabapentin (NEURONTIN) 600 MG tablet, Take 1 tablet by mouth 2 (Two) Times a Day., Disp: 60 tablet, Rfl: 2  •  hydrOXYzine pamoate (VISTARIL) 50 MG capsule,  Take 1 capsule by mouth 3 (Three) Times a Day As Needed for Itching., Disp: 270 capsule, Rfl: 1  •  isosorbide mononitrate (IMDUR) 60 MG 24 hr tablet, Take 0.5 tablets by mouth Daily., Disp: , Rfl:   •  Lantus SoloStar 100 UNIT/ML injection pen, Inject 45 Units under the skin into the appropriate area as directed Daily., Disp: 15 mL, Rfl: 5  •  losartan (COZAAR) 100 MG tablet, Take 0.5 tablets by mouth 2 (Two) Times a Day., Disp: , Rfl:   •  metoprolol tartrate (LOPRESSOR) 100 MG tablet, TAKE 1 TABLET BY MOUTH TWICE DAILY, Disp: 180 tablet, Rfl: 3  •  nitroglycerin (Nitrostat) 0.4 MG SL tablet, Place 1 tablet under the tongue Every 5 (Five) Minutes As Needed for Chest Pain., Disp: 30 tablet, Rfl: 5  •  pantoprazole (PROTONIX) 40 MG EC tablet, Take 1 tablet by mouth Daily., Disp: 90 tablet, Rfl: 3  •  Repatha SureClick solution auto-injector SureClick injection, Inject 1 mL under the skin into the appropriate area as directed Every 14 (Fourteen) Days., Disp: 12 mL, Rfl: 3  •  rOPINIRole (REQUIP) 2 MG tablet, Take 1 tablet by mouth Every Night., Disp: 90 tablet, Rfl: 3  •  traMADol (ULTRAM) 50 MG tablet, TAKE 1 TABLET BY MOUTH EVERY 8 HOURS AS NEEDED FOR 7 DAYS, Disp: , Rfl:   •  True Metrix Blood Glucose Test test strip, 1 each by Other route 3 (Three) Times a Day., Disp: 300 each, Rfl: 3    Review of Systems   Constitutional:  Negative for activity change, appetite change, chills, diaphoresis, fatigue and fever.   HENT:  Negative for congestion, drooling, ear discharge, ear pain, mouth sores, nosebleeds, postnasal drip, rhinorrhea, sinus pressure, sneezing and sore throat.    Eyes:  Negative for pain, discharge and visual disturbance.   Respiratory:  Negative for cough, chest tightness, shortness of breath and wheezing.    Cardiovascular:  Negative for chest pain, palpitations and leg swelling.   Gastrointestinal:  Negative for abdominal pain, constipation, diarrhea, nausea and vomiting.   Endocrine: Negative for  "cold intolerance, heat intolerance, polydipsia, polyphagia and polyuria.   Musculoskeletal:  Negative for arthralgias, myalgias and neck pain.   Skin:  Negative for rash and wound.   Neurological:  Negative for dizziness, syncope, speech difficulty, weakness, light-headedness and headaches.   Hematological:  Negative for adenopathy. Does not bruise/bleed easily.   Psychiatric/Behavioral:  Negative for confusion, dysphoric mood and sleep disturbance. The patient is not nervous/anxious.    All other systems reviewed and are negative.    /70 (BP Location: Left arm, Patient Position: Sitting, Cuff Size: Adult)   Pulse 72   Ht 162.6 cm (64\")   Wt 74.4 kg (164 lb)   LMP  (LMP Unknown)   SpO2 97%   BMI 28.15 kg/m²     Objective  Allergies   Allergen Reactions   • Ciprofloxacin Anaphylaxis   • Penicillins Shortness Of Breath   • Statins Myalgia   • Fish Oil [Omega-3 Fatty Acids] Nausea And Vomiting       Physical Exam  Vitals reviewed.   Constitutional:       Appearance: Normal appearance. She is well-developed and overweight.   HENT:      Head: Normocephalic.      Mouth/Throat:        Comments: Several teeth missing from recent surgery   Eyes:      Conjunctiva/sclera: Conjunctivae normal.   Neck:      Thyroid: No thyromegaly.      Vascular: No carotid bruit or JVD.   Cardiovascular:      Rate and Rhythm: Normal rate and regular rhythm.   Pulmonary:      Effort: Pulmonary effort is normal.      Breath sounds: Normal breath sounds.   Musculoskeletal:      Cervical back: Neck supple.      Right lower leg: No edema.      Left lower leg: No edema.   Skin:     General: Skin is warm and dry.   Neurological:      Mental Status: She is alert and oriented to person, place, and time.   Psychiatric:         Attention and Perception: Attention normal.         Mood and Affect: Mood normal.         Speech: Speech normal.         Behavior: Behavior normal. Behavior is cooperative.         Cognition and Memory: Cognition " normal.           LABS  WBC   Date Value Ref Range Status   03/01/2024 6.19 3.40 - 10.80 10*3/mm3 Final   11/12/2021 10.58 3.40 - 10.80 10*3/mm3 Final     RBC   Date Value Ref Range Status   03/01/2024 4.22 3.77 - 5.28 10*6/mm3 Final   11/12/2021 4.77 3.77 - 5.28 10*6/mm3 Final     Hemoglobin   Date Value Ref Range Status   03/01/2024 11.4 (L) 12.0 - 15.9 g/dL Final     Hematocrit   Date Value Ref Range Status   03/01/2024 34.9 34.0 - 46.6 % Final     MCV   Date Value Ref Range Status   03/01/2024 82.7 79.0 - 97.0 fL Final     MCH   Date Value Ref Range Status   03/01/2024 27.0 26.6 - 33.0 pg Final     MCHC   Date Value Ref Range Status   03/01/2024 32.7 31.5 - 35.7 g/dL Final     RDW   Date Value Ref Range Status   03/01/2024 15.1 12.3 - 15.4 % Final     RDW-SD   Date Value Ref Range Status   03/01/2024 44.3 37.0 - 54.0 fl Final     MPV   Date Value Ref Range Status   03/01/2024 9.9 6.0 - 12.0 fL Final     Platelets   Date Value Ref Range Status   03/01/2024 504 (H) 140 - 450 10*3/mm3 Final     Neutrophil %   Date Value Ref Range Status   03/01/2024 36.7 (L) 42.7 - 76.0 % Final     Lymphocyte %   Date Value Ref Range Status   03/01/2024 47.5 (H) 19.6 - 45.3 % Final     Monocyte %   Date Value Ref Range Status   03/01/2024 8.4 5.0 - 12.0 % Final     Eosinophil %   Date Value Ref Range Status   03/01/2024 5.0 0.3 - 6.2 % Final     Basophil %   Date Value Ref Range Status   03/01/2024 1.3 0.0 - 1.5 % Final     Immature Grans %   Date Value Ref Range Status   03/01/2024 1.1 (H) 0.0 - 0.5 % Final     Neutrophils, Absolute   Date Value Ref Range Status   03/01/2024 2.27 1.70 - 7.00 10*3/mm3 Final     Lymphocytes, Absolute   Date Value Ref Range Status   03/01/2024 2.94 0.70 - 3.10 10*3/mm3 Final     Monocytes, Absolute   Date Value Ref Range Status   03/01/2024 0.52 0.10 - 0.90 10*3/mm3 Final     Eosinophils, Absolute   Date Value Ref Range Status   03/01/2024 0.31 0.00 - 0.40 10*3/mm3 Final     Basophils, Absolute    Date Value Ref Range Status   03/01/2024 0.08 0.00 - 0.20 10*3/mm3 Final     Immature Grans, Absolute   Date Value Ref Range Status   03/01/2024 0.07 (H) 0.00 - 0.05 10*3/mm3 Final     nRBC   Date Value Ref Range Status   03/01/2024 0.0 0.0 - 0.2 /100 WBC Final       Total Cholesterol   Date Value Ref Range Status   03/01/2024 193 0 - 200 mg/dL Final     Triglycerides   Date Value Ref Range Status   03/01/2024 363 (H) 0 - 150 mg/dL Final     HDL Cholesterol   Date Value Ref Range Status   03/01/2024 30 (L) 40 - 60 mg/dL Final     LDL Cholesterol    Date Value Ref Range Status   03/01/2024 101 (H) 0 - 100 mg/dL Final     LDL Chol Calc (NIH)   Date Value Ref Range Status   11/12/2021 124 (H) 0 - 100 mg/dL Final           Assessment & Plan  Diagnoses and all orders for this visit:    1. Essential hypertension (Primary)  Improved with recent discontinuation of Norvasc.  Continue on losartan and metoprolol  Recommend continuing close monitoring of BP and adequate hydration  Sodium restrictions recommended    2. Coronary artery disease involving native coronary artery of native heart with other form of angina pectoris  Asymptomatic, continue on aspirin and beta-blocker.  Patient is statin intolerant and has previously been on Repatha, will plan to reinitiate     3. Apnea  Awaiting further evaluation    4. Dyslipidemia  Discussed and reviewed recent laboratory testing  Refer to medical management clinic to reinitiate Repatha  Continue on fenofibrate  Heart healthy diet  LDL goal < 70    5. Aortic stenosis, mild  Clinically asymptomatic, will continue routine echo surveillance    6. Type 2 diabetes mellitus with hyperglycemia, with long-term current use of insulin  recommend tight glycemic control for overall health benefit, management by PCP     7. CKD  Repeat labs show an improved creatinine and normal potassium, she has upcoming appointment with nephrology, will continue    Review of medical record    Lifestyle  modifications including healthy diet, regular exercise, maintenance of desirable body weight and avoidance of tobacco products    Recommend follow-up with PCP for CBC abnormalities     Follow-up in 3 months, sooner if needed

## 2024-03-15 ENCOUNTER — TELEPHONE (OUTPATIENT)
Dept: FAMILY MEDICINE CLINIC | Facility: CLINIC | Age: 64
End: 2024-03-15
Payer: COMMERCIAL

## 2024-03-16 LAB
BH CV NUCLEAR PRIOR STUDY: 3
BH CV REST NUCLEAR ISOTOPE DOSE: 9.4 MCI
BH CV STRESS BP STAGE 1: NORMAL
BH CV STRESS COMMENTS STAGE 1: NORMAL
BH CV STRESS DOSE REGADENOSON STAGE 1: 0.4
BH CV STRESS DURATION MIN STAGE 1: 0
BH CV STRESS DURATION SEC STAGE 1: 10
BH CV STRESS HR STAGE 1: 85
BH CV STRESS NUCLEAR ISOTOPE DOSE: 27.5 MCI
BH CV STRESS PROTOCOL 1: NORMAL
BH CV STRESS RECOVERY BP: NORMAL MMHG
BH CV STRESS RECOVERY HR: 81 BPM
BH CV STRESS STAGE 1: 1
LV EF NUC BP: 69 %
MAXIMAL PREDICTED HEART RATE: 159 BPM
PERCENT MAX PREDICTED HR: 53.46 %
STRESS BASELINE BP: NORMAL MMHG
STRESS BASELINE HR: 65 BPM
STRESS PERCENT HR: 63 %
STRESS POST PEAK BP: NORMAL MMHG
STRESS POST PEAK HR: 85 BPM
STRESS TARGET HR: 135 BPM

## 2024-03-19 ENCOUNTER — TELEPHONE (OUTPATIENT)
Dept: FAMILY MEDICINE CLINIC | Facility: CLINIC | Age: 64
End: 2024-03-19
Payer: COMMERCIAL

## 2024-03-19 DIAGNOSIS — E11.65 TYPE 2 DIABETES MELLITUS WITH HYPERGLYCEMIA, WITH LONG-TERM CURRENT USE OF INSULIN: Primary | ICD-10-CM

## 2024-03-19 DIAGNOSIS — Z79.4 TYPE 2 DIABETES MELLITUS WITH HYPERGLYCEMIA, WITH LONG-TERM CURRENT USE OF INSULIN: Primary | ICD-10-CM

## 2024-03-19 RX ORDER — SEMAGLUTIDE 1.34 MG/ML
0.25 INJECTION, SOLUTION SUBCUTANEOUS WEEKLY
Qty: 1.5 ML | Refills: 2 | Status: SHIPPED | OUTPATIENT
Start: 2024-03-19

## 2024-03-19 NOTE — TELEPHONE ENCOUNTER
Pt is aware of this information.       ----- Message from ANH Medina sent at 3/19/2024  1:55 PM EDT -----  Inform the patient that I sent in prescription for Ozempic to help with her blood glucose.  I recommend monitoring blood glucose daily.  ----- Message -----  From: Sophy Stevens MA  Sent: 3/19/2024  11:32 AM EDT  To: ANH Medina    Pt stated that her blood sugar has went back up to 200 and 300. She wanted to know if you could put her back on the ozempic to help control it.

## 2024-03-20 ENCOUNTER — TELEPHONE (OUTPATIENT)
Dept: FAMILY MEDICINE CLINIC | Facility: CLINIC | Age: 64
End: 2024-03-20

## 2024-03-20 NOTE — TELEPHONE ENCOUNTER
Caller: Monisha Gardner    Relationship: Self    Best call back number: [phone]     What test/procedure requested: PRIOR AUTHORIZATION FOR THE OZEMPIC    When is it needed: AS SOON AS POSSIBLE

## 2024-03-29 ENCOUNTER — OFFICE VISIT (OUTPATIENT)
Dept: CARDIOLOGY | Facility: CLINIC | Age: 64
End: 2024-03-29
Payer: COMMERCIAL

## 2024-03-29 ENCOUNTER — TELEPHONE (OUTPATIENT)
Dept: CARDIOLOGY | Facility: CLINIC | Age: 64
End: 2024-03-29

## 2024-03-29 VITALS
OXYGEN SATURATION: 98 % | BODY MASS INDEX: 27.14 KG/M2 | HEIGHT: 64 IN | HEART RATE: 49 BPM | SYSTOLIC BLOOD PRESSURE: 116 MMHG | WEIGHT: 159 LBS | DIASTOLIC BLOOD PRESSURE: 64 MMHG

## 2024-03-29 DIAGNOSIS — R06.81 APNEA: ICD-10-CM

## 2024-03-29 DIAGNOSIS — E78.5 DYSLIPIDEMIA: ICD-10-CM

## 2024-03-29 DIAGNOSIS — N18.32 STAGE 3B CHRONIC KIDNEY DISEASE: ICD-10-CM

## 2024-03-29 DIAGNOSIS — Z79.4 TYPE 2 DIABETES MELLITUS WITH HYPERGLYCEMIA, WITH LONG-TERM CURRENT USE OF INSULIN: ICD-10-CM

## 2024-03-29 DIAGNOSIS — R00.1 BRADYCARDIA, SINUS: ICD-10-CM

## 2024-03-29 DIAGNOSIS — I35.0 AORTIC STENOSIS, MILD: ICD-10-CM

## 2024-03-29 DIAGNOSIS — I25.118 CORONARY ARTERY DISEASE INVOLVING NATIVE CORONARY ARTERY OF NATIVE HEART WITH OTHER FORM OF ANGINA PECTORIS: Primary | ICD-10-CM

## 2024-03-29 DIAGNOSIS — I10 ESSENTIAL HYPERTENSION: ICD-10-CM

## 2024-03-29 DIAGNOSIS — R94.39 ABNORMAL NUCLEAR STRESS TEST: ICD-10-CM

## 2024-03-29 DIAGNOSIS — E11.65 TYPE 2 DIABETES MELLITUS WITH HYPERGLYCEMIA, WITH LONG-TERM CURRENT USE OF INSULIN: ICD-10-CM

## 2024-03-29 NOTE — LETTER
April 11, 2024     ANH Medina  [address] [facility] [address]    Patient: Monisha Gardner   YOB: 1960   Date of Visit: 3/29/2024       Dear ANH Medina    Monisha Gardner was in my office today. Below is a copy of my note.    If you have questions, please do not hesitate to call me. I look forward to following Monisha along with you.         Sincerely,        TRUONG Briones        CC: No Recipients    Subjective    Monisha Gardner is a 63 y.o. female.   Chief Complaint   Patient presents with   • Slow Heart Rate     Today         History of Present Illness   Monisha Gardner is a 63-year-old female who presents to clinic today for cardiology follow-up.    Hypertension with recent episodes of hypotension.  At last visit medication changes were made and she remains on losartan 50 mg twice daily and metoprolol 100 mg twice daily currently.  Her Norvasc was placed on hold.  She states since making medication changes her blood pressure has been much better and she denies any dizziness.       CAD currently on ASA, Imdur, Metoprolol and Repatha (statin intolerant).  She denies chest pain, dyspnea, palpitations or syncope.  Reports compliance with medicine. She had completed a nuclear stress test in August 2023 which had been discussed with patient but after further review of the nuclear stress test it was felt that there was an area of concern.  This was discussed with her today as well as treatment options to consider cardiac cath versus aggressive risk factor modification.  He has underlying CKD and will need clearance from nephrology before proceeding.      Hyperlipidemia with statin intolerance.  She reports continuing on Repatha she does report missing some Repatha due to insurance coverage. She has been referred to medical management clinic, a sample was given while awaiting approval. She remains on fenofibrate.      Valvular heart disease which has been stable.   She denies shortness of air, orthopnea or swelling.     She reports a jaw mass and recently underwent surgery in [place].  She states things went well and she was recovering nicely but recently developed an abscess. Her diet is limited to soft/liquid foods however she is pushing fluids. She continues to follow closely with oral surgeon. She remains on antibiotics.     She reports during hospitalization for surgery she was placed on oxygen at night due to significant desaturations. She completed a home overnight study which was abnormal and oxygen has been prescribed.      CKD and was previously following with nephrology but has not recently been seen. She reports recent re-establishing with nephrology associates.      Patient Active Problem List   Diagnosis   • ASCVD (arteriosclerotic cardiovascular disease)   • Essential hypertension   • Dyslipidemia   • Type 2 diabetes mellitus with hyperglycemia, with long-term current use of insulin   • Body mass index (bmi) 30.0-30.9, adult   • Osteoarthritis   • Atypical angina   • Elevated liver enzymes   • Gastroesophageal reflux disease with esophagitis   • Vitamin D deficiency   • Hx of pancreatitis   • Menopausal symptoms   • Vitamin B 12 deficiency   • Nocturnal leg cramps   • Anxiety   • Left arm weakness   • Chronic gout of multiple sites   • Squamous cell carcinoma in situ (SCCIS) of skin   • Mixed hyperlipidemia   • PVC (premature ventricular contraction)   • RLS (restless legs syndrome)   • Psychophysiological insomnia   • Dry mouth   • Type 2 diabetes mellitus with diabetic autonomic neuropathy, with long-term current use of insulin   • Non-recurrent acute serous otitis media of left ear   • Bone mass   • Mass of mandible     Past Medical History:   Diagnosis Date   • Anxiety    • Arthritis    • Diabetes mellitus    • Diverticulosis    • Gout    • Hyperlipidemia    • Hypertension    • Myocardial infarction      Past Surgical History:   Procedure Laterality Date    • APPENDECTOMY     • BREAST BIOPSY Right     benign   • CARDIAC CATHETERIZATION     •  SECTION     • CHOLECYSTECTOMY     • CORONARY STENT PLACEMENT      Stenting of the RCA with 3.5 x 26 mm Resolute drug-eluting stent   • HYSTERECTOMY     • JOINT REPLACEMENT     • TONSILLECTOMY     • TUMOR REMOVAL  02/15/2024    FACIAL TUMOR     Family History   Problem Relation Age of Onset   • Arthritis Mother    • Heart disease Mother    • Heart disease Sister    • Vision loss Daughter    • Heart disease Father    • Heart disease Sister    • Heart disease Sister    • Heart disease Sister      Social History     Tobacco Use   • Smoking status: Never     Passive exposure: Never   • Smokeless tobacco: Never   Vaping Use   • Vaping status: Never Used   Substance Use Topics   • Alcohol use: No   • Drug use: No     The following portions of the patient's history were reviewed and updated as appropriate: allergies, current medications, past family history, past medical history, past social history, past surgical history and problem list.    Allergies   Allergen Reactions   • Ciprofloxacin Anaphylaxis   • Penicillins Shortness Of Breath   • Statins Myalgia   • Fish Oil [Omega-3 Fatty Acids] Nausea And Vomiting       Current Outpatient Medications:   •  allopurinol (ZYLOPRIM) 100 MG tablet, Take 1 tablet by mouth 2 (Two) Times a Day., Disp: 180 tablet, Rfl: 3  •  ammonium lactate (AMLACTIN) 12 % cream, , Disp: , Rfl:   •  aspirin 81 MG EC tablet, Take 1 tablet by mouth Daily., Disp: 100 tablet, Rfl: 3  •  Cariprazine HCl (Vraylar) 3 MG capsule capsule, Take 1 capsule by mouth Daily., Disp: 90 capsule, Rfl: 3  •  Continuous Blood Gluc  (Dexcom G7 ) device, Use 1 each Daily., Disp: 1 each, Rfl: 1  •  Continuous Blood Gluc Sensor (Dexcom G7 Sensor) misc, Use 1 each Every 10 (Ten) Days., Disp: 9 each, Rfl: 3  •  DULoxetine (CYMBALTA) 30 MG capsule, Take 3 capsules by mouth Daily., Disp: 270 capsule, Rfl: 3  •  Easy  Touch Lancets 30G/Twist misc, Use tid, Disp: 300 each, Rfl: 3  •  fenofibrate 160 MG tablet, Take 1 tablet by mouth Daily., Disp: 90 tablet, Rfl: 3  •  gabapentin (NEURONTIN) 600 MG tablet, Take 1 tablet by mouth 2 (Two) Times a Day., Disp: 60 tablet, Rfl: 2  •  hydrOXYzine pamoate (VISTARIL) 50 MG capsule, Take 1 capsule by mouth 3 (Three) Times a Day As Needed for Itching., Disp: 270 capsule, Rfl: 1  •  isosorbide mononitrate (IMDUR) 60 MG 24 hr tablet, Take 0.5 tablets by mouth Daily., Disp: , Rfl:   •  Lantus SoloStar 100 UNIT/ML injection pen, Inject 45 Units under the skin into the appropriate area as directed Daily., Disp: 15 mL, Rfl: 5  •  losartan (COZAAR) 100 MG tablet, Take 0.5 tablets by mouth 2 (Two) Times a Day., Disp: , Rfl:   •  metoprolol tartrate (LOPRESSOR) 100 MG tablet, TAKE 1 TABLET BY MOUTH TWICE DAILY, Disp: 180 tablet, Rfl: 3  •  nitroglycerin (Nitrostat) 0.4 MG SL tablet, Place 1 tablet under the tongue Every 5 (Five) Minutes As Needed for Chest Pain., Disp: 30 tablet, Rfl: 5  •  pantoprazole (PROTONIX) 40 MG EC tablet, Take 1 tablet by mouth Daily., Disp: 90 tablet, Rfl: 3  •  rOPINIRole (REQUIP) 2 MG tablet, Take 1 tablet by mouth Every Night., Disp: 90 tablet, Rfl: 3  •  Semaglutide,0.25 or 0.5MG/DOS, (Ozempic, 0.25 or 0.5 MG/DOSE,) 2 MG/1.5ML solution pen-injector, Inject 0.25 mg under the skin into the appropriate area as directed 1 (One) Time Per Week., Disp: 1.5 mL, Rfl: 2  •  traMADol (ULTRAM) 50 MG tablet, TAKE 1 TABLET BY MOUTH EVERY 8 HOURS AS NEEDED FOR 7 DAYS, Disp: , Rfl:   •  True Metrix Blood Glucose Test test strip, 1 each by Other route 3 (Three) Times a Day., Disp: 300 each, Rfl: 3  •  Continuous Blood Gluc  (Dexcom G7 ) device, Use 1 each Daily., Disp: 1 each, Rfl: 0  •  Continuous Blood Gluc Sensor (Dexcom G7 Sensor) misc, Use 1 each Every 10 (Ten) Days., Disp: 4 each, Rfl: 11  •  Evolocumab (REPATHA) solution auto-injector SureClick injection, Inject 1  "mL under the skin into the appropriate area as directed Every 14 (Fourteen) Days., Disp: 2 mL, Rfl: 5  •  Insulin Lispro 200 UNIT/ML solution pen-injector, Inject 6 Units under the skin into the appropriate area as directed Take As Directed., Disp: 15 mL, Rfl: 5  •  Insulin Pen Needle 32G X 5 MM misc, Use 1 each 3 times a day., Disp: 100 each, Rfl: 5  •  triamcinolone (KENALOG) 0.5 % cream, Apply 1 Application topically to the appropriate area as directed 3 (Three) Times a Day., Disp: 30 g, Rfl: 0    Review of Systems   Constitutional:  Negative for activity change, appetite change, chills, diaphoresis, fatigue and fever.   HENT:  Negative for congestion, drooling, ear discharge, ear pain, mouth sores, nosebleeds, postnasal drip, rhinorrhea, sinus pressure, sneezing and sore throat.    Eyes:  Negative for pain, discharge and visual disturbance.   Respiratory:  Negative for cough, chest tightness, shortness of breath and wheezing.    Cardiovascular:  Negative for chest pain, palpitations and leg swelling.   Gastrointestinal:  Negative for abdominal pain, constipation, diarrhea, nausea and vomiting.   Endocrine: Negative for cold intolerance, heat intolerance, polydipsia, polyphagia and polyuria.   Musculoskeletal:  Negative for arthralgias, myalgias and neck pain.   Skin:  Negative for rash and wound.   Neurological:  Negative for dizziness, syncope, speech difficulty, weakness, light-headedness and headaches.   Hematological:  Negative for adenopathy. Does not bruise/bleed easily.   Psychiatric/Behavioral:  Negative for confusion, dysphoric mood and sleep disturbance. The patient is not nervous/anxious.    All other systems reviewed and are negative.    /64 (BP Location: Left arm, Patient Position: Sitting, Cuff Size: Adult)   Pulse (!) 49   Ht 162.6 cm (64\")   Wt 72.1 kg (159 lb)   LMP  (LMP Unknown)   SpO2 98%   BMI 27.29 kg/m²     Objective  Allergies   Allergen Reactions   • Ciprofloxacin Anaphylaxis "   • Penicillins Shortness Of Breath   • Statins Myalgia   • Fish Oil [Omega-3 Fatty Acids] Nausea And Vomiting     Physical Exam  Vitals reviewed.   Constitutional:       Appearance: Normal appearance. She is well-developed.   HENT:      Head: Normocephalic.   Eyes:      Conjunctiva/sclera: Conjunctivae normal.   Neck:      Thyroid: No thyromegaly.      Vascular: No carotid bruit or JVD.   Cardiovascular:      Rate and Rhythm: Normal rate and regular rhythm.   Pulmonary:      Effort: Pulmonary effort is normal.      Breath sounds: Normal breath sounds.   Musculoskeletal:      Cervical back: Neck supple.      Right lower leg: No edema.      Left lower leg: No edema.   Skin:     General: Skin is warm and dry.   Neurological:      Mental Status: She is alert and oriented to person, place, and time.   Psychiatric:         Attention and Perception: Attention normal.         Mood and Affect: Mood normal.         Speech: Speech normal.         Behavior: Behavior normal. Behavior is cooperative.         Cognition and Memory: Cognition normal.           ECG 12 Lead    Date/Time: 3/29/2024 3:27 PM  Performed by: Asia Hawkins APRN    Authorized by: Asia Hawkins APRN  Comparison: compared with previous ECG   Similar to previous ECG  Comparison to previous ECG: Sinus bradycardia has replaced sinus rhythm   Rhythm: sinus bradycardia  BPM: 46  Conduction: non-specific intraventricular conduction delay  Other findings: non-specific ST-T wave changes    Clinical impression: abnormal EKG  Comments: QT/QTc - 501/460          LABS  WBC   Date Value Ref Range Status   03/01/2024 6.19 3.40 - 10.80 10*3/mm3 Final   11/12/2021 10.58 3.40 - 10.80 10*3/mm3 Final     RBC   Date Value Ref Range Status   03/01/2024 4.22 3.77 - 5.28 10*6/mm3 Final   11/12/2021 4.77 3.77 - 5.28 10*6/mm3 Final     Hemoglobin   Date Value Ref Range Status   03/01/2024 11.4 (L) 12.0 - 15.9 g/dL Final     Hematocrit   Date Value Ref Range Status    03/01/2024 34.9 34.0 - 46.6 % Final     MCV   Date Value Ref Range Status   03/01/2024 82.7 79.0 - 97.0 fL Final     MCH   Date Value Ref Range Status   03/01/2024 27.0 26.6 - 33.0 pg Final     MCHC   Date Value Ref Range Status   03/01/2024 32.7 31.5 - 35.7 g/dL Final     RDW   Date Value Ref Range Status   03/01/2024 15.1 12.3 - 15.4 % Final     RDW-SD   Date Value Ref Range Status   03/01/2024 44.3 37.0 - 54.0 fl Final     MPV   Date Value Ref Range Status   03/01/2024 9.9 6.0 - 12.0 fL Final     Platelets   Date Value Ref Range Status   03/01/2024 504 (H) 140 - 450 10*3/mm3 Final     Neutrophil %   Date Value Ref Range Status   03/01/2024 36.7 (L) 42.7 - 76.0 % Final     Lymphocyte %   Date Value Ref Range Status   03/01/2024 47.5 (H) 19.6 - 45.3 % Final     Monocyte %   Date Value Ref Range Status   03/01/2024 8.4 5.0 - 12.0 % Final     Eosinophil %   Date Value Ref Range Status   03/01/2024 5.0 0.3 - 6.2 % Final     Basophil %   Date Value Ref Range Status   03/01/2024 1.3 0.0 - 1.5 % Final     Immature Grans %   Date Value Ref Range Status   03/01/2024 1.1 (H) 0.0 - 0.5 % Final     Neutrophils, Absolute   Date Value Ref Range Status   03/01/2024 2.27 1.70 - 7.00 10*3/mm3 Final     Lymphocytes, Absolute   Date Value Ref Range Status   03/01/2024 2.94 0.70 - 3.10 10*3/mm3 Final     Monocytes, Absolute   Date Value Ref Range Status   03/01/2024 0.52 0.10 - 0.90 10*3/mm3 Final     Eosinophils, Absolute   Date Value Ref Range Status   03/01/2024 0.31 0.00 - 0.40 10*3/mm3 Final     Basophils, Absolute   Date Value Ref Range Status   03/01/2024 0.08 0.00 - 0.20 10*3/mm3 Final     Immature Grans, Absolute   Date Value Ref Range Status   03/01/2024 0.07 (H) 0.00 - 0.05 10*3/mm3 Final     nRBC   Date Value Ref Range Status   03/01/2024 0.0 0.0 - 0.2 /100 WBC Final       Total Cholesterol   Date Value Ref Range Status   03/01/2024 193 0 - 200 mg/dL Final     Triglycerides   Date Value Ref Range Status   03/01/2024 363  (H) 0 - 150 mg/dL Final     HDL Cholesterol   Date Value Ref Range Status   03/01/2024 30 (L) 40 - 60 mg/dL Final     LDL Cholesterol    Date Value Ref Range Status   03/01/2024 101 (H) 0 - 100 mg/dL Final     LDL Chol Calc (NIH)   Date Value Ref Range Status   11/12/2021 124 (H) 0 - 100 mg/dL Final     IMAGING   US DUPLEX RENAL ARTERY COMPLETE    Result Date: 3/11/2024  Normal renal ultrasound . Elevated RI bilaterally . Images reviewed, interpreted, and dictated by Dr. HANNAH Barajas. Transcribed by Halima Orozco PA-C.            Assessment & Plan  Diagnoses and all orders for this visit:    1. Coronary artery disease involving native coronary artery of native heart with other form of angina pectoris (Primary)  -     ECG 12 Lead  EKG reviewed and discussed, nonspecific changes noted.  Patient is asymptomatic.  Continue on aspirin, Lopressor, Repatha and Imdur.  She is statin intolerant  Advised if new or worsening symptoms seek urgent medical attention. She expresses understanding     2. Abnormal nuclear stress test  Aggressive risk factor modification, she is agreeable to proceed with cardiac cath however we will need to obtain nephrology clearance due to underlying CKD before proceeding    3. Essential hypertension  Controlled, continue current therapy    4. Apnea  Continue with nighttime oxygen utilization    5. Dyslipidemia  Continue on Repatha, medical management clinic assisting with preauthorization.     6. Aortic stenosis, mild  Clinically is asymptomatic, will continue to monitor    7. Stage 3b chronic kidney disease  Stable, patient follow with nephrology    8. Type 2 diabetes mellitus with hyperglycemia, with long-term current use of insulin  Recommend tight glycemic control for overall health benefit, management by primary    9. Bradycardia, sinus  EKG with sinus bradycardia however patient is asymptomatic, will continue to monitor, could consider decreasing her Lopressor     Lifestyle  modifications including heart healthy diet, regular exercise, maintenance of desirable body weight and avoidance of tobacco product    Follow-up in 2 months with EKG, sooner if needed

## 2024-03-29 NOTE — PROGRESS NOTES
Subjective     Monisha Gardner is a 63 y.o. female.   Chief Complaint   Patient presents with    Slow Heart Rate     Today         History of Present Illness   Monisha Gardner is a 63-year-old female who presents to clinic today for cardiology follow-up.    Hypertension with recent episodes of hypotension.  At last visit medication changes were made and she remains on losartan 50 mg twice daily and metoprolol 100 mg twice daily currently.  Her Norvasc was placed on hold.  She states since making medication changes her blood pressure has been much better and she denies any dizziness.       CAD currently on ASA, Imdur, Metoprolol and Repatha (statin intolerant).  She denies chest pain, dyspnea, palpitations or syncope.  Reports compliance with medicine. She had completed a nuclear stress test in August 2023 which had been discussed with patient but after further review of the nuclear stress test it was felt that there was an area of concern.  This was discussed with her today as well as treatment options to consider cardiac cath versus aggressive risk factor modification.  He has underlying CKD and will need clearance from nephrology before proceeding.      Hyperlipidemia with statin intolerance.  She reports continuing on Repatha she does report missing some Repatha due to insurance coverage. She has been referred to medical management clinic, a sample was given while awaiting approval. She remains on fenofibrate.      Valvular heart disease which has been stable.  She denies shortness of air, orthopnea or swelling.     She reports a jaw mass and recently underwent surgery in [place].  She states things went well and she was recovering nicely but recently developed an abscess. Her diet is limited to soft/liquid foods however she is pushing fluids. She continues to follow closely with oral surgeon. She remains on antibiotics.     She reports during hospitalization for surgery she was placed on oxygen at night  due to significant desaturations. She completed a home overnight study which was abnormal and oxygen has been prescribed.      CKD and was previously following with nephrology but has not recently been seen. She reports recent [facility].      Patient Active Problem List   Diagnosis    ASCVD (arteriosclerotic cardiovascular disease)    Essential hypertension    Dyslipidemia    Type 2 diabetes mellitus with hyperglycemia, with long-term current use of insulin    Body mass index (bmi) 30.0-30.9, adult    Osteoarthritis    Atypical angina    Elevated liver enzymes    Gastroesophageal reflux disease with esophagitis    Vitamin D deficiency    Hx of pancreatitis    Menopausal symptoms    Vitamin B 12 deficiency    Nocturnal leg cramps    Anxiety    Left arm weakness    Chronic gout of multiple sites    Squamous cell carcinoma in situ (SCCIS) of skin    Mixed hyperlipidemia    PVC (premature ventricular contraction)    RLS (restless legs syndrome)    Psychophysiological insomnia    Dry mouth    Type 2 diabetes mellitus with diabetic autonomic neuropathy, with long-term current use of insulin    Non-recurrent acute serous otitis media of left ear    Bone mass    Mass of mandible     Past Medical History:   Diagnosis Date    Anxiety     Arthritis     Diabetes mellitus     Diverticulosis     Gout     Hyperlipidemia     Hypertension     Myocardial infarction      Past Surgical History:   Procedure Laterality Date    APPENDECTOMY      BREAST BIOPSY Right     benign    CARDIAC CATHETERIZATION       SECTION      CHOLECYSTECTOMY      CORONARY STENT PLACEMENT      Stenting of the RCA with 3.5 x 26 mm Resolute drug-eluting stent    HYSTERECTOMY      JOINT REPLACEMENT      TONSILLECTOMY      TUMOR REMOVAL  02/15/2024    FACIAL TUMOR     Family History   Problem Relation Age of Onset    Arthritis Mother     Heart disease Mother     Heart disease Sister     Vision loss Daughter     Heart disease  Father     Heart disease Sister     Heart disease Sister     Heart disease Sister      Social History     Tobacco Use    Smoking status: Never     Passive exposure: Never    Smokeless tobacco: Never   Vaping Use    Vaping status: Never Used   Substance Use Topics    Alcohol use: No    Drug use: No     The following portions of the patient's history were reviewed and updated as appropriate: allergies, current medications, past family history, past medical history, past social history, past surgical history and problem list.    Allergies   Allergen Reactions    Ciprofloxacin Anaphylaxis    Penicillins Shortness Of Breath    Statins Myalgia    Fish Oil [Omega-3 Fatty Acids] Nausea And Vomiting       Current Outpatient Medications:     allopurinol (ZYLOPRIM) 100 MG tablet, Take 1 tablet by mouth 2 (Two) Times a Day., Disp: 180 tablet, Rfl: 3    ammonium lactate (AMLACTIN) 12 % cream, , Disp: , Rfl:     aspirin 81 MG EC tablet, Take 1 tablet by mouth Daily., Disp: 100 tablet, Rfl: 3    Cariprazine HCl (Vraylar) 3 MG capsule capsule, Take 1 capsule by mouth Daily., Disp: 90 capsule, Rfl: 3    Continuous Blood Gluc  (Dexcom G7 ) device, Use 1 each Daily., Disp: 1 each, Rfl: 1    Continuous Blood Gluc Sensor (Dexcom G7 Sensor) misc, Use 1 each Every 10 (Ten) Days., Disp: 9 each, Rfl: 3    DULoxetine (CYMBALTA) 30 MG capsule, Take 3 capsules by mouth Daily., Disp: 270 capsule, Rfl: 3    Easy Touch Lancets 30G/Twist misc, Use tid, Disp: 300 each, Rfl: 3    fenofibrate 160 MG tablet, Take 1 tablet by mouth Daily., Disp: 90 tablet, Rfl: 3    gabapentin (NEURONTIN) 600 MG tablet, Take 1 tablet by mouth 2 (Two) Times a Day., Disp: 60 tablet, Rfl: 2    hydrOXYzine pamoate (VISTARIL) 50 MG capsule, Take 1 capsule by mouth 3 (Three) Times a Day As Needed for Itching., Disp: 270 capsule, Rfl: 1    isosorbide mononitrate (IMDUR) 60 MG 24 hr tablet, Take 0.5 tablets by mouth Daily., Disp: , Rfl:     Lantus SoloStar 100  UNIT/ML injection pen, Inject 45 Units under the skin into the appropriate area as directed Daily., Disp: 15 mL, Rfl: 5    losartan (COZAAR) 100 MG tablet, Take 0.5 tablets by mouth 2 (Two) Times a Day., Disp: , Rfl:     metoprolol tartrate (LOPRESSOR) 100 MG tablet, TAKE 1 TABLET BY MOUTH TWICE DAILY, Disp: 180 tablet, Rfl: 3    nitroglycerin (Nitrostat) 0.4 MG SL tablet, Place 1 tablet under the tongue Every 5 (Five) Minutes As Needed for Chest Pain., Disp: 30 tablet, Rfl: 5    pantoprazole (PROTONIX) 40 MG EC tablet, Take 1 tablet by mouth Daily., Disp: 90 tablet, Rfl: 3    rOPINIRole (REQUIP) 2 MG tablet, Take 1 tablet by mouth Every Night., Disp: 90 tablet, Rfl: 3    Semaglutide,0.25 or 0.5MG/DOS, (Ozempic, 0.25 or 0.5 MG/DOSE,) 2 MG/1.5ML solution pen-injector, Inject 0.25 mg under the skin into the appropriate area as directed 1 (One) Time Per Week., Disp: 1.5 mL, Rfl: 2    traMADol (ULTRAM) 50 MG tablet, TAKE 1 TABLET BY MOUTH EVERY 8 HOURS AS NEEDED FOR 7 DAYS, Disp: , Rfl:     True Metrix Blood Glucose Test test strip, 1 each by Other route 3 (Three) Times a Day., Disp: 300 each, Rfl: 3    Continuous Blood Gluc  (Dexcom G7 ) device, Use 1 each Daily., Disp: 1 each, Rfl: 0    Continuous Blood Gluc Sensor (Dexcom G7 Sensor) misc, Use 1 each Every 10 (Ten) Days., Disp: 4 each, Rfl: 11    Evolocumab (REPATHA) solution auto-injector SureClick injection, Inject 1 mL under the skin into the appropriate area as directed Every 14 (Fourteen) Days., Disp: 2 mL, Rfl: 5    Insulin Lispro 200 UNIT/ML solution pen-injector, Inject 6 Units under the skin into the appropriate area as directed Take As Directed., Disp: 15 mL, Rfl: 5    Insulin Pen Needle 32G X 5 MM misc, Use 1 each 3 times a day., Disp: 100 each, Rfl: 5    triamcinolone (KENALOG) 0.5 % cream, Apply 1 Application topically to the appropriate area as directed 3 (Three) Times a Day., Disp: 30 g, Rfl: 0    Review of Systems   Constitutional:   "Negative for activity change, appetite change, chills, diaphoresis, fatigue and fever.   HENT:  Negative for congestion, drooling, ear discharge, ear pain, mouth sores, nosebleeds, postnasal drip, rhinorrhea, sinus pressure, sneezing and sore throat.    Eyes:  Negative for pain, discharge and visual disturbance.   Respiratory:  Negative for cough, chest tightness, shortness of breath and wheezing.    Cardiovascular:  Negative for chest pain, palpitations and leg swelling.   Gastrointestinal:  Negative for abdominal pain, constipation, diarrhea, nausea and vomiting.   Endocrine: Negative for cold intolerance, heat intolerance, polydipsia, polyphagia and polyuria.   Musculoskeletal:  Negative for arthralgias, myalgias and neck pain.   Skin:  Negative for rash and wound.   Neurological:  Negative for dizziness, syncope, speech difficulty, weakness, light-headedness and headaches.   Hematological:  Negative for adenopathy. Does not bruise/bleed easily.   Psychiatric/Behavioral:  Negative for confusion, dysphoric mood and sleep disturbance. The patient is not nervous/anxious.    All other systems reviewed and are negative.    /64 (BP Location: Left arm, Patient Position: Sitting, Cuff Size: Adult)   Pulse (!) 49   Ht 162.6 cm (64\")   Wt 72.1 kg (159 lb)   LMP  (LMP Unknown)   SpO2 98%   BMI 27.29 kg/m²     Objective   Allergies   Allergen Reactions    Ciprofloxacin Anaphylaxis    Penicillins Shortness Of Breath    Statins Myalgia    Fish Oil [Omega-3 Fatty Acids] Nausea And Vomiting     Physical Exam  Vitals reviewed.   Constitutional:       Appearance: Normal appearance. She is well-developed.   HENT:      Head: Normocephalic.   Eyes:      Conjunctiva/sclera: Conjunctivae normal.   Neck:      Thyroid: No thyromegaly.      Vascular: No carotid bruit or JVD.   Cardiovascular:      Rate and Rhythm: Normal rate and regular rhythm.   Pulmonary:      Effort: Pulmonary effort is normal.      Breath sounds: Normal " breath sounds.   Musculoskeletal:      Cervical back: Neck supple.      Right lower leg: No edema.      Left lower leg: No edema.   Skin:     General: Skin is warm and dry.   Neurological:      Mental Status: She is alert and oriented to person, place, and time.   Psychiatric:         Attention and Perception: Attention normal.         Mood and Affect: Mood normal.         Speech: Speech normal.         Behavior: Behavior normal. Behavior is cooperative.         Cognition and Memory: Cognition normal.           ECG 12 Lead    Date/Time: 3/29/2024 3:27 PM  Performed by: Asia Hawkins APRN    Authorized by: Asia Hawkins APRN  Comparison: compared with previous ECG   Similar to previous ECG  Comparison to previous ECG: Sinus bradycardia has replaced sinus rhythm   Rhythm: sinus bradycardia  BPM: 46  Conduction: non-specific intraventricular conduction delay  Other findings: non-specific ST-T wave changes    Clinical impression: abnormal EKG  Comments: QT/QTc - 501/460          LABS  WBC   Date Value Ref Range Status   03/01/2024 6.19 3.40 - 10.80 10*3/mm3 Final   11/12/2021 10.58 3.40 - 10.80 10*3/mm3 Final     RBC   Date Value Ref Range Status   03/01/2024 4.22 3.77 - 5.28 10*6/mm3 Final   11/12/2021 4.77 3.77 - 5.28 10*6/mm3 Final     Hemoglobin   Date Value Ref Range Status   03/01/2024 11.4 (L) 12.0 - 15.9 g/dL Final     Hematocrit   Date Value Ref Range Status   03/01/2024 34.9 34.0 - 46.6 % Final     MCV   Date Value Ref Range Status   03/01/2024 82.7 79.0 - 97.0 fL Final     MCH   Date Value Ref Range Status   03/01/2024 27.0 26.6 - 33.0 pg Final     MCHC   Date Value Ref Range Status   03/01/2024 32.7 31.5 - 35.7 g/dL Final     RDW   Date Value Ref Range Status   03/01/2024 15.1 12.3 - 15.4 % Final     RDW-SD   Date Value Ref Range Status   03/01/2024 44.3 37.0 - 54.0 fl Final     MPV   Date Value Ref Range Status   03/01/2024 9.9 6.0 - 12.0 fL Final     Platelets   Date Value Ref Range Status    03/01/2024 504 (H) 140 - 450 10*3/mm3 Final     Neutrophil %   Date Value Ref Range Status   03/01/2024 36.7 (L) 42.7 - 76.0 % Final     Lymphocyte %   Date Value Ref Range Status   03/01/2024 47.5 (H) 19.6 - 45.3 % Final     Monocyte %   Date Value Ref Range Status   03/01/2024 8.4 5.0 - 12.0 % Final     Eosinophil %   Date Value Ref Range Status   03/01/2024 5.0 0.3 - 6.2 % Final     Basophil %   Date Value Ref Range Status   03/01/2024 1.3 0.0 - 1.5 % Final     Immature Grans %   Date Value Ref Range Status   03/01/2024 1.1 (H) 0.0 - 0.5 % Final     Neutrophils, Absolute   Date Value Ref Range Status   03/01/2024 2.27 1.70 - 7.00 10*3/mm3 Final     Lymphocytes, Absolute   Date Value Ref Range Status   03/01/2024 2.94 0.70 - 3.10 10*3/mm3 Final     Monocytes, Absolute   Date Value Ref Range Status   03/01/2024 0.52 0.10 - 0.90 10*3/mm3 Final     Eosinophils, Absolute   Date Value Ref Range Status   03/01/2024 0.31 0.00 - 0.40 10*3/mm3 Final     Basophils, Absolute   Date Value Ref Range Status   03/01/2024 0.08 0.00 - 0.20 10*3/mm3 Final     Immature Grans, Absolute   Date Value Ref Range Status   03/01/2024 0.07 (H) 0.00 - 0.05 10*3/mm3 Final     nRBC   Date Value Ref Range Status   03/01/2024 0.0 0.0 - 0.2 /100 WBC Final       Total Cholesterol   Date Value Ref Range Status   03/01/2024 193 0 - 200 mg/dL Final     Triglycerides   Date Value Ref Range Status   03/01/2024 363 (H) 0 - 150 mg/dL Final     HDL Cholesterol   Date Value Ref Range Status   03/01/2024 30 (L) 40 - 60 mg/dL Final     LDL Cholesterol    Date Value Ref Range Status   03/01/2024 101 (H) 0 - 100 mg/dL Final     LDL Chol Calc (NIH)   Date Value Ref Range Status   11/12/2021 124 (H) 0 - 100 mg/dL Final     IMAGING   US DUPLEX RENAL ARTERY COMPLETE    Result Date: 3/11/2024  Normal renal ultrasound . Elevated RI bilaterally . Images reviewed, interpreted, and dictated by Dr. HANNAH Barajas. Transcribed by Halima Orozco,  JARRET.            Assessment & Plan   Diagnoses and all orders for this visit:    1. Coronary artery disease involving native coronary artery of native heart with other form of angina pectoris (Primary)  -     ECG 12 Lead  EKG reviewed and discussed, nonspecific changes noted.  Patient is asymptomatic.  Continue on aspirin, Lopressor, Repatha and Imdur.  She is statin intolerant  Advised if new or worsening symptoms seek urgent medical attention. She expresses understanding     2. Abnormal nuclear stress test  Aggressive risk factor modification, she is agreeable to proceed with cardiac cath however we will need to obtain nephrology clearance due to underlying CKD before proceeding    3. Essential hypertension  Controlled, continue current therapy    4. Apnea  Continue with nighttime oxygen utilization    5. Dyslipidemia  Continue on Repatha, medical management clinic assisting with preauthorization.     6. Aortic stenosis, mild  Clinically is asymptomatic, will continue to monitor    7. Stage 3b chronic kidney disease  Stable, patient follow with nephrology    8. Type 2 diabetes mellitus with hyperglycemia, with long-term current use of insulin  Recommend tight glycemic control for overall health benefit, management by primary    9. Bradycardia, sinus  EKG with sinus bradycardia however patient is asymptomatic, will continue to monitor, could consider decreasing her Lopressor     Lifestyle modifications including heart healthy diet, regular exercise, maintenance of desirable body weight and avoidance of tobacco product    Follow-up in 2 months with EKG, sooner if needed

## 2024-03-29 NOTE — TELEPHONE ENCOUNTER
Called pt to ask if she had seen [facility] or another office.    It was [facility] in [place], [place].

## 2024-04-01 ENCOUNTER — SPECIALTY PHARMACY (OUTPATIENT)
Dept: PHARMACY | Facility: HOSPITAL | Age: 64
End: 2024-04-01
Payer: COMMERCIAL

## 2024-04-01 NOTE — PROGRESS NOTES
[facility]  Lipid Management Program - PCSK9i       Monisha Gardner is a 63 y.o. female referred to the [facility] by Asia Hawkins for clinical pharmacy and specialty pharmacy management of PCSK9i.  Monisha Gardner is  treated for clinical ASCVD/hyperlipidemia and currently takes Repatha sample.  In the past, Pt has tried Zetia, Livalo, Crestor, Lipitor, and Zocor. Patient is statin intolerant due to myalgias. The patient denies any allergies to latex.  Patient has been on Repatha for years in the past, and reports getting a sample dose on 3/29/24.    Relevant Past Medical History and Comorbidities  Past Medical History:   Diagnosis Date    Anxiety     Arthritis     Diabetes mellitus     Diverticulosis     Gout     Hyperlipidemia     Hypertension     Myocardial infarction      Social History     Socioeconomic History    Marital status:     Number of children: 2    Years of education: 12   Tobacco Use    Smoking status: Never     Passive exposure: Never    Smokeless tobacco: Never   Vaping Use    Vaping status: Never Used   Substance and Sexual Activity    Alcohol use: No    Drug use: No    Sexual activity: Defer       Allergies  Ciprofloxacin, Penicillins, Statins, and Fish oil [omega-3 fatty acids]    Current Medication List    Current Outpatient Medications:     allopurinol (ZYLOPRIM) 100 MG tablet, Take 1 tablet by mouth 2 (Two) Times a Day., Disp: 180 tablet, Rfl: 3    ammonium lactate (AMLACTIN) 12 % cream, , Disp: , Rfl:     aspirin 81 MG EC tablet, Take 1 tablet by mouth Daily., Disp: 100 tablet, Rfl: 3    Cariprazine HCl (Vraylar) 3 MG capsule capsule, Take 1 capsule by mouth Daily., Disp: 90 capsule, Rfl: 3    Continuous Blood Gluc  (Dexcom G7 ) device, Use 1 each Daily., Disp: 1 each, Rfl: 1    Continuous Blood Gluc Sensor (Dexcom G7 Sensor) misc, Use 1 each Every 10 (Ten) Days., Disp: 9 each, Rfl: 3    DULoxetine (CYMBALTA) 30 MG  capsule, Take 3 capsules by mouth Daily., Disp: 270 capsule, Rfl: 3    Easy Touch Lancets 30G/Twist misc, Use tid, Disp: 300 each, Rfl: 3    fenofibrate 160 MG tablet, Take 1 tablet by mouth Daily., Disp: 90 tablet, Rfl: 3    gabapentin (NEURONTIN) 600 MG tablet, Take 1 tablet by mouth 2 (Two) Times a Day., Disp: 60 tablet, Rfl: 2    hydrOXYzine pamoate (VISTARIL) 50 MG capsule, Take 1 capsule by mouth 3 (Three) Times a Day As Needed for Itching., Disp: 270 capsule, Rfl: 1    isosorbide mononitrate (IMDUR) 60 MG 24 hr tablet, Take 0.5 tablets by mouth Daily., Disp: , Rfl:     Lantus SoloStar 100 UNIT/ML injection pen, Inject 45 Units under the skin into the appropriate area as directed Daily., Disp: 15 mL, Rfl: 5    losartan (COZAAR) 100 MG tablet, Take 0.5 tablets by mouth 2 (Two) Times a Day., Disp: , Rfl:     metoprolol tartrate (LOPRESSOR) 100 MG tablet, TAKE 1 TABLET BY MOUTH TWICE DAILY, Disp: 180 tablet, Rfl: 3    nitroglycerin (Nitrostat) 0.4 MG SL tablet, Place 1 tablet under the tongue Every 5 (Five) Minutes As Needed for Chest Pain., Disp: 30 tablet, Rfl: 5    pantoprazole (PROTONIX) 40 MG EC tablet, Take 1 tablet by mouth Daily., Disp: 90 tablet, Rfl: 3    Repatha SureClick solution auto-injector SureClick injection, Inject 1 mL under the skin into the appropriate area as directed Every 14 (Fourteen) Days., Disp: 12 mL, Rfl: 3    rOPINIRole (REQUIP) 2 MG tablet, Take 1 tablet by mouth Every Night., Disp: 90 tablet, Rfl: 3    Semaglutide,0.25 or 0.5MG/DOS, (Ozempic, 0.25 or 0.5 MG/DOSE,) 2 MG/1.5ML solution pen-injector, Inject 0.25 mg under the skin into the appropriate area as directed 1 (One) Time Per Week., Disp: 1.5 mL, Rfl: 2    traMADol (ULTRAM) 50 MG tablet, TAKE 1 TABLET BY MOUTH EVERY 8 HOURS AS NEEDED FOR 7 DAYS, Disp: , Rfl:     True Metrix Blood Glucose Test test strip, 1 each by Other route 3 (Three) Times a Day., Disp: 300 each, Rfl: 3    Drug Interactions  No significant drug-drug  interactions    Relevant Laboratory Values  Lab Results   Component Value Date    CHOL 193 03/01/2024    CHLPL 227 (H) 11/12/2021    TRIG 363 (H) 03/01/2024    HDL 30 (L) 03/01/2024     (H) 03/01/2024       Initial Education Provided for Praluent/Repatha    Patient seen in the [facility] Management Clinic for initial education and injection training for PCSK9 inhibitors. The patient was introduced to services offered by [facility], including: regular assessments, refill coordination, curbside pick-up or mail order delivery options, prior authorization maintenance, and financial assistance programs as applicable. The patient was also provided with contact information for the pharmacy team. Welcome information and patient satisfaction survey to be sent by retail team with patient's initial fill.    Patient Instructions    Repatha is used to lower LDL, or bad cholesterol, to help reduce your chance of a heart attack or stroke.  You should give your injection once every 14 days.  Your doctor will likely keep you on this medication indefinitely as long as it is working for you and not causing any adverse effects.      This medication should be kept in the refrigerator until you are ready to use it.  Once removed from the refrigerator, it is good at room temperature for 30 days.  Do not leave in your car or expose to extreme heat.  Do not shake or freeze.  Dispose the used syringe/device in a sharps container.     This injection is a subcutaneous injection, which means just under the skin.  It is important to choose an area of your skin that is not tender, bruised, cut or has scars or stretch marks.  You can inject into your abdomen (except for 2 inches around your navel), your thigh or your upper arm.  Do not administer with other drugs.   Rotate injection sites each time you give the injection. Wash your hands prior to giving yourself an injection and use an alcohol wipe to clean the area  of the injection and allow to dry prior to injecting.      If you miss a dose, take it as soon as you remember and resume the original schedule if it is within 7 days from the missed dose.  If an every 2 week dose is not administered within 7 days, wait until the next dose on the original schedule.  If a once-monthly dose is not administered within 7 days, administer the dose and start a new schedule based on this date.     Be sure to let your doctor or pharmacist know of any medication changes, including OTC and herbal supplements.     Adverse Effects  Reviewed with patient and education on management provided.     Sore Throat  Injection site pain  Itching or irritation   Flu-like symptoms  Signs of an allergic reaction     Immunizations  While there are no immunizations that you need to get specifically because you are on this drug, it is important to keep up with your recommended routine vaccinations.     Adherence and Self-Administration    Barriers to Patient Adherence and/or Self-Administration: None  Methods for Supporting Patient Adherence and/or Self-Administration: None Required     Goals of Therapy  Patient Goals of Therapy: LDL reduction  Clinical Goals or Therapeutic Targets, If Applicable: LDL Reduction      Attestation  I attest that the initiated specialty medication(s) are appropriate for the patient based on my assessment.  If the prescribed therapy is at any point deemed not appropriate based on the current or future assessments, a consultation will be initiated with the patient's specialty care provider to determine the best course of action. The revised plan of therapy will be documented along with any additional patient education provided.     The patient has been provided with the following education and any applicable administration techniques (i.e. self-injection) have been demonstrated for the therapies indicated. All questions and concerns have been addressed prior to the patient receiving  the medication, and the patient has verbalized understanding of the education and any materials provided.  Additional patient education shall be provided and documented upon request by the patient, provider or payer.      The patient would like to receive specialty mail-out services for the next injection. Care Coordinator to set up future refill outreaches, coordinate prescription delivery, and escalate clinical questions to pharmacist.     Medication Assessment & Plan    Patient started on Repatha Sureclick. 3/29/24. Injection training and medication education provided.     Pt administered sample of Repatha on 3/29/24.    Patient will need lipid panel in 6 weeks, order placed.     Patient will continue regular follow-up with cardiology.     Will follow up with specialty pharmacy for next injection    Will follow-up in 6 months, or sooner if needed.     Lyndsay Grissom, PharmD  4/1/2024  10:46 EDT

## 2024-04-01 NOTE — TELEPHONE ENCOUNTER
THE PATIENT CALLED AND FOR THE PA FOR OZEMPIC THE INSURANCE COMPANY HAS NOT RECEIVED THE OFFICE NOTES. PLEASE RESEND THE INFORMATION TO THE INSURANCE COMPANY. THE INSURANCE COMPANY ONLY GOT A FACE SHEET.    THE PATIENT WOULD ALSO LIKE TO KNOW IF THE OFFICE HAS SAMPLES OF THE MEDICATION UNTIL IT CAN BE APPROVED.  PLEASE CALL THE PATIENT  [phone] TO DISCUSS.   THE PATIENT WOULD LIKE A CALL BACK ASAP.

## 2024-04-03 ENCOUNTER — TELEPHONE (OUTPATIENT)
Dept: FAMILY MEDICINE CLINIC | Facility: CLINIC | Age: 64
End: 2024-04-03
Payer: COMMERCIAL

## 2024-04-03 NOTE — TELEPHONE ENCOUNTER
Patient called says that she is having issues with her Dexcom patient is requesting new prescription for meter, lancets and test strips. Please advise

## 2024-04-04 ENCOUNTER — OFFICE VISIT (OUTPATIENT)
Dept: FAMILY MEDICINE CLINIC | Facility: CLINIC | Age: 64
End: 2024-04-04
Payer: COMMERCIAL

## 2024-04-04 VITALS
TEMPERATURE: 96.6 F | SYSTOLIC BLOOD PRESSURE: 120 MMHG | DIASTOLIC BLOOD PRESSURE: 60 MMHG | OXYGEN SATURATION: 98 % | BODY MASS INDEX: 27.14 KG/M2 | HEART RATE: 65 BPM | WEIGHT: 159 LBS | HEIGHT: 64 IN

## 2024-04-04 DIAGNOSIS — M89.8X9 BONE MASS: Chronic | ICD-10-CM

## 2024-04-04 DIAGNOSIS — L30.9 DERMATITIS: ICD-10-CM

## 2024-04-04 DIAGNOSIS — I10 ESSENTIAL HYPERTENSION: Chronic | ICD-10-CM

## 2024-04-04 DIAGNOSIS — E11.65 TYPE 2 DIABETES MELLITUS WITH HYPERGLYCEMIA, WITH LONG-TERM CURRENT USE OF INSULIN: Primary | Chronic | ICD-10-CM

## 2024-04-04 DIAGNOSIS — Z79.4 TYPE 2 DIABETES MELLITUS WITH HYPERGLYCEMIA, WITH LONG-TERM CURRENT USE OF INSULIN: Primary | Chronic | ICD-10-CM

## 2024-04-04 DIAGNOSIS — Z79.899 LONG TERM CURRENT USE OF THERAPEUTIC DRUG: ICD-10-CM

## 2024-04-04 PROCEDURE — 99214 OFFICE O/P EST MOD 30 MIN: CPT | Performed by: PHYSICIAN ASSISTANT

## 2024-04-04 RX ORDER — ACYCLOVIR 400 MG/1
1 TABLET ORAL DAILY
Qty: 1 EACH | Refills: 0 | Status: SHIPPED | OUTPATIENT
Start: 2024-04-04

## 2024-04-04 RX ORDER — ACYCLOVIR 400 MG/1
1 TABLET ORAL
Qty: 4 EACH | Refills: 11 | Status: SHIPPED | OUTPATIENT
Start: 2024-04-04

## 2024-04-04 RX ORDER — TRIAMCINOLONE ACETONIDE 5 MG/G
1 CREAM TOPICAL 3 TIMES DAILY
Qty: 30 G | Refills: 0 | Status: SHIPPED | OUTPATIENT
Start: 2024-04-04

## 2024-04-04 NOTE — TELEPHONE ENCOUNTER
She has an appointment with me today and I will be glad to address this issue with her at the visit.

## 2024-04-04 NOTE — PROGRESS NOTES
"Chief Complaint -diabetes    History of Present Illness -     Monisha Gardner is a 63 y.o. female.     Diabetes-  Uncontrolled despite taking Lantus Solostar 45 units every morning and Ozempic 0.25 mg weekly.  Patient reports fasting blood glucose this morning 325.  She reports blood glucose random has been running between 300-400.      Bone mass-  Patient had bone mass in left lower jaw that was removed over a month ago.  There has been a subsequent infection that has not completely cleared up with antibiotics.  Patient reports that she has to repeat the surgery but needs blood glucose to be controlled prior to repeat left jaw surgery to improve outcomes and healing.    Rash-  Patient complains of a pruritic rash on her right arm consistent with insect bites.    Hypertension-  Stable with losartan and metoprolol    The following portions of the patient's history were reviewed and updated as appropriate: allergies, current medications, past family history, past medical history, past social history, past surgical history and problem list.        Objective  Vital signs:  /60   Pulse 65   Temp 96.6 °F (35.9 °C) (Temporal)   Ht 162.6 cm (64.02\")   Wt 72.1 kg (159 lb)   LMP  (LMP Unknown)   SpO2 98%   BMI 27.28 kg/m²     Physical Exam  Vitals and nursing note reviewed.   Constitutional:       Appearance: Normal appearance. She is well-developed.   Eyes:      Extraocular Movements: Extraocular movements intact.      Conjunctiva/sclera: Conjunctivae normal.   Cardiovascular:      Rate and Rhythm: Normal rate and regular rhythm.      Heart sounds: Normal heart sounds. No murmur heard.  Pulmonary:      Effort: Pulmonary effort is normal. No respiratory distress.      Breath sounds: Normal breath sounds. No wheezing.   Musculoskeletal:         General: No tenderness.   Skin:     General: Skin is warm and dry.      Findings: Rash (Hives noted surrounding centralized puncture wounds consistent with insect/spider " bites on right arm) present.   Neurological:      Mental Status: She is alert and oriented to person, place, and time.   Psychiatric:         Mood and Affect: Mood normal.         Behavior: Behavior normal.         Thought Content: Thought content normal.         The following data was reviewed by Kimmy Vela PA-C:         Lab Results   Component Value Date    BUN 24 (H) 03/05/2024    CREATININE 1.49 (H) 03/05/2024    EGFR 39.6 (L) 03/05/2024    ALT 16 03/01/2024    AST 30 03/01/2024    WBC 6.19 03/01/2024    HGB 11.4 (L) 03/01/2024    HCT 34.9 03/01/2024     (H) 03/01/2024    CHOL 193 03/01/2024    TRIG 363 (H) 03/01/2024    HDL 30 (L) 03/01/2024     (H) 03/01/2024    TSH 0.838 04/07/2023    HGBA1C 7.20 (H) 03/01/2024           Assessment & Plan     Diagnoses and all orders for this visit:    1. Type 2 diabetes mellitus with hyperglycemia, with long-term current use of insulin (Primary)  Comments:  Continue Ozempic 0.25 mg weekly  Continue Lantus 45 units every morning  Start Humalog sliding scale:    Glucose # units insulin  200 - 250 6  251-300 8  301-350 10  351-400 12  401-450 14  > 450  16 and CALL doctor    Orders:  -     Continuous Blood Gluc Sensor (Dexcom G7 Sensor) misc; Use 1 each Every 10 (Ten) Days.  Dispense: 4 each; Refill: 11  -     Continuous Blood Gluc  (Dexcom G7 ) device; Use 1 each Daily.  Dispense: 1 each; Refill: 0  -     Insulin Lispro 200 UNIT/ML solution pen-injector; Inject 6 Units under the skin into the appropriate area as directed Take As Directed.  Dispense: 15 mL; Refill: 5  -     Insulin Pen Needle 32G X 5 MM misc; Use 1 each 3 times a day.  Dispense: 100 each; Refill: 5    2. Long term current use of therapeutic drug  -     Urine Drug Screen - Urine, Clean Catch; Future    3. Dermatitis  Comments:  Advised use of Vistaril or Benadryl for pruritus and to decrease allergic response  Orders:  -     triamcinolone (KENALOG) 0.5 % cream; Apply 1 Application  topically to the appropriate area as directed 3 (Three) Times a Day.  Dispense: 30 g; Refill: 0    4. Bone mass  Comments:  Plan to get blood glucose under control prior to repeat left jaw surgery to improve outcomes and healing    5. Essential hypertension  Comments:  Continue losartan and metoprolol  Continue to monitor                   Patient was given instructions and counseling regarding his condition or for health maintenance advice. Please see specific information pulled into the AVS if appropriate      This document has been electronically signed by:  Kimmy Vela PA-C

## 2024-04-12 ENCOUNTER — OFFICE VISIT (OUTPATIENT)
Dept: FAMILY MEDICINE CLINIC | Facility: CLINIC | Age: 64
End: 2024-04-12
Payer: COMMERCIAL

## 2024-04-12 VITALS
HEIGHT: 64 IN | WEIGHT: 160 LBS | BODY MASS INDEX: 27.31 KG/M2 | SYSTOLIC BLOOD PRESSURE: 100 MMHG | TEMPERATURE: 96.9 F | OXYGEN SATURATION: 94 % | HEART RATE: 68 BPM | DIASTOLIC BLOOD PRESSURE: 60 MMHG

## 2024-04-12 DIAGNOSIS — Z79.4 TYPE 2 DIABETES MELLITUS WITH HYPERGLYCEMIA, WITH LONG-TERM CURRENT USE OF INSULIN: Primary | Chronic | ICD-10-CM

## 2024-04-12 DIAGNOSIS — R22.0 MASS OF MANDIBLE: Chronic | ICD-10-CM

## 2024-04-12 DIAGNOSIS — E11.65 TYPE 2 DIABETES MELLITUS WITH HYPERGLYCEMIA, WITH LONG-TERM CURRENT USE OF INSULIN: Primary | Chronic | ICD-10-CM

## 2024-04-12 PROCEDURE — 99214 OFFICE O/P EST MOD 30 MIN: CPT | Performed by: NURSE PRACTITIONER

## 2024-04-12 RX ORDER — ALCOHOL ANTISEPTIC PADS
1 PADS, MEDICATED (EA) TOPICAL 3 TIMES DAILY
Qty: 100 EACH | Refills: 3 | Status: SHIPPED | OUTPATIENT
Start: 2024-04-12

## 2024-04-12 NOTE — PROGRESS NOTES
History of Present Illness  Monisha Gardner is a 63 y.o. female who presents to the clinic today in regards to her diabetes, type 2 which is treated with insulin and Ozempic.  She was diagnosed with a left mandibular mass several months ago which she had surgical intervention.  At her follow-up appointment it was discovered she had complications which required a lengthy course of antibiotics.  She is now awaiting another procedure which requires her glycemic control to be an optimal control    Diabetes  She has type 2 diabetes mellitus. Hypoglycemia symptoms include nervousness/anxiousness and speech difficulty (Due to dental issues). Pertinent negatives for hypoglycemia include no confusion, dizziness, headaches or tremors. Associated symptoms include fatigue. Pertinent negatives for diabetes include no chest pain, no polydipsia, no polyphagia, no polyuria and no weakness. Symptoms are stable. Risk factors for coronary artery disease include diabetes mellitus, dyslipidemia, hypertension, post-menopausal and stress. An ACE inhibitor/angiotensin II receptor blocker is being taken.     Lab Results   Component Value Date    HGBA1C 7.20 (H) 03/01/2024      The following portions of the patient's history were reviewed and updated as appropriate: allergies, current medications, past family history, past medical history, past social history, past surgical history and problem list.    Review of Systems   Constitutional:  Positive for fatigue. Negative for activity change, appetite change, chills, fever and unexpected weight change.   HENT: Negative.     Eyes:  Negative for visual disturbance.   Respiratory:  Negative for cough, shortness of breath and wheezing.    Cardiovascular:  Negative for chest pain, palpitations and leg swelling.   Gastrointestinal:  Negative for nausea and vomiting.   Endocrine: Negative for cold intolerance, heat intolerance, polydipsia, polyphagia and polyuria.   Musculoskeletal:   "Positive for arthralgias.   Skin:  Negative for color change and rash.   Neurological:  Positive for speech difficulty (Due to dental issues) and numbness. Negative for dizziness, tremors, weakness, light-headedness and headaches.   Hematological:  Negative for adenopathy.   Psychiatric/Behavioral:  Negative for confusion and decreased concentration. The patient is nervous/anxious.    All other systems reviewed and are negative.    Vital signs:  /60 (BP Location: Right arm, Patient Position: Sitting, Cuff Size: Adult)   Pulse 68   Temp 96.9 °F (36.1 °C) (Temporal)   Ht 162.6 cm (64.02\")   Wt 72.6 kg (160 lb)   LMP  (LMP Unknown)   SpO2 94%   BMI 27.45 kg/m²     Physical Exam  Vitals and nursing note reviewed.   Constitutional:       General: She is in acute distress.      Appearance: She is well-developed.   HENT:      Head: Normocephalic.      Nose: Nose normal.      Mouth/Throat:      Dentition: Abnormal dentition.      Comments: Slight speech impairment secondary to lower teeth except for 3 have been removed due to mass  Eyes:      General: No scleral icterus.        Right eye: No discharge.         Left eye: No discharge.      Conjunctiva/sclera: Conjunctivae normal.   Neck:      Thyroid: No thyromegaly.      Vascular: No JVD.   Cardiovascular:      Rate and Rhythm: Normal rate and regular rhythm.      Heart sounds: Normal heart sounds. No murmur heard.     No friction rub.   Pulmonary:      Effort: Pulmonary effort is normal. No respiratory distress.      Breath sounds: Normal breath sounds. No wheezing or rales.   Abdominal:      Palpations: Abdomen is soft.      Tenderness: There is no abdominal tenderness. There is no guarding or rebound.   Musculoskeletal:         General: Tenderness present.      Cervical back: Neck supple.   Lymphadenopathy:      Cervical: No cervical adenopathy.   Skin:     General: Skin is warm and dry.      Capillary Refill: Capillary refill takes less than 2 seconds.      " Findings: No erythema or rash.   Neurological:      Mental Status: She is alert and oriented to person, place, and time.   Psychiatric:         Mood and Affect: Affect normal. Mood is anxious.         Behavior: Behavior is cooperative.         Thought Content: Thought content normal.       BMI is >= 25 and <30. (Overweight) The following options were offered after discussion;: weight loss educational material (shared in after visit summary)    Assessment & Plan     Diagnoses and all orders for this visit:    1. Type 2 diabetes mellitus with hyperglycemia, with long-term current use of insulin (Primary)  Comments:  She will continue Lantus and utilize her sliding scale.   Increase Ozempic to 0.5 milligrams every 7 days  Orders:  -     Ultra Thin Lancets 31G misc; Use 1 Device 3 (Three) Times a Day.  Dispense: 100 each; Refill: 3  -     glucose blood test strip; 1 each by Other route 3 (Three) Times a Day. Use as instructed  Dispense: 100 each; Refill: 5  -     Blood Glucose Monitoring Suppl kit; Use 1 Device Daily.  Dispense: 1 each; Refill: 0    2. Mass of mandible  Comments:  Awaiting surgical intervention which glycemic control at optimal level.  Dexcom 7 PDM and sensor provided      Follow Up In 1 week  Findings and recommendations discussed with Monisha. Reviewed treatment options.  Her PCP had prescribed a Dexcom 7 CGM system which her insurance would not cover.  Provided her a sample PDM and sensor today.  Instructed her on the Dexcom 7 which was inserted today.  Lifestyle modifications reinforced including nutrition and activity recommendations.  Monisha will follow up in 1 week sooner if problems/concerns occur.  Monisha was given instructions and counseling regarding her condition or for health maintenance advice. Please see specific information pulled into the AVS if appropriate.    This document has been electronically signed by:

## 2024-04-26 ENCOUNTER — OFFICE VISIT (OUTPATIENT)
Dept: FAMILY MEDICINE CLINIC | Facility: CLINIC | Age: 64
End: 2024-04-26
Payer: COMMERCIAL

## 2024-04-26 VITALS
BODY MASS INDEX: 26.8 KG/M2 | OXYGEN SATURATION: 97 % | SYSTOLIC BLOOD PRESSURE: 100 MMHG | WEIGHT: 157 LBS | HEIGHT: 64 IN | TEMPERATURE: 96.9 F | DIASTOLIC BLOOD PRESSURE: 70 MMHG | HEART RATE: 75 BPM

## 2024-04-26 DIAGNOSIS — Z79.4 TYPE 2 DIABETES MELLITUS WITH HYPERGLYCEMIA, WITH LONG-TERM CURRENT USE OF INSULIN: Primary | Chronic | ICD-10-CM

## 2024-04-26 DIAGNOSIS — I10 ESSENTIAL HYPERTENSION: Chronic | ICD-10-CM

## 2024-04-26 DIAGNOSIS — E11.65 TYPE 2 DIABETES MELLITUS WITH HYPERGLYCEMIA, WITH LONG-TERM CURRENT USE OF INSULIN: Primary | Chronic | ICD-10-CM

## 2024-04-26 DIAGNOSIS — J30.9 ALLERGIC RHINITIS, UNSPECIFIED SEASONALITY, UNSPECIFIED TRIGGER: ICD-10-CM

## 2024-04-26 DIAGNOSIS — E78.2 MIXED HYPERLIPIDEMIA: Chronic | ICD-10-CM

## 2024-04-26 PROCEDURE — 99214 OFFICE O/P EST MOD 30 MIN: CPT | Performed by: NURSE PRACTITIONER

## 2024-04-26 RX ORDER — TOBRAMYCIN AND DEXAMETHASONE 3; 1 MG/ML; MG/ML
1 SUSPENSION/ DROPS OPHTHALMIC
Qty: 10 ML | Refills: 0 | Status: SHIPPED | OUTPATIENT
Start: 2024-04-26

## 2024-04-26 RX ORDER — AZELASTINE 1 MG/ML
SPRAY, METERED NASAL
Qty: 1 ML | Refills: 0 | Status: SHIPPED | OUTPATIENT
Start: 2024-04-26

## 2024-04-28 NOTE — PROGRESS NOTES
History of Present Illness  Monisha Gardner is a 63 y.o. female who presents to the clinic today in regards to her diabetes, type 2 which is treated with insulin and Ozempic.  She was diagnosed with a left mandibular mass several months ago which she had surgical intervention.  At her follow-up appointment it was discovered she had complications which required a lengthy course of antibiotics.  She is now awaiting a possible additional procedure which requires her glycemic control to be an optimal control.  She is complaining of ear pain which started several days ago    Diabetes  She has type 2 diabetes mellitus. Hypoglycemia symptoms include nervousness/anxiousness and speech difficulty (Due to dental issues). Pertinent negatives for hypoglycemia include no confusion, dizziness, headaches or tremors. Associated symptoms include fatigue. Pertinent negatives for diabetes include no chest pain, no polydipsia, no polyphagia, no polyuria and no weakness. Symptoms are stable. Risk factors for coronary artery disease include diabetes mellitus, dyslipidemia, hypertension, post-menopausal and stress. An ACE inhibitor/angiotensin II receptor blocker is being taken.     Lab Results   Component Value Date    HGBA1C 7.20 (H) 03/01/2024     Hypertension  The current episode started more than 1 year ago. The problem is controlled. Pertinent negatives include no chest pain, headaches, palpitations or shortness of breath. Risk factors for coronary artery disease include diabetes mellitus, dyslipidemia and post-menopausal state. Current antihypertension treatment includes angiotensin blockers and beta blockers.   Lab Results   Component Value Date    GLUCOSE 142 (H) 03/05/2024    BUN 24 (H) 03/05/2024    CREATININE 1.49 (H) 03/05/2024    EGFR 39.6 (L) 03/05/2024    BCR 16.1 03/05/2024    K 5.0 03/05/2024    CO2 21.9 (L) 03/05/2024    CALCIUM 10.3 03/05/2024    PROTENTOTREF 6.5 05/05/2023    ALBUMIN 4.4 03/01/2024    BILITOT  0.3 03/01/2024    AST 30 03/01/2024    ALT 16 03/01/2024      Dyslipidemia  The current episode started more than 1 year ago. Pertinent negatives include no chest pain or shortness of breath. Current antihyperlipidemic treatment includes fibric acid derivatives (Repatha). Risk factors for coronary artery disease include diabetes mellitus, dyslipidemia, hypertension and post-menopausal.   Lab Results   Component Value Date    CHOL 193 03/01/2024    CHOL 102 08/11/2023    CHOL 299 (H) 04/07/2023     Lab Results   Component Value Date    TRIG 363 (H) 03/01/2024    TRIG 196 (H) 08/11/2023    TRIG 569 (H) 04/07/2023     Lab Results   Component Value Date    HDL 30 (L) 03/01/2024    HDL 42 08/11/2023    HDL 49 04/07/2023     Lab Results   Component Value Date     (H) 03/01/2024    LDL 29 08/11/2023     (H) 04/07/2023      Earache   The current episode started in the past 7 days. The problem occurs intermittently. The problem has been worse. There has been no fever. Pertinent negatives include no coughing, ear discharge, headaches, rash or vomiting.     The following portions of the patient's history were reviewed and updated as appropriate: allergies, current medications, past family history, past medical history, past social history, past surgical history and problem list.    Review of Systems   Constitutional:  Positive for fatigue. Negative for activity change, appetite change, chills, fever and unexpected weight change.   HENT:  Positive for congestion, dental problem, ear pain and facial swelling (Due to mandibular mass). Negative for ear discharge, sinus pressure and sinus pain.    Eyes:  Negative for visual disturbance.   Respiratory:  Negative for cough, shortness of breath and wheezing.    Cardiovascular:  Negative for chest pain, palpitations and leg swelling.   Gastrointestinal:  Negative for nausea and vomiting.   Endocrine: Negative for cold intolerance, heat intolerance, polydipsia, polyphagia  "and polyuria.   Musculoskeletal:  Positive for arthralgias.   Skin:  Negative for color change and rash.   Allergic/Immunologic: Positive for environmental allergies.   Neurological:  Positive for speech difficulty (Due to dental issues) and numbness. Negative for dizziness, tremors, weakness, light-headedness and headaches.   Hematological:  Negative for adenopathy.   Psychiatric/Behavioral:  Negative for confusion and decreased concentration. The patient is nervous/anxious.    All other systems reviewed and are negative.    Vital signs:  /70 (BP Location: Left arm, Patient Position: Sitting, Cuff Size: Adult)   Pulse 75   Temp 96.9 °F (36.1 °C) (Temporal)   Ht 162.6 cm (64.02\")   Wt 71.2 kg (157 lb)   LMP  (LMP Unknown)   SpO2 97%   BMI 26.94 kg/m²     Physical Exam  Vitals and nursing note reviewed.   Constitutional:       General: She is not in acute distress.     Appearance: She is well-developed.   HENT:      Head: Normocephalic.      Right Ear: A middle ear effusion is present. There is no impacted cerumen. Tympanic membrane is bulging. Tympanic membrane is not erythematous.      Left Ear: A middle ear effusion is present. There is no impacted cerumen. Tympanic membrane is bulging. Tympanic membrane is not erythematous.      Nose: Nose normal.      Right Turbinates: Swollen.      Left Turbinates: Swollen.      Right Sinus: No maxillary sinus tenderness or frontal sinus tenderness.      Left Sinus: No maxillary sinus tenderness or frontal sinus tenderness.      Mouth/Throat:      Dentition: Abnormal dentition.      Comments: Slight speech impairment secondary to lower teeth except for 3 have been removed due to mass  Eyes:      General: No scleral icterus.        Right eye: No discharge.         Left eye: No discharge.      Conjunctiva/sclera: Conjunctivae normal.   Neck:      Thyroid: No thyromegaly.      Vascular: No JVD.   Cardiovascular:      Rate and Rhythm: Normal rate and regular rhythm.    "   Heart sounds: Normal heart sounds. No murmur heard.     No friction rub.   Pulmonary:      Effort: Pulmonary effort is normal. No respiratory distress.      Breath sounds: Normal breath sounds. No wheezing or rales.   Abdominal:      Palpations: Abdomen is soft.      Tenderness: There is no abdominal tenderness. There is no guarding or rebound.   Musculoskeletal:         General: Tenderness present.      Cervical back: Neck supple.   Lymphadenopathy:      Cervical: No cervical adenopathy.   Skin:     General: Skin is warm and dry.      Capillary Refill: Capillary refill takes less than 2 seconds.      Findings: No erythema or rash.   Neurological:      Mental Status: She is alert and oriented to person, place, and time.   Psychiatric:         Mood and Affect: Affect normal. Mood is anxious.         Behavior: Behavior is cooperative.         Thought Content: Thought content normal.     BMI is >= 25 and <30. (Overweight) The following options were offered after discussion;: weight loss educational material (shared in after visit summary)    Assessment & Plan     Diagnoses and all orders for this visit:    1. Type 2 diabetes mellitus with hyperglycemia, with long-term current use of insulin (Primary)  Comments:  Continue Lantus and cautious use Ozempic secondary to previous episode of pancreatitis  Orders:  -     POC Glycosylated Hemoglobin (Hb A1C)    2. Essential hypertension  Comments:  Well-controlled.  Continue losartan and metoprolol    3. Mixed hyperlipidemia  Comments:  Continue Repatha, fenofibrate    4. Allergic rhinitis, unspecified seasonality, unspecified trigger  Comments:  Astelin Nasal Spray prescribed  Orders:  -     azelastine (ASTELIN) 0.1 % nasal spray; 2 sprays both nostrils twice daily as needed  Dispense: 1 mL; Refill: 0    Other orders  -     tobramycin-dexAMETHasone (TOBRADEX) 0.3-0.1 % ophthalmic suspension; Administer 1 drop to both eyes Every 4 (Four) Hours While Awake.  Dispense: 10 mL;  Refill: 0    Follow Up In May  Findings and recommendations discussed with Monisha. Reviewed initiation of Dexcom which was started today. Lifestyle modifications reinforced including nutrition and activity recommendations.  Monisha will follow up in May sooner if problems/concerns occur.  Monisha was given instructions and counseling regarding her condition or for health maintenance advice. Please see specific information pulled into the AVS if appropriate.    This document has been electronically signed by:

## 2024-05-09 DIAGNOSIS — E11.65 TYPE 2 DIABETES MELLITUS WITH HYPERGLYCEMIA, WITH LONG-TERM CURRENT USE OF INSULIN: Chronic | ICD-10-CM

## 2024-05-09 DIAGNOSIS — Z79.4 TYPE 2 DIABETES MELLITUS WITH HYPERGLYCEMIA, WITH LONG-TERM CURRENT USE OF INSULIN: Chronic | ICD-10-CM

## 2024-05-09 DIAGNOSIS — Z79.4 TYPE 2 DIABETES MELLITUS WITH HYPERGLYCEMIA, WITH LONG-TERM CURRENT USE OF INSULIN: ICD-10-CM

## 2024-05-09 DIAGNOSIS — E11.65 TYPE 2 DIABETES MELLITUS WITH HYPERGLYCEMIA, WITH LONG-TERM CURRENT USE OF INSULIN: ICD-10-CM

## 2024-05-09 RX ORDER — SEMAGLUTIDE 1.34 MG/ML
0.25 INJECTION, SOLUTION SUBCUTANEOUS WEEKLY
Qty: 1.5 ML | Refills: 2 | Status: SHIPPED | OUTPATIENT
Start: 2024-05-09

## 2024-05-09 RX ORDER — ACYCLOVIR 400 MG/1
1 TABLET ORAL DAILY
Qty: 1 EACH | Refills: 0 | Status: SHIPPED | OUTPATIENT
Start: 2024-05-09

## 2024-05-18 DIAGNOSIS — Z79.4 TYPE 2 DIABETES MELLITUS WITH DIABETIC NEUROPATHY, WITH LONG-TERM CURRENT USE OF INSULIN: Chronic | ICD-10-CM

## 2024-05-18 DIAGNOSIS — E11.40 TYPE 2 DIABETES MELLITUS WITH DIABETIC NEUROPATHY, WITH LONG-TERM CURRENT USE OF INSULIN: Chronic | ICD-10-CM

## 2024-05-20 RX ORDER — GABAPENTIN 600 MG/1
600 TABLET ORAL 2 TIMES DAILY
Qty: 60 TABLET | Refills: 0 | Status: SHIPPED | OUTPATIENT
Start: 2024-05-20

## 2024-05-20 RX ORDER — TRIAMCINOLONE ACETONIDE 1 MG/G
1 CREAM TOPICAL 2 TIMES DAILY
Qty: 80 G | Refills: 0 | Status: SHIPPED | OUTPATIENT
Start: 2024-05-20

## 2024-05-21 ENCOUNTER — TELEPHONE (OUTPATIENT)
Dept: FAMILY MEDICINE CLINIC | Facility: CLINIC | Age: 64
End: 2024-05-21
Payer: COMMERCIAL

## 2024-05-21 DIAGNOSIS — Z79.4 TYPE 2 DIABETES MELLITUS WITH HYPERGLYCEMIA, WITH LONG-TERM CURRENT USE OF INSULIN: ICD-10-CM

## 2024-05-21 DIAGNOSIS — E11.65 TYPE 2 DIABETES MELLITUS WITH HYPERGLYCEMIA, WITH LONG-TERM CURRENT USE OF INSULIN: ICD-10-CM

## 2024-05-21 RX ORDER — SEMAGLUTIDE 1.34 MG/ML
0.5 INJECTION, SOLUTION SUBCUTANEOUS WEEKLY
Qty: 1.5 ML | Refills: 2 | Status: SHIPPED | OUTPATIENT
Start: 2024-05-21

## 2024-05-21 NOTE — TELEPHONE ENCOUNTER
Pt is aware of this information.       Kimmy Vela PA Cecil, [place], MA  Inform the patient that I sent in triamcinolone cream that she can put on her poison ivy.  I do not recommend getting this around the eyes or mouth.  She is welcome to come in for Depo-Medrol 80 mg injection tomorrow.  I also recommend Benadryl over-the-counter.          Previous Messages       ----- Message -----  From: Sophy Stevens MA  Sent: 5/20/2024   2:51 PM EDT  To: ANH Medina    Pt called in and stated that she has poison ivy all over her face and arms. She wanted to know if you could send her something in for this?

## 2024-05-21 NOTE — TELEPHONE ENCOUNTER
Kimmy Vela PA Cecil, [place], MA  Inform the patient I sent a prescription for Ozempic 0.5 mg to the pharmacy for her.  Thank you          Previous Messages       ----- Message -----  From: Sophy Stevens MA  Sent: 5/21/2024  10:29 AM EDT  To: ANH Medina     Patient needs a new prescription sent in to her pharmacy on her ozempic. It should be the 0.5mg instead of the 0.25.

## 2024-05-24 ENCOUNTER — OFFICE VISIT (OUTPATIENT)
Dept: FAMILY MEDICINE CLINIC | Facility: CLINIC | Age: 64
End: 2024-05-24
Payer: COMMERCIAL

## 2024-05-24 VITALS
SYSTOLIC BLOOD PRESSURE: 132 MMHG | DIASTOLIC BLOOD PRESSURE: 76 MMHG | BODY MASS INDEX: 28 KG/M2 | WEIGHT: 164 LBS | HEART RATE: 78 BPM | HEIGHT: 64 IN | OXYGEN SATURATION: 96 % | RESPIRATION RATE: 14 BRPM

## 2024-05-24 DIAGNOSIS — I10 ESSENTIAL HYPERTENSION: Chronic | ICD-10-CM

## 2024-05-24 DIAGNOSIS — G25.81 RLS (RESTLESS LEGS SYNDROME): Chronic | ICD-10-CM

## 2024-05-24 DIAGNOSIS — L25.5 DERMATITIS DUE TO PLANT: Primary | ICD-10-CM

## 2024-05-24 PROBLEM — H65.02 NON-RECURRENT ACUTE SEROUS OTITIS MEDIA OF LEFT EAR: Status: RESOLVED | Noted: 2022-11-06 | Resolved: 2024-05-24

## 2024-05-24 PROCEDURE — 99214 OFFICE O/P EST MOD 30 MIN: CPT | Performed by: PHYSICIAN ASSISTANT

## 2024-05-24 PROCEDURE — 96372 THER/PROPH/DIAG INJ SC/IM: CPT | Performed by: PHYSICIAN ASSISTANT

## 2024-05-24 RX ORDER — METHYLPREDNISOLONE ACETATE 80 MG/ML
80 INJECTION, SUSPENSION INTRA-ARTICULAR; INTRALESIONAL; INTRAMUSCULAR; SOFT TISSUE ONCE
Status: COMPLETED | OUTPATIENT
Start: 2024-05-24 | End: 2024-05-24

## 2024-05-24 RX ADMIN — METHYLPREDNISOLONE ACETATE 80 MG: 80 INJECTION, SUSPENSION INTRA-ARTICULAR; INTRALESIONAL; INTRAMUSCULAR; SOFT TISSUE at 14:24

## 2024-05-24 NOTE — PROGRESS NOTES
"Chief Complaint -rash    History of Present Illness -     Monisha Gardner is a 63 y.o. female.     Rash-  Patient complains of a pruritic rash on bilateral forearms and face due to exposure to poison ivy.  Onset 6 days.  She denies any shortness of breath or wheezing.    Hypertension-  Controlled with losartan and metoprolol    Restless leg syndrome-  Stable with ropinirole 2 mg nightly.    The following portions of the patient's history were reviewed and updated as appropriate: allergies, current medications, past family history, past medical history, past social history, past surgical history and problem list.        Objective  Vital signs:  /76   Pulse 78   Resp 14   Ht 162.6 cm (64.02\")   Wt 74.4 kg (164 lb)   LMP  (LMP Unknown)   SpO2 96%   BMI 28.14 kg/m²     Physical Exam  Vitals and nursing note reviewed.   Constitutional:       Appearance: Normal appearance. She is well-developed.   Eyes:      Extraocular Movements: Extraocular movements intact.      Conjunctiva/sclera: Conjunctivae normal.   Cardiovascular:      Rate and Rhythm: Normal rate and regular rhythm.      Heart sounds: Normal heart sounds. No murmur heard.  Pulmonary:      Effort: Pulmonary effort is normal. No respiratory distress.      Breath sounds: Normal breath sounds. No wheezing.   Musculoskeletal:         General: No tenderness.   Skin:     General: Skin is warm and dry.      Findings: Rash present.      Comments: Vesicular lesions noted on bilateral forearms left side of face near mouth and right cheek   Neurological:      Mental Status: She is alert and oriented to person, place, and time.   Psychiatric:         Mood and Affect: Mood normal.         Behavior: Behavior normal.         Thought Content: Thought content normal.         The following data was reviewed by Kimmy Vela PA-C:         Lab Results   Component Value Date    BUN 24 (H) 03/05/2024    CREATININE 1.49 (H) 03/05/2024    EGFR 39.6 (L) 03/05/2024    ALT 16 " 03/01/2024    AST 30 03/01/2024    WBC 6.19 03/01/2024    HGB 11.4 (L) 03/01/2024    HCT 34.9 03/01/2024     (H) 03/01/2024    CHOL 193 03/01/2024    TRIG 363 (H) 03/01/2024    HDL 30 (L) 03/01/2024     (H) 03/01/2024    TSH 0.838 04/07/2023    HGBA1C 7.20 (H) 03/01/2024           Assessment & Plan     Diagnoses and all orders for this visit:    1. Dermatitis due to plant (Primary)  Comments:  Advised cleaning of home including washing of linens to prevent recurrence  Start Benadryl as needed  Orders:  -     methylPREDNISolone acetate (DEPO-medrol) injection 80 mg    2. Essential hypertension  Comments:  Continue losartan and metoprolol  Advise daily BP monitoring    3. RLS (restless legs syndrome)  Comments:  Continue ropinirole                   Patient was given instructions and counseling regarding his condition or for health maintenance advice. Please see specific information pulled into the AVS if appropriate      This document has been electronically signed by:  Kimmy Vela PA-C

## 2024-05-24 NOTE — PATIENT INSTRUCTIONS
"Fat and Cholesterol Restricted Eating Plan  Getting too much fat and cholesterol in your diet may cause health problems. Choosing the right foods helps keep your fat and cholesterol at normal levels. This can keep you from getting certain diseases.  Your doctor may recommend an eating plan that includes:  Total fat: ______% or less of total calories a day. This is ______g of fat a day.  Saturated fat: ______% or less of total calories a day. This is ______g of saturated fat a day.  Cholesterol: less than _________mg a day.  Fiber: ______g a day.  What are tips for following this plan?  General tips  Work with your doctor to lose weight if you need to.  Avoid:  Foods with added sugar.  Fried foods.  Foods with trans fat or partially hydrogenated oils. This includes some margarines and baked goods.  If you drink alcohol:  Limit how much you have to:  0-1 drink a day for women who are not pregnant.  0-2 drinks a day for men.  Know how much alcohol is in a drink. In the [place] one drink equals one 12 oz bottle of beer (355 mL), one 5 oz glass of wine (148 mL), or one 1½ oz glass of hard liquor (44 mL).  Reading food labels  Check food labels for:  Trans fats.  Partially hydrogenated oils.  Saturated fat (g) in each serving.  Cholesterol (mg) in each serving.  Fiber (g) in each serving.  Choose foods with healthy fats, such as:  Monounsaturated fats and polyunsaturated fats. These include olive and canola oil, flaxseeds, walnuts, almonds, and seeds.  Omega-3 fats. These are found in certain fish, flaxseed oil, and ground flaxseeds.  Choose grain products that have whole grains. Look for the word \"whole\" as the first word in the ingredient list.  Cooking  Cook foods using low-fat methods. These include baking, boiling, grilling, and broiling.  Eat more home-cooked foods. Eat at restaurants and buffets less often. Eat less fast food.  Avoid cooking using saturated fats, such as butter, cream, palm oil, palm kernel oil, and " coconut oil.  Meal planning    At meals, divide your plate into four equal parts:  Fill one-half of your plate with vegetables, green salads, and fruit.  Fill one-fourth of your plate with whole grains.  Fill one-fourth of your plate with low-fat (lean) protein foods.  Eat fish that is high in omega-3 fats at least two times a week. This includes mackerel, tuna, sardines, and salmon.  Eat foods that are high in fiber, such as whole grains, beans, apples, pears, berries, broccoli, carrots, peas, and barley.  What foods should I eat?  Fruits  All fresh, canned (in natural juice), or frozen fruits.  Vegetables  Fresh or frozen vegetables (raw, steamed, roasted, or grilled). Green salads.  Grains  Whole grains, such as whole wheat or whole grain breads, crackers, cereals, and pasta. Unsweetened oatmeal, bulgur, barley, quinoa, or brown rice. Corn or whole wheat flour tortillas.  Meats and other protein foods  Ground beef (85% or leaner), grass-fed beef, or beef trimmed of fat. Skinless chicken or turkey. Ground chicken or turkey. Pork trimmed of fat. All fish and seafood. Egg whites. Dried beans, peas, or lentils. Unsalted nuts or seeds. Unsalted canned beans. Nut butters without added sugar or oil.  Dairy  Low-fat or nonfat dairy products, such as skim or 1% milk, 2% or reduced-fat cheeses, low-fat and fat-free ricotta or cottage cheese, or plain low-fat and nonfat yogurt.  Fats and oils  Tub margarine without trans fats. Light or reduced-fat mayonnaise and salad dressings. Avocado. Olive, canola, sesame, or safflower oils.  The items listed above may not be a complete list of foods and beverages you can eat. Contact a dietitian for more information.  What foods should I avoid?  Fruits  Canned fruit in heavy syrup. Fruit in cream or butter sauce. Fried fruit.  Vegetables  Vegetables cooked in cheese, cream, or butter sauce. Fried vegetables.  Grains  White bread. White pasta. White rice. Cornbread. Bagels, pastries,  and croissants. Crackers and snack foods that contain trans fat and hydrogenated oils.  Meats and other protein foods  Fatty cuts of meat. Ribs, chicken wings, villar, sausage, bologna, salami, chitterlings, fatback, hot dogs, bratwurst, and packaged lunch meats. Liver and organ meats. Whole eggs and egg yolks. Chicken and turkey with skin. Fried meat.  Dairy  Whole or 2% milk, cream, half-and-half, and cream cheese. Whole milk cheeses. Whole-fat or sweetened yogurt. Full-fat cheeses. Nondairy creamers and whipped toppings. Processed cheese, cheese spreads, and cheese curds.  Fats and oils  Butter, stick margarine, lard, shortening, ghee, or villar fat. Coconut, palm kernel, and palm oils.  Beverages  Alcohol. Sugar-sweetened drinks such as sodas, lemonade, and fruit drinks.  Sweets and desserts  Corn syrup, sugars, honey, and molasses. Candy. Jam and jelly. Syrup. Sweetened cereals. Cookies, pies, cakes, donuts, muffins, and ice cream.  The items listed above may not be a complete list of foods and beverages you should avoid. Contact a dietitian for more information.  Summary  Choosing the right foods helps keep your fat and cholesterol at normal levels. This can keep you from getting certain diseases.  At meals, fill one-half of your plate with vegetables, green salads, and fruits.  Eat high fiber foods, like whole grains, beans, apples, pears, berries, carrots, peas, and barley.  Limit added sugar, saturated fats, alcohol, and fried foods.  This information is not intended to replace advice given to you by your health care provider. Make sure you discuss any questions you have with your health care provider.  Document Revised: 04/29/2022 Document Reviewed: 04/29/2022  [employer]vier Patient Education © 2024 [employer]

## 2024-05-31 ENCOUNTER — LAB (OUTPATIENT)
Dept: LAB | Facility: HOSPITAL | Age: 64
End: 2024-05-31
Payer: COMMERCIAL

## 2024-05-31 ENCOUNTER — TELEPHONE (OUTPATIENT)
Dept: CARDIOLOGY | Facility: CLINIC | Age: 64
End: 2024-05-31

## 2024-05-31 ENCOUNTER — OFFICE VISIT (OUTPATIENT)
Dept: CARDIOLOGY | Facility: CLINIC | Age: 64
End: 2024-05-31
Payer: COMMERCIAL

## 2024-05-31 VITALS
OXYGEN SATURATION: 98 % | BODY MASS INDEX: 27.49 KG/M2 | HEART RATE: 66 BPM | SYSTOLIC BLOOD PRESSURE: 168 MMHG | WEIGHT: 161 LBS | HEIGHT: 64 IN | DIASTOLIC BLOOD PRESSURE: 82 MMHG

## 2024-05-31 DIAGNOSIS — Z79.4 TYPE 2 DIABETES MELLITUS WITH HYPERGLYCEMIA, WITH LONG-TERM CURRENT USE OF INSULIN: ICD-10-CM

## 2024-05-31 DIAGNOSIS — N18.32 STAGE 3B CHRONIC KIDNEY DISEASE: ICD-10-CM

## 2024-05-31 DIAGNOSIS — I25.118 CORONARY ARTERY DISEASE INVOLVING NATIVE CORONARY ARTERY OF NATIVE HEART WITH OTHER FORM OF ANGINA PECTORIS: ICD-10-CM

## 2024-05-31 DIAGNOSIS — I10 ESSENTIAL HYPERTENSION: Chronic | ICD-10-CM

## 2024-05-31 DIAGNOSIS — R94.39 ABNORMAL NUCLEAR STRESS TEST: ICD-10-CM

## 2024-05-31 DIAGNOSIS — I25.118 CORONARY ARTERY DISEASE INVOLVING NATIVE CORONARY ARTERY OF NATIVE HEART WITH OTHER FORM OF ANGINA PECTORIS: Primary | ICD-10-CM

## 2024-05-31 DIAGNOSIS — R22.0 MASS OF MANDIBLE: Chronic | ICD-10-CM

## 2024-05-31 DIAGNOSIS — I35.0 AORTIC STENOSIS, MILD: ICD-10-CM

## 2024-05-31 DIAGNOSIS — E78.5 DYSLIPIDEMIA: ICD-10-CM

## 2024-05-31 DIAGNOSIS — E11.65 TYPE 2 DIABETES MELLITUS WITH HYPERGLYCEMIA, WITH LONG-TERM CURRENT USE OF INSULIN: ICD-10-CM

## 2024-05-31 LAB
ANION GAP SERPL CALCULATED.3IONS-SCNC: 10 MMOL/L (ref 5–15)
BASOPHILS # BLD AUTO: 0.12 10*3/MM3 (ref 0–0.2)
BASOPHILS NFR BLD AUTO: 1.2 % (ref 0–1.5)
BUN SERPL-MCNC: 29 MG/DL (ref 8–23)
BUN/CREAT SERPL: 19 (ref 7–25)
CALCIUM SPEC-SCNC: 9.8 MG/DL (ref 8.6–10.5)
CHLORIDE SERPL-SCNC: 103 MMOL/L (ref 98–107)
CO2 SERPL-SCNC: 26 MMOL/L (ref 22–29)
CREAT SERPL-MCNC: 1.53 MG/DL (ref 0.57–1)
DEPRECATED RDW RBC AUTO: 40.3 FL (ref 37–54)
EGFRCR SERPLBLD CKD-EPI 2021: 38.3 ML/MIN/1.73
EOSINOPHIL # BLD AUTO: 0.3 10*3/MM3 (ref 0–0.4)
EOSINOPHIL NFR BLD AUTO: 3 % (ref 0.3–6.2)
ERYTHROCYTE [DISTWIDTH] IN BLOOD BY AUTOMATED COUNT: 14.3 % (ref 12.3–15.4)
GLUCOSE SERPL-MCNC: 114 MG/DL (ref 65–99)
HCT VFR BLD AUTO: 40.5 % (ref 34–46.6)
HGB BLD-MCNC: 13.2 G/DL (ref 12–15.9)
IMM GRANULOCYTES # BLD AUTO: 0.05 10*3/MM3 (ref 0–0.05)
IMM GRANULOCYTES NFR BLD AUTO: 0.5 % (ref 0–0.5)
LYMPHOCYTES # BLD AUTO: 4.02 10*3/MM3 (ref 0.7–3.1)
LYMPHOCYTES NFR BLD AUTO: 39.8 % (ref 19.6–45.3)
MCH RBC QN AUTO: 25.7 PG (ref 26.6–33)
MCHC RBC AUTO-ENTMCNC: 32.6 G/DL (ref 31.5–35.7)
MCV RBC AUTO: 78.9 FL (ref 79–97)
MONOCYTES # BLD AUTO: 0.62 10*3/MM3 (ref 0.1–0.9)
MONOCYTES NFR BLD AUTO: 6.1 % (ref 5–12)
NEUTROPHILS NFR BLD AUTO: 4.98 10*3/MM3 (ref 1.7–7)
NEUTROPHILS NFR BLD AUTO: 49.4 % (ref 42.7–76)
NRBC BLD AUTO-RTO: 0 /100 WBC (ref 0–0.2)
PLATELET # BLD AUTO: 380 10*3/MM3 (ref 140–450)
PMV BLD AUTO: 9 FL (ref 6–12)
POTASSIUM SERPL-SCNC: 4.8 MMOL/L (ref 3.5–5.2)
RBC # BLD AUTO: 5.13 10*6/MM3 (ref 3.77–5.28)
SODIUM SERPL-SCNC: 139 MMOL/L (ref 136–145)
WBC NRBC COR # BLD AUTO: 10.09 10*3/MM3 (ref 3.4–10.8)

## 2024-05-31 PROCEDURE — 85025 COMPLETE CBC W/AUTO DIFF WBC: CPT

## 2024-05-31 PROCEDURE — 80048 BASIC METABOLIC PNL TOTAL CA: CPT

## 2024-05-31 PROCEDURE — 36415 COLL VENOUS BLD VENIPUNCTURE: CPT

## 2024-05-31 RX ORDER — AMLODIPINE BESYLATE 2.5 MG/1
5 TABLET ORAL DAILY
Start: 2024-05-31

## 2024-05-31 RX ORDER — AMLODIPINE BESYLATE 2.5 MG/1
1 TABLET ORAL DAILY
COMMUNITY
Start: 2024-05-20 | End: 2024-05-31 | Stop reason: SDUPTHER

## 2024-05-31 RX ORDER — RANOLAZINE 500 MG/1
500 TABLET, EXTENDED RELEASE ORAL 2 TIMES DAILY
Qty: 60 TABLET | Refills: 5 | Status: SHIPPED | OUTPATIENT
Start: 2024-05-31

## 2024-05-31 RX ORDER — ISOSORBIDE MONONITRATE 60 MG/1
60 TABLET, EXTENDED RELEASE ORAL DAILY
Start: 2024-05-31

## 2024-05-31 NOTE — H&P (VIEW-ONLY)
Subjective     Monisha Gardner is a 63 y.o. female.   Chief Complaint   Patient presents with    Hypertension     today    Chest Pain     Pressure pain x 1 month ago with diaphoresis and soa used nitro     History of Present Illness   Monisha Gardner is a 63-year-old female who presents to clinic today for cardiology follow-up.    Hypertension currently on Losartan, Norvasc, Lopresor and reports some recent elevations in BP with home monitoring.  Reports compliance with medicine.     CAD currently on ASA, Imdur, Metoprolol and Repatha (statin intolerant).  She denies dyspnea, palpitations or syncope. Reports compliance with medicine. She had completed a nuclear stress test in August 2023 which had been discussed with patient but after further review of the nuclear stress test it was felt that there was an area of concern.  This has been discussed with her and treatment options.  Cardiac cath was recommended but awaiting nephrology clearance.  Reports today about an episode of chest discomfort, shortness of air and diaphoresis about a month ago.  She reports taking nitroglycerin and symptoms occurred while resting.  She did not seek urgent medical attention.  She denies any recurrence of symptoms.    Hyperlipidemia with statin intolerance.  She reports continuing on Repatha she does report missing some Repatha due to insurance coverage. She remains on fenofibrate.  She denies medication side effects and reports compliance.     Valvular heart disease which has been stable.  She denies shortness of air, orthopnea or swelling.     She reports a jaw mass and recently underwent surgery in [place].  She states things went well and she was recovering nicely but developed an abscess which she has been following closely with oral surgeon.     She reports during hospitalization for surgery she was placed on oxygen at night due to significant desaturations. She completed a home overnight study which was abnormal and  oxygen has been prescribed.      CKD and was previously following with nephrology but has not recently been seen. She reports recent re-establishing with nephrology [facility].      Patient Active Problem List   Diagnosis    ASCVD (arteriosclerotic cardiovascular disease)    Essential hypertension    Dyslipidemia    Type 2 diabetes mellitus with hyperglycemia, with long-term current use of insulin    Body mass index (bmi) 30.0-30.9, adult    Osteoarthritis    Atypical angina    Elevated liver enzymes    Gastroesophageal reflux disease with esophagitis    Vitamin D deficiency    Hx of pancreatitis    Menopausal symptoms    Vitamin B 12 deficiency    Nocturnal leg cramps    Anxiety    Left arm weakness    Chronic gout of multiple sites    Squamous cell carcinoma in situ (SCCIS) of skin    Mixed hyperlipidemia    PVC (premature ventricular contraction)    RLS (restless legs syndrome)    Psychophysiological insomnia    Dry mouth    Type 2 diabetes mellitus with diabetic autonomic neuropathy, with long-term current use of insulin    Bone mass    Mass of mandible     Past Medical History:   Diagnosis Date    Anxiety     Arthritis     Diabetes mellitus     Diverticulosis     Gout     Hyperlipidemia     Hypertension     Myocardial infarction      Past Surgical History:   Procedure Laterality Date    APPENDECTOMY      BREAST BIOPSY Right     benign    CARDIAC CATHETERIZATION       SECTION      CHOLECYSTECTOMY      CORONARY STENT PLACEMENT      Stenting of the RCA with 3.5 x 26 mm Resolute drug-eluting stent    HYSTERECTOMY      JOINT REPLACEMENT      TONSILLECTOMY      TUMOR REMOVAL  02/15/2024    FACIAL TUMOR       Family History   Problem Relation Age of Onset    Arthritis Mother     Heart disease Mother     Heart disease Sister     Vision loss Daughter     Heart disease Father     Heart disease Sister     Heart disease Sister     Heart disease Sister      Social History     Tobacco Use    Smoking status: Never      Passive exposure: Never    Smokeless tobacco: Never   Vaping Use    Vaping status: Never Used   Substance Use Topics    Alcohol use: No    Drug use: No         The following portions of the patient's history were reviewed and updated as appropriate: allergies, current medications, past family history, past medical history, past social history, past surgical history and problem list.    Allergies   Allergen Reactions    Ciprofloxacin Anaphylaxis    Penicillins Shortness Of Breath    Statins Myalgia    Fish Oil [Omega-3 Fatty Acids] Nausea And Vomiting         Current Outpatient Medications:     allopurinol (ZYLOPRIM) 100 MG tablet, Take 1 tablet by mouth 2 (Two) Times a Day., Disp: 180 tablet, Rfl: 3    amLODIPine (NORVASC) 2.5 MG tablet, Take 2 tablets by mouth Daily., Disp: , Rfl:     ammonium lactate (AMLACTIN) 12 % cream, , Disp: , Rfl:     aspirin 81 MG EC tablet, Take 1 tablet by mouth Daily., Disp: 100 tablet, Rfl: 3    azelastine (ASTELIN) 0.1 % nasal spray, 2 sprays both nostrils twice daily as needed, Disp: 1 mL, Rfl: 0    Blood Glucose Monitoring Suppl kit, Use 1 Device Daily., Disp: 1 each, Rfl: 0    Cariprazine HCl (Vraylar) 3 MG capsule capsule, Take 1 capsule by mouth Daily., Disp: 90 capsule, Rfl: 3    Continuous Blood Gluc  (Dexcom G7 ) device, Use 1 each Daily., Disp: 1 each, Rfl: 1    Continuous Blood Gluc Sensor (Dexcom G7 Sensor) misc, Use 1 each Every 10 (Ten) Days., Disp: 9 each, Rfl: 3    Continuous Glucose  (Dexcom G7 ) device, Use 1 each Daily., Disp: 1 each, Rfl: 0    DULoxetine (CYMBALTA) 30 MG capsule, Take 3 capsules by mouth Daily., Disp: 270 capsule, Rfl: 3    Easy Touch Lancets 30G/Twist misc, Use tid, Disp: 300 each, Rfl: 3    Evolocumab (REPATHA) solution auto-injector SureClick injection, Inject 1 mL under the skin into the appropriate area as directed Every 14 (Fourteen) Days., Disp: 2 mL, Rfl: 5    fenofibrate 160 MG tablet, Take 1 tablet by mouth  Daily., Disp: 90 tablet, Rfl: 3    gabapentin (NEURONTIN) 600 MG tablet, TAKE 1 TABLET BY MOUTH TWICE DAILY, Disp: 60 tablet, Rfl: 0    glucose blood test strip, 1 each by Other route 3 (Three) Times a Day. Use as instructed, Disp: 100 each, Rfl: 5    hydrOXYzine pamoate (VISTARIL) 50 MG capsule, Take 1 capsule by mouth 3 (Three) Times a Day As Needed for Itching., Disp: 270 capsule, Rfl: 1    Insulin Lispro 200 UNIT/ML solution pen-injector, Inject 6 Units under the skin into the appropriate area as directed Take As Directed., Disp: 15 mL, Rfl: 5    Insulin Pen Needle 32G X 5 MM misc, Use 1 each 3 times a day., Disp: 100 each, Rfl: 5    isosorbide mononitrate (IMDUR) 60 MG 24 hr tablet, Take 1 tablet by mouth Daily., Disp: , Rfl:     Lantus SoloStar 100 UNIT/ML injection pen, Inject 45 Units under the skin into the appropriate area as directed Daily., Disp: 15 mL, Rfl: 5    losartan (COZAAR) 100 MG tablet, Take 0.5 tablets by mouth 2 (Two) Times a Day., Disp: , Rfl:     metoprolol tartrate (LOPRESSOR) 100 MG tablet, TAKE 1 TABLET BY MOUTH TWICE DAILY, Disp: 180 tablet, Rfl: 3    nitroglycerin (Nitrostat) 0.4 MG SL tablet, Place 1 tablet under the tongue Every 5 (Five) Minutes As Needed for Chest Pain., Disp: 30 tablet, Rfl: 5    pantoprazole (PROTONIX) 40 MG EC tablet, Take 1 tablet by mouth Daily., Disp: 90 tablet, Rfl: 3    rOPINIRole (REQUIP) 2 MG tablet, Take 1 tablet by mouth Every Night., Disp: 90 tablet, Rfl: 3    Semaglutide,0.25 or 0.5MG/DOS, (Ozempic, 0.25 or 0.5 MG/DOSE,) 2 MG/1.5ML solution pen-injector, Inject 0.5 mg under the skin into the appropriate area as directed 1 (One) Time Per Week., Disp: 1.5 mL, Rfl: 2    tobramycin-dexAMETHasone (TOBRADEX) 0.3-0.1 % ophthalmic suspension, Administer 1 drop to both eyes Every 4 (Four) Hours While Awake., Disp: 10 mL, Rfl: 0    traMADol (ULTRAM) 50 MG tablet, TAKE 1 TABLET BY MOUTH EVERY 8 HOURS AS NEEDED FOR 7 DAYS, Disp: , Rfl:     triamcinolone (KENALOG)  0.1 % cream, Apply 1 Application topically to the appropriate area as directed 2 (Two) Times a Day., Disp: 80 g, Rfl: 0    triamcinolone (KENALOG) 0.5 % cream, Apply 1 Application topically to the appropriate area as directed 3 (Three) Times a Day., Disp: 30 g, Rfl: 0    True Metrix Blood Glucose Test test strip, 1 each by Other route 3 (Three) Times a Day., Disp: 300 each, Rfl: 3    Ultra Thin Lancets 31G misc, Use 1 Device 3 (Three) Times a Day., Disp: 100 each, Rfl: 3    Continuous Blood Gluc Sensor (Dexcom G7 Sensor) misc, Use 1 each Every 10 (Ten) Days., Disp: 4 each, Rfl: 11    ranolazine (Ranexa) 500 MG 12 hr tablet, Take 1 tablet by mouth 2 (Two) Times a Day., Disp: 60 tablet, Rfl: 5    Review of Systems   Constitutional:  Negative for activity change, appetite change, chills, diaphoresis, fatigue and fever.   HENT:  Negative for congestion, drooling, ear discharge, ear pain, mouth sores, nosebleeds, postnasal drip, rhinorrhea, sinus pressure, sneezing and sore throat.    Eyes:  Negative for pain, discharge and visual disturbance.   Respiratory:  Positive for shortness of breath. Negative for cough, chest tightness and wheezing.    Cardiovascular:  Positive for chest pain. Negative for palpitations and leg swelling.   Gastrointestinal:  Negative for abdominal pain, constipation, diarrhea, nausea and vomiting.   Endocrine: Negative for cold intolerance, heat intolerance, polydipsia, polyphagia and polyuria.   Musculoskeletal:  Negative for arthralgias, myalgias and neck pain.   Skin:  Negative for rash and wound.   Neurological:  Negative for dizziness, syncope, speech difficulty, weakness, light-headedness and headaches.   Hematological:  Negative for adenopathy. Does not bruise/bleed easily.   Psychiatric/Behavioral:  Negative for confusion, dysphoric mood and sleep disturbance. The patient is not nervous/anxious.    All other systems reviewed and are negative.    /82 (BP Location: Left arm, Patient  "Position: Sitting, Cuff Size: Adult)   Pulse 66   Ht 162.6 cm (64\")   Wt 73 kg (161 lb)   LMP  (LMP Unknown)   SpO2 98%   BMI 27.64 kg/m²     BP recheck 138/70    Objective   Allergies   Allergen Reactions    Ciprofloxacin Anaphylaxis    Penicillins Shortness Of Breath    Statins Myalgia    Fish Oil [Omega-3 Fatty Acids] Nausea And Vomiting       Physical Exam  Vitals reviewed.   Constitutional:       Appearance: Normal appearance. She is well-developed.   HENT:      Head: Normocephalic.      Right Ear: Tympanic membrane normal.      Left Ear: Tympanic membrane normal.      Nose: Nose normal.      Mouth/Throat:        Comments: Several teeth missing   Eyes:      Conjunctiva/sclera: Conjunctivae normal.      Pupils: Pupils are equal, round, and reactive to light.   Neck:      Thyroid: No thyromegaly.      Vascular: No carotid bruit or JVD.   Cardiovascular:      Rate and Rhythm: Normal rate and regular rhythm.   Pulmonary:      Effort: Pulmonary effort is normal.      Breath sounds: Normal breath sounds.   Abdominal:      General: Bowel sounds are normal.      Palpations: Abdomen is soft. There is no hepatomegaly, splenomegaly or mass.      Tenderness: There is no abdominal tenderness.   Musculoskeletal:         General: Normal range of motion.      Cervical back: Neck supple.      Right lower leg: No edema.      Left lower leg: No edema.   Skin:     General: Skin is warm and dry.   Neurological:      Mental Status: She is alert and oriented to person, place, and time.   Psychiatric:         Attention and Perception: Attention normal.         Mood and Affect: Mood normal.         Speech: Speech normal.         Behavior: Behavior normal. Behavior is cooperative.         Cognition and Memory: Cognition normal.           ECG 12 Lead    Date/Time: 5/31/2024 4:53 PM  Performed by: Asia Hawkins APRN    Authorized by: Asia Hawkins APRN  Comparison: compared with previous ECG   Similar to previous " ECG  Rhythm: sinus rhythm  Rate: normal  BPM: 66  Other findings: non-specific ST-T wave changes    Clinical impression: abnormal EKG and myocardial infarction  Clinical impression comment: septal, indeterminate age  Comments: QT/QTc - 423/436          LABS  WBC   Date Value Ref Range Status   05/31/2024 10.09 3.40 - 10.80 10*3/mm3 Final   11/12/2021 10.58 3.40 - 10.80 10*3/mm3 Final     RBC   Date Value Ref Range Status   05/31/2024 5.13 3.77 - 5.28 10*6/mm3 Final   11/12/2021 4.77 3.77 - 5.28 10*6/mm3 Final     Hemoglobin   Date Value Ref Range Status   05/31/2024 13.2 12.0 - 15.9 g/dL Final     Hematocrit   Date Value Ref Range Status   05/31/2024 40.5 34.0 - 46.6 % Final     MCV   Date Value Ref Range Status   05/31/2024 78.9 (L) 79.0 - 97.0 fL Final     MCH   Date Value Ref Range Status   05/31/2024 25.7 (L) 26.6 - 33.0 pg Final     MCHC   Date Value Ref Range Status   05/31/2024 32.6 31.5 - 35.7 g/dL Final     RDW   Date Value Ref Range Status   05/31/2024 14.3 12.3 - 15.4 % Final     RDW-SD   Date Value Ref Range Status   05/31/2024 40.3 37.0 - 54.0 fl Final     MPV   Date Value Ref Range Status   05/31/2024 9.0 6.0 - 12.0 fL Final     Platelets   Date Value Ref Range Status   05/31/2024 380 140 - 450 10*3/mm3 Final     Neutrophil %   Date Value Ref Range Status   05/31/2024 49.4 42.7 - 76.0 % Final     Lymphocyte %   Date Value Ref Range Status   05/31/2024 39.8 19.6 - 45.3 % Final     Monocyte %   Date Value Ref Range Status   05/31/2024 6.1 5.0 - 12.0 % Final     Eosinophil %   Date Value Ref Range Status   05/31/2024 3.0 0.3 - 6.2 % Final     Basophil %   Date Value Ref Range Status   05/31/2024 1.2 0.0 - 1.5 % Final     Immature Grans %   Date Value Ref Range Status   05/31/2024 0.5 0.0 - 0.5 % Final     Neutrophils, Absolute   Date Value Ref Range Status   05/31/2024 4.98 1.70 - 7.00 10*3/mm3 Final     Lymphocytes, Absolute   Date Value Ref Range Status   05/31/2024 4.02 (H) 0.70 - 3.10 10*3/mm3 Final      Monocytes, Absolute   Date Value Ref Range Status   05/31/2024 0.62 0.10 - 0.90 10*3/mm3 Final     Eosinophils, Absolute   Date Value Ref Range Status   05/31/2024 0.30 0.00 - 0.40 10*3/mm3 Final     Basophils, Absolute   Date Value Ref Range Status   05/31/2024 0.12 0.00 - 0.20 10*3/mm3 Final     Immature Grans, Absolute   Date Value Ref Range Status   05/31/2024 0.05 0.00 - 0.05 10*3/mm3 Final     nRBC   Date Value Ref Range Status   05/31/2024 0.0 0.0 - 0.2 /100 WBC Final       Total Cholesterol   Date Value Ref Range Status   03/01/2024 193 0 - 200 mg/dL Final     Triglycerides   Date Value Ref Range Status   03/01/2024 363 (H) 0 - 150 mg/dL Final     HDL Cholesterol   Date Value Ref Range Status   03/01/2024 30 (L) 40 - 60 mg/dL Final     LDL Cholesterol    Date Value Ref Range Status   03/01/2024 101 (H) 0 - 100 mg/dL Final     LDL Chol Calc (NIH)   Date Value Ref Range Status   11/12/2021 124 (H) 0 - 100 mg/dL Final     IMAGING  US DUPLEX RENAL ARTERY COMPLETE    Result Date: 3/11/2024  Normal renal ultrasound . Elevated RI bilaterally . Images reviewed, interpreted, and dictated by Dr. HANNAH Barajas. Transcribed by Halima Orozco PA-C.            Assessment & Plan   Diagnoses and all orders for this visit:    1. Coronary artery disease involving native coronary artery of native heart with other form of angina pectoris (Primary)  -     ECG 12 Lead  -     CBC & Differential; Future  -     Basic Metabolic Panel; Future  -     Case Request Cath Lab: Left Heart Cath  Reviewed and discussed, no acute changes however nonspecific changes noted  With recent symptoms and strongly positive nuclear stress test, cardiac catheter is recommended.  Due to underlying CKD will reach out to nephrology again for clearance  Continue on aspirin, Repatha (statin intolerant), Imdur (increased to 60 mg), Lopressor and start Ranexa 500 mg twice daily  Advised if new or worsening symptoms while awaiting workup, go to the  ER.  She expresses understanding    2. Abnormal nuclear stress test  -     Case Request Cath Lab: Left Heart Cath  Cardiac cath recommended, discussed risk and benefits to include but not limited to hematoma, kidney injury, arrhythmia, MI, stroke and death.  She expresses understanding and is willing to proceed    3. Essential hypertension  -     isosorbide mononitrate (IMDUR) 60 MG 24 hr tablet; Take 1 tablet by mouth Daily.  Uncontrolled, will increase Norvasc to 5 mg daily while continuing on losartan and Lopressor, she is asked to call the clinic if home BP is greater than 140/90 within the next week.    4. Aortic stenosis, mild  Clinically asymptomatic, will continue to monitor    5. Dyslipidemia  Continue on Repatha, statin intolerant  Heart healthy diet   LDL goal 55    6. Type 2 diabetes mellitus with hyperglycemia, with long-term current use of insulin  Tight glycemic control recommended for overall health benefit    7. Stage 3b chronic kidney disease  Continue to follow closely with nephrology    8. Mass of mandible  Continue to follow with oral surgeon    Other orders  -     amLODIPine (NORVASC) 2.5 MG tablet; Take 2 tablets by mouth Daily.  -     ranolazine (Ranexa) 500 MG 12 hr tablet; Take 1 tablet by mouth 2 (Two) Times a Day.  Dispense: 60 tablet; Refill: 5      Review of medical record    Lifestyle modifications including heart healthy diet, regular exercise, maintenance of desirable body weight and avoidance of tobacco products    Follow-up in 1 month with EKG, sooner if needed.

## 2024-05-31 NOTE — LETTER
June 2, 2024     ANH Medina  [address] [facility] [address]    Patient: Monisha Gardner   YOB: 1960   Date of Visit: 5/31/2024       Dear ANH Medina    Monisha Gardner was in my office today. Below is a copy of my note.    If you have questions, please do not hesitate to call me. I look forward to following Monisha along with you.         Sincerely,        TRUONG Briones        CC: No Recipients    Subjective    Monisha Gardner is a 63 y.o. female.   Chief Complaint   Patient presents with   • Hypertension     today   • Chest Pain     Pressure pain x 1 month ago with diaphoresis and soa used nitro     History of Present Illness   Monisha Gardner is a 63-year-old female who presents to clinic today for cardiology follow-up.    Hypertension currently on Losartan, Norvasc, Lopresor and reports some recent elevations in BP with home monitoring.  Reports compliance with medicine.     CAD currently on ASA, Imdur, Metoprolol and Repatha (statin intolerant).  She denies dyspnea, palpitations or syncope. Reports compliance with medicine. She had completed a nuclear stress test in August 2023 which had been discussed with patient but after further review of the nuclear stress test it was felt that there was an area of concern.  This has been discussed with her and treatment options.  Cardiac cath was recommended but awaiting nephrology clearance.  Reports today about an episode of chest discomfort, shortness of air and diaphoresis about a month ago.  She reports taking nitroglycerin and symptoms occurred while resting.  She did not seek urgent medical attention.  She denies any recurrence of symptoms.    Hyperlipidemia with statin intolerance.  She reports continuing on Repatha she does report missing some Repatha due to insurance coverage. She remains on fenofibrate.  She denies medication side effects and reports compliance.     Valvular heart disease which has been  stable.  She denies shortness of air, orthopnea or swelling.     She reports a jaw mass and recently underwent surgery in [place].  She states things went well and she was recovering nicely but developed an abscess which she has been following closely with oral surgeon.     She reports during hospitalization for surgery she was placed on oxygen at night due to significant desaturations. She completed a home overnight study which was abnormal and oxygen has been prescribed.      CKD and was previously following with nephrology but has not recently been seen. She reports recent re-establishing with [facility].      Patient Active Problem List   Diagnosis   • ASCVD (arteriosclerotic cardiovascular disease)   • Essential hypertension   • Dyslipidemia   • Type 2 diabetes mellitus with hyperglycemia, with long-term current use of insulin   • Body mass index (bmi) 30.0-30.9, adult   • Osteoarthritis   • Atypical angina   • Elevated liver enzymes   • Gastroesophageal reflux disease with esophagitis   • Vitamin D deficiency   • Hx of pancreatitis   • Menopausal symptoms   • Vitamin B 12 deficiency   • Nocturnal leg cramps   • Anxiety   • Left arm weakness   • Chronic gout of multiple sites   • Squamous cell carcinoma in situ (SCCIS) of skin   • Mixed hyperlipidemia   • PVC (premature ventricular contraction)   • RLS (restless legs syndrome)   • Psychophysiological insomnia   • Dry mouth   • Type 2 diabetes mellitus with diabetic autonomic neuropathy, with long-term current use of insulin   • Bone mass   • Mass of mandible     Past Medical History:   Diagnosis Date   • Anxiety    • Arthritis    • Diabetes mellitus    • Diverticulosis    • Gout    • Hyperlipidemia    • Hypertension    • Myocardial infarction      Past Surgical History:   Procedure Laterality Date   • APPENDECTOMY     • BREAST BIOPSY Right     benign   • CARDIAC CATHETERIZATION     •  SECTION     • CHOLECYSTECTOMY     • CORONARY STENT  PLACEMENT      Stenting of the RCA with 3.5 x 26 mm Resolute drug-eluting stent   • HYSTERECTOMY     • JOINT REPLACEMENT     • TONSILLECTOMY     • TUMOR REMOVAL  02/15/2024    FACIAL TUMOR       Family History   Problem Relation Age of Onset   • Arthritis Mother    • Heart disease Mother    • Heart disease Sister    • Vision loss Daughter    • Heart disease Father    • Heart disease Sister    • Heart disease Sister    • Heart disease Sister      Social History     Tobacco Use   • Smoking status: Never     Passive exposure: Never   • Smokeless tobacco: Never   Vaping Use   • Vaping status: Never Used   Substance Use Topics   • Alcohol use: No   • Drug use: No         The following portions of the patient's history were reviewed and updated as appropriate: allergies, current medications, past family history, past medical history, past social history, past surgical history and problem list.    Allergies   Allergen Reactions   • Ciprofloxacin Anaphylaxis   • Penicillins Shortness Of Breath   • Statins Myalgia   • Fish Oil [Omega-3 Fatty Acids] Nausea And Vomiting         Current Outpatient Medications:   •  allopurinol (ZYLOPRIM) 100 MG tablet, Take 1 tablet by mouth 2 (Two) Times a Day., Disp: 180 tablet, Rfl: 3  •  amLODIPine (NORVASC) 2.5 MG tablet, Take 2 tablets by mouth Daily., Disp: , Rfl:   •  ammonium lactate (AMLACTIN) 12 % cream, , Disp: , Rfl:   •  aspirin 81 MG EC tablet, Take 1 tablet by mouth Daily., Disp: 100 tablet, Rfl: 3  •  azelastine (ASTELIN) 0.1 % nasal spray, 2 sprays both nostrils twice daily as needed, Disp: 1 mL, Rfl: 0  •  Blood Glucose Monitoring Suppl kit, Use 1 Device Daily., Disp: 1 each, Rfl: 0  •  Cariprazine HCl (Vraylar) 3 MG capsule capsule, Take 1 capsule by mouth Daily., Disp: 90 capsule, Rfl: 3  •  Continuous Blood Gluc  (Dexcom G7 ) device, Use 1 each Daily., Disp: 1 each, Rfl: 1  •  Continuous Blood Gluc Sensor (Dexcom G7 Sensor) misc, Use 1 each Every 10 (Ten)  Days., Disp: 9 each, Rfl: 3  •  Continuous Glucose  (Dexcom G7 ) device, Use 1 each Daily., Disp: 1 each, Rfl: 0  •  DULoxetine (CYMBALTA) 30 MG capsule, Take 3 capsules by mouth Daily., Disp: 270 capsule, Rfl: 3  •  Easy Touch Lancets 30G/Twist misc, Use tid, Disp: 300 each, Rfl: 3  •  Evolocumab (REPATHA) solution auto-injector SureClick injection, Inject 1 mL under the skin into the appropriate area as directed Every 14 (Fourteen) Days., Disp: 2 mL, Rfl: 5  •  fenofibrate 160 MG tablet, Take 1 tablet by mouth Daily., Disp: 90 tablet, Rfl: 3  •  gabapentin (NEURONTIN) 600 MG tablet, TAKE 1 TABLET BY MOUTH TWICE DAILY, Disp: 60 tablet, Rfl: 0  •  glucose blood test strip, 1 each by Other route 3 (Three) Times a Day. Use as instructed, Disp: 100 each, Rfl: 5  •  hydrOXYzine pamoate (VISTARIL) 50 MG capsule, Take 1 capsule by mouth 3 (Three) Times a Day As Needed for Itching., Disp: 270 capsule, Rfl: 1  •  Insulin Lispro 200 UNIT/ML solution pen-injector, Inject 6 Units under the skin into the appropriate area as directed Take As Directed., Disp: 15 mL, Rfl: 5  •  Insulin Pen Needle 32G X 5 MM misc, Use 1 each 3 times a day., Disp: 100 each, Rfl: 5  •  isosorbide mononitrate (IMDUR) 60 MG 24 hr tablet, Take 1 tablet by mouth Daily., Disp: , Rfl:   •  Lantus SoloStar 100 UNIT/ML injection pen, Inject 45 Units under the skin into the appropriate area as directed Daily., Disp: 15 mL, Rfl: 5  •  losartan (COZAAR) 100 MG tablet, Take 0.5 tablets by mouth 2 (Two) Times a Day., Disp: , Rfl:   •  metoprolol tartrate (LOPRESSOR) 100 MG tablet, TAKE 1 TABLET BY MOUTH TWICE DAILY, Disp: 180 tablet, Rfl: 3  •  nitroglycerin (Nitrostat) 0.4 MG SL tablet, Place 1 tablet under the tongue Every 5 (Five) Minutes As Needed for Chest Pain., Disp: 30 tablet, Rfl: 5  •  pantoprazole (PROTONIX) 40 MG EC tablet, Take 1 tablet by mouth Daily., Disp: 90 tablet, Rfl: 3  •  rOPINIRole (REQUIP) 2 MG tablet, Take 1 tablet by  mouth Every Night., Disp: 90 tablet, Rfl: 3  •  Semaglutide,0.25 or 0.5MG/DOS, (Ozempic, 0.25 or 0.5 MG/DOSE,) 2 MG/1.5ML solution pen-injector, Inject 0.5 mg under the skin into the appropriate area as directed 1 (One) Time Per Week., Disp: 1.5 mL, Rfl: 2  •  tobramycin-dexAMETHasone (TOBRADEX) 0.3-0.1 % ophthalmic suspension, Administer 1 drop to both eyes Every 4 (Four) Hours While Awake., Disp: 10 mL, Rfl: 0  •  traMADol (ULTRAM) 50 MG tablet, TAKE 1 TABLET BY MOUTH EVERY 8 HOURS AS NEEDED FOR 7 DAYS, Disp: , Rfl:   •  triamcinolone (KENALOG) 0.1 % cream, Apply 1 Application topically to the appropriate area as directed 2 (Two) Times a Day., Disp: 80 g, Rfl: 0  •  triamcinolone (KENALOG) 0.5 % cream, Apply 1 Application topically to the appropriate area as directed 3 (Three) Times a Day., Disp: 30 g, Rfl: 0  •  True Metrix Blood Glucose Test test strip, 1 each by Other route 3 (Three) Times a Day., Disp: 300 each, Rfl: 3  •  Ultra Thin Lancets 31G misc, Use 1 Device 3 (Three) Times a Day., Disp: 100 each, Rfl: 3  •  Continuous Blood Gluc Sensor (Dexcom G7 Sensor) misc, Use 1 each Every 10 (Ten) Days., Disp: 4 each, Rfl: 11  •  ranolazine (Ranexa) 500 MG 12 hr tablet, Take 1 tablet by mouth 2 (Two) Times a Day., Disp: 60 tablet, Rfl: 5    Review of Systems   Constitutional:  Negative for activity change, appetite change, chills, diaphoresis, fatigue and fever.   HENT:  Negative for congestion, drooling, ear discharge, ear pain, mouth sores, nosebleeds, postnasal drip, rhinorrhea, sinus pressure, sneezing and sore throat.    Eyes:  Negative for pain, discharge and visual disturbance.   Respiratory:  Positive for shortness of breath. Negative for cough, chest tightness and wheezing.    Cardiovascular:  Positive for chest pain. Negative for palpitations and leg swelling.   Gastrointestinal:  Negative for abdominal pain, constipation, diarrhea, nausea and vomiting.   Endocrine: Negative for cold intolerance, heat  "intolerance, polydipsia, polyphagia and polyuria.   Musculoskeletal:  Negative for arthralgias, myalgias and neck pain.   Skin:  Negative for rash and wound.   Neurological:  Negative for dizziness, syncope, speech difficulty, weakness, light-headedness and headaches.   Hematological:  Negative for adenopathy. Does not bruise/bleed easily.   Psychiatric/Behavioral:  Negative for confusion, dysphoric mood and sleep disturbance. The patient is not nervous/anxious.    All other systems reviewed and are negative.    /82 (BP Location: Left arm, Patient Position: Sitting, Cuff Size: Adult)   Pulse 66   Ht 162.6 cm (64\")   Wt 73 kg (161 lb)   LMP  (LMP Unknown)   SpO2 98%   BMI 27.64 kg/m²     BP recheck 138/70    Objective  Allergies   Allergen Reactions   • Ciprofloxacin Anaphylaxis   • Penicillins Shortness Of Breath   • Statins Myalgia   • Fish Oil [Omega-3 Fatty Acids] Nausea And Vomiting       Physical Exam  Vitals reviewed.   Constitutional:       Appearance: Normal appearance. She is well-developed.   HENT:      Head: Normocephalic.      Right Ear: Tympanic membrane normal.      Left Ear: Tympanic membrane normal.      Nose: Nose normal.      Mouth/Throat:        Comments: Several teeth missing   Eyes:      Conjunctiva/sclera: Conjunctivae normal.      Pupils: Pupils are equal, round, and reactive to light.   Neck:      Thyroid: No thyromegaly.      Vascular: No carotid bruit or JVD.   Cardiovascular:      Rate and Rhythm: Normal rate and regular rhythm.   Pulmonary:      Effort: Pulmonary effort is normal.      Breath sounds: Normal breath sounds.   Abdominal:      General: Bowel sounds are normal.      Palpations: Abdomen is soft. There is no hepatomegaly, splenomegaly or mass.      Tenderness: There is no abdominal tenderness.   Musculoskeletal:         General: Normal range of motion.      Cervical back: Neck supple.      Right lower leg: No edema.      Left lower leg: No edema.   Skin:     General: " Skin is warm and dry.   Neurological:      Mental Status: She is alert and oriented to person, place, and time.   Psychiatric:         Attention and Perception: Attention normal.         Mood and Affect: Mood normal.         Speech: Speech normal.         Behavior: Behavior normal. Behavior is cooperative.         Cognition and Memory: Cognition normal.           ECG 12 Lead    Date/Time: 5/31/2024 4:53 PM  Performed by: Asia Hawkins APRN    Authorized by: Asia Hawkins APRN  Comparison: compared with previous ECG   Similar to previous ECG  Rhythm: sinus rhythm  Rate: normal  BPM: 66  Other findings: non-specific ST-T wave changes    Clinical impression: abnormal EKG and myocardial infarction  Clinical impression comment: septal, indeterminate age  Comments: QT/QTc - 423/436          LABS  WBC   Date Value Ref Range Status   05/31/2024 10.09 3.40 - 10.80 10*3/mm3 Final   11/12/2021 10.58 3.40 - 10.80 10*3/mm3 Final     RBC   Date Value Ref Range Status   05/31/2024 5.13 3.77 - 5.28 10*6/mm3 Final   11/12/2021 4.77 3.77 - 5.28 10*6/mm3 Final     Hemoglobin   Date Value Ref Range Status   05/31/2024 13.2 12.0 - 15.9 g/dL Final     Hematocrit   Date Value Ref Range Status   05/31/2024 40.5 34.0 - 46.6 % Final     MCV   Date Value Ref Range Status   05/31/2024 78.9 (L) 79.0 - 97.0 fL Final     MCH   Date Value Ref Range Status   05/31/2024 25.7 (L) 26.6 - 33.0 pg Final     MCHC   Date Value Ref Range Status   05/31/2024 32.6 31.5 - 35.7 g/dL Final     RDW   Date Value Ref Range Status   05/31/2024 14.3 12.3 - 15.4 % Final     RDW-SD   Date Value Ref Range Status   05/31/2024 40.3 37.0 - 54.0 fl Final     MPV   Date Value Ref Range Status   05/31/2024 9.0 6.0 - 12.0 fL Final     Platelets   Date Value Ref Range Status   05/31/2024 380 140 - 450 10*3/mm3 Final     Neutrophil %   Date Value Ref Range Status   05/31/2024 49.4 42.7 - 76.0 % Final     Lymphocyte %   Date Value Ref Range Status   05/31/2024 39.8  19.6 - 45.3 % Final     Monocyte %   Date Value Ref Range Status   05/31/2024 6.1 5.0 - 12.0 % Final     Eosinophil %   Date Value Ref Range Status   05/31/2024 3.0 0.3 - 6.2 % Final     Basophil %   Date Value Ref Range Status   05/31/2024 1.2 0.0 - 1.5 % Final     Immature Grans %   Date Value Ref Range Status   05/31/2024 0.5 0.0 - 0.5 % Final     Neutrophils, Absolute   Date Value Ref Range Status   05/31/2024 4.98 1.70 - 7.00 10*3/mm3 Final     Lymphocytes, Absolute   Date Value Ref Range Status   05/31/2024 4.02 (H) 0.70 - 3.10 10*3/mm3 Final     Monocytes, Absolute   Date Value Ref Range Status   05/31/2024 0.62 0.10 - 0.90 10*3/mm3 Final     Eosinophils, Absolute   Date Value Ref Range Status   05/31/2024 0.30 0.00 - 0.40 10*3/mm3 Final     Basophils, Absolute   Date Value Ref Range Status   05/31/2024 0.12 0.00 - 0.20 10*3/mm3 Final     Immature Grans, Absolute   Date Value Ref Range Status   05/31/2024 0.05 0.00 - 0.05 10*3/mm3 Final     nRBC   Date Value Ref Range Status   05/31/2024 0.0 0.0 - 0.2 /100 WBC Final       Total Cholesterol   Date Value Ref Range Status   03/01/2024 193 0 - 200 mg/dL Final     Triglycerides   Date Value Ref Range Status   03/01/2024 363 (H) 0 - 150 mg/dL Final     HDL Cholesterol   Date Value Ref Range Status   03/01/2024 30 (L) 40 - 60 mg/dL Final     LDL Cholesterol    Date Value Ref Range Status   03/01/2024 101 (H) 0 - 100 mg/dL Final     LDL Chol Calc (NIH)   Date Value Ref Range Status   11/12/2021 124 (H) 0 - 100 mg/dL Final     IMAGING  US DUPLEX RENAL ARTERY COMPLETE    Result Date: 3/11/2024  Normal renal ultrasound . Elevated RI bilaterally . Images reviewed, interpreted, and dictated by Dr. HANNAH Barajas. Transcribed by Halima Orozco PA-C.            Assessment & Plan  Diagnoses and all orders for this visit:    1. Coronary artery disease involving native coronary artery of native heart with other form of angina pectoris (Primary)  -     ECG 12 Lead  -      CBC & Differential; Future  -     Basic Metabolic Panel; Future  -     Case Request Cath Lab: Left Heart Cath  Reviewed and discussed, no acute changes however nonspecific changes noted  With recent symptoms and strongly positive nuclear stress test, cardiac catheter is recommended.  Due to underlying CKD will reach out to nephrology again for clearance  Continue on aspirin, Repatha (statin intolerant), Imdur (increased to 60 mg), Lopressor and start Ranexa 500 mg twice daily  Advised if new or worsening symptoms while awaiting workup, go to the ER.  She expresses understanding    2. Abnormal nuclear stress test  -     Case Request Cath Lab: Left Heart Cath  Cardiac cath recommended, discussed risk and benefits to include but not limited to hematoma, kidney injury, arrhythmia, MI, stroke and death.  She expresses understanding and is willing to proceed    3. Essential hypertension  -     isosorbide mononitrate (IMDUR) 60 MG 24 hr tablet; Take 1 tablet by mouth Daily.  Uncontrolled, will increase Norvasc to 5 mg daily while continuing on losartan and Lopressor, she is asked to call the clinic if home BP is greater than 140/90 within the next week.    4. Aortic stenosis, mild  Clinically asymptomatic, will continue to monitor    5. Dyslipidemia  Continue on Repatha, statin intolerant  Heart healthy diet   LDL goal 55    6. Type 2 diabetes mellitus with hyperglycemia, with long-term current use of insulin  Tight glycemic control recommended for overall health benefit    7. Stage 3b chronic kidney disease  Continue to follow closely with nephrology    8. Mass of mandible  Continue to follow with oral surgeon    Other orders  -     amLODIPine (NORVASC) 2.5 MG tablet; Take 2 tablets by mouth Daily.  -     ranolazine (Ranexa) 500 MG 12 hr tablet; Take 1 tablet by mouth 2 (Two) Times a Day.  Dispense: 60 tablet; Refill: 5      Review of medical record    Lifestyle modifications including heart healthy diet, regular  exercise, maintenance of desirable body weight and avoidance of tobacco products    Follow-up in 1 month with EKG, sooner if needed.

## 2024-06-03 ENCOUNTER — TELEPHONE (OUTPATIENT)
Dept: CARDIOLOGY | Facility: CLINIC | Age: 64
End: 2024-06-03
Payer: COMMERCIAL

## 2024-06-03 NOTE — TELEPHONE ENCOUNTER
----- Message from Asia Hawkins sent at 6/2/2024  5:13 PM EDT -----  Labs overall stable, with kidney function stable at 1.53  Awaiting nephrology clearance   Cardiac cath order placed

## 2024-06-03 NOTE — TELEPHONE ENCOUNTER
[facility] and spoke with nurse regarding clearance for Monisha to have a Heart Cath.  She requested we fax over her recent office note and labs for the Dr To review and she will call us regarding clearance.     Fax # [phone].  Faxed over all notes and labs.

## 2024-06-05 ENCOUNTER — TELEPHONE (OUTPATIENT)
Dept: CARDIOLOGY | Facility: CLINIC | Age: 64
End: 2024-06-05
Payer: COMMERCIAL

## 2024-06-05 NOTE — TELEPHONE ENCOUNTER
Caller: Monisha Gardner Ashlyn    Relationship: Self    Best call back number: [phone] (home) MAY LEAVE VM      Caller requesting test results: LABS AND THE CARDIAC CLEARANCE    When was the test performed: 5.31.24    Additional notes: PT IS ASKING FOR A CALLBACK ABOUT THE STATUS OF THE LABS SHE DID ON 5.31.24. ALSO SHE WANTS TO KNOW WHEN THERE IS AN UPDATE ON THE CARDIAC CLEARANCE REQUEST.

## 2024-06-05 NOTE — TELEPHONE ENCOUNTER
Patient is cleared for Left heart Cath.  Nephrology did clear her as well.  (See pg 2 of 4) on progress note stating they would recommend IV hydration pre and post procedure.  Hold ARB 1 - 2 days before and after the LHC.            Called and advised pt she is cleared for left heart cath.

## 2024-06-07 ENCOUNTER — OFFICE VISIT (OUTPATIENT)
Dept: FAMILY MEDICINE CLINIC | Facility: CLINIC | Age: 64
End: 2024-06-07
Payer: COMMERCIAL

## 2024-06-07 VITALS
HEART RATE: 72 BPM | DIASTOLIC BLOOD PRESSURE: 60 MMHG | OXYGEN SATURATION: 98 % | SYSTOLIC BLOOD PRESSURE: 120 MMHG | BODY MASS INDEX: 27.66 KG/M2 | TEMPERATURE: 97.5 F | WEIGHT: 162 LBS | HEIGHT: 64 IN

## 2024-06-07 DIAGNOSIS — E11.65 TYPE 2 DIABETES MELLITUS WITH HYPERGLYCEMIA, WITH LONG-TERM CURRENT USE OF INSULIN: Primary | Chronic | ICD-10-CM

## 2024-06-07 DIAGNOSIS — Z79.4 TYPE 2 DIABETES MELLITUS WITH DIABETIC NEUROPATHY, WITH LONG-TERM CURRENT USE OF INSULIN: Chronic | ICD-10-CM

## 2024-06-07 DIAGNOSIS — Z79.4 TYPE 2 DIABETES MELLITUS WITH HYPERGLYCEMIA, WITH LONG-TERM CURRENT USE OF INSULIN: Primary | Chronic | ICD-10-CM

## 2024-06-07 DIAGNOSIS — K21.00 GASTROESOPHAGEAL REFLUX DISEASE WITH ESOPHAGITIS WITHOUT HEMORRHAGE: Chronic | ICD-10-CM

## 2024-06-07 DIAGNOSIS — G25.81 RLS (RESTLESS LEGS SYNDROME): Chronic | ICD-10-CM

## 2024-06-07 DIAGNOSIS — E11.40 TYPE 2 DIABETES MELLITUS WITH DIABETIC NEUROPATHY, WITH LONG-TERM CURRENT USE OF INSULIN: Chronic | ICD-10-CM

## 2024-06-07 PROBLEM — I25.10 CORONARY ARTERY DISEASE: Status: ACTIVE | Noted: 2024-05-31

## 2024-06-07 PROBLEM — R94.39 ABNORMAL NUCLEAR STRESS TEST: Status: ACTIVE | Noted: 2024-05-31

## 2024-06-07 PROCEDURE — 99214 OFFICE O/P EST MOD 30 MIN: CPT | Performed by: PHYSICIAN ASSISTANT

## 2024-06-07 RX ORDER — GABAPENTIN 600 MG/1
600 TABLET ORAL 2 TIMES DAILY
Qty: 60 TABLET | Refills: 0 | Status: SHIPPED | OUTPATIENT
Start: 2024-06-07

## 2024-06-07 NOTE — PROGRESS NOTES
"Chief Complaint -diabetes    History of Present Illness -     Monisha Gardner is a 63 y.o. female.     Diabetes-  Uncontrolled as patient states that her blood glucose has gone up over 200 regularly since she has been unable to get her Trulicity.  She is on Ozempic 0.5 mg weekly along with Lantus 45 units daily    Diabetic neuropathy-  Stable with gabapentin    Gastroesophageal reflux disease-  Stable with pantoprazole    Restless leg syndrome  Stable with ropinirole      The following portions of the patient's history were reviewed and updated as appropriate: allergies, current medications, past family history, past medical history, past social history, past surgical history and problem list.        Objective  Vital signs:  /60   Pulse 72   Temp 97.5 °F (36.4 °C) (Temporal)   Ht 162.6 cm (64.02\")   Wt 73.5 kg (162 lb)   LMP  (LMP Unknown)   SpO2 98%   BMI 27.79 kg/m²     Physical Exam  Vitals and nursing note reviewed.   Constitutional:       Appearance: Normal appearance. She is well-developed.   Eyes:      Extraocular Movements: Extraocular movements intact.      Conjunctiva/sclera: Conjunctivae normal.   Cardiovascular:      Rate and Rhythm: Normal rate and regular rhythm.      Heart sounds: Normal heart sounds. No murmur heard.     Comments: Left radial pulse surgically absent.  Pulmonary:      Effort: Pulmonary effort is normal. No respiratory distress.      Breath sounds: Normal breath sounds. No wheezing.   Musculoskeletal:         General: No tenderness.   Skin:     General: Skin is warm and dry.      Findings: No rash.   Neurological:      Mental Status: She is alert and oriented to person, place, and time.   Psychiatric:         Mood and Affect: Mood normal.         Behavior: Behavior normal.         Thought Content: Thought content normal.         The following data was reviewed by Kimmy Vela PA-C:         Lab Results   Component Value Date    BUN 29 (H) 05/31/2024    CREATININE 1.53 (H) " 05/31/2024    EGFR 38.3 (L) 05/31/2024    ALT 16 03/01/2024    AST 30 03/01/2024    WBC 10.09 05/31/2024    HGB 13.2 05/31/2024    HCT 40.5 05/31/2024     05/31/2024    CHOL 193 03/01/2024    TRIG 363 (H) 03/01/2024    HDL 30 (L) 03/01/2024     (H) 03/01/2024    TSH 0.838 04/07/2023    HGBA1C 7.20 (H) 03/01/2024           Assessment & Plan     Diagnoses and all orders for this visit:    1. Type 2 diabetes mellitus with hyperglycemia, with long-term current use of insulin (Primary)  Comments:  Increase Ozempic 1 mg weekly  Discontinue Trulicity as it is on backorder at the pharmacy  Continue Lantus 45 units daily  Orders:  -     Semaglutide, 1 MG/DOSE, (OZEMPIC) 4 MG/3ML solution pen-injector; Inject 1 mg under the skin into the appropriate area as directed 1 (One) Time Per Week.  Dispense: 3 mL; Refill: 3    2. Type 2 diabetes mellitus with diabetic neuropathy, with long-term current use of insulin  Comments:  Continue gabapentin  Advise low carbohydrate diabetic diet  Orders:  -     gabapentin (NEURONTIN) 600 MG tablet; Take 1 tablet by mouth 2 (Two) Times a Day.  Dispense: 60 tablet; Refill: 0    3. RLS (restless legs syndrome)  Comments:  Continue ropinirole    4. Gastroesophageal reflux disease with esophagitis without hemorrhage  Comments:  Continue pantoprazole                   Patient was given instructions and counseling regarding his condition or for health maintenance advice. Please see specific information pulled into the AVS if appropriate      This document has been electronically signed by:  Kimmy Vela PA-C

## 2024-06-07 NOTE — PATIENT INSTRUCTIONS
Carbohydrate Counting for Diabetes Mellitus, Adult  Carbohydrate counting is a method of keeping track of how many carbohydrates you eat. Eating carbohydrates increases the amount of sugar (glucose) in the blood. Counting how many carbohydrates you eat improves how well you manage your blood glucose. This, in turn, helps you manage your diabetes.  Carbohydrates are measured in grams (g) per serving. It is important to know how many carbohydrates (in grams or by serving size) you can have in each meal. This is different for every person. A dietitian can help you make a meal plan and calculate how many carbohydrates you should have at each meal and snack.  What foods contain carbohydrates?  Carbohydrates are found in the following foods:  Grains, such as breads and cereals.  Dried beans and soy products.  Starchy vegetables, such as potatoes, peas, and corn.  Fruit and fruit juices.  Milk and yogurt.  Sweets and snack foods, such as cake, cookies, candy, chips, and soft drinks.  How do I count carbohydrates in foods?  There are two ways to count carbohydrates in food. You can read food labels or learn standard serving sizes of foods. You can use either of these methods or a combination of both.  Using the Nutrition Facts label  The Nutrition Facts list is included on the labels of almost all packaged foods and beverages in the [place]. It includes:  The serving size.  Information about nutrients in each serving, including the grams of carbohydrate per serving.  To use the Nutrition Facts, decide how many servings you will have. Then, multiply the number of servings by the number of carbohydrates per serving. The resulting number is the total grams of carbohydrates that you will be having.  Learning the standard serving sizes of foods  When you eat carbohydrate foods that are not packaged or do not include Nutrition Facts on the label, you need to measure the servings in order to count the grams of  carbohydrates.  Measure the foods that you will eat with a food scale or measuring cup, if needed.  Decide how many standard-size servings you will eat.  Multiply the number of servings by 15. For foods that contain carbohydrates, one serving equals 15 g of carbohydrates.  For example, if you eat 2 cups or 10 oz (300 g) of strawberries, you will have eaten 2 servings and 30 g of carbohydrates (2 servings x 15 g = 30 g).  For foods that have more than one food mixed, such as soups and casseroles, you must count the carbohydrates in each food that is included.  The following list contains standard serving sizes of common carbohydrate-rich foods. Each of these servings has about 15 g of carbohydrates:  1 slice of bread.  1 six-inch (15 cm) tortilla.  ? cup or 2 oz (53 g) cooked rice or pasta.  ½ cup or 3 oz (85 g) cooked or canned, drained and rinsed beans or lentils.  ½ cup or 3 oz (85 g) starchy vegetable, such as peas, corn, or squash.  ½ cup or 4 oz (120 g) hot cereal.  ½ cup or 3 oz (85 g) boiled or mashed potatoes, or ¼ or 3 oz (85 g) of a large baked potato.  ½ cup or 4 fl oz (118 mL) fruit juice.  1 cup or 8 fl oz (237 mL) milk.  1 small or 4 oz (106 g) apple.  ½ or 2 oz (63 g) of a medium banana.  1 cup or 5 oz (150 g) strawberries.  3 cups or 1 oz (28.3 g) popped popcorn.  What is an example of carbohydrate counting?  To calculate the grams of carbohydrates in this sample meal, follow the steps shown below.  Sample meal  3 oz (85 g) chicken breast.  ? cup or 4 oz (106 g) brown rice.  ½ cup or 3 oz (85 g) corn.  1 cup or 8 fl oz (237 mL) milk.  1 cup or 5 oz (150 g) strawberries with sugar-free whipped topping.  Carbohydrate calculation  Identify the foods that contain carbohydrates:  Rice.  Corn.  Milk.  Strawberries.  Calculate how many servings you have of each food:  2 servings rice.  1 serving corn.  1 serving milk.  1 serving strawberries.  Multiply each number of servings by 15  servings rice x 15  g = 30 g.  1 serving corn x 15 g = 15 g.  1 serving milk x 15 g = 15 g.  1 serving strawberries x 15 g = 15 g.  Add together all of the amounts to find the total grams of carbohydrates eaten:  30 g + 15 g + 15 g + 15 g = 75 g of carbohydrates total.  What are tips for following this plan?  Shopping  Develop a meal plan and then make a shopping list.  Buy fresh and frozen vegetables, fresh and frozen fruit, dairy, eggs, beans, lentils, and whole grains.  Look at food labels. Choose foods that have more fiber and less sugar.  Avoid processed foods and foods with added sugars.  Meal planning  Aim to have the same number of grams of carbohydrates at each meal and for each snack time.  Plan to have regular, balanced meals and snacks.  Where to find more information  American Diabetes Association: diabetes.org  Centers for Disease Control and Prevention: cdc.gov  Academy of Nutrition and Dietetics: eatright.org  Association of Diabetes Care & Education Specialists: diabeteseducator.org  Summary  Carbohydrate counting is a method of keeping track of how many carbohydrates you eat.  Eating carbohydrates increases the amount of sugar (glucose) in your blood.  Counting how many carbohydrates you eat improves how well you manage your blood glucose. This helps you manage your diabetes.  A dietitian can help you make a meal plan and calculate how many carbohydrates you should have at each meal and snack.  This information is not intended to replace advice given to you by your health care provider. Make sure you discuss any questions you have with your health care provider.  Document Revised: 07/21/2021 Document Reviewed: 07/21/2021  Elsevier Patient Education © 2024 [employer]

## 2024-06-08 ENCOUNTER — PREP FOR SURGERY (OUTPATIENT)
Dept: OTHER | Facility: HOSPITAL | Age: 64
End: 2024-06-08
Payer: COMMERCIAL

## 2024-06-08 RX ORDER — ASPIRIN 81 MG/1
81 TABLET ORAL DAILY
OUTPATIENT
Start: 2024-06-09

## 2024-06-08 RX ORDER — SODIUM CHLORIDE 0.9 % (FLUSH) 0.9 %
10 SYRINGE (ML) INJECTION AS NEEDED
OUTPATIENT
Start: 2024-06-08

## 2024-06-08 RX ORDER — SODIUM CHLORIDE 9 MG/ML
40 INJECTION, SOLUTION INTRAVENOUS AS NEEDED
OUTPATIENT
Start: 2024-06-08

## 2024-06-08 RX ORDER — SODIUM CHLORIDE 0.9 % (FLUSH) 0.9 %
10 SYRINGE (ML) INJECTION EVERY 12 HOURS SCHEDULED
OUTPATIENT
Start: 2024-06-08

## 2024-06-08 RX ORDER — ASPIRIN 81 MG/1
324 TABLET, CHEWABLE ORAL ONCE
OUTPATIENT
Start: 2024-06-08 | End: 2024-06-08

## 2024-06-10 DIAGNOSIS — E78.2 MIXED HYPERLIPIDEMIA: Chronic | ICD-10-CM

## 2024-06-10 RX ORDER — FENOFIBRATE 160 MG/1
160 TABLET ORAL DAILY
Qty: 30 TABLET | Refills: 0 | Status: SHIPPED | OUTPATIENT
Start: 2024-06-10

## 2024-06-10 RX ORDER — AMLODIPINE BESYLATE 2.5 MG/1
2.5 TABLET ORAL DAILY
Qty: 30 TABLET | Refills: 0 | Status: SHIPPED | OUTPATIENT
Start: 2024-06-10

## 2024-06-10 RX ORDER — AMLODIPINE BESYLATE 2.5 MG/1
2.5 TABLET ORAL DAILY
Qty: 90 TABLET | OUTPATIENT
Start: 2024-06-10

## 2024-06-10 RX ORDER — FENOFIBRATE 160 MG/1
160 TABLET ORAL DAILY
Qty: 90 TABLET | OUTPATIENT
Start: 2024-06-10

## 2024-06-17 ENCOUNTER — TELEPHONE (OUTPATIENT)
Dept: FAMILY MEDICINE CLINIC | Facility: CLINIC | Age: 64
End: 2024-06-17

## 2024-06-17 NOTE — TELEPHONE ENCOUNTER
Caller: Monisha Gardner    Relationship: Self    Best call back number: [phone]     What is the best time to reach you: ANYTIME     Who are you requesting to speak with (clinical staff, provider,  specific staff member): CLINICAL STAFF    What was the call regarding: PATIENT STATES THAT SINCE SHE HAS STOPPED TAKING THE TRULICITY SHE SAYS THAT HER SUGAR HAS BEEN RUNNING NELLY. RIGHT NOW IT IS  BUT OVER THE WEEKEND IT WAS OVER 300.     Is it okay if the provider responds through MyChart: NO

## 2024-06-18 ENCOUNTER — TELEPHONE (OUTPATIENT)
Dept: FAMILY MEDICINE CLINIC | Facility: CLINIC | Age: 64
End: 2024-06-18
Payer: COMMERCIAL

## 2024-06-18 ENCOUNTER — SPECIALTY PHARMACY (OUTPATIENT)
Dept: PHARMACY | Facility: HOSPITAL | Age: 64
End: 2024-06-18
Payer: COMMERCIAL

## 2024-06-18 NOTE — TELEPHONE ENCOUNTER
Can you get me more information?  How long has she been on the Ozempic 1 mg dose weekly?  If she still taking Lantus 45 units daily?  She may need a telehealth visit to discuss medication change

## 2024-06-18 NOTE — TELEPHONE ENCOUNTER
I think she is confused-here are the instructions from her last office note.  Please tell her to restart the Lantus   increase Ozempic 1 mg weekly  Discontinue Trulicity as it is on backorder at the pharmacy  Continue Lantus 45 units daily

## 2024-06-18 NOTE — PROGRESS NOTES
Specialty Pharmacy Refill Coordination Note     Monisha is a 63 y.o. female contacted today regarding refills of  Repatha [employer] specialty medication(s).    Reviewed and verified with patient:       Specialty medication(s) and dose(s) confirmed: yes    Refill Questions      Flowsheet Row Most Recent Value   Changes to allergies? No   Changes to medications? No   New conditions or infections since last clinic visit No   Unplanned office visit, urgent care, ED, or hospital admission in the last 4 weeks  No   How does patient/caregiver feel medication is working? Very good   Financial problems or insurance changes  No   Since the previous refill, were any specialty medication doses or scheduled injections missed or delayed?  No   Does this patient require a clinical escalation to a pharmacist? No            Delivery Questions      Flowsheet Row Most Recent Value   Copay verified? Yes   Copay amount 0   Copay form of payment No copayment ($0)                   Follow-up: 84 day(s)     Renee Rasmussen, Pharmacy Technician  Specialty Pharmacy Technician

## 2024-06-18 NOTE — TELEPHONE ENCOUNTER
Patient has called today concerned about her sugar being high. She said that she was taken off Trulicity would like for someone to call her back. Let me know if I need to do anything.

## 2024-06-20 ENCOUNTER — TRANSCRIBE ORDERS (OUTPATIENT)
Dept: CARDIAC REHAB | Facility: HOSPITAL | Age: 64
End: 2024-06-20
Payer: COMMERCIAL

## 2024-06-20 ENCOUNTER — HOSPITAL ENCOUNTER (OUTPATIENT)
Facility: HOSPITAL | Age: 64
Discharge: HOME | End: 2024-06-21
Attending: INTERNAL MEDICINE | Admitting: INTERNAL MEDICINE
Payer: COMMERCIAL

## 2024-06-20 ENCOUNTER — APPOINTMENT (OUTPATIENT)
Dept: CARDIOLOGY | Facility: HOSPITAL | Age: 64
End: 2024-06-20
Payer: COMMERCIAL

## 2024-06-20 DIAGNOSIS — Z95.5 STENTED CORONARY ARTERY: Primary | ICD-10-CM

## 2024-06-20 DIAGNOSIS — I25.118 CORONARY ARTERY DISEASE INVOLVING NATIVE CORONARY ARTERY OF NATIVE HEART WITH OTHER FORM OF ANGINA PECTORIS: ICD-10-CM

## 2024-06-20 DIAGNOSIS — R94.39 ABNORMAL NUCLEAR STRESS TEST: ICD-10-CM

## 2024-06-20 PROBLEM — I25.10 CAD (CORONARY ARTERY DISEASE): Status: ACTIVE | Noted: 2024-06-20

## 2024-06-20 LAB
ACT BLD: 244 SECONDS (ref 82–152)
ANION GAP SERPL CALCULATED.3IONS-SCNC: 11 MMOL/L (ref 5–15)
BH CV ECHO MEAS - AO ROOT DIAM: 3.3 CM
BH CV ECHO MEAS - EDV(CUBED): 85.2 ML
BH CV ECHO MEAS - EDV(MOD-SP2): 56.4 ML
BH CV ECHO MEAS - EDV(MOD-SP4): 55.1 ML
BH CV ECHO MEAS - EF(MOD-BP): 64 %
BH CV ECHO MEAS - EF(MOD-SP2): 63.1 %
BH CV ECHO MEAS - EF(MOD-SP4): 60.1 %
BH CV ECHO MEAS - ESV(CUBED): 29.8 ML
BH CV ECHO MEAS - ESV(MOD-SP2): 20.8 ML
BH CV ECHO MEAS - ESV(MOD-SP4): 22 ML
BH CV ECHO MEAS - FS: 29.5 %
BH CV ECHO MEAS - IVS/LVPW: 1 CM
BH CV ECHO MEAS - IVSD: 1 CM
BH CV ECHO MEAS - LA DIMENSION: 3.3 CM
BH CV ECHO MEAS - LV DIASTOLIC VOL/BSA (35-75): 30.6 CM2
BH CV ECHO MEAS - LV MASS(C)D: 147.8 GRAMS
BH CV ECHO MEAS - LV SYSTOLIC VOL/BSA (12-30): 12.2 CM2
BH CV ECHO MEAS - LVIDD: 4.4 CM
BH CV ECHO MEAS - LVIDS: 3.1 CM
BH CV ECHO MEAS - LVOT AREA: 3.1 CM2
BH CV ECHO MEAS - LVOT DIAM: 2 CM
BH CV ECHO MEAS - LVPWD: 1 CM
BH CV ECHO MEAS - SV(MOD-SP2): 35.6 ML
BH CV ECHO MEAS - SV(MOD-SP4): 33.1 ML
BH CV ECHO MEAS - SVI(MOD-SP2): 19.8 ML/M2
BH CV ECHO MEAS - SVI(MOD-SP4): 18.4 ML/M2
BH CV VAS BP RIGHT ARM: NORMAL MMHG
BUN SERPL-MCNC: 30 MG/DL (ref 8–23)
BUN/CREAT SERPL: 20.4 (ref 7–25)
CALCIUM SPEC-SCNC: 9.5 MG/DL (ref 8.6–10.5)
CHLORIDE SERPL-SCNC: 104 MMOL/L (ref 98–107)
CO2 SERPL-SCNC: 25 MMOL/L (ref 22–29)
CREAT SERPL-MCNC: 1.47 MG/DL (ref 0.57–1)
DEPRECATED RDW RBC AUTO: 46.8 FL (ref 37–54)
EGFRCR SERPLBLD CKD-EPI 2021: 40.2 ML/MIN/1.73
ERYTHROCYTE [DISTWIDTH] IN BLOOD BY AUTOMATED COUNT: 15.5 % (ref 12.3–15.4)
GLUCOSE SERPL-MCNC: 140 MG/DL (ref 65–99)
HCT VFR BLD AUTO: 39.7 % (ref 34–46.6)
HGB BLD-MCNC: 12.3 G/DL (ref 12–15.9)
LV EF 2D ECHO EST: 60 %
MCH RBC QN AUTO: 25.5 PG (ref 26.6–33)
MCHC RBC AUTO-ENTMCNC: 31 G/DL (ref 31.5–35.7)
MCV RBC AUTO: 82.4 FL (ref 79–97)
PLATELET # BLD AUTO: 292 10*3/MM3 (ref 140–450)
PMV BLD AUTO: 9.1 FL (ref 6–12)
POTASSIUM SERPL-SCNC: 4.6 MMOL/L (ref 3.5–5.2)
QT INTERVAL: 450 MS
QTC INTERVAL: 478 MS
RBC # BLD AUTO: 4.82 10*6/MM3 (ref 3.77–5.28)
SODIUM SERPL-SCNC: 140 MMOL/L (ref 136–145)
WBC NRBC COR # BLD AUTO: 8.96 10*3/MM3 (ref 3.4–10.8)

## 2024-06-20 PROCEDURE — 25810000003 SODIUM CHLORIDE 0.9 % SOLUTION

## 2024-06-20 PROCEDURE — G0378 HOSPITAL OBSERVATION PER HR: HCPCS

## 2024-06-20 PROCEDURE — 25510000001 IOPAMIDOL PER 1 ML: Performed by: INTERNAL MEDICINE

## 2024-06-20 PROCEDURE — C1725 CATH, TRANSLUMIN NON-LASER: HCPCS | Performed by: INTERNAL MEDICINE

## 2024-06-20 PROCEDURE — 25010000002 HEPARIN (PORCINE) PER 1000 UNITS: Performed by: INTERNAL MEDICINE

## 2024-06-20 PROCEDURE — 93005 ELECTROCARDIOGRAM TRACING: CPT | Performed by: INTERNAL MEDICINE

## 2024-06-20 PROCEDURE — C1887 CATHETER, GUIDING: HCPCS | Performed by: INTERNAL MEDICINE

## 2024-06-20 PROCEDURE — 85027 COMPLETE CBC AUTOMATED: CPT | Performed by: INTERNAL MEDICINE

## 2024-06-20 PROCEDURE — 93458 L HRT ARTERY/VENTRICLE ANGIO: CPT | Performed by: INTERNAL MEDICINE

## 2024-06-20 PROCEDURE — 76376 3D RENDER W/INTRP POSTPROCES: CPT

## 2024-06-20 PROCEDURE — 25010000002 NICARDIPINE 2.5 MG/ML SOLUTION: Performed by: INTERNAL MEDICINE

## 2024-06-20 PROCEDURE — 92928 PRQ TCAT PLMT NTRAC ST 1 LES: CPT | Performed by: INTERNAL MEDICINE

## 2024-06-20 PROCEDURE — 93325 DOPPLER ECHO COLOR FLOW MAPG: CPT

## 2024-06-20 PROCEDURE — 25810000003 SODIUM CHLORIDE 0.9 % SOLUTION: Performed by: INTERNAL MEDICINE

## 2024-06-20 PROCEDURE — 93308 TTE F-UP OR LMTD: CPT

## 2024-06-20 PROCEDURE — 25010000002 MIDAZOLAM PER 1 MG: Performed by: INTERNAL MEDICINE

## 2024-06-20 PROCEDURE — C9600 PERC DRUG-EL COR STENT SING: HCPCS | Performed by: INTERNAL MEDICINE

## 2024-06-20 PROCEDURE — C1874 STENT, COATED/COV W/DEL SYS: HCPCS | Performed by: INTERNAL MEDICINE

## 2024-06-20 PROCEDURE — C1769 GUIDE WIRE: HCPCS | Performed by: INTERNAL MEDICINE

## 2024-06-20 PROCEDURE — C1894 INTRO/SHEATH, NON-LASER: HCPCS | Performed by: INTERNAL MEDICINE

## 2024-06-20 PROCEDURE — 80048 BASIC METABOLIC PNL TOTAL CA: CPT | Performed by: INTERNAL MEDICINE

## 2024-06-20 PROCEDURE — 76376 3D RENDER W/INTRP POSTPROCES: CPT | Performed by: INTERNAL MEDICINE

## 2024-06-20 PROCEDURE — 93010 ELECTROCARDIOGRAM REPORT: CPT | Performed by: INTERNAL MEDICINE

## 2024-06-20 PROCEDURE — 85347 COAGULATION TIME ACTIVATED: CPT

## 2024-06-20 PROCEDURE — 25010000002 FENTANYL CITRATE (PF) 50 MCG/ML SOLUTION: Performed by: INTERNAL MEDICINE

## 2024-06-20 PROCEDURE — 93308 TTE F-UP OR LMTD: CPT | Performed by: INTERNAL MEDICINE

## 2024-06-20 PROCEDURE — 25010000002 ATROPINE PER 0.01 MG: Performed by: INTERNAL MEDICINE

## 2024-06-20 PROCEDURE — 93325 DOPPLER ECHO COLOR FLOW MAPG: CPT | Performed by: INTERNAL MEDICINE

## 2024-06-20 DEVICE — XIENCE SKYPOINT™ EVEROLIMUS ELUTING CORONARY STENT SYSTEM 3.50 MM X 38 MM / RAPID-EXCHANGE
Type: IMPLANTABLE DEVICE | Status: FUNCTIONAL
Brand: XIENCE SKYPOINT™

## 2024-06-20 RX ORDER — CLOPIDOGREL BISULFATE 75 MG/1
75 TABLET ORAL DAILY
Status: DISCONTINUED | OUTPATIENT
Start: 2024-06-21 | End: 2024-06-21 | Stop reason: HOSPADM

## 2024-06-20 RX ORDER — HEPARIN SODIUM 1000 [USP'U]/ML
INJECTION, SOLUTION INTRAVENOUS; SUBCUTANEOUS
Status: DISCONTINUED | OUTPATIENT
Start: 2024-06-20 | End: 2024-06-20 | Stop reason: HOSPADM

## 2024-06-20 RX ORDER — SODIUM CHLORIDE 0.9 % (FLUSH) 0.9 %
10 SYRINGE (ML) INJECTION AS NEEDED
Status: DISCONTINUED | OUTPATIENT
Start: 2024-06-20 | End: 2024-06-20 | Stop reason: HOSPADM

## 2024-06-20 RX ORDER — ASPIRIN 81 MG/1
81 TABLET ORAL DAILY
Status: DISCONTINUED | OUTPATIENT
Start: 2024-06-21 | End: 2024-06-21 | Stop reason: HOSPADM

## 2024-06-20 RX ORDER — MIDAZOLAM HYDROCHLORIDE 1 MG/ML
INJECTION INTRAMUSCULAR; INTRAVENOUS
Status: DISCONTINUED | OUTPATIENT
Start: 2024-06-20 | End: 2024-06-20 | Stop reason: HOSPADM

## 2024-06-20 RX ORDER — ALLOPURINOL 100 MG/1
100 TABLET ORAL 2 TIMES DAILY
Status: DISCONTINUED | OUTPATIENT
Start: 2024-06-20 | End: 2024-06-21 | Stop reason: HOSPADM

## 2024-06-20 RX ORDER — NICARDIPINE HCL-0.9% SOD CHLOR 1 MG/10 ML
SYRINGE (ML) INTRAVENOUS
Status: DISCONTINUED | OUTPATIENT
Start: 2024-06-20 | End: 2024-06-20 | Stop reason: HOSPADM

## 2024-06-20 RX ORDER — SODIUM CHLORIDE 0.9 % (FLUSH) 0.9 %
10 SYRINGE (ML) INJECTION EVERY 12 HOURS SCHEDULED
Status: DISCONTINUED | OUTPATIENT
Start: 2024-06-20 | End: 2024-06-20 | Stop reason: HOSPADM

## 2024-06-20 RX ORDER — LIDOCAINE HYDROCHLORIDE 10 MG/ML
INJECTION, SOLUTION EPIDURAL; INFILTRATION; INTRACAUDAL; PERINEURAL
Status: DISCONTINUED | OUTPATIENT
Start: 2024-06-20 | End: 2024-06-20 | Stop reason: HOSPADM

## 2024-06-20 RX ORDER — ACETAMINOPHEN 325 MG/1
650 TABLET ORAL EVERY 4 HOURS PRN
Status: DISCONTINUED | OUTPATIENT
Start: 2024-06-20 | End: 2024-06-21 | Stop reason: HOSPADM

## 2024-06-20 RX ORDER — ASPIRIN 81 MG/1
324 TABLET, CHEWABLE ORAL ONCE
Status: COMPLETED | OUTPATIENT
Start: 2024-06-20 | End: 2024-06-20

## 2024-06-20 RX ORDER — SODIUM CHLORIDE 9 MG/ML
100 INJECTION, SOLUTION INTRAVENOUS CONTINUOUS
Status: ACTIVE | OUTPATIENT
Start: 2024-06-20 | End: 2024-06-20

## 2024-06-20 RX ORDER — NITROGLYCERIN 0.4 MG/1
0.4 TABLET SUBLINGUAL
Status: DISCONTINUED | OUTPATIENT
Start: 2024-06-20 | End: 2024-06-21 | Stop reason: HOSPADM

## 2024-06-20 RX ORDER — SODIUM CHLORIDE 9 MG/ML
40 INJECTION, SOLUTION INTRAVENOUS AS NEEDED
Status: DISCONTINUED | OUTPATIENT
Start: 2024-06-20 | End: 2024-06-20 | Stop reason: HOSPADM

## 2024-06-20 RX ORDER — PANTOPRAZOLE SODIUM 40 MG/1
40 TABLET, DELAYED RELEASE ORAL DAILY
Status: DISCONTINUED | OUTPATIENT
Start: 2024-06-20 | End: 2024-06-21 | Stop reason: HOSPADM

## 2024-06-20 RX ORDER — RANOLAZINE 500 MG/1
500 TABLET, EXTENDED RELEASE ORAL 2 TIMES DAILY
Status: DISCONTINUED | OUTPATIENT
Start: 2024-06-20 | End: 2024-06-21 | Stop reason: HOSPADM

## 2024-06-20 RX ORDER — CLOPIDOGREL BISULFATE 75 MG/1
TABLET ORAL
Status: DISCONTINUED | OUTPATIENT
Start: 2024-06-20 | End: 2024-06-20 | Stop reason: HOSPADM

## 2024-06-20 RX ORDER — ASPIRIN 81 MG/1
81 TABLET ORAL DAILY
Status: DISCONTINUED | OUTPATIENT
Start: 2024-06-21 | End: 2024-06-20 | Stop reason: HOSPADM

## 2024-06-20 RX ORDER — ISOSORBIDE MONONITRATE 60 MG/1
60 TABLET, EXTENDED RELEASE ORAL DAILY
Status: DISCONTINUED | OUTPATIENT
Start: 2024-06-20 | End: 2024-06-21 | Stop reason: HOSPADM

## 2024-06-20 RX ORDER — ROPINIROLE 2 MG/1
2 TABLET, FILM COATED ORAL NIGHTLY
Status: DISCONTINUED | OUTPATIENT
Start: 2024-06-20 | End: 2024-06-21 | Stop reason: HOSPADM

## 2024-06-20 RX ORDER — ATROPINE SULFATE 1 MG/ML
INJECTION, SOLUTION INTRAMUSCULAR; INTRAVENOUS; SUBCUTANEOUS
Status: DISCONTINUED | OUTPATIENT
Start: 2024-06-20 | End: 2024-06-20 | Stop reason: HOSPADM

## 2024-06-20 RX ORDER — FENTANYL CITRATE 50 UG/ML
INJECTION, SOLUTION INTRAMUSCULAR; INTRAVENOUS
Status: DISCONTINUED | OUTPATIENT
Start: 2024-06-20 | End: 2024-06-20 | Stop reason: HOSPADM

## 2024-06-20 RX ADMIN — ROPINIROLE HYDROCHLORIDE 2 MG: 2 TABLET, FILM COATED ORAL at 21:22

## 2024-06-20 RX ADMIN — ASPIRIN 324 MG: 81 TABLET, CHEWABLE ORAL at 09:38

## 2024-06-20 RX ADMIN — SODIUM CHLORIDE 220.5 ML: 900 INJECTION, SOLUTION INTRAVENOUS at 09:38

## 2024-06-20 RX ADMIN — ACETAMINOPHEN 650 MG: 325 TABLET ORAL at 23:18

## 2024-06-20 RX ADMIN — DULOXETINE HYDROCHLORIDE 90 MG: 60 CAPSULE, DELAYED RELEASE ORAL at 16:19

## 2024-06-20 RX ADMIN — PANTOPRAZOLE SODIUM 40 MG: 40 TABLET, DELAYED RELEASE ORAL at 16:20

## 2024-06-20 RX ADMIN — ALLOPURINOL 100 MG: 100 TABLET ORAL at 21:22

## 2024-06-20 RX ADMIN — RANOLAZINE 500 MG: 500 TABLET, FILM COATED, EXTENDED RELEASE ORAL at 21:22

## 2024-06-20 RX ADMIN — ISOSORBIDE MONONITRATE 60 MG: 60 TABLET, EXTENDED RELEASE ORAL at 16:20

## 2024-06-20 RX ADMIN — CARIPRAZINE 3 MG: 3 CAPSULE, GELATIN COATED ORAL at 16:20

## 2024-06-20 NOTE — Clinical Note
First balloon inflation max pressure = 12 jeff. First balloon inflation duration = 10 seconds. Second inflation of balloon - Max pressure = 12 jeff. 2nd Inflation of balloon - Duration = 15 seconds. Third inflation of balloon - Max pressure = 15 jeff. 3rd Inflation of balloon - Duration = 15 seconds. Fourth inflation of balloon - Max pressure = 12 jeff. 4th Inflation of balloon - Duration = 8 seconds.

## 2024-06-20 NOTE — PLAN OF CARE
Goal Outcome Evaluation:           Progress: improving  Outcome Evaluation: VSS. room air. alert and oriented X 4. TR band removed. site c/d/i. NSR-reina on tele. no complains of pain or discomfort.

## 2024-06-20 NOTE — Clinical Note
First balloon inflation max pressure = 12 jeff. First balloon inflation duration = 10 seconds. Second inflation of balloon - Max pressure = 12 jeff. 2nd Inflation of balloon - Duration = 10 seconds.

## 2024-06-20 NOTE — INTERVAL H&P NOTE
H&P reviewed. The patient was examined and there are no changes to the H&P.      Patient is a 63-year-old female who is primarily followed by [facility] who has known coronary artery disease with 2 stents placed in the past who recently had an abnormal stress test.  She presents today for cardiac catheterization for ischemic evaluation.  Risk, benefits, and alternatives discussed with patient she wishes to proceed.  Will proceed via right radial.    Nerissa Caban PA-C

## 2024-06-20 NOTE — PROGRESS NOTES
Pt. Referred for Phase II Cardiac Rehab. Staff discussed benefits of exercise, program protocol, and educational material provided. Teach back verified. Patient states that she works [employer] and is not sure if she'd be able to participate but patient gave permission for staff to fax referral information to outlying program at this time.  Staff faxed referral info to [facility].

## 2024-06-20 NOTE — Clinical Note
First balloon inflation max pressure = 12 jeff. First balloon inflation duration = 12 seconds. Second inflation of balloon - Max pressure = 14 jeff. 2nd Inflation of balloon - Duration = 10 seconds. Third inflation of balloon - Max pressure = 12 jeff. 3rd Inflation of balloon - Duration = 10 seconds.

## 2024-06-20 NOTE — Clinical Note
First balloon inflation max pressure = 12 jeff. First balloon inflation duration = 15 seconds. Second inflation of balloon - Max pressure = 14 jeff. 2nd Inflation of balloon - Duration = 10 seconds.

## 2024-06-21 VITALS
RESPIRATION RATE: 16 BRPM | HEIGHT: 64 IN | HEART RATE: 83 BPM | OXYGEN SATURATION: 93 % | BODY MASS INDEX: 28.54 KG/M2 | TEMPERATURE: 97.5 F | DIASTOLIC BLOOD PRESSURE: 83 MMHG | WEIGHT: 167.2 LBS | SYSTOLIC BLOOD PRESSURE: 157 MMHG

## 2024-06-21 LAB
ANION GAP SERPL CALCULATED.3IONS-SCNC: 11 MMOL/L (ref 5–15)
BUN SERPL-MCNC: 24 MG/DL (ref 8–23)
BUN/CREAT SERPL: 18.5 (ref 7–25)
CALCIUM SPEC-SCNC: 9.8 MG/DL (ref 8.6–10.5)
CHLORIDE SERPL-SCNC: 104 MMOL/L (ref 98–107)
CO2 SERPL-SCNC: 26 MMOL/L (ref 22–29)
CREAT SERPL-MCNC: 1.3 MG/DL (ref 0.57–1)
DEPRECATED RDW RBC AUTO: 46.5 FL (ref 37–54)
EGFRCR SERPLBLD CKD-EPI 2021: 46.6 ML/MIN/1.73
ERYTHROCYTE [DISTWIDTH] IN BLOOD BY AUTOMATED COUNT: 15.5 % (ref 12.3–15.4)
GLUCOSE SERPL-MCNC: 127 MG/DL (ref 65–99)
HCT VFR BLD AUTO: 39.7 % (ref 34–46.6)
HGB BLD-MCNC: 12.5 G/DL (ref 12–15.9)
MCH RBC QN AUTO: 26 PG (ref 26.6–33)
MCHC RBC AUTO-ENTMCNC: 31.5 G/DL (ref 31.5–35.7)
MCV RBC AUTO: 82.7 FL (ref 79–97)
PLATELET # BLD AUTO: 289 10*3/MM3 (ref 140–450)
PMV BLD AUTO: 9.2 FL (ref 6–12)
POTASSIUM SERPL-SCNC: 5 MMOL/L (ref 3.5–5.2)
RBC # BLD AUTO: 4.8 10*6/MM3 (ref 3.77–5.28)
SODIUM SERPL-SCNC: 141 MMOL/L (ref 136–145)
WBC NRBC COR # BLD AUTO: 9.02 10*3/MM3 (ref 3.4–10.8)

## 2024-06-21 PROCEDURE — 80048 BASIC METABOLIC PNL TOTAL CA: CPT | Performed by: INTERNAL MEDICINE

## 2024-06-21 PROCEDURE — 85027 COMPLETE CBC AUTOMATED: CPT | Performed by: INTERNAL MEDICINE

## 2024-06-21 PROCEDURE — 93010 ELECTROCARDIOGRAM REPORT: CPT | Performed by: INTERNAL MEDICINE

## 2024-06-21 PROCEDURE — 93005 ELECTROCARDIOGRAM TRACING: CPT | Performed by: INTERNAL MEDICINE

## 2024-06-21 PROCEDURE — 99214 OFFICE O/P EST MOD 30 MIN: CPT | Performed by: INTERNAL MEDICINE

## 2024-06-21 RX ORDER — CLOPIDOGREL BISULFATE 75 MG/1
75 TABLET ORAL DAILY
Qty: 90 TABLET | Refills: 3 | Status: SHIPPED | OUTPATIENT
Start: 2024-06-21

## 2024-06-21 RX ADMIN — ALLOPURINOL 100 MG: 100 TABLET ORAL at 08:05

## 2024-06-21 RX ADMIN — RANOLAZINE 500 MG: 500 TABLET, FILM COATED, EXTENDED RELEASE ORAL at 08:04

## 2024-06-21 RX ADMIN — PANTOPRAZOLE SODIUM 40 MG: 40 TABLET, DELAYED RELEASE ORAL at 05:51

## 2024-06-21 RX ADMIN — ISOSORBIDE MONONITRATE 60 MG: 60 TABLET, EXTENDED RELEASE ORAL at 08:04

## 2024-06-21 RX ADMIN — CLOPIDOGREL BISULFATE 75 MG: 75 TABLET ORAL at 08:05

## 2024-06-21 RX ADMIN — ASPIRIN 81 MG: 81 TABLET, COATED ORAL at 08:05

## 2024-06-21 RX ADMIN — ACETAMINOPHEN 650 MG: 325 TABLET ORAL at 11:53

## 2024-06-21 RX ADMIN — CARIPRAZINE 3 MG: 3 CAPSULE, GELATIN COATED ORAL at 08:04

## 2024-06-21 RX ADMIN — DULOXETINE HYDROCHLORIDE 90 MG: 60 CAPSULE, DELAYED RELEASE ORAL at 08:05

## 2024-06-21 NOTE — CASE MANAGEMENT/SOCIAL WORK
Discharge Planning Assessment  [facility]     Patient Name: Monisha Gardner  MRN: [id number]  Today's Date: 6/21/2024    Admit Date: 6/20/2024    Plan: Home   Discharge Needs Assessment       Row Name 06/21/24 1325       Living Environment    People in Home alone    Current Living Arrangements home    Potentially Unsafe Housing Conditions none    Primary Care Provided by self    Family Caregiver if Needed none       Resource/Environmental Concerns    Resource/Environmental Concerns none    Transportation Concerns no car       Transportation Needs    In the past 12 months, has lack of transportation kept you from medical appointments or from getting medications? no    In the past 12 months, has lack of transportation kept you from meetings, work, or from getting things needed for daily living? No       Food Insecurity    Within the past 12 months, you worried that your food would run out before you got the money to buy more. Never true    Within the past 12 months, the food you bought just didn't last and you didn't have money to get more. Never true       Transition Planning    Patient/Family Anticipates Transition to home with family    Patient/Family Anticipated Services at Transition none       Discharge Needs Assessment    Equipment Currently Used at Home none    Anticipated Changes Related to Illness none    Equipment Needed After Discharge none                   Discharge Plan       Row Name 06/21/24 1325       Plan    Plan Home    Patient/Family in Agreement with Plan yes    Plan Comments Spoke with patient at bedside. She lives alone in [facility], address is in [facility]. She is independent of ADLs. She uses oxygen at night at 2L. She is unsure of oxygen company's name. PCP is Kimmy Vela. Insurance is [facility]. Patient discharge plan is home with daughter to transport. Patient discharging home today. There were no discharge needs identified at this time    Final Discharge Disposition Code 01 - home or  self-care                  Continued Care and Services - Discharged on 6/21/2024 Admission date: 6/20/2024 - Discharge disposition: Home or Self Care   No active coordination exists for this encounter.       Selected Continued Care - Episodes Includes continued care and service providers with selected services from the active episodes listed below      Hyperlipidemia Episode start date: 4/1/2024   There are no active outsourced providers for this episode.                 Expected Discharge Date and Time       Expected Discharge Date Expected Discharge Time    Jun 21, 2024            Demographic Summary       Row Name 06/21/24 1322       General Information    Admission Type observation    Preferred Language English                   Functional Status       Row Name 06/21/24 1325       Functional Status    Usual Activity Tolerance good    Current Activity Tolerance good       Functional Status, IADL    Medications independent    Meal Preparation independent    Housekeeping independent    Laundry independent    Shopping independent                   Psychosocial    No documentation.                  Abuse/Neglect    No documentation.                  Legal    No documentation.                  Substance Abuse    No documentation.                  Patient Forms    No documentation.                     Maria Cunningham RN

## 2024-06-21 NOTE — PLAN OF CARE
Goal Outcome Evaluation:              Outcome Evaluation: GHAZAL TSE. PRN Tylenol given x1 for a headache overnight with relief. R radial site C/D/I.

## 2024-06-21 NOTE — PROGRESS NOTES
"[facility]  IP Progress Note      Chief Complaint: Follow-up of CAD, status post PCI    Subjective   Resting in bed, states that she had some headache last night which has now resolved.  No chest pain or shortness of breath.    Objective     Blood pressure 157/83, pulse 83, temperature 97.5 °F (36.4 °C), temperature source Oral, resp. rate 16, height 162.6 cm (64\"), weight 75.8 kg (167 lb 3.2 oz), SpO2 93%.     Intake/Output Summary (Last 24 hours) at 6/21/2024 1219  Last data filed at 6/20/2024 2009  Gross per 24 hour   Intake 691.67 ml   Output --   Net 691.67 ml       Physical Exam:  General: No acute distress.   Neck: no JVD.  Chest:No respiratory distress, breath sounds are normal. No wheezes,  rhonchi or rales.  Cardiovascular: Normal S1 and S2, no murmur, gallop or rub.    Extremities: No edema.  Right wrist intact, no bleeding or hematoma.    Results Review:     I reviewed the patient's new clinical results.    Results from last 7 days   Lab Units 06/21/24  0735   WBC 10*3/mm3 9.02   HEMOGLOBIN g/dL 12.5   HEMATOCRIT % 39.7   PLATELETS 10*3/mm3 289     Results from last 7 days   Lab Units 06/21/24  0735   SODIUM mmol/L 141   POTASSIUM mmol/L 5.0   CHLORIDE mmol/L 104   CO2 mmol/L 26.0   BUN mg/dL 24*   CREATININE mg/dL 1.30*   CALCIUM mg/dL 9.8   GLUCOSE mg/dL 127*     Results from last 7 days   Lab Units 06/21/24  0735   SODIUM mmol/L 141   POTASSIUM mmol/L 5.0   CHLORIDE mmol/L 104   CO2 mmol/L 26.0   BUN mg/dL 24*   CREATININE mg/dL 1.30*   GLUCOSE mg/dL 127*   CALCIUM mg/dL 9.8         Lab Results   Component Value Date    CKTOTAL 32 03/01/2024    CKMB 0.2 03/08/2015    CKMBINDEX 0.3 03/08/2015    TROPONINI 0.18 11/16/2015    TROPONINT <0.010 04/12/2020     Scheduled Meds:allopurinol, 100 mg, Oral, BID  aspirin, 81 mg, Oral, Daily  Cariprazine HCl, 3 mg, Oral, Daily  clopidogrel, 75 mg, Oral, Daily  DULoxetine, 90 mg, Oral, Daily  isosorbide mononitrate, 60 mg, Oral, " Daily  pantoprazole, 40 mg, Oral, Daily  ranolazine, 500 mg, Oral, BID  rOPINIRole, 2 mg, Oral, Nightly      Continuous Infusions:   PRN Meds:.  acetaminophen    nitroglycerin    Tele: Sinus Rythym      Assessment:  CAD, status post previous MI and stenting of the ostial/proximal RCA 2015.  Now status post cardiac cath for recurrent chest pain and abnormal myocardial perfusion stress test.  Noted to have severe in-stent restenosis of the proximal/ostial RCA which was restented with 3.5 x 38 mm VITALIY and reduced to no significant residual disease.  Hypertension.  Dyslipidemia.  Statin intolerant, takes Repatha at home.  CKD.  Plan:  Due to long difficult procedure and contrast load patient was kept for overnight observation and hydration.  She has done well overnight.  Labs from this morning are pending.  If labs stable we will discharge her to home.  She will continue her usual home medications and further add Plavix 75 mg daily.  She will follow-up with her local cardiologist and can follow-up with us as needed.    Derrick Watts MD, FACC, [facility]

## 2024-06-21 NOTE — PLAN OF CARE
Goal Outcome Evaluation:  Pt will dc home today on asa and plavix.   [facility] to deliver meds to room.  Dtr will transport home later..  Pt left unit at 13:08

## 2024-06-24 LAB
QT INTERVAL: 454 MS
QTC INTERVAL: 486 MS

## 2024-06-27 DIAGNOSIS — M1A.09X0 IDIOPATHIC CHRONIC GOUT OF MULTIPLE SITES WITHOUT TOPHUS: ICD-10-CM

## 2024-06-27 RX ORDER — ALLOPURINOL 100 MG/1
100 TABLET ORAL 2 TIMES DAILY
Qty: 180 TABLET | Refills: 3 | Status: SHIPPED | OUTPATIENT
Start: 2024-06-27

## 2024-06-28 ENCOUNTER — TELEPHONE (OUTPATIENT)
Dept: FAMILY MEDICINE CLINIC | Facility: CLINIC | Age: 64
End: 2024-06-28
Payer: COMMERCIAL

## 2024-06-28 ENCOUNTER — OFFICE VISIT (OUTPATIENT)
Dept: CARDIOLOGY | Facility: CLINIC | Age: 64
End: 2024-06-28
Payer: COMMERCIAL

## 2024-06-28 ENCOUNTER — HOSPITAL ENCOUNTER (EMERGENCY)
Facility: HOSPITAL | Age: 64
Discharge: HOME | End: 2024-06-28
Attending: EMERGENCY MEDICINE
Payer: COMMERCIAL

## 2024-06-28 ENCOUNTER — APPOINTMENT (OUTPATIENT)
Dept: GENERAL RADIOLOGY | Facility: HOSPITAL | Age: 64
End: 2024-06-28
Payer: COMMERCIAL

## 2024-06-28 VITALS
DIASTOLIC BLOOD PRESSURE: 71 MMHG | RESPIRATION RATE: 18 BRPM | WEIGHT: 161 LBS | SYSTOLIC BLOOD PRESSURE: 124 MMHG | OXYGEN SATURATION: 96 % | HEART RATE: 58 BPM | TEMPERATURE: 98.2 F | BODY MASS INDEX: 27.49 KG/M2 | HEIGHT: 64 IN

## 2024-06-28 VITALS
OXYGEN SATURATION: 96 % | WEIGHT: 161.4 LBS | BODY MASS INDEX: 27.55 KG/M2 | SYSTOLIC BLOOD PRESSURE: 122 MMHG | HEART RATE: 74 BPM | HEIGHT: 64 IN | DIASTOLIC BLOOD PRESSURE: 70 MMHG

## 2024-06-28 DIAGNOSIS — I25.118 CORONARY ARTERY DISEASE INVOLVING NATIVE CORONARY ARTERY OF NATIVE HEART WITH OTHER FORM OF ANGINA PECTORIS: Primary | ICD-10-CM

## 2024-06-28 DIAGNOSIS — N18.32 STAGE 3B CHRONIC KIDNEY DISEASE: ICD-10-CM

## 2024-06-28 DIAGNOSIS — R94.31 ABNORMAL EKG: ICD-10-CM

## 2024-06-28 DIAGNOSIS — Z95.5 S/P CORONARY ARTERY STENT PLACEMENT: ICD-10-CM

## 2024-06-28 DIAGNOSIS — I10 ESSENTIAL HYPERTENSION: ICD-10-CM

## 2024-06-28 DIAGNOSIS — E78.5 DYSLIPIDEMIA: ICD-10-CM

## 2024-06-28 DIAGNOSIS — N18.9 CHRONIC KIDNEY DISEASE, UNSPECIFIED CKD STAGE: ICD-10-CM

## 2024-06-28 DIAGNOSIS — Z71.1 NO PROBLEM, FEARED COMPLAINT UNFOUNDED: Primary | ICD-10-CM

## 2024-06-28 LAB
ALBUMIN SERPL-MCNC: 4.3 G/DL (ref 3.5–5.2)
ALBUMIN/GLOB SERPL: 1.5 G/DL
ALP SERPL-CCNC: 90 U/L (ref 39–117)
ALT SERPL W P-5'-P-CCNC: 19 U/L (ref 1–33)
ANION GAP SERPL CALCULATED.3IONS-SCNC: 11.6 MMOL/L (ref 5–15)
AST SERPL-CCNC: 22 U/L (ref 1–32)
BASOPHILS # BLD AUTO: 0.14 10*3/MM3 (ref 0–0.2)
BASOPHILS NFR BLD AUTO: 1.2 % (ref 0–1.5)
BILIRUB SERPL-MCNC: 0.4 MG/DL (ref 0–1.2)
BUN SERPL-MCNC: 22 MG/DL (ref 8–23)
BUN/CREAT SERPL: 14.9 (ref 7–25)
CALCIUM SPEC-SCNC: 10.4 MG/DL (ref 8.6–10.5)
CHLORIDE SERPL-SCNC: 100 MMOL/L (ref 98–107)
CK MB SERPL-CCNC: 2.51 NG/ML
CO2 SERPL-SCNC: 27.4 MMOL/L (ref 22–29)
CREAT SERPL-MCNC: 1.48 MG/DL (ref 0.57–1)
DEPRECATED RDW RBC AUTO: 47.8 FL (ref 37–54)
EGFRCR SERPLBLD CKD-EPI 2021: 39.9 ML/MIN/1.73
EOSINOPHIL # BLD AUTO: 0.68 10*3/MM3 (ref 0–0.4)
EOSINOPHIL NFR BLD AUTO: 5.8 % (ref 0.3–6.2)
ERYTHROCYTE [DISTWIDTH] IN BLOOD BY AUTOMATED COUNT: 15.9 % (ref 12.3–15.4)
GLOBULIN UR ELPH-MCNC: 2.8 GM/DL
GLUCOSE SERPL-MCNC: 120 MG/DL (ref 65–99)
HCT VFR BLD AUTO: 40.8 % (ref 34–46.6)
HGB BLD-MCNC: 13 G/DL (ref 12–15.9)
HOLD SPECIMEN: NORMAL
HOLD SPECIMEN: NORMAL
IMM GRANULOCYTES # BLD AUTO: 0.08 10*3/MM3 (ref 0–0.05)
IMM GRANULOCYTES NFR BLD AUTO: 0.7 % (ref 0–0.5)
LYMPHOCYTES # BLD AUTO: 4.31 10*3/MM3 (ref 0.7–3.1)
LYMPHOCYTES NFR BLD AUTO: 36.8 % (ref 19.6–45.3)
MAGNESIUM SERPL-MCNC: 2 MG/DL (ref 1.6–2.4)
MCH RBC QN AUTO: 26.5 PG (ref 26.6–33)
MCHC RBC AUTO-ENTMCNC: 31.9 G/DL (ref 31.5–35.7)
MCV RBC AUTO: 83.1 FL (ref 79–97)
MONOCYTES # BLD AUTO: 0.66 10*3/MM3 (ref 0.1–0.9)
MONOCYTES NFR BLD AUTO: 5.6 % (ref 5–12)
NEUTROPHILS NFR BLD AUTO: 49.9 % (ref 42.7–76)
NEUTROPHILS NFR BLD AUTO: 5.85 10*3/MM3 (ref 1.7–7)
NRBC BLD AUTO-RTO: 0 /100 WBC (ref 0–0.2)
PLATELET # BLD AUTO: 334 10*3/MM3 (ref 140–450)
PMV BLD AUTO: 8.7 FL (ref 6–12)
POTASSIUM SERPL-SCNC: 5.2 MMOL/L (ref 3.5–5.2)
PROT SERPL-MCNC: 7.1 G/DL (ref 6–8.5)
RBC # BLD AUTO: 4.91 10*6/MM3 (ref 3.77–5.28)
SODIUM SERPL-SCNC: 139 MMOL/L (ref 136–145)
TROPONIN T SERPL HS-MCNC: 11 NG/L
WBC NRBC COR # BLD AUTO: 11.72 10*3/MM3 (ref 3.4–10.8)
WHOLE BLOOD HOLD COAG: NORMAL
WHOLE BLOOD HOLD SPECIMEN: NORMAL

## 2024-06-28 PROCEDURE — 99284 EMERGENCY DEPT VISIT MOD MDM: CPT

## 2024-06-28 PROCEDURE — 80053 COMPREHEN METABOLIC PANEL: CPT | Performed by: EMERGENCY MEDICINE

## 2024-06-28 PROCEDURE — 36415 COLL VENOUS BLD VENIPUNCTURE: CPT

## 2024-06-28 PROCEDURE — 84484 ASSAY OF TROPONIN QUANT: CPT | Performed by: EMERGENCY MEDICINE

## 2024-06-28 PROCEDURE — 96374 THER/PROPH/DIAG INJ IV PUSH: CPT

## 2024-06-28 PROCEDURE — 25010000002 ONDANSETRON PER 1 MG: Performed by: EMERGENCY MEDICINE

## 2024-06-28 PROCEDURE — 71045 X-RAY EXAM CHEST 1 VIEW: CPT

## 2024-06-28 PROCEDURE — 25810000003 SODIUM CHLORIDE 0.9 % SOLUTION: Performed by: EMERGENCY MEDICINE

## 2024-06-28 PROCEDURE — 82553 CREATINE MB FRACTION: CPT | Performed by: EMERGENCY MEDICINE

## 2024-06-28 PROCEDURE — 85025 COMPLETE CBC W/AUTO DIFF WBC: CPT | Performed by: EMERGENCY MEDICINE

## 2024-06-28 PROCEDURE — 93005 ELECTROCARDIOGRAM TRACING: CPT | Performed by: EMERGENCY MEDICINE

## 2024-06-28 PROCEDURE — 83735 ASSAY OF MAGNESIUM: CPT | Performed by: EMERGENCY MEDICINE

## 2024-06-28 PROCEDURE — 71045 X-RAY EXAM CHEST 1 VIEW: CPT | Performed by: RADIOLOGY

## 2024-06-28 RX ORDER — ONDANSETRON 2 MG/ML
4 INJECTION INTRAMUSCULAR; INTRAVENOUS ONCE
Status: COMPLETED | OUTPATIENT
Start: 2024-06-28 | End: 2024-06-28

## 2024-06-28 RX ORDER — SULFAMETHOXAZOLE AND TRIMETHOPRIM 800; 160 MG/1; MG/1
1 TABLET ORAL 2 TIMES DAILY
Qty: 20 TABLET | Refills: 0 | Status: SHIPPED | OUTPATIENT
Start: 2024-06-28

## 2024-06-28 RX ORDER — SODIUM CHLORIDE 0.9 % (FLUSH) 0.9 %
10 SYRINGE (ML) INJECTION AS NEEDED
Status: DISCONTINUED | OUTPATIENT
Start: 2024-06-28 | End: 2024-06-28 | Stop reason: HOSPADM

## 2024-06-28 RX ORDER — ASPIRIN 81 MG/1
324 TABLET, CHEWABLE ORAL ONCE
Status: COMPLETED | OUTPATIENT
Start: 2024-06-28 | End: 2024-06-28

## 2024-06-28 RX ADMIN — ONDANSETRON 4 MG: 2 INJECTION INTRAMUSCULAR; INTRAVENOUS at 12:18

## 2024-06-28 RX ADMIN — ASPIRIN 324 MG: 81 TABLET, CHEWABLE ORAL at 12:13

## 2024-06-28 RX ADMIN — SODIUM CHLORIDE 1000 ML: 9 INJECTION, SOLUTION INTRAVENOUS at 12:18

## 2024-06-28 NOTE — PROGRESS NOTES
Subjective     Monisha Gardner is a 63 y.o. female.   Chief Complaint   Patient presents with    Coronary Artery Disease      History of Present Illness   Monisha Gardner is a 63-year-old female who presents to clinic today for cardiology follow-up.    CAD currently on ASA, Imdur, Ranexa, Lopressor and Repatha (statin intolerant).  Reports compliance with medicine. She had completed a nuclear stress test in August 2023 which had been discussed with patient but after further review of the nuclear stress test it was felt that there was an area of concern.  This has been discussed with her and treatment options.  Cardiac cath was recommended and she recently completed.  She states she done well with the procedure.  She stayed overnight and kidney function was stable.  She reports compliance with aspirin and Plavix.  She states she has noticed this week fatigue.  She denies chest pain or dyspnea or palpitations but has noticed nighttime diaphoresis and diffuse jaw and intermittent arm discomfort.  She has not utilized nitroglycerin.  She states it has been difficult for her to work without falling asleep.  Cardiac cath on 6/20/2024 with 80% in-stent restenosis of proximal/ostial RCA stented with 3.5 x 38 mm VITALIY and reduced to no significant residual disease.  Nonobstructive disease in left coronary system and a calculated LVEF is 65%.    Hypertension currently on Losartan, Norvasc and Lopressor. She reports home BPs are well-controlled.  She reports compliance with medicine.       Hyperlipidemia with statin intolerance.  She reports continuing on Repatha she does report missing some Repatha due to insurance coverage. She remains on fenofibrate.  She denies medication side effects and reports compliance.     Valvular heart disease which has been stable.  She denies shortness of air, orthopnea or swelling.     She reports a jaw mass and underwent surgery in [place].  She continues to report intermittent problems and  some ongoing issues with infection.  She is following closely with oral surgeon.  She denies fever    She reports during hospitalization for surgery she was placed on oxygen at night due to significant desaturations. She completed a home overnight study which was abnormal and oxygen has been prescribed.      CKD and was previously following with nephrology but has not recently been seen. She reports recent re-establishing with nephrology associates.       Patient Active Problem List   Diagnosis    ASCVD (arteriosclerotic cardiovascular disease)    Essential hypertension    Dyslipidemia    Type 2 diabetes mellitus with hyperglycemia, with long-term current use of insulin    Body mass index (bmi) 30.0-30.9, adult    Osteoarthritis    Atypical angina    Elevated liver enzymes    Gastroesophageal reflux disease with esophagitis    Vitamin D deficiency    Hx of pancreatitis    Menopausal symptoms    Vitamin B 12 deficiency    Nocturnal leg cramps    Anxiety    Left arm weakness    Chronic gout of multiple sites    Squamous cell carcinoma in situ (SCCIS) of skin    Mixed hyperlipidemia    PVC (premature ventricular contraction)    RLS (restless legs syndrome)    Psychophysiological insomnia    Dry mouth    Type 2 diabetes mellitus with diabetic autonomic neuropathy, with long-term current use of insulin    Bone mass    Mass of mandible    Coronary artery disease    Abnormal nuclear stress test    CAD (coronary artery disease)     Past Medical History:   Diagnosis Date    Anxiety     Arthritis     Diabetes mellitus     Diverticulosis     GERD (gastroesophageal reflux disease)     Gout     Hyperlipidemia     Hypertension     Myocardial infarction      Past Surgical History:   Procedure Laterality Date    APPENDECTOMY      BREAST BIOPSY Right     benign    CARDIAC CATHETERIZATION      CARDIAC CATHETERIZATION N/A 6/20/2024    Procedure: Left Heart Cath;  Surgeon: Derrick Watts MD;  Location: [facility] CATH INVASIVE LOCATION;   Service: Cardiology;  Laterality: N/A;    CARDIAC CATHETERIZATION N/A 2024    Procedure: Stent VITALIY coronary;  Surgeon: Derrick Watts MD;  Location: [facility] CATH INVASIVE LOCATION;  Service: Cardiology;  Laterality: N/A;     SECTION      CHOLECYSTECTOMY      CORONARY STENT PLACEMENT      Stenting of the RCA with 3.5 x 26 mm Resolute drug-eluting stent    HYSTERECTOMY      JOINT REPLACEMENT      TONSILLECTOMY      TUMOR REMOVAL  02/15/2024    FACIAL TUMOR       Family History   Problem Relation Age of Onset    Arthritis Mother     Heart disease Mother     Heart disease Sister     Vision loss Daughter     Heart disease Father     Heart disease Sister     Heart disease Sister     Heart disease Sister      Social History     Tobacco Use    Smoking status: Never     Passive exposure: Never    Smokeless tobacco: Never   Vaping Use    Vaping status: Never Used   Substance Use Topics    Alcohol use: No    Drug use: No         The following portions of the patient's history were reviewed and updated as appropriate: allergies, current medications, past family history, past medical history, past social history, past surgical history and problem list.    Allergies   Allergen Reactions    Ciprofloxacin Anaphylaxis    Penicillins Shortness Of Breath    Statins Myalgia    Fish Oil [Omega-3 Fatty Acids] Nausea And Vomiting       No current facility-administered medications for this visit.    Current Outpatient Medications:     allopurinol (ZYLOPRIM) 100 MG tablet, TAKE 1 TABLET BY MOUTH TWICE DAILY, Disp: 180 tablet, Rfl: 3    amLODIPine (NORVASC) 2.5 MG tablet, TAKE 1 TABLET BY MOUTH DAILY, Disp: 30 tablet, Rfl: 0    ammonium lactate (AMLACTIN) 12 % cream, , Disp: , Rfl:     aspirin 81 MG EC tablet, Take 1 tablet by mouth Daily., Disp: 100 tablet, Rfl: 3    Blood Glucose Monitoring Suppl kit, Use 1 Device Daily., Disp: 1 each, Rfl: 0    Cariprazine HCl (Vraylar) 3 MG capsule capsule, Take 1 capsule by mouth Daily.,  Disp: 90 capsule, Rfl: 3    clopidogrel (PLAVIX) 75 MG tablet, Take 1 tablet by mouth Daily., Disp: 90 tablet, Rfl: 3    Continuous Glucose  (Dexcom G7 ) device, Use 1 each Daily., Disp: 1 each, Rfl: 0    DULoxetine (CYMBALTA) 30 MG capsule, Take 3 capsules by mouth Daily., Disp: 270 capsule, Rfl: 3    Easy Touch Lancets 30G/Twist misc, Use tid, Disp: 300 each, Rfl: 3    Evolocumab (REPATHA) solution auto-injector SureClick injection, Inject 1 mL under the skin into the appropriate area as directed Every 14 (Fourteen) Days., Disp: 2 mL, Rfl: 5    fenofibrate 160 MG tablet, TAKE 1 TABLET BY MOUTH EVERY DAY, Disp: 30 tablet, Rfl: 0    gabapentin (NEURONTIN) 600 MG tablet, Take 1 tablet by mouth 2 (Two) Times a Day., Disp: 60 tablet, Rfl: 0    hydrOXYzine pamoate (VISTARIL) 50 MG capsule, Take 1 capsule by mouth 3 (Three) Times a Day As Needed for Itching., Disp: 270 capsule, Rfl: 1    isosorbide mononitrate (IMDUR) 60 MG 24 hr tablet, Take 1 tablet by mouth Daily., Disp: , Rfl:     Lantus SoloStar 100 UNIT/ML injection pen, Inject 45 Units under the skin into the appropriate area as directed Daily., Disp: 15 mL, Rfl: 5    losartan (COZAAR) 100 MG tablet, Take 0.5 tablets by mouth 2 (Two) Times a Day., Disp: , Rfl:     metoprolol tartrate (LOPRESSOR) 100 MG tablet, TAKE 1 TABLET BY MOUTH TWICE DAILY, Disp: 180 tablet, Rfl: 3    nitroglycerin (Nitrostat) 0.4 MG SL tablet, Place 1 tablet under the tongue Every 5 (Five) Minutes As Needed for Chest Pain., Disp: 30 tablet, Rfl: 5    pantoprazole (PROTONIX) 40 MG EC tablet, Take 1 tablet by mouth Daily., Disp: 90 tablet, Rfl: 3    ranolazine (Ranexa) 500 MG 12 hr tablet, Take 1 tablet by mouth 2 (Two) Times a Day., Disp: 60 tablet, Rfl: 5    rOPINIRole (REQUIP) 2 MG tablet, Take 1 tablet by mouth Every Night., Disp: 90 tablet, Rfl: 3    Semaglutide, 1 MG/DOSE, (OZEMPIC) 4 MG/3ML solution pen-injector, Inject 1 mg under the skin into the appropriate area as  directed 1 (One) Time Per Week., Disp: 3 mL, Rfl: 3    traMADol (ULTRAM) 50 MG tablet, TAKE 1 TABLET BY MOUTH EVERY 8 HOURS AS NEEDED FOR 7 DAYS, Disp: , Rfl:     triamcinolone (KENALOG) 0.5 % cream, Apply 1 Application topically to the appropriate area as directed 3 (Three) Times a Day., Disp: 30 g, Rfl: 0    True Metrix Blood Glucose Test test strip, 1 each by Other route 3 (Three) Times a Day., Disp: 300 each, Rfl: 3    Ultra Thin Lancets 31G misc, Use 1 Device 3 (Three) Times a Day., Disp: 100 each, Rfl: 3    Facility-Administered Medications Ordered in Other Visits:     aspirin chewable tablet 324 mg, 324 mg, Oral, Once, Alvin Suresh MD    ondansetron (ZOFRAN) injection 4 mg, 4 mg, Intravenous, Once, Alvin Suresh MD    sodium chloride 0.9 % bolus 1,000 mL, 1,000 mL, Intravenous, Once, Alvin Suresh MD    sodium chloride 0.9 % flush 10 mL, 10 mL, Intravenous, PRN, Alvin Suresh MD    Review of Systems   Constitutional:  Positive for fatigue. Negative for activity change, appetite change, chills, diaphoresis and fever.   HENT:  Negative for congestion, drooling, ear discharge, ear pain, mouth sores, nosebleeds, postnasal drip, rhinorrhea, sinus pressure, sneezing and sore throat.    Eyes:  Negative for pain, discharge and visual disturbance.   Respiratory:  Negative for cough, chest tightness, shortness of breath and wheezing.    Cardiovascular:  Negative for chest pain, palpitations and leg swelling.   Gastrointestinal:  Negative for abdominal pain, constipation, diarrhea, nausea and vomiting.   Endocrine: Negative for cold intolerance, heat intolerance, polydipsia, polyphagia and polyuria.   Musculoskeletal:  Negative for arthralgias, myalgias and neck pain.   Skin:  Negative for rash and wound.   Neurological:  Negative for dizziness, syncope, speech difficulty, weakness, light-headedness and headaches.   Hematological:  Negative for adenopathy. Does not bruise/bleed easily.  "  Psychiatric/Behavioral:  Negative for confusion, dysphoric mood and sleep disturbance. The patient is not nervous/anxious.    All other systems reviewed and are negative.    /70 (BP Location: Left arm, Patient Position: Sitting, Cuff Size: Adult)   Pulse 74   Ht 162.6 cm (64\")   Wt 73.2 kg (161 lb 6.4 oz)   LMP  (LMP Unknown)   SpO2 96%   BMI 27.70 kg/m²     Objective   Allergies   Allergen Reactions    Ciprofloxacin Anaphylaxis    Penicillins Shortness Of Breath    Statins Myalgia    Fish Oil [Omega-3 Fatty Acids] Nausea And Vomiting       Physical Exam  Vitals reviewed.   Constitutional:       Appearance: Normal appearance. She is well-developed.   HENT:      Head: Normocephalic.      Right Ear: Tympanic membrane normal.      Left Ear: Tympanic membrane normal.      Nose: Nose normal.   Eyes:      Conjunctiva/sclera: Conjunctivae normal.      Pupils: Pupils are equal, round, and reactive to light.   Neck:      Thyroid: No thyromegaly.      Vascular: No carotid bruit or JVD.   Cardiovascular:      Rate and Rhythm: Normal rate and regular rhythm.   Pulmonary:      Effort: Pulmonary effort is normal.      Breath sounds: Normal breath sounds.   Abdominal:      General: Bowel sounds are normal.      Palpations: Abdomen is soft. There is no hepatomegaly, splenomegaly or mass.      Tenderness: There is no abdominal tenderness.   Musculoskeletal:         General: Normal range of motion.      Cervical back: Neck supple.      Right lower leg: No edema.      Left lower leg: No edema.   Skin:     General: Skin is warm and dry.   Neurological:      Mental Status: She is alert and oriented to person, place, and time.   Psychiatric:         Attention and Perception: Attention normal.         Mood and Affect: Mood normal.         Speech: Speech normal.         Behavior: Behavior normal. Behavior is cooperative.         Cognition and Memory: Cognition normal.           ECG 12 Lead    Date/Time: 6/28/2024 12:03 " PM  Performed by: Asia Hawkins APRN    Authorized by: Asia Hawkins APRN  Comparison: compared with previous ECG   Similar to previous ECG  Comparison to previous ECG: T wave inversion noted and AVL elevation more prominent   Rhythm: sinus rhythm  Rate: normal  BPM: 64  Other findings: non-specific ST-T wave changes and T wave abnormality    Clinical impression: myocardial ischemia and myocardial infarction  Clinical impression comment: septal, indeterminate. consider lateral and inferior ischemia  Comments: QT/QTc - 446/455          LABS  WBC   Date Value Ref Range Status   06/21/2024 9.02 3.40 - 10.80 10*3/mm3 Final   11/12/2021 10.58 3.40 - 10.80 10*3/mm3 Final     RBC   Date Value Ref Range Status   06/21/2024 4.80 3.77 - 5.28 10*6/mm3 Final   11/12/2021 4.77 3.77 - 5.28 10*6/mm3 Final     Hemoglobin   Date Value Ref Range Status   06/21/2024 12.5 12.0 - 15.9 g/dL Final     Hematocrit   Date Value Ref Range Status   06/21/2024 39.7 34.0 - 46.6 % Final     MCV   Date Value Ref Range Status   06/21/2024 82.7 79.0 - 97.0 fL Final     MCH   Date Value Ref Range Status   06/21/2024 26.0 (L) 26.6 - 33.0 pg Final     MCHC   Date Value Ref Range Status   06/21/2024 31.5 31.5 - 35.7 g/dL Final     RDW   Date Value Ref Range Status   06/21/2024 15.5 (H) 12.3 - 15.4 % Final     RDW-SD   Date Value Ref Range Status   06/21/2024 46.5 37.0 - 54.0 fl Final     MPV   Date Value Ref Range Status   06/21/2024 9.2 6.0 - 12.0 fL Final     Platelets   Date Value Ref Range Status   06/21/2024 289 140 - 450 10*3/mm3 Final     Neutrophil %   Date Value Ref Range Status   05/31/2024 49.4 42.7 - 76.0 % Final     Lymphocyte %   Date Value Ref Range Status   05/31/2024 39.8 19.6 - 45.3 % Final     Monocyte %   Date Value Ref Range Status   05/31/2024 6.1 5.0 - 12.0 % Final     Eosinophil %   Date Value Ref Range Status   05/31/2024 3.0 0.3 - 6.2 % Final     Basophil %   Date Value Ref Range Status   05/31/2024 1.2 0.0 - 1.5 %  Final     Immature Grans %   Date Value Ref Range Status   05/31/2024 0.5 0.0 - 0.5 % Final     Neutrophils, Absolute   Date Value Ref Range Status   05/31/2024 4.98 1.70 - 7.00 10*3/mm3 Final     Lymphocytes, Absolute   Date Value Ref Range Status   05/31/2024 4.02 (H) 0.70 - 3.10 10*3/mm3 Final     Monocytes, Absolute   Date Value Ref Range Status   05/31/2024 0.62 0.10 - 0.90 10*3/mm3 Final     Eosinophils, Absolute   Date Value Ref Range Status   05/31/2024 0.30 0.00 - 0.40 10*3/mm3 Final     Basophils, Absolute   Date Value Ref Range Status   05/31/2024 0.12 0.00 - 0.20 10*3/mm3 Final     Immature Grans, Absolute   Date Value Ref Range Status   05/31/2024 0.05 0.00 - 0.05 10*3/mm3 Final     nRBC   Date Value Ref Range Status   05/31/2024 0.0 0.0 - 0.2 /100 WBC Final       Total Cholesterol   Date Value Ref Range Status   03/01/2024 193 0 - 200 mg/dL Final     Triglycerides   Date Value Ref Range Status   03/01/2024 363 (H) 0 - 150 mg/dL Final     HDL Cholesterol   Date Value Ref Range Status   03/01/2024 30 (L) 40 - 60 mg/dL Final     LDL Cholesterol    Date Value Ref Range Status   03/01/2024 101 (H) 0 - 100 mg/dL Final     LDL Chol Calc (NIH)   Date Value Ref Range Status   11/12/2021 124 (H) 0 - 100 mg/dL Final     IMAGING  Cardiac Catheterization/Vascular Study    Result Date: 6/20/2024  80% in-stent restenosis of the ostial/proximal RCA successfully stented with 3.5 x 38 mm VITALIY and reduced to no significant residual disease. Nonobstructive disease of the left coronary system. Normal left ventricular systolic function, estimated EF 65%. RECOMMENDATIONS: Dual antiplatelet therapy for at least 1 year. Optimize medical therapy and risk factor management. Indications: Chest pain and abnormal myocardial perfusion stress test. Access: Right radial. Procedures: Left heart catheterization. Left ventriculogram. Selective coronary angiography. Arterial site hemostasis with radial band. Procedure narrative: The  patient was brought to the catheterization lab in a fasting condition.  Access site was prepped and draped in standard sterile fashion.  Lidocaine was injected and arterial access was obtained by percutaneous anterior wall puncture technique.  A 6 [place] arterial sheath was placed. Above procedures were performed without complications.  Following the angiograms the right coronary artery PCI was performed.  Additional heparin was given.  ACT was monitored.  F R4 guide catheter was tried with guide liner subsequently AL-1 guide catheter was used and finally the procedure was performed with a 5 [place] MATT guide catheter.  The right coronary artery was difficult to engage enough to advance devices.  Once it was engaged the versa turn wire was advanced.  Balloon PTCA was performed with a 1.5 then with a 3.0 mm balloon.  Subsequently the right coronary artery was stented with a 3.5 x 38 mm VITALIY which was fully deployed and postdilated with 3.5 mm NC balloon at high pressures.  There was radial opacity noted around the ostium and there was a concern about limited ostial dissection into the aortic wall however on repeated angiograms no contrast draining was noted and it was felt that this was reasonable calcification.  Patient's hemodynamics were stable.  HEBER flow was grade 3 before and after PCI.  At the conclusion the arterial sheath was removed and hemostasis was achieved.  The patient was transferred to the unit in a stable condition. Hemodynamic Findings: Heart Rate: 78/minute. LV pressure: 120/8-14 mmHg, on pull back no gradient was recorded across the aortic valve. Angiographic Findings: Right coronary dominance. LM: Angiographically normal. LAD: Minor irregularities and mild proximal to mid segment plaque with up to 30% proximal narrowing.  A medium sized second diagonal branch has a 40 to 50% stenosis.  The very large diagonal branch is free of significant disease.  No hemodynamic significant disease is noted. LCX:  Nondominant vessel with minor irregularities and no significant disease. RCA: Dominant vessel with a ostial/proximal stent.  There was a 80% IntraStent restenosis.  This was treated with PTCA followed by placement of a 3.5 x 38 mm VITALIY and the 80% stenosis was reduced to no significant residual disease. LV: Left ventriculogram performed in 30 BUTTS projection revealed normal global and regional left ventricular systolic function with estimated ejection fraction of 65%.  No mitral regurgitation was noted. Complications: No acute procedure related complications.     US DUPLEX RENAL ARTERY COMPLETE    Result Date: 3/11/2024  Normal renal ultrasound . Elevated RI bilaterally . Images reviewed, interpreted, and dictated by Dr. HANNAH Barajas. Transcribed by Halima Orozco PA-C.            Assessment & Plan   Diagnoses and all orders for this visit:    1. Coronary artery disease involving native coronary artery of native heart with other form of angina pectoris (Primary)  -     ECG 12 Lead  Patient remains on aspirin, Plavix, Imdur, Ranexa, Lopressor and Repatha (statin intolerant)  She underwent cardiac cath with placement of stent about a week ago.  She states since that time she has experienced significant fatigue with intermittent nighttime diaphoresis and may be some arm and jaw discomfort however she recently had jaw surgery.  She denies utilization of nitroglycerin.  She denies chest pain or dyspnea.  EKG with changes from EKG performed post stent.  Recommend ER evaluation due to nonspecific symptoms with EKG changes.  She is agreeable.  Ambulance transportation was authorized but she declines.    2. S/P coronary artery stent placement  Continue on aspirin, Plavix, Imdur, Ranexa, Lopressor and Repatha    3. Abnormal EKG  Due to abnormal EKG with recent stent and intermittent vague symptoms, patient is advised to go to ER and is agreeable.    4. Essential hypertension  well-controlled, continue current  therapy    5. Dyslipidemia  Continue on Repatha, routine lab monitoring with LDL goal of 55  Heart healthy diet    6. Stage 3b chronic kidney disease  Continue to closely follow with nephrology    Review of medical    Lifestyle modifications including heart healthy diet, regular exercise, maintenance of desirable body weight and avoidance of tobacco    Follow-up in 1 month, sooner if needed

## 2024-06-28 NOTE — ED PROVIDER NOTES
Subjective   History of Present Illness  Monisha is a 61-year-old female who presents to the ED for chief complaint of abnormal EKG at the cardiologist office.  Patient states she got a stent last week, however she has been fatigued since prior to the stent, she states she is working 11-hour days she is in the sun, she denies any chest pain at this time, when discussing with her if this was similar or different from previous episodes where she needed stents she said this is much different as she has no pain whatsoever her previous pain when requiring stents was more significant.  She followed up with her cardiologist nurse practitioner today who got an EKG was concerned and transferred her to the ED for further workup and evaluation.        Review of Systems   All other systems reviewed and are negative.      Past Medical History:   Diagnosis Date    Anxiety     Arthritis     Diabetes mellitus     Diverticulosis     GERD (gastroesophageal reflux disease)     Gout     Hyperlipidemia     Hypertension     Myocardial infarction        Allergies   Allergen Reactions    Ciprofloxacin Anaphylaxis    Penicillins Shortness Of Breath    Statins Myalgia    Fish Oil [Omega-3 Fatty Acids] Nausea And Vomiting       Past Surgical History:   Procedure Laterality Date    APPENDECTOMY      BREAST BIOPSY Right     benign    CARDIAC CATHETERIZATION      CARDIAC CATHETERIZATION N/A 2024    Procedure: Left Heart Cath;  Surgeon: Derrick Watts MD;  Location:  [facility];  Service: Cardiology;  Laterality: N/A;    CARDIAC CATHETERIZATION N/A 2024    Procedure: Stent VITALIY coronary;  Surgeon: Derrick Watts MD;  Location:  [facility];  Service: Cardiology;  Laterality: N/A;     SECTION      CHOLECYSTECTOMY      CORONARY STENT PLACEMENT      Stenting of the RCA with 3.5 x 26 mm Resolute drug-eluting stent    HYSTERECTOMY      JOINT REPLACEMENT      TONSILLECTOMY      TUMOR REMOVAL  02/15/2024     FACIAL TUMOR       Family History   Problem Relation Age of Onset    Arthritis Mother     Heart disease Mother     Heart disease Sister     Vision loss Daughter     Heart disease Father     Heart disease Sister     Heart disease Sister     Heart disease Sister        Social History     Socioeconomic History    Marital status:     Number of children: 2    Years of education: 12   Tobacco Use    Smoking status: Never     Passive exposure: Never    Smokeless tobacco: Never   Vaping Use    Vaping status: Never Used   Substance and Sexual Activity    Alcohol use: No    Drug use: No    Sexual activity: Defer           Objective   Physical Exam  Vitals reviewed.   Constitutional:       Appearance: Normal appearance. She is normal weight.   HENT:      Head: Normocephalic and atraumatic.      Right Ear: External ear normal.      Left Ear: External ear normal.      Nose: Nose normal.      Mouth/Throat:      Mouth: Mucous membranes are moist. Mucous membranes are dry.      Pharynx: Oropharynx is clear.   Eyes:      Extraocular Movements: Extraocular movements intact.      Conjunctiva/sclera: Conjunctivae normal.      Pupils: Pupils are equal, round, and reactive to light.   Cardiovascular:      Rate and Rhythm: Normal rate and regular rhythm.      Pulses: Normal pulses.      Heart sounds: Normal heart sounds.   Pulmonary:      Effort: Pulmonary effort is normal.      Breath sounds: Normal breath sounds.   Abdominal:      General: Bowel sounds are normal.      Palpations: Abdomen is soft.   Musculoskeletal:         General: Normal range of motion.      Cervical back: Normal range of motion and neck supple. No rigidity.   Skin:     General: Skin is warm and dry.      Capillary Refill: Capillary refill takes less than 2 seconds.   Neurological:      General: No focal deficit present.      Mental Status: She is alert and oriented to person, place, and time.   Psychiatric:         Mood and Affect: Mood normal.          Procedures           ED Course                                             Medical Decision Making  Monisha is a 61-year-old female who presents to the ED for chief complaint of abnormal EKG at the cardiologist office.  EKG was obtained and revealed sinus rhythm, however there is ST T wave depression in 2 3 aVF, however her EKG morphology has not changed since previous EKGs on 6/21, 6/20 and 5/31.  ----    CBC is unremarkable  Patient CMP returns with her normal CKD  Patient's troponin returned at 11  Patient's CK-MB is at 2.51  ----    12:51 EDT  Patient remains without distress while during her visit in the ED  Will call and discuss treatment disposition with cardiology.  ---    12:55 EDT  Discussed case with APRN they are comfortable with my treatment plan in my treatment decision of sending the patient home to follow-up with regular doctor.  All questions were answered reassurance provided to the patient patient was discharged stable condition.      Amount and/or Complexity of Data Reviewed  Labs: ordered.  Radiology: ordered.  ECG/medicine tests: ordered.    Risk  OTC drugs.  Prescription drug management.        Final diagnoses:   No problem, feared complaint unfounded   Chronic kidney disease, unspecified CKD stage       ED Disposition  ED Disposition       ED Disposition   Discharge    Condition   Stable    Comment   --               Kimmy Vela, PA  [address] [facility] [address] [phone]               Medication List      No changes were made to your prescriptions during this visit.            Alvin Suresh MD  06/28/24 1935

## 2024-06-28 NOTE — TELEPHONE ENCOUNTER
Caller: Monisha Gardner    Relationship: Self    Best call back number: [phone]    What medication are you requesting: ANTIBIOTICS    What are your current symptoms: INFECTED TOOTH    How long have you been experiencing symptoms: MOUTH PAIN      If a prescription is needed, what is your preferred pharmacy and phone number: [id number] [address] AT [facility] [address] [facility] - [phone] [facility] [phone] FX     Additional notes:

## 2024-06-28 NOTE — LETTER
June 28, 2024     ANH Medina  [address] [facility] [address]    Patient: Monisha Gardner   YOB: 1960   Date of Visit: 6/28/2024       Dear ANH Medina    Monisha Gardner was in my office today. Below is a copy of my note.    If you have questions, please do not hesitate to call me. I look forward to following Monisha along with you.         Sincerely,        TRUONG Briones        CC: No Recipients    Subjective    Monisha Gardner is a 63 y.o. female.   Chief Complaint   Patient presents with   • Coronary Artery Disease      History of Present Illness   Monisha Gardner is a 63-year-old female who presents to clinic today for cardiology follow-up.    CAD currently on ASA, Imdur, Ranexa, Lopressor and Repatha (statin intolerant).  Reports compliance with medicine. She had completed a nuclear stress test in August 2023 which had been discussed with patient but after further review of the nuclear stress test it was felt that there was an area of concern.  This has been discussed with her and treatment options.  Cardiac cath was recommended and she recently completed.  She states she done well with the procedure.  She stayed overnight and kidney function was stable.  She reports compliance with aspirin and Plavix.  She states she has noticed this week fatigue.  She denies chest pain or dyspnea or palpitations but has noticed nighttime diaphoresis and diffuse jaw and intermittent arm discomfort.  She has not utilized nitroglycerin.  She states it has been difficult for her to work without falling asleep.  Cardiac cath on 6/20/2024 with 80% in-stent restenosis of proximal/ostial RCA stented with 3.5 x 38 mm VITALIY and reduced to no significant residual disease.  Nonobstructive disease in left coronary system and a calculated LVEF is 65%.    Hypertension currently on Losartan, Norvasc and Lopressor. She reports home BPs are well-controlled.  She reports compliance with  medicine.       Hyperlipidemia with statin intolerance.  She reports continuing on Repatha she does report missing some Repatha due to insurance coverage. She remains on fenofibrate.  She denies medication side effects and reports compliance.     Valvular heart disease which has been stable.  She denies shortness of air, orthopnea or swelling.     She reports a jaw mass and underwent surgery in [place].  She continues to report intermittent problems and some ongoing issues with infection.  She is following closely with oral surgeon.  She denies fever    She reports during hospitalization for surgery she was placed on oxygen at night due to significant desaturations. She completed a home overnight study which was abnormal and oxygen has been prescribed.      CKD and was previously following with nephrology but has not recently been seen. She reports recent re-establishing with nephrology associates.       Patient Active Problem List   Diagnosis   • ASCVD (arteriosclerotic cardiovascular disease)   • Essential hypertension   • Dyslipidemia   • Type 2 diabetes mellitus with hyperglycemia, with long-term current use of insulin   • Body mass index (bmi) 30.0-30.9, adult   • Osteoarthritis   • Atypical angina   • Elevated liver enzymes   • Gastroesophageal reflux disease with esophagitis   • Vitamin D deficiency   • Hx of pancreatitis   • Menopausal symptoms   • Vitamin B 12 deficiency   • Nocturnal leg cramps   • Anxiety   • Left arm weakness   • Chronic gout of multiple sites   • Squamous cell carcinoma in situ (SCCIS) of skin   • Mixed hyperlipidemia   • PVC (premature ventricular contraction)   • RLS (restless legs syndrome)   • Psychophysiological insomnia   • Dry mouth   • Type 2 diabetes mellitus with diabetic autonomic neuropathy, with long-term current use of insulin   • Bone mass   • Mass of mandible   • Coronary artery disease   • Abnormal nuclear stress test   • CAD (coronary artery disease)     Past Medical  History:   Diagnosis Date   • Anxiety    • Arthritis    • Diabetes mellitus    • Diverticulosis    • GERD (gastroesophageal reflux disease)    • Gout    • Hyperlipidemia    • Hypertension    • Myocardial infarction      Past Surgical History:   Procedure Laterality Date   • APPENDECTOMY     • BREAST BIOPSY Right     benign   • CARDIAC CATHETERIZATION     • CARDIAC CATHETERIZATION N/A 2024    Procedure: Left Heart Cath;  Surgeon: Derrick Watts MD;  Location:  [facility];  Service: Cardiology;  Laterality: N/A;   • CARDIAC CATHETERIZATION N/A 2024    Procedure: Stent VITALIY coronary;  Surgeon: Derrick Watts MD;  Location:  [facility];  Service: Cardiology;  Laterality: N/A;   •  SECTION     • CHOLECYSTECTOMY     • CORONARY STENT PLACEMENT      Stenting of the RCA with 3.5 x 26 mm Resolute drug-eluting stent   • HYSTERECTOMY     • JOINT REPLACEMENT     • TONSILLECTOMY     • TUMOR REMOVAL  02/15/2024    FACIAL TUMOR       Family History   Problem Relation Age of Onset   • Arthritis Mother    • Heart disease Mother    • Heart disease Sister    • Vision loss Daughter    • Heart disease Father    • Heart disease Sister    • Heart disease Sister    • Heart disease Sister      Social History     Tobacco Use   • Smoking status: Never     Passive exposure: Never   • Smokeless tobacco: Never   Vaping Use   • Vaping status: Never Used   Substance Use Topics   • Alcohol use: No   • Drug use: No         The following portions of the patient's history were reviewed and updated as appropriate: allergies, current medications, past family history, past medical history, past social history, past surgical history and problem list.    Allergies   Allergen Reactions   • Ciprofloxacin Anaphylaxis   • Penicillins Shortness Of Breath   • Statins Myalgia   • Fish Oil [Omega-3 Fatty Acids] Nausea And Vomiting       No current facility-administered medications for this visit.    Current  Outpatient Medications:   •  allopurinol (ZYLOPRIM) 100 MG tablet, TAKE 1 TABLET BY MOUTH TWICE DAILY, Disp: 180 tablet, Rfl: 3  •  amLODIPine (NORVASC) 2.5 MG tablet, TAKE 1 TABLET BY MOUTH DAILY, Disp: 30 tablet, Rfl: 0  •  ammonium lactate (AMLACTIN) 12 % cream, , Disp: , Rfl:   •  aspirin 81 MG EC tablet, Take 1 tablet by mouth Daily., Disp: 100 tablet, Rfl: 3  •  Blood Glucose Monitoring Suppl kit, Use 1 Device Daily., Disp: 1 each, Rfl: 0  •  Cariprazine HCl (Vraylar) 3 MG capsule capsule, Take 1 capsule by mouth Daily., Disp: 90 capsule, Rfl: 3  •  clopidogrel (PLAVIX) 75 MG tablet, Take 1 tablet by mouth Daily., Disp: 90 tablet, Rfl: 3  •  Continuous Glucose  (Dexcom G7 ) device, Use 1 each Daily., Disp: 1 each, Rfl: 0  •  DULoxetine (CYMBALTA) 30 MG capsule, Take 3 capsules by mouth Daily., Disp: 270 capsule, Rfl: 3  •  Easy Touch Lancets 30G/Twist misc, Use tid, Disp: 300 each, Rfl: 3  •  Evolocumab (REPATHA) solution auto-injector SureClick injection, Inject 1 mL under the skin into the appropriate area as directed Every 14 (Fourteen) Days., Disp: 2 mL, Rfl: 5  •  fenofibrate 160 MG tablet, TAKE 1 TABLET BY MOUTH EVERY DAY, Disp: 30 tablet, Rfl: 0  •  gabapentin (NEURONTIN) 600 MG tablet, Take 1 tablet by mouth 2 (Two) Times a Day., Disp: 60 tablet, Rfl: 0  •  hydrOXYzine pamoate (VISTARIL) 50 MG capsule, Take 1 capsule by mouth 3 (Three) Times a Day As Needed for Itching., Disp: 270 capsule, Rfl: 1  •  isosorbide mononitrate (IMDUR) 60 MG 24 hr tablet, Take 1 tablet by mouth Daily., Disp: , Rfl:   •  Lantus SoloStar 100 UNIT/ML injection pen, Inject 45 Units under the skin into the appropriate area as directed Daily., Disp: 15 mL, Rfl: 5  •  losartan (COZAAR) 100 MG tablet, Take 0.5 tablets by mouth 2 (Two) Times a Day., Disp: , Rfl:   •  metoprolol tartrate (LOPRESSOR) 100 MG tablet, TAKE 1 TABLET BY MOUTH TWICE DAILY, Disp: 180 tablet, Rfl: 3  •  nitroglycerin (Nitrostat) 0.4 MG SL  tablet, Place 1 tablet under the tongue Every 5 (Five) Minutes As Needed for Chest Pain., Disp: 30 tablet, Rfl: 5  •  pantoprazole (PROTONIX) 40 MG EC tablet, Take 1 tablet by mouth Daily., Disp: 90 tablet, Rfl: 3  •  ranolazine (Ranexa) 500 MG 12 hr tablet, Take 1 tablet by mouth 2 (Two) Times a Day., Disp: 60 tablet, Rfl: 5  •  rOPINIRole (REQUIP) 2 MG tablet, Take 1 tablet by mouth Every Night., Disp: 90 tablet, Rfl: 3  •  Semaglutide, 1 MG/DOSE, (OZEMPIC) 4 MG/3ML solution pen-injector, Inject 1 mg under the skin into the appropriate area as directed 1 (One) Time Per Week., Disp: 3 mL, Rfl: 3  •  traMADol (ULTRAM) 50 MG tablet, TAKE 1 TABLET BY MOUTH EVERY 8 HOURS AS NEEDED FOR 7 DAYS, Disp: , Rfl:   •  triamcinolone (KENALOG) 0.5 % cream, Apply 1 Application topically to the appropriate area as directed 3 (Three) Times a Day., Disp: 30 g, Rfl: 0  •  True Metrix Blood Glucose Test test strip, 1 each by Other route 3 (Three) Times a Day., Disp: 300 each, Rfl: 3  •  Ultra Thin Lancets 31G misc, Use 1 Device 3 (Three) Times a Day., Disp: 100 each, Rfl: 3    Facility-Administered Medications Ordered in Other Visits:   •  aspirin chewable tablet 324 mg, 324 mg, Oral, Once, Alvin Suresh MD  •  ondansetron (ZOFRAN) injection 4 mg, 4 mg, Intravenous, Once, Alvin Suresh MD  •  sodium chloride 0.9 % bolus 1,000 mL, 1,000 mL, Intravenous, Once, Alvin Suresh MD  •  sodium chloride 0.9 % flush 10 mL, 10 mL, Intravenous, PRN, Alvin Suresh MD    Review of Systems   Constitutional:  Positive for fatigue. Negative for activity change, appetite change, chills, diaphoresis and fever.   HENT:  Negative for congestion, drooling, ear discharge, ear pain, mouth sores, nosebleeds, postnasal drip, rhinorrhea, sinus pressure, sneezing and sore throat.    Eyes:  Negative for pain, discharge and visual disturbance.   Respiratory:  Negative for cough, chest tightness, shortness of breath and wheezing.   "  Cardiovascular:  Negative for chest pain, palpitations and leg swelling.   Gastrointestinal:  Negative for abdominal pain, constipation, diarrhea, nausea and vomiting.   Endocrine: Negative for cold intolerance, heat intolerance, polydipsia, polyphagia and polyuria.   Musculoskeletal:  Negative for arthralgias, myalgias and neck pain.   Skin:  Negative for rash and wound.   Neurological:  Negative for dizziness, syncope, speech difficulty, weakness, light-headedness and headaches.   Hematological:  Negative for adenopathy. Does not bruise/bleed easily.   Psychiatric/Behavioral:  Negative for confusion, dysphoric mood and sleep disturbance. The patient is not nervous/anxious.    All other systems reviewed and are negative.    /70 (BP Location: Left arm, Patient Position: Sitting, Cuff Size: Adult)   Pulse 74   Ht 162.6 cm (64\")   Wt 73.2 kg (161 lb 6.4 oz)   LMP  (LMP Unknown)   SpO2 96%   BMI 27.70 kg/m²     Objective  Allergies   Allergen Reactions   • Ciprofloxacin Anaphylaxis   • Penicillins Shortness Of Breath   • Statins Myalgia   • Fish Oil [Omega-3 Fatty Acids] Nausea And Vomiting       Physical Exam  Vitals reviewed.   Constitutional:       Appearance: Normal appearance. She is well-developed.   HENT:      Head: Normocephalic.      Right Ear: Tympanic membrane normal.      Left Ear: Tympanic membrane normal.      Nose: Nose normal.   Eyes:      Conjunctiva/sclera: Conjunctivae normal.      Pupils: Pupils are equal, round, and reactive to light.   Neck:      Thyroid: No thyromegaly.      Vascular: No carotid bruit or JVD.   Cardiovascular:      Rate and Rhythm: Normal rate and regular rhythm.   Pulmonary:      Effort: Pulmonary effort is normal.      Breath sounds: Normal breath sounds.   Abdominal:      General: Bowel sounds are normal.      Palpations: Abdomen is soft. There is no hepatomegaly, splenomegaly or mass.      Tenderness: There is no abdominal tenderness.   Musculoskeletal:         " General: Normal range of motion.      Cervical back: Neck supple.      Right lower leg: No edema.      Left lower leg: No edema.   Skin:     General: Skin is warm and dry.   Neurological:      Mental Status: She is alert and oriented to person, place, and time.   Psychiatric:         Attention and Perception: Attention normal.         Mood and Affect: Mood normal.         Speech: Speech normal.         Behavior: Behavior normal. Behavior is cooperative.         Cognition and Memory: Cognition normal.           ECG 12 Lead    Date/Time: 6/28/2024 12:03 PM  Performed by: Asia Hawkins APRN    Authorized by: Asia Hawkins APRN  Comparison: compared with previous ECG   Similar to previous ECG  Comparison to previous ECG: T wave inversion noted and AVL elevation more prominent   Rhythm: sinus rhythm  Rate: normal  BPM: 64  Other findings: non-specific ST-T wave changes and T wave abnormality    Clinical impression: myocardial ischemia and myocardial infarction  Clinical impression comment: septal, indeterminate. consider lateral and inferior ischemia  Comments: QT/QTc - 446/455          LABS  WBC   Date Value Ref Range Status   06/21/2024 9.02 3.40 - 10.80 10*3/mm3 Final   11/12/2021 10.58 3.40 - 10.80 10*3/mm3 Final     RBC   Date Value Ref Range Status   06/21/2024 4.80 3.77 - 5.28 10*6/mm3 Final   11/12/2021 4.77 3.77 - 5.28 10*6/mm3 Final     Hemoglobin   Date Value Ref Range Status   06/21/2024 12.5 12.0 - 15.9 g/dL Final     Hematocrit   Date Value Ref Range Status   06/21/2024 39.7 34.0 - 46.6 % Final     MCV   Date Value Ref Range Status   06/21/2024 82.7 79.0 - 97.0 fL Final     MCH   Date Value Ref Range Status   06/21/2024 26.0 (L) 26.6 - 33.0 pg Final     MCHC   Date Value Ref Range Status   06/21/2024 31.5 31.5 - 35.7 g/dL Final     RDW   Date Value Ref Range Status   06/21/2024 15.5 (H) 12.3 - 15.4 % Final     RDW-SD   Date Value Ref Range Status   06/21/2024 46.5 37.0 - 54.0 fl Final     MPV    Date Value Ref Range Status   06/21/2024 9.2 6.0 - 12.0 fL Final     Platelets   Date Value Ref Range Status   06/21/2024 289 140 - 450 10*3/mm3 Final     Neutrophil %   Date Value Ref Range Status   05/31/2024 49.4 42.7 - 76.0 % Final     Lymphocyte %   Date Value Ref Range Status   05/31/2024 39.8 19.6 - 45.3 % Final     Monocyte %   Date Value Ref Range Status   05/31/2024 6.1 5.0 - 12.0 % Final     Eosinophil %   Date Value Ref Range Status   05/31/2024 3.0 0.3 - 6.2 % Final     Basophil %   Date Value Ref Range Status   05/31/2024 1.2 0.0 - 1.5 % Final     Immature Grans %   Date Value Ref Range Status   05/31/2024 0.5 0.0 - 0.5 % Final     Neutrophils, Absolute   Date Value Ref Range Status   05/31/2024 4.98 1.70 - 7.00 10*3/mm3 Final     Lymphocytes, Absolute   Date Value Ref Range Status   05/31/2024 4.02 (H) 0.70 - 3.10 10*3/mm3 Final     Monocytes, Absolute   Date Value Ref Range Status   05/31/2024 0.62 0.10 - 0.90 10*3/mm3 Final     Eosinophils, Absolute   Date Value Ref Range Status   05/31/2024 0.30 0.00 - 0.40 10*3/mm3 Final     Basophils, Absolute   Date Value Ref Range Status   05/31/2024 0.12 0.00 - 0.20 10*3/mm3 Final     Immature Grans, Absolute   Date Value Ref Range Status   05/31/2024 0.05 0.00 - 0.05 10*3/mm3 Final     nRBC   Date Value Ref Range Status   05/31/2024 0.0 0.0 - 0.2 /100 WBC Final       Total Cholesterol   Date Value Ref Range Status   03/01/2024 193 0 - 200 mg/dL Final     Triglycerides   Date Value Ref Range Status   03/01/2024 363 (H) 0 - 150 mg/dL Final     HDL Cholesterol   Date Value Ref Range Status   03/01/2024 30 (L) 40 - 60 mg/dL Final     LDL Cholesterol    Date Value Ref Range Status   03/01/2024 101 (H) 0 - 100 mg/dL Final     LDL Chol Calc (NIH)   Date Value Ref Range Status   11/12/2021 124 (H) 0 - 100 mg/dL Final     IMAGING  Cardiac Catheterization/Vascular Study    Result Date: 6/20/2024  80% in-stent restenosis of the ostial/proximal RCA successfully stented  with 3.5 x 38 mm VITALIY and reduced to no significant residual disease. Nonobstructive disease of the left coronary system. Normal left ventricular systolic function, estimated EF 65%. RECOMMENDATIONS: Dual antiplatelet therapy for at least 1 year. Optimize medical therapy and risk factor management. Indications: Chest pain and abnormal myocardial perfusion stress test. Access: Right radial. Procedures: Left heart catheterization. Left ventriculogram. Selective coronary angiography. Arterial site hemostasis with radial band. Procedure narrative: The patient was brought to the catheterization lab in a fasting condition.  Access site was prepped and draped in standard sterile fashion.  Lidocaine was injected and arterial access was obtained by percutaneous anterior wall puncture technique.  A 6 Urdu arterial sheath was placed. Above procedures were performed without complications.  Following the angiograms the right coronary artery PCI was performed.  Additional heparin was given.  ACT was monitored.  F R4 guide catheter was tried with guide liner subsequently AL-1 guide catheter was used and finally the procedure was performed with a 5 Urdu MATT guide catheter.  The right coronary artery was difficult to engage enough to advance devices.  Once it was engaged the versa turn wire was advanced.  Balloon PTCA was performed with a 1.5 then with a 3.0 mm balloon.  Subsequently the right coronary artery was stented with a 3.5 x 38 mm VITALIY which was fully deployed and postdilated with 3.5 mm NC balloon at high pressures.  There was radial opacity noted around the ostium and there was a concern about limited ostial dissection into the aortic wall however on repeated angiograms no contrast draining was noted and it was felt that this was reasonable calcification.  Patient's hemodynamics were stable.  HEBER flow was grade 3 before and after PCI.  At the conclusion the arterial sheath was removed and hemostasis was achieved.  The  patient was transferred to the unit in a stable condition. Hemodynamic Findings: Heart Rate: 78/minute. LV pressure: 120/8-14 mmHg, on pull back no gradient was recorded across the aortic valve. Angiographic Findings: Right coronary dominance. LM: Angiographically normal. LAD: Minor irregularities and mild proximal to mid segment plaque with up to 30% proximal narrowing.  A medium sized second diagonal branch has a 40 to 50% stenosis.  The very large diagonal branch is free of significant disease.  No hemodynamic significant disease is noted. LCX: Nondominant vessel with minor irregularities and no significant disease. RCA: Dominant vessel with a ostial/proximal stent.  There was a 80% IntraStent restenosis.  This was treated with PTCA followed by placement of a 3.5 x 38 mm VITALIY and the 80% stenosis was reduced to no significant residual disease. LV: Left ventriculogram performed in 30 BUTTS projection revealed normal global and regional left ventricular systolic function with estimated ejection fraction of 65%.  No mitral regurgitation was noted. Complications: No acute procedure related complications.     US DUPLEX RENAL ARTERY COMPLETE    Result Date: 3/11/2024  Normal renal ultrasound . Elevated RI bilaterally . Images reviewed, interpreted, and dictated by Dr. HANNAH Barajas. Transcribed by Halima Orozco PA-C.            Assessment & Plan  Diagnoses and all orders for this visit:    1. Coronary artery disease involving native coronary artery of native heart with other form of angina pectoris (Primary)  -     ECG 12 Lead  Patient remains on aspirin, Plavix, Imdur, Ranexa, Lopressor and Repatha (statin intolerant)  She underwent cardiac cath with placement of stent about a week ago.  She states since that time she has experienced significant fatigue with intermittent nighttime diaphoresis and may be some arm and jaw discomfort however she recently had jaw surgery.  She denies utilization of nitroglycerin.   She denies chest pain or dyspnea.  EKG with changes from EKG performed post stent.  Recommend ER evaluation due to nonspecific symptoms with EKG changes.  She is agreeable.  Ambulance transportation was authorized but she declines.    2. S/P coronary artery stent placement  Continue on aspirin, Plavix, Imdur, Ranexa, Lopressor and Repatha    3. Abnormal EKG  Due to abnormal EKG with recent stent and intermittent vague symptoms, patient is advised to go to ER and is agreeable.    4. Essential hypertension  well-controlled, continue current therapy    5. Dyslipidemia  Continue on Repatha, routine lab monitoring with LDL goal of 55  Heart healthy diet    6. Stage 3b chronic kidney disease  Continue to closely follow with nephrology    Review of medical    Lifestyle modifications including heart healthy diet, regular exercise, maintenance of desirable body weight and avoidance of tobacco    Follow-up in 1 month, sooner if needed

## 2024-06-29 LAB
QT INTERVAL: 440 MS
QTC INTERVAL: 464 MS

## 2024-07-01 ENCOUNTER — DOCUMENTATION (OUTPATIENT)
Dept: CARDIOLOGY | Facility: CLINIC | Age: 64
End: 2024-07-01
Payer: COMMERCIAL

## 2024-07-01 ENCOUNTER — TELEPHONE (OUTPATIENT)
Dept: CARDIOLOGY | Facility: CLINIC | Age: 64
End: 2024-07-01
Payer: COMMERCIAL

## 2024-07-01 NOTE — PROGRESS NOTES
Spoke with patient today regarding recent ER visit. She reports fatigue and some low BP with systolic 80's. Recommend adequate hydration and holding Norvasc. Continue to monitor BP and call the clinic with concerns. Pt is on antibiotics for mouth infection, recommend follow up with PCP/dentistry.

## 2024-07-09 DIAGNOSIS — F32.A ANXIETY AND DEPRESSION: Chronic | ICD-10-CM

## 2024-07-09 DIAGNOSIS — G62.89 SMALL FIBER POLYNEUROPATHY: Chronic | ICD-10-CM

## 2024-07-09 DIAGNOSIS — F41.9 ANXIETY: Chronic | ICD-10-CM

## 2024-07-09 DIAGNOSIS — E78.2 MIXED HYPERLIPIDEMIA: Chronic | ICD-10-CM

## 2024-07-09 DIAGNOSIS — F41.9 ANXIETY AND DEPRESSION: Chronic | ICD-10-CM

## 2024-07-09 RX ORDER — HYDROXYZINE PAMOATE 50 MG/1
50 CAPSULE ORAL 3 TIMES DAILY PRN
Qty: 270 CAPSULE | Refills: 1 | Status: SHIPPED | OUTPATIENT
Start: 2024-07-09

## 2024-07-09 RX ORDER — AMLODIPINE BESYLATE 2.5 MG/1
2.5 TABLET ORAL DAILY
Qty: 90 TABLET | Refills: 1 | Status: SHIPPED | OUTPATIENT
Start: 2024-07-09

## 2024-07-09 RX ORDER — FENOFIBRATE 160 MG/1
160 TABLET ORAL DAILY
Qty: 90 TABLET | Refills: 1 | Status: SHIPPED | OUTPATIENT
Start: 2024-07-09

## 2024-07-09 RX ORDER — DULOXETIN HYDROCHLORIDE 30 MG/1
90 CAPSULE, DELAYED RELEASE ORAL DAILY
Qty: 360 CAPSULE | Refills: 1 | Status: SHIPPED | OUTPATIENT
Start: 2024-07-09

## 2024-07-19 ENCOUNTER — OFFICE VISIT (OUTPATIENT)
Dept: FAMILY MEDICINE CLINIC | Facility: CLINIC | Age: 64
End: 2024-07-19
Payer: COMMERCIAL

## 2024-07-19 DIAGNOSIS — E11.65 TYPE 2 DIABETES MELLITUS WITH HYPERGLYCEMIA, WITH LONG-TERM CURRENT USE OF INSULIN: Chronic | ICD-10-CM

## 2024-07-19 DIAGNOSIS — F41.9 ANXIETY: Primary | Chronic | ICD-10-CM

## 2024-07-19 DIAGNOSIS — Z79.4 TYPE 2 DIABETES MELLITUS WITH HYPERGLYCEMIA, WITH LONG-TERM CURRENT USE OF INSULIN: Chronic | ICD-10-CM

## 2024-07-19 DIAGNOSIS — Z79.899 ENCOUNTER FOR LONG-TERM (CURRENT) USE OF OTHER MEDICATIONS: ICD-10-CM

## 2024-07-19 DIAGNOSIS — Z51.81 ENCOUNTER FOR THERAPEUTIC DRUG MONITORING: ICD-10-CM

## 2024-07-19 DIAGNOSIS — I25.110 CORONARY ARTERY DISEASE INVOLVING NATIVE CORONARY ARTERY OF NATIVE HEART WITH UNSTABLE ANGINA PECTORIS: Chronic | ICD-10-CM

## 2024-07-19 PROCEDURE — 99214 OFFICE O/P EST MOD 30 MIN: CPT | Performed by: PHYSICIAN ASSISTANT

## 2024-07-22 RX ORDER — INSULIN GLARGINE 100 [IU]/ML
45 INJECTION, SOLUTION SUBCUTANEOUS DAILY
Qty: 15 ML | Refills: 5 | Status: SHIPPED | OUTPATIENT
Start: 2024-07-22

## 2024-07-22 RX ORDER — BUPROPION HYDROCHLORIDE 150 MG/1
150 TABLET, EXTENDED RELEASE ORAL 2 TIMES DAILY
Start: 2024-07-22

## 2024-07-22 RX ORDER — ACYCLOVIR 400 MG/1
1 TABLET ORAL
Qty: 3 EACH | Refills: 11 | Status: SHIPPED | OUTPATIENT
Start: 2024-07-22

## 2024-07-22 NOTE — PROGRESS NOTES
7/19/2024    See scanned in office note    Diagnoses and all orders for this visit:    1. Anxiety (Primary)  Comments:  Start Wellbutrin  Continue Vraylar and Cymbalta  Counseled patient on dealing with anxiety and stress  Orders:  -     buPROPion SR (Wellbutrin SR) 150 MG 12 hr tablet; Take 1 tablet by mouth 2 (Two) Times a Day.    2. Encounter for therapeutic drug monitoring  -     Urine Drug Screen - Urine, Clean Catch; Future    3. Encounter for long-term (current) use of other medications  -     Urine Drug Screen - Urine, Clean Catch; Future    4. Type 2 diabetes mellitus with hyperglycemia, with long-term current use of insulin  Comments:  Continue Lantus  Advised a low carbohydrate diabetic diet  Orders:  -     Semaglutide, 2 MG/DOSE, (OZEMPIC) 8 MG/3ML solution pen-injector; Inject 2 mg under the skin into the appropriate area as directed 1 (One) Time Per Week.  Dispense: 3 mL; Refill: 5  -     Continuous Glucose Sensor (Dexcom G7 Sensor) misc; Use 1 each Every 10 (Ten) Days.  Dispense: 3 each; Refill: 11  -     Lantus SoloStar 100 UNIT/ML injection pen; Inject 45 Units under the skin into the appropriate area as directed Daily.  Dispense: 15 mL; Refill: 5  -     Insulin Pen Needle 32G X 5 MM misc; Use 1 each Daily.  Dispense: 50 each; Refill: 5    5. Coronary artery disease involving native coronary artery of native heart with unstable angina pectoris  Comments:  Advise risk factor modification including aspirin and Plavix

## 2024-08-16 ENCOUNTER — APPOINTMENT (OUTPATIENT)
Dept: GENERAL RADIOLOGY | Facility: HOSPITAL | Age: 64
End: 2024-08-16
Payer: COMMERCIAL

## 2024-08-16 ENCOUNTER — APPOINTMENT (OUTPATIENT)
Dept: CT IMAGING | Facility: HOSPITAL | Age: 64
End: 2024-08-16
Payer: COMMERCIAL

## 2024-08-16 ENCOUNTER — HOSPITAL ENCOUNTER (EMERGENCY)
Facility: HOSPITAL | Age: 64
Discharge: HOME | End: 2024-08-16
Attending: EMERGENCY MEDICINE
Payer: COMMERCIAL

## 2024-08-16 VITALS
OXYGEN SATURATION: 97 % | HEART RATE: 68 BPM | WEIGHT: 157 LBS | TEMPERATURE: 97.6 F | HEIGHT: 64 IN | DIASTOLIC BLOOD PRESSURE: 60 MMHG | RESPIRATION RATE: 20 BRPM | BODY MASS INDEX: 26.8 KG/M2 | SYSTOLIC BLOOD PRESSURE: 107 MMHG

## 2024-08-16 DIAGNOSIS — M54.50 ACUTE LOW BACK PAIN WITHOUT SCIATICA, UNSPECIFIED BACK PAIN LATERALITY: ICD-10-CM

## 2024-08-16 DIAGNOSIS — S51.812A SKIN TEAR OF LEFT FOREARM WITHOUT COMPLICATION, INITIAL ENCOUNTER: ICD-10-CM

## 2024-08-16 DIAGNOSIS — S70.02XA CONTUSION OF LEFT HIP, INITIAL ENCOUNTER: ICD-10-CM
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
## 2024-08-16 DIAGNOSIS — M51.36 DEGENERATIVE DISC DISEASE, LUMBAR: ICD-10-CM

## 2024-08-16 LAB
HOLD SPECIMEN: NORMAL
WHOLE BLOOD HOLD COAG: NORMAL
WHOLE BLOOD HOLD SPECIMEN: NORMAL

## 2024-08-16 PROCEDURE — 25010000002 HYDROMORPHONE PER 4 MG: Performed by: EMERGENCY MEDICINE

## 2024-08-16 PROCEDURE — 96376 TX/PRO/DX INJ SAME DRUG ADON: CPT

## 2024-08-16 PROCEDURE — 25010000002 KETOROLAC TROMETHAMINE PER 15 MG: Performed by: EMERGENCY MEDICINE

## 2024-08-16 PROCEDURE — 96375 TX/PRO/DX INJ NEW DRUG ADDON: CPT

## 2024-08-16 PROCEDURE — 73502 X-RAY EXAM HIP UNI 2-3 VIEWS: CPT

## 2024-08-16 PROCEDURE — 72192 CT PELVIS W/O DYE: CPT

## 2024-08-16 PROCEDURE — 72131 CT LUMBAR SPINE W/O DYE: CPT

## 2024-08-16 PROCEDURE — 72100 X-RAY EXAM L-S SPINE 2/3 VWS: CPT

## 2024-08-16 PROCEDURE — 96374 THER/PROPH/DIAG INJ IV PUSH: CPT

## 2024-08-16 PROCEDURE — 25010000002 ONDANSETRON PER 1 MG: Performed by: EMERGENCY MEDICINE

## 2024-08-16 PROCEDURE — 99284 EMERGENCY DEPT VISIT MOD MDM: CPT

## 2024-08-16 RX ORDER — DIAZEPAM 5 MG/1
5 TABLET ORAL ONCE
Status: COMPLETED | OUTPATIENT
Start: 2024-08-16 | End: 2024-08-16

## 2024-08-16 RX ORDER — KETOROLAC TROMETHAMINE 10 MG/1
10 TABLET, FILM COATED ORAL EVERY 6 HOURS PRN
Qty: 12 TABLET | Refills: 0 | Status: SHIPPED | OUTPATIENT
Start: 2024-08-16

## 2024-08-16 RX ORDER — HYDROMORPHONE HYDROCHLORIDE 1 MG/ML
0.5 INJECTION, SOLUTION INTRAMUSCULAR; INTRAVENOUS; SUBCUTANEOUS ONCE
Status: COMPLETED | OUTPATIENT
Start: 2024-08-16 | End: 2024-08-16

## 2024-08-16 RX ORDER — KETOROLAC TROMETHAMINE 15 MG/ML
15 INJECTION, SOLUTION INTRAMUSCULAR; INTRAVENOUS ONCE
Status: COMPLETED | OUTPATIENT
Start: 2024-08-16 | End: 2024-08-16

## 2024-08-16 RX ORDER — SODIUM CHLORIDE 0.9 % (FLUSH) 0.9 %
10 SYRINGE (ML) INJECTION AS NEEDED
Status: DISCONTINUED | OUTPATIENT
Start: 2024-08-16 | End: 2024-08-16 | Stop reason: HOSPADM

## 2024-08-16 RX ORDER — ONDANSETRON 2 MG/ML
4 INJECTION INTRAMUSCULAR; INTRAVENOUS ONCE
Status: COMPLETED | OUTPATIENT
Start: 2024-08-16 | End: 2024-08-16

## 2024-08-16 RX ADMIN — HYDROMORPHONE HYDROCHLORIDE 0.5 MG: 1 INJECTION, SOLUTION INTRAMUSCULAR; INTRAVENOUS; SUBCUTANEOUS at 10:59

## 2024-08-16 RX ADMIN — KETOROLAC TROMETHAMINE 15 MG: 15 INJECTION, SOLUTION INTRAMUSCULAR; INTRAVENOUS at 09:24

## 2024-08-16 RX ADMIN — ONDANSETRON 4 MG: 2 INJECTION INTRAMUSCULAR; INTRAVENOUS at 09:24

## 2024-08-16 RX ADMIN — DIAZEPAM 5 MG: 5 TABLET ORAL at 10:59

## 2024-08-16 RX ADMIN — HYDROMORPHONE HYDROCHLORIDE 0.5 MG: 1 INJECTION, SOLUTION INTRAMUSCULAR; INTRAVENOUS; SUBCUTANEOUS at 09:24

## 2024-08-16 NOTE — ED PROVIDER NOTES
Subjective   History of Present Illness  Pleasant patient presents the ER status post fall shortly prior to arrival where she landed on her bottom and balance down several stairs.  She has pain in her low back and into her left hip.  She reports her left hip was replaced many years ago.  She lives out of town and has been staying here helping with her granddaughter.  She denies any chest pain difficulty breathing.  She denies hitting her head.  She does take Plavix.  She denies any history of back problems.  She denies any numbness or tingling down her legs.  She was able to walk around at home however her pain is gotten significantly worse.  She arrives by private vehicle.  She does have several skin tears to her left arm and left leg that she tells me are very superficial however they are currently bandaged with paper towels.        Review of Systems    Past Medical History:   Diagnosis Date    Anxiety     Arthritis     Diabetes mellitus     Diverticulosis     GERD (gastroesophageal reflux disease)     Gout     Hyperlipidemia     Hypertension     Myocardial infarction        Allergies   Allergen Reactions    Ciprofloxacin Anaphylaxis    Penicillins Shortness Of Breath    Statins Myalgia    Fish Oil [Omega-3 Fatty Acids] Nausea And Vomiting       Past Surgical History:   Procedure Laterality Date    APPENDECTOMY      BREAST BIOPSY Right     benign    CARDIAC CATHETERIZATION      CARDIAC CATHETERIZATION N/A 2024    Procedure: Left Heart Cath;  Surgeon: Derrick Watts MD;  Location:  [facility];  Service: Cardiology;  Laterality: N/A;    CARDIAC CATHETERIZATION N/A 2024    Procedure: Stent VITALIY coronary;  Surgeon: Derrick Watts MD;  Location:  [facility];  Service: Cardiology;  Laterality: N/A;     SECTION      CHOLECYSTECTOMY      CORONARY STENT PLACEMENT      Stenting of the RCA with 3.5 x 26 mm Resolute drug-eluting stent    HYSTERECTOMY      JOINT REPLACEMENT       TONSILLECTOMY      TUMOR REMOVAL  02/15/2024    FACIAL TUMOR       Family History   Problem Relation Age of Onset    Arthritis Mother     Heart disease Mother     Heart disease Sister     Vision loss Daughter     Heart disease Father     Heart disease Sister     Heart disease Sister     Heart disease Sister        Social History     Socioeconomic History    Marital status:     Number of children: 2    Years of education: 12   Tobacco Use    Smoking status: Never     Passive exposure: Never    Smokeless tobacco: Never   Vaping Use    Vaping status: Never Used   Substance and Sexual Activity    Alcohol use: No    Drug use: No    Sexual activity: Defer           Objective   Physical Exam  Constitutional:       Appearance: She is well-developed.   HENT:      Head: Normocephalic and atraumatic.      Right Ear: External ear normal.      Left Ear: External ear normal.      Nose: Nose normal.   Eyes:      Conjunctiva/sclera: Conjunctivae normal.      Pupils: Pupils are equal, round, and reactive to light.   Cardiovascular:      Rate and Rhythm: Normal rate and regular rhythm.      Heart sounds: Normal heart sounds.   Pulmonary:      Effort: Pulmonary effort is normal.      Breath sounds: Normal breath sounds.   Abdominal:      General: Bowel sounds are normal.      Palpations: Abdomen is soft.   Musculoskeletal:         General: Normal range of motion.      Cervical back: Normal range of motion and neck supple.      Comments: Significant pain paravertebral lumbar spine.  She is able to sit up in the bed.  She has pain into her left hip as well all is worse with movement.   Skin:     General: Skin is warm and dry.   Neurological:      Mental Status: She is alert and oriented to person, place, and time.   Psychiatric:         Behavior: Behavior normal.         Thought Content: Thought content normal.         Judgment: Judgment normal.         Procedures           ED Course  ED Course as of 08/16/24 1422   Fri Aug  16, 2024   1029 Continued back pain.  Will need to get CAT scans for further evaluation. [JM]   1413 Reassuring findings on the x-rays and CTs of the lumbar spine and pelvis.  No fractures or dislocation.  Wounds were dressed by the nurse with skin tears.  Plan on discharging the patient home with pain medication.  Patient well aware the signs of worse despite all thankful and agreeable. [JM]      ED Course User Index  [JM] Armand Hardin APRN                                 CT Lumbar Spine Without Contrast   Final Result   Impression:   No evidence of fracture or malalignment of the lumbar spine or pelvis.      Degenerative changes of lumbar the spine most advanced at L4-5.            Electronically Signed: Brett Bryson MD     8/16/2024 11:21 AM EDT     Workstation ID: [id number]      CT Pelvis Without Contrast   Final Result   Impression:   No evidence of fracture or malalignment of the lumbar spine or pelvis.      Degenerative changes of lumbar the spine most advanced at L4-5.            Electronically Signed: Brett Bryson MD     8/16/2024 11:21 AM EDT     Workstation ID: [id number]      XR Hip With or Without Pelvis 2 - 3 View Left   Final Result   Impression:   No evidence of displaced fracture or traumatic malalignment.      Degenerative changes of the lumbar spine most advanced at L5-S1.      Left hip arthroplasty without evidence of complication.         Electronically Signed: Brett Bryson MD     8/16/2024 10:02 AM EDT     Workstation ID: [id number]      XR Spine Lumbar 2 or 3 View   Final Result   Impression:   No evidence of displaced fracture or traumatic malalignment.      Degenerative changes of the lumbar spine most advanced at L5-S1.      Left hip arthroplasty without evidence of complication.         Electronically Signed: Brett Bryson MD     8/16/2024 10:02 AM EDT     Workstation ID: [id number]                    Medical Decision Making  Amount and/or Complexity of Data  Reviewed  Radiology: ordered.    Risk  Prescription drug management.        Final diagnoses:   Fall, initial encounter   Acute low back pain without sciatica, unspecified back pain laterality   Degenerative disc disease, lumbar   Contusion of left hip, initial encounter   Skin tear of left forearm without complication, initial encounter       ED Disposition  ED Disposition       ED Disposition   Discharge    Condition   Stable    Comment   --               Kimmy Vela PA  [address] [facility] [address] [phone]    Schedule an appointment as soon as possible for a visit       Ochoa Govea MD  [facility] [place] [id number] [phone]    Schedule an appointment as soon as possible for a visit            Medication List        New Prescriptions      ketorolac 10 MG tablet  Commonly known as: TORADOL  Take 1 tablet by mouth Every 6 (Six) Hours As Needed for Moderate Pain.               Where to Get Your Medications        These medications were sent to [id number] - [place], [place] - [address] [place], [place] [address] AT [facility] [phone]  - [phone] [address] [place] , [facility] [address]      Phone: [phone]   ketorolac 10 MG tablet            Armand Hardin APRN  08/16/24 1424

## 2024-08-21 ENCOUNTER — TELEPHONE (OUTPATIENT)
Dept: FAMILY MEDICINE CLINIC | Facility: CLINIC | Age: 64
End: 2024-08-21

## 2024-08-21 NOTE — TELEPHONE ENCOUNTER
Patient has called asking about CT scan and X-ray results that was done at ER in [place] on August 16th. Patient says that she is in a lot of pain needs someone to call her back.

## 2024-08-22 ENCOUNTER — TELEMEDICINE (OUTPATIENT)
Dept: FAMILY MEDICINE CLINIC | Facility: CLINIC | Age: 64
End: 2024-08-22
Payer: COMMERCIAL

## 2024-08-22 VITALS — WEIGHT: 157 LBS | BODY MASS INDEX: 26.8 KG/M2 | HEIGHT: 64 IN

## 2024-08-22 DIAGNOSIS — S30.0XXD CONTUSION OF LOWER BACK, SUBSEQUENT ENCOUNTER: ICD-10-CM
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
## 2024-08-22 RX ORDER — LIDOCAINE 50 MG/G
2 PATCH TOPICAL EVERY 24 HOURS
Qty: 60 EACH | Refills: 0 | Status: SHIPPED | OUTPATIENT
Start: 2024-08-22

## 2024-08-22 RX ORDER — ACETAMINOPHEN AND CODEINE PHOSPHATE 300; 30 MG/1; MG/1
1 TABLET ORAL EVERY 4 HOURS PRN
Qty: 18 TABLET | Refills: 0 | Status: SHIPPED | OUTPATIENT
Start: 2024-08-22 | End: 2024-08-25

## 2024-08-22 RX ORDER — ONDANSETRON 4 MG/1
4 TABLET, FILM COATED ORAL EVERY 8 HOURS PRN
Qty: 30 TABLET | Refills: 0 | Status: SHIPPED | OUTPATIENT
Start: 2024-08-22

## 2024-08-22 NOTE — PROGRESS NOTES
Subjective      You have chosen to receive care through a telehealth visit.  Do you consent to use a video/audio connection for your medical care today? Yes The patient is at their home.  I am at the [facility] in [place].    Chief Complaint  Fall (/)    Subjective      Monisha Gardner is a 63 y.o. female who presents today to [facility] for Fall (/). She has a past medical history of Anxiety, Arthritis, Diabetes mellitus, Diverticulosis, GERD, Gout, Hyperlipidemia, Hypertension, and CAD.     Fall (/):  ER Follow up:   Monisha was recently evaluated in the ER of [facility] on 8/16/2024 due to fall down the stairs. CT of the lumbar spine as well as pelvis did not show any concerning findings. She received dilaudid in the ER and was discharged home on oral Ketoralac. She reports falling on her tailbone and then sliding on down the stairs, approximately 20 steps.  Since her fall, she is continue to have significant pain on her coccyx.  She denies any new swelling.  She has had difficulty with ambulation due to pain.  She took the oral ketorolac sent from the ER without improvement.  She is also been taking up to 1500 mg of Tylenol at a time and 600 mg of Motrin several times a day.      Current Outpatient Medications:     allopurinol (ZYLOPRIM) 100 MG tablet, TAKE 1 TABLET BY MOUTH TWICE DAILY, Disp: 180 tablet, Rfl: 3    amLODIPine (NORVASC) 2.5 MG tablet, TAKE 1 TABLET BY MOUTH DAILY, Disp: 90 tablet, Rfl: 1    ammonium lactate (AMLACTIN) 12 % cream, , Disp: , Rfl:     aspirin 81 MG EC tablet, Take 1 tablet by mouth Daily., Disp: 100 tablet, Rfl: 3    Blood Glucose Monitoring Suppl kit, Use 1 Device Daily., Disp: 1 each, Rfl: 0    buPROPion SR (Wellbutrin SR) 150 MG 12 hr tablet, Take 1 tablet by mouth 2 (Two) Times a Day., Disp: , Rfl:     Cariprazine HCl (Vraylar) 3 MG capsule capsule, Take 1 capsule by mouth Daily., Disp: 90 capsule, Rfl: 3     clopidogrel (PLAVIX) 75 MG tablet, Take 1 tablet by mouth Daily., Disp: 90 tablet, Rfl: 3    Continuous Glucose  (Dexcom G7 ) device, Use 1 each Daily., Disp: 1 each, Rfl: 0    Continuous Glucose Sensor (Dexcom G7 Sensor) misc, Use 1 each Every 10 (Ten) Days., Disp: 3 each, Rfl: 11    DULoxetine (CYMBALTA) 30 MG capsule, TAKE 3 CAPSULES BY MOUTH EVERY DAY, Disp: 360 capsule, Rfl: 1    Easy Touch Lancets 30G/Twist misc, Use tid, Disp: 300 each, Rfl: 3    Evolocumab (REPATHA) solution auto-injector SureClick injection, Inject 1 mL under the skin into the appropriate area as directed Every 14 (Fourteen) Days., Disp: 2 mL, Rfl: 5    fenofibrate 160 MG tablet, TAKE 1 TABLET BY MOUTH EVERY DAY, Disp: 90 tablet, Rfl: 1    gabapentin (NEURONTIN) 600 MG tablet, Take 1 tablet by mouth 2 (Two) Times a Day., Disp: 60 tablet, Rfl: 0    hydrOXYzine pamoate (VISTARIL) 50 MG capsule, TAKE 1 CAPSULE BY MOUTH THREE TIMES DAILY AS NEEDED FOR ITCHING, Disp: 270 capsule, Rfl: 1    Insulin Pen Needle 32G X 5 MM misc, Use 1 each Daily., Disp: 50 each, Rfl: 5    isosorbide mononitrate (IMDUR) 60 MG 24 hr tablet, Take 1 tablet by mouth Daily., Disp: , Rfl:     ketorolac (TORADOL) 10 MG tablet, Take 1 tablet by mouth Every 6 (Six) Hours As Needed for Moderate Pain., Disp: 12 tablet, Rfl: 0    Lantus SoloStar 100 UNIT/ML injection pen, Inject 45 Units under the skin into the appropriate area as directed Daily., Disp: 15 mL, Rfl: 5    losartan (COZAAR) 100 MG tablet, Take 0.5 tablets by mouth 2 (Two) Times a Day., Disp: , Rfl:     metoprolol tartrate (LOPRESSOR) 100 MG tablet, TAKE 1 TABLET BY MOUTH TWICE DAILY, Disp: 180 tablet, Rfl: 3    nitroglycerin (Nitrostat) 0.4 MG SL tablet, Place 1 tablet under the tongue Every 5 (Five) Minutes As Needed for Chest Pain., Disp: 30 tablet, Rfl: 5    pantoprazole (PROTONIX) 40 MG EC tablet, Take 1 tablet by mouth Daily., Disp: 90 tablet, Rfl: 3    ranolazine (Ranexa) 500 MG 12 hr tablet,  "Take 1 tablet by mouth 2 (Two) Times a Day., Disp: 60 tablet, Rfl: 5    rOPINIRole (REQUIP) 2 MG tablet, Take 1 tablet by mouth Every Night., Disp: 90 tablet, Rfl: 3    Semaglutide, 2 MG/DOSE, (OZEMPIC) 8 MG/3ML solution pen-injector, Inject 2 mg under the skin into the appropriate area as directed 1 (One) Time Per Week., Disp: 3 mL, Rfl: 5    triamcinolone (KENALOG) 0.5 % cream, Apply 1 Application topically to the appropriate area as directed 3 (Three) Times a Day., Disp: 30 g, Rfl: 0    True Metrix Blood Glucose Test test strip, 1 each by Other route 3 (Three) Times a Day., Disp: 300 each, Rfl: 3    Ultra Thin Lancets 31G misc, Use 1 Device 3 (Three) Times a Day., Disp: 100 each, Rfl: 3    acetaminophen-codeine (TYLENOL with CODEINE #3) 300-30 MG per tablet, Take 1 tablet by mouth Every 4 (Four) Hours As Needed for Moderate Pain for up to 3 days., Disp: 18 tablet, Rfl: 0    lidocaine (LIDODERM) 5 %, Place 2 patches on the skin as directed by provider Daily. Remove & Discard patch within 12 hours or as directed by MD, Disp: 60 each, Rfl: 0    ondansetron (Zofran) 4 MG tablet, Take 1 tablet by mouth Every 8 (Eight) Hours As Needed for Nausea or Vomiting., Disp: 30 tablet, Rfl: 0      Allergies   Allergen Reactions    Ciprofloxacin Anaphylaxis    Penicillins Shortness Of Breath    Statins Myalgia    Fish Oil [Omega-3 Fatty Acids] Nausea And Vomiting     Objective     Objective   Vital Signs:  Ht 162.6 cm (64.02\")   Wt 71.2 kg (157 lb)   BMI 26.94 kg/m²   Estimated body mass index is 26.94 kg/m² as calculated from the following:    Height as of this encounter: 162.6 cm (64.02\").    Weight as of this encounter: 71.2 kg (157 lb).    Past Medical History:   Diagnosis Date    Anxiety     Arthritis     Diabetes mellitus     Diverticulosis     GERD (gastroesophageal reflux disease)     Gout     Hyperlipidemia     Hypertension     Myocardial infarction      Past Surgical History:   Procedure Laterality Date    " APPENDECTOMY      BREAST BIOPSY Right     benign    CARDIAC CATHETERIZATION      CARDIAC CATHETERIZATION N/A 2024    Procedure: Left Heart Cath;  Surgeon: Derrick Watts MD;  Location:  [facility];  Service: Cardiology;  Laterality: N/A;    CARDIAC CATHETERIZATION N/A 2024    Procedure: Stent VITALIY coronary;  Surgeon: eDrrick Watts MD;  Location:  [facility];  Service: Cardiology;  Laterality: N/A;     SECTION      CHOLECYSTECTOMY      CORONARY STENT PLACEMENT      Stenting of the RCA with 3.5 x 26 mm Resolute drug-eluting stent    HYSTERECTOMY      JOINT REPLACEMENT      TONSILLECTOMY      TUMOR REMOVAL  02/15/2024    FACIAL TUMOR     Social History     Socioeconomic History    Marital status:     Number of children: 2    Years of education: 12   Tobacco Use    Smoking status: Never     Passive exposure: Never    Smokeless tobacco: Never   Vaping Use    Vaping status: Never Used   Substance and Sexual Activity    Alcohol use: No    Drug use: No    Sexual activity: Defer      Physical Exam  Constitutional:       General: She is not in acute distress.     Appearance: She is well-developed. She is not diaphoretic.   HENT:      Head: Normocephalic and atraumatic.   Eyes:      General: No scleral icterus.        Right eye: No discharge.         Left eye: No discharge.      Conjunctiva/sclera: Conjunctivae normal.   Pulmonary:      Effort: Pulmonary effort is normal. No respiratory distress.      Breath sounds: No rales.   Musculoskeletal:      Cervical back: Normal range of motion and neck supple.   Skin:     General: Skin is warm and dry.      Coloration: Skin is not pale.   Neurological:      Mental Status: She is alert and oriented to person, place, and time.   Psychiatric:         Behavior: Behavior normal.        Result Review :  The following data was reviewed by: ANH Miguel on 2024:  Hemoglobin A1C   Date Value Ref Range Status    03/01/2024 7.20 (H) 4.80 - 5.60 % Final     TSH   Date Value Ref Range Status   04/07/2023 0.838 0.270 - 4.200 uIU/mL Final     HDL Cholesterol   Date Value Ref Range Status   03/01/2024 30 (L) 40 - 60 mg/dL Final     LDL Cholesterol    Date Value Ref Range Status   03/01/2024 101 (H) 0 - 100 mg/dL Final     LDL Chol Calc (NIH)   Date Value Ref Range Status   11/12/2021 124 (H) 0 - 100 mg/dL Final     Triglycerides   Date Value Ref Range Status   03/01/2024 363 (H) 0 - 150 mg/dL Final     Total Cholesterol   Date Value Ref Range Status   11/12/2021 227 (H) 0 - 200 mg/dL Final     Comment:                  Assessment / Plan         Assessment   Diagnoses and all orders for this visit:    1. Fall (on) (from) other stairs and steps, subsequent encounter (Primary)  2. Contusion of lower back, subsequent encounter  She denies any need for DME or physical therapy at this time.  We discussed not exceeding recommended doses of Tylenol or NSAIDs.  Add Zofran 4 mg 3 times daily as needed for nausea.  We discussed options of pain control.  Multiple drug interactions noted with tramadol.  Will try Tylenol No. 3 1 tablet every 4 hours as needed.  She was instructed to space it out from her Wellbutrin given the Wellbutrin may decrease the efficacy.  Monitor for and hold for oversedation.  Add Lidoderm patches, 2 patches to the coccyx as directed.  Check on a PA for this.  Return to clinic if no improvement noted or if symptoms are worsening.   -     acetaminophen-codeine (TYLENOL with CODEINE #3) 300-30 MG per tablet; Take 1 tablet by mouth Every 4 (Four) Hours As Needed for Moderate Pain for up to 3 days.  Dispense: 18 tablet; Refill: 0  -     lidocaine (LIDODERM) 5 %; Place 2 patches on the skin as directed by provider Daily. Remove & Discard patch within 12 hours or as directed by MD  Dispense: 60 each; Refill: 0  -     ondansetron (Zofran) 4 MG tablet; Take 1 tablet by mouth Every 8 (Eight) Hours As Needed for Nausea or  Vomiting.  Dispense: 30 tablet; Refill: 0       New Medications Ordered This Visit   Medications    acetaminophen-codeine (TYLENOL with CODEINE #3) 300-30 MG per tablet     Sig: Take 1 tablet by mouth Every 4 (Four) Hours As Needed for Moderate Pain for up to 3 days.     Dispense:  18 tablet     Refill:  0    lidocaine (LIDODERM) 5 %     Sig: Place 2 patches on the skin as directed by provider Daily. Remove & Discard patch within 12 hours or as directed by MD     Dispense:  60 each     Refill:  0    ondansetron (Zofran) 4 MG tablet     Sig: Take 1 tablet by mouth Every 8 (Eight) Hours As Needed for Nausea or Vomiting.     Dispense:  30 tablet     Refill:  0     Follow Up   Return in about 1 month (around 9/22/2024), or if symptoms worsen or fail to improve, for W/ Kimmy, rechtangela.    Patient was given instructions and counseling regarding her condition or for health maintenance advice. Please see specific information pulled into the AVS if appropriate.       This document has been electronically signed by ANH Miguel   August 22, 2024 15:06 EDT    Dictated Utilizing Dragon Dictation: Part of this note may be an electronic transcription/translation of spoken language to printed text using the Dragon Dictation System.

## 2024-08-23 ENCOUNTER — PRIOR AUTHORIZATION (OUTPATIENT)
Dept: FAMILY MEDICINE CLINIC | Facility: CLINIC | Age: 64
End: 2024-08-23
Payer: COMMERCIAL

## 2024-08-27 ENCOUNTER — HOSPITAL ENCOUNTER (EMERGENCY)
Facility: HOSPITAL | Age: 64
Discharge: HOME | End: 2024-08-27
Attending: STUDENT IN AN ORGANIZED HEALTH CARE EDUCATION/TRAINING PROGRAM
Payer: COMMERCIAL

## 2024-08-27 ENCOUNTER — APPOINTMENT (OUTPATIENT)
Dept: CT IMAGING | Facility: HOSPITAL | Age: 64
End: 2024-08-27
Payer: COMMERCIAL

## 2024-08-27 VITALS
TEMPERATURE: 98.3 F | RESPIRATION RATE: 18 BRPM | OXYGEN SATURATION: 99 % | DIASTOLIC BLOOD PRESSURE: 82 MMHG | BODY MASS INDEX: 26.8 KG/M2 | HEART RATE: 80 BPM | SYSTOLIC BLOOD PRESSURE: 164 MMHG | WEIGHT: 156.97 LBS | HEIGHT: 64 IN

## 2024-08-27 DIAGNOSIS — S30.0XXA CONTUSION OF SACRUM, INITIAL ENCOUNTER: Primary | ICD-10-CM

## 2024-08-27 DIAGNOSIS — S39.012A STRAIN OF LUMBAR REGION, INITIAL ENCOUNTER: ICD-10-CM

## 2024-08-27 PROCEDURE — 72131 CT LUMBAR SPINE W/O DYE: CPT

## 2024-08-27 PROCEDURE — 72192 CT PELVIS W/O DYE: CPT | Performed by: RADIOLOGY

## 2024-08-27 PROCEDURE — 72192 CT PELVIS W/O DYE: CPT

## 2024-08-27 PROCEDURE — 96372 THER/PROPH/DIAG INJ SC/IM: CPT

## 2024-08-27 PROCEDURE — 25010000002 KETOROLAC TROMETHAMINE PER 15 MG

## 2024-08-27 PROCEDURE — 25010000002 DEXAMETHASONE SODIUM PHOSPHATE 10 MG/ML SOLUTION

## 2024-08-27 PROCEDURE — 99284 EMERGENCY DEPT VISIT MOD MDM: CPT

## 2024-08-27 PROCEDURE — 72131 CT LUMBAR SPINE W/O DYE: CPT | Performed by: RADIOLOGY

## 2024-08-27 RX ORDER — HYDROCODONE BITARTRATE AND ACETAMINOPHEN 7.5; 325 MG/1; MG/1
1 TABLET ORAL ONCE
Status: COMPLETED | OUTPATIENT
Start: 2024-08-27 | End: 2024-08-27

## 2024-08-27 RX ORDER — DEXAMETHASONE SODIUM PHOSPHATE 10 MG/ML
10 INJECTION, SOLUTION INTRAMUSCULAR; INTRAVENOUS ONCE
Status: COMPLETED | OUTPATIENT
Start: 2024-08-27 | End: 2024-08-27

## 2024-08-27 RX ORDER — IBUPROFEN 600 MG/1
600 TABLET, FILM COATED ORAL EVERY 6 HOURS PRN
Qty: 20 TABLET | Refills: 0 | Status: SHIPPED | OUTPATIENT
Start: 2024-08-27 | End: 2024-09-09 | Stop reason: HOSPADM

## 2024-08-27 RX ORDER — METHOCARBAMOL 500 MG/1
500 TABLET, FILM COATED ORAL 3 TIMES DAILY PRN
Qty: 20 TABLET | Refills: 0 | Status: SHIPPED | OUTPATIENT
Start: 2024-08-27 | End: 2024-09-09 | Stop reason: HOSPADM

## 2024-08-27 RX ORDER — KETOROLAC TROMETHAMINE 30 MG/ML
30 INJECTION, SOLUTION INTRAMUSCULAR; INTRAVENOUS ONCE
Status: COMPLETED | OUTPATIENT
Start: 2024-08-27 | End: 2024-08-27

## 2024-08-27 RX ORDER — METHYLPREDNISOLONE SODIUM SUCCINATE 125 MG/2ML
80 INJECTION, POWDER, LYOPHILIZED, FOR SOLUTION INTRAMUSCULAR; INTRAVENOUS ONCE
Status: DISCONTINUED | OUTPATIENT
Start: 2024-08-27 | End: 2024-08-27

## 2024-08-27 RX ADMIN — KETOROLAC TROMETHAMINE 30 MG: 30 INJECTION, SOLUTION INTRAMUSCULAR; INTRAVENOUS at 13:36

## 2024-08-27 RX ADMIN — DEXAMETHASONE SODIUM PHOSPHATE 10 MG: 10 INJECTION INTRAMUSCULAR; INTRAVENOUS at 13:36

## 2024-08-27 RX ADMIN — HYDROCODONE BITARTRATE AND ACETAMINOPHEN 1 TABLET: 7.5; 325 TABLET ORAL at 14:01

## 2024-08-27 NOTE — ED PROVIDER NOTES
Subjective   History of Present Illness  Patient is a 63-year-old female with past medical history of anxiety, GERD, hypertension, hyperlipidemia, and diabetes.  Patient presents with complaints of pain in her sacral region at the top of her buttocks.  Patient reports that she fell down a flight of stairs over 1 week ago and has had pain since.  Patient reports she has been seen for this injury and told that there is no fractures however the pain is becoming worse.  Patient denies any other injuries during this event and denies any other complaints today.  Patient does have some bruising noted to her sacral area however there is no deformities noted.  Patient is alert and oriented able to answer all questions appropriately.  Patient presents in no acute distress via private vehicle.        Review of Systems   Constitutional: Negative.  Negative for fever.   HENT: Negative.     Respiratory: Negative.     Cardiovascular: Negative.  Negative for chest pain.   Gastrointestinal: Negative.  Negative for abdominal pain.   Endocrine: Negative.    Genitourinary: Negative.  Negative for dysuria.   Musculoskeletal:  Positive for back pain.   Skin: Negative.    Neurological: Negative.    Psychiatric/Behavioral: Negative.     All other systems reviewed and are negative.      Past Medical History:   Diagnosis Date    Anxiety     Arthritis     Diabetes mellitus     Diverticulosis     GERD (gastroesophageal reflux disease)     Gout     Hyperlipidemia     Hypertension     Myocardial infarction        Allergies   Allergen Reactions    Ciprofloxacin Anaphylaxis    Penicillins Shortness Of Breath    Statins Myalgia    Fish Oil [Omega-3 Fatty Acids] Nausea And Vomiting       Past Surgical History:   Procedure Laterality Date    APPENDECTOMY      BREAST BIOPSY Right     benign    CARDIAC CATHETERIZATION      CARDIAC CATHETERIZATION N/A 6/20/2024    Procedure: Left Heart Cath;  Surgeon: Derrick Watts MD;  Location: [facility] CATH INVASIVE  LOCATION;  Service: Cardiology;  Laterality: N/A;    CARDIAC CATHETERIZATION N/A 2024    Procedure: Stent VITALIY coronary;  Surgeon: Derrick Watts MD;  Location: [facility] CATH INVASIVE LOCATION;  Service: Cardiology;  Laterality: N/A;     SECTION      CHOLECYSTECTOMY      CORONARY STENT PLACEMENT      Stenting of the RCA with 3.5 x 26 mm Resolute drug-eluting stent    HYSTERECTOMY      JOINT REPLACEMENT      TONSILLECTOMY      TUMOR REMOVAL  02/15/2024    FACIAL TUMOR       Family History   Problem Relation Age of Onset    Arthritis Mother     Heart disease Mother     Heart disease Sister     Vision loss Daughter     Heart disease Father     Heart disease Sister     Heart disease Sister     Heart disease Sister        Social History     Socioeconomic History    Marital status:     Number of children: 2    Years of education: 12   Tobacco Use    Smoking status: Never     Passive exposure: Never    Smokeless tobacco: Never   Vaping Use    Vaping status: Never Used   Substance and Sexual Activity    Alcohol use: No    Drug use: No    Sexual activity: Defer           Objective   Physical Exam  Vitals and nursing note reviewed.   Constitutional:       General: She is not in acute distress.     Appearance: She is well-developed. She is not diaphoretic.   HENT:      Head: Normocephalic and atraumatic.      Right Ear: External ear normal.      Left Ear: External ear normal.      Nose: Nose normal.   Eyes:      Conjunctiva/sclera: Conjunctivae normal.      Pupils: Pupils are equal, round, and reactive to light.   Neck:      Vascular: No JVD.      Trachea: No tracheal deviation.   Cardiovascular:      Rate and Rhythm: Normal rate and regular rhythm.      Heart sounds: Normal heart sounds. No murmur heard.  Pulmonary:      Effort: Pulmonary effort is normal. No respiratory distress.      Breath sounds: Normal breath sounds. No wheezing.   Abdominal:      General: Bowel sounds are normal.      Palpations:  Abdomen is soft.      Tenderness: There is no abdominal tenderness.   Musculoskeletal:         General: Signs of injury present. No deformity. Normal range of motion.      Cervical back: Normal range of motion and neck supple.      Comments: Lower back pain/sacral pain.   Skin:     General: Skin is warm and dry.      Coloration: Skin is not pale.      Findings: Bruising present. No erythema or rash.   Neurological:      Mental Status: She is alert and oriented to person, place, and time.      Cranial Nerves: No cranial nerve deficit.   Psychiatric:         Behavior: Behavior normal.         Thought Content: Thought content normal.       CT Pelvis Without Contrast    Result Date: 8/27/2024  No acute fracture seen   This report was finalized on 8/27/2024 1:29 PM by Dr. Blayne Contreras MD.      CT Lumbar Spine Without Contrast    Result Date: 8/27/2024  1.  Disc base narrowing at L4-5 and L5-S1 stable from the previous exam. 2.  No acute fracture.   This report was finalized on 8/27/2024 1:27 PM by Dr. Blayne Contreras MD.       Procedures           ED Course                                             Medical Decision Making  Patient is a 63-year-old female with past medical history of anxiety, GERD, hypertension, hyperlipidemia, and diabetes.  Patient presents with complaints of pain in her sacral region at the top of her buttocks.  Patient reports that she fell down a flight of stairs over 1 week ago and has had pain since.  Patient reports she has been seen for this injury and told that there is no fractures however the pain is becoming worse.  Patient denies any other injuries during this event and denies any other complaints today.  Patient does have some bruising noted to her sacral area however there is no deformities noted.  Patient is alert and oriented able to answer all questions appropriately.  Patient presents in no acute distress via private vehicle.    Problems Addressed:  Contusion of sacrum, initial  encounter: complicated acute illness or injury  Strain of lumbar region, initial encounter: complicated acute illness or injury    Amount and/or Complexity of Data Reviewed  Radiology: ordered.    Risk  Prescription drug management.        Final diagnoses:   Contusion of sacrum, initial encounter   Strain of lumbar region, initial encounter       ED Disposition  ED Disposition       ED Disposition   Discharge    Condition   Stable    Comment   --               Kimmy Vela PA  [address] [facility] [id number] [phone]    Schedule an appointment as soon as possible for a visit   As needed    [facility] EMERGENCY DEPARTMENT  [address] [facility] [address] [phone]  Go to   If symptoms worsen    Charles Herman, DO  [address] [place] [id number] [phone]    Schedule an appointment as soon as possible for a visit   As needed         Medication List        New Prescriptions      ibuprofen 600 MG tablet  Commonly known as: ADVIL,MOTRIN  Take 1 tablet by mouth Every 6 (Six) Hours As Needed for Mild Pain.     methocarbamol 500 MG tablet  Commonly known as: ROBAXIN  Take 1 tablet by mouth 3 (Three) Times a Day As Needed for Muscle Spasms.               Where to Get Your Medications        These medications were sent to [id number] - [facility] [address] - [phone] PH - [phone]   [address] [address]      Phone: [phone]   ibuprofen 600 MG tablet  methocarbamol 500 MG tablet            Shannan Tobar, APRN  08/27/24 [id number]

## 2024-08-29 DIAGNOSIS — E78.5 DYSLIPIDEMIA: Primary | ICD-10-CM

## 2024-08-29 NOTE — PROGRESS NOTES
Patient's last lipid panel was taken prior to the initiation of Repatha. Placed an order for patient to have a more current lipid panel drawn. She is agreeable and will complete this. Once we have results, we will follow up with her for her clinical reassessment/refills.    Lyndsay Grissom, PharmD  08/29/24  10:57 EDT

## 2024-08-30 ENCOUNTER — SPECIALTY PHARMACY (OUTPATIENT)
Dept: PHARMACY | Facility: HOSPITAL | Age: 64
End: 2024-08-30
Payer: COMMERCIAL

## 2024-08-30 NOTE — PROGRESS NOTES
[facility] Patient Management Program  Lipid Management Program - PCSK9i Initial Assessment   Monisha Gardner is a 63 y.o. female referred by their provider, Asia Hawkins, to the Hyperlipidemia Patient Management program offered by [facility] for Lipid Management.  A follow-up outreach was conducted, including assessment of therapy appropriateness and specialty medication education for Repatha.     Monisha Gardner is  treated for clinical ASCVD/hyperlipidemia and currently takes Repatha 140mg every 14 days. (Patient has been on Repatha for years in the past). In the past, Pt has tried Zetia, Livalo, Crestor, Lipitor, and Zocor. Patient is statin intolerant due to myalgias. The patient denies any allergies to latex.      Patient denies any issues with giving herself the injections and denies any missed doses. She also denies any side effects.     Insurance Coverage & Financial Support  [employer]     Relevant Past Medical History and Comorbidities  Relevant medical history and concomitant health conditions were discussed with the patient. The patient's chart has been reviewed for relevant past medical history and comorbid conditions and updated as necessary.  Past Medical History:   Diagnosis Date    Anxiety     Arthritis     Diabetes mellitus     Diverticulosis     GERD (gastroesophageal reflux disease)     Gout     Hyperlipidemia     Hypertension     Myocardial infarction      Social History     Socioeconomic History    Marital status:     Number of children: 2    Years of education: 12   Tobacco Use    Smoking status: Never     Passive exposure: Never    Smokeless tobacco: Never   Vaping Use    Vaping status: Never Used   Substance and Sexual Activity    Alcohol use: No    Drug use: No    Sexual activity: Defer       Problem list reviewed by Zoie Cheng, PharmD on 8/30/2024 at  3:05 PM    Allergies  Known  allergies and reactions were discussed with the patient. The patient's chart has been reviewed for  allergy information and updated as necessary.   Allergies   Allergen Reactions    Ciprofloxacin Anaphylaxis    Penicillins Shortness Of Breath    Statins Myalgia    Fish Oil [Omega-3 Fatty Acids] Nausea And Vomiting       Allergies reviewed by Zoie Cheng, PharmD on 8/30/2024 at  3:04 PM    Relevant Laboratory Values  Relevant laboratory values were discussed with the patient. The following specialty medication dose adjustment(s) are recommended: See plan, if applicable   Lab Results   Component Value Date    CHOL 193 03/01/2024    CHLPL 227 (H) 11/12/2021    TRIG 363 (H) 03/01/2024    HDL 30 (L) 03/01/2024     (H) 03/01/2024       Current Medication List  This medication list has been reviewed with the patient and evaluated for any interactions or necessary modifications/recommendations, and updated to include all prescription medications, OTC medications, and supplements the patient is currently taking.  This list reflects what is contained in the patient's profile, which has also been marked as reviewed to communicate to other providers it is the most up to date version of the patient's current medication therapy.     Current Outpatient Medications:     acetaminophen-codeine (TYLENOL with CODEINE #3) 300-30 MG per tablet, Take 1 tablet by mouth Every 4 (Four) Hours As Needed for Moderate Pain for up to 3 days., Disp: 18 tablet, Rfl: 0    allopurinol (ZYLOPRIM) 100 MG tablet, TAKE 1 TABLET BY MOUTH TWICE DAILY, Disp: 180 tablet, Rfl: 3    amLODIPine (NORVASC) 2.5 MG tablet, TAKE 1 TABLET BY MOUTH DAILY, Disp: 90 tablet, Rfl: 1    ammonium lactate (AMLACTIN) 12 % cream, , Disp: , Rfl:     aspirin 81 MG EC tablet, Take 1 tablet by mouth Daily., Disp: 100 tablet, Rfl: 3    Blood Glucose Monitoring Suppl kit, Use 1 Device Daily., Disp: 1 each, Rfl: 0    buPROPion SR (Wellbutrin SR) 150 MG 12 hr tablet, Take 1  tablet by mouth 2 (Two) Times a Day., Disp: , Rfl:     Cariprazine HCl (Vraylar) 3 MG capsule capsule, Take 1 capsule by mouth Daily., Disp: 90 capsule, Rfl: 3    clopidogrel (PLAVIX) 75 MG tablet, Take 1 tablet by mouth Daily., Disp: 90 tablet, Rfl: 3    Continuous Glucose  (Dexcom G7 ) device, Use 1 each Daily., Disp: 1 each, Rfl: 0    Continuous Glucose Sensor (Dexcom G7 Sensor) misc, Use 1 each Every 10 (Ten) Days., Disp: 3 each, Rfl: 11    DULoxetine (CYMBALTA) 30 MG capsule, TAKE 3 CAPSULES BY MOUTH EVERY DAY, Disp: 360 capsule, Rfl: 1    Easy Touch Lancets 30G/Twist misc, Use tid, Disp: 300 each, Rfl: 3    Evolocumab (REPATHA) solution auto-injector SureClick injection, Inject 1 mL under the skin into the appropriate area as directed Every 14 (Fourteen) Days., Disp: 2 mL, Rfl: 5    fenofibrate 160 MG tablet, TAKE 1 TABLET BY MOUTH EVERY DAY, Disp: 90 tablet, Rfl: 1    gabapentin (NEURONTIN) 600 MG tablet, Take 1 tablet by mouth 2 (Two) Times a Day., Disp: 60 tablet, Rfl: 0    hydrOXYzine pamoate (VISTARIL) 50 MG capsule, TAKE 1 CAPSULE BY MOUTH THREE TIMES DAILY AS NEEDED FOR ITCHING, Disp: 270 capsule, Rfl: 1    ibuprofen (ADVIL,MOTRIN) 600 MG tablet, Take 1 tablet by mouth Every 6 (Six) Hours As Needed for Mild Pain., Disp: 20 tablet, Rfl: 0    Insulin Pen Needle 32G X 5 MM misc, Use 1 each Daily., Disp: 50 each, Rfl: 5    isosorbide mononitrate (IMDUR) 60 MG 24 hr tablet, Take 1 tablet by mouth Daily., Disp: , Rfl:     ketorolac (TORADOL) 10 MG tablet, Take 1 tablet by mouth Every 6 (Six) Hours As Needed for Moderate Pain., Disp: 12 tablet, Rfl: 0    Lantus SoloStar 100 UNIT/ML injection pen, Inject 45 Units under the skin into the appropriate area as directed Daily., Disp: 15 mL, Rfl: 5    lidocaine (LIDODERM) 5 %, Place 2 patches on the skin as directed by provider Daily. Remove & Discard patch within 12 hours or as directed by MD, Disp: 60 each, Rfl: 0    losartan (COZAAR) 100 MG tablet,  Take 0.5 tablets by mouth 2 (Two) Times a Day., Disp: , Rfl:     methocarbamol (ROBAXIN) 500 MG tablet, Take 1 tablet by mouth 3 (Three) Times a Day As Needed for Muscle Spasms., Disp: 20 tablet, Rfl: 0    metoprolol tartrate (LOPRESSOR) 100 MG tablet, TAKE 1 TABLET BY MOUTH TWICE DAILY, Disp: 180 tablet, Rfl: 3    nitroglycerin (Nitrostat) 0.4 MG SL tablet, Place 1 tablet under the tongue Every 5 (Five) Minutes As Needed for Chest Pain., Disp: 30 tablet, Rfl: 5    ondansetron (Zofran) 4 MG tablet, Take 1 tablet by mouth Every 8 (Eight) Hours As Needed for Nausea or Vomiting., Disp: 30 tablet, Rfl: 0    pantoprazole (PROTONIX) 40 MG EC tablet, Take 1 tablet by mouth Daily., Disp: 90 tablet, Rfl: 3    ranolazine (Ranexa) 500 MG 12 hr tablet, Take 1 tablet by mouth 2 (Two) Times a Day., Disp: 60 tablet, Rfl: 5    rOPINIRole (REQUIP) 2 MG tablet, Take 1 tablet by mouth Every Night., Disp: 90 tablet, Rfl: 3    Semaglutide, 2 MG/DOSE, (OZEMPIC) 8 MG/3ML solution pen-injector, Inject 2 mg under the skin into the appropriate area as directed 1 (One) Time Per Week., Disp: 3 mL, Rfl: 5    triamcinolone (KENALOG) 0.5 % cream, Apply 1 Application topically to the appropriate area as directed 3 (Three) Times a Day., Disp: 30 g, Rfl: 0    True Metrix Blood Glucose Test test strip, 1 each by Other route 3 (Three) Times a Day., Disp: 300 each, Rfl: 3    Ultra Thin Lancets 31G misc, Use 1 Device 3 (Three) Times a Day., Disp: 100 each, Rfl: 3  No current facility-administered medications for this visit.    Medicines reviewed by Zoie Cheng, PharmD on 8/30/2024 at  3:05 PM    Drug Interactions  No significant drug-drug interactions       Goals of Therapy  Goals related to the patient's specialty therapy were discussed with the patient. The Patient Goals segment of this outreach has been reviewed and updated.   Goals Addressed Today        Specialty Pharmacy General Goal      Lower LDL            Adverse Drug Reactions  Medication  tolerability: Tolerating with no to minimal ADRs  Medication plan: Continue therapy with normal follow-up  Plan for ADR Management: Addressed in Plan, if applicable    Adherence, Self-Administration, and Current Therapy Problems  Adherence related to the patient's specialty therapy was discussed with the patient. The Adherence segment of this outreach has been reviewed and updated.     Adherence Questions  Linked Medication(s) Assessed: Evolocumab  On average, how many doses/injections does the patient miss per month?: 0  What are the identified reasons for non-adherence or missed doses? : no problems identified  What is the estimated medication adherence level?: %  Based on the patient/caregiver response and refill history, does this patient require an MTP to track adherence improvements?: no    Additional Barriers to Patient Self-Administration: Addressed in Plan, if applicable  Methods for Supporting Patient Self-Administration: Addressed in Plan, if applicable    Open Medication Therapy Problems  No medication therapy recommendations to display    Quality of Life Assessment   Quality of Life related to the patient's enrollment in the patient management program and services provided was discussed with the patient. The QOL segment of this outreach has been reviewed and updated.  Quality of Life Improvement Scale: 6-A little better      Medication Assessment & Plan  Patient will continue Repatha 140mg every 14 days at this time.   Related Plans, Therapy Recommendations, or Therapy Problems to Be Addressed: Patient needs a lipid panel to assess LDL while on Repatha. She also needs to schedule a follow-up appointment with cardiology, as she missed her last appointment on 8/9/24.   Lipid Panel order was placed a few days ago and patient reports that she will get in the next 2 weeks. Follow-up on.   Patient will continue regular follow-up with cardiology- pt will schedule an appointment. Please follow-up on.    Patient will follow up with specialty mail-out services. Care Coordinator to set up future refill outreaches, coordinate prescription delivery, and escalate clinical questions to pharmacist.  Pharmacist to perform regular assessments no more than (6) months from the previous assessment. Will follow-up in 6 months, or sooner if needed.      Attestation  Therapeutic appropriateness: Appropriate   I attest the patient was actively involved in and has agreed to the above plan of care. If the prescribed therapy is at any point deemed not appropriate based on the current or future assessments, a consultation will be initiated with the patient's specialty care provider to determine the best course of action. The revised plan of therapy will be documented along with any required assessments and/or additional patient education provided.     Zoie hCeng, PharmD  8/30/2024  15:07 EDT

## 2024-09-05 ENCOUNTER — HOSPITAL ENCOUNTER (EMERGENCY)
Facility: HOSPITAL | Age: 64
Discharge: OTHER INSTITUTION | End: 2024-09-07
Attending: STUDENT IN AN ORGANIZED HEALTH CARE EDUCATION/TRAINING PROGRAM
Payer: COMMERCIAL

## 2024-09-05 ENCOUNTER — APPOINTMENT (OUTPATIENT)
Dept: MRI IMAGING | Facility: HOSPITAL | Age: 64
End: 2024-09-05
Payer: COMMERCIAL

## 2024-09-05 ENCOUNTER — APPOINTMENT (OUTPATIENT)
Dept: CT IMAGING | Facility: HOSPITAL | Age: 64
End: 2024-09-05
Payer: COMMERCIAL

## 2024-09-05 ENCOUNTER — APPOINTMENT (OUTPATIENT)
Dept: GENERAL RADIOLOGY | Facility: HOSPITAL | Age: 64
End: 2024-09-05
Payer: COMMERCIAL

## 2024-09-05 DIAGNOSIS — M54.42 MIDLINE LOW BACK PAIN WITH LEFT-SIDED SCIATICA, UNSPECIFIED CHRONICITY: Primary | ICD-10-CM

## 2024-09-05 DIAGNOSIS — G62.9 NEUROPATHY: ICD-10-CM
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
## 2024-09-05 LAB
ALBUMIN SERPL-MCNC: 3.8 G/DL (ref 3.5–5.2)
ALBUMIN/GLOB SERPL: 1.7 G/DL
ALP SERPL-CCNC: 56 U/L (ref 39–117)
ALT SERPL W P-5'-P-CCNC: 10 U/L (ref 1–33)
AMPHET+METHAMPHET UR QL: NEGATIVE
AMPHETAMINES UR QL: NEGATIVE
ANION GAP SERPL CALCULATED.3IONS-SCNC: 11.8 MMOL/L (ref 5–15)
AST SERPL-CCNC: 19 U/L (ref 1–32)
BACTERIA UR QL AUTO: NORMAL /HPF
BARBITURATES UR QL SCN: NEGATIVE
BASOPHILS # BLD AUTO: 0.1 10*3/MM3 (ref 0–0.2)
BASOPHILS NFR BLD AUTO: 1 % (ref 0–1.5)
BENZODIAZ UR QL SCN: POSITIVE
BILIRUB SERPL-MCNC: 0.3 MG/DL (ref 0–1.2)
BILIRUB UR QL STRIP: NEGATIVE
BUN SERPL-MCNC: 24 MG/DL (ref 8–23)
BUN/CREAT SERPL: 14.4 (ref 7–25)
BUPRENORPHINE SERPL-MCNC: NEGATIVE NG/ML
CALCIUM SPEC-SCNC: 9.8 MG/DL (ref 8.6–10.5)
CANNABINOIDS SERPL QL: NEGATIVE
CHLORIDE SERPL-SCNC: 98 MMOL/L (ref 98–107)
CLARITY UR: CLEAR
CO2 SERPL-SCNC: 23.2 MMOL/L (ref 22–29)
COCAINE UR QL: NEGATIVE
COLOR UR: YELLOW
CREAT SERPL-MCNC: 1.67 MG/DL (ref 0.57–1)
DEPRECATED RDW RBC AUTO: 47.8 FL (ref 37–54)
EGFRCR SERPLBLD CKD-EPI 2021: 34.3 ML/MIN/1.73
EOSINOPHIL # BLD AUTO: 0.28 10*3/MM3 (ref 0–0.4)
EOSINOPHIL NFR BLD AUTO: 2.7 % (ref 0.3–6.2)
ERYTHROCYTE [DISTWIDTH] IN BLOOD BY AUTOMATED COUNT: 15.9 % (ref 12.3–15.4)
FENTANYL UR-MCNC: NEGATIVE NG/ML
GLOBULIN UR ELPH-MCNC: 2.3 GM/DL
GLUCOSE SERPL-MCNC: 83 MG/DL (ref 65–99)
GLUCOSE UR STRIP-MCNC: NEGATIVE MG/DL
HBA1C MFR BLD: 5.8 % (ref 4.8–5.6)
HCT VFR BLD AUTO: 33.4 % (ref 34–46.6)
HGB BLD-MCNC: 10.8 G/DL (ref 12–15.9)
HGB UR QL STRIP.AUTO: NEGATIVE
HOLD SPECIMEN: NORMAL
HOLD SPECIMEN: NORMAL
HYALINE CASTS UR QL AUTO: NORMAL /LPF
IMM GRANULOCYTES # BLD AUTO: 0.09 10*3/MM3 (ref 0–0.05)
IMM GRANULOCYTES NFR BLD AUTO: 0.9 % (ref 0–0.5)
KETONES UR QL STRIP: NEGATIVE
LEUKOCYTE ESTERASE UR QL STRIP.AUTO: ABNORMAL
LYMPHOCYTES # BLD AUTO: 3.76 10*3/MM3 (ref 0.7–3.1)
LYMPHOCYTES NFR BLD AUTO: 35.8 % (ref 19.6–45.3)
MCH RBC QN AUTO: 26.9 PG (ref 26.6–33)
MCHC RBC AUTO-ENTMCNC: 32.3 G/DL (ref 31.5–35.7)
MCV RBC AUTO: 83.3 FL (ref 79–97)
METHADONE UR QL SCN: NEGATIVE
MONOCYTES # BLD AUTO: 0.75 10*3/MM3 (ref 0.1–0.9)
MONOCYTES NFR BLD AUTO: 7.1 % (ref 5–12)
NEUTROPHILS NFR BLD AUTO: 5.53 10*3/MM3 (ref 1.7–7)
NEUTROPHILS NFR BLD AUTO: 52.5 % (ref 42.7–76)
NITRITE UR QL STRIP: NEGATIVE
NRBC BLD AUTO-RTO: 0 /100 WBC (ref 0–0.2)
OPIATES UR QL: POSITIVE
OXYCODONE UR QL SCN: NEGATIVE
PCP UR QL SCN: NEGATIVE
PH UR STRIP.AUTO: 6.5 [PH] (ref 5–8)
PLATELET # BLD AUTO: 298 10*3/MM3 (ref 140–450)
PMV BLD AUTO: 8.8 FL (ref 6–12)
POTASSIUM SERPL-SCNC: 4.7 MMOL/L (ref 3.5–5.2)
PROT SERPL-MCNC: 6.1 G/DL (ref 6–8.5)
PROT UR QL STRIP: NEGATIVE
RBC # BLD AUTO: 4.01 10*6/MM3 (ref 3.77–5.28)
RBC # UR STRIP: NORMAL /HPF
REF LAB TEST METHOD: NORMAL
SODIUM SERPL-SCNC: 133 MMOL/L (ref 136–145)
SP GR UR STRIP: 1.01 (ref 1–1.03)
SQUAMOUS #/AREA URNS HPF: NORMAL /HPF
TRICYCLICS UR QL SCN: NEGATIVE
TROPONIN T SERPL HS-MCNC: 12 NG/L
UROBILINOGEN UR QL STRIP: ABNORMAL
WBC # UR STRIP: NORMAL /HPF
WBC NRBC COR # BLD AUTO: 10.51 10*3/MM3 (ref 3.4–10.8)
WHOLE BLOOD HOLD COAG: NORMAL
WHOLE BLOOD HOLD SPECIMEN: NORMAL

## 2024-09-05 PROCEDURE — 72146 MRI CHEST SPINE W/O DYE: CPT

## 2024-09-05 PROCEDURE — 70551 MRI BRAIN STEM W/O DYE: CPT | Performed by: RADIOLOGY

## 2024-09-05 PROCEDURE — 36415 COLL VENOUS BLD VENIPUNCTURE: CPT

## 2024-09-05 PROCEDURE — 72146 MRI CHEST SPINE W/O DYE: CPT | Performed by: RADIOLOGY

## 2024-09-05 PROCEDURE — 25010000002 DEXAMETHASONE SODIUM PHOSPHATE 10 MG/ML SOLUTION: Performed by: STUDENT IN AN ORGANIZED HEALTH CARE EDUCATION/TRAINING PROGRAM

## 2024-09-05 PROCEDURE — 93005 ELECTROCARDIOGRAM TRACING: CPT | Performed by: PHYSICIAN ASSISTANT

## 2024-09-05 PROCEDURE — 72192 CT PELVIS W/O DYE: CPT | Performed by: RADIOLOGY

## 2024-09-05 PROCEDURE — 71045 X-RAY EXAM CHEST 1 VIEW: CPT | Performed by: RADIOLOGY

## 2024-09-05 PROCEDURE — 85025 COMPLETE CBC W/AUTO DIFF WBC: CPT | Performed by: PHYSICIAN ASSISTANT

## 2024-09-05 PROCEDURE — 72192 CT PELVIS W/O DYE: CPT

## 2024-09-05 PROCEDURE — 72148 MRI LUMBAR SPINE W/O DYE: CPT | Performed by: RADIOLOGY

## 2024-09-05 PROCEDURE — 96372 THER/PROPH/DIAG INJ SC/IM: CPT

## 2024-09-05 PROCEDURE — 70551 MRI BRAIN STEM W/O DYE: CPT

## 2024-09-05 PROCEDURE — 25010000002 KETOROLAC TROMETHAMINE PER 15 MG: Performed by: STUDENT IN AN ORGANIZED HEALTH CARE EDUCATION/TRAINING PROGRAM

## 2024-09-05 PROCEDURE — 84484 ASSAY OF TROPONIN QUANT: CPT | Performed by: PHYSICIAN ASSISTANT

## 2024-09-05 PROCEDURE — 81001 URINALYSIS AUTO W/SCOPE: CPT | Performed by: PHYSICIAN ASSISTANT

## 2024-09-05 PROCEDURE — 80307 DRUG TEST PRSMV CHEM ANLYZR: CPT | Performed by: PHYSICIAN ASSISTANT

## 2024-09-05 PROCEDURE — 72148 MRI LUMBAR SPINE W/O DYE: CPT

## 2024-09-05 PROCEDURE — 80053 COMPREHEN METABOLIC PANEL: CPT | Performed by: PHYSICIAN ASSISTANT

## 2024-09-05 PROCEDURE — 71045 X-RAY EXAM CHEST 1 VIEW: CPT

## 2024-09-05 PROCEDURE — 99285 EMERGENCY DEPT VISIT HI MDM: CPT

## 2024-09-05 PROCEDURE — 83036 HEMOGLOBIN GLYCOSYLATED A1C: CPT | Performed by: PHYSICIAN ASSISTANT

## 2024-09-05 RX ORDER — DEXAMETHASONE SODIUM PHOSPHATE 10 MG/ML
6 INJECTION, SOLUTION INTRAMUSCULAR; INTRAVENOUS ONCE
Status: COMPLETED | OUTPATIENT
Start: 2024-09-05 | End: 2024-09-05

## 2024-09-05 RX ORDER — DEXAMETHASONE 4 MG/1
2 TABLET ORAL
Qty: 1 TABLET | Refills: 0 | Status: SHIPPED | OUTPATIENT
Start: 2024-09-05 | End: 2024-09-09 | Stop reason: HOSPADM

## 2024-09-05 RX ORDER — KETOROLAC TROMETHAMINE 10 MG/1
10 TABLET, FILM COATED ORAL EVERY 6 HOURS PRN
Qty: 15 TABLET | Refills: 0 | Status: SHIPPED | OUTPATIENT
Start: 2024-09-05 | End: 2024-09-09 | Stop reason: HOSPADM

## 2024-09-05 RX ORDER — SODIUM CHLORIDE 0.9 % (FLUSH) 0.9 %
10 SYRINGE (ML) INJECTION AS NEEDED
Status: DISCONTINUED | OUTPATIENT
Start: 2024-09-05 | End: 2024-09-07 | Stop reason: HOSPADM

## 2024-09-05 RX ORDER — ORPHENADRINE CITRATE 100 MG/1
100 TABLET, EXTENDED RELEASE ORAL 2 TIMES DAILY
Qty: 20 TABLET | Refills: 0 | Status: SHIPPED | OUTPATIENT
Start: 2024-09-05 | End: 2024-09-09 | Stop reason: HOSPADM

## 2024-09-05 RX ORDER — KETOROLAC TROMETHAMINE 30 MG/ML
30 INJECTION, SOLUTION INTRAMUSCULAR; INTRAVENOUS ONCE
Status: DISCONTINUED | OUTPATIENT
Start: 2024-09-05 | End: 2024-09-05

## 2024-09-05 RX ORDER — DEXAMETHASONE SODIUM PHOSPHATE 10 MG/ML
6 INJECTION, SOLUTION INTRAMUSCULAR; INTRAVENOUS ONCE
Status: DISCONTINUED | OUTPATIENT
Start: 2024-09-05 | End: 2024-09-05

## 2024-09-05 RX ORDER — KETOROLAC TROMETHAMINE 30 MG/ML
30 INJECTION, SOLUTION INTRAMUSCULAR; INTRAVENOUS ONCE
Status: COMPLETED | OUTPATIENT
Start: 2024-09-05 | End: 2024-09-05

## 2024-09-05 RX ADMIN — DEXAMETHASONE SODIUM PHOSPHATE 6 MG: 10 INJECTION INTRAMUSCULAR; INTRAVENOUS at 17:34

## 2024-09-05 RX ADMIN — KETOROLAC TROMETHAMINE 30 MG: 30 INJECTION, SOLUTION INTRAMUSCULAR; INTRAVENOUS at 17:33

## 2024-09-05 NOTE — ED PROVIDER NOTES
Subjective   History of Present Illness  64-year-old female who presents to the emergency department with complaint of multiple falls.  Patient states she has had approximately 3 falls in the past 2 days.  She has history of hyperlipidemia, hypertension, MI.  Patient also states she has had diabetes.  Presents with right hip pain.    History provided by:  Patient   used: No    Fall  Mechanism of injury: fall    Injury location:  Pelvis  Pelvic injury location:  L hip  Incident location:  Home  Time since incident:  1 day  Arrived directly from scene: no    Fall:     Fall occurred:  Standing    Impact surface:  Hard floor    Entrapped after fall: no    Protective equipment: none    Suspicion of alcohol use: no    Suspicion of drug use: no    Tetanus status:  Unknown  Prior to arrival data:     Bystander interventions:  None    Patient ambulatory at scene: no      Blood loss:  None    Loss of consciousness: no      Amnesic to event: no      Airway interventions:  None    Breathing interventions:  None    IV access status:  None    IO access:  None    Fluids administered:  None    Cardiac interventions:  None    Medications administered:  None    Immobilization:  None  Associated symptoms: back pain    Associated symptoms: no blindness, no chest pain, no headaches, no hearing loss, no loss of consciousness, no neck pain, no seizures and no vomiting    Risk factors: no anticoagulation therapy, no asthma, no beta blocker therapy, no COPD, no diabetes, no dialysis, no pacemaker, no past MI and not pregnant        Review of Systems   Constitutional: Negative.  Negative for chills, diaphoresis and fatigue.   HENT: Negative.  Negative for dental problem, drooling, ear discharge and hearing loss.    Eyes: Negative.  Negative for blindness, photophobia, pain, redness and itching.   Respiratory: Negative.     Cardiovascular:  Negative for chest pain and leg swelling.   Gastrointestinal: Negative.  Negative  for anal bleeding, blood in stool, constipation and vomiting.   Genitourinary: Negative.  Negative for dysuria, flank pain, frequency, genital sores, hematuria and menstrual problem.   Musculoskeletal:  Positive for arthralgias, back pain, joint swelling and myalgias. Negative for neck pain.   Skin: Negative.  Negative for pallor, rash and wound.   Neurological:  Negative for seizures, loss of consciousness, weakness and headaches.   Hematological: Negative.    Psychiatric/Behavioral: Negative.  Negative for confusion, dysphoric mood, hallucinations, self-injury and sleep disturbance. The patient is not hyperactive.    All other systems reviewed and are negative.      Past Medical History:   Diagnosis Date    Anxiety     Arthritis     Diabetes mellitus     Diverticulosis     GERD (gastroesophageal reflux disease)     Gout     Hyperlipidemia     Hypertension     Myocardial infarction        Allergies   Allergen Reactions    Ciprofloxacin Anaphylaxis    Penicillins Shortness Of Breath    Statins Myalgia    Fish Oil [Omega-3 Fatty Acids] Nausea And Vomiting       Past Surgical History:   Procedure Laterality Date    APPENDECTOMY      BREAST BIOPSY Right     benign    CARDIAC CATHETERIZATION      CARDIAC CATHETERIZATION N/A 2024    Procedure: Left Heart Cath;  Surgeon: Derrick Watts MD;  Location:  [facility];  Service: Cardiology;  Laterality: N/A;    CARDIAC CATHETERIZATION N/A 2024    Procedure: Stent VITALIY coronary;  Surgeon: Derrick Watts MD;  Location:  [facility];  Service: Cardiology;  Laterality: N/A;     SECTION      CHOLECYSTECTOMY      CORONARY STENT PLACEMENT      Stenting of the RCA with 3.5 x 26 mm Resolute drug-eluting stent    HYSTERECTOMY      JOINT REPLACEMENT      TONSILLECTOMY      TUMOR REMOVAL  02/15/2024    FACIAL TUMOR       Family History   Problem Relation Age of Onset    Arthritis Mother     Heart disease Mother     Heart disease Sister      Vision loss Daughter     Heart disease Father     Heart disease Sister     Heart disease Sister     Heart disease Sister        Social History     Socioeconomic History    Marital status:     Number of children: 2    Years of education: 12   Tobacco Use    Smoking status: Never     Passive exposure: Never    Smokeless tobacco: Never   Vaping Use    Vaping status: Never Used   Substance and Sexual Activity    Alcohol use: No    Drug use: No    Sexual activity: Defer           Objective   Physical Exam  Vitals and nursing note reviewed.   Constitutional:       General: She is not in acute distress.     Appearance: Normal appearance. She is normal weight. She is not ill-appearing or toxic-appearing.   HENT:      Head: Normocephalic and atraumatic.      Right Ear: Tympanic membrane, ear canal and external ear normal. There is no impacted cerumen.      Left Ear: Tympanic membrane, ear canal and external ear normal. There is no impacted cerumen.      Nose: Nose normal. No congestion or rhinorrhea.      Mouth/Throat:      Mouth: Mucous membranes are moist. Mucous membranes are dry.      Pharynx: Oropharynx is clear. No oropharyngeal exudate or posterior oropharyngeal erythema.   Eyes:      General: No scleral icterus.        Right eye: No discharge.         Left eye: No discharge.      Extraocular Movements: Extraocular movements intact.      Conjunctiva/sclera: Conjunctivae normal.      Pupils: Pupils are equal, round, and reactive to light.   Cardiovascular:      Rate and Rhythm: Normal rate and regular rhythm.      Pulses: Normal pulses.      Heart sounds: Normal heart sounds. No murmur heard.     No friction rub. No gallop.   Pulmonary:      Effort: Pulmonary effort is normal. No respiratory distress.      Breath sounds: Normal breath sounds. No stridor. No wheezing or rhonchi.   Abdominal:      General: Abdomen is flat. Bowel sounds are normal. There is no distension.      Palpations: Abdomen is soft. There is  no mass.      Tenderness: There is no abdominal tenderness. There is no right CVA tenderness or rebound.      Hernia: No hernia is present.   Musculoskeletal:         General: Swelling, tenderness and signs of injury present. No deformity. Normal range of motion.      Cervical back: Normal range of motion and neck supple. No rigidity or tenderness.      Left hip: Deformity and tenderness present. Decreased range of motion.      Right lower leg: No edema.   Lymphadenopathy:      Cervical: No cervical adenopathy.   Skin:     General: Skin is warm and dry.      Capillary Refill: Capillary refill takes less than 2 seconds.      Coloration: Skin is not jaundiced or pale.      Findings: No bruising, erythema or lesion.   Neurological:      General: No focal deficit present.      Mental Status: She is alert and oriented to person, place, and time.      Cranial Nerves: No cranial nerve deficit.      Sensory: No sensory deficit.      Motor: No weakness.      Coordination: Coordination normal.      Gait: Gait normal.   Psychiatric:         Mood and Affect: Mood normal.         Behavior: Behavior normal.         Thought Content: Thought content normal.         Judgment: Judgment normal.         Procedures           ED Course  ED Course as of 09/07/24 1511   Thu Sep 05, 2024   1528 IMPRESSION:  1.  No evidence of acute displaced fracture or dislocation.  2.  Extensive patient motion artifact at the level of the pubic rami  limits assessment in this region.  3.  Left hip arthroplasty changes noted without evidence of hardware  complication.      [BH]   1639 IMPRESSION:  1.  No levels of significant central canal or neuroforaminal stenosis  identified.  2.  No acute appearing disc herniations.  3.  No fracture or malalignment identified.  4.  No paraspinal fluid collections noted.      [BH]   1639    IMPRESSION:  1. No acute intracranial abnormality.  2. Otherwise unremarkable exam.      [BH]   1715 IMPRESSION:  1. Degenerative  disc disease and facet arthropathy with central canal  and neuroforaminal stenosis at levels detailed above.  2. Central canal and neuroforaminal stenosis is most pronounced at the  L4/5 level.  3. Facet joint inflammation at L4/5.  4. No fracture or traumatic malalignment.      []   1848 OTHER:  No additional remarkable findings.      IMPRESSION:  1. Degenerative disc disease and facet arthropathy with central canal  and neuroforaminal stenosis at levels detailed above.  2. Central canal and neuroforaminal stenosis is most pronounced at the  L4/5 level.  3. Facet joint inflammation at L4/5.  4. No fracture or traumatic malalignment.   []   1850 Discussed care with Dr. Martin. Advised can discharge patient. Patient is stable at this time.  []   1927 Discussed care with dr. German. Neurosurgery. Advised to send to [facility] for further evaluation.  []   1935 Discussed care with Dr. Azul, will accept patient to telemetry.  []   Fri Sep 06, 2024   0707 Patient's overnight has been uneventful, no signs of acute distress.  Still awaiting bed assignment at [facility].  Endorsed to Dr. Jacobo at shift change.  Electronically signed by Syd Pinzon MD, 09/06/24, 7:08 AM EDT.   []   1122 Care assumed from Dr. Pinzon at shift change.  Patient awake and alert.  Complains of continued back pain.  Reviewed results of workup.  Awaiting bed availability at [facility].  In the meantime we will continue home meds. [BC]   1804 Patient continues to have back pain.  Discussed her recent falls which she says that she stiffens up, loses her balance, and falls backwards.  She on exam is having some intermittent myoclonus.  She also has some spasticity of her lower extremities.  Sensation is intact.  She has no saddle anesthesia.  Normal motor strength of the lower extremities.  Patella DTRs 2+/4+ bilaterally.  Have discussed with Dr. Christine Aceves, hospitalist at [facility], and she is  prioritizing this patient for available bed for transfer. [BC]   Sat Sep 07, 2024   0023 Patient has a bed at [facility].  EMS is here for transport.  Electronically signed by Syd Pinzon MD, 09/07/24, 12:23 AM EDT.   [CM]      ED Course User Index  [BC] Romain Jacobo MD  [] James Martin PA-C  [CM] Syd Pinzon MD                                             Medical Decision Making  Problems Addressed:  Fall, initial encounter: complicated acute illness or injury  Midline low back pain with left-sided sciatica, unspecified chronicity: complicated acute illness or injury  Neuropathy: complicated acute illness or injury    Amount and/or Complexity of Data Reviewed  Labs: ordered.  Radiology: ordered.  ECG/medicine tests: ordered.    Risk  OTC drugs.  Prescription drug management.        Final diagnoses:   Midline low back pain with left-sided sciatica, unspecified chronicity   Fall, initial encounter   Neuropathy       ED Disposition  ED Disposition       ED Disposition   Transfer to Another Facility     Condition   --    Comment   --               Kimmy Vela PA  [address] [facility] [address] [phone]    Call in 1 day           Medication List        New Prescriptions      dexAMETHasone 4 MG tablet  Commonly known as: DECADRON  Take 0.5 tablets by mouth Daily With Breakfast for 2 days.     orphenadrine 100 MG 12 hr tablet  Commonly known as: NORFLEX  Take 1 tablet by mouth 2 (Two) Times a Day.            ASK your doctor about these medications      gabapentin 600 MG tablet  Commonly known as: NEURONTIN  Take 1 tablet by mouth 2 (Two) Times a Day.  Ask about: Which instructions should I use?               Where to Get Your Medications        These medications were sent to [id number] - [facility] [address] AT [facility] [address] - [phone] PH - [phone]   [address] [address]       Phone: [phone]   dexAMETHasone 4 MG tablet  gabapentin 600 MG tablet  ketorolac 10 MG tablet  orphenadrine 100 MG 12 hr tablet            Romain Jacobo MD  09/07/24 0584

## 2024-09-05 NOTE — ED NOTES
Called  [facility] for [facility] to speak with hospitalist. Connected Dr. Martin with hospitalist.

## 2024-09-06 VITALS
HEART RATE: 77 BPM | OXYGEN SATURATION: 96 % | SYSTOLIC BLOOD PRESSURE: 125 MMHG | BODY MASS INDEX: 26.8 KG/M2 | HEIGHT: 64 IN | WEIGHT: 156.97 LBS | RESPIRATION RATE: 18 BRPM | DIASTOLIC BLOOD PRESSURE: 90 MMHG | TEMPERATURE: 98 F

## 2024-09-06 DIAGNOSIS — Z79.4 TYPE 2 DIABETES MELLITUS WITH DIABETIC NEUROPATHY, WITH LONG-TERM CURRENT USE OF INSULIN: Chronic | ICD-10-CM

## 2024-09-06 DIAGNOSIS — E11.40 TYPE 2 DIABETES MELLITUS WITH DIABETIC NEUROPATHY, WITH LONG-TERM CURRENT USE OF INSULIN: Chronic | ICD-10-CM

## 2024-09-06 LAB — GLUCOSE BLDC GLUCOMTR-MCNC: 171 MG/DL (ref 70–130)

## 2024-09-06 PROCEDURE — 96372 THER/PROPH/DIAG INJ SC/IM: CPT

## 2024-09-06 PROCEDURE — 25010000002 MORPHINE PER 10 MG: Performed by: EMERGENCY MEDICINE

## 2024-09-06 PROCEDURE — 82948 REAGENT STRIP/BLOOD GLUCOSE: CPT

## 2024-09-06 RX ORDER — LOSARTAN POTASSIUM 25 MG/1
50 TABLET ORAL 2 TIMES DAILY
Status: DISCONTINUED | OUTPATIENT
Start: 2024-09-06 | End: 2024-09-07 | Stop reason: HOSPADM

## 2024-09-06 RX ORDER — LIDOCAINE 4 G/G
PATCH TOPICAL EVERY 24 HOURS
Status: CANCELLED | OUTPATIENT
Start: 2024-09-06

## 2024-09-06 RX ORDER — ONDANSETRON 4 MG/1
4 TABLET, ORALLY DISINTEGRATING ORAL
Status: CANCELLED | OUTPATIENT
Start: 2024-09-06

## 2024-09-06 RX ORDER — HYDROXYZINE HYDROCHLORIDE 25 MG/1
50 TABLET, FILM COATED ORAL 3 TIMES DAILY PRN
Status: DISCONTINUED | OUTPATIENT
Start: 2024-09-06 | End: 2024-09-07 | Stop reason: HOSPADM

## 2024-09-06 RX ORDER — IBUPROFEN 600 MG/1
600 TABLET, FILM COATED ORAL EVERY 6 HOURS PRN
Status: CANCELLED | OUTPATIENT
Start: 2024-09-06

## 2024-09-06 RX ORDER — BUPROPION HYDROCHLORIDE 150 MG/1
150 TABLET, EXTENDED RELEASE ORAL DAILY
Status: DISCONTINUED | OUTPATIENT
Start: 2024-09-06 | End: 2024-09-07 | Stop reason: HOSPADM

## 2024-09-06 RX ORDER — PROCHLORPERAZINE EDISYLATE 5 MG/ML
2.5 INJECTION INTRAMUSCULAR; INTRAVENOUS ONCE
Status: DISCONTINUED | OUTPATIENT
Start: 2024-09-06 | End: 2024-09-06

## 2024-09-06 RX ORDER — ASPIRIN 81 MG/1
81 TABLET ORAL DAILY
Status: DISCONTINUED | OUTPATIENT
Start: 2024-09-06 | End: 2024-09-07 | Stop reason: HOSPADM

## 2024-09-06 RX ORDER — METOPROLOL TARTRATE 50 MG
100 TABLET ORAL 2 TIMES DAILY
Status: DISCONTINUED | OUTPATIENT
Start: 2024-09-06 | End: 2024-09-07 | Stop reason: HOSPADM

## 2024-09-06 RX ORDER — IBUPROFEN 600 MG/1
600 TABLET, FILM COATED ORAL EVERY 6 HOURS PRN
Status: DISCONTINUED | OUTPATIENT
Start: 2024-09-06 | End: 2024-09-07 | Stop reason: HOSPADM

## 2024-09-06 RX ORDER — FENOFIBRATE 145 MG/1
145 TABLET, COATED ORAL DAILY
Status: CANCELLED | OUTPATIENT
Start: 2024-09-06

## 2024-09-06 RX ORDER — ONDANSETRON 4 MG/1
4 TABLET, ORALLY DISINTEGRATING ORAL EVERY 8 HOURS PRN
Status: DISCONTINUED | OUTPATIENT
Start: 2024-09-06 | End: 2024-09-07 | Stop reason: HOSPADM

## 2024-09-06 RX ORDER — OXYCODONE AND ACETAMINOPHEN 5; 325 MG/1; MG/1
1 TABLET ORAL ONCE
Status: COMPLETED | OUTPATIENT
Start: 2024-09-06 | End: 2024-09-06

## 2024-09-06 RX ORDER — AMLODIPINE BESYLATE 5 MG/1
2.5 TABLET ORAL DAILY
Status: DISCONTINUED | OUTPATIENT
Start: 2024-09-06 | End: 2024-09-07 | Stop reason: HOSPADM

## 2024-09-06 RX ORDER — PANTOPRAZOLE SODIUM 40 MG/1
40 TABLET, DELAYED RELEASE ORAL DAILY
Status: DISCONTINUED | OUTPATIENT
Start: 2024-09-06 | End: 2024-09-07 | Stop reason: HOSPADM

## 2024-09-06 RX ORDER — GABAPENTIN 600 MG/1
600 TABLET ORAL 2 TIMES DAILY
Qty: 60 TABLET | Refills: 0 | Status: SHIPPED | OUTPATIENT
Start: 2024-09-06

## 2024-09-06 RX ORDER — CLOPIDOGREL BISULFATE 75 MG/1
75 TABLET ORAL DAILY
Status: DISCONTINUED | OUTPATIENT
Start: 2024-09-06 | End: 2024-09-07 | Stop reason: HOSPADM

## 2024-09-06 RX ORDER — GABAPENTIN 300 MG/1
300 CAPSULE ORAL EVERY 12 HOURS SCHEDULED
Status: DISCONTINUED | OUTPATIENT
Start: 2024-09-06 | End: 2024-09-07 | Stop reason: HOSPADM

## 2024-09-06 RX ORDER — METHOCARBAMOL 500 MG/1
500 TABLET, FILM COATED ORAL 3 TIMES DAILY PRN
Status: DISCONTINUED | OUTPATIENT
Start: 2024-09-06 | End: 2024-09-07 | Stop reason: HOSPADM

## 2024-09-06 RX ORDER — ALLOPURINOL 100 MG/1
100 TABLET ORAL 2 TIMES DAILY
Status: DISCONTINUED | OUTPATIENT
Start: 2024-09-06 | End: 2024-09-07 | Stop reason: HOSPADM

## 2024-09-06 RX ORDER — RANOLAZINE 500 MG/1
500 TABLET, EXTENDED RELEASE ORAL 2 TIMES DAILY
Status: DISCONTINUED | OUTPATIENT
Start: 2024-09-06 | End: 2024-09-07 | Stop reason: HOSPADM

## 2024-09-06 RX ORDER — LIDOCAINE 4 G/G
2 PATCH TOPICAL
Status: DISCONTINUED | OUTPATIENT
Start: 2024-09-06 | End: 2024-09-07 | Stop reason: HOSPADM

## 2024-09-06 RX ORDER — ROPINIROLE 1 MG/1
2 TABLET, FILM COATED ORAL NIGHTLY
Status: DISCONTINUED | OUTPATIENT
Start: 2024-09-06 | End: 2024-09-07 | Stop reason: HOSPADM

## 2024-09-06 RX ADMIN — PANTOPRAZOLE SODIUM 40 MG: 40 TABLET, DELAYED RELEASE ORAL at 16:21

## 2024-09-06 RX ADMIN — OXYCODONE HYDROCHLORIDE AND ACETAMINOPHEN 1 TABLET: 5; 325 TABLET ORAL at 00:24

## 2024-09-06 RX ADMIN — AMLODIPINE BESYLATE 2.5 MG: 5 TABLET ORAL at 16:20

## 2024-09-06 RX ADMIN — CLOPIDOGREL BISULFATE 75 MG: 75 TABLET ORAL at 16:21

## 2024-09-06 RX ADMIN — MORPHINE SULFATE 4 MG: 4 INJECTION, SOLUTION INTRAMUSCULAR; INTRAVENOUS at 22:32

## 2024-09-06 RX ADMIN — BUPROPION HYDROCHLORIDE 150 MG: 150 TABLET, EXTENDED RELEASE ORAL at 16:19

## 2024-09-06 RX ADMIN — OXYCODONE HYDROCHLORIDE AND ACETAMINOPHEN 1 TABLET: 5; 325 TABLET ORAL at 09:58

## 2024-09-06 RX ADMIN — ALLOPURINOL 100 MG: 100 TABLET ORAL at 23:40

## 2024-09-06 RX ADMIN — ASPIRIN 81 MG: 81 TABLET, COATED ORAL at 16:19

## 2024-09-06 RX ADMIN — ROPINIROLE HYDROCHLORIDE 2 MG: 1 TABLET, FILM COATED ORAL at 23:40

## 2024-09-06 RX ADMIN — GABAPENTIN 300 MG: 300 CAPSULE ORAL at 23:40

## 2024-09-06 RX ADMIN — LIDOCAINE 2 PATCH: 4 PATCH TOPICAL at 16:21

## 2024-09-06 RX ADMIN — DULOXETINE HYDROCHLORIDE 90 MG: 60 CAPSULE, DELAYED RELEASE ORAL at 16:19

## 2024-09-06 RX ADMIN — LOSARTAN POTASSIUM 50 MG: 25 TABLET, FILM COATED ORAL at 23:40

## 2024-09-06 RX ADMIN — METOPROLOL TARTRATE 100 MG: 50 TABLET, FILM COATED ORAL at 23:39

## 2024-09-06 RX ADMIN — RANOLAZINE 500 MG: 500 TABLET, FILM COATED, EXTENDED RELEASE ORAL at 23:41

## 2024-09-06 NOTE — ED NOTES
Assumed care from KENDY Galan at this time. Bedside report was provided. Introduced self to patient at this time. There is no acute distress that has been noted at this time. No new complaints have been expressed by patient at this time. Patient has been updated and made aware of plans to await for a bed at [facility].

## 2024-09-06 NOTE — TELEPHONE ENCOUNTER
Inform the patient that I will send a refill of gabapentin for her to take when she gets out of the hospital.  Please have her make an appointment with me within the next month as she will need a hospital follow-up also

## 2024-09-06 NOTE — ED NOTES
Report received from KENDY Kelly. Pt resting on stretcher A&OX4, RR even and unlabored, skin WPD. Pt VSS. Pt reports that she has been able to control her bowel/bladder through the night and has had improvements since yesterday. Pt expressing concerns about her multiple falls recently and how she is displaying involuntary motor movements, spastic in nature and intermittently, provider made aware. Pt updated about POC with verbalized understanding, pending transfer to [facility] - wait listed at this time. Pt denies any acute needs or concerns. Safety measures remain WDL. WCTM.

## 2024-09-06 NOTE — TELEPHONE ENCOUNTER
Caller: Monisha Gardner    Relationship: Self    Best call back number: [phone]     Requested Prescriptions:   Requested Prescriptions     Pending Prescriptions Disp Refills    gabapentin (NEURONTIN) 600 MG tablet 60 tablet 0     Sig: Take 1 tablet by mouth 2 (Two) Times a Day.        Pharmacy where request should be sent: [facility][id number] [address] [facility] [address] - [phone]  - [phone] FX     Last office visit with prescribing clinician: 7/19/2024   Last telemedicine visit with prescribing clinician: 8/22/2024   Next office visit with prescribing clinician: Visit date not found     Additional details provided by patient:  PATIENT WANTS A CALL BACK    Does the patient have less than a 3 day supply:  [x] Yes  [] No    Would you like a call back once the refill request has been completed: [x] Yes [] No    If the office needs to give you a call back, can they leave a voicemail: [x] Yes [] No    Mark Le Rep   09/06/24 08:49 EDT      [Initial Visit] : an initial visit for [FreeTextEntry2] : Right hand and left wrist

## 2024-09-06 NOTE — ED NOTES
Iman with [facility] advised that they have five discharges that they are waiting on and hope to have a pt bed for our pt by this evening.

## 2024-09-06 NOTE — ED NOTES
Sudha from [facility] called and said that they are still waiting on pt to get a bed.

## 2024-09-07 ENCOUNTER — HOSPITAL ENCOUNTER (OUTPATIENT)
Facility: HOSPITAL | Age: 64
Discharge: HOME | End: 2024-09-09
Attending: INTERNAL MEDICINE | Admitting: INTERNAL MEDICINE
Payer: COMMERCIAL

## 2024-09-07 DIAGNOSIS — R13.11 DYSPHAGIA, ORAL PHASE: Primary | ICD-10-CM

## 2024-09-07 DIAGNOSIS — Z79.4 TYPE 2 DIABETES MELLITUS WITH DIABETIC NEUROPATHY, WITH LONG-TERM CURRENT USE OF INSULIN: Chronic | ICD-10-CM

## 2024-09-07 DIAGNOSIS — S32.2XXA CLOSED FRACTURE OF COCCYX, INITIAL ENCOUNTER: ICD-10-CM

## 2024-09-07 DIAGNOSIS — E11.40 TYPE 2 DIABETES MELLITUS WITH DIABETIC NEUROPATHY, WITH LONG-TERM CURRENT USE OF INSULIN: Chronic | ICD-10-CM

## 2024-09-07 PROBLEM — R29.6 FALLS: Status: ACTIVE | Noted: 2024-09-07
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
## 2024-09-07 LAB
25(OH)D3 SERPL-MCNC: 31.2 NG/ML (ref 30–100)
ANION GAP SERPL CALCULATED.3IONS-SCNC: 13 MMOL/L (ref 5–15)
BUN SERPL-MCNC: 24 MG/DL (ref 8–23)
BUN/CREAT SERPL: 15.4 (ref 7–25)
CALCIUM SPEC-SCNC: 10 MG/DL (ref 8.6–10.5)
CHLORIDE SERPL-SCNC: 100 MMOL/L (ref 98–107)
CK SERPL-CCNC: 56 U/L (ref 20–180)
CO2 SERPL-SCNC: 22 MMOL/L (ref 22–29)
CREAT SERPL-MCNC: 1.56 MG/DL (ref 0.57–1)
CRP SERPL-MCNC: 0.47 MG/DL (ref 0–0.5)
DEPRECATED RDW RBC AUTO: 50.3 FL (ref 37–54)
EGFRCR SERPLBLD CKD-EPI 2021: 37.2 ML/MIN/1.73
ERYTHROCYTE [DISTWIDTH] IN BLOOD BY AUTOMATED COUNT: 16 % (ref 12.3–15.4)
ERYTHROCYTE [SEDIMENTATION RATE] IN BLOOD: 2 MM/HR (ref 0–30)
FOLATE SERPL-MCNC: 16 NG/ML (ref 4.78–24.2)
GLUCOSE BLDC GLUCOMTR-MCNC: 103 MG/DL (ref 70–130)
GLUCOSE BLDC GLUCOMTR-MCNC: 180 MG/DL (ref 70–130)
GLUCOSE BLDC GLUCOMTR-MCNC: 186 MG/DL (ref 70–130)
GLUCOSE BLDC GLUCOMTR-MCNC: 200 MG/DL (ref 70–130)
GLUCOSE SERPL-MCNC: 174 MG/DL (ref 65–99)
HCT VFR BLD AUTO: 37.9 % (ref 34–46.6)
HGB BLD-MCNC: 12.2 G/DL (ref 12–15.9)
MAGNESIUM SERPL-MCNC: 1.8 MG/DL (ref 1.6–2.4)
MCH RBC QN AUTO: 27.9 PG (ref 26.6–33)
MCHC RBC AUTO-ENTMCNC: 32.2 G/DL (ref 31.5–35.7)
MCV RBC AUTO: 86.7 FL (ref 79–97)
PLATELET # BLD AUTO: 280 10*3/MM3 (ref 140–450)
PMV BLD AUTO: 9 FL (ref 6–12)
POTASSIUM SERPL-SCNC: 4.9 MMOL/L (ref 3.5–5.2)
RBC # BLD AUTO: 4.37 10*6/MM3 (ref 3.77–5.28)
SODIUM SERPL-SCNC: 135 MMOL/L (ref 136–145)
TSH SERPL DL<=0.05 MIU/L-ACNC: 0.96 UIU/ML (ref 0.27–4.2)
VIT B12 BLD-MCNC: 1431 PG/ML (ref 211–946)
WBC NRBC COR # BLD AUTO: 7.56 10*3/MM3 (ref 3.4–10.8)

## 2024-09-07 PROCEDURE — 85027 COMPLETE CBC AUTOMATED: CPT | Performed by: STUDENT IN AN ORGANIZED HEALTH CARE EDUCATION/TRAINING PROGRAM

## 2024-09-07 PROCEDURE — 83735 ASSAY OF MAGNESIUM: CPT | Performed by: STUDENT IN AN ORGANIZED HEALTH CARE EDUCATION/TRAINING PROGRAM

## 2024-09-07 PROCEDURE — 82550 ASSAY OF CK (CPK): CPT | Performed by: STUDENT IN AN ORGANIZED HEALTH CARE EDUCATION/TRAINING PROGRAM

## 2024-09-07 PROCEDURE — 96374 THER/PROPH/DIAG INJ IV PUSH: CPT

## 2024-09-07 PROCEDURE — 25010000002 HEPARIN (PORCINE) PER 1000 UNITS: Performed by: STUDENT IN AN ORGANIZED HEALTH CARE EDUCATION/TRAINING PROGRAM

## 2024-09-07 PROCEDURE — 82948 REAGENT STRIP/BLOOD GLUCOSE: CPT

## 2024-09-07 PROCEDURE — 25010000002 HYDROMORPHONE PER 4 MG: Performed by: STUDENT IN AN ORGANIZED HEALTH CARE EDUCATION/TRAINING PROGRAM

## 2024-09-07 PROCEDURE — 84425 ASSAY OF VITAMIN B-1: CPT | Performed by: STUDENT IN AN ORGANIZED HEALTH CARE EDUCATION/TRAINING PROGRAM

## 2024-09-07 PROCEDURE — 25010000002 DEXAMETHASONE PER 1 MG: Performed by: STUDENT IN AN ORGANIZED HEALTH CARE EDUCATION/TRAINING PROGRAM

## 2024-09-07 PROCEDURE — 63710000001 INSULIN LISPRO (HUMAN) PER 5 UNITS: Performed by: STUDENT IN AN ORGANIZED HEALTH CARE EDUCATION/TRAINING PROGRAM

## 2024-09-07 PROCEDURE — 96375 TX/PRO/DX INJ NEW DRUG ADDON: CPT

## 2024-09-07 PROCEDURE — 80048 BASIC METABOLIC PNL TOTAL CA: CPT | Performed by: STUDENT IN AN ORGANIZED HEALTH CARE EDUCATION/TRAINING PROGRAM

## 2024-09-07 PROCEDURE — G0378 HOSPITAL OBSERVATION PER HR: HCPCS

## 2024-09-07 PROCEDURE — 25810000003 LACTATED RINGERS PER 1000 ML: Performed by: STUDENT IN AN ORGANIZED HEALTH CARE EDUCATION/TRAINING PROGRAM

## 2024-09-07 PROCEDURE — 96376 TX/PRO/DX INJ SAME DRUG ADON: CPT

## 2024-09-07 PROCEDURE — 63710000001 INSULIN GLARGINE PER 5 UNITS: Performed by: STUDENT IN AN ORGANIZED HEALTH CARE EDUCATION/TRAINING PROGRAM

## 2024-09-07 PROCEDURE — 84443 ASSAY THYROID STIM HORMONE: CPT | Performed by: STUDENT IN AN ORGANIZED HEALTH CARE EDUCATION/TRAINING PROGRAM

## 2024-09-07 PROCEDURE — 82607 VITAMIN B-12: CPT | Performed by: STUDENT IN AN ORGANIZED HEALTH CARE EDUCATION/TRAINING PROGRAM

## 2024-09-07 PROCEDURE — 99214 OFFICE O/P EST MOD 30 MIN: CPT | Performed by: PSYCHIATRY & NEUROLOGY

## 2024-09-07 PROCEDURE — 99223 1ST HOSP IP/OBS HIGH 75: CPT | Performed by: STUDENT IN AN ORGANIZED HEALTH CARE EDUCATION/TRAINING PROGRAM

## 2024-09-07 PROCEDURE — 92610 EVALUATE SWALLOWING FUNCTION: CPT

## 2024-09-07 PROCEDURE — 86140 C-REACTIVE PROTEIN: CPT | Performed by: STUDENT IN AN ORGANIZED HEALTH CARE EDUCATION/TRAINING PROGRAM

## 2024-09-07 PROCEDURE — 82746 ASSAY OF FOLIC ACID SERUM: CPT | Performed by: STUDENT IN AN ORGANIZED HEALTH CARE EDUCATION/TRAINING PROGRAM

## 2024-09-07 PROCEDURE — 85652 RBC SED RATE AUTOMATED: CPT | Performed by: STUDENT IN AN ORGANIZED HEALTH CARE EDUCATION/TRAINING PROGRAM

## 2024-09-07 PROCEDURE — 82306 VITAMIN D 25 HYDROXY: CPT | Performed by: STUDENT IN AN ORGANIZED HEALTH CARE EDUCATION/TRAINING PROGRAM

## 2024-09-07 RX ORDER — ACETAMINOPHEN 650 MG/1
650 SUPPOSITORY RECTAL EVERY 4 HOURS PRN
Status: DISCONTINUED | OUTPATIENT
Start: 2024-09-07 | End: 2024-09-09 | Stop reason: HOSPADM

## 2024-09-07 RX ORDER — METOPROLOL TARTRATE 100 MG
100 TABLET ORAL 2 TIMES DAILY
Status: DISCONTINUED | OUTPATIENT
Start: 2024-09-07 | End: 2024-09-09 | Stop reason: HOSPADM

## 2024-09-07 RX ORDER — GABAPENTIN 300 MG/1
300 CAPSULE ORAL EVERY 12 HOURS SCHEDULED
Status: DISCONTINUED | OUTPATIENT
Start: 2024-09-07 | End: 2024-09-09 | Stop reason: HOSPADM

## 2024-09-07 RX ORDER — DEXAMETHASONE SODIUM PHOSPHATE 4 MG/ML
4 INJECTION, SOLUTION INTRA-ARTICULAR; INTRALESIONAL; INTRAMUSCULAR; INTRAVENOUS; SOFT TISSUE EVERY 6 HOURS
Status: DISCONTINUED | OUTPATIENT
Start: 2024-09-07 | End: 2024-09-09 | Stop reason: HOSPADM

## 2024-09-07 RX ORDER — ASPIRIN 81 MG/1
81 TABLET ORAL DAILY
Status: DISCONTINUED | OUTPATIENT
Start: 2024-09-07 | End: 2024-09-09 | Stop reason: HOSPADM

## 2024-09-07 RX ORDER — AMLODIPINE BESYLATE 2.5 MG/1
2.5 TABLET ORAL DAILY
Status: DISCONTINUED | OUTPATIENT
Start: 2024-09-07 | End: 2024-09-09 | Stop reason: HOSPADM

## 2024-09-07 RX ORDER — RANOLAZINE 500 MG/1
500 TABLET, EXTENDED RELEASE ORAL 2 TIMES DAILY
Status: DISCONTINUED | OUTPATIENT
Start: 2024-09-07 | End: 2024-09-09 | Stop reason: HOSPADM

## 2024-09-07 RX ORDER — LOSARTAN POTASSIUM 50 MG/1
50 TABLET ORAL 2 TIMES DAILY
Status: DISCONTINUED | OUTPATIENT
Start: 2024-09-07 | End: 2024-09-09 | Stop reason: HOSPADM

## 2024-09-07 RX ORDER — BISACODYL 10 MG
10 SUPPOSITORY, RECTAL RECTAL DAILY PRN
Status: DISCONTINUED | OUTPATIENT
Start: 2024-09-07 | End: 2024-09-09 | Stop reason: HOSPADM

## 2024-09-07 RX ORDER — SODIUM CHLORIDE 0.9 % (FLUSH) 0.9 %
10 SYRINGE (ML) INJECTION EVERY 12 HOURS SCHEDULED
Status: DISCONTINUED | OUTPATIENT
Start: 2024-09-07 | End: 2024-09-09 | Stop reason: HOSPADM

## 2024-09-07 RX ORDER — BISACODYL 5 MG/1
5 TABLET, DELAYED RELEASE ORAL DAILY PRN
Status: DISCONTINUED | OUTPATIENT
Start: 2024-09-07 | End: 2024-09-09 | Stop reason: HOSPADM

## 2024-09-07 RX ORDER — HEPARIN SODIUM 5000 [USP'U]/ML
5000 INJECTION, SOLUTION INTRAVENOUS; SUBCUTANEOUS EVERY 8 HOURS SCHEDULED
Status: DISCONTINUED | OUTPATIENT
Start: 2024-09-07 | End: 2024-09-09 | Stop reason: HOSPADM

## 2024-09-07 RX ORDER — HYDROCODONE BITARTRATE AND ACETAMINOPHEN 5; 325 MG/1; MG/1
1 TABLET ORAL EVERY 4 HOURS PRN
Status: DISCONTINUED | OUTPATIENT
Start: 2024-09-07 | End: 2024-09-09 | Stop reason: HOSPADM

## 2024-09-07 RX ORDER — NICOTINE POLACRILEX 4 MG
15 LOZENGE BUCCAL
Status: DISCONTINUED | OUTPATIENT
Start: 2024-09-07 | End: 2024-09-09 | Stop reason: HOSPADM

## 2024-09-07 RX ORDER — ROPINIROLE 2 MG/1
2 TABLET, FILM COATED ORAL NIGHTLY
Status: DISCONTINUED | OUTPATIENT
Start: 2024-09-07 | End: 2024-09-09 | Stop reason: HOSPADM

## 2024-09-07 RX ORDER — LIDOCAINE 4 G/G
2 PATCH TOPICAL
Status: DISCONTINUED | OUTPATIENT
Start: 2024-09-07 | End: 2024-09-09 | Stop reason: HOSPADM

## 2024-09-07 RX ORDER — ACETAMINOPHEN 160 MG/5ML
650 SOLUTION ORAL EVERY 4 HOURS PRN
Status: DISCONTINUED | OUTPATIENT
Start: 2024-09-07 | End: 2024-09-09 | Stop reason: HOSPADM

## 2024-09-07 RX ORDER — PREGABALIN 50 MG/1
50 CAPSULE ORAL EVERY 8 HOURS SCHEDULED
Status: DISCONTINUED | OUTPATIENT
Start: 2024-09-07 | End: 2024-09-09 | Stop reason: HOSPADM

## 2024-09-07 RX ORDER — SODIUM CHLORIDE 9 MG/ML
40 INJECTION, SOLUTION INTRAVENOUS AS NEEDED
Status: DISCONTINUED | OUTPATIENT
Start: 2024-09-07 | End: 2024-09-09 | Stop reason: HOSPADM

## 2024-09-07 RX ORDER — AMOXICILLIN 250 MG
2 CAPSULE ORAL 2 TIMES DAILY PRN
Status: DISCONTINUED | OUTPATIENT
Start: 2024-09-07 | End: 2024-09-09 | Stop reason: HOSPADM

## 2024-09-07 RX ORDER — PANTOPRAZOLE SODIUM 40 MG/1
40 TABLET, DELAYED RELEASE ORAL DAILY
Status: DISCONTINUED | OUTPATIENT
Start: 2024-09-07 | End: 2024-09-09 | Stop reason: HOSPADM

## 2024-09-07 RX ORDER — SODIUM CHLORIDE, SODIUM LACTATE, POTASSIUM CHLORIDE, CALCIUM CHLORIDE 600; 310; 30; 20 MG/100ML; MG/100ML; MG/100ML; MG/100ML
75 INJECTION, SOLUTION INTRAVENOUS CONTINUOUS
Status: DISCONTINUED | OUTPATIENT
Start: 2024-09-07 | End: 2024-09-09 | Stop reason: HOSPADM

## 2024-09-07 RX ORDER — HYDROMORPHONE HYDROCHLORIDE 1 MG/ML
0.5 INJECTION, SOLUTION INTRAMUSCULAR; INTRAVENOUS; SUBCUTANEOUS
Status: DISCONTINUED | OUTPATIENT
Start: 2024-09-07 | End: 2024-09-09 | Stop reason: HOSPADM

## 2024-09-07 RX ORDER — ACETAMINOPHEN 325 MG/1
650 TABLET ORAL EVERY 4 HOURS PRN
Status: DISCONTINUED | OUTPATIENT
Start: 2024-09-07 | End: 2024-09-09 | Stop reason: HOSPADM

## 2024-09-07 RX ORDER — BUPROPION HYDROCHLORIDE 150 MG/1
150 TABLET, EXTENDED RELEASE ORAL DAILY
Status: DISCONTINUED | OUTPATIENT
Start: 2024-09-07 | End: 2024-09-09 | Stop reason: HOSPADM

## 2024-09-07 RX ORDER — POLYETHYLENE GLYCOL 3350 17 G/17G
17 POWDER, FOR SOLUTION ORAL DAILY PRN
Status: DISCONTINUED | OUTPATIENT
Start: 2024-09-07 | End: 2024-09-09 | Stop reason: HOSPADM

## 2024-09-07 RX ORDER — IBUPROFEN 600 MG/1
1 TABLET ORAL
Status: DISCONTINUED | OUTPATIENT
Start: 2024-09-07 | End: 2024-09-09 | Stop reason: HOSPADM

## 2024-09-07 RX ORDER — CLOPIDOGREL BISULFATE 75 MG/1
75 TABLET ORAL DAILY
Status: DISCONTINUED | OUTPATIENT
Start: 2024-09-07 | End: 2024-09-09 | Stop reason: HOSPADM

## 2024-09-07 RX ORDER — DEXTROSE MONOHYDRATE 25 G/50ML
25 INJECTION, SOLUTION INTRAVENOUS
Status: DISCONTINUED | OUTPATIENT
Start: 2024-09-07 | End: 2024-09-09 | Stop reason: HOSPADM

## 2024-09-07 RX ORDER — SODIUM CHLORIDE 0.9 % (FLUSH) 0.9 %
10 SYRINGE (ML) INJECTION AS NEEDED
Status: DISCONTINUED | OUTPATIENT
Start: 2024-09-07 | End: 2024-09-09 | Stop reason: HOSPADM

## 2024-09-07 RX ORDER — INSULIN LISPRO 100 [IU]/ML
2-7 INJECTION, SOLUTION INTRAVENOUS; SUBCUTANEOUS
Status: DISCONTINUED | OUTPATIENT
Start: 2024-09-07 | End: 2024-09-09 | Stop reason: HOSPADM

## 2024-09-07 RX ORDER — NALOXONE HCL 0.4 MG/ML
0.4 VIAL (ML) INJECTION
Status: DISCONTINUED | OUTPATIENT
Start: 2024-09-07 | End: 2024-09-09 | Stop reason: HOSPADM

## 2024-09-07 RX ORDER — ALLOPURINOL 100 MG/1
100 TABLET ORAL 2 TIMES DAILY
Status: DISCONTINUED | OUTPATIENT
Start: 2024-09-07 | End: 2024-09-09 | Stop reason: HOSPADM

## 2024-09-07 RX ADMIN — PANTOPRAZOLE SODIUM 40 MG: 40 TABLET, DELAYED RELEASE ORAL at 08:03

## 2024-09-07 RX ADMIN — HYDROCODONE BITARTRATE AND ACETAMINOPHEN 1 TABLET: 5; 325 TABLET ORAL at 08:03

## 2024-09-07 RX ADMIN — HEPARIN SODIUM 5000 UNITS: 5000 INJECTION INTRAVENOUS; SUBCUTANEOUS at 14:18

## 2024-09-07 RX ADMIN — INSULIN LISPRO 3 UNITS: 100 INJECTION, SOLUTION INTRAVENOUS; SUBCUTANEOUS at 17:12

## 2024-09-07 RX ADMIN — POLYETHYLENE GLYCOL 3350 17 G: 17 POWDER, FOR SOLUTION ORAL at 08:00

## 2024-09-07 RX ADMIN — INSULIN LISPRO 2 UNITS: 100 INJECTION, SOLUTION INTRAVENOUS; SUBCUTANEOUS at 21:21

## 2024-09-07 RX ADMIN — HEPARIN SODIUM 5000 UNITS: 5000 INJECTION INTRAVENOUS; SUBCUTANEOUS at 06:35

## 2024-09-07 RX ADMIN — Medication 10 ML: at 21:23

## 2024-09-07 RX ADMIN — CLOPIDOGREL BISULFATE 75 MG: 75 TABLET ORAL at 08:02

## 2024-09-07 RX ADMIN — HYDROMORPHONE HYDROCHLORIDE 0.5 MG: 1 INJECTION, SOLUTION INTRAMUSCULAR; INTRAVENOUS; SUBCUTANEOUS at 02:56

## 2024-09-07 RX ADMIN — GABAPENTIN 300 MG: 300 CAPSULE ORAL at 21:22

## 2024-09-07 RX ADMIN — METOPROLOL TARTRATE 100 MG: 100 TABLET, FILM COATED ORAL at 21:22

## 2024-09-07 RX ADMIN — LIDOCAINE 2 PATCH: 4 PATCH TOPICAL at 08:00

## 2024-09-07 RX ADMIN — DEXAMETHASONE SODIUM PHOSPHATE 4 MG: 4 INJECTION, SOLUTION INTRA-ARTICULAR; INTRALESIONAL; INTRAMUSCULAR; INTRAVENOUS; SOFT TISSUE at 06:35

## 2024-09-07 RX ADMIN — PREGABALIN 50 MG: 50 CAPSULE ORAL at 21:22

## 2024-09-07 RX ADMIN — ALLOPURINOL 100 MG: 100 TABLET ORAL at 21:22

## 2024-09-07 RX ADMIN — DEXAMETHASONE SODIUM PHOSPHATE 4 MG: 4 INJECTION, SOLUTION INTRA-ARTICULAR; INTRALESIONAL; INTRAMUSCULAR; INTRAVENOUS; SOFT TISSUE at 12:42

## 2024-09-07 RX ADMIN — BISACODYL 5 MG: 5 TABLET, COATED ORAL at 08:03

## 2024-09-07 RX ADMIN — RANOLAZINE 500 MG: 500 TABLET, FILM COATED, EXTENDED RELEASE ORAL at 21:22

## 2024-09-07 RX ADMIN — HEPARIN SODIUM 5000 UNITS: 5000 INJECTION INTRAVENOUS; SUBCUTANEOUS at 21:21

## 2024-09-07 RX ADMIN — INSULIN LISPRO 2 UNITS: 100 INJECTION, SOLUTION INTRAVENOUS; SUBCUTANEOUS at 12:41

## 2024-09-07 RX ADMIN — DULOXETINE HYDROCHLORIDE 90 MG: 60 CAPSULE, DELAYED RELEASE ORAL at 08:03

## 2024-09-07 RX ADMIN — ROPINIROLE HYDROCHLORIDE 2 MG: 2 TABLET, FILM COATED ORAL at 21:22

## 2024-09-07 RX ADMIN — INSULIN GLARGINE 20 UNITS: 100 INJECTION, SOLUTION SUBCUTANEOUS at 21:21

## 2024-09-07 RX ADMIN — METOPROLOL TARTRATE 100 MG: 100 TABLET, FILM COATED ORAL at 08:03

## 2024-09-07 RX ADMIN — BUPROPION HYDROCHLORIDE 150 MG: 150 TABLET, EXTENDED RELEASE ORAL at 08:14

## 2024-09-07 RX ADMIN — RANOLAZINE 500 MG: 500 TABLET, FILM COATED, EXTENDED RELEASE ORAL at 08:03

## 2024-09-07 RX ADMIN — ACETAMINOPHEN 650 MG: 325 TABLET ORAL at 14:18

## 2024-09-07 RX ADMIN — DEXAMETHASONE SODIUM PHOSPHATE 4 MG: 4 INJECTION, SOLUTION INTRA-ARTICULAR; INTRALESIONAL; INTRAMUSCULAR; INTRAVENOUS; SOFT TISSUE at 17:12

## 2024-09-07 RX ADMIN — ASPIRIN 81 MG: 81 TABLET, COATED ORAL at 08:03

## 2024-09-07 RX ADMIN — GABAPENTIN 300 MG: 300 CAPSULE ORAL at 08:02

## 2024-09-07 RX ADMIN — HYDROCODONE BITARTRATE AND ACETAMINOPHEN 1 TABLET: 5; 325 TABLET ORAL at 21:59

## 2024-09-07 RX ADMIN — SENNOSIDES AND DOCUSATE SODIUM 2 TABLET: 50; 8.6 TABLET ORAL at 08:02

## 2024-09-07 RX ADMIN — ALLOPURINOL 100 MG: 100 TABLET ORAL at 08:03

## 2024-09-07 RX ADMIN — SODIUM CHLORIDE, POTASSIUM CHLORIDE, SODIUM LACTATE AND CALCIUM CHLORIDE 75 ML/HR: 600; 310; 30; 20 INJECTION, SOLUTION INTRAVENOUS at 06:36

## 2024-09-07 RX ADMIN — AMLODIPINE BESYLATE 2.5 MG: 2.5 TABLET ORAL at 08:02

## 2024-09-07 NOTE — THERAPY EVALUATION
Acute Care - Speech Language Pathology   Swallow Initial Evaluation [facility]  Clinical Swallow Evaluation       Patient Name: Monisha Gardner  : 1960  MRN: [id number]  Today's Date: 2024               Admit Date: 2024    Visit Dx:     ICD-10-CM ICD-9-CM   1. Dysphagia, oral phase  R13.11 787.21     Patient Active Problem List   Diagnosis    ASCVD (arteriosclerotic cardiovascular disease)    Essential hypertension    Dyslipidemia    Type 2 diabetes mellitus with hyperglycemia, with long-term current use of insulin    Body mass index (bmi) 30.0-30.9, adult    Osteoarthritis    Atypical angina    Elevated liver enzymes    Gastroesophageal reflux disease with esophagitis    Vitamin D deficiency    Hx of pancreatitis    Menopausal symptoms    Vitamin B 12 deficiency    Nocturnal leg cramps    Anxiety    Left arm weakness    Chronic gout of multiple sites    Squamous cell carcinoma in situ (SCCIS) of skin    Mixed hyperlipidemia    PVC (premature ventricular contraction)    RLS (restless legs syndrome)    Psychophysiological insomnia    Dry mouth    Type 2 diabetes mellitus with diabetic autonomic neuropathy, with long-term current use of insulin    Bone mass    Mass of mandible    Coronary artery disease    Abnormal nuclear stress test    CAD (coronary artery disease)    Falls     Past Medical History:   Diagnosis Date    Anxiety     Arthritis     Diabetes mellitus     Diverticulosis     GERD (gastroesophageal reflux disease)     Gout     Hyperlipidemia     Hypertension     Myocardial infarction      Past Surgical History:   Procedure Laterality Date    APPENDECTOMY      BREAST BIOPSY Right     benign    CARDIAC CATHETERIZATION      CARDIAC CATHETERIZATION N/A 2024    Procedure: Left Heart Cath;  Surgeon: Derrick Watts MD;  Location: [facility] CATH INVASIVE LOCATION;  Service: Cardiology;  Laterality: N/A;    CARDIAC CATHETERIZATION N/A 2024    Procedure: Stent VITALIY coronary;  Surgeon: Mac  MD Derrick;  Location:  [facility] CATH INVASIVE LOCATION;  Service: Cardiology;  Laterality: N/A;     SECTION      CHOLECYSTECTOMY      CORONARY STENT PLACEMENT      Stenting of the RCA with 3.5 x 26 mm Resolute drug-eluting stent    HYSTERECTOMY      JOINT REPLACEMENT      TONSILLECTOMY      TUMOR REMOVAL  02/15/2024    FACIAL TUMOR       SLP Recommendation and Plan  SLP Swallowing Diagnosis: suspect acute-on-chronic, oral dysphagia (24)  SLP Diet Recommendation: soft to chew textures, whole, thin liquids (24)  Recommended Precautions and Strategies: upright posture during/after eating, small bites of food and sips of liquid, multiple swallows per bite of food, alternate between small bites of food and sips of liquid, 3 second prep, fatigue precautions, general aspiration precautions (24)  SLP Rec. for Method of Medication Administration: meds whole, meds crushed, with puree, as tolerated (24)     Monitor for Signs of Aspiration: notify SLP if any concerns (24)  Recommended Diagnostics: other (see comments) (Diet tolerance) (24)  Swallow Criteria for Skilled Therapeutic Interventions Met: demonstrates skilled criteria (24)  Anticipated Discharge Disposition (SLP): home (24)  Rehab Potential/Prognosis, Swallowing: good, to achieve stated therapy goals (24)  Therapy Frequency (Swallow): PRN (24)  Predicted Duration Therapy Intervention (Days): 2 days, 3 days (24)  Oral Care Recommendations: Oral Care BID/PRN, Swab, Suction toothbrush, Other (see comments) (as tolerated) (24)                                        Outcome Evaluation: Swallowing evaluation completed. Difficult to determine acute vs. chronic oral impairment.  SLP to follow up re: diet tolerance and consider diet increase. Impaired speech with pt endorsing it is different from baseline. Need order for SLC evaluation.        SWALLOW EVALUATION (Last 72 Hours)       SLP Adult Swallow Evaluation       Row Name 09/07/24 8701                   Rehab Evaluation    Document Type evaluation  -ML        Subjective Information complains of;pain  -ML        Patient Observations alert;agree to therapy  -ML        Patient/Family/Caregiver Comments/Observations Pt reporting intense pain- back when she is sitting upright. 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 She has plans to return for more surgery.  In addition lower teeth extraction and limited upper teeth. Pt denies chemo/Rx.  Presumably sx 2/24- pt with hx of feeding tube  -ML        Symptoms Noted During/After Treatment other (see comments)  Persistent pain  -ML           General Information    Patient Profile Reviewed yes  -ML        Pertinent History Of Current Problem Transfer- fall-frequent falls, HTN, heart disease, DM II, weak, DDD- No hx of oral cancer in chart, however pt endorses oral cancer with removal of manible/place of titanium.  -ML        Current Method of Nutrition regular textures;thin liquids  -ML        Precautions/Limitations, Vision WFL;for purposes of eval  -ML        Precautions/Limitations, Hearing WFL;for purposes of eval  -ML        Prior Level of Function-Communication unknown  -ML        Prior Level of Function-Swallowing thin liquids;soft to chew  avoidance of breads, requires soft/chopped meat  -ML        Plans/Goals Discussed with patient;agreed upon  -ML        Barriers to Rehab medically complex  -ML        Patient's Goals for Discharge patient did not state  -ML           Pain    Additional Documentation Pain Scale: Numbers Pre/Post-Treatment  "(Group)  -ML           Pain Scale: Numbers Pre/Post-Treatment    Pretreatment Pain Rating 9/10  -ML        Posttreatment Pain Rating 9/10  -ML        Pain Location - back  -ML        Pre/Posttreatment Pain Comment Alerted RN of pt c/o \"9\"  -ML        Pain Intervention(s) Nursing Notified  Pt declined SLP attempts to reposition.  -ML           Oral Motor Structure and Function    Dentition Assessment missing teeth;other (see comments)  extraction of lower teeth  -ML        Secretion Management WNL/WFL  -ML        Mucosal Quality dry  -ML        Volitional Swallow delayed  -ML           Oral Musculature and Cranial Nerve Assessment    Oral Motor General Assessment oral labial or buccal impairment;lingual impairment;mandibular impairment  -ML        Mandibular Impairment Detail, Cranial Nerve V (Trigeminal) reduced mandibular ROM  -ML        Oral Labial or Buccal Impairment, Detail, Cranial Nerve VII (Facial): reduced ROM;other (see comments)  large sore on right lip- new-still able to suck from  a straw- RN aware and adressing with MD  -ML        Lingual Impairment, Detail. Cranial Nerves IX, XII (Glossopharyngeal and Hypoglossal) reduced lingual ROM;reduced strength  -ML           General Eating/Swallowing Observations    Respiratory Support Currently in Use room air  -ML        Eating/Swallowing Skills fed by SLP  -ML        Positioning During Eating upright in chair  -ML        Utensils Used spoon;straw  -ML        Consistencies Trialed soft to chew textures;mixed consistency;thin liquids;pureed  -ML           Clinical Swallow Eval    Oral Prep Phase impaired  -ML        Oral Transit impaired  -ML        Oral Residue impaired  -ML        Pharyngeal Phase no overt signs/symptoms of pharyngeal impairment  -ML        Esophageal Phase unremarkable  -ML        Clinical Swallow Evaluation Summary Oral dysphagia- acute on chronic. No obvious pharyngeal impairment.  -ML           Oral Prep Concerns    Oral Prep Concerns " prolonged mastication;inefficient mastication;poor rotary chew;incomplete or weak lip closure around spoon;anterior loss;incomplete bolus preparation;increased prep time  -ML        Oral Prep Concerns, Comment Slow to manipulate, prepare, and clear.  Chronic dysphagia but now acute on chronic.  Not fully agreeable to modified diet, however willing to try.  -ML           Swallowing Quality of Life Assessment    Education and counseling provided Safest diet options;Comfort diet options  -ML           SLP Evaluation Clinical Impression    SLP Swallowing Diagnosis suspect acute-on-chronic;oral dysphagia  -ML        Functional Impact risk of malnutrition;risk of dehydration  -ML        Rehab Potential/Prognosis, Swallowing good, to achieve stated therapy goals  -ML        Swallow Criteria for Skilled Therapeutic Interventions Met demonstrates skilled criteria  -ML           Recommendations    Therapy Frequency (Swallow) PRN  -ML        Predicted Duration Therapy Intervention (Days) 2 days;3 days  -ML        SLP Diet Recommendation soft to chew textures;whole;thin liquids  -ML        Recommended Diagnostics other (see comments)  Diet tolerance  -ML        Recommended Precautions and Strategies upright posture during/after eating;small bites of food and sips of liquid;multiple swallows per bite of food;alternate between small bites of food and sips of liquid;3 second prep;fatigue precautions;general aspiration precautions  -ML        Oral Care Recommendations Oral Care BID/PRN;Swab;Suction toothbrush;Other (see comments)  as tolerated  -ML        SLP Rec. for Method of Medication Administration meds whole;meds crushed;with puree;as tolerated  -ML        Monitor for Signs of Aspiration notify SLP if any concerns  -ML        Anticipated Discharge Disposition (SLP) home  -ML                  User Key  (r) = Recorded By, (t) = Taken By, (c) = Cosigned By      Initials Name Effective Dates    ML Lyndsay Morataya MS CCC-SLP  08/30/24 -                     EDUCATION  The patient has been educated in the following areas:   Dysphagia (Swallowing Impairment) Modified Diet Instruction.        SLP GOALS       Row Name 09/07/24 1430             (LTG) Patient will demonstrate functional swallow for    Diet Texture (Demonstrate functional swallow) regular textures  -ML      Liquid viscosity (Demonstrate functional swallow) thin liquids  -ML      [place] (Demonstrate functional swallow) independently (over 90% accuracy)  -ML      Time Frame (Demonstrate functional swallow) 1 week  -ML      Progress/Outcomes (Demonstrate functional swallow) new goal  -ML         (STG) Patient will tolerate trials of    Consistencies Trialed (Tolerate trials) soft to chew (whole) textures;thin liquids  -ML      Desired Outcome (Tolerate trials) with adequate oral prep/transit/clearance  -ML      [place] (Tolerate trials) independently (over 90% accuracy)  -ML      Time Frame (Tolerate trials) 1 week  -ML      Progress/Outcomes (Tolerate trials) new goal  -ML         (STG) Patient will tolerate therapeutic trials of    Consistencies Trialed (Tolerate therapeutic trials) regular textures  -ML      Desired Outcome (Tolerate therapeutic trials) with adequate oral prep/transit/clearance  -ML      [place] (Tolerate therapeutic trials) independently (over 90% accuracy)  -ML      Time Frame (Tolerate therapeutic trials) 1 week  -ML      Progress/Outcomes (Tolerate therapeutic trials) new goal  -ML                User Key  (r) = Recorded By, (t) = Taken By, (c) = Cosigned By      Initials Name Provider Type    Lyndsay Baldwin MS CCC-SLP Speech and Language Pathologist                         Time Calculation:    Time Calculation- SLP       Row Name 09/07/24 1536             Time Calculation- SLP    SLP Start Time 1430  -ML      SLP Received On 09/07/24  -ML                User Key  (r) = Recorded By, (t) = Taken By, (c) = Cosigned By      Initials  Name Provider Type     Lyndsay Morataya, MS CCC-SLP Speech and Language Pathologist                    Therapy Charges for Today       Code Description Service Date Service Provider Modifiers Qty    [id number] HC ST EVAL ORAL PHARYNG SWALLOW 4 9/7/2024 Lyndsay Morataya, MS CCC-SLP GN 1                 Lyndsay Morataya MS CCC-SLP  9/7/2024

## 2024-09-07 NOTE — PLAN OF CARE
Goal Outcome Evaluation:              Outcome Evaluation: Swallowing evaluation completed. Difficult to determine acute vs. chronic oral impairment.  SLP to follow up re: diet tolerance and consider diet increase.      Anticipated Discharge Disposition (SLP): home          SLP Swallowing Diagnosis: suspect acute-on-chronic, oral dysphagia (09/07/24 1430)

## 2024-09-07 NOTE — PROGRESS NOTES
[facility] Medicine Services  ADMISSION FOLLOW-UP NOTE          Patient admitted after midnight, H&P by my partner performed earlier on today's date reviewed.  Interim findings, labs, and charting also reviewed.        The [facility] Hospital Problem List has been managed and updated to include any new diagnoses:  Active Hospital Problems    Diagnosis  POA    Falls [W19.XXXA]  Unknown    CAD (coronary artery disease) [I25.10]  Yes    Nocturnal leg cramps [G47.62]  Yes    Essential hypertension [I10]  Yes    Osteoarthritis [M19.90]  Yes      Resolved Hospital Problems   No resolved problems to display.         ADDITIONAL PLAN:  - detailed assessment and plan from admission reviewed    Hip and back pain, multiple falls  -MRI is obtained and significant for moderate canal narrowing lower spine, otherwise no red flag symptoms or severe critical stenosis.  She has no claudication symptoms.  Tenderness along inguinal ligament suggest possible inflammatory component  -Check inflammatory markers, vitamin levels, CK  -start decadron for now  -OSH d/w neurology, formal consult to neuro   -NS following, d/w NS PA who recs rehab.  Family worried about sacrum/coccyx injury so recs MRI pelvis  -Neuro following, recs that likely d/t diabetic polyneuropathy affecting gait and balance.  He recs aggressive PT dedicated to gait and balance as an outpatient.  -will start low dose lyrica.  D/w risks with patient family including sedation/decreased thinking     SIMON on CKD  -Hold nephrotoxic meds, provide IV fluids and monitor     T2DM  -had 45 u long acting last night relatively hypoglycemic, reduced dose to 20u, continue ssi     CAD  HTN  -c/w home meds, except for ARB    Expected Discharge   Expected discharge date/ time has not been documented.     Shane Duckworth MD  09/07/24

## 2024-09-07 NOTE — PLAN OF CARE
Problem: Skin Injury Risk Increased  Goal: Skin Health and Integrity  Outcome: Ongoing, Progressing  Intervention: Optimize Skin Protection  Description: Perform a full pressure injury risk assessment, as indicated by screening, upon admission to care unit.  Reassess skin (injury risk, full inspection) frequently (e.g., scheduled interval, with change in condition) to provide optimal early detection and prevention.  Maintain adequate tissue perfusion (e.g., encourage fluid balance; avoid crossing legs, constrictive clothing or devices) to promote tissue oxygenation.  Maintain head of bed at lowest degree of elevation tolerated, considering medical condition and other restrictions.  Avoid positioning onto an area that remains reddened.  Minimize incontinence and moisture (e.g., toileting schedule; moisture-wicking pad, diaper or incontinence collection device; skin moisture barrier).  Cleanse skin promptly and gently when soiled utilizing a pH-balanced cleanser.  Relieve and redistribute pressure (e.g., scheduled position changes, weight shifts, use of support surface, medical device repositioning, protective dressing application, use of positioning device, microclimate control, use of pressure-injury-monitor  Encourage increased activity, such as sitting in a chair at the bedside or early mobilization, when able to tolerate.     Problem: Pain Acute  Goal: Acceptable Pain Control and Functional Ability  Outcome: Ongoing, Progressing  Intervention: Prevent or Manage Pain  Description: Evaluate pain level, effect of treatment and patient response at regular intervals.  Minimize painful stimuli; coordinate care and adjust environment (e.g., light, noise, unnecessary movement); promote sleep/rest.  Match pharmacologic analgesia to severity and type of pain mechanism (e.g., neuropathic, muscle, inflammatory); consider multimodal approach (e.g., nonopioid, opioid, adjuvant).  Provide medication at regular intervals;  titrate to patient response; premedicate for painful procedures.  Manage breakthrough pain with additional doses; consider rotation or switching medication.  Monitor for signs of substance tolerance (increased dose to reach desired effect, decreased effect with same dose).  Manage medication-induced effects, such as constipation, nausea, pruritus, urinary retention, somnolence and dizziness.  Provide multimodal interventions, such as as physical activity, therapeutic exercise, yoga, TENS (transcutaneous electrical nerve stimulation) and manual therapy.  Train in functional activity modifications, such as body mechanics, posture, ergonomics, energy conservation and activity pacing.  Consider addition of complementary or alternative therapy, such as acupuncture, hypnosis or therapeutic touch.  Intervention: Develop Pain Management Plan  Description: Acknowledge patient as the expert in pain self-management.  Use a consistent, validated tool for pain assessment; include function and quality of life.  Evaluate risk for opioid use and dependence.  Set pain management goals; determine acceptable level of discomfort to allow for maximal functioning.  Determine [id number] pain management plan, including both pharmacologic and nonpharmacologic measures; integrate management of chronic (persistent) pain.  Identify and integrate past successful treatment measures, if able.  Encourage patient and caregiver involvement in pain assessment, interventions and safety measures.  Re-evaluate plan regularly.  Intervention: Optimize Psychosocial Wellbeing  Description: Facilitate patient’s self-control over pain by providing pain information and allowing choices in treatment.  Consider and address emotional response to pain.  Explore and promote use of coping strategies; address barriers to successful coping.  Evaluate and assist with psychosocial, cultural and spiritual factors impacting pain.  Modify pain perception using  techniques, such as distraction, mindfulness, guided imagery, meditation or music.  Assess for risk factors for developing chronic pain, such as depression, fear, pain avoidance and pain catastrophizing.  Consider referral for ongoing coping support, such as education, relaxation training and role of thoughts.     Problem: Mobility Impairment  Goal: Optimal Mobility  Outcome: Ongoing, Progressing  Intervention: Optimize Mobility  Description: Assess mobility skills (e.g., bed, transfers, ambulation, gait, stair climbing, wheelchair) and factors influencing mobility, such as balance, safety, range of motion, strength, muscle tone, cognition and sensory processing.  Instruct in transfer and mobility techniques supporting highest level of independence while ensuring safety.  Consider any contraindications or precautions to individualize treatment plan (e.g., joint or ligament instability, weightbearing restrictions).  Encourage early mobilization and performance of daily activities, if able, while providing level of assistance needed for safety.  Schedule mobility activities when pain and fatigue are at a minimum to encourage optimal performance.  Pace activity; allow adequate time and rest periods to conserve energy.  Provide frequent encouragement, along with prompting and assistance as needed.  Individualize instructions and prompts to patient’s cognitive status to promote effective communication; simplify verbal directions, give encouragement and provide demonstrated cues as needed.  Design and implement therapeutic interventions to address impairments (e.g., functional mobility training, mat and standing balance activities, strengthening).  Train in and reinforce use of adaptive equipment and assistive devices, such as a walker or transfer board.  Utilize appropriate modalities, devices or techniques to facilitate mobility (e.g., ankle foot orthosis, electrical stimulation, sit-to-stand lift,  treadmill-training).  Assess fall risk using standardized tool; implement appropriate interventions, such as behavioral or environmental modifications.  Use proper body mechanics and patient alignment during mobility to ensure safety.   Goal Outcome Evaluation:

## 2024-09-07 NOTE — CONSULTS
Neurology    Referring Provider: ANH Lua*    Reason for Consultation: Frequent falls.    Chief complaint: Frequent falls.    HPI:  Patient is a 64-year-old female with past medical history of type 2 diabetes on insulin, hyperlipidemia, hypertension, coronary artery disease who presented to the hospital with complaint of generalized weakness and falls.  Per patient, in last 2 months, she has had multiple falls.  She reports that typically she will fall onto her back.  She denies any symptoms prior to falling.  She started falling in August and since then has had some tailbone pain and sometimes pain would radiate down to both legs.  Since hospital admission, she has completed MRI of brain, thoracic spine and lumbar spine.  MRI brain showed mild age-related nonspecific white matter changes.  Thoracic spine and lumbar spine showed minimal to mild degenerative changes.  She does report symptoms of diabetic neuropathy and has been on gabapentin for quite some time.  She does report numbness, tingling in both her feet.    Current Facility-Administered Medications:     acetaminophen (TYLENOL) tablet 650 mg, 650 mg, Oral, Q4H PRN **OR** acetaminophen (TYLENOL) 160 MG/5ML oral solution 650 mg, 650 mg, Oral, Q4H PRN **OR** acetaminophen (TYLENOL) suppository 650 mg, 650 mg, Rectal, Q4H PRN, Armand Azevedo MD    allopurinol (ZYLOPRIM) tablet 100 mg, 100 mg, Oral, BID, Armand Azevedo MD, 100 mg at 09/07/24 0803    amLODIPine (NORVASC) tablet 2.5 mg, 2.5 mg, Oral, Daily, Armand Azevedo MD, 2.5 mg at 09/07/24 0802    aspirin EC tablet 81 mg, 81 mg, Oral, Daily, Armand Azevedo MD, 81 mg at 09/07/24 0803    sennosides-docusate (PERICOLACE) 8.6-50 MG per tablet 2 tablet, 2 tablet, Oral, BID PRN, 2 tablet at 09/07/24 0802 **AND** polyethylene glycol (MIRALAX) packet 17 g, 17 g, Oral, Daily PRN, 17 g at 09/07/24 0800 **AND** bisacodyl (DULCOLAX) EC tablet 5 mg, 5 mg, Oral, Daily PRN, 5 mg at 09/07/24 0803  **AND** bisacodyl (DULCOLAX) suppository 10 mg, 10 mg, Rectal, Daily PRN, Armand Azevedo MD    buPROPion SR (WELLBUTRIN SR) 12 hr tablet 150 mg, 150 mg, Oral, Daily, Armand Azevedo MD, 150 mg at 09/07/24 0814    Calcium Replacement - Follow Nurse / BPA Driven Protocol, , Does not apply, PRN, Armand Azevedo MD    clopidogrel (PLAVIX) tablet 75 mg, 75 mg, Oral, Daily, Armand Azevedo MD, 75 mg at 09/07/24 0802    dexAMETHasone (DECADRON) injection 4 mg, 4 mg, Intravenous, Q6H, Armand Azevedo MD, 4 mg at 09/07/24 1242    dextrose (D50W) (25 g/50 mL) IV injection 25 g, 25 g, Intravenous, Q15 Min PRN, Armand Azevedo MD    dextrose (GLUTOSE) oral gel 15 g, 15 g, Oral, Q15 Min PRN, Armand Azevedo MD    DULoxetine (CYMBALTA) DR capsule 90 mg, 90 mg, Oral, Daily, Armand Azevedo MD, 90 mg at 09/07/24 0803    gabapentin (NEURONTIN) capsule 300 mg, 300 mg, Oral, Q12H, Armand Azevedo MD, 300 mg at 09/07/24 0802    glucagon (GLUCAGEN) injection 1 mg, 1 mg, Intramuscular, Q15 Min PRN, Armand Azevedo MD    heparin (porcine) 5000 UNIT/ML injection 5,000 Units, 5,000 Units, Subcutaneous, Q8H, Armand Azevedo MD, 5,000 Units at 09/07/24 0635    HYDROcodone-acetaminophen (NORCO) 5-325 MG per tablet 1 tablet, 1 tablet, Oral, Q4H PRN, Armand Azevedo MD, 1 tablet at 09/07/24 0803    HYDROmorphone (DILAUDID) injection 0.5 mg, 0.5 mg, Intravenous, Q2H PRN, 0.5 mg at 09/07/24 0256 **AND** naloxone (NARCAN) injection 0.4 mg, 0.4 mg, Intravenous, Q5 Min PRN, Armand Azevedo MD    insulin glargine (LANTUS, SEMGLEE) injection 20 Units, 20 Units, Subcutaneous, Nightly, Armand Azevedo MD    Insulin Lispro (humaLOG) injection 2-7 Units, 2-7 Units, Subcutaneous, 4x Daily AC & at Bedtime, Armand Azevedo MD, 2 Units at 09/07/24 1241    lactated ringers infusion, 75 mL/hr, Intravenous, Continuous, Armand Azevedo MD, Last Rate: 75 mL/hr at 09/07/24 0636, 75 mL/hr at 09/07/24 0636    Lidocaine 4 % 2 patch, 2  patch, Transdermal, Q24H, Armand Azevedo MD, 2 patch at 24 0800    [Held by provider] losartan (COZAAR) tablet 50 mg, 50 mg, Oral, BID, Armand Azevedo MD    Magnesium Standard Dose Replacement - Follow Nurse / BPA Driven Protocol, , Does not apply, Jolly NGUYỄN John L, MD    metoprolol tartrate (LOPRESSOR) tablet 100 mg, 100 mg, Oral, BID, Armand Azevedo MD, 100 mg at 24 0803    pantoprazole (PROTONIX) EC tablet 40 mg, 40 mg, Oral, Daily, Armand Azevedo MD, 40 mg at 24 0803    Phosphorus Replacement - Follow Nurse / BPA Driven Protocol, , Does not apply, Jolly NGUYỄN John L, MD    Potassium Replacement - Follow Nurse / BPA Driven Protocol, , Does not apply, Jolly NGUYỄN John L, MD    ranolazine (RANEXA) 12 hr tablet 500 mg, 500 mg, Oral, BID, Armand Azevedo MD, 500 mg at 24 0803    rOPINIRole (REQUIP) tablet 2 mg, 2 mg, Oral, Nightly, Armand Azevedo MD    sodium chloride 0.9 % flush 10 mL, 10 mL, Intravenous, Q12H, Armand Azevedo MD    sodium chloride 0.9 % flush 10 mL, 10 mL, Intravenous, PRN, Armand Azevedo MD    sodium chloride 0.9 % infusion 40 mL, 40 mL, Intravenous, PRN, Armand Azevedo MD       Past Medical History:   Diagnosis Date    Anxiety     Arthritis     Diabetes mellitus     Diverticulosis     GERD (gastroesophageal reflux disease)     Gout     Hyperlipidemia     Hypertension     Myocardial infarction         Past Surgical History:   Procedure Laterality Date    APPENDECTOMY      BREAST BIOPSY Right     benign    CARDIAC CATHETERIZATION      CARDIAC CATHETERIZATION N/A 2024    Procedure: Left Heart Cath;  Surgeon: Derrick Watts MD;  Location:  [facility];  Service: Cardiology;  Laterality: N/A;    CARDIAC CATHETERIZATION N/A 2024    Procedure: Stent VITALIY coronary;  Surgeon: Derrick Watts MD;  Location:  [facility];  Service: Cardiology;  Laterality: N/A;     SECTION      CHOLECYSTECTOMY       "CORONARY STENT PLACEMENT      Stenting of the RCA with 3.5 x 26 mm Resolute drug-eluting stent    HYSTERECTOMY      JOINT REPLACEMENT      TONSILLECTOMY      TUMOR REMOVAL  02/15/2024    FACIAL TUMOR        Family History   Problem Relation Age of Onset    Arthritis Mother     Heart disease Mother     Heart disease Sister     Vision loss Daughter     Heart disease Father     Heart disease Sister     Heart disease Sister     Heart disease Sister         Social History     Socioeconomic History    Marital status:     Number of children: 2    Years of education: 12   Tobacco Use    Smoking status: Never     Passive exposure: Never    Smokeless tobacco: Never   Vaping Use    Vaping status: Never Used   Substance and Sexual Activity    Alcohol use: No    Drug use: No    Sexual activity: Defer       Review of Systems    Pertinent items are noted in HPI, all other systems reviewed and negative     Objective:    /64 (BP Location: Right arm, Patient Position: Lying)   Pulse 67   Temp 97.9 °F (36.6 °C) (Oral)   Resp 18   Ht 162.6 cm (64\")   Wt 71.1 kg (156 lb 11.2 oz)   LMP  (LMP Unknown)   SpO2 96%   BMI 26.90 kg/m²     Exam:     Cardiovascular:  No rubs, gallops or murmurs. Regular rate and rhythm.     Neurology Exam:    General appearance: NAD.     Mental status: Alert, awake and oriented to time place and person.    Language and Speech: Intact- No dysarthria.    Fluency, Naming , Repetition and Comprehension:  Intact    Cranial Nerves:   CN II: Visual fields are full. Intact. Pupils - LEROY  CN III, IV and VI: Extraocular movements are intact. Normal saccades.   CN V: Facial sensation is intact.   CN VII: Muscles of facial expression reveal no asymmetry. Intact.   CN VIII: Hearing is intact. Whispered voice intact.   CN IX and X: Palate elevates symmetrically. Intact  CN XI: Shoulder shrug is intact.   CN XII: Tongue is midline without evidence of atrophy or fasciculation.     Ophthalmoscopic Exam: " Fundi - Normal, No papilledema    Motor:  Right UE muscle strength 5-/5. Normal tone.     Left UE muscle strength 5-/5. Normal tone.      Right LE muscle strength 5-/5. Normal tone.     Left LE muscle strength 5-/5. Normal tone.      Overall poor effort noted.    Sensory: Normal light touch, vibration and pinprick sensation bilaterally.    DTRs: 1+ bilaterally in upper and lower extremities.    Babinski: Negative bilaterally.    Coordination: Normal finger-to-nose, heel to holley B/L.    Romberg: Not assessed.    Gait: Not assessed.    Results Reviewed:     Labs:  Most recent labs have been reviewed.    Results from last 7 days   Lab Units 09/07/24  1049   WBC 10*3/mm3 7.56   HEMOGLOBIN g/dL 12.2   HEMATOCRIT % 37.9   PLATELETS 10*3/mm3 280     Results from last 7 days   Lab Units 09/07/24  1049   SODIUM mmol/L 135*   POTASSIUM mmol/L 4.9   CHLORIDE mmol/L 100   CO2 mmol/L 22.0   BUN mg/dL 24*   CREATININE mg/dL 1.56*   CALCIUM mg/dL 10.0       Radiology: MRI Lumbar Spine Without Contrast    Result Date: 9/5/2024  1. Degenerative disc disease and facet arthropathy with central canal and neuroforaminal stenosis at levels detailed above. 2. Central canal and neuroforaminal stenosis is most pronounced at the L4/5 level. 3. Facet joint inflammation at L4/5. 4. No fracture or traumatic malalignment.  This report was finalized on 9/5/2024 4:43 PM by Dr. Sai Gómez MD.      MRI Thoracic Spine Without Contrast    Result Date: 9/5/2024  1.  No levels of significant central canal or neuroforaminal stenosis identified. 2.  No acute appearing disc herniations. 3.  No fracture or malalignment identified. 4.  No paraspinal fluid collections noted.   This report was finalized on 9/5/2024 4:37 PM by Dr. Sai Gómez MD.      MRI Brain Without Contrast    Result Date: 9/5/2024  1. No acute intracranial abnormality. 2. Otherwise unremarkable exam.  This report was finalized on 9/5/2024 4:23 PM by Dr. Sai Gómez MD.      XR  Chest 1 View    Result Date: 9/5/2024    Unremarkable exam. No acute cardiopulmonary findings identified.   This report was finalized on 9/5/2024 3:44 PM by Dr. Sai Gómez MD.      CT Pelvis Without Contrast    Result Date: 9/5/2024  1.  No evidence of acute displaced fracture or dislocation. 2.  Extensive patient motion artifact at the level of the pubic rami limits assessment in this region. 3.  Left hip arthroplasty changes noted without evidence of hardware complication.   This report was finalized on 9/5/2024 3:07 PM by Dr. Sai Gómez MD.       I personally reviewed MRI brain without contrast which showed mild age-related nonspecific changes.  MRI of thoracic spine did not reveal any abnormalities.  MRI of lumbar spine showed mild to moderate degenerative changes at L4-L5.        Assessment & Plan     1.  Frequent falls:  -Likely secondary to diabetic peripheral polyneuropathy affecting gait and balance.  She has completed MRI brain, thoracic and lumbar spine without any abnormalities.  MRI of lumbar spine showed only mild to moderate degenerative changes at L4-L5.  She denies any radicular symptoms and these changes will not explain her frequent falls.  I recommend that she will need aggressive physical therapy dedicated to gait and balance as an outpatient to improve her balance and likely will need some assistive device to prevent her from falling in future.  -Continue to maintain strict glycemic control.  -OT, PT.    I discussed the patients findings and my recommendations with patient and family    As part of this visit I reviewed prior lab results, reviewed radiology results, reviewed radiology images and reviewed records from the current hospitalization which is incorporated in the HPI. Please see above for details.          Jw Werner MD  09/07/24  14:01 EDT

## 2024-09-07 NOTE — ED NOTES
[facility] here to transport patient to [facility].

## 2024-09-07 NOTE — H&P
[facility] Medicine Services  HISTORY AND PHYSICAL    Patient Name: Monisha Gardner  : 1960  MRN: [id number]  Primary Care Physician: Kimmy Vela PA  Date of admission: 2024      Subjective   Subjective     Chief Complaint:  Weakness, back pain    HPI:  Monisha Gardner is a 64 y.o. female with past medical history of type 2 diabetes on insulin, gout, hyperlipidemia, hypertension, coronary artery disease who presents with weakness and falls.  Over the last 2 months she has had multiple falls.  It started in August when she was coming down the stairs and she fell onto her back.  Since then she has been having pain in her lower buttocks and thighs laterally.  Worse when she bends forward, better when she lays backwards.  Occasionally she gets shooting pain down the front of her legs.  She also reports that she has multiple falls since then while she is ambulating or standing upright she states that she abruptly falls backwards.  She does not have any preceding symptoms or loss of consciousness.  She reports 1 episode of stool incontinence 2 weeks ago as she was having diarrhea at the time but since then no further episodes.  No urinary incontinence.  No numbness or tingling in the extremities but does have a little bit of neuropathy distally that is chronic.  She also does not have any changes in sensation of her genital or rectal area.      Personal History     Past Medical History:   Diagnosis Date    Anxiety     Arthritis     Diabetes mellitus     Diverticulosis     GERD (gastroesophageal reflux disease)     Gout     Hyperlipidemia     Hypertension     Myocardial infarction          Oncology Problem List:  Squamous cell carcinoma in situ (SCCIS) of skin (2020; Status:   Active)    Oncology/Hematology History     Past Surgical History:   Procedure Laterality Date    APPENDECTOMY      BREAST BIOPSY Right     benign    CARDIAC CATHETERIZATION      CARDIAC  CATHETERIZATION N/A 2024    Procedure: Left Heart Cath;  Surgeon: Derrick Watts MD;  Location:  [facility];  Service: Cardiology;  Laterality: N/A;    CARDIAC CATHETERIZATION N/A 2024    Procedure: Stent VITALIY coronary;  Surgeon: Derrick Watts MD;  Location:  [facility];  Service: Cardiology;  Laterality: N/A;     SECTION      CHOLECYSTECTOMY      CORONARY STENT PLACEMENT      Stenting of the RCA with 3.5 x 26 mm Resolute drug-eluting stent    HYSTERECTOMY      JOINT REPLACEMENT      TONSILLECTOMY      TUMOR REMOVAL  02/15/2024    FACIAL TUMOR       Family History: family history includes Arthritis in her mother; Heart disease in her father, mother, sister, sister, sister, and sister; Vision loss in her daughter.     Social History:  reports that she has never smoked. She has never been exposed to tobacco smoke. She has never used smokeless tobacco. She reports that she does not drink alcohol and does not use drugs.  Social History     Social History Narrative    Not on file       Medications:  Available home medication information reviewed.  DULoxetine, Evolocumab, Insulin Glargine, Semaglutide (2 MG/DOSE), allopurinol, amLODIPine, aspirin, buPROPion SR, clopidogrel, dexAMETHasone, fenofibrate, gabapentin, hydrOXYzine pamoate, ibuprofen, ketorolac, lidocaine, losartan, methocarbamol, metoprolol tartrate, ondansetron, orphenadrine, pantoprazole, rOPINIRole, and ranolazine    Allergies   Allergen Reactions    Ciprofloxacin Anaphylaxis    Penicillins Shortness Of Breath    Statins Myalgia    Fish Oil [Omega-3 Fatty Acids] Nausea And Vomiting       Objective   Objective     Vital Signs:   Temp:  [97.7 °F (36.5 °C)-98 °F (36.7 °C)] 97.7 °F (36.5 °C)  Heart Rate:  [71-89] 71  Resp:  [18] 18  BP: (106-147)/(52-99) 145/75       Physical Exam   Awake alert and oriented x 3  Resting comfortably in bed  Extract movements intact, pupils equal and reactive to light, facial  expression intact  Neck flexion intact  No tenderness along spine or sacrum  Reflexes 2+ and symmetric upper extremities, strength intact and symmetric upper extremities  There is tenderness on palpation of the pelvis laterally on the left side as well as bilateral tenderness when the inguinal ligament is palpated that causes radiating pain down the front of the legs  Lower extremity active and passive range of motion is limited by pain, there is no clonus or sensory loss.  There is no sensory loss in the buttocks  Right lower extremity patellar reflex 3+ compared to left lower extremity 2+  Distal pulses equal and symmetric  Heart regular rate and rhythm  Lungs clear to auscultation bilaterally  Abdomen is soft and nontender  There is trace bilateral lower extremity edema    Result Review:  I have personally reviewed the results from the time of this admission to 9/7/2024 04:16 EDT and agree with these findings:  [x]  Laboratory list / accordion  []  Microbiology  [x]  Radiology  [x]  EKG/Telemetry   []  Cardiology/Vascular   []  Pathology  [x]  Old records  []  Other:  Most notable findings include: See assessment and plan      LAB RESULTS:      Lab 09/05/24  1506   WBC 10.51   HEMOGLOBIN 10.8*   HEMATOCRIT 33.4*   PLATELETS 298   NEUTROS ABS 5.53   IMMATURE GRANS (ABS) 0.09*   LYMPHS ABS 3.76*   MONOS ABS 0.75   EOS ABS 0.28   MCV 83.3         Lab 09/05/24  1506   SODIUM 133*   POTASSIUM 4.7   CHLORIDE 98   CO2 23.2   ANION GAP 11.8   BUN 24*   CREATININE 1.67*   EGFR 34.3*   GLUCOSE 83   CALCIUM 9.8   HEMOGLOBIN A1C 5.80*         Lab 09/05/24  1506   TOTAL PROTEIN 6.1   ALBUMIN 3.8   GLOBULIN 2.3   ALT (SGPT) 10   AST (SGOT) 19   BILIRUBIN 0.3   ALK PHOS 56         Lab 09/05/24  1506   HSTROP T 12                 UA          10/6/2023    13:40 9/5/2024    17:14   Urinalysis   Squamous Epithelial Cells, UA 0-2  0-2    Specific Gravity, UA 1.015  1.006    Ketones, UA Negative  Negative    Blood, UA Negative   Negative    Leukocytes, UA Trace  Trace    Nitrite, UA Negative  Negative    RBC, UA None Seen  None Seen    WBC, UA 0-2  0-2    Bacteria, UA None Seen  None Seen        Microbiology Results (last 10 days)       ** No results found for the last 240 hours. **            MRI Lumbar Spine Without Contrast    Result Date: 9/5/2024  EXAMINATION: MRI LUMBAR SPINE WO CONTRAST-  CLINICAL INDICATION: back pain  COMPARISON: None  TECHNIQUE: Multiplanar, multisequence MR imaging performed through the lumbar spine WITHOUT contrast.  FINDINGS:  HARDWARE: No MRI evidence of hardware.  NUMBERING/ALIGNMENT:? 5 lumbar vertebral body segments. Intact vertebral body alignment.  SPINAL CORD:? Conus terminates at the? L1/2 level.  BONES: No fracture. No marrow signal abnormality.  POSTERIOR ELEMENTS: Posterior elements are intact.  SOFT TISSUES: The visualized paraspinal soft tissues are unremarkable.   DISC SPACES/NEUROFORAMINA: T12-L1: No disk bulge or protrusion.  No central canal or neuroforaminal stenosis. L1/2: Very mild annular disc bulge. No stenosis. L2/3: Mild annular disc bulge. No stenosis. L3/4: Mild annular disc bulge. Mild facet arthropathy. Mild central canal and neuroforaminal stenosis. L4/5: Broad-based posterior disc bulge. Advanced facet arthropathy with facet joint inflammation. Moderate central canal stenosis and lateral recess stenosis. Moderate to severe neuroforaminal stenosis. L5/S1: Broad-based posterior disc osteophyte complex eccentric to right. Mild facet arthropathy. Moderate neural foraminal stenosis.  OTHER: No additional remarkable findings.      Impression: 1. Degenerative disc disease and facet arthropathy with central canal and neuroforaminal stenosis at levels detailed above. 2. Central canal and neuroforaminal stenosis is most pronounced at the L4/5 level. 3. Facet joint inflammation at L4/5. 4. No fracture or traumatic malalignment.  This report was finalized on 9/5/2024 4:43 PM by Dr. Sai BERNABE  MD [place].      MRI Thoracic Spine Without Contrast    Result Date: 9/5/2024  EXAM:   MR Thoracic Spine Without Intravenous Contrast  EXAM DATE:   9/5/2024 3:36 PM  CLINICAL HISTORY:   back pain  TECHNIQUE:   Magnetic resonance images of the thoracic spine without intravenous contrast in multiple planes.  COMPARISON:   No relevant prior studies available.  FINDINGS:   Vertebrae:  Unremarkable as visualized.  No fracture or malalignment identified.   Discs/spinal canal/neural foramina:  Multilevel degenerative disc disease with anterior osteophyte formation and disc space narrowing is noted spanning the T3/4 through T12/L1 levels.  No levels of significant central canal or neuroforaminal stenosis identified.  No acute appearing disc herniations.  No paraspinal fluid collections noted.   Spinal cord:  Unremarkable as visualized.  Normal signal.   Soft tissues:  Unremarkable as visualized.      Impression: 1.  No levels of significant central canal or neuroforaminal stenosis identified. 2.  No acute appearing disc herniations. 3.  No fracture or malalignment identified. 4.  No paraspinal fluid collections noted.   This report was finalized on 9/5/2024 4:37 PM by Dr. Sai Gómez MD.      MRI Brain Without Contrast    Result Date: 9/5/2024  EXAM:   MR Head Without Intravenous Contrast  EXAM DATE:   9/5/2024 3:36 PM  CLINICAL HISTORY:   weakness  TECHNIQUE:   Magnetic resonance images of the head/brain without intravenous contrast in multiple planes.  COMPARISON:   No relevant prior studies available.  FINDINGS:   Brain and extra-axial spaces:  Unremarkable as visualized.  No mass. No hemorrhage.  No acute infarct.  No ventriculomegaly.   Bones/joints:  Unremarkable as visualized.   Sinuses:  Unremarkable as visualized.  No acute sinusitis.   Mastoid air cells:  Unremarkable as visualized.  No mastoid effusion.   Orbits:  Unremarkable as visualized.      Impression: 1. No acute intracranial abnormality. 2. Otherwise  unremarkable exam.  This report was finalized on 9/5/2024 4:23 PM by Dr. Sai Gómez MD.      XR Chest 1 View    Result Date: 9/5/2024  EXAM:   XR Chest, 1 View  EXAM DATE:   9/5/2024 3:41 PM  CLINICAL HISTORY:   Chest Pain Protocol  TECHNIQUE:   Frontal view of the chest.  COMPARISON:   6/28/2024  FINDINGS:   Lungs and pleural spaces:  Unremarkable as visualized.  No consolidation.  No pneumothorax.   Heart:  Unremarkable as visualized.  No cardiomegaly.   Mediastinum:  Unremarkable as visualized.   Bones/joints:  Unremarkable as visualized.      Impression:   Unremarkable exam. No acute cardiopulmonary findings identified.   This report was finalized on 9/5/2024 3:44 PM by Dr. Sai Gómez MD.      CT Pelvis Without Contrast    Result Date: 9/5/2024  EXAM:   CT Pelvis Without Intravenous Contrast  EXAM DATE:   9/5/2024 2:34 PM  CLINICAL HISTORY:   left hip  TECHNIQUE:   Axial computed tomography images of the pelvis without intravenous contrast.  Sagittal and coronal reformatted images were created and reviewed.  This CT exam was performed using one or more of the following dose reduction techniques:  automated exposure control, adjustment of the mA and/or kV according to patient size, and/or use of iterative reconstruction technique.  COMPARISON:   No relevant prior studies available.  FINDINGS:   Artifacts:  Motion artifact limits assessment.   Bones/joints:  Limited assessment of the pubic rami due to motion artifact in this location.  Total left hip arthroplasty noted with anatomic alignment preserved.  Degenerative changes of the lumbar spine with stenosis of lower levels.  No obvious displaced fracture or dislocation.   Soft tissues:  Unremarkable as visualized.   Bladder:  Unremarkable as visualized.  No stones.      Impression: 1.  No evidence of acute displaced fracture or dislocation. 2.  Extensive patient motion artifact at the level of the pubic rami limits assessment in this region. 3.  Left hip  arthroplasty changes noted without evidence of hardware complication.   This report was finalized on 9/5/2024 3:07 PM by Dr. Sai Gómez MD.       Results for orders placed during the hospital encounter of 06/20/24    Adult Transthoracic Echo 3D Limited W/ Cont If Necessary Per Protocol    Interpretation Summary    Left ventricular systolic function is normal. Estimated left ventricular EF = 60%    No significant pericardial effusion.      Assessment & Plan   Assessment & Plan       Essential hypertension    Osteoarthritis    Nocturnal leg cramps    CAD (coronary artery disease)    Falls    Hip and back pain, multiple falls  -MRI is obtained and significant for moderate canal narrowing lower spine, otherwise no red flag symptoms or severe critical stenosis.  She has no claudication symptoms.  Tenderness along inguinal ligament suggest possible inflammatory component  -Check inflammatory markers, vitamin levels, CK  -start decadron for now  -OSH d/w neurology, formal consult to neuro and nsgy in am    SIMON on CKD  -Hold nephrotoxic meds, provide IV fluids and monitor    T2DM  -had 45 u long acting last night relatively hypoglycemic, reduce dose to 20u, add corrective    CAD  HTN  -c/w home meds, except for ARB        VTE Prophylaxis:  Pharmacologic VTE prophylaxis orders are present.          CODE STATUS:    Code Status and Medical Interventions: CPR (Attempt to Resuscitate); Full Support   Ordered at: 09/07/24 0414     Code Status (Patient has no pulse and is not breathing):    CPR (Attempt to Resuscitate)     Medical Interventions (Patient has pulse or is breathing):    Full Support       Expected Discharge   Expected discharge date/ time has not been documented.     Armand Azevedo MD  09/07/24

## 2024-09-08 ENCOUNTER — APPOINTMENT (OUTPATIENT)
Dept: MRI IMAGING | Facility: HOSPITAL | Age: 64
End: 2024-09-08
Payer: COMMERCIAL

## 2024-09-08 LAB
ANION GAP SERPL CALCULATED.3IONS-SCNC: 14 MMOL/L (ref 5–15)
BUN SERPL-MCNC: 22 MG/DL (ref 8–23)
BUN/CREAT SERPL: 19.5 (ref 7–25)
CALCIUM SPEC-SCNC: 10 MG/DL (ref 8.6–10.5)
CHLORIDE SERPL-SCNC: 98 MMOL/L (ref 98–107)
CO2 SERPL-SCNC: 22 MMOL/L (ref 22–29)
CREAT SERPL-MCNC: 1.13 MG/DL (ref 0.57–1)
EGFRCR SERPLBLD CKD-EPI 2021: 54.8 ML/MIN/1.73
GLUCOSE BLDC GLUCOMTR-MCNC: 153 MG/DL (ref 70–130)
GLUCOSE BLDC GLUCOMTR-MCNC: 179 MG/DL (ref 70–130)
GLUCOSE BLDC GLUCOMTR-MCNC: 248 MG/DL (ref 70–130)
GLUCOSE BLDC GLUCOMTR-MCNC: 317 MG/DL (ref 70–130)
GLUCOSE SERPL-MCNC: 180 MG/DL (ref 65–99)
MAGNESIUM SERPL-MCNC: 1.8 MG/DL (ref 1.6–2.4)
POTASSIUM SERPL-SCNC: 4.9 MMOL/L (ref 3.5–5.2)
SODIUM SERPL-SCNC: 134 MMOL/L (ref 136–145)

## 2024-09-08 PROCEDURE — 25810000003 LACTATED RINGERS PER 1000 ML: Performed by: STUDENT IN AN ORGANIZED HEALTH CARE EDUCATION/TRAINING PROGRAM

## 2024-09-08 PROCEDURE — 0 GADOBENATE DIMEGLUMINE 529 MG/ML SOLUTION: Performed by: INTERNAL MEDICINE

## 2024-09-08 PROCEDURE — 99221 1ST HOSP IP/OBS SF/LOW 40: CPT | Performed by: PHYSICIAN ASSISTANT

## 2024-09-08 PROCEDURE — 63710000001 INSULIN LISPRO (HUMAN) PER 5 UNITS: Performed by: STUDENT IN AN ORGANIZED HEALTH CARE EDUCATION/TRAINING PROGRAM

## 2024-09-08 PROCEDURE — G0378 HOSPITAL OBSERVATION PER HR: HCPCS

## 2024-09-08 PROCEDURE — 82948 REAGENT STRIP/BLOOD GLUCOSE: CPT

## 2024-09-08 PROCEDURE — 97116 GAIT TRAINING THERAPY: CPT

## 2024-09-08 PROCEDURE — A9577 INJ MULTIHANCE: HCPCS | Performed by: INTERNAL MEDICINE

## 2024-09-08 PROCEDURE — 72197 MRI PELVIS W/O & W/DYE: CPT

## 2024-09-08 PROCEDURE — 99232 SBSQ HOSP IP/OBS MODERATE 35: CPT | Performed by: INTERNAL MEDICINE

## 2024-09-08 PROCEDURE — 97161 PT EVAL LOW COMPLEX 20 MIN: CPT

## 2024-09-08 PROCEDURE — 96376 TX/PRO/DX INJ SAME DRUG ADON: CPT

## 2024-09-08 PROCEDURE — 80048 BASIC METABOLIC PNL TOTAL CA: CPT | Performed by: STUDENT IN AN ORGANIZED HEALTH CARE EDUCATION/TRAINING PROGRAM

## 2024-09-08 PROCEDURE — 25010000002 HEPARIN (PORCINE) PER 1000 UNITS: Performed by: STUDENT IN AN ORGANIZED HEALTH CARE EDUCATION/TRAINING PROGRAM

## 2024-09-08 PROCEDURE — 25010000002 DEXAMETHASONE PER 1 MG: Performed by: STUDENT IN AN ORGANIZED HEALTH CARE EDUCATION/TRAINING PROGRAM

## 2024-09-08 PROCEDURE — 83735 ASSAY OF MAGNESIUM: CPT | Performed by: STUDENT IN AN ORGANIZED HEALTH CARE EDUCATION/TRAINING PROGRAM

## 2024-09-08 PROCEDURE — 63710000001 INSULIN GLARGINE PER 5 UNITS: Performed by: STUDENT IN AN ORGANIZED HEALTH CARE EDUCATION/TRAINING PROGRAM

## 2024-09-08 PROCEDURE — 99214 OFFICE O/P EST MOD 30 MIN: CPT | Performed by: PSYCHIATRY & NEUROLOGY

## 2024-09-08 RX ORDER — LIDOCAINE HYDROCHLORIDE 20 MG/ML
5 SOLUTION OROPHARYNGEAL
Status: DISCONTINUED | OUTPATIENT
Start: 2024-09-08 | End: 2024-09-09 | Stop reason: HOSPADM

## 2024-09-08 RX ADMIN — HEPARIN SODIUM 5000 UNITS: 5000 INJECTION INTRAVENOUS; SUBCUTANEOUS at 05:05

## 2024-09-08 RX ADMIN — DULOXETINE HYDROCHLORIDE 90 MG: 60 CAPSULE, DELAYED RELEASE ORAL at 08:14

## 2024-09-08 RX ADMIN — LIDOCAINE HYDROCHLORIDE 5 ML: 20 SOLUTION ORAL at 14:37

## 2024-09-08 RX ADMIN — INSULIN LISPRO 3 UNITS: 100 INJECTION, SOLUTION INTRAVENOUS; SUBCUTANEOUS at 17:45

## 2024-09-08 RX ADMIN — BUPROPION HYDROCHLORIDE 150 MG: 150 TABLET, EXTENDED RELEASE ORAL at 08:27

## 2024-09-08 RX ADMIN — METOPROLOL TARTRATE 100 MG: 100 TABLET, FILM COATED ORAL at 08:14

## 2024-09-08 RX ADMIN — RANOLAZINE 500 MG: 500 TABLET, FILM COATED, EXTENDED RELEASE ORAL at 08:14

## 2024-09-08 RX ADMIN — ASPIRIN 81 MG: 81 TABLET, COATED ORAL at 08:14

## 2024-09-08 RX ADMIN — METOPROLOL TARTRATE 100 MG: 100 TABLET, FILM COATED ORAL at 21:57

## 2024-09-08 RX ADMIN — HYDROCODONE BITARTRATE AND ACETAMINOPHEN 1 TABLET: 5; 325 TABLET ORAL at 18:56

## 2024-09-08 RX ADMIN — HYDROCODONE BITARTRATE AND ACETAMINOPHEN 1 TABLET: 5; 325 TABLET ORAL at 09:07

## 2024-09-08 RX ADMIN — SODIUM CHLORIDE, POTASSIUM CHLORIDE, SODIUM LACTATE AND CALCIUM CHLORIDE 75 ML/HR: 600; 310; 30; 20 INJECTION, SOLUTION INTRAVENOUS at 14:37

## 2024-09-08 RX ADMIN — GABAPENTIN 300 MG: 300 CAPSULE ORAL at 21:57

## 2024-09-08 RX ADMIN — INSULIN LISPRO 5 UNITS: 100 INJECTION, SOLUTION INTRAVENOUS; SUBCUTANEOUS at 21:56

## 2024-09-08 RX ADMIN — DEXAMETHASONE SODIUM PHOSPHATE 4 MG: 4 INJECTION, SOLUTION INTRA-ARTICULAR; INTRALESIONAL; INTRAMUSCULAR; INTRAVENOUS; SOFT TISSUE at 12:56

## 2024-09-08 RX ADMIN — PANTOPRAZOLE SODIUM 40 MG: 40 TABLET, DELAYED RELEASE ORAL at 08:14

## 2024-09-08 RX ADMIN — DEXAMETHASONE SODIUM PHOSPHATE 4 MG: 4 INJECTION, SOLUTION INTRA-ARTICULAR; INTRALESIONAL; INTRAMUSCULAR; INTRAVENOUS; SOFT TISSUE at 00:09

## 2024-09-08 RX ADMIN — GADOBENATE DIMEGLUMINE 14 ML: 529 INJECTION, SOLUTION INTRAVENOUS at 12:27

## 2024-09-08 RX ADMIN — ALLOPURINOL 100 MG: 100 TABLET ORAL at 21:57

## 2024-09-08 RX ADMIN — INSULIN LISPRO 2 UNITS: 100 INJECTION, SOLUTION INTRAVENOUS; SUBCUTANEOUS at 08:13

## 2024-09-08 RX ADMIN — RANOLAZINE 500 MG: 500 TABLET, FILM COATED, EXTENDED RELEASE ORAL at 21:57

## 2024-09-08 RX ADMIN — DEXAMETHASONE SODIUM PHOSPHATE 4 MG: 4 INJECTION, SOLUTION INTRA-ARTICULAR; INTRALESIONAL; INTRAMUSCULAR; INTRAVENOUS; SOFT TISSUE at 17:45

## 2024-09-08 RX ADMIN — HEPARIN SODIUM 5000 UNITS: 5000 INJECTION INTRAVENOUS; SUBCUTANEOUS at 14:45

## 2024-09-08 RX ADMIN — CLOPIDOGREL BISULFATE 75 MG: 75 TABLET ORAL at 08:14

## 2024-09-08 RX ADMIN — ROPINIROLE HYDROCHLORIDE 2 MG: 2 TABLET, FILM COATED ORAL at 21:57

## 2024-09-08 RX ADMIN — HEPARIN SODIUM 5000 UNITS: 5000 INJECTION INTRAVENOUS; SUBCUTANEOUS at 21:56

## 2024-09-08 RX ADMIN — INSULIN GLARGINE 20 UNITS: 100 INJECTION, SOLUTION SUBCUTANEOUS at 21:56

## 2024-09-08 RX ADMIN — DEXAMETHASONE SODIUM PHOSPHATE 4 MG: 4 INJECTION, SOLUTION INTRA-ARTICULAR; INTRALESIONAL; INTRAMUSCULAR; INTRAVENOUS; SOFT TISSUE at 05:05

## 2024-09-08 RX ADMIN — PREGABALIN 50 MG: 50 CAPSULE ORAL at 14:45

## 2024-09-08 RX ADMIN — INSULIN LISPRO 2 UNITS: 100 INJECTION, SOLUTION INTRAVENOUS; SUBCUTANEOUS at 12:55

## 2024-09-08 RX ADMIN — PREGABALIN 50 MG: 50 CAPSULE ORAL at 05:05

## 2024-09-08 RX ADMIN — LIDOCAINE 2 PATCH: 4 PATCH TOPICAL at 08:13

## 2024-09-08 RX ADMIN — ALLOPURINOL 100 MG: 100 TABLET ORAL at 08:27

## 2024-09-08 RX ADMIN — PREGABALIN 50 MG: 50 CAPSULE ORAL at 21:57

## 2024-09-08 RX ADMIN — BENZOCAINE 1 APPLICATION: 0.1 GEL TOPICAL at 14:37

## 2024-09-08 RX ADMIN — AMLODIPINE BESYLATE 2.5 MG: 2.5 TABLET ORAL at 08:14

## 2024-09-08 RX ADMIN — ACETAMINOPHEN 650 MG: 325 TABLET ORAL at 08:14

## 2024-09-08 RX ADMIN — GABAPENTIN 300 MG: 300 CAPSULE ORAL at 08:14

## 2024-09-08 NOTE — THERAPY EVALUATION
Patient Name: Monisha Gardner  : 1960    MRN: [id number]                              Today's Date: 2024       Admit Date: 2024    Visit Dx:     ICD-10-CM ICD-9-CM   1. Dysphagia, oral phase  R13.11 787.21     Patient Active Problem List   Diagnosis    ASCVD (arteriosclerotic cardiovascular disease)    Essential hypertension    Dyslipidemia    Type 2 diabetes mellitus with hyperglycemia, with long-term current use of insulin    Body mass index (bmi) 30.0-30.9, adult    Osteoarthritis    Atypical angina    Elevated liver enzymes    Gastroesophageal reflux disease with esophagitis    Vitamin D deficiency    Hx of pancreatitis    Menopausal symptoms    Vitamin B 12 deficiency    Nocturnal leg cramps    Anxiety    Left arm weakness    Chronic gout of multiple sites    Squamous cell carcinoma in situ (SCCIS) of skin    Mixed hyperlipidemia    PVC (premature ventricular contraction)    RLS (restless legs syndrome)    Psychophysiological insomnia    Dry mouth    Type 2 diabetes mellitus with diabetic autonomic neuropathy, with long-term current use of insulin    Bone mass    Mass of mandible    Coronary artery disease    Abnormal nuclear stress test    CAD (coronary artery disease)    Falls     Past Medical History:   Diagnosis Date    Anxiety     Arthritis     Diabetes mellitus     Diverticulosis     GERD (gastroesophageal reflux disease)     Gout     Hyperlipidemia     Hypertension     Myocardial infarction      Past Surgical History:   Procedure Laterality Date    APPENDECTOMY      BREAST BIOPSY Right     benign    CARDIAC CATHETERIZATION      CARDIAC CATHETERIZATION N/A 2024    Procedure: Left Heart Cath;  Surgeon: Derrick Watts MD;  Location:  [facility];  Service: Cardiology;  Laterality: N/A;    CARDIAC CATHETERIZATION N/A 2024    Procedure: Stent VITALIY coronary;  Surgeon: Derrick Watts MD;  Location:  [facility];  Service: Cardiology;  Laterality: N/A;      SECTION      CHOLECYSTECTOMY      CORONARY STENT PLACEMENT      Stenting of the RCA with 3.5 x 26 mm Resolute drug-eluting stent    HYSTERECTOMY      JOINT REPLACEMENT      TONSILLECTOMY      TUMOR REMOVAL  02/15/2024    FACIAL TUMOR      General Information       Row Name 24 165          Physical Therapy Time and Intention    Document Type evaluation  -     Mode of Treatment physical therapy  -       Row Name 24          General Information    Patient Profile Reviewed yes  -     Prior Level of Function independent:;all household mobility;community mobility;gait;transfer;bed mobility;ADL's  Pt lives alone; works, Ind with functional mobility and ADLs. Recent increase of falls.  -     Existing Precautions/Restrictions fall  -     Barriers to Rehab previous functional deficit  -       Row Name 24 165          Living Environment    People in Home alone;other (see comments)  Plans to d/c [facility] dtr/son-in-law  -       Row Name 24 165          Home Main Entrance    Number of Stairs, Main Entrance one  -     Stair Railings, Main Entrance none  -       Row Name 24          Stairs Within Home, Primary    Stairs, Within Home, Primary Flight to second level. Can sleep on main level. Shower is on second level.  -     Number of Stairs, Within Home, Primary twelve  -       Row Name 24 165          Cognition    Orientation Status (Cognition) oriented x 3  -       Row Name 24 165          Safety Issues, Functional Mobility    Safety Issues Affecting Function (Mobility) insight into deficits/self-awareness;awareness of need for assistance;safety precaution awareness  -     Impairments Affecting Function (Mobility) endurance/activity tolerance;balance;pain;strength  -               User Key  (r) = Recorded By, (t) = Taken By, (c) = Cosigned By      Initials Name Provider Type    HW Jennie Rogers PT Physical Therapist                    Mobility       Row Name 09/08/24 1709          Bed Mobility    Bed Mobility supine-sit;sit-supine  -     Supine-Sit [place] (Bed Mobility) standby assist  -     Sit-Supine [place] (Bed Mobility) standby assist  -     Comment, (Bed Mobility) Encouraged logrolling for comfort  -       Row Name 09/08/24 1709          Transfers    Comment, (Transfers) Pt performed STS with VC for hand placement  -[facility] Name 09/08/24 1709          Sit-Stand Transfer    Sit-Stand [place] (Transfers) contact guard;nonverbal cues (demo/gesture);verbal cues  -     Assistive Device (Sit-Stand Transfers) walker, front-wheeled  -       Row Name 09/08/24 1709          Gait/Stairs (Locomotion)    [place] Level (Gait) contact guard;nonverbal cues (demo/gesture);verbal cues  -     Assistive Device (Gait) walker, front-wheeled  -     Distance in Feet (Gait) 300  -     Deviations/Abnormal Patterns (Gait) bilateral deviations;gait speed decreased  -     Comment, (Gait/Stairs) Pt amb in hoover with step-through gait pattern. VC for staying closer to AD. Pt demonstrated slight posterior lean when ambulating with increased WB in heels. Increased anterior weight shifting that improved with use of FWW. Activity limited by fatigue. Pain in tolerable with ambulation. Intense pain with sitting.  -               User Key  (r) = Recorded By, (t) = Taken By, (c) = Cosigned By      Initials Name Provider Type     Jennie Rogers PT Physical Therapist                   Obj/Interventions       [facility] Name 09/08/24 1719          Range of Motion Comprehensive    General Range of Motion lower extremity range of motion deficits identified  -[facility] Name 09/08/24 1719          Strength Comprehensive (MMT)    General Manual Muscle Testing (MMT) Assessment lower extremity strength deficits identified  -     Comment, General Manual Muscle Testing (MMT) Assessment Limited strength assessment secondary to elevated sacral and  R hip pain.  -       Row Name 09/08/24 1719          Balance    Balance Assessment sitting static balance;sitting dynamic balance;sit to stand dynamic balance;standing static balance;standing dynamic balance  -     Static Sitting Balance standby assist  -     Dynamic Sitting Balance standby assist  -HW     Position, Sitting Balance sitting edge of bed  -     Static Standing Balance contact guard  -     Dynamic Standing Balance contact guard  -     Position/Device Used, Standing Balance supported;walker, rolling  -     Balance Interventions sitting;standing;sit to stand;occupation based/functional task  -[facility] Name 09/08/24 1719          Sensory Assessment (Somatosensory)    Sensory Assessment (Somatosensory) LE sensation intact;other (see comments)  pt able to identify light tough and general sensation. Proprioception not tested. Pt reported worsening sensation with dep position in B feet  -               User Key  (r) = Recorded By, (t) = Taken By, (c) = Cosigned By      Initials Name Provider Type     Jennie Rogers, SUYAPA Physical Therapist                   Goals/Plan       [facility] Name 09/08/24 1728          Bed Mobility Goal 1 (PT)    Activity/Assistive Device (Bed Mobility Goal 1, PT) sit to supine;supine to sit  -     [place] Level/Cues Needed (Bed Mobility Goal 1, PT) modified independence  -HW     Time Frame (Bed Mobility Goal 1, PT) short term goal (STG);5 days  -[facility] Name 09/08/24 1728          Transfer Goal 1 (PT)    Activity/Assistive Device (Transfer Goal 1, PT) sit-to-stand/stand-to-sit;bed-to-chair/chair-to-bed  -     [place] Level/Cues Needed (Transfer Goal 1, PT) modified independence  -HW     Time Frame (Transfer Goal 1, PT) long term goal (LTG);10 days  -[facility] Name 09/08/24 1728          Gait Training Goal 1 (PT)    Activity/Assistive Device (Gait Training Goal 1, PT) gait (walking locomotion)  -     [place] Level (Gait Training Goal 1, PT)  modified independence  -HW     Distance (Gait Training Goal 1, PT) 500  -HW     Time Frame (Gait Training Goal 1, PT) long term goal (LTG);10 days  -       Row Name 09/08/24 1728          Stairs Goal 1 (PT)    Activity/Assistive Device (Stairs Goal 1, PT) ascending stairs;descending stairs  -     [place] Level/Cues Needed (Stairs Goal 1, PT) contact guard required  -     Number of Stairs (Stairs Goal 1, PT) 12  -HW     Time Frame (Stairs Goal 1, PT) long term goal (LTG);10 days  -       Row Name 09/08/24 1728          Therapy Assessment/Plan (PT)    Planned Therapy Interventions (PT) balance training;bed mobility training;gait training;home exercise program;patient/family education;ROM (range of motion);strengthening;stretching;transfer training  -               User Key  (r) = Recorded By, (t) = Taken By, (c) = Cosigned By      Initials Name Provider Type     Jennie Rogers, PT Physical Therapist                   Clinical Impression       Row Name 09/08/24 1720          Pain    Pretreatment Pain Rating 6/10  -     Posttreatment Pain Rating 8/10  -     Pain Location generalized  -     Pain Location - buttock  -       Row Name 09/08/24 1720          Plan of Care Review    Plan of Care Reviewed With patient;son;daughter  -     Progress no change  -     Outcome Evaluation Pt amb 300' with FWW and CGA. Pt performed bed mobility with SBA. Encouraged logrolling for comfort. No LOB observed though unsteadiness and posterior lean noted. Encouraged use of AD at home. Recommend d/c home with assist and OPPT to decrease risk for falls. Pt would also benefit from BSC for comfort while sitting to shower to minimize risk for falls.  -       Row Name 09/08/24 1720          Therapy Assessment/Plan (PT)    Rehab Potential (PT) good, to achieve stated therapy goals  -     Criteria for Skilled Interventions Met (PT) yes;meets criteria;skilled treatment is necessary  -     Therapy Frequency (PT) daily   -     Predicted Duration of Therapy Intervention (PT) three days  -       Row Name 09/08/24 1720          Positioning and Restraints    Pre-Treatment Position in bed  -     Post Treatment Position bed  -HW     In Bed supine;notified nsg;sitting;with family/caregiver;side rails up x2;exit alarm on;encouraged to call for assist;call light within reach  -               User Key  (r) = Recorded By, (t) = Taken By, (c) = Cosigned By      Initials Name Provider Type     Jennie Rogers PT Physical Therapist                   Outcome Measures       Row Name 09/08/24 1728 09/08/24 0815       How much help from another person do you currently need...    Turning from your back to your side while in flat bed without using bedrails? 3  - 4  -TS    Moving from lying on back to sitting on the side of a flat bed without bedrails? 3  - 4  -TS    Moving to and from a bed to a chair (including a wheelchair)? 3  - 3  -TS    Standing up from a chair using your arms (e.g., wheelchair, bedside chair)? 3  - 3  -TS    Climbing 3-5 steps with a railing? 3  - 3  -TS    To walk in hospital room? 3  - 3  -TS    AM-PAC 6 Clicks Score (PT) 18  - 20  -TS    Highest Level of Mobility Goal 6 --> Walk 10 steps or more  - 6 --> Walk 10 steps or more  -      Row Name 09/08/24 1728          Functional Assessment    Outcome Measure Options AM-PAC 6 Clicks Basic Mobility (PT)  -               User Key  (r) = Recorded By, (t) = Taken By, (c) = Cosigned By      Initials Name Provider Type     Saige Mcclelland, RN Registered Nurse     Jennie Rogers, SUYAPA Physical Therapist                                 Physical Therapy Education       Title: PT OT SLP Therapies (Done)       Topic: Physical Therapy (Done)       Point: Mobility training (Done)       Learning Progress Summary             Patient Acceptance, E,D, GABBIE,DU by  at 9/8/2024 1728                         Point: Home exercise program (Done)       Learning Progress  Summary             Patient Acceptance, E,D, VU,DU by  at 9/8/2024 1728                         Point: Body mechanics (Done)       Learning Progress Summary             Patient Acceptance, E,D, VU,DU by  at 9/8/2024 1728                         Point: Precautions (Done)       Learning Progress Summary             Patient Acceptance, E,D, VU,DU by  at 9/8/2024 1728                                         User Key       Initials Effective Dates Name Provider Type Discipline     12/15/23 -  Jennie Rogers, PT Physical Therapist PT                  PT Recommendation and Plan  Planned Therapy Interventions (PT): balance training, bed mobility training, gait training, home exercise program, patient/family education, ROM (range of motion), strengthening, stretching, transfer training  Plan of Care Reviewed With: patient, son, daughter  Progress: no change  Outcome Evaluation: Pt amb 300' with FWW and CGA. Pt performed bed mobility with SBA. Encouraged logrolling for comfort. No LOB observed though unsteadiness and posterior lean noted. Encouraged use of AD at home. Recommend d/c home with assist and OPPT to decrease risk for falls. Pt would also benefit from BSC for comfort while sitting to shower to minimize risk for falls.     Time Calculation:   PT Evaluation Complexity  History, PT Evaluation Complexity: 1-2 personal factors and/or comorbidities  Examination of Body Systems (PT Eval Complexity): total of 3 or more elements  Clinical Presentation (PT Evaluation Complexity): stable  Clinical Decision Making (PT Evaluation Complexity): low complexity  Overall Complexity (PT Evaluation Complexity): low complexity     PT Charges       Row Name 09/08/24 1729             Time Calculation    Start Time 1615  -      PT Received On 09/08/24  -      PT Goal Re-Cert Due Date 09/18/24  -         Timed Charges    36854 - Gait Training Minutes  10  -         Untimed Charges    PT Eval/Re-eval Minutes 52  -          Total Minutes    Timed Charges Total Minutes 10  -HW      Untimed Charges Total Minutes 52  -HW       Total Minutes 62  -HW                User Key  (r) = Recorded By, (t) = Taken By, (c) = Cosigned By      Initials Name Provider Type     Jennie Rogers, PT Physical Therapist                  Therapy Charges for Today       Code Description Service Date Service Provider Modifiers Qty    07748664490  GAIT TRAINING EA 15 MIN 9/8/2024 Jennie Rogers, PT GP 1    [id number] HC PT EVAL LOW COMPLEXITY 4 9/8/2024 Jennie Rogers, PT GP 1            PT G-Codes  Outcome Measure Options: AM-PAC 6 Clicks Basic Mobility (PT)  AM-PAC 6 Clicks Score (PT): 18  PT Discharge Summary  Anticipated Discharge Disposition (PT): home with assist, home with outpatient therapy services    Jennie Rogers PT  9/8/2024

## 2024-09-08 NOTE — PROGRESS NOTES
Daily Progress Note  Neurology     LOS: 0 days     Subjective     Chief Complaint: Follow-up frequent falls.    Interval History: No acute issues overnight.  Taking part in physical therapy.  Continues to complain of tailbone pain.  Completed MRI pelvis for further evaluation.    ROS: Negative for fever, chest pain and shortness of breath.    Objective     Vital signs in last 24 hours:  Temp:  [97.9 °F (36.6 °C)-98.9 °F (37.2 °C)] 98.6 °F (37 °C)  Heart Rate:  [67-78] 73  Resp:  [16-18] 16  BP: (126-166)/() 166/77      Physical Exam:   General: Lying in bed with eyes open. In NAD.     Respiratory: Respirations unlabored   CV: RRR       Neurologic Exam:   Mental status: Awake, alert, Follows commands. Speech fluent   CN: II-XII intact to detailed exam    Motor: Strength full (5/5) throughout in BUE and BLE    Reflexes: 1+ both upper extremities, absent bilateral ankles and knees.          Results Review:    Results from last 7 days   Lab Units 09/07/24  1049   WBC 10*3/mm3 7.56   HEMOGLOBIN g/dL 12.2   HEMATOCRIT % 37.9   PLATELETS 10*3/mm3 280     Results from last 7 days   Lab Units 09/08/24  1050   SODIUM mmol/L 134*   POTASSIUM mmol/L 4.9   CHLORIDE mmol/L 98   CO2 mmol/L 22.0   BUN mg/dL 22   CREATININE mg/dL 1.13*   CALCIUM mg/dL 10.0     MRI Pelvis With & Without Contrast    Result Date: 9/8/2024  Impression: 1.Edema signal in the inferior coccyx and surrounding soft tissues, suspected to represent a nondisplaced fracture given patient's reported fall. 2.Status post left hip arthroplasty. 3.Suspected mild to moderate degenerative changes at the sacroiliac joints and mild right hip osteoarthritis. Electronically Signed: Ramone Daly  9/8/2024 1:03 PM EDT  Workstation ID: [id number]    MRI Lumbar Spine Without Contrast    Result Date: 9/5/2024  1. Degenerative disc disease and facet arthropathy with central  canal and neuroforaminal stenosis at levels detailed above. 2. Central canal and neuroforaminal stenosis is most pronounced at the L4/5 level. 3. Facet joint inflammation at L4/5. 4. No fracture or traumatic malalignment.  This report was finalized on 9/5/2024 4:43 PM by Dr. Sai Gómez MD.      MRI Thoracic Spine Without Contrast    Result Date: 9/5/2024  1.  No levels of significant central canal or neuroforaminal stenosis identified. 2.  No acute appearing disc herniations. 3.  No fracture or malalignment identified. 4.  No paraspinal fluid collections noted.   This report was finalized on 9/5/2024 4:37 PM by Dr. Sai Gómez MD.      MRI Brain Without Contrast    Result Date: 9/5/2024  1. No acute intracranial abnormality. 2. Otherwise unremarkable exam.  This report was finalized on 9/5/2024 4:23 PM by Dr. Sai Gómez MD.      XR Chest 1 View    Result Date: 9/5/2024    Unremarkable exam. No acute cardiopulmonary findings identified.   This report was finalized on 9/5/2024 3:44 PM by Dr. Sai Gómez MD.      CT Pelvis Without Contrast    Result Date: 9/5/2024  1.  No evidence of acute displaced fracture or dislocation. 2.  Extensive patient motion artifact at the level of the pubic rami limits assessment in this region. 3.  Left hip arthroplasty changes noted without evidence of hardware complication.   This report was finalized on 9/5/2024 3:07 PM by Dr. Sai Gómez MD.      CT Pelvis Without Contrast    Result Date: 8/27/2024  No acute fracture seen   This report was finalized on 8/27/2024 1:29 PM by Dr. Blayne Contreras MD.      CT Lumbar Spine Without Contrast    Result Date: 8/27/2024  1.  Disc base narrowing at L4-5 and L5-S1 stable from the previous exam. 2.  No acute fracture.   This report was finalized on 8/27/2024 1:27 PM by Dr. Blayne Contreras MD.      CT Lumbar Spine Without Contrast    Result Date: 8/16/2024  Impression: No evidence of fracture or malalignment of the lumbar spine or  pelvis. Degenerative changes of lumbar the spine most advanced at L4-5. Electronically Signed: Brett Bryson MD  8/16/2024 11:21 AM EDT  Workstation ID: [id number]    CT Pelvis Without Contrast    Result Date: 8/16/2024  Impression: No evidence of fracture or malalignment of the lumbar spine or pelvis. Degenerative changes of lumbar the spine most advanced at L4-5. Electronically Signed: Brett Bryson MD  8/16/2024 11:21 AM EDT  Workstation ID: [id number]    XR Spine Lumbar 2 or 3 View    Result Date: 8/16/2024  Impression: No evidence of displaced fracture or traumatic malalignment. Degenerative changes of the lumbar spine most advanced at L5-S1. Left hip arthroplasty without evidence of complication. Electronically Signed: Brett Bryson MD  8/16/2024 10:02 AM EDT  Workstation ID: [id number]    XR Hip With or Without Pelvis 2 - 3 View Left    Result Date: 8/16/2024  Impression: No evidence of displaced fracture or traumatic malalignment. Degenerative changes of the lumbar spine most advanced at L5-S1. Left hip arthroplasty without evidence of complication. Electronically Signed: Brett Bryson MD  8/16/2024 10:02 AM EDT  Workstation ID: [id number]             Assessment & Plan     Patient is a 64-year-old female with past medical history of type 2 diabetes on insulin, hyperlipidemia, hypertension, coronary artery disease who presented to the hospital with complaint of generalized weakness and falls. Per patient, in last 2 months, she has had multiple falls.  She typically will fall backwards without any warning signs.    -Frequent falls and imbalance likely secondary to underlying diabetic peripheral neuropathy.  Since fall, she also complained of severe tailbone pain and underwent MRI of her pelvis which showed possible nondisplaced fracture of coccyx.  -MRI of lumbar spine showed mild to moderate degenerative changes at L4-L5 which will not explain patient's symptoms.  Neurosurgery has seen patient and no  intervention is recommended.  -Recommend aggressive outpatient physical therapy dedicated to gait and balance.  -Continue to maintain strict glycemic control.  -Neurology will sign off.  Follow-up with outpatient neurology in 6 to 8 weeks.  Please call back with any questions or concerns that you may have.      Jw Werner MD  09/08/24  13:21 EDT

## 2024-09-08 NOTE — PLAN OF CARE
Problem: Skin Injury Risk Increased  Goal: Skin Health and Integrity  Outcome: Ongoing, Progressing  Intervention: Optimize Skin Protection  Description: Perform a full pressure injury risk assessment, as indicated by screening, upon admission to care unit.  Reassess skin (injury risk, full inspection) frequently (e.g., scheduled interval, with change in condition) to provide optimal early detection and prevention.  Maintain adequate tissue perfusion (e.g., encourage fluid balance; avoid crossing legs, constrictive clothing or devices) to promote tissue oxygenation.  Maintain head of bed at lowest degree of elevation tolerated, considering medical condition and other restrictions.  Avoid positioning onto an area that remains reddened.  Minimize incontinence and moisture (e.g., toileting schedule; moisture-wicking pad, diaper or incontinence collection device; skin moisture barrier).  Cleanse skin promptly and gently when soiled utilizing a pH-balanced cleanser.  Relieve and redistribute pressure (e.g., scheduled position changes, weight shifts, use of support surface, medical device repositioning, protective dressing application, use of positioning device, microclimate control, use of pressure-injury-monitor  Encourage increased activity, such as sitting in a chair at the bedside or early mobilization, when able to tolerate.     Problem: Pain Acute  Goal: Acceptable Pain Control and Functional Ability  Outcome: Ongoing, Progressing  Intervention: Prevent or Manage Pain  Description: Evaluate pain level, effect of treatment and patient response at regular intervals.  Minimize painful stimuli; coordinate care and adjust environment (e.g., light, noise, unnecessary movement); promote sleep/rest.  Match pharmacologic analgesia to severity and type of pain mechanism (e.g., neuropathic, muscle, inflammatory); consider multimodal approach (e.g., nonopioid, opioid, adjuvant).  Provide medication at regular intervals;  titrate to patient response; premedicate for painful procedures.  Manage breakthrough pain with additional doses; consider rotation or switching medication.  Monitor for signs of substance tolerance (increased dose to reach desired effect, decreased effect with same dose).  Manage medication-induced effects, such as constipation, nausea, pruritus, urinary retention, somnolence and dizziness.  Provide multimodal interventions, such as as physical activity, therapeutic exercise, yoga, TENS (transcutaneous electrical nerve stimulation) and manual therapy.  Train in functional activity modifications, such as body mechanics, posture, ergonomics, energy conservation and activity pacing.  Consider addition of complementary or alternative therapy, such as acupuncture, hypnosis or therapeutic touch.  Intervention: Develop Pain Management Plan  Description: Acknowledge patient as the expert in pain self-management.  Use a consistent, validated tool for pain assessment; include function and quality of life.  Evaluate risk for opioid use and dependence.  Set pain management goals; determine acceptable level of discomfort to allow for maximal functioning.  Determine [id number] pain management plan, including both pharmacologic and nonpharmacologic measures; integrate management of chronic (persistent) pain.  Identify and integrate past successful treatment measures, if able.  Encourage patient and caregiver involvement in pain assessment, interventions and safety measures.  Re-evaluate plan regularly.  Intervention: Optimize Psychosocial Wellbeing  Description: Facilitate patient’s self-control over pain by providing pain information and allowing choices in treatment.  Consider and address emotional response to pain.  Explore and promote use of coping strategies; address barriers to successful coping.  Evaluate and assist with psychosocial, cultural and spiritual factors impacting pain.  Modify pain perception using  techniques, such as distraction, mindfulness, guided imagery, meditation or music.  Assess for risk factors for developing chronic pain, such as depression, fear, pain avoidance and pain catastrophizing.  Consider referral for ongoing coping support, such as education, relaxation training and role of thoughts.     Problem: Mobility Impairment  Goal: Optimal Mobility  Outcome: Ongoing, Progressing  Intervention: Optimize Mobility  Description: Assess mobility skills (e.g., bed, transfers, ambulation, gait, stair climbing, wheelchair) and factors influencing mobility, such as balance, safety, range of motion, strength, muscle tone, cognition and sensory processing.  Instruct in transfer and mobility techniques supporting highest level of independence while ensuring safety.  Consider any contraindications or precautions to individualize treatment plan (e.g., joint or ligament instability, weightbearing restrictions).  Encourage early mobilization and performance of daily activities, if able, while providing level of assistance needed for safety.  Schedule mobility activities when pain and fatigue are at a minimum to encourage optimal performance.  Pace activity; allow adequate time and rest periods to conserve energy.  Provide frequent encouragement, along with prompting and assistance as needed.  Individualize instructions and prompts to patient’s cognitive status to promote effective communication; simplify verbal directions, give encouragement and provide demonstrated cues as needed.  Design and implement therapeutic interventions to address impairments (e.g., functional mobility training, mat and standing balance activities, strengthening).  Train in and reinforce use of adaptive equipment and assistive devices, such as a walker or transfer board.  Utilize appropriate modalities, devices or techniques to facilitate mobility (e.g., ankle foot orthosis, electrical stimulation, sit-to-stand lift,  treadmill-training).  Assess fall risk using standardized tool; implement appropriate interventions, such as behavioral or environmental modifications.  Use proper body mechanics and patient alignment during mobility to ensure safety.     Problem: Fall Injury Risk  Goal: Absence of Fall and Fall-Related Injury  Outcome: Ongoing, Progressing  Intervention: Identify and Manage Contributors  Description: Develop a fall prevention plan with the patient and caregiver/family.  Provide reorientation, appropriate sensory stimulation and routines with changes in mental status to decrease risk of fall.  Promote use of personal vision and auditory aids.  Assess assistance level required for safe and effective self-care; provide support as needed, such as toileting, mobilization. For age 65 and older, implement timed toileting with assistance.  Encourage physical activity, such as performance of mobility and self-care at highest level of patient ability, multicomponent exercise program and provision of appropriate assistive devices.  If fall occurs, assess the severity of injury; implement fall injury protocol. Determine the cause and revise fall injury prevention plan.  Regularly review medication contribution to fall risk; adjust medication administration times to minimize risk of falling.  Consider risk related to polypharmacy and age.  Balance adequate pain management with potential for oversedation.  Intervention: Promote Injury-Free Environment  Description: Provide a safe, barrier-free environment that encourages independent activity.  Keep care area uncluttered and well-lighted.  Determine need for increased observation or monitoring.  Avoid use of devices that minimize mobility, such as restraints or indwelling urinary catheter.   Goal Outcome Evaluation:         Ambulated to bathroom with gait belt and walker and one person assist

## 2024-09-08 NOTE — PLAN OF CARE
Goal Outcome Evaluation:  Plan of Care Reviewed With: patient, son, daughter        Progress: no change  Outcome Evaluation: Pt amb 300' with FWW and CGA. Pt performed bed mobility with SBA. Encouraged logrolling for comfort. No LOB observed though unsteadiness and posterior lean noted. Encouraged use of AD at home. Recommend d/c home with assist and OPPT to decrease risk for falls. Pt would also benefit from BSC for comfort while sitting to shower to minimize risk for falls.      Anticipated Discharge Disposition (PT): home with assist, home with outpatient therapy services

## 2024-09-08 NOTE — CASE MANAGEMENT/SOCIAL WORK
Discharge Planning Assessment  [facility]     Patient Name: Monisha Gardner  MRN: [id number]  Today's Date: 9/8/2024    Admit Date: 9/7/2024    Plan: home with dtr Sheba at discharge   Discharge Needs Assessment    No documentation.                  Discharge Plan       Row Name 09/08/24 1055       Plan    Plan home with dtaria Scruggs at discharge    Patient/Family in Agreement with Plan yes    Plan Comments I spoke with patient at the  and she lives in [facility] alone. Independent, works still, no dme, no HH and no OP PT. Patient tells me she has 02 at her mom's but she doesn't use it bc she has none at home. I told her to have the dme company move the 02 concentrator and she doesn't know the DME Co. Plan is home with dtaria Scruggs at d/c. Dtr lives in [place].    Final Discharge Disposition Code 01 - home or self-care                  Continued Care and Services - Admitted Since 9/7/2024    No active coordination exists for this encounter.       Selected Continued Care - Episodes Includes continued care and service providers with selected services from the active episodes listed below      Hyperlipidemia Episode start date: 4/1/2024   There are no active outsourced providers for this episode.                    Demographic Summary    No documentation.                  Functional Status    No documentation.                  Psychosocial    No documentation.                  Abuse/Neglect    No documentation.                  Legal    No documentation.                  Substance Abuse    No documentation.                  Patient Forms    No documentation.                     Karissa Valentino RN

## 2024-09-08 NOTE — PROGRESS NOTES
"    [facility] Medicine Services  PROGRESS NOTE    Patient Name: Monisha Gardner  : 1960  MRN: [id number]    Date of Admission: 2024  Primary Care Physician: Kimmy Vela PA    Subjective   Subjective     CC:  Weakness, back pain    HPI:  \"So so.\"  Still pain in her right thigh but some better.  States that can get up and walk to bathroom but can't sit d/t pain on her bottom even with a donut.        Objective   Objective     Vital Signs:   Temp:  [97.9 °F (36.6 °C)-98.9 °F (37.2 °C)] 97.9 °F (36.6 °C)  Heart Rate:  [67-78] 76  Resp:  [18] 18  BP: (126-158)/() 158/72     Physical Exam:  Constitutional: No acute distress, awake, alert  HENT: NCAT, mucous membranes moist, sores inside left lower lip  Respiratory: Clear to auscultation bilaterally, respiratory effort normal   Cardiovascular: RRR, no murmurs, rubs, or gallops  Gastrointestinal: Positive bowel sounds, soft, nontender, nondistended  Musculoskeletal: No bilateral ankle edema  Psychiatric: Appropriate affect, cooperative  Neurologic: Oriented x 3,  speech clear  Skin: No rashes      Results Reviewed:  LAB RESULTS:      Lab 24  1049 24  1506   WBC 7.56 10.51   HEMOGLOBIN 12.2 10.8*   HEMATOCRIT 37.9 33.4*   PLATELETS 280 298   NEUTROS ABS  --  5.53   IMMATURE GRANS (ABS)  --  0.09*   LYMPHS ABS  --  3.76*   MONOS ABS  --  0.75   EOS ABS  --  0.28   MCV 86.7 83.3   SED RATE 2  --    CRP 0.47  --          Lab 24  1050 24  1049 24  1506   SODIUM 134* 135* 133*   POTASSIUM 4.9 4.9 4.7   CHLORIDE 98 100 98   CO2 22.0 22.0 23.2   ANION GAP 14.0 13.0 11.8   BUN 22 24* 24*   CREATININE 1.13* 1.56* 1.67*   EGFR 54.8* 37.2* 34.3*   GLUCOSE 180* 174* 83   CALCIUM 10.0 10.0 9.8   MAGNESIUM 1.8 1.8  --    HEMOGLOBIN A1C  --   --  5.80*   TSH  --  0.965  --          Lab 24  1506   TOTAL PROTEIN 6.1   ALBUMIN 3.8   GLOBULIN 2.3   ALT (SGPT) 10   AST (SGOT) 19   BILIRUBIN 0.3   ALK PHOS 56 "         Lab 09/05/24  1506   HSTROP T 12             Lab 09/07/24  1059   FOLATE 16.00   VITAMIN B 12 1,431*         Brief Urine Lab Results  (Last result in the past 365 days)        Color   Clarity   Blood   Leuk Est   Nitrite   Protein   CREAT   Urine HCG        09/05/24 1714 Yellow   Clear   Negative   Trace   Negative   Negative                   Microbiology Results Abnormal       None            No radiology results from the last 24 hrs    Results for orders placed during the hospital encounter of 06/20/24    Adult Transthoracic Echo 3D Limited W/ Cont If Necessary Per Protocol    Interpretation Summary    Left ventricular systolic function is normal. Estimated left ventricular EF = 60%    No significant pericardial effusion.      Current medications:  Scheduled Meds:allopurinol, 100 mg, Oral, BID  amLODIPine, 2.5 mg, Oral, Daily  aspirin, 81 mg, Oral, Daily  buPROPion SR, 150 mg, Oral, Daily  clopidogrel, 75 mg, Oral, Daily  dexAMETHasone, 4 mg, Intravenous, Q6H  DULoxetine, 90 mg, Oral, Daily  gabapentin, 300 mg, Oral, Q12H  gadobenate dimeglumine, 14 mL, Intravenous, Once in imaging  heparin (porcine), 5,000 Units, Subcutaneous, Q8H  insulin glargine, 20 Units, Subcutaneous, Nightly  insulin lispro, 2-7 Units, Subcutaneous, 4x Daily AC & at Bedtime  Lidocaine, 2 patch, Transdermal, Q24H  [Held by provider] losartan, 50 mg, Oral, BID  metoprolol tartrate, 100 mg, Oral, BID  pantoprazole, 40 mg, Oral, Daily  pregabalin, 50 mg, Oral, Q8H  ranolazine, 500 mg, Oral, BID  rOPINIRole, 2 mg, Oral, Nightly  sodium chloride, 10 mL, Intravenous, Q12H      Continuous Infusions:lactated ringers, 75 mL/hr, Last Rate: 75 mL/hr (09/07/24 0636)      PRN Meds:.  acetaminophen **OR** acetaminophen **OR** acetaminophen    benzocaine    senna-docusate sodium **AND** polyethylene glycol **AND** bisacodyl **AND** bisacodyl    Calcium Replacement - Follow Nurse / BPA Driven Protocol    dextrose    dextrose    glucagon (human  recombinant)    HYDROcodone-acetaminophen    HYDROmorphone **AND** naloxone    Lidocaine Viscous HCl    Magnesium Standard Dose Replacement - Follow Nurse / BPA Driven Protocol    Phosphorus Replacement - Follow Nurse / BPA Driven Protocol    Potassium Replacement - Follow Nurse / BPA Driven Protocol    sodium chloride    sodium chloride    Assessment & Plan   Assessment & Plan     Active Hospital Problems    Diagnosis  POA    Falls [W19.XXXA]  Unknown    CAD (coronary artery disease) [I25.10]  Yes    Nocturnal leg cramps [G47.62]  Yes    Essential hypertension [I10]  Yes    Osteoarthritis [M19.90]  Yes      Resolved Hospital Problems   No resolved problems to display.        Brief Hospital Course to date:  Monisha Gardner is a 64 y.o. female     Hip and back pain, multiple falls  -MRI significant for moderate canal narrowing lower spine, otherwise no red flag symptoms or severe critical stenosis.  She has no claudication symptoms.  Tenderness along inguinal ligament suggest possible inflammatory component  -Check inflammatory markers, vitamin levels, CK  -started decadron  -NS following, d/w NS PA who recs rehab.  Family worried about sacrum/coccyx injury so recs MRI pelvis which is still pending  -Neuro following, recs that likely d/t diabetic polyneuropathy affecting gait and balance.  He recs aggressive PT dedicated to gait and balance as an outpatient.  -started low dose lyrica.  D/w risks with patient family including sedation/decreased thinking     SIMON on CKD  -Hold nephrotoxic meds, better with IVF    Mouth Sores/canker sores  --oragel and viscous lidocaine     T2DM  -had 45 u long acting last night relatively hypoglycemic, reduced dose to 20u, continue ssi     CAD  HTN  -c/w home meds, except for ARB        Expected Discharge Location and Transportation:   Expected Discharge   Expected Discharge Date: 9/9/2024; Expected Discharge Time:      VTE Prophylaxis:  Pharmacologic VTE prophylaxis orders are  present.         AM-PAC 6 Clicks Score (PT): 20 (09/08/24 0815)    CODE STATUS:   Code Status and Medical Interventions: CPR (Attempt to Resuscitate); Full Support   Ordered at: 09/07/24 0414     Code Status (Patient has no pulse and is not breathing):    CPR (Attempt to Resuscitate)     Medical Interventions (Patient has pulse or is breathing):    Full Support       Shane Duckworth MD  09/08/24

## 2024-09-08 NOTE — CONSULTS
Inpatient Neurosurgery Consult  Consult performed by: Harry Lawrence PA-C  Consult ordered by: Armand Azevedo MD          Referring Provider: Gucci    Patient Care Team:  Kimmy Vela PA as PCP - General (Family Medicine)    Chief Complaint: Falls/ numbness    Subjective .     History of present illness:       Patient is 64 with history of DM2 and neuropathy. She had a fall down a flight of steps and since August she had multiple falls backwards. Patient also complains of tailbone pain.     Due to Lumbar MRI findings of disc bulge NS was consulted.     Family present at the time of Evaluation    Review of Systems  Negative other than HPI  History  Past Medical History:   Diagnosis Date    Anxiety     Arthritis     Diabetes mellitus     Diverticulosis     GERD (gastroesophageal reflux disease)     Gout     Hyperlipidemia     Hypertension     Myocardial infarction    ,   Past Surgical History:   Procedure Laterality Date    APPENDECTOMY      BREAST BIOPSY Right     benign    CARDIAC CATHETERIZATION      CARDIAC CATHETERIZATION N/A 2024    Procedure: Left Heart Cath;  Surgeon: Derrick Watts MD;  Location:  [facility];  Service: Cardiology;  Laterality: N/A;    CARDIAC CATHETERIZATION N/A 2024    Procedure: Stent VITALIY coronary;  Surgeon: Derrick Watts MD;  Location:  [facility];  Service: Cardiology;  Laterality: N/A;     SECTION      CHOLECYSTECTOMY      CORONARY STENT PLACEMENT      Stenting of the RCA with 3.5 x 26 mm Resolute drug-eluting stent    HYSTERECTOMY      JOINT REPLACEMENT      TONSILLECTOMY      TUMOR REMOVAL  02/15/2024    FACIAL TUMOR   ,   Family History   Problem Relation Age of Onset    Arthritis Mother     Heart disease Mother     Heart disease Sister     Vision loss Daughter     Heart disease Father     Heart disease Sister     Heart disease Sister     Heart disease Sister    ,   Social History     Socioeconomic History    Marital  status:     Number of children: 2    Years of education: 12   Tobacco Use    Smoking status: Never     Passive exposure: Never    Smokeless tobacco: Never   Vaping Use    Vaping status: Never Used   Substance and Sexual Activity    Alcohol use: No    Drug use: No    Sexual activity: Defer     E-cigarette/Vaping    E-cigarette/Vaping Use Never User     Passive Exposure No     Counseling Given No      E-cigarette/Vaping Substances    Nicotine No     THC No     CBD No     Flavoring No      E-cigarette/Vaping Devices    Disposable No     Pre-filled or Refillable Cartridge No     Refillable Tank No     Pre-filled Pod No          ,   Medications Prior to Admission   Medication Sig Dispense Refill Last Dose    allopurinol (ZYLOPRIM) 100 MG tablet TAKE 1 TABLET BY MOUTH TWICE DAILY 180 tablet 3     amLODIPine (NORVASC) 2.5 MG tablet TAKE 1 TABLET BY MOUTH DAILY 90 tablet 1     aspirin 81 MG EC tablet Take 1 tablet by mouth Daily. 100 tablet 3     buPROPion SR (Wellbutrin SR) 150 MG 12 hr tablet Take 1 tablet by mouth 2 (Two) Times a Day.       clopidogrel (PLAVIX) 75 MG tablet Take 1 tablet by mouth Daily. 90 tablet 3     [] dexAMETHasone (DECADRON) 4 MG tablet Take 0.5 tablets by mouth Daily With Breakfast for 2 days. 1 tablet 0     DULoxetine (CYMBALTA) 30 MG capsule TAKE 3 CAPSULES BY MOUTH EVERY  capsule 1     Evolocumab (REPATHA) solution auto-injector SureClick injection Inject 1 mL under the skin into the appropriate area as directed Every 14 (Fourteen) Days. 2 mL 5     fenofibrate 160 MG tablet TAKE 1 TABLET BY MOUTH EVERY DAY 90 tablet 1     gabapentin (NEURONTIN) 600 MG tablet Take 1 tablet by mouth 2 (Two) Times a Day. 60 tablet 0     hydrOXYzine pamoate (VISTARIL) 50 MG capsule TAKE 1 CAPSULE BY MOUTH THREE TIMES DAILY AS NEEDED FOR ITCHING 270 capsule 1     ibuprofen (ADVIL,MOTRIN) 600 MG tablet Take 1 tablet by mouth Every 6 (Six) Hours As Needed for Mild Pain. 20 tablet 0     ketorolac  (TORADOL) 10 MG tablet Take 1 tablet by mouth Every 6 (Six) Hours As Needed for Moderate Pain. 15 tablet 0     Lantus SoloStar 100 UNIT/ML injection pen Inject 45 Units under the skin into the appropriate area as directed Daily. 15 mL 5     lidocaine (LIDODERM) 5 % Place 2 patches on the skin as directed by provider Daily. Remove & Discard patch within 12 hours or as directed by MD 60 each 0     losartan (COZAAR) 100 MG tablet Take 0.5 tablets by mouth 2 (Two) Times a Day.       methocarbamol (ROBAXIN) 500 MG tablet Take 1 tablet by mouth 3 (Three) Times a Day As Needed for Muscle Spasms. 20 tablet 0     metoprolol tartrate (LOPRESSOR) 100 MG tablet TAKE 1 TABLET BY MOUTH TWICE DAILY 180 tablet 3     ondansetron (Zofran) 4 MG tablet Take 1 tablet by mouth Every 8 (Eight) Hours As Needed for Nausea or Vomiting. 30 tablet 0     orphenadrine (NORFLEX) 100 MG 12 hr tablet Take 1 tablet by mouth 2 (Two) Times a Day. 20 tablet 0     pantoprazole (PROTONIX) 40 MG EC tablet Take 1 tablet by mouth Daily. 90 tablet 3     ranolazine (Ranexa) 500 MG 12 hr tablet Take 1 tablet by mouth 2 (Two) Times a Day. 60 tablet 5     rOPINIRole (REQUIP) 2 MG tablet Take 1 tablet by mouth Every Night. 90 tablet 3     Semaglutide, 2 MG/DOSE, (OZEMPIC) 8 MG/3ML solution pen-injector Inject 2 mg under the skin into the appropriate area as directed 1 (One) Time Per Week. 3 mL 5    , Scheduled Meds:  allopurinol, 100 mg, Oral, BID  amLODIPine, 2.5 mg, Oral, Daily  aspirin, 81 mg, Oral, Daily  buPROPion SR, 150 mg, Oral, Daily  clopidogrel, 75 mg, Oral, Daily  dexAMETHasone, 4 mg, Intravenous, Q6H  DULoxetine, 90 mg, Oral, Daily  gabapentin, 300 mg, Oral, Q12H  heparin (porcine), 5,000 Units, Subcutaneous, Q8H  insulin glargine, 20 Units, Subcutaneous, Nightly  insulin lispro, 2-7 Units, Subcutaneous, 4x Daily AC & at Bedtime  Lidocaine, 2 patch, Transdermal, Q24H  [Held by provider] losartan, 50 mg, Oral, BID  metoprolol tartrate, 100 mg, Oral,  BID  pantoprazole, 40 mg, Oral, Daily  pregabalin, 50 mg, Oral, Q8H  ranolazine, 500 mg, Oral, BID  rOPINIRole, 2 mg, Oral, Nightly  sodium chloride, 10 mL, Intravenous, Q12H   , Continuous Infusions:  lactated ringers, 75 mL/hr, Last Rate: 75 mL/hr (09/07/24 0636)   , PRN Meds:    acetaminophen **OR** acetaminophen **OR** acetaminophen    benzocaine    senna-docusate sodium **AND** polyethylene glycol **AND** bisacodyl **AND** bisacodyl    Calcium Replacement - Follow Nurse / BPA Driven Protocol    dextrose    dextrose    glucagon (human recombinant)    HYDROcodone-acetaminophen    HYDROmorphone **AND** naloxone    Lidocaine Viscous HCl    Magnesium Standard Dose Replacement - Follow Nurse / BPA Driven Protocol    Phosphorus Replacement - Follow Nurse / BPA Driven Protocol    Potassium Replacement - Follow Nurse / BPA Driven Protocol    sodium chloride    sodium chloride, and Allergies:  Ciprofloxacin, Penicillins, Statins, and Fish oil [omega-3 fatty acids]   SMOKING STATUS: non smoker  Objective     Vital Signs   Temp:  [97.9 °F (36.6 °C)-98.9 °F (37.2 °C)] 97.9 °F (36.6 °C)  Heart Rate:  [67-78] 76  Resp:  [18] 18  BP: (126-158)/() 158/72  Body mass index is 26.9 kg/m².    Physical Exam:     Body mass index is 26.9 kg/m².    Strength in all ext 5/5 on direct testing  Tenderness to palpation on whole spine and flanks but has worst soreness around sacrum    No benitez's  No Lhermitte's  No suspension of the thoracic sensory level  Results Review:   I reviewed the patient's new imaging results and agree with the interpretation.  Brain and spine survey MRI reviewed only showing mild spinal stenosis in the C spine and L4/5 broad based disc protrusion.    Assessment & Plan     Diagnosis:  falls    PLAN:    Patient with likely peripheral polyneuropathy affecting gait and balance. Pain in spine after fall and finds on MRI that could warrant outpatient visit if she develops Sciatica.     Global mobility and care  taking issues discussed extensively with daughter and son in law (Physical Therapist).     No follow up required from NS perspective    I discussed the patients findings and my recommendations with patient, family, and consulting provider    Harry Lawrence PA-C  09/08/24  12:34 EDT    Time:  60 minutes

## 2024-09-09 ENCOUNTER — READMISSION MANAGEMENT (OUTPATIENT)
Dept: CALL CENTER | Facility: HOSPITAL | Age: 64
End: 2024-09-09
Payer: COMMERCIAL

## 2024-09-09 VITALS
BODY MASS INDEX: 26.75 KG/M2 | SYSTOLIC BLOOD PRESSURE: 132 MMHG | OXYGEN SATURATION: 97 % | TEMPERATURE: 98.1 F | WEIGHT: 156.7 LBS | HEART RATE: 70 BPM | HEIGHT: 64 IN | RESPIRATION RATE: 18 BRPM | DIASTOLIC BLOOD PRESSURE: 69 MMHG

## 2024-09-09 LAB
ANION GAP SERPL CALCULATED.3IONS-SCNC: 12 MMOL/L (ref 5–15)
BUN SERPL-MCNC: 23 MG/DL (ref 8–23)
BUN/CREAT SERPL: 21.3 (ref 7–25)
CALCIUM SPEC-SCNC: 9.8 MG/DL (ref 8.6–10.5)
CHLORIDE SERPL-SCNC: 96 MMOL/L (ref 98–107)
CO2 SERPL-SCNC: 25 MMOL/L (ref 22–29)
CREAT SERPL-MCNC: 1.08 MG/DL (ref 0.57–1)
EGFRCR SERPLBLD CKD-EPI 2021: 57.8 ML/MIN/1.73
GLUCOSE BLDC GLUCOMTR-MCNC: 143 MG/DL (ref 70–130)
GLUCOSE BLDC GLUCOMTR-MCNC: 194 MG/DL (ref 70–130)
GLUCOSE BLDC GLUCOMTR-MCNC: 265 MG/DL (ref 70–130)
GLUCOSE SERPL-MCNC: 182 MG/DL (ref 65–99)
MAGNESIUM SERPL-MCNC: 1.8 MG/DL (ref 1.6–2.4)
POTASSIUM SERPL-SCNC: 4.9 MMOL/L (ref 3.5–5.2)
QT INTERVAL: 452 MS
QTC INTERVAL: 491 MS
SODIUM SERPL-SCNC: 133 MMOL/L (ref 136–145)

## 2024-09-09 PROCEDURE — 25010000002 ONDANSETRON PER 1 MG: Performed by: INTERNAL MEDICINE

## 2024-09-09 PROCEDURE — 96375 TX/PRO/DX INJ NEW DRUG ADDON: CPT

## 2024-09-09 PROCEDURE — 25010000002 DEXAMETHASONE PER 1 MG: Performed by: STUDENT IN AN ORGANIZED HEALTH CARE EDUCATION/TRAINING PROGRAM

## 2024-09-09 PROCEDURE — G0378 HOSPITAL OBSERVATION PER HR: HCPCS

## 2024-09-09 PROCEDURE — 25010000002 HYDROMORPHONE PER 4 MG: Performed by: STUDENT IN AN ORGANIZED HEALTH CARE EDUCATION/TRAINING PROGRAM

## 2024-09-09 PROCEDURE — 83735 ASSAY OF MAGNESIUM: CPT | Performed by: STUDENT IN AN ORGANIZED HEALTH CARE EDUCATION/TRAINING PROGRAM

## 2024-09-09 PROCEDURE — 99239 HOSP IP/OBS DSCHRG MGMT >30: CPT | Performed by: INTERNAL MEDICINE

## 2024-09-09 PROCEDURE — 99231 SBSQ HOSP IP/OBS SF/LOW 25: CPT | Performed by: PHYSICIAN ASSISTANT

## 2024-09-09 PROCEDURE — 97116 GAIT TRAINING THERAPY: CPT

## 2024-09-09 PROCEDURE — 82948 REAGENT STRIP/BLOOD GLUCOSE: CPT

## 2024-09-09 PROCEDURE — 25010000002 HEPARIN (PORCINE) PER 1000 UNITS: Performed by: STUDENT IN AN ORGANIZED HEALTH CARE EDUCATION/TRAINING PROGRAM

## 2024-09-09 PROCEDURE — 63710000001 INSULIN LISPRO (HUMAN) PER 5 UNITS: Performed by: STUDENT IN AN ORGANIZED HEALTH CARE EDUCATION/TRAINING PROGRAM

## 2024-09-09 PROCEDURE — 80048 BASIC METABOLIC PNL TOTAL CA: CPT | Performed by: STUDENT IN AN ORGANIZED HEALTH CARE EDUCATION/TRAINING PROGRAM

## 2024-09-09 PROCEDURE — 96376 TX/PRO/DX INJ SAME DRUG ADON: CPT

## 2024-09-09 RX ORDER — PREGABALIN 50 MG/1
50 CAPSULE ORAL EVERY 8 HOURS SCHEDULED
Qty: 9 CAPSULE | Refills: 0 | Status: SHIPPED | OUTPATIENT
Start: 2024-09-09 | End: 2024-09-13

## 2024-09-09 RX ORDER — ONDANSETRON 2 MG/ML
4 INJECTION INTRAMUSCULAR; INTRAVENOUS EVERY 6 HOURS PRN
Status: DISCONTINUED | OUTPATIENT
Start: 2024-09-09 | End: 2024-09-09 | Stop reason: HOSPADM

## 2024-09-09 RX ORDER — HYDROCODONE BITARTRATE AND ACETAMINOPHEN 5; 325 MG/1; MG/1
1 TABLET ORAL EVERY 4 HOURS PRN
Qty: 18 TABLET | Refills: 0 | Status: SHIPPED | OUTPATIENT
Start: 2024-09-09 | End: 2024-09-12

## 2024-09-09 RX ORDER — LIDOCAINE HYDROCHLORIDE 20 MG/ML
5 SOLUTION OROPHARYNGEAL
Qty: 100 ML | Refills: 0 | Status: SHIPPED | OUTPATIENT
Start: 2024-09-09

## 2024-09-09 RX ADMIN — ASPIRIN 81 MG: 81 TABLET, COATED ORAL at 08:34

## 2024-09-09 RX ADMIN — BUPROPION HYDROCHLORIDE 150 MG: 150 TABLET, EXTENDED RELEASE ORAL at 08:37

## 2024-09-09 RX ADMIN — AMLODIPINE BESYLATE 2.5 MG: 2.5 TABLET ORAL at 08:33

## 2024-09-09 RX ADMIN — HYDROMORPHONE HYDROCHLORIDE 0.5 MG: 1 INJECTION, SOLUTION INTRAMUSCULAR; INTRAVENOUS; SUBCUTANEOUS at 09:49

## 2024-09-09 RX ADMIN — RANOLAZINE 500 MG: 500 TABLET, FILM COATED, EXTENDED RELEASE ORAL at 08:34

## 2024-09-09 RX ADMIN — METOPROLOL TARTRATE 100 MG: 100 TABLET, FILM COATED ORAL at 08:34

## 2024-09-09 RX ADMIN — ALLOPURINOL 100 MG: 100 TABLET ORAL at 08:34

## 2024-09-09 RX ADMIN — INSULIN LISPRO 4 UNITS: 100 INJECTION, SOLUTION INTRAVENOUS; SUBCUTANEOUS at 18:06

## 2024-09-09 RX ADMIN — GABAPENTIN 300 MG: 300 CAPSULE ORAL at 08:33

## 2024-09-09 RX ADMIN — DULOXETINE HYDROCHLORIDE 90 MG: 60 CAPSULE, DELAYED RELEASE ORAL at 08:33

## 2024-09-09 RX ADMIN — HYDROCODONE BITARTRATE AND ACETAMINOPHEN 1 TABLET: 5; 325 TABLET ORAL at 08:46

## 2024-09-09 RX ADMIN — CLOPIDOGREL BISULFATE 75 MG: 75 TABLET ORAL at 08:33

## 2024-09-09 RX ADMIN — DEXAMETHASONE SODIUM PHOSPHATE 4 MG: 4 INJECTION, SOLUTION INTRA-ARTICULAR; INTRALESIONAL; INTRAMUSCULAR; INTRAVENOUS; SOFT TISSUE at 12:04

## 2024-09-09 RX ADMIN — ONDANSETRON 4 MG: 2 INJECTION INTRAMUSCULAR; INTRAVENOUS at 08:33

## 2024-09-09 RX ADMIN — INSULIN LISPRO 2 UNITS: 100 INJECTION, SOLUTION INTRAVENOUS; SUBCUTANEOUS at 12:04

## 2024-09-09 RX ADMIN — HEPARIN SODIUM 5000 UNITS: 5000 INJECTION INTRAVENOUS; SUBCUTANEOUS at 14:02

## 2024-09-09 RX ADMIN — PREGABALIN 50 MG: 50 CAPSULE ORAL at 14:02

## 2024-09-09 RX ADMIN — PANTOPRAZOLE SODIUM 40 MG: 40 TABLET, DELAYED RELEASE ORAL at 08:33

## 2024-09-09 RX ADMIN — DEXAMETHASONE SODIUM PHOSPHATE 4 MG: 4 INJECTION, SOLUTION INTRA-ARTICULAR; INTRALESIONAL; INTRAMUSCULAR; INTRAVENOUS; SOFT TISSUE at 00:08

## 2024-09-09 RX ADMIN — DEXAMETHASONE SODIUM PHOSPHATE 4 MG: 4 INJECTION, SOLUTION INTRA-ARTICULAR; INTRALESIONAL; INTRAMUSCULAR; INTRAVENOUS; SOFT TISSUE at 06:41

## 2024-09-09 RX ADMIN — HEPARIN SODIUM 5000 UNITS: 5000 INJECTION INTRAVENOUS; SUBCUTANEOUS at 06:41

## 2024-09-09 RX ADMIN — PREGABALIN 50 MG: 50 CAPSULE ORAL at 06:42

## 2024-09-09 NOTE — DISCHARGE PLACEMENT REQUEST
"Case Management   Becca Kim, [phone]    Monisha Gongora (64 y.o. Female)       Date of Birth   1960    Social Security Number       Address   [address] [place] [address]    Home Phone   [phone]    MRN   [id number]       Gnosticist   [facility]    Marital Status                               Admission Date   9/7/24    Admission Type   Elective    Admitting Provider   Shane Duckworth MD    Attending Provider   Shane Duckworth MD    Department, Room/Bed   [facility] 3H, S377/1       Discharge Date       Discharge Disposition   [place] [facility]    [place] Destination                                 Attending Provider: Shane Duckworth MD    Allergies: Ciprofloxacin, Penicillins, Statins, Fish Oil [Omega-3 Fatty Acids]    Isolation: None   Infection: None   Code Status: CPR    Ht: 162.6 cm (64\")   Wt: 71.1 kg (156 lb 11.2 oz)    Admission Cmt: None   Principal Problem: None                  Active Insurance as of 9/7/2024       Primary Coverage       Payor Plan Insurance Group Employer/Plan Group    [facility] [id number]       Payor Plan Address Payor Plan Phone Number Payor Plan Fax Number Effective Dates    [address] [address] [phone]  1/1/2022 - None Entered    [facility] [id number]         Subscriber Name Subscriber Birth Date Member ID       MONISHA GONGORA FAY 1960 [id number]               Secondary Coverage       Payor Plan Insurance Group Employer/Plan Group    [facility] COMMUNITY PLAN [facility] COMMUNITY PLAN [facility] [place]       Payor Plan Address Payor Plan Phone Number Payor Plan Fax Number Effective Dates    [address]   4/1/2024 - None Entered    [facility] [address]         Subscriber Name Subscriber Birth Date Member ID       MONISHA GONGORA FAY 1960 [id number]                     Emergency Contacts        (Rel.) Home Phone Work Phone Mobile Phone    ORIANA DUENAS (Daughter) [phone] -- [phone]    Shannon Duenas " (Friend) -- -- [phone]             [address]  [address]  [facility] [address]  Phone:  [phone]  Fax:  [phone] Date: Sep 9, 2024      Ambulatory Referral to Physical Therapy for Evaluation & Treatment     Patient:  Monisha Gardner MRN:  [id number]   [address]  [facility] [address] :  1960  SSN:    Phone: [phone] Sex:  F      INSURANCE PAYOR PLAN GROUP # SUBSCRIBER ID   Primary:  Secondary:    [facility] COMMUNITY PLAN OF [place] [id number]  [id number]  [facility] [id number]  [id number]      Referring Provider Information:  SHANE BRUMFIELD Phone: [phone] Fax: [phone]       Referral Information:   # Visits:  1 Referral Type: Physical Therapy [AE1]   Urgency:  Routine Referral Reason: Specialty Services Required   Start Date: Sep 9, 2024 End Date:  To be determined by Insurer   Diagnosis: Dysphagia, oral phase (R13.11 [ICD-10-CM] 787.21 [ICD-9-CM])      Refer to Dept:   Refer to Provider:   Refer to Provider Phone:   Refer to Facility:       Specialty needed: Evaluate and treat  Follow-up needed: Yes     This document serves as a request of services and does not constitute Insurance authorization or approval of services.  To determine eligibility, please contact the members Insurance carrier to verify and review coverage.     If you have medical questions regarding this request for services. Please contact [address] at [phone] during normal business hours.        Authorizing Provider:Shane Brumfield MD  Authorizing Provider's NPI: [id number]  Order Entered By: Becca Kim RN 2024  3:29 PM     Electronically signed by: Shane Brumfield MD 2024  3:29 PM

## 2024-09-09 NOTE — OUTREACH NOTE
Prep Survey      Flowsheet Row Responses   Yazidism facility patient discharged from? [facility]   Is LACE score < 7 ? No   Eligibility [facility]   Date of Admission 09/07/24   Date of Discharge 09/09/24   Discharge Disposition Home-Health Care Sv   Discharge diagnosis Falls   Does the patient have one of the following disease processes/diagnoses(primary or secondary)? Other   Does the patient have Home health ordered? No   Is there a DME ordered? Yes   What DME was ordered? [place] for rolling walker   Prep survey completed? Yes            DEMARIO REVELES - Registered Nurse

## 2024-09-09 NOTE — DISCHARGE PLACEMENT REQUEST
"Case Management   Becca Kim, [phone]    Patient will be staying with her daughter Sheba at [address] [facility] [address]    Monisha Gongora (64 y.o. Female)       Date of Birth   1960    Social Security Number       Address   [address] [place] [id number]    Home Phone   [phone]    MRN   [id number]       Baptism   Shinto    Marital Status                               Admission Date   9/7/24    Admission Type   Elective    Admitting Provider   Shane Duckworth MD    Attending Provider   Shane Duckworth MD    Department, Room/Bed   [facility] 3H, S377/1       Discharge Date       Discharge Disposition   [facility] [place]    [place]charge Destination                                 Attending Provider: Shane Duckworth MD    Allergies: Ciprofloxacin, Penicillins, Statins, Fish Oil [Omega-3 Fatty Acids]    Isolation: None   Infection: None   Code Status: CPR    Ht: 162.6 cm (64\")   Wt: 71.1 kg (156 lb 11.2 oz)    Admission Cmt: None   Principal Problem: None                  Active Insurance as of 9/7/2024       Primary Coverage       Payor Plan Insurance Group Employer/Plan Group    [facility] [id number]       Payor Plan Address Payor Plan Phone Number Payor Plan Fax Number Effective Dates    [address] [address] [phone]  1/1/2022 - None Entered    [facility] [id number]         Subscriber Name Subscriber Birth Date Member ID       MONISHA GONGORA FAY 1960 [id number]               Secondary Coverage       Payor Plan Insurance Group Employer/Plan Group    [facility] COMMUNITY PLAN [facility] COMMUNITY PLAN [facility] [id number]       Payor Plan Address Payor Plan Phone Number Payor Plan Fax Number Effective Dates    [address]   4/1/2024 - None Entered    [facility] [id number]         Subscriber Name Subscriber Birth Date Member ID       MONISHA GONGORA FAY 1960 [id number]                     Emergency Contacts        (Rel.) Home Phone Work Phone " Mobile Phone    ORIANA DUENAS (Daughter) [phone] -- [phone]    Shannon Duenas (Friend) -- -- [phone]                 Physical Therapy Notes (most recent note)        Jennie Rogers, PT at 24 1615  Version 1 of 1         Patient Name: Monisha Gardner  : 1960    MRN: [id number]                              Today's Date: 2024       Admit Date: 2024    Visit Dx:     ICD-10-CM ICD-9-CM   1. Dysphagia, oral phase  R13.11 787.21     Patient Active Problem List   Diagnosis    ASCVD (arteriosclerotic cardiovascular disease)    Essential hypertension    Dyslipidemia    Type 2 diabetes mellitus with hyperglycemia, with long-term current use of insulin    Body mass index (bmi) 30.0-30.9, adult    Osteoarthritis    Atypical angina    Elevated liver enzymes    Gastroesophageal reflux disease with esophagitis    Vitamin D deficiency    Hx of pancreatitis    Menopausal symptoms    Vitamin B 12 deficiency    Nocturnal leg cramps    Anxiety    Left arm weakness    Chronic gout of multiple sites    Squamous cell carcinoma in situ (SCCIS) of skin    Mixed hyperlipidemia    PVC (premature ventricular contraction)    RLS (restless legs syndrome)    Psychophysiological insomnia    Dry mouth    Type 2 diabetes mellitus with diabetic autonomic neuropathy, with long-term current use of insulin    Bone mass    Mass of mandible    Coronary artery disease    Abnormal nuclear stress test    CAD (coronary artery disease)    Falls     Past Medical History:   Diagnosis Date    Anxiety     Arthritis     Diabetes mellitus     Diverticulosis     GERD (gastroesophageal reflux disease)     Gout     Hyperlipidemia     Hypertension     Myocardial infarction      Past Surgical History:   Procedure Laterality Date    APPENDECTOMY      BREAST BIOPSY Right     benign    CARDIAC CATHETERIZATION      CARDIAC CATHETERIZATION N/A 2024    Procedure: Left Heart Cath;  Surgeon: Derrick Watts MD;  Location: [facility]  INVASIVE LOCATION;  Service: Cardiology;  Laterality: N/A;    CARDIAC CATHETERIZATION N/A 2024    Procedure: Stent VITALIY coronary;  Surgeon: Derrick Watts MD;  [facility] CATH INVASIVE LOC[facility];  Service: Cardiology;  Laterality: N/A;     SECTION      CHOLECYSTECTOMY      CORONARY STENT PLACEMENT      Stenting of the RCA with 3.5 x 26 mm Resolute drug-eluting stent    HYSTERECTOMY      JOINT REPLACEMENT      TONSILLECTOMY      TUMOR REMOVAL  02/15/2024    FACIAL TUMOR      General Information       Row Name 24          Physical Therapy Time and Intention    Document Type evaluation  -     Mode of Treatment physical therapy  -       Row Name 24          General Information    Patient Profile Reviewed yes  -     Prior Level of Function independent:;all household mobility;community mobility;gait;transfer;bed mobility;ADL's  Pt lives alone; works, Ind with functional mobility and ADLs. Recent increase of falls.  -     Existing Precautions/Restrictions fall  -     Barriers to Rehab previous functional deficit  -       Row Name 24          Living Environment    People in Home alone;other (see comments)  Plans to d/c [facility] dtr/son-in-law  -       Row Name 24          Home Main Entrance    Number of Stairs, Main Entrance one  -     Stair Railings, Main Entrance none  -       Row Name 24          Stairs Within Home, Primary    Stairs, Within Home, Primary Flight to second level. Can sleep on main level. Shower is on second level.  -     Number of Stairs, Within Home, Primary twelve  -       Row Name 24          Cognition    Orientation Status (Cognition) oriented x 3  -       Row Name 24          Safety Issues, Functional Mobility    Safety Issues Affecting Function (Mobility) insight into deficits/self-awareness;awareness of need for assistance;safety precaution awareness  -     Impairments Affecting  Function (Mobility) endurance/activity tolerance;balance;pain;strength  -               User Key  (r) = Recorded By, (t) = Taken By, (c) = Cosigned By      Initials Name Provider Type     Jennie Rogers PT Physical Therapist                   Mobility       Row Name 09/08/24 1709          Bed Mobility    Bed Mobility supine-sit;sit-supine  -     Supine-Sit [place] (Bed Mobility) standby assist  -     Sit-Supine [place] (Bed Mobility) standby assist  -     Comment, (Bed Mobility) Encouraged logrolling for comfort  -       Row Name 09/08/24 1709          Transfers    Comment, (Transfers) Pt performed STS with VC for hand placement  -       Row Name 09/08/24 1709          Sit-Stand Transfer    Sit-Stand [place] (Transfers) contact guard;nonverbal cues (demo/gesture);verbal cues  -     Assistive Device (Sit-Stand Transfers) walker, front-wheeled  -       Row Name 09/08/24 1709          Gait/Stairs (Locomotion)    [place] Level (Gait) contact guard;nonverbal cues (demo/gesture);verbal cues  -     Assistive Device (Gait) walker, front-wheeled  -     Distance in Feet (Gait) 300  -     Deviations/Abnormal Patterns (Gait) bilateral deviations;gait speed decreased  -     Comment, (Gait/Stairs) Pt amb in hoover with step-through gait pattern. VC for staying closer to AD. Pt demonstrated slight posterior lean when ambulating with increased WB in heels. Increased anterior weight shifting that improved with use of FWW. Activity limited by fatigue. Pain in tolerable with ambulation. Intense pain with sitting.  -               User Key  (r) = Recorded By, (t) = Taken By, (c) = Cosigned By      Initials Name Provider Type    Jennie Salmon PT Physical Therapist                   Obj/Interventions       Row Name 09/08/24 1719          Range of Motion Comprehensive    General Range of Motion lower extremity range of motion deficits identified  -       Row Name 09/08/24 4979           Strength Comprehensive (MMT)    General Manual Muscle Testing (MMT) Assessment lower extremity strength deficits identified  -     Comment, General Manual Muscle Testing (MMT) Assessment Limited strength assessment secondary to elevated sacral and R hip pain.  -       Row Name 09/08/24 1719          Balance    Balance Assessment sitting static balance;sitting dynamic balance;sit to stand dynamic balance;standing static balance;standing dynamic balance  -     Static Sitting Balance standby assist  -     Dynamic Sitting Balance standby assist  -HW     Position, Sitting Balance sitting edge of bed  -     Static Standing Balance contact guard  -     Dynamic Standing Balance contact guard  -     Position/Device Used, Standing Balance supported;walker, rolling  -     Balance Interventions sitting;standing;sit to stand;occupation based/functional task  -       Row Name 09/08/24 1719          Sensory Assessment (Somatosensory)    Sensory Assessment (Somatosensory) LE sensation intact;other (see comments)  pt able to identify light tough and general sensation. Proprioception not tested. Pt reported worsening sensation with dep position in B feet  -               User Key  (r) = Recorded By, (t) = Taken By, (c) = Cosigned By      Initials Name Provider Type     Jennie Rogers, PT Physical Therapist                   Goals/Plan       [facility] Name 09/08/24 1728          Bed Mobility Goal 1 (PT)    Activity/Assistive Device (Bed Mobility Goal 1, PT) sit to supine;supine to sit  -     [place] Level/Cues Needed (Bed Mobility Goal 1, PT) modified independence  -     Time Frame (Bed Mobility Goal 1, PT) short term goal (STG);5 days  -[facility] Name 09/08/24 1728          Transfer Goal 1 (PT)    Activity/Assistive Device (Transfer Goal 1, PT) sit-to-stand/stand-to-sit;bed-to-chair/chair-to-bed  -     [place] Level/Cues Needed (Transfer Goal 1, PT) modified independence  -     Time Frame (Transfer  Goal 1, PT) long term goal (LTG);10 days  -       Row Name 09/08/24 1728          Gait Training Goal 1 (PT)    Activity/Assistive Device (Gait Training Goal 1, PT) gait (walking locomotion)  -HW     [place] Level (Gait Training Goal 1, PT) modified independence  -HW     Distance (Gait Training Goal 1, PT) 500  -HW     Time Frame (Gait Training Goal 1, PT) long term goal (LTG);10 days  -       Row Name 09/08/24 1728          Stairs Goal 1 (PT)    Activity/Assistive Device (Stairs Goal 1, PT) ascending stairs;descending stairs  -HW     [place] Level/Cues Needed (Stairs Goal 1, PT) contact guard required  -HW     Number of Stairs (Stairs Goal 1, PT) 12  -HW     Time Frame (Stairs Goal 1, PT) long term goal (LTG);10 days  -       Row Name 09/08/24 1728          Therapy Assessment/Plan (PT)    Planned Therapy Interventions (PT) balance training;bed mobility training;gait training;home exercise program;patient/family education;ROM (range of motion);strengthening;stretching;transfer training  -               User Key  (r) = Recorded By, (t) = Taken By, (c) = Cosigned By      Initials Name Provider Type    HW Jennie Rogers, SUYAPA Physical Therapist                   Clinical Impression       Row Name 09/08/24 1720          Pain    Pretreatment Pain Rating 6/10  -HW     Posttreatment Pain Rating 8/10  -     Pain Location generalized  -     Pain Location - buttock  -       Row Name 09/08/24 1720          Plan of Care Review    Plan of Care Reviewed With patient;son;daughter  -     Progress no change  -     Outcome Evaluation Pt amb 300' with FWW and CGA. Pt performed bed mobility with SBA. Encouraged logrolling for comfort. No LOB observed though unsteadiness and posterior lean noted. Encouraged use of AD at home. Recommend d/c home with assist and OPPT to decrease risk for falls. Pt would also benefit from BSC for comfort while sitting to shower to minimize risk for falls.  -       Row Name 09/08/24  1720          Therapy Assessment/Plan (PT)    Rehab Potential (PT) good, to achieve stated therapy goals  -     Criteria for Skilled Interventions Met (PT) yes;meets criteria;skilled treatment is necessary  -     Therapy Frequency (PT) daily  -     Predicted Duration of Therapy Intervention (PT) three days  -       Row Name 09/08/24 1720          Positioning and Restraints    Pre-Treatment Position in bed  -     Post Treatment Position bed  -HW     In Bed supine;notified nsg;sitting;with family/caregiver;side rails up x2;exit alarm on;encouraged to call for assist;call light within reach  -               User Key  (r) = Recorded By, (t) = Taken By, (c) = Cosigned By      Initials Name Provider Type     Jennie Rogers PT Physical Therapist                   Outcome Measures       Row Name 09/08/24 1728 09/08/24 0815       How much help from another person do you currently need...    Turning from your back to your side while in flat bed without using bedrails? 3  - 4  -TS    Moving from lying on back to sitting on the side of a flat bed without bedrails? 3  - 4  -TS    Moving to and from a bed to a chair (including a wheelchair)? 3  - 3  -TS    Standing up from a chair using your arms (e.g., wheelchair, bedside chair)? 3  - 3  -TS    Climbing 3-5 steps with a railing? 3  - 3  -TS    To walk in hospital room? 3  - 3  -TS    AM-PAC 6 Clicks Score (PT) 18  - 20  -TS    Highest Level of Mobility Goal 6 --> Walk 10 steps or more  - 6 --> Walk 10 steps or more  -      Row Name 09/08/24 1728          Functional Assessment    Outcome Measure Options AM-PAC 6 Clicks Basic Mobility (PT)  -               User Key  (r) = Recorded By, (t) = Taken By, (c) = Cosigned By      Initials Name Provider Type    TS Saige Mcclelland RN Registered Nurse     Jennie Rogers PT Physical Therapist                                 Physical Therapy Education       Title: PT OT SLP Therapies (Done)       Topic:  Physical Therapy (Done)       Point: Mobility training (Done)       Learning Progress Summary             Patient Acceptance, E,D, VU,DU by  at 9/8/2024 1728                         Point: Home exercise program (Done)       Learning Progress Summary             Patient Acceptance, E,D, VU,DU by  at 9/8/2024 1728                         Point: Body mechanics (Done)       Learning Progress Summary             Patient Acceptance, E,D, VU,DU by  at 9/8/2024 1728                         Point: Precautions (Done)       Learning Progress Summary             Patient Acceptance, E,D, VU,DU by  at 9/8/2024 1728                                         User Key       Initials Effective Dates Name Provider Type Discipline     12/15/23 -  Jennie Rogers, SUYAPA Physical Therapist PT                  PT Recommendation and Plan  Planned Therapy Interventions (PT): balance training, bed mobility training, gait training, home exercise program, patient/family education, ROM (range of motion), strengthening, stretching, transfer training  Plan of Care Reviewed With: patient, son, daughter  Progress: no change  Outcome Evaluation: Pt amb 300' with FWW and CGA. Pt performed bed mobility with SBA. Encouraged logrolling for comfort. No LOB observed though unsteadiness and posterior lean noted. Encouraged use of AD at home. Recommend d/c home with assist and OPPT to decrease risk for falls. Pt would also benefit from BSC for comfort while sitting to shower to minimize risk for falls.     Time Calculation:   PT Evaluation Complexity  History, PT Evaluation Complexity: 1-2 personal factors and/or comorbidities  Examination of Body Systems (PT Eval Complexity): total of 3 or more elements  Clinical Presentation (PT Evaluation Complexity): stable  Clinical Decision Making (PT Evaluation Complexity): low complexity  Overall Complexity (PT Evaluation Complexity): low complexity     PT Charges       Row Name 09/08/24 1729             Time  Calculation    Start Time 1615  -HW      PT Received On 24  -HW      PT Goal Re-Cert Due Date 24  -         Timed Charges    88139 - Gait Training Minutes  10  -HW         Untimed Charges    PT Eval/Re-eval Minutes 52  -HW         Total Minutes    Timed Charges Total Minutes 10  -HW      Untimed Charges Total Minutes 52  -HW       Total Minutes 62  -HW                User Key  (r) = Recorded By, (t) = Taken By, (c) = Cosigned By      Initials Name Provider Type     Jennie Rogers, SUYAPA Physical Therapist                  Therapy Charges for Today       Code Description Service Date Service Provider Modifiers Qty    90489567150 HC GAIT TRAINING EA 15 MIN 2024 Jennie Rogers, PT GP 1    60416113032 HC PT EVAL LOW COMPLEXITY 4 2024 Jennie Rogers, PT GP 1            PT G-Codes  Outcome Measure Options: AM-PAC 6 Clicks Basic Mobility (PT)  AM-PAC 6 Clicks Score (PT): 18  PT Discharge Summary  Anticipated Discharge Disposition (PT): home with assist, home with outpatient therapy services    Jennie Rogers PT  2024      Electronically signed by Jennie Rogers PT at 24 1730       Occupational Therapy Notes (most recent note)    No notes exist for this encounter.          Discharge Summary        Shane Duckworth MD at 24 1130              [facility] Medicine Services  DISCHARGE SUMMARY    Patient Name: Monisha Gardner  : 1960  MRN: [id number]    Date of Admission: 2024  2:18 AM  Date of Discharge:  2024  Primary Care Physician: Kimmy Vela PA    Consults       Date and Time Order Name Status Description    2024  8:44 AM Inpatient Orthopedic Surgery Consult      2024  4:14 AM Inpatient Neurosurgery Consult Completed     2024  4:14 AM Inpatient Neurology Consult General Completed             Hospital Course     Presenting Problem:     Active Hospital Problems    Diagnosis  POA    Falls [W19.XXXA]  Unknown    CAD (coronary artery  disease) [I25.10]  Yes    Nocturnal leg cramps [G47.62]  Yes    Essential hypertension [I10]  Yes    Osteoarthritis [M19.90]  Yes      Resolved Hospital Problems   No resolved problems to display.          Hospital Course:  Monisha Gardner is a 64 y.o. female     Hip and back pain, multiple falls  Coccyx fracture  -MRI significant for moderate canal narrowing lower spine, otherwise no red flag symptoms or severe critical stenosis.    -NS following, d/w NS PA who recs rehab.  Family worried about sacrum/coccyx injury so recs MRI pelvis which is still pending  -Neuro following, recs that likely d/t diabetic polyneuropathy affecting gait and balance.  He recs aggressive PT dedicated to gait and balance as an outpatient.  -patient already on gabapentin BID at home so stopped lyrica that had started here.  PCP/provider prescribing may consider increasing from BID to TID if will help with pain  -asked ortho to see here but they thought would be better to be seen by spine.  D/w NS who recs that if pain doesn't improve needs to see a coccyx specialist at  so wrote referral  -patient advised to put no pressure on coccyx/use donut to sit     SIMON on CKD  -continue to hold ARB at home, better with IVF     Mouth Sores/canker sores  --oragel and viscous lidocaine     T2DM  -continue home meds     CAD  HTN      Discharge Follow Up Recommendations for outpatient labs/diagnostics:   F/u with PCP in 1 week  F/u with NS/Harry Lawrence    Day of Discharge     HPI:   Still c/o bottom pain/can't sit.  Wants to go home.     Review of Systems  Gen- No fevers, chills  CV- No chest pain, palpitations  Resp- No cough, dyspnea  GI- No N/V/D, abd pain      Vital Signs:   Temp:  [97.5 °F (36.4 °C)-98.6 °F (37 °C)] 97.8 °F (36.6 °C)  Heart Rate:  [63-76] 64  Resp:  [16-18] 18  BP: (138-177)/(68-89) 143/72      Physical Exam:  Constitutional: No acute distress, awake, alert  HENT: NCAT, mucous membranes moist  Respiratory: Clear to auscultation  bilaterally, respiratory effort normal   Cardiovascular: RRR, no murmurs, rubs, or gallops  Gastrointestinal: Positive bowel sounds, soft, nontender, nondistended  Musculoskeletal: No bilateral ankle edema  Psychiatric: Appropriate affect, cooperative  Neurologic: Oriented x 3, moves all extremities, speech clear  Skin: No rashes      Pertinent  and/or Most Recent Results     LAB RESULTS:      Lab 09/07/24  1049 09/05/24  1506   WBC 7.56 10.51   HEMOGLOBIN 12.2 10.8*   HEMATOCRIT 37.9 33.4*   PLATELETS 280 298   NEUTROS ABS  --  5.53   IMMATURE GRANS (ABS)  --  0.09*   LYMPHS ABS  --  3.76*   MONOS ABS  --  0.75   EOS ABS  --  0.28   MCV 86.7 83.3   SED RATE 2  --    CRP 0.47  --          Lab 09/09/24  0912 09/08/24  1050 09/07/24  1049 09/05/24  1506   SODIUM 133* 134* 135* 133*   POTASSIUM 4.9 4.9 4.9 4.7   CHLORIDE 96* 98 100 98   CO2 25.0 22.0 22.0 23.2   ANION GAP 12.0 14.0 13.0 11.8   BUN 23 22 24* 24*   CREATININE 1.08* 1.13* 1.56* 1.67*   EGFR 57.8* 54.8* 37.2* 34.3*   GLUCOSE 182* 180* 174* 83   CALCIUM 9.8 10.0 10.0 9.8   MAGNESIUM 1.8 1.8 1.8  --    HEMOGLOBIN A1C  --   --   --  5.80*   TSH  --   --  0.965  --          Lab 09/05/24  1506   TOTAL PROTEIN 6.1   ALBUMIN 3.8   GLOBULIN 2.3   ALT (SGPT) 10   AST (SGOT) 19   BILIRUBIN 0.3   ALK PHOS 56         Lab 09/05/24  1506   HSTROP T 12             Lab 09/07/24  1059   FOLATE 16.00   VITAMIN B 12 1,431*         Brief Urine Lab Results  (Last result in the past 365 days)        Color   Clarity   Blood   Leuk Est   Nitrite   Protein   CREAT   Urine HCG        09/05/24 1714 Yellow   Clear   Negative   Trace   Negative   Negative                 Microbiology Results (last 10 days)       ** No results found for the last 240 hours. **            MRI Pelvis With & Without Contrast    Result Date: 9/8/2024  MRI PELVIS W WO CONTRAST Date of Exam: 9/8/2024 11:35 AM EDT Indication: sacral/coccyx pain after fall.  Comparison: CT pelvis September 5, 2024 Technique:   Routine multiplanar/multisequence images of the pelvis were obtained before and after the uneventful intravenous administration of 14 mL Multihance.  Findings: Motion artifact degrades diagnostic image quality. Patient has reported spasms resulting in motion on multiple sequences. There is focal marrow edema signal at the inferior coccyx. There appears to be fracture without evidence of significant displacement. There is mild edema signal in the surrounding soft tissues. There is some postcontrast enhancement in this location which may be reactive. Status post left hip arthroplasty. Artifact from hardware does limit assessment of the surrounding structures. No other definite suspicious marrow signal abnormality or fracture. No significant right hip effusion. There is suspected mild right hip osteoarthritis. There are suspected mild to moderate degenerative changes at the sacroiliac joints without significant periarticular edema or effusions. There is disc degeneration in the lumbar spine. See recent prior lumbar spine MRI for additional evaluation. No definite findings of bursitis. There is mild to moderate diffuse muscle atrophy. There is suspected tendinopathy of the gluteal and hamstring tendons and calcific tendinopathy/remote avulsion at the anterior superior right iliac spine, as before. No acute muscle or tendon abnormality is identified. Evaluation of the pelvic soft tissues is limited. There is diverticulosis of the distal colon. No acute abnormality is seen.     Impression: 1.Edema signal in the inferior coccyx and surrounding soft tissues, suspected to represent a nondisplaced fracture given patient's reported fall. 2.Status post left hip arthroplasty. 3.Suspected mild to moderate degenerative changes at the sacroiliac joints and mild right hip osteoarthritis. Electronically Signed: Ramone Daly  9/8/2024 1:03 PM EDT  Workstation ID: [id number]    MRI Lumbar Spine Without Contrast    Result Date:  9/5/2024  EXAMINATION: MRI LUMBAR SPINE WO CONTRAST-  CLINICAL INDICATION: back pain  COMPARISON: None  TECHNIQUE: Multiplanar, multisequence MR imaging performed through the lumbar spine WITHOUT contrast.  FINDINGS:  HARDWARE: No MRI evidence of hardware.  NUMBERING/ALIGNMENT:? 5 lumbar vertebral body segments. Intact vertebral body alignment.  SPINAL CORD:? Conus terminates at the? L1/2 level.  BONES: No fracture. No marrow signal abnormality.  POSTERIOR ELEMENTS: Posterior elements are intact.  SOFT TISSUES: The visualized paraspinal soft tissues are unremarkable.   DISC SPACES/NEUROFORAMINA: T12-L1: No disk bulge or protrusion.  No central canal or neuroforaminal stenosis. L1/2: Very mild annular disc bulge. No stenosis. L2/3: Mild annular disc bulge. No stenosis. L3/4: Mild annular disc bulge. Mild facet arthropathy. Mild central canal and neuroforaminal stenosis. L4/5: Broad-based posterior disc bulge. Advanced facet arthropathy with facet joint inflammation. Moderate central canal stenosis and lateral recess stenosis. Moderate to severe neuroforaminal stenosis. L5/S1: Broad-based posterior disc osteophyte complex eccentric to right. Mild facet arthropathy. Moderate neural foraminal stenosis.  OTHER: No additional remarkable findings.      1. Degenerative disc disease and facet arthropathy with central canal and neuroforaminal stenosis at levels detailed above. 2. Central canal and neuroforaminal stenosis is most pronounced at the L4/5 level. 3. Facet joint inflammation at L4/5. 4. No fracture or traumatic malalignment.  This report was finalized on 9/5/2024 4:43 PM by Dr. Sai Gómez MD.      MRI Thoracic Spine Without Contrast    Result Date: 9/5/2024  EXAM:   MR Thoracic Spine Without Intravenous Contrast  EXAM DATE:   9/5/2024 3:36 PM  CLINICAL HISTORY:   back pain  TECHNIQUE:   Magnetic resonance images of the thoracic spine without intravenous contrast in multiple planes.  COMPARISON:   No relevant  prior studies available.  FINDINGS:   Vertebrae:  Unremarkable as visualized.  No fracture or malalignment identified.   Discs/spinal canal/neural foramina:  Multilevel degenerative disc disease with anterior osteophyte formation and disc space narrowing is noted spanning the T3/4 through T12/L1 levels.  No levels of significant central canal or neuroforaminal stenosis identified.  No acute appearing disc herniations.  No paraspinal fluid collections noted.   Spinal cord:  Unremarkable as visualized.  Normal signal.   Soft tissues:  Unremarkable as visualized.      1.  No levels of significant central canal or neuroforaminal stenosis identified. 2.  No acute appearing disc herniations. 3.  No fracture or malalignment identified. 4.  No paraspinal fluid collections noted.   This report was finalized on 9/5/2024 4:37 PM by Dr. Sai Gómez MD.      MRI Brain Without Contrast    Result Date: 9/5/2024  EXAM:   MR Head Without Intravenous Contrast  EXAM DATE:   9/5/2024 3:36 PM  CLINICAL HISTORY:   weakness  TECHNIQUE:   Magnetic resonance images of the head/brain without intravenous contrast in multiple planes.  COMPARISON:   No relevant prior studies available.  FINDINGS:   Brain and extra-axial spaces:  Unremarkable as visualized.  No mass. No hemorrhage.  No acute infarct.  No ventriculomegaly.   Bones/joints:  Unremarkable as visualized.   Sinuses:  Unremarkable as visualized.  No acute sinusitis.   Mastoid air cells:  Unremarkable as visualized.  No mastoid effusion.   Orbits:  Unremarkable as visualized.      1. No acute intracranial abnormality. 2. Otherwise unremarkable exam.  This report was finalized on 9/5/2024 4:23 PM by Dr. Sai Gómez MD.      XR Chest 1 View    Result Date: 9/5/2024  EXAM:   XR Chest, 1 View  EXAM DATE:   9/5/2024 3:41 PM  CLINICAL HISTORY:   Chest Pain Protocol  TECHNIQUE:   Frontal view of the chest.  COMPARISON:   6/28/2024  FINDINGS:   Lungs and pleural spaces:  Unremarkable as  visualized.  No consolidation.  No pneumothorax.   Heart:  Unremarkable as visualized.  No cardiomegaly.   Mediastinum:  Unremarkable as visualized.   Bones/joints:  Unremarkable as visualized.        Unremarkable exam. No acute cardiopulmonary findings identified.   This report was finalized on 9/5/2024 3:44 PM by Dr. Sai Gómez MD.      CT Pelvis Without Contrast    Result Date: 9/5/2024  EXAM:   CT Pelvis Without Intravenous Contrast  EXAM DATE:   9/5/2024 2:34 PM  CLINICAL HISTORY:   left hip  TECHNIQUE:   Axial computed tomography images of the pelvis without intravenous contrast.  Sagittal and coronal reformatted images were created and reviewed.  This CT exam was performed using one or more of the following dose reduction techniques:  automated exposure control, adjustment of the mA and/or kV according to patient size, and/or use of iterative reconstruction technique.  COMPARISON:   No relevant prior studies available.  FINDINGS:   Artifacts:  Motion artifact limits assessment.   Bones/joints:  Limited assessment of the pubic rami due to motion artifact in this location.  Total left hip arthroplasty noted with anatomic alignment preserved.  Degenerative changes of the lumbar spine with stenosis of lower levels.  No obvious displaced fracture or dislocation.   Soft tissues:  Unremarkable as visualized.   Bladder:  Unremarkable as visualized.  No stones.      1.  No evidence of acute displaced fracture or dislocation. 2.  Extensive patient motion artifact at the level of the pubic rami limits assessment in this region. 3.  Left hip arthroplasty changes noted without evidence of hardware complication.   This report was finalized on 9/5/2024 3:07 PM by Dr. Sai Gómez MD.               Results for orders placed during the hospital encounter of 06/20/24    Adult Transthoracic Echo 3D Limited W/ Cont If Necessary Per Protocol    Interpretation Summary    Left ventricular systolic function is normal.  Estimated left ventricular EF = 60%    No significant pericardial effusion.      Plan for Follow-up of Pending Labs/Results:   Pending Labs       Order Current Status    Vitamin B1, Whole Blood In process          Discharge Details        Discharge Medications        New Medications        Instructions Start Date   HYDROcodone-acetaminophen 5-325 MG per tablet  Commonly known as: NORCO   1 tablet, Oral, Every 4 Hours PRN      Lidocaine Viscous HCl 2 % solution  Commonly known as: XYLOCAINE   5 mL, Oral, Every 3 Hours PRN             Continue These Medications        Instructions Start Date   allopurinol 100 MG tablet  Commonly known as: ZYLOPRIM   100 mg, Oral, 2 Times Daily      amLODIPine 2.5 MG tablet  Commonly known as: NORVASC   2.5 mg, Oral, Daily      aspirin 81 MG EC tablet   81 mg, Oral, Daily      buPROPion  MG 12 hr tablet  Commonly known as: Wellbutrin SR   150 mg, Oral, 2 Times Daily      clopidogrel 75 MG tablet  Commonly known as: PLAVIX   75 mg, Oral, Daily      DULoxetine 30 MG capsule  Commonly known as: CYMBALTA   90 mg, Oral, Daily      Evolocumab solution auto-injector SureClick injection  Commonly known as: REPATHA   140 mg, Subcutaneous, Every 14 Days      fenofibrate 160 MG tablet   160 mg, Oral, Daily      gabapentin 600 MG tablet  Commonly known as: NEURONTIN   600 mg, Oral, 2 Times Daily      Lantus SoloStar 100 UNIT/ML injection pen  Generic drug: Insulin Glargine   45 Units, Subcutaneous, Daily      lidocaine 5 %  Commonly known as: LIDODERM   2 patches, Transdermal, Every 24 Hours, Remove & Discard patch within 12 hours or as directed by MD      metoprolol tartrate 100 MG tablet  Commonly known as: LOPRESSOR   100 mg, Oral, 2 Times Daily      pantoprazole 40 MG EC tablet  Commonly known as: PROTONIX   40 mg, Oral, Daily      ranolazine 500 MG 12 hr tablet  Commonly known as: Ranexa   500 mg, Oral, 2 Times Daily      rOPINIRole 2 MG tablet  Commonly known as: REQUIP   2 mg, Oral,  Nightly      Semaglutide (2 MG/DOSE) 8 MG/3ML solution pen-injector  Commonly known as: OZEMPIC   2 mg, Subcutaneous, Weekly             Stop These Medications      dexAMETHasone 4 MG tablet  Commonly known as: DECADRON     hydrOXYzine pamoate 50 MG capsule  Commonly known as: VISTARIL     ibuprofen 600 MG tablet  Commonly known as: ADVIL,MOTRIN     ketorolac 10 MG tablet  Commonly known as: TORADOL     losartan 100 MG tablet  Commonly known as: COZAAR     methocarbamol 500 MG tablet  Commonly known as: ROBAXIN     ondansetron 4 MG tablet  Commonly known as: Zofran     orphenadrine 100 MG 12 hr tablet  Commonly known as: NORFLEX              Allergies   Allergen Reactions    Ciprofloxacin Anaphylaxis    Penicillins Shortness Of Breath    Statins Myalgia    Fish Oil [Omega-3 Fatty Acids] Nausea And Vomiting         Discharge Disposition:  Home-Health Care [facility] [place]    Diet:  Hospital:  Diet Order   Procedures    Diet: Cardiac; Healthy Heart (2-3 Na+); Texture: Soft to Chew (NDD 3); Soft to Chew: Whole Meat; Fluid Consistency: Thin (IDDSI 0)       Diet Instructions       Diet: Cardiac Diets; Healthy Heart (2-3 Na+); Soft to Chew (NDD 3); Whole Meat; Thin (IDDSI 0)      Discharge Diet: Cardiac Diets    Cardiac Diet: Healthy Heart (2-3 Na+)    Texture: Soft to Chew (NDD 3)    Soft to Chew: Whole Meat    Fluid Consistency: Thin (IDDSI 0)             Activity:      Restrictions or Other Recommendations:         CODE STATUS:    Code Status and Medical Interventions: CPR (Attempt to Resuscitate); Full Support   Ordered at: 09/07/24 0414     Code Status (Patient has no pulse and is not breathing):    CPR (Attempt to Resuscitate)     Medical Interventions (Patient has pulse or is breathing):    Full Support       Future Appointments   Date Time Provider Department Center   9/10/2024  2:30 PM Vela, Kimmy Mala, PA [facility]       Additional Instructions for the Follow-ups that You Need to Schedule       Discharge Follow-up  with PCP   As directed       Currently Documented PCP:    Kimmy Vela PA    PCP Phone Number:    [phone]     Follow Up Details: with PCP in 1 week        Discharge Follow-up with Specified Provider: f/u with ortho that specializes in coccyx fractures at .  Will also write referral   As directed      To: f/u with ortho that specializes in coccyx fractures at .  Will also write referral   Follow Up Details: d/t coccyx fracture                      Sahne Duckworth MD  09/09/24      Time Spent on Discharge:  I spent  37  minutes on this discharge activity which included: face-to-face encounter with the patient, reviewing the data in the system, coordination of the care with the nursing staff as well as consultants, documentation, and entering orders.            Electronically signed by Shane Duckworth MD at 09/09/24 8562

## 2024-09-09 NOTE — THERAPY TREATMENT NOTE
Patient Name: Monisha Gradner  : 1960    MRN: [id number]                              Today's Date: 2024       Admit Date: 2024    Visit Dx:     ICD-10-CM ICD-9-CM   1. Dysphagia, oral phase  R13.11 787.21   2. Type 2 diabetes mellitus with diabetic neuropathy, with long-term current use of insulin  E11.40 250.60    Z79.4 357.2     V58.67   3. Closed fracture of coccyx, initial encounter  S32.2XXA 805.6     Patient Active Problem List   Diagnosis    ASCVD (arteriosclerotic cardiovascular disease)    Essential hypertension    Dyslipidemia    Type 2 diabetes mellitus with hyperglycemia, with long-term current use of insulin    Body mass index (bmi) 30.0-30.9, adult    Osteoarthritis    Atypical angina    Elevated liver enzymes    Gastroesophageal reflux disease with esophagitis    Vitamin D deficiency    Hx of pancreatitis    Menopausal symptoms    Vitamin B 12 deficiency    Nocturnal leg cramps    Anxiety    Left arm weakness    Chronic gout of multiple sites    Squamous cell carcinoma in situ (SCCIS) of skin    Mixed hyperlipidemia    PVC (premature ventricular contraction)    RLS (restless legs syndrome)    Psychophysiological insomnia    Dry mouth    Type 2 diabetes mellitus with diabetic autonomic neuropathy, with long-term current use of insulin    Bone mass    Mass of mandible    Coronary artery disease    Abnormal nuclear stress test    CAD (coronary artery disease)    Falls     Past Medical History:   Diagnosis Date    Anxiety     Arthritis     Diabetes mellitus     Diverticulosis     GERD (gastroesophageal reflux disease)     Gout     Hyperlipidemia     Hypertension     Myocardial infarction      Past Surgical History:   Procedure Laterality Date    APPENDECTOMY      BREAST BIOPSY Right     benign    CARDIAC CATHETERIZATION      CARDIAC CATHETERIZATION N/A 2024    Procedure: Left Heart Cath;  Surgeon: Derrick Watts MD;  Location: [facility] CATH INVASIVE LOCATION;  Service: Cardiology;   Laterality: N/A;    CARDIAC CATHETERIZATION N/A 2024    Procedure: Stent VITALIY coronary;  Surgeon: Derrick Watts MD;  Location: [facility] CATH INVASIVE LOCATION;  Service: Cardiology;  Laterality: N/A;     SECTION      CHOLECYSTECTOMY      CORONARY STENT PLACEMENT      Stenting of the RCA with 3.5 x 26 mm Resolute drug-eluting stent    HYSTERECTOMY      JOINT REPLACEMENT      TONSILLECTOMY      TUMOR REMOVAL  02/15/2024    FACIAL TUMOR      General Information       Row Name 24          Physical Therapy Time and Intention    Document Type therapy note (daily note)  -     Mode of Treatment physical therapy  -       Row Name 24          General Information    Patient Profile Reviewed yes  -     Existing Precautions/Restrictions fall  -       Row Name 24          Cognition    Orientation Status (Cognition) oriented x 3  -[facility] Name 24          Safety Issues, Functional Mobility    Impairments Affecting Function (Mobility) endurance/activity tolerance;balance;pain;strength  -               User Key  (r) = Recorded By, (t) = Taken By, (c) = Cosigned By      Initials Name Provider Type     Jennie Rogers PT Physical Therapist                   Mobility       Row Name 24          Bed Mobility    Bed Mobility supine-sit;sit-supine  -     Supine-Sit Patrick (Bed Mobility) standby assist  -     Sit-Supine Patrick (Bed Mobility) standby assist  -     Comment, (Bed Mobility) VC for logrolling  -[facility] Name 24          Transfers    Comment, (Transfers) VC for hand placement  -[facility] Name 24          Sit-Stand Transfer    Sit-Stand Patrick (Transfers) contact guard;nonverbal cues (demo/gesture);verbal cues  -     Assistive Device (Sit-Stand Transfers) walker, front-wheeled  -       Row Name 24          Gait/Stairs (Locomotion)    Patrick Level (Gait) contact guard;nonverbal  cues (demo/gesture);verbal cues  -     Assistive Device (Gait) walker, front-wheeled  -     Distance in Feet (Gait) 3000  -HW     Deviations/Abnormal Patterns (Gait) bilateral deviations;gait speed decreased  -     [place] Level (Stairs) minimum assist (75% patient effort);2 person assist;verbal cues;nonverbal cues (demo/gesture)  -HW     Assistive Device (Stairs) cane, straight  -     Handrail Location (Stairs) right side (ascending)  -     Number of Steps (Stairs) 10  -HW     Ascending Technique (Stairs) step-to-step  -HW     Descending Technique (Stairs) step-to-step  -HW     Comment, (Gait/Stairs) Pt amb in hoover with step-through gait pattern. Good balance and safety awareness noted. Encouraged use of AD at all times. Pt ascended steps with SPC and CGA. VC for sequencing. When turning around to descend the steps, pt became extremely anxious and fearful of falling. Time spent encouraging pt to approach steps to descend. Reassured pt of two person assist, using calming voice to ease pt's fear. HHA and L HR utilized to descend steps. Assistance used for steadiness and tactile assistance to sooth pt. No LOB noted.  -               User Key  (r) = Recorded By, (t) = Taken By, (c) = Cosigned By      Initials Name Provider Type     Jennie Rogers PT Physical Therapist                   Obj/Interventions       Row Name 09/09/24 8970          Balance    Balance Assessment sitting static balance;sitting dynamic balance;sit to stand dynamic balance;standing static balance;standing dynamic balance  -     Static Sitting Balance standby assist  -     Dynamic Sitting Balance standby assist  -HW     Position, Sitting Balance sitting edge of bed  -     Static Standing Balance contact guard  -     Dynamic Standing Balance contact guard  -HW     Position/Device Used, Standing Balance supported;walker, rolling  -HW     Balance Interventions sitting;standing;sit to stand;occupation based/functional task   -               User Key  (r) = Recorded By, (t) = Taken By, (c) = Cosigned By      Initials Name Provider Type     Jennie Rogers PT Physical Therapist                   Goals/Plan    No documentation.                  Clinical Impression       Row Name 09/09/24 1759          Pain    Pretreatment Pain Rating 3/10  -     Posttreatment Pain Rating 3/10  -     Pain Location generalized  -     Pain Location - buttock  -     Pain Intervention(s) Repositioned;Ambulation/increased activity;Nursing Notified  -       Row Name 09/09/24 1759          Plan of Care Review    Plan of Care Reviewed With patient  -     Progress no change  -     Outcome Evaluation Pt amb 300' with FWW and CGA. Pt became extremely anxious and fearful of falling when descending steps. Assist of two person to encourage pt to descend. No LOB noted. Only time pt has to navigate stairs is to shower at her dtr's house. Encouraged pt to address fear with OPPT and possibly shower at her house in the short term. Pt verbalized understanding. Pt cleared to d/c home with 24/7 assist and OPPT when medically appropriate.  -[facility] Name 09/09/24 1759          Therapy Assessment/Plan (PT)    Rehab Potential (PT) good, to achieve stated therapy goals  -     Criteria for Skilled Interventions Met (PT) yes;meets criteria;skilled treatment is necessary  -     Therapy Frequency (PT) daily  -[facility] Name 09/09/24 1759          Positioning and Restraints    Pre-Treatment Position in bed  -     Post Treatment Position bed  -HW     In Bed notified nsg;supine;fowlers;encouraged to call for assist;exit alarm on;call light within reach;side rails up x3  -               User Key  (r) = Recorded By, (t) = Taken By, (c) = Cosigned By      Initials Name Provider Type     Jnenie Rogers, SUYAPA Physical Therapist                   Outcome Measures       Row Name 09/09/24 1803 09/09/24 0833       How much help from another person do you currently  need...    Turning from your back to your side while in flat bed without using bedrails? 3  - 3  -GS    Moving from lying on back to sitting on the side of a flat bed without bedrails? 3  - 3  -GS    Moving to and from a bed to a chair (including a wheelchair)? 3  - 3  -GS    Standing up from a chair using your arms (e.g., wheelchair, bedside chair)? 3  - 3  -GS    Climbing 3-5 steps with a railing? 3  - 3  -GS    To walk in hospital room? 3  - 3  -GS    AM-PAC 6 Clicks Score (PT) 18  - 18  -    Highest Level of Mobility Goal 6 --> Walk 10 steps or more  - 6 --> Walk 10 steps or more  -      Row Name 09/09/24 1803          Functional Assessment    Outcome Measure Options AM-PAC 6 Clicks Basic Mobility (PT)  -               User Key  (r) = Recorded By, (t) = Taken By, (c) = Cosigned By      Initials Name Provider Type     Nuzhat Rob RN Registered Nurse     Jennie Rogers, PT Physical Therapist                                 Physical Therapy Education       Title: PT OT SLP Therapies (Done)       Topic: Physical Therapy (Done)       Point: Mobility training (Done)       Learning Progress Summary             Patient Acceptance, E,D, VU by  at 9/9/2024 1804    Acceptance, E,D, VU,DU by  at 9/8/2024 1728                         Point: Home exercise program (Done)       Learning Progress Summary             Patient Acceptance, E,D, VU by  at 9/9/2024 1804    Acceptance, E,D, VU,DU by  at 9/8/2024 1728                         Point: Body mechanics (Done)       Learning Progress Summary             Patient Acceptance, E,D, VU by  at 9/9/2024 1804    Acceptance, E,D, VU,DU by  at 9/8/2024 1728                         Point: Precautions (Done)       Learning Progress Summary             Patient Acceptance, E,D, VU by  at 9/9/2024 1804    Acceptance, E,D, VU,DU by  at 9/8/2024 1728                                         User Key       Initials Effective Dates Name Provider Type  Discipline     12/15/23 -  Jennie Rogers PT Physical Therapist PT                  PT Recommendation and Plan  Planned Therapy Interventions (PT): balance training, bed mobility training, gait training, home exercise program, patient/family education, ROM (range of motion), strengthening, stretching, transfer training  Plan of Care Reviewed With: patient  Progress: no change  Outcome Evaluation: Pt amb 300' with FWW and CGA. Pt became extremely anxious and fearful of falling when descending steps. Assist of two person to encourage pt to descend. No LOB noted. Only time pt has to navigate stairs is to shower at her dtr's house. Encouraged pt to address fear with OPPT and possibly shower at her house in the short term. Pt verbalized understanding. Pt cleared to d/c home with 24/7 assist and OPPT when medically appropriate.     Time Calculation:   PT Evaluation Complexity  History, PT Evaluation Complexity: 1-2 personal factors and/or comorbidities  Examination of Body Systems (PT Eval Complexity): total of 3 or more elements  Clinical Presentation (PT Evaluation Complexity): stable  Clinical Decision Making (PT Evaluation Complexity): low complexity  Overall Complexity (PT Evaluation Complexity): low complexity     PT Charges       Row Name 09/09/24 1805             Time Calculation    Start Time 1536  -HW      PT Received On 09/09/24  -         Timed Charges    54864 - Gait Training Minutes  15  -HW         Total Minutes    Timed Charges Total Minutes 15  -HW       Total Minutes 15  -HW                User Key  (r) = Recorded By, (t) = Taken By, (c) = Cosigned By      Initials Name Provider Type     Jennie Rogers PT Physical Therapist                  Therapy Charges for Today       Code Description Service Date Service Provider Modifiers Qty    25350420218 HC GAIT TRAINING EA 15 MIN 9/8/2024 Jennie Rogers, PT GP 1    23137595991 HC PT EVAL LOW COMPLEXITY 4 9/8/2024 Jennie Rogers, PT GP 1    99884898853 HC GAIT  TRAINING EA 15 MIN 9/9/2024 Jennie Rogers, PT GP 1            PT G-Codes  Outcome Measure Options: AM-PAC 6 Clicks Basic Mobility (PT)  AM-PAC 6 Clicks Score (PT): 18  PT Discharge Summary  Anticipated Discharge Disposition (PT): home with 24/7 care, home with outpatient therapy services    Jennie Rogers, SUYAPA  9/9/2024

## 2024-09-09 NOTE — DISCHARGE SUMMARY
[facility] Medicine Services  DISCHARGE SUMMARY    Patient Name: Monisha Gardner  : 1960  MRN: [id number]    Date of Admission: 2024  2:18 AM  Date of Discharge:  2024  Primary Care Physician: Kimmy Vela PA    Consults       Date and Time Order Name Status Description    2024  8:44 AM Inpatient Orthopedic Surgery Consult      2024  4:14 AM Inpatient Neurosurgery Consult Completed     2024  4:14 AM Inpatient Neurology Consult General Completed             Hospital Course     Presenting Problem:     Active Hospital Problems    Diagnosis  POA   • Falls [W19.XXXA]  Unknown   • CAD (coronary artery disease) [I25.10]  Yes   • Nocturnal leg cramps [G47.62]  Yes   • Essential hypertension [I10]  Yes   • Osteoarthritis [M19.90]  Yes      Resolved Hospital Problems   No resolved problems to display.          Hospital Course:  Monisha Gardner is a 64 y.o. female     Hip and back pain, multiple falls  Coccyx fracture  -MRI significant for moderate canal narrowing lower spine, otherwise no red flag symptoms or severe critical stenosis.    -NS following, d/w NS PA who recs rehab.  Family worried about sacrum/coccyx injury so recs MRI pelvis which is still pending  -Neuro following, recs that likely d/t diabetic polyneuropathy affecting gait and balance.  He recs aggressive PT dedicated to gait and balance as an outpatient.  -patient already on gabapentin BID at home so stopped lyrica that had started here.  PCP/provider prescribing may consider increasing from BID to TID if will help with pain  -asked ortho to see here but they thought would be better to be seen by spine.  D/w NS who recs that if pain doesn't improve needs to see a coccyx specialist at  so wrote referral  -patient advised to put no pressure on coccyx/use donut to sit     SIMON on CKD  -continue to hold ARB at home, better with IVF     Mouth Sores/canker sores  --oragel and viscous lidocaine      T2DM  -continue home meds     CAD  HTN      Discharge Follow Up Recommendations for outpatient labs/diagnostics:   F/u with PCP in 1 week  F/u with NS/Harry Lawrence    Day of Discharge     HPI:   Still c/o bottom pain/can't sit.  Wants to go home.     Review of Systems  Gen- No fevers, chills  CV- No chest pain, palpitations  Resp- No cough, dyspnea  GI- No N/V/D, abd pain      Vital Signs:   Temp:  [97.5 °F (36.4 °C)-98.6 °F (37 °C)] 97.8 °F (36.6 °C)  Heart Rate:  [63-76] 64  Resp:  [16-18] 18  BP: (138-177)/(68-89) 143/72      Physical Exam:  Constitutional: No acute distress, awake, alert  HENT: NCAT, mucous membranes moist  Respiratory: Clear to auscultation bilaterally, respiratory effort normal   Cardiovascular: RRR, no murmurs, rubs, or gallops  Gastrointestinal: Positive bowel sounds, soft, nontender, nondistended  Musculoskeletal: No bilateral ankle edema  Psychiatric: Appropriate affect, cooperative  Neurologic: Oriented x 3, moves all extremities, speech clear  Skin: No rashes      Pertinent  and/or Most Recent Results     LAB RESULTS:      Lab 09/07/24  1049 09/05/24  1506   WBC 7.56 10.51   HEMOGLOBIN 12.2 10.8*   HEMATOCRIT 37.9 33.4*   PLATELETS 280 298   NEUTROS ABS  --  5.53   IMMATURE GRANS (ABS)  --  0.09*   LYMPHS ABS  --  3.76*   MONOS ABS  --  0.75   EOS ABS  --  0.28   MCV 86.7 83.3   SED RATE 2  --    CRP 0.47  --          Lab 09/09/24  0912 09/08/24  1050 09/07/24  1049 09/05/24  1506   SODIUM 133* 134* 135* 133*   POTASSIUM 4.9 4.9 4.9 4.7   CHLORIDE 96* 98 100 98   CO2 25.0 22.0 22.0 23.2   ANION GAP 12.0 14.0 13.0 11.8   BUN 23 22 24* 24*   CREATININE 1.08* 1.13* 1.56* 1.67*   EGFR 57.8* 54.8* 37.2* 34.3*   GLUCOSE 182* 180* 174* 83   CALCIUM 9.8 10.0 10.0 9.8   MAGNESIUM 1.8 1.8 1.8  --    HEMOGLOBIN A1C  --   --   --  5.80*   TSH  --   --  0.965  --          Lab 09/05/24  1506   TOTAL PROTEIN 6.1   ALBUMIN 3.8   GLOBULIN 2.3   ALT (SGPT) 10   AST (SGOT) 19   BILIRUBIN 0.3   ALK PHOS  56         Lab 09/05/24  1506   HSTROP T 12             Lab 09/07/24  1059   FOLATE 16.00   VITAMIN B 12 1,431*         Brief Urine Lab Results  (Last result in the past 365 days)        Color   Clarity   Blood   Leuk Est   Nitrite   Protein   CREAT   Urine HCG        09/05/24 1714 Yellow   Clear   Negative   Trace   Negative   Negative                 Microbiology Results (last 10 days)       ** No results found for the last 240 hours. **            MRI Pelvis With & Without Contrast    Result Date: 9/8/2024  MRI PELVIS W WO CONTRAST Date of Exam: 9/8/2024 11:35 AM EDT Indication: sacral/coccyx pain after fall.  Comparison: CT pelvis September 5, 2024 Technique:  Routine multiplanar/multisequence images of the pelvis were obtained before and after the uneventful intravenous administration of 14 mL Multihance.  Findings: Motion artifact degrades diagnostic image quality. Patient has reported spasms resulting in motion on multiple sequences. There is focal marrow edema signal at the inferior coccyx. There appears to be fracture without evidence of significant displacement. There is mild edema signal in the surrounding soft tissues. There is some postcontrast enhancement in this location which may be reactive. Status post left hip arthroplasty. Artifact from hardware does limit assessment of the surrounding structures. No other definite suspicious marrow signal abnormality or fracture. No significant right hip effusion. There is suspected mild right hip osteoarthritis. There are suspected mild to moderate degenerative changes at the sacroiliac joints without significant periarticular edema or effusions. There is disc degeneration in the lumbar spine. See recent prior lumbar spine MRI for additional evaluation. No definite findings of bursitis. There is mild to moderate diffuse muscle atrophy. There is suspected tendinopathy of the gluteal and hamstring tendons and calcific tendinopathy/remote avulsion at the anterior  superior right iliac spine, as before. No acute muscle or tendon abnormality is identified. Evaluation of the pelvic soft tissues is limited. There is diverticulosis of the distal colon. No acute abnormality is seen.     Impression: 1.Edema signal in the inferior coccyx and surrounding soft tissues, suspected to represent a nondisplaced fracture given patient's reported fall. 2.Status post left hip arthroplasty. 3.Suspected mild to moderate degenerative changes at the sacroiliac joints and mild right hip osteoarthritis. Electronically Signed: Ramone Addy  9/8/2024 1:03 PM EDT  Workstation ID: [id number]    MRI Lumbar Spine Without Contrast    Result Date: 9/5/2024  EXAMINATION: MRI LUMBAR SPINE WO CONTRAST-  CLINICAL INDICATION: back pain  COMPARISON: None  TECHNIQUE: Multiplanar, multisequence MR imaging performed through the lumbar spine WITHOUT contrast.  FINDINGS:  HARDWARE: No MRI evidence of hardware.  NUMBERING/ALIGNMENT:? 5 lumbar vertebral body segments. Intact vertebral body alignment.  SPINAL CORD:? Conus terminates at the? L1/2 level.  BONES: No fracture. No marrow signal abnormality.  POSTERIOR ELEMENTS: Posterior elements are intact.  SOFT TISSUES: The visualized paraspinal soft tissues are unremarkable.   DISC SPACES/NEUROFORAMINA: T12-L1: No disk bulge or protrusion.  No central canal or neuroforaminal stenosis. L1/2: Very mild annular disc bulge. No stenosis. L2/3: Mild annular disc bulge. No stenosis. L3/4: Mild annular disc bulge. Mild facet arthropathy. Mild central canal and neuroforaminal stenosis. L4/5: Broad-based posterior disc bulge. Advanced facet arthropathy with facet joint inflammation. Moderate central canal stenosis and lateral recess stenosis. Moderate to severe neuroforaminal stenosis. L5/S1: Broad-based posterior disc osteophyte complex eccentric to right. Mild facet arthropathy. Moderate neural foraminal stenosis.  OTHER: No additional remarkable findings.      1. Degenerative disc  disease and facet arthropathy with central canal and neuroforaminal stenosis at levels detailed above. 2. Central canal and neuroforaminal stenosis is most pronounced at the L4/5 level. 3. Facet joint inflammation at L4/5. 4. No fracture or traumatic malalignment.  This report was finalized on 9/5/2024 4:43 PM by Dr. Sai Gómez MD.      MRI Thoracic Spine Without Contrast    Result Date: 9/5/2024  EXAM:   MR Thoracic Spine Without Intravenous Contrast  EXAM DATE:   9/5/2024 3:36 PM  CLINICAL HISTORY:   back pain  TECHNIQUE:   Magnetic resonance images of the thoracic spine without intravenous contrast in multiple planes.  COMPARISON:   No relevant prior studies available.  FINDINGS:   Vertebrae:  Unremarkable as visualized.  No fracture or malalignment identified.   Discs/spinal canal/neural foramina:  Multilevel degenerative disc disease with anterior osteophyte formation and disc space narrowing is noted spanning the T3/4 through T12/L1 levels.  No levels of significant central canal or neuroforaminal stenosis identified.  No acute appearing disc herniations.  No paraspinal fluid collections noted.   Spinal cord:  Unremarkable as visualized.  Normal signal.   Soft tissues:  Unremarkable as visualized.      1.  No levels of significant central canal or neuroforaminal stenosis identified. 2.  No acute appearing disc herniations. 3.  No fracture or malalignment identified. 4.  No paraspinal fluid collections noted.   This report was finalized on 9/5/2024 4:37 PM by Dr. Sai Gómez MD.      MRI Brain Without Contrast    Result Date: 9/5/2024  EXAM:   MR Head Without Intravenous Contrast  EXAM DATE:   9/5/2024 3:36 PM  CLINICAL HISTORY:   weakness  TECHNIQUE:   Magnetic resonance images of the head/brain without intravenous contrast in multiple planes.  COMPARISON:   No relevant prior studies available.  FINDINGS:   Brain and extra-axial spaces:  Unremarkable as visualized.  No mass. No hemorrhage.  No acute  infarct.  No ventriculomegaly.   Bones/joints:  Unremarkable as visualized.   Sinuses:  Unremarkable as visualized.  No acute sinusitis.   Mastoid air cells:  Unremarkable as visualized.  No mastoid effusion.   Orbits:  Unremarkable as visualized.      1. No acute intracranial abnormality. 2. Otherwise unremarkable exam.  This report was finalized on 9/5/2024 4:23 PM by Dr. Sai Gómez MD.      XR Chest 1 View    Result Date: 9/5/2024  EXAM:   XR Chest, 1 View  EXAM DATE:   9/5/2024 3:41 PM  CLINICAL HISTORY:   Chest Pain Protocol  TECHNIQUE:   Frontal view of the chest.  COMPARISON:   6/28/2024  FINDINGS:   Lungs and pleural spaces:  Unremarkable as visualized.  No consolidation.  No pneumothorax.   Heart:  Unremarkable as visualized.  No cardiomegaly.   Mediastinum:  Unremarkable as visualized.   Bones/joints:  Unremarkable as visualized.        Unremarkable exam. No acute cardiopulmonary findings identified.   This report was finalized on 9/5/2024 3:44 PM by Dr. Sai Gómez MD.      CT Pelvis Without Contrast    Result Date: 9/5/2024  EXAM:   CT Pelvis Without Intravenous Contrast  EXAM DATE:   9/5/2024 2:34 PM  CLINICAL HISTORY:   left hip  TECHNIQUE:   Axial computed tomography images of the pelvis without intravenous contrast.  Sagittal and coronal reformatted images were created and reviewed.  This CT exam was performed using one or more of the following dose reduction techniques:  automated exposure control, adjustment of the mA and/or kV according to patient size, and/or use of iterative reconstruction technique.  COMPARISON:   No relevant prior studies available.  FINDINGS:   Artifacts:  Motion artifact limits assessment.   Bones/joints:  Limited assessment of the pubic rami due to motion artifact in this location.  Total left hip arthroplasty noted with anatomic alignment preserved.  Degenerative changes of the lumbar spine with stenosis of lower levels.  No obvious displaced fracture or  dislocation.   Soft tissues:  Unremarkable as visualized.   Bladder:  Unremarkable as visualized.  No stones.      1.  No evidence of acute displaced fracture or dislocation. 2.  Extensive patient motion artifact at the level of the pubic rami limits assessment in this region. 3.  Left hip arthroplasty changes noted without evidence of hardware complication.   This report was finalized on 9/5/2024 3:07 PM by Dr. Sai Gómez MD.               Results for orders placed during the hospital encounter of 06/20/24    Adult Transthoracic Echo 3D Limited W/ Cont If Necessary Per Protocol    Interpretation Summary  •  Left ventricular systolic function is normal. Estimated left ventricular EF = 60%  •  No significant pericardial effusion.      Plan for Follow-up of Pending Labs/Results:   Pending Labs       Order Current Status    Vitamin B1, Whole Blood In process          Discharge Details        Discharge Medications        New Medications        Instructions Start Date   HYDROcodone-acetaminophen 5-325 MG per tablet  Commonly known as: NORCO   1 tablet, Oral, Every 4 Hours PRN      Lidocaine Viscous HCl 2 % solution  Commonly known as: XYLOCAINE   5 mL, Oral, Every 3 Hours PRN             Continue These Medications        Instructions Start Date   allopurinol 100 MG tablet  Commonly known as: ZYLOPRIM   100 mg, Oral, 2 Times Daily      amLODIPine 2.5 MG tablet  Commonly known as: NORVASC   2.5 mg, Oral, Daily      aspirin 81 MG EC tablet   81 mg, Oral, Daily      buPROPion  MG 12 hr tablet  Commonly known as: Wellbutrin SR   150 mg, Oral, 2 Times Daily      clopidogrel 75 MG tablet  Commonly known as: PLAVIX   75 mg, Oral, Daily      DULoxetine 30 MG capsule  Commonly known as: CYMBALTA   90 mg, Oral, Daily      Evolocumab solution auto-injector SureClick injection  Commonly known as: REPATHA   140 mg, Subcutaneous, Every 14 Days      fenofibrate 160 MG tablet   160 mg, Oral, Daily      gabapentin 600 MG  tablet  Commonly known as: NEURONTIN   600 mg, Oral, 2 Times Daily      Lantus SoloStar 100 UNIT/ML injection pen  Generic drug: Insulin Glargine   45 Units, Subcutaneous, Daily      lidocaine 5 %  Commonly known as: LIDODERM   2 patches, Transdermal, Every 24 Hours, Remove & Discard patch within 12 hours or as directed by MD      metoprolol tartrate 100 MG tablet  Commonly known as: LOPRESSOR   100 mg, Oral, 2 Times Daily      pantoprazole 40 MG EC tablet  Commonly known as: PROTONIX   40 mg, Oral, Daily      ranolazine 500 MG 12 hr tablet  Commonly known as: Ranexa   500 mg, Oral, 2 Times Daily      rOPINIRole 2 MG tablet  Commonly known as: REQUIP   2 mg, Oral, Nightly      Semaglutide (2 MG/DOSE) 8 MG/3ML solution pen-injector  Commonly known as: OZEMPIC   2 mg, Subcutaneous, Weekly             Stop These Medications      dexAMETHasone 4 MG tablet  Commonly known as: DECADRON     hydrOXYzine pamoate 50 MG capsule  Commonly known as: VISTARIL     ibuprofen 600 MG tablet  Commonly known as: ADVIL,MOTRIN     ketorolac 10 MG tablet  Commonly known as: TORADOL     losartan 100 MG tablet  Commonly known as: COZAAR     methocarbamol 500 MG tablet  Commonly known as: ROBAXIN     ondansetron 4 MG tablet  Commonly known as: Zofran     orphenadrine 100 MG 12 hr tablet  Commonly known as: NORFLEX              Allergies   Allergen Reactions   • Ciprofloxacin Anaphylaxis   • Penicillins Shortness Of Breath   • Statins Myalgia   • Fish Oil [Omega-3 Fatty Acids] Nausea And Vomiting         Discharge Disposition:  Home-Health Care [facility]    Diet:  Hospital:  Diet Order   Procedures   • Diet: Cardiac; Healthy Heart (2-3 Na+); Texture: Soft to Chew (NDD 3); Soft to Chew: Whole Meat; Fluid Consistency: Thin (IDDSI 0)       Diet Instructions       Diet: Cardiac Diets; Healthy Heart (2-3 Na+); Soft to Chew (NDD 3); Whole Meat; Thin (IDDSI 0)      Discharge Diet: Cardiac Diets    Cardiac Diet: Healthy Heart (2-3 Na+)    Texture: Soft to  Chew (NDD 3)    Soft to Chew: Whole Meat    Fluid Consistency: Thin (IDDSI 0)             Activity:      Restrictions or Other Recommendations:         CODE STATUS:    Code Status and Medical Interventions: CPR (Attempt to Resuscitate); Full Support   Ordered at: 09/07/24 0414     Code Status (Patient has no pulse and is not breathing):    CPR (Attempt to Resuscitate)     Medical Interventions (Patient has pulse or is breathing):    Full Support       Future Appointments   Date Time Provider Department Center   9/10/2024  2:30 PM Kimmy Vela PA [facility]       Additional Instructions for the Follow-ups that You Need to Schedule       Discharge Follow-up with PCP   As directed       Currently Documented PCP:    Kimmy Vela PA    PCP Phone Number:    [phone]     Follow Up Details: with PCP in 1 week        Discharge Follow-up with Specified Provider: f/u with ortho that specializes in coccyx fractures at .  Will also write referral   As directed      To: f/u with ortho that specializes in coccyx fractures at .  Will also write referral   Follow Up Details: d/t coccyx fracture                      Shane Duckworth MD  09/09/24      Time Spent on Discharge:  I spent  37  minutes on this discharge activity which included: face-to-face encounter with the patient, reviewing the data in the system, coordination of the care with the nursing staff as well as consultants, documentation, and entering orders.

## 2024-09-09 NOTE — PROGRESS NOTES
HOD# : 0    No events last night  Patient still sacral pain and difficulty sitting upright.    Pelvis MRI shows inferior sacral fracture which is not amendable to surgery.  I discussed this with the patient she understands that it will be conservative measures and offloading with donut etc. until this heals    Essential hypertension    Osteoarthritis    Nocturnal leg cramps    CAD (coronary artery disease)    Falls      Temp:  [97.5 °F (36.4 °C)-98.6 °F (37 °C)] 97.8 °F (36.6 °C)  Heart Rate:  [63-76] 64  Resp:  [16-18] 18  BP: (138-177)/(68-89) 143/72  No intake/output data recorded.  I/O this shift:  In: 300 [P.O.:300]  Out: -   Vital signs were reviewed and documented in the chart      EXAM   Body mass index is 26.9 kg/m².      Patient appeared in good neurologic function with normal comprehension   CN grossly intact  Moves all extremities to command      DIAGNOSIS  Sacral fracture  Lumbar disc herniation/bulge L4-5      PLAN    No role for follow-up with neurosurgery.  If she requires follow-up for the sacral pain she needs to see the coccydynia specialist at [facility]

## 2024-09-09 NOTE — CASE MANAGEMENT/SOCIAL WORK
Case Management Discharge Note      Final Note: Unfortunately Ms Gardner's DME was not delivered.  I called the emergency call number for Rafy and was advised to send her home with a rolling walker and Rafy will follow up with Ms Gardner tomorrow at her daughter Sheba's address ([address]) and deliver both the bedside commode and rollator.  I did attempt to get a bedside commode however there were none in the 3rd floor office.  I spoke with Ms Gardner and her family at bedside.  At this time they have refused the rolling walker, stating that they have one that they brought from home.  No other needs were spoken.         Selected Continued Care - Admitted Since 9/7/2024       Destination    No services have been selected for the patient.                Durable Medical Equipment       Service Provider Selected Services Address Phone Fax Patient Preferred    [facility] [address] [place], [place] [id number] [phone] --              Dialysis/Infusion    No services have been selected for the patient.                Home Medical Care    No services have been selected for the patient.                Therapy    No services have been selected for the patient.                Community Resources    No services have been selected for the patient.                Community & DME    No services have been selected for the patient.                    Selected Continued Care - Episodes Includes continued care and service providers with selected services from the active episodes listed below      Hyperlipidemia Episode start date: 4/1/2024   There are no active outsourced providers for this episode.                      Final Discharge Disposition Code: 01 - home or self-care

## 2024-09-09 NOTE — PLAN OF CARE
Goal Outcome Evaluation:  Plan of Care Reviewed With: patient        Progress: no change  Outcome Evaluation: Pt amb 300' with FWW and CGA. Pt became extremely anxious and fearful of falling when descending steps. Assist of two person to encourage pt to descend. No LOB noted. Only time pt has to navigate stairs is to shower at her dtr's house. Encouraged pt to address fear with OPPT and possibly shower at her house in the short term. Pt verbalized understanding. Pt cleared to d/c home with 24/7 assist and OPPT when medically appropriate.      Anticipated Discharge Disposition (PT): home with 24/7 care, home with outpatient therapy services

## 2024-09-09 NOTE — CASE MANAGEMENT/SOCIAL WORK
Case Management Discharge Note      Final Note: Ms Gardner is up for discharge today.  I spoke with her and her daughter Sheba multiple times.  Ms Gardner will be staying with Sheba at [address] in [place].  I have ordered a bedside commode and rollator for Ms Gardner through [facility].  I have set up outpatient PT through [facility].  At this time, there are no other discharge needs.         Selected Continued Care - Admitted Since 9/7/2024       Destination    No services have been selected for the patient.                Durable Medical Equipment       Service Provider Selected Services Address Phone Fax Patient Preferred    [facility] [address], [place] [id number] [phone] --              Dialysis/Infusion    No services have been selected for the patient.                Home Medical Care    No services have been selected for the patient.                Therapy    No services have been selected for the patient.                Community Resources    No services have been selected for the patient.                Community & DME    No services have been selected for the patient.                    Selected Continued Care - Episodes Includes continued care and service providers with selected services from the active episodes listed below      Hyperlipidemia Episode start date: 4/1/2024   There are no active outsourced providers for this episode.                      Final Discharge Disposition Code: 01 - home or self-care

## 2024-09-10 ENCOUNTER — TELEPHONE (OUTPATIENT)
Dept: CARDIOLOGY | Facility: CLINIC | Age: 64
End: 2024-09-10
Payer: COMMERCIAL

## 2024-09-10 ENCOUNTER — TRANSITIONAL CARE MANAGEMENT TELEPHONE ENCOUNTER (OUTPATIENT)
Dept: CALL CENTER | Facility: HOSPITAL | Age: 64
End: 2024-09-10
Payer: COMMERCIAL

## 2024-09-10 NOTE — TELEPHONE ENCOUNTER
Caller: Monisha Gardner    Relationship: Self    Best call back number: [phone]    What is the best time to reach you: ANY    Who are you requesting to speak with (clinical staff, provider,  specific staff member): MARCOS    What was the call regarding: PATIENT HAD AN ACCIDENT FELL DOWN A FLIGHT OF STAIRS, SHE BROKE HER TAIL BONE. SHE CONTINUES TO FALL AND WANTS TO DISCUSS THIS WITH MARCOS. PLEASE CALL THE PATIENT ASAP TO DISCUSS.     Is it okay if the provider responds through MyChart: NO

## 2024-09-10 NOTE — OUTREACH NOTE
Call Center TCM Note      Flowsheet Row Responses   [facility] patient discharged from? [place]   Does the patient have one of the following disease processes/diagnoses(primary or secondary)? Other   TCM attempt successful? Yes   Call start time 1235   Call end time 1237   Discharge diagnosis Falls   Meds reviewed with patient/caregiver? Yes   Is the patient having any side effects they believe may be caused by any medication additions or changes? No   Does the patient have all medications ordered at discharge? Yes   Is the patient taking all medications as directed (includes completed medication regime)? Yes   Does the patient have an appointment with their PCP within 7-14 days of discharge? Yes   Has home health visited the patient within 72 hours of discharge? N/A   Psychosocial issues? No   Did the patient receive a copy of their discharge instructions? Yes   Nursing interventions Reviewed instructions with patient   What is the patient's perception of their health status since discharge? Improving   Is the patient/caregiver able to teach back signs and symptoms related to disease process for when to call PCP? Yes   Is the patient/caregiver able to teach back signs and symptoms related to disease process for when to call 911? Yes   Is the patient/caregiver able to teach back the hierarchy of who to call/visit for symptoms/problems? PCP, Specialist, Home health nurse, Urgent Care, ED, 911 Yes   If the patient is a current smoker, are they able to teach back resources for cessation? Not a smoker   TCM call completed? Yes   Call end time 1237   Would this patient benefit from a Referral to Amb Social Work? No   Is the patient interested in additional calls from an ambulatory ? No            July Salvador LPN    9/10/2024, 12:41 EDT

## 2024-09-11 ENCOUNTER — SPECIALTY PHARMACY (OUTPATIENT)
Dept: PHARMACY | Facility: HOSPITAL | Age: 64
End: 2024-09-11
Payer: COMMERCIAL

## 2024-09-12 ENCOUNTER — TELEPHONE (OUTPATIENT)
Dept: CARDIOLOGY | Facility: CLINIC | Age: 64
End: 2024-09-12
Payer: COMMERCIAL

## 2024-09-12 NOTE — TELEPHONE ENCOUNTER
Patient advised we do not do Telehealth visits.  Patient states she will call back to schedule appointment when she returns to the area.

## 2024-09-12 NOTE — TELEPHONE ENCOUNTER
Caller: Monisha Gardner    Relationship to patient: Self    Best call back number: [phone]     Chief complaint: PT HAD SEVERAL FALLS RECENTLY     Type of visit: F/U     Requested date: N/A     Additional notes: REQUESTING A TELEHEALTH APPT BE SET UP, PT IS UNABLE TO COME IN. WAS IN A ACCIDENT IN HER HOME. WAS TOLD TO F/U ASAP. WAS IN THE [facility] HOS.

## 2024-09-13 ENCOUNTER — TELEMEDICINE (OUTPATIENT)
Dept: FAMILY MEDICINE CLINIC | Facility: CLINIC | Age: 64
End: 2024-09-13
Payer: COMMERCIAL

## 2024-09-13 VITALS — WEIGHT: 162 LBS | BODY MASS INDEX: 27.66 KG/M2 | HEIGHT: 64 IN

## 2024-09-13 DIAGNOSIS — R29.6 FREQUENT FALLS: ICD-10-CM

## 2024-09-13 DIAGNOSIS — S32.2XXA CLOSED FRACTURE OF COCCYX, INITIAL ENCOUNTER: ICD-10-CM

## 2024-09-13 DIAGNOSIS — I25.10 CORONARY ARTERY DISEASE INVOLVING NATIVE CORONARY ARTERY OF NATIVE HEART WITHOUT ANGINA PECTORIS: Chronic | ICD-10-CM

## 2024-09-13 DIAGNOSIS — E11.43 TYPE 2 DIABETES MELLITUS WITH DIABETIC AUTONOMIC NEUROPATHY, WITH LONG-TERM CURRENT USE OF INSULIN: Chronic | ICD-10-CM

## 2024-09-13 DIAGNOSIS — Z79.4 TYPE 2 DIABETES MELLITUS WITH DIABETIC AUTONOMIC NEUROPATHY, WITH LONG-TERM CURRENT USE OF INSULIN: Chronic | ICD-10-CM

## 2024-09-13 DIAGNOSIS — I10 ESSENTIAL HYPERTENSION: Chronic | ICD-10-CM

## 2024-09-13 DIAGNOSIS — S32.2XXA CLOSED FRACTURE OF COCCYX, INITIAL ENCOUNTER: Primary | ICD-10-CM

## 2024-09-13 LAB — VIT B1 BLD-SCNC: 134.9 NMOL/L (ref 66.5–200)

## 2024-09-13 PROCEDURE — 99495 TRANSJ CARE MGMT MOD F2F 14D: CPT | Performed by: PHYSICIAN ASSISTANT

## 2024-09-13 RX ORDER — HYDROCODONE BITARTRATE AND ACETAMINOPHEN 5; 325 MG/1; MG/1
1 TABLET ORAL EVERY 4 HOURS PRN
Qty: 18 TABLET | Refills: 0 | OUTPATIENT
Start: 2024-09-13 | End: 2024-09-16

## 2024-09-13 RX ORDER — PREGABALIN 100 MG/1
100 CAPSULE ORAL 3 TIMES DAILY
Qty: 90 CAPSULE | Refills: 2 | Status: SHIPPED | OUTPATIENT
Start: 2024-09-13 | End: 2024-09-19

## 2024-09-13 NOTE — TELEPHONE ENCOUNTER
Caller: Monisha Gardner    Relationship: Self    Best call back number: [phone]    Requested Prescriptions:   Requested Prescriptions     Pending Prescriptions Disp Refills    HYDROcodone-acetaminophen (NORCO) 5-325 MG per tablet 18 tablet 0     Sig: Take 1 tablet by mouth Every 4 (Four) Hours As Needed for Moderate Pain for up to 3 days.        Pharmacy where request should be sent: [id number] [address] [facility] [address] [facility] [address] - [phone] PH - [phone] FX     Last office visit with prescribing clinician: 7/19/2024   Last telemedicine visit with prescribing clinician: 9/13/2024   Next office visit with prescribing clinician: Visit date not found     Additional details provided by patient: PATIENT STATED THAT SHE ONLY HAS FIVE LEFT. PATIENT DIDN'T TELL ZEENAT BRITTON WHEN SHE WAS SPEAKING WITH HER ON THE PHONE.     Does the patient have less than a 3 day supply:  [x] Yes  [] No    Would you like a call back once the refill request has been completed: [x] Yes [] No    If the office needs to give you a call back, can they leave a voicemail: [x] Yes [] No    Mark Chambers Rep   09/13/24 09:37 EDT

## 2024-09-17 ENCOUNTER — HOSPITAL ENCOUNTER (EMERGENCY)
Facility: HOSPITAL | Age: 64
Discharge: HOME | End: 2024-09-17
Attending: EMERGENCY MEDICINE | Admitting: EMERGENCY MEDICINE
Payer: COMMERCIAL

## 2024-09-17 ENCOUNTER — APPOINTMENT (OUTPATIENT)
Dept: GENERAL RADIOLOGY | Facility: HOSPITAL | Age: 64
End: 2024-09-17
Payer: COMMERCIAL

## 2024-09-17 ENCOUNTER — APPOINTMENT (OUTPATIENT)
Dept: CT IMAGING | Facility: HOSPITAL | Age: 64
End: 2024-09-17
Payer: COMMERCIAL

## 2024-09-17 VITALS
DIASTOLIC BLOOD PRESSURE: 80 MMHG | OXYGEN SATURATION: 95 % | BODY MASS INDEX: 27.66 KG/M2 | HEART RATE: 70 BPM | WEIGHT: 162 LBS | RESPIRATION RATE: 18 BRPM | TEMPERATURE: 98 F | HEIGHT: 64 IN | SYSTOLIC BLOOD PRESSURE: 138 MMHG

## 2024-09-17 DIAGNOSIS — R29.6 MULTIPLE FALLS: ICD-10-CM

## 2024-09-17 DIAGNOSIS — F13.931: Primary | ICD-10-CM

## 2024-09-17 DIAGNOSIS — Z91.89 AT RISK FOR POLYPHARMACY: ICD-10-CM

## 2024-09-17 DIAGNOSIS — Z02.89 REFERRED BY HEALTH CARE PROFESSIONAL: ICD-10-CM

## 2024-09-17 LAB
ALBUMIN SERPL-MCNC: 4.2 G/DL (ref 3.5–5.2)
ALBUMIN/GLOB SERPL: 1.8 G/DL
ALP SERPL-CCNC: 65 U/L (ref 39–117)
ALT SERPL W P-5'-P-CCNC: 17 U/L (ref 1–33)
ANION GAP SERPL CALCULATED.3IONS-SCNC: 13 MMOL/L (ref 5–15)
AST SERPL-CCNC: 23 U/L (ref 1–32)
BASOPHILS # BLD AUTO: 0.06 10*3/MM3 (ref 0–0.2)
BASOPHILS NFR BLD AUTO: 0.6 % (ref 0–1.5)
BILIRUB SERPL-MCNC: 0.4 MG/DL (ref 0–1.2)
BILIRUB UR QL STRIP: NEGATIVE
BUN SERPL-MCNC: 23 MG/DL (ref 8–23)
BUN/CREAT SERPL: 18.3 (ref 7–25)
CALCIUM SPEC-SCNC: 9.7 MG/DL (ref 8.6–10.5)
CHLORIDE SERPL-SCNC: 99 MMOL/L (ref 98–107)
CLARITY UR: CLEAR
CO2 SERPL-SCNC: 26 MMOL/L (ref 22–29)
COLOR UR: YELLOW
CREAT SERPL-MCNC: 1.26 MG/DL (ref 0.57–1)
DEPRECATED RDW RBC AUTO: 49.8 FL (ref 37–54)
EGFRCR SERPLBLD CKD-EPI 2021: 48.1 ML/MIN/1.73
EOSINOPHIL # BLD AUTO: 0.23 10*3/MM3 (ref 0–0.4)
EOSINOPHIL NFR BLD AUTO: 2.4 % (ref 0.3–6.2)
ERYTHROCYTE [DISTWIDTH] IN BLOOD BY AUTOMATED COUNT: 16.3 % (ref 12.3–15.4)
GLOBULIN UR ELPH-MCNC: 2.4 GM/DL
GLUCOSE BLDC GLUCOMTR-MCNC: 120 MG/DL (ref 70–130)
GLUCOSE SERPL-MCNC: 108 MG/DL (ref 65–99)
GLUCOSE UR STRIP-MCNC: NEGATIVE MG/DL
HCT VFR BLD AUTO: 35.9 % (ref 34–46.6)
HGB BLD-MCNC: 11.6 G/DL (ref 12–15.9)
HGB UR QL STRIP.AUTO: NEGATIVE
HOLD SPECIMEN: NORMAL
IMM GRANULOCYTES # BLD AUTO: 0.08 10*3/MM3 (ref 0–0.05)
IMM GRANULOCYTES NFR BLD AUTO: 0.8 % (ref 0–0.5)
KETONES UR QL STRIP: NEGATIVE
LEUKOCYTE ESTERASE UR QL STRIP.AUTO: NEGATIVE
LYMPHOCYTES # BLD AUTO: 2.92 10*3/MM3 (ref 0.7–3.1)
LYMPHOCYTES NFR BLD AUTO: 30.2 % (ref 19.6–45.3)
MAGNESIUM SERPL-MCNC: 1.9 MG/DL (ref 1.6–2.4)
MCH RBC QN AUTO: 27.2 PG (ref 26.6–33)
MCHC RBC AUTO-ENTMCNC: 32.3 G/DL (ref 31.5–35.7)
MCV RBC AUTO: 84.1 FL (ref 79–97)
MONOCYTES # BLD AUTO: 0.71 10*3/MM3 (ref 0.1–0.9)
MONOCYTES NFR BLD AUTO: 7.3 % (ref 5–12)
NEUTROPHILS NFR BLD AUTO: 5.67 10*3/MM3 (ref 1.7–7)
NEUTROPHILS NFR BLD AUTO: 58.7 % (ref 42.7–76)
NITRITE UR QL STRIP: NEGATIVE
NRBC BLD AUTO-RTO: 0 /100 WBC (ref 0–0.2)
PH UR STRIP.AUTO: 6.5 [PH] (ref 5–8)
PLATELET # BLD AUTO: 301 10*3/MM3 (ref 140–450)
PMV BLD AUTO: 9 FL (ref 6–12)
POTASSIUM SERPL-SCNC: 4.6 MMOL/L (ref 3.5–5.2)
PROT SERPL-MCNC: 6.6 G/DL (ref 6–8.5)
PROT UR QL STRIP: NEGATIVE
QT INTERVAL: 452 MS
QTC INTERVAL: 491 MS
RBC # BLD AUTO: 4.27 10*6/MM3 (ref 3.77–5.28)
SODIUM SERPL-SCNC: 138 MMOL/L (ref 136–145)
SP GR UR STRIP: 1.01 (ref 1–1.03)
TROPONIN T SERPL HS-MCNC: 16 NG/L
UROBILINOGEN UR QL STRIP: NORMAL
WBC NRBC COR # BLD AUTO: 9.67 10*3/MM3 (ref 3.4–10.8)
WHOLE BLOOD HOLD COAG: NORMAL
WHOLE BLOOD HOLD SPECIMEN: NORMAL

## 2024-09-17 PROCEDURE — 81003 URINALYSIS AUTO W/O SCOPE: CPT | Performed by: EMERGENCY MEDICINE

## 2024-09-17 PROCEDURE — 85025 COMPLETE CBC W/AUTO DIFF WBC: CPT | Performed by: EMERGENCY MEDICINE

## 2024-09-17 PROCEDURE — 36415 COLL VENOUS BLD VENIPUNCTURE: CPT

## 2024-09-17 PROCEDURE — 80053 COMPREHEN METABOLIC PANEL: CPT | Performed by: EMERGENCY MEDICINE

## 2024-09-17 PROCEDURE — 25010000002 KETOROLAC TROMETHAMINE PER 15 MG: Performed by: EMERGENCY MEDICINE

## 2024-09-17 PROCEDURE — 82948 REAGENT STRIP/BLOOD GLUCOSE: CPT

## 2024-09-17 PROCEDURE — 71045 X-RAY EXAM CHEST 1 VIEW: CPT

## 2024-09-17 PROCEDURE — 84484 ASSAY OF TROPONIN QUANT: CPT | Performed by: EMERGENCY MEDICINE

## 2024-09-17 PROCEDURE — 70450 CT HEAD/BRAIN W/O DYE: CPT

## 2024-09-17 PROCEDURE — 83735 ASSAY OF MAGNESIUM: CPT | Performed by: EMERGENCY MEDICINE

## 2024-09-17 PROCEDURE — 99284 EMERGENCY DEPT VISIT MOD MDM: CPT

## 2024-09-17 PROCEDURE — 93005 ELECTROCARDIOGRAM TRACING: CPT | Performed by: EMERGENCY MEDICINE

## 2024-09-17 PROCEDURE — 96374 THER/PROPH/DIAG INJ IV PUSH: CPT

## 2024-09-17 RX ORDER — ACETAMINOPHEN 500 MG
1000 TABLET ORAL ONCE
Status: COMPLETED | OUTPATIENT
Start: 2024-09-17 | End: 2024-09-17

## 2024-09-17 RX ORDER — KETOROLAC TROMETHAMINE 15 MG/ML
7.5 INJECTION, SOLUTION INTRAMUSCULAR; INTRAVENOUS ONCE
Status: COMPLETED | OUTPATIENT
Start: 2024-09-17 | End: 2024-09-17

## 2024-09-17 RX ORDER — SODIUM CHLORIDE 0.9 % (FLUSH) 0.9 %
10 SYRINGE (ML) INJECTION AS NEEDED
Status: DISCONTINUED | OUTPATIENT
Start: 2024-09-17 | End: 2024-09-17 | Stop reason: HOSPADM

## 2024-09-17 RX ADMIN — ACETAMINOPHEN 1000 MG: 500 TABLET ORAL at 11:15

## 2024-09-17 RX ADMIN — KETOROLAC TROMETHAMINE 7.5 MG: 15 INJECTION, SOLUTION INTRAMUSCULAR; INTRAVENOUS at 11:15

## 2024-09-18 ENCOUNTER — TELEPHONE (OUTPATIENT)
Dept: FAMILY MEDICINE CLINIC | Facility: CLINIC | Age: 64
End: 2024-09-18

## 2024-09-18 ENCOUNTER — PATIENT ROUNDING (BHMG ONLY) (OUTPATIENT)
Dept: URGENT CARE | Facility: CLINIC | Age: 64
End: 2024-09-18
Payer: COMMERCIAL

## 2024-09-18 NOTE — TELEPHONE ENCOUNTER
Caller: FREYA DUENAS     Relationship: SON IN LAW    Best call back number: [phone] OR DAUGHTER, ORIANA, [phone]    What is your medical concern? STATUS UPDATE: ON 09/17/24 AT 2:30 IN THE MORNING PATIENT FELL WHILE ATTEMPTING TO GO TO THE BATHROOM DURING THE NIGHT. WAS HELPED BACK TO BED, A COUPLE OF HOURS LATER PATIENT WOKE CONFUSED AND DISORIENTED. WAS TAKEN TO  URGENT CARE AND THEN WAS SENT TO THE ER. ER DID HEAD CT AND URINALYSIS- BOTH UNREMARKABLE. ER PROVIDER STATES THEY THINK IT IS RELATED TO A RECENT MEDICATION CHANGE. WAS CHANGED FROM GABAPENTIN TO LYRICA. WAS TOLD BY ER PROVIDER THAT THE MENTAL STATUS COULD TAKE A COUPLE OF WEEKS TO RETURN TO NORMAL STATUS PRIOR TO THE MEDICATION CHANGE.  NEUROLOGIST THINKS THE FALLS COULD BE FROM THE NEUROPATHY IN HER FEET AND LEGS. THEY THINK THIS IS WHY SHE IS FALLING SO MUCH.  THE MRI OF THE TAILBONE SHOWS A BROKEN TAILBONE.    FAMILY CONCERNED WITH WHAT TO DO REGARDING HER MENTAL STATUS- IMPULSIVE EX: PATIENT GETS UP TO WALK NOT REMEMBERING THAT SHE IS NOT ABLE TO WALK ON HER OWN. PATIENT'S SPEECH IS WORSE. PATIENT HAD JAW SURGERY IN FEBRUARY, BUT THE SPEECH HAS GOTTEN EVEN WORSE.    TODAY, PATIENT IS HAVING MORE DIFFICULTY SWALLOWING SOLID FOODS- PATIENT IS CHOKING ON FOOD.     How long has this issue been going on? THE FIRST ONSET OF THE SIGNIFICANT CHANGE WAS YESTERDAY AROUND 5:00 IN THE MORNING - A COUPLE OF HOURS AFTER THE FALL.WHEN SHE FELL    Is your provider already aware of this issue? NO    Have you been treated for this issue? WAS GIVEN PAIN MEDS FOR THE TAILBONE PAIN, BUT NO OTHER TREATMENT WAS GIVEN. TOLD IT SHOULD GET BETTER. WAS TOLD EVERYTHING WAS DUE TO THE MEDICATION CHANGE FROM GABAPENTIN TO LYRICA.    PATIENT IS ALSO HALLUCINATING

## 2024-09-19 ENCOUNTER — TELEMEDICINE (OUTPATIENT)
Dept: FAMILY MEDICINE CLINIC | Facility: CLINIC | Age: 64
End: 2024-09-19
Payer: COMMERCIAL

## 2024-09-19 VITALS — WEIGHT: 154 LBS | HEIGHT: 64 IN | BODY MASS INDEX: 26.29 KG/M2

## 2024-09-19 DIAGNOSIS — R29.6 FREQUENT FALLS: ICD-10-CM

## 2024-09-19 DIAGNOSIS — R41.0 DELIRIUM: Primary | ICD-10-CM

## 2024-09-19 DIAGNOSIS — I25.110 CORONARY ARTERY DISEASE INVOLVING NATIVE CORONARY ARTERY OF NATIVE HEART WITH UNSTABLE ANGINA PECTORIS: Chronic | ICD-10-CM

## 2024-09-19 DIAGNOSIS — R13.19 ESOPHAGEAL DYSPHAGIA: ICD-10-CM

## 2024-09-19 DIAGNOSIS — R47.81 SLURRED SPEECH: ICD-10-CM

## 2024-09-19 DIAGNOSIS — K21.00 GASTROESOPHAGEAL REFLUX DISEASE WITH ESOPHAGITIS WITHOUT HEMORRHAGE: Chronic | ICD-10-CM

## 2024-09-19 PROCEDURE — 99214 OFFICE O/P EST MOD 30 MIN: CPT | Performed by: PHYSICIAN ASSISTANT

## 2024-09-20 ENCOUNTER — TELEPHONE (OUTPATIENT)
Dept: NEUROLOGY | Facility: CLINIC | Age: 64
End: 2024-09-20
Payer: COMMERCIAL

## 2024-09-20 ENCOUNTER — TELEPHONE (OUTPATIENT)
Dept: SPEECH THERAPY | Facility: HOSPITAL | Age: 64
End: 2024-09-20
Payer: COMMERCIAL

## 2024-09-22 ENCOUNTER — READMISSION MANAGEMENT (OUTPATIENT)
Dept: CALL CENTER | Facility: HOSPITAL | Age: 64
End: 2024-09-22
Payer: COMMERCIAL

## 2024-09-23 ENCOUNTER — TRANSITIONAL CARE MANAGEMENT TELEPHONE ENCOUNTER (OUTPATIENT)
Dept: CALL CENTER | Facility: HOSPITAL | Age: 64
End: 2024-09-23
Payer: COMMERCIAL

## 2024-09-23 ENCOUNTER — TELEPHONE (OUTPATIENT)
Dept: CARDIOLOGY | Facility: CLINIC | Age: 64
End: 2024-09-23
Payer: COMMERCIAL

## 2024-09-23 ENCOUNTER — TELEPHONE (OUTPATIENT)
Dept: FAMILY MEDICINE CLINIC | Facility: CLINIC | Age: 64
End: 2024-09-23

## 2024-09-23 NOTE — TELEPHONE ENCOUNTER
I found it in media from 9/20/24.  Its blank and will need to be completed.  Make her a video visit with me to complete the paperwork tomorrow.

## 2024-09-23 NOTE — TELEPHONE ENCOUNTER
Caller: Monisha Gardner    Relationship: Self    Best call back number: [phone]    What form or medical record are you requesting: DISABILITY     Who is requesting this form or medical record from you: [facility]    Additional notes:     PAPERWORK WAS SUPPOSED TO BE FAXED OVER AT THE BEGINNING OF SEPTEMBER.  DO WE HAVE THIS PAPERWORK? HAS IT BEEN COMPLETED AND FAXED BACK TO [facility]?    THIS IS URGENT PER PATIENT

## 2024-09-24 NOTE — TELEPHONE ENCOUNTER
Called patient and she said she would like to do the paperwork on her current appointment Friday, 9-27.

## 2024-09-27 ENCOUNTER — OFFICE VISIT (OUTPATIENT)
Dept: FAMILY MEDICINE CLINIC | Facility: CLINIC | Age: 64
End: 2024-09-27
Payer: COMMERCIAL

## 2024-09-27 ENCOUNTER — TELEPHONE (OUTPATIENT)
Dept: SPEECH THERAPY | Facility: HOSPITAL | Age: 64
End: 2024-09-27
Payer: COMMERCIAL

## 2024-09-27 VITALS
SYSTOLIC BLOOD PRESSURE: 110 MMHG | OXYGEN SATURATION: 98 % | BODY MASS INDEX: 26.29 KG/M2 | WEIGHT: 154 LBS | DIASTOLIC BLOOD PRESSURE: 70 MMHG | HEART RATE: 73 BPM | TEMPERATURE: 98.6 F | HEIGHT: 64 IN

## 2024-09-27 DIAGNOSIS — E04.1 THYROID NODULE: Chronic | ICD-10-CM

## 2024-09-27 DIAGNOSIS — T50.905A SIDE EFFECT OF ALLERGY MEDICATION: Primary | ICD-10-CM

## 2024-09-27 DIAGNOSIS — M15.9 PRIMARY OSTEOARTHRITIS INVOLVING MULTIPLE JOINTS: Chronic | ICD-10-CM

## 2024-09-27 DIAGNOSIS — E11.43 TYPE 2 DIABETES MELLITUS WITH DIABETIC AUTONOMIC NEUROPATHY, WITH LONG-TERM CURRENT USE OF INSULIN: Chronic | ICD-10-CM

## 2024-09-27 DIAGNOSIS — I10 ESSENTIAL HYPERTENSION: Chronic | ICD-10-CM

## 2024-09-27 DIAGNOSIS — Z79.4 TYPE 2 DIABETES MELLITUS WITH DIABETIC AUTONOMIC NEUROPATHY, WITH LONG-TERM CURRENT USE OF INSULIN: Chronic | ICD-10-CM

## 2024-09-27 PROBLEM — S32.2XXD CLOSED FRACTURE OF COCCYX WITH ROUTINE HEALING: Status: ACTIVE | Noted: 2024-09-27

## 2024-09-27 RX ORDER — LOSARTAN POTASSIUM 100 MG/1
100 TABLET ORAL DAILY
Qty: 30 TABLET | Refills: 5 | Status: SHIPPED | OUTPATIENT
Start: 2024-09-27

## 2024-09-27 RX ORDER — GABAPENTIN 600 MG/1
600 TABLET ORAL 2 TIMES DAILY
Qty: 60 TABLET | Refills: 3 | Status: SHIPPED | OUTPATIENT
Start: 2024-09-27

## 2024-09-27 RX ORDER — METHYLPREDNISOLONE ACETATE 80 MG/ML
80 INJECTION, SUSPENSION INTRA-ARTICULAR; INTRALESIONAL; INTRAMUSCULAR; SOFT TISSUE ONCE
Status: COMPLETED | OUTPATIENT
Start: 2024-09-27 | End: 2024-09-27

## 2024-09-27 RX ADMIN — METHYLPREDNISOLONE ACETATE 80 MG: 80 INJECTION, SUSPENSION INTRA-ARTICULAR; INTRALESIONAL; INTRAMUSCULAR; SOFT TISSUE at 15:23

## 2024-10-04 ENCOUNTER — TELEPHONE (OUTPATIENT)
Dept: FAMILY MEDICINE CLINIC | Facility: CLINIC | Age: 64
End: 2024-10-04

## 2024-10-04 ENCOUNTER — OFFICE VISIT (OUTPATIENT)
Dept: CARDIOLOGY | Facility: CLINIC | Age: 64
End: 2024-10-04
Payer: COMMERCIAL

## 2024-10-04 VITALS
SYSTOLIC BLOOD PRESSURE: 136 MMHG | DIASTOLIC BLOOD PRESSURE: 82 MMHG | BODY MASS INDEX: 26.29 KG/M2 | WEIGHT: 154 LBS | OXYGEN SATURATION: 98 % | HEART RATE: 80 BPM | HEIGHT: 64 IN

## 2024-10-04 DIAGNOSIS — N18.32 STAGE 3B CHRONIC KIDNEY DISEASE: ICD-10-CM

## 2024-10-04 DIAGNOSIS — E11.65 TYPE 2 DIABETES MELLITUS WITH HYPERGLYCEMIA, WITH LONG-TERM CURRENT USE OF INSULIN: ICD-10-CM

## 2024-10-04 DIAGNOSIS — I10 ESSENTIAL HYPERTENSION: ICD-10-CM

## 2024-10-04 DIAGNOSIS — I25.118 CORONARY ARTERY DISEASE INVOLVING NATIVE CORONARY ARTERY OF NATIVE HEART WITH OTHER FORM OF ANGINA PECTORIS: ICD-10-CM

## 2024-10-04 DIAGNOSIS — I65.29 STENOSIS OF CAROTID ARTERY, UNSPECIFIED LATERALITY: ICD-10-CM

## 2024-10-04 DIAGNOSIS — R29.6 FALLS: Primary | ICD-10-CM

## 2024-10-04 DIAGNOSIS — E78.5 DYSLIPIDEMIA: ICD-10-CM

## 2024-10-04 DIAGNOSIS — Z79.4 TYPE 2 DIABETES MELLITUS WITH HYPERGLYCEMIA, WITH LONG-TERM CURRENT USE OF INSULIN: ICD-10-CM

## 2024-10-04 NOTE — TELEPHONE ENCOUNTER
Caller: Monisha Gardner    Relationship: Self    Best call back number: [phone]     What was the call regarding: PATIENT STATED THAT SHE IS HAVING NIGHTMARES AGAIN AND THAT THE INJECTIONS FOR HER ARM ARE NOT WORKING,   PLEASE CALL PATIENT TO LET HER KNOW WHAT PLAN OF CARE NEEDS TO BE REGARDING THE NIGHTMARES AND THE SHOULDER PAIN.       PATIENT IS WAITING TO KNOW IF SHE IS NEEDING TO COME IN FOR AN APPOINTMENT.

## 2024-10-04 NOTE — TELEPHONE ENCOUNTER
Yes please make her an appointment with me for 30 minutes early next week as she might need a shoulder injection.  We can discuss changing her medication at that time

## 2024-10-04 NOTE — LETTER
October 4, 2024     ANH Medina  [address] [facility] [address]    Patient: Monisha Gardner   YOB: 1960   Date of Visit: 10/4/2024       Dear ANH Medina    Monisha Gardner was in my office today. Below is a copy of my note.    If you have questions, please do not hesitate to call me. I look forward to following Monisha along with you.         Sincerely,        TRUONG Briones        CC: No Recipients    Subjective    Monisha Gardner is a 64 y.o. female.   Chief Complaint   Patient presents with   • Coronary Artery Disease   History of Present Illness   Monisha Gardner is a 64-year-old female who presents to clinic today for cardiology follow-up.    She states since last being seen she has suffered several falls and has been hospitalized several times.  She states it was felt her medications were causing her falls and nightmares.  Those medications have since been discontinued.  She states she is overall doing better.  She expresses concern as she has started having bad dreams again and has contacted her PCP.   She did have extensive imaging revealing mild to moderate narrowing at the origin of the left subclavian artery otherwise no significant stenosis.  She will need annual monitoring while continuing her aspirin and Repatha (statin intolerant).  She did have a distinct nodule on her thyroid which ultrasound was recommended in primary is arranging.     CAD currently on ASA, Imdur, Ranexa, Lopressor and Repatha (statin intolerant).  Reports compliance with medicine. She had completed a nuclear stress test in August 2023 which had been discussed with patient but after further review of the nuclear stress test it was felt that there was an area of concern.  This has been discussed with her and treatment options.  Cardiac cath was recommended and she recently completed.  She states she done well with the procedure.  She stayed overnight and kidney function was  stable.  She reports compliance with aspirin and Plavix.  She denies chest pain, dyspnea, palpitations or syncope. She denies nitroglycerin use.  Cardiac cath on 6/20/2024 with 80% in-stent restenosis of proximal/ostial RCA stented with 3.5 x 38 mm VITALIY and reduced to no significant residual disease.  Nonobstructive disease in left coronary system and a calculated LVEF is 65%.     Hypertension currently on Losartan, Norvasc and Lopressor. She reports home BPs are well-controlled.  She reports compliance with medicine.       Hyperlipidemia with statin intolerance.  She reports continuing on Repatha she does report missing some Repatha due to insurance coverage. She remains on fenofibrate.  She denies medication side effects and reports compliance.     Valvular heart disease which has been stable.  She denies shortness of air, orthopnea or swelling.     She reports a jaw mass and underwent surgery in [place].  She continues to report intermittent problems and some ongoing issues with infection.  She is following closely with oral surgeon.  She denies fever.  She reports today that she is likely going to have to have repeat jaw surgery however discussed with patient due to recent stent and DAPT she will need to delay for 1 year after June 2024 unless emergent surgery required.     She reports during hospitalization for surgery she was placed on oxygen at night due to significant desaturations. She completed a home overnight study which was abnormal and oxygen has been prescribed. She request a discontinuation order today as she reports she is not using.       CKD and was previously following with nephrology but has not recently been seen. She reports recent re-establishing with nephrology associates.    Patient Active Problem List   Diagnosis   • ASCVD (arteriosclerotic cardiovascular disease)   • Essential hypertension   • Dyslipidemia   • Type 2 diabetes mellitus with hyperglycemia, with long-term current use of  insulin   • Body mass index (bmi) 30.0-30.9, adult   • Osteoarthritis   • Atypical angina   • Elevated liver enzymes   • Gastroesophageal reflux disease with esophagitis   • Vitamin D deficiency   • Hx of pancreatitis   • Menopausal symptoms   • Vitamin B 12 deficiency   • Nocturnal leg cramps   • Anxiety   • Left arm weakness   • Chronic gout of multiple sites   • Squamous cell carcinoma in situ (SCCIS) of skin   • Mixed hyperlipidemia   • PVC (premature ventricular contraction)   • RLS (restless legs syndrome)   • Psychophysiological insomnia   • Dry mouth   • Type 2 diabetes mellitus with diabetic autonomic neuropathy, with long-term current use of insulin   • Bone mass   • Mass of mandible   • Coronary artery disease   • Abnormal nuclear stress test   • CAD (coronary artery disease)   • Falls   • Closed fracture of coccyx with routine healing   • Thyroid nodule     Past Medical History:   Diagnosis Date   • Anxiety    • Arthritis    • Diabetes mellitus    • Diverticulosis    • GERD (gastroesophageal reflux disease)    • Gout    • Hyperlipidemia    • Hypertension    • Myocardial infarction      Past Surgical History:   Procedure Laterality Date   • APPENDECTOMY     • BREAST BIOPSY Right     benign   • CARDIAC CATHETERIZATION     • CARDIAC CATHETERIZATION N/A 2024    Procedure: Left Heart Cath;  Surgeon: Derrick Watts MD;  Location:  [facility];  Service: Cardiology;  Laterality: N/A;   • CARDIAC CATHETERIZATION N/A 2024    Procedure: Stent VITALIY coronary;  Surgeon: Derrick Watts MD;  Location:  [facility];  Service: Cardiology;  Laterality: N/A;   •  SECTION     • CHOLECYSTECTOMY     • CORONARY STENT PLACEMENT      Stenting of the RCA with 3.5 x 26 mm Resolute drug-eluting stent   • HYSTERECTOMY     • JOINT REPLACEMENT     • TONSILLECTOMY     • TUMOR REMOVAL  02/15/2024    FACIAL TUMOR       Family History   Problem Relation Age of Onset   • Arthritis Mother     • Heart disease Mother    • Heart disease Sister    • Vision loss Daughter    • Heart disease Father    • Heart disease Sister    • Heart disease Sister    • Heart disease Sister      Social History     Tobacco Use   • Smoking status: Never     Passive exposure: Never   • Smokeless tobacco: Never   Vaping Use   • Vaping status: Never Used   Substance Use Topics   • Alcohol use: No   • Drug use: No         The following portions of the patient's history were reviewed and updated as appropriate: allergies, current medications, past family history, past medical history, past social history, past surgical history and problem list.    Allergies   Allergen Reactions   • Ciprofloxacin Anaphylaxis   • Penicillins Shortness Of Breath   • Statins Myalgia   • Fish Oil [Omega-3 Fatty Acids] Nausea And Vomiting   • Lyrica [Pregabalin] Mental Status Change   • Wellbutrin [Bupropion] Mental Status Change         Current Outpatient Medications:   •  allopurinol (ZYLOPRIM) 100 MG tablet, TAKE 1 TABLET BY MOUTH TWICE DAILY, Disp: 180 tablet, Rfl: 3  •  amLODIPine (NORVASC) 2.5 MG tablet, TAKE 1 TABLET BY MOUTH DAILY, Disp: 90 tablet, Rfl: 1  •  aspirin 81 MG EC tablet, Take 1 tablet by mouth Daily., Disp: 100 tablet, Rfl: 3  •  clopidogrel (PLAVIX) 75 MG tablet, Take 1 tablet by mouth Daily., Disp: 90 tablet, Rfl: 3  •  DULoxetine (CYMBALTA) 30 MG capsule, TAKE 3 CAPSULES BY MOUTH EVERY DAY, Disp: 360 capsule, Rfl: 1  •  Evolocumab (REPATHA) solution auto-injector SureClick injection, Inject 1 mL under the skin into the appropriate area as directed Every 14 (Fourteen) Days., Disp: 2 mL, Rfl: 5  •  fenofibrate 160 MG tablet, TAKE 1 TABLET BY MOUTH EVERY DAY, Disp: 90 tablet, Rfl: 1  •  gabapentin (NEURONTIN) 600 MG tablet, Take 1 tablet by mouth 2 (Two) Times a Day., Disp: 60 tablet, Rfl: 3  •  Lidocaine Viscous HCl (XYLOCAINE) 2 % solution, Take 5 mL by mouth Every 3 (Three) Hours As Needed for Mild Pain., Disp: 100 mL, Rfl: 0  •   losartan (COZAAR) 100 MG tablet, Take 1 tablet by mouth Daily., Disp: 30 tablet, Rfl: 5  •  metoprolol tartrate (LOPRESSOR) 100 MG tablet, TAKE 1 TABLET BY MOUTH TWICE DAILY, Disp: 180 tablet, Rfl: 3  •  pantoprazole (PROTONIX) 40 MG EC tablet, Take 1 tablet by mouth Daily., Disp: 90 tablet, Rfl: 3  •  ranolazine (Ranexa) 500 MG 12 hr tablet, Take 1 tablet by mouth 2 (Two) Times a Day., Disp: 60 tablet, Rfl: 5  •  rOPINIRole (REQUIP) 2 MG tablet, Take 1 tablet by mouth Every Night., Disp: 90 tablet, Rfl: 3  •  Semaglutide, 2 MG/DOSE, (OZEMPIC) 8 MG/3ML solution pen-injector, Inject 2 mg under the skin into the appropriate area as directed 1 (One) Time Per Week., Disp: 3 mL, Rfl: 5    Review of Systems   Constitutional:  Negative for activity change, appetite change, chills, diaphoresis, fatigue and fever.   HENT:  Negative for congestion, drooling, ear discharge, ear pain, mouth sores, nosebleeds, postnasal drip, rhinorrhea, sinus pressure, sneezing and sore throat.    Eyes:  Negative for pain, discharge and visual disturbance.   Respiratory:  Negative for cough, chest tightness, shortness of breath and wheezing.    Cardiovascular:  Negative for chest pain, palpitations and leg swelling.   Gastrointestinal:  Negative for abdominal pain, constipation, diarrhea, nausea and vomiting.   Endocrine: Negative for cold intolerance, heat intolerance, polydipsia, polyphagia and polyuria.   Musculoskeletal:  Negative for arthralgias, myalgias and neck pain.   Skin:  Negative for rash and wound.   Neurological:  Negative for dizziness, syncope, speech difficulty, weakness, light-headedness and headaches.        Falls    Hematological:  Negative for adenopathy. Does not bruise/bleed easily.   Psychiatric/Behavioral:  Negative for confusion, dysphoric mood and sleep disturbance. The patient is not nervous/anxious.    All other systems reviewed and are negative.    /82 (BP Location: Left arm, Patient Position: Sitting, Cuff  "Size: Adult)   Pulse 80   Ht 162.6 cm (64\")   Wt 69.9 kg (154 lb)   LMP  (LMP Unknown)   SpO2 98%   BMI 26.43 kg/m²     Objective  Allergies   Allergen Reactions   • Ciprofloxacin Anaphylaxis   • Penicillins Shortness Of Breath   • Statins Myalgia   • Fish Oil [Omega-3 Fatty Acids] Nausea And Vomiting   • Lyrica [Pregabalin] Mental Status Change   • Wellbutrin [Bupropion] Mental Status Change       Physical Exam  Vitals reviewed.   Constitutional:       Appearance: Normal appearance. She is well-developed.   HENT:      Head: Normocephalic.      Right Ear: Tympanic membrane normal.      Left Ear: Tympanic membrane normal.      Nose: Nose normal.      Mouth/Throat:      Comments: Missing teeth as a result of jaw surgery   Eyes:      Conjunctiva/sclera: Conjunctivae normal.      Pupils: Pupils are equal, round, and reactive to light.   Neck:      Thyroid: No thyromegaly.      Vascular: No carotid bruit or JVD.   Cardiovascular:      Rate and Rhythm: Normal rate and regular rhythm.   Pulmonary:      Effort: Pulmonary effort is normal.      Breath sounds: Normal breath sounds.   Abdominal:      General: Bowel sounds are normal.      Palpations: Abdomen is soft. There is no hepatomegaly, splenomegaly or mass.      Tenderness: There is no abdominal tenderness.   Musculoskeletal:         General: Normal range of motion.      Cervical back: Neck supple.      Right lower leg: No edema.      Left lower leg: No edema.   Skin:     General: Skin is warm and dry.   Neurological:      Mental Status: She is alert and oriented to person, place, and time.   Psychiatric:         Attention and Perception: Attention normal.         Mood and Affect: Mood normal.         Speech: Speech normal.         Behavior: Behavior normal. Behavior is cooperative.         Cognition and Memory: Cognition normal.           LABS  WBC   Date Value Ref Range Status   09/21/2024 7.66 3.70 - 10.30 10*3/uL Final     RBC   Date Value Ref Range Status "   09/21/2024 4.86 3.90 - 5.20 10*6/uL Final     Hemoglobin   Date Value Ref Range Status   09/21/2024 13.9 11.2 - 15.7 g/dL Final     Hematocrit   Date Value Ref Range Status   09/21/2024 41.6 34.0 - 45.0 % Final     MCV   Date Value Ref Range Status   09/21/2024 86 79 - 98 fL Final     MCH   Date Value Ref Range Status   09/21/2024 28.6 26.0 - 32.0 pg Final     MCHC   Date Value Ref Range Status   09/21/2024 33.4 30.7 - 35.5 g/dL Final     RDW   Date Value Ref Range Status   09/21/2024 16.5 (H) 11.5 - 14.5 % Final     RDW-SD   Date Value Ref Range Status   09/17/2024 49.8 37.0 - 54.0 fl Final     MPV   Date Value Ref Range Status   09/21/2024 9.0 8.8 - 12.5 fL Final     Platelets   Date Value Ref Range Status   09/21/2024 313 155 - 369 10*3/uL Final     Neutrophil Rel %   Date Value Ref Range Status   09/21/2024 54.0 % Final     Lymphocyte Rel %   Date Value Ref Range Status   09/21/2024 33.0 % Final     Monocyte Rel %   Date Value Ref Range Status   09/21/2024 8.0 % Final     Eosinophil %   Date Value Ref Range Status   09/21/2024 3.0 % Final     Basophil Rel %   Date Value Ref Range Status   09/21/2024 1.0 % Final     Immature Grans %   Date Value Ref Range Status   09/21/2024 1.0 % Final     Neutrophils Absolute   Date Value Ref Range Status   09/21/2024 4.11 1.60 - 6.10 10*3/uL Final     Lymphocytes Absolute   Date Value Ref Range Status   09/21/2024 2.54 1.20 - 3.90 10*3/uL Final     Monocytes Absolute   Date Value Ref Range Status   09/21/2024 0.62 0.30 - 0.90 10*3/uL Final     Eosinophils Absolute   Date Value Ref Range Status   09/21/2024 0.24 0.00 - 0.50 10*3/uL Final     Basophils Absolute   Date Value Ref Range Status   09/21/2024 0.09 0.00 - 0.10 10*3/uL Final     Immature Grans, Absolute   Date Value Ref Range Status   09/21/2024 0.06 0.00 - 0.06 10*3/uL Final     nRBC   Date Value Ref Range Status   09/21/2024 0.0 <=0.0 per 100 WBCs Final   09/17/2024 0.0 0.0 - 0.2 /100 WBC Final       Total  Cholesterol   Date Value Ref Range Status   03/01/2024 193 0 - 200 mg/dL Final     Triglycerides   Date Value Ref Range Status   03/01/2024 363 (H) 0 - 150 mg/dL Final     HDL Cholesterol   Date Value Ref Range Status   03/01/2024 30 (L) 40 - 60 mg/dL Final     LDL Cholesterol    Date Value Ref Range Status   03/01/2024 101 (H) 0 - 100 mg/dL Final     LDL Chol Calc (NIH)   Date Value Ref Range Status   11/12/2021 124 (H) 0 - 100 mg/dL Final     IMAGING   CT Angiogram Head    Result Date: 9/19/2024  Neck CTA: Mild to moderate narrowing at the origin of the left subclavian artery. Otherwise no flow significant stenosis. Heterogenous thyroid lobe, right worse than left, with distinct hypodense nodule measuring up to 10 mm in size in the right thyroid lobe. Consider clinical correlation and thyroid ultrasound to further characterize. Head CTA: No flow significant stenosis or definite aneurysms. CRITICAL RESULT: No. COMMUNICATION: Per this written report. By electronically signing this report, I, the attending physician, attest that I have personally reviewed the images/data for the above examination(s) and agree with the final edited report. Drafted by Wicho Walker MD on 9/19/2024 1:56 PM Final report signed by Adam Draper MD on 9/19/2024 4:20 PM    CT Angiogram Neck    Result Date: 9/19/2024  Neck CTA: Mild to moderate narrowing at the origin of the left subclavian artery. Otherwise no flow significant stenosis. Heterogenous thyroid lobe, right worse than left, with distinct hypodense nodule measuring up to 10 mm in size in the right thyroid lobe. Consider clinical correlation and thyroid ultrasound to further characterize. Head CTA: No flow significant stenosis or definite aneurysms. CRITICAL RESULT: No. COMMUNICATION: Per this written report. By electronically signing this report, I, the attending physician, attest that I have personally reviewed the images/data for the above examination(s) and agree  with the final edited report. Drafted by Wicho Walker MD on 9/19/2024 1:56 PM Final report signed by Adam Draper MD on 9/19/2024 4:20 PM    CT Head Without Contrast    Result Date: 9/19/2024  No acute large cortical infarct or intracranial hemorrhage. CRITICAL RESULT:   No. COMMUNICATION: Per this written report. By electronically signing this report, I, the attending physician, attest that I have personally reviewed the images/data for the above examination(s) and agree with the final edited report. Drafted by Wicho Walker MD on 9/19/2024 1:43 PM Final report signed by Adam Draper MD on 9/19/2024 4:16 PM    CT Head Without Contrast    Result Date: 9/17/2024  Impression: No evidence of acute trauma to the brain or other acute intracranial disease. Electronically Signed: Jeanmarie Aguilar MD  9/17/2024 11:12 AM EDT  Workstation ID: [id number]    XR Chest 1 View    Result Date: 9/17/2024  Impression: No evidence of active chest disease. Electronically Signed: Jeanmarie Aguilar MD  9/17/2024 11:02 AM EDT  Workstation ID: [id number]    MRI Pelvis With & Without Contrast    Result Date: 9/8/2024  Impression: 1.Edema signal in the inferior coccyx and surrounding soft tissues, suspected to represent a nondisplaced fracture given patient's reported fall. 2.Status post left hip arthroplasty. 3.Suspected mild to moderate degenerative changes at the sacroiliac joints and mild right hip osteoarthritis. Electronically Signed: Ramone Daly  9/8/2024 1:03 PM EDT  Workstation ID: [id number]    MRI Lumbar Spine Without Contrast    Result Date: 9/5/2024  1. Degenerative disc disease and facet arthropathy with central canal and neuroforaminal stenosis at levels detailed above. 2. Central canal and neuroforaminal stenosis is most pronounced at the L4/5 level. 3. Facet joint inflammation at L4/5. 4. No fracture or traumatic malalignment.  This report was finalized on 9/5/2024 4:43 PM by Dr. Sai Gómez MD.      MRI Thoracic  Spine Without Contrast    Result Date: 9/5/2024  1.  No levels of significant central canal or neuroforaminal stenosis identified. 2.  No acute appearing disc herniations. 3.  No fracture or malalignment identified. 4.  No paraspinal fluid collections noted.   This report was finalized on 9/5/2024 4:37 PM by Dr. Sai Gómez MD.      MRI Brain Without Contrast    Result Date: 9/5/2024  1. No acute intracranial abnormality. 2. Otherwise unremarkable exam.  This report was finalized on 9/5/2024 4:23 PM by Dr. Sai Gómez MD.      XR Chest 1 View    Result Date: 9/5/2024    Unremarkable exam. No acute cardiopulmonary findings identified.   This report was finalized on 9/5/2024 3:44 PM by Dr. Sai Gómez MD.      CT Pelvis Without Contrast    Result Date: 9/5/2024  1.  No evidence of acute displaced fracture or dislocation. 2.  Extensive patient motion artifact at the level of the pubic rami limits assessment in this region. 3.  Left hip arthroplasty changes noted without evidence of hardware complication.   This report was finalized on 9/5/2024 3:07 PM by Dr. Sai Gómez MD.      CT Pelvis Without Contrast    Result Date: 8/27/2024  No acute fracture seen   This report was finalized on 8/27/2024 1:29 PM by Dr. Blayne Contreras MD.      CT Lumbar Spine Without Contrast    Result Date: 8/27/2024  1.  Disc base narrowing at L4-5 and L5-S1 stable from the previous exam. 2.  No acute fracture.   This report was finalized on 8/27/2024 1:27 PM by Dr. Blayne Contreras MD.      CT Lumbar Spine Without Contrast    Result Date: 8/16/2024  Impression: No evidence of fracture or malalignment of the lumbar spine or pelvis. Degenerative changes of lumbar the spine most advanced at L4-5. Electronically Signed: Brett Bryson MD  8/16/2024 11:21 AM EDT  Workstation ID: [id number]    CT Pelvis Without Contrast    Result Date: 8/16/2024  Impression: No evidence of fracture or malalignment of the lumbar spine or pelvis. Degenerative  changes of lumbar the spine most advanced at L4-5. Electronically Signed: Brett Bryson MD  8/16/2024 11:21 AM EDT  Workstation ID: [id number]    XR Spine Lumbar 2 or 3 View    Result Date: 8/16/2024  Impression: No evidence of displaced fracture or traumatic malalignment. Degenerative changes of the lumbar spine most advanced at L5-S1. Left hip arthroplasty without evidence of complication. Electronically Signed: Brett Bryson MD  8/16/2024 10:02 AM EDT  Workstation ID: [id number]    XR Hip With or Without Pelvis 2 - 3 View Left    Result Date: 8/16/2024  Impression: No evidence of displaced fracture or traumatic malalignment. Degenerative changes of the lumbar spine most advanced at L5-S1. Left hip arthroplasty without evidence of complication. Electronically Signed: Brett Bryson MD  8/16/2024 10:02 AM EDT  Workstation ID: [id number]    XR Chest 1 View    Result Date: 6/28/2024    Unremarkable exam. No acute cardiopulmonary findings identified.   This report was finalized on 6/28/2024 1:05 PM by Dr. Javier Khan MD.      Cardiac Catheterization/Vascular Study    Result Date: 6/20/2024  80% in-stent restenosis of the ostial/proximal RCA successfully stented with 3.5 x 38 mm VITALIY and reduced to no significant residual disease. Nonobstructive disease of the left coronary system. Normal left ventricular systolic function, estimated EF 65%. RECOMMENDATIONS: Dual antiplatelet therapy for at least 1 year. Optimize medical therapy and risk factor management. Indications: Chest pain and abnormal myocardial perfusion stress test. Access: Right radial. Procedures: Left heart catheterization. Left ventriculogram. Selective coronary angiography. Arterial site hemostasis with radial band. Procedure narrative: The patient was brought to the catheterization lab in a fasting condition.  Access site was prepped and draped in standard sterile fashion.  Lidocaine was injected and arterial access was obtained by percutaneous  anterior wall puncture technique.  A 6 S[place]inese arterial sheath was placed. Above procedures were performed without complications.  Following the angiograms the right coronary artery PCI was performed.  Additional heparin was given.  ACT was monitored.  F R4 guide catheter was tried with guide liner subsequently AL-1 guide catheter was used and finally the procedure was performed with a 5 Sammarinese MATT guide catheter.  The right coronary artery was difficult to engage enough to advance devices.  Once it was engaged the versa turn wire was advanced.  Balloon PTCA was performed with a 1.5 then with a 3.0 mm balloon.  Subsequently the right coronary artery was stented with a 3.5 x 38 mm VITALIY which was fully deployed and postdilated with 3.5 mm NC balloon at high pressures.  There was radial opacity noted around the ostium and there was a concern about limited ostial dissection into the aortic wall however on repeated angiograms no contrast draining was noted and it was felt that this was reasonable calcification.  Patient's hemodynamics were stable.  HEBER flow was grade 3 before and after PCI.  At the conclusion the arterial sheath was removed and hemostasis was achieved.  The patient was transferred to the unit in a stable condition. Hemodynamic Findings: Heart Rate: 78/minute. LV pressure: 120/8-14 mmHg, on pull back no gradient was recorded across the aortic valve. Angiographic Findings: Right coronary dominance. LM: Angiographically normal. LAD: Minor irregularities and mild proximal to mid segment plaque with up to 30% proximal narrowing.  A medium sized second diagonal branch has a 40 to 50% stenosis.  The very large diagonal branch is free of significant disease.  No hemodynamic significant disease is noted. LCX: Nondominant vessel with minor irregularities and no significant disease. RCA: Dominant vessel with a ostial/proximal stent.  There was a 80% IntraStent restenosis.  This was treated with PTCA followed by  placement of a 3.5 x 38 mm VITALIY and the 80% stenosis was reduced to no significant residual disease. LV: Left ventriculogram performed in 30 BUTTS projection revealed normal global and regional left ventricular systolic function with estimated ejection fraction of 65%.  No mitral regurgitation was noted. Complications: No acute procedure related complications.             Assessment & Plan  Diagnoses and all orders for this visit:    1. Falls (Primary)  Is warranted to rule out underlying arrhythmia or pauses/block    2. Coronary artery disease involving native coronary artery of native heart with other form of angina pectoris  Continue aspirin, Plavix, Repatha (statin intolerant), beta-blocker and Ranexa  Clinically asymptomatic, will continue to monitor    3. Essential hypertension  Well-controlled, continue current therapy    4. Dyslipidemia  Continue Repatha  Continue to monitor routinely     5. Stage 3b chronic kidney disease  Stable, will continue    6. Stenosis of carotid artery, unspecified laterality  Continue aspirin and Repatha, will need ongoing annual surveillance    7. Type 2 diabetes mellitus with hyperglycemia, with long-term current use of insulin  Recommend tight glycemic control for overall health and      Review of medical record    Lifestyle modifications including heart healthy diet, regular exercise, maintenance of desirable body weight and avoidance of tobacco products     follow-up in 1 month to discuss review Holter monitor, sooner if needed

## 2024-10-08 ENCOUNTER — PROCEDURE VISIT (OUTPATIENT)
Dept: FAMILY MEDICINE CLINIC | Facility: CLINIC | Age: 64
End: 2024-10-08
Payer: COMMERCIAL

## 2024-10-08 ENCOUNTER — TELEPHONE (OUTPATIENT)
Dept: CARDIOLOGY | Facility: CLINIC | Age: 64
End: 2024-10-08
Payer: COMMERCIAL

## 2024-10-08 VITALS
WEIGHT: 151 LBS | HEIGHT: 64 IN | OXYGEN SATURATION: 98 % | HEART RATE: 68 BPM | DIASTOLIC BLOOD PRESSURE: 60 MMHG | TEMPERATURE: 98.6 F | BODY MASS INDEX: 25.78 KG/M2 | SYSTOLIC BLOOD PRESSURE: 130 MMHG

## 2024-10-08 DIAGNOSIS — E04.1 THYROID NODULE: Chronic | ICD-10-CM

## 2024-10-08 DIAGNOSIS — Z79.4 TYPE 2 DIABETES MELLITUS WITH DIABETIC AUTONOMIC NEUROPATHY, WITH LONG-TERM CURRENT USE OF INSULIN: Chronic | ICD-10-CM

## 2024-10-08 DIAGNOSIS — M19.012 PRIMARY OSTEOARTHRITIS OF LEFT SHOULDER: Primary | Chronic | ICD-10-CM

## 2024-10-08 DIAGNOSIS — F41.9 ANXIETY: Chronic | ICD-10-CM

## 2024-10-08 DIAGNOSIS — E11.43 TYPE 2 DIABETES MELLITUS WITH DIABETIC AUTONOMIC NEUROPATHY, WITH LONG-TERM CURRENT USE OF INSULIN: Chronic | ICD-10-CM

## 2024-10-08 PROCEDURE — 20610 DRAIN/INJ JOINT/BURSA W/O US: CPT | Performed by: PHYSICIAN ASSISTANT

## 2024-10-08 PROCEDURE — 99214 OFFICE O/P EST MOD 30 MIN: CPT | Performed by: PHYSICIAN ASSISTANT

## 2024-10-08 RX ORDER — FLUOXETINE 40 MG/1
40 CAPSULE ORAL DAILY
Qty: 90 CAPSULE | Refills: 1 | Status: SHIPPED | OUTPATIENT
Start: 2024-10-08

## 2024-10-08 RX ORDER — ISOSORBIDE MONONITRATE 30 MG/1
30 TABLET, EXTENDED RELEASE ORAL DAILY
Qty: 90 TABLET | Refills: 1 | Status: SHIPPED | OUTPATIENT
Start: 2024-10-08

## 2024-10-08 RX ORDER — GABAPENTIN 600 MG/1
600 TABLET ORAL 2 TIMES DAILY
Qty: 60 TABLET | Refills: 3 | Status: SHIPPED | OUTPATIENT
Start: 2024-10-08

## 2024-10-08 RX ORDER — FLUOXETINE 40 MG/1
40 CAPSULE ORAL DAILY
Qty: 90 CAPSULE | Refills: 1 | Status: SHIPPED | OUTPATIENT
Start: 2024-10-08 | End: 2024-10-08

## 2024-10-08 NOTE — TELEPHONE ENCOUNTER
Spoke with pt, allen of [facility] message:     I would recommend cleaning the area with alcohol to see if it will adhere. She can come by and get some new patches. It is important if she can wear  it to r/o underlying arrhythmia contributing to falls. If she is unable to wear it, she can return it.    Secondly she should be taking imdur, new rx will be sent to pharmacy.  Thanks,  Asia       Pt v/u

## 2024-10-08 NOTE — PROGRESS NOTES
"Chief Complaint -shoulder pain    History of Present Illness -     Monisha Gardner is a 64 y.o. female.     Shoulder pain-  She complains of pain in the left shoulder due to osteoarthritis.  Worse with recent wet weather and cold weather changes.  Onset 2 weeks.  No known specific injury.    Anxiety-  Not at goal as patient reports that she has been having a lot of dreams.  She reports some dreams are scary and some are vivid.  She is finding herself sleepwalking and waking up in different bed than she went to sleep p.m. in her home.  She denies any hallucinations, SI or HI    Thyroid nodule-  Thyroid nodule was noted on previous imaging 10 mm right thyroid lobe which was discussed with the patient today    Diabetes mellitus type 2-  Stable with Ozempic and dietary modification      The following portions of the patient's history were reviewed and updated as appropriate: allergies, current medications, past family history, past medical history, past social history, past surgical history and problem list.        Objective  Vital signs:  /60   Pulse 68   Temp 98.6 °F (37 °C) (Temporal)   Ht 162.6 cm (64\")   Wt 68.5 kg (151 lb)   LMP  (LMP Unknown)   SpO2 98%   BMI 25.92 kg/m²     Physical Exam  Vitals and nursing note reviewed.   Constitutional:       Appearance: Normal appearance. She is well-developed.   Eyes:      Extraocular Movements: Extraocular movements intact.      Conjunctiva/sclera: Conjunctivae normal.   Cardiovascular:      Rate and Rhythm: Normal rate and regular rhythm.      Heart sounds: Normal heart sounds. No murmur heard.  Pulmonary:      Effort: Pulmonary effort is normal. No respiratory distress.      Breath sounds: Normal breath sounds. No wheezing.   Musculoskeletal:         General: No tenderness.   Skin:     General: Skin is warm and dry.      Findings: No rash.   Neurological:      Mental Status: She is alert and oriented to person, place, and time.   Psychiatric:         Mood " and Affect: Mood normal.         Behavior: Behavior normal.         Thought Content: Thought content normal.         The following data was reviewed by Kimmy Vela PA-C:         Lab Results   Component Value Date    BUN 23 09/17/2024    CREATININE 1.26 (H) 09/17/2024    EGFR 48.1 (L) 09/17/2024    ALT 17 09/17/2024    AST 23 09/17/2024    WBC 7.66 09/21/2024    HGB 13.9 09/21/2024    HCT 41.6 09/21/2024     09/21/2024    CHOL 193 03/01/2024    TRIG 363 (H) 03/01/2024    HDL 30 (L) 03/01/2024     (H) 03/01/2024    TSH 0.965 09/07/2024    HGBA1C 5.80 (H) 09/05/2024           Assessment & Plan     Diagnoses and all orders for this visit:    1. Primary osteoarthritis of left shoulder (Primary)  Comments:  Left shoulder intra-articular injection performed today    2. Anxiety  Comments:  Discontinue duloxetine due to vivid dreams and sleepwalking  Start Prozac  Orders:  -     FLUoxetine (PROzac) 40 MG capsule; Take 1 capsule by mouth Daily.  Dispense: 90 capsule; Refill: 1    3. Type 2 diabetes mellitus with diabetic autonomic neuropathy, with long-term current use of insulin  Comments:  Continue Ozempic 2 mg weekly  Advised a low carbohydrate diabetic diet  Orders:  -     gabapentin (NEURONTIN) 600 MG tablet; Take 1 tablet by mouth 2 (Two) Times a Day.  Dispense: 60 tablet; Refill: 3    4. Thyroid nodule  Comments:  Order thyroid ultrasound for 10 mm right thyroid nodule noted on previous imaging  Orders:  -     US Thyroid; Future      Procedure: Left shoulder intra-articular injection  Provider: Kimmy Vela PA-C  Indication: Left shoulder pain secondary to osteoarthritis  Timeout was taken to verify correct patient procedure and location  Description: The left shoulder was prepped and draped in sterile fashion.  An intra-articular injection was given using 3 cc 1% lidocaine, 1 cc of triamcinolone, and 1 cc of Depo-Medrol.  Pressure was held and sterile dressing was placed.  No complications  Estimated  blood loss: Minimal  Patient tolerated the procedure well    Lidocaine 1% 10 mg/mL  NDC number 06646-180-53  Lot #[id number]  Expiration 5/27    Triamcinolone 40 mg/mL  NDC number 6809-8773-46  Lot #[id number]  Expiration January 2026    Depo-Medrol 40 mg/mL  NDC number 8599-9933-60  Lot number [id number]  Expiration 8/2025                 Patient was given instructions and counseling regarding his condition or for health maintenance advice. Please see specific information pulled into the AVS if appropriate      This document has been electronically signed by:  Kimmy Vela PA-C

## 2024-10-08 NOTE — TELEPHONE ENCOUNTER
Pt called to report that she had holter monitor put on Friday and it fell off Saturday night. Pt reports she put it back on and it continues to come off.   Pt also reports she is NOT taking imdur medication.    Please advise.

## 2024-10-11 ENCOUNTER — LAB (OUTPATIENT)
Dept: LAB | Facility: HOSPITAL | Age: 64
End: 2024-10-11
Payer: COMMERCIAL

## 2024-10-11 ENCOUNTER — HOSPITAL ENCOUNTER (OUTPATIENT)
Dept: ULTRASOUND IMAGING | Facility: HOSPITAL | Age: 64
Discharge: HOME | End: 2024-10-11
Payer: COMMERCIAL

## 2024-10-11 DIAGNOSIS — E04.1 THYROID NODULE: Chronic | ICD-10-CM

## 2024-10-11 DIAGNOSIS — E78.5 DYSLIPIDEMIA: ICD-10-CM

## 2024-10-11 PROCEDURE — 36415 COLL VENOUS BLD VENIPUNCTURE: CPT

## 2024-10-11 PROCEDURE — 76536 US EXAM OF HEAD AND NECK: CPT

## 2024-10-11 PROCEDURE — 80061 LIPID PANEL: CPT

## 2024-10-12 LAB
CHOLEST SERPL-MCNC: 120 MG/DL (ref 0–200)
HDLC SERPL-MCNC: 49 MG/DL (ref 40–60)
LDLC SERPL CALC-MCNC: 33 MG/DL (ref 0–100)
LDLC/HDLC SERPL: 0.42 {RATIO}
TRIGL SERPL-MCNC: 252 MG/DL (ref 0–150)
VLDLC SERPL-MCNC: 38 MG/DL (ref 5–40)

## 2024-10-14 ENCOUNTER — TELEPHONE (OUTPATIENT)
Dept: CARDIOLOGY | Facility: CLINIC | Age: 64
End: 2024-10-14
Payer: COMMERCIAL

## 2024-10-14 NOTE — TELEPHONE ENCOUNTER
Tried to call pt, no answer. Left detailed vm    ----- Message from Asia Hawkins sent at 10/14/2024  2:15 PM EDT -----  Cholesterol is much better on medication, her triglycerides remain elevated. Continue on fenofibrate, repatha and recommend fish oil.

## 2024-10-18 ENCOUNTER — TELEPHONE (OUTPATIENT)
Dept: CARDIOLOGY | Facility: CLINIC | Age: 64
End: 2024-10-18

## 2024-10-18 NOTE — TELEPHONE ENCOUNTER
Caller: Monisha Gardner    Relationship: Self    Best call back number: [phone]    What is the best time to reach you: ANY    Who are you requesting to speak with (clinical staff, provider,  specific staff member): CLINICAL    What was the call regarding: PATIENT CALLED IN REGARDS TO AN OXYGEN TANK THAT SHE IS NOT USING. SHE STATES SHE TALKED TO [facility] ABOUT IT A MONTH AGO, BUT SHE WAS TOLD THAT HER CARDIOLOGIST NEEDS TO NOTIFY THEM ABOUT IT BEFORE THEY COME AND PICK IT UP. PLEASE REACH OUT TO HER TO DISCUSS THIS. THANK YOU    Is it okay if the provider responds through [facility]: PLEASE CALL

## 2024-11-04 ENCOUNTER — TELEPHONE (OUTPATIENT)
Dept: CARDIOLOGY | Facility: CLINIC | Age: 64
End: 2024-11-04

## 2024-11-04 NOTE — TELEPHONE ENCOUNTER
Caller: Monisha Gardner    Relationship to patient: Self    Best call back number: [phone]    Chief complaint: NONE    Type of visit: FOLLOW UP    Requested date: 11/8/24, AFTERNOON    If rescheduling, when is the original appointment: 11/5/24     Additional notes: PATIENT HAS OTHER OBLIGATIONS ALL WEEK AND CAN ONLY COME IN FRIDAY AFTERNOON DUE TO BEING IN [place] AT 9 AM FOR AN APPOINTMENT. NOTHING AVAILABLE AFTER 11:30 ON 11/8. PLEASE CALL PATIENT BACK IF SHE CAN BE SEEN IN THE AFTERNOON ON FRIDAY. OKAY TO LEAVE VM WITH APPOINTMENT DATE AND TIME. THANK YOU    HER SURGERY IS SCHEDULE FOR 11/21

## 2024-11-08 ENCOUNTER — OFFICE VISIT (OUTPATIENT)
Dept: CARDIOLOGY | Facility: CLINIC | Age: 64
End: 2024-11-08
Payer: COMMERCIAL

## 2024-11-08 VITALS
DIASTOLIC BLOOD PRESSURE: 76 MMHG | WEIGHT: 150.2 LBS | SYSTOLIC BLOOD PRESSURE: 114 MMHG | BODY MASS INDEX: 25.64 KG/M2 | HEIGHT: 64 IN | OXYGEN SATURATION: 97 % | HEART RATE: 75 BPM

## 2024-11-08 DIAGNOSIS — I10 ESSENTIAL HYPERTENSION: ICD-10-CM

## 2024-11-08 DIAGNOSIS — E78.5 DYSLIPIDEMIA: ICD-10-CM

## 2024-11-08 DIAGNOSIS — E11.65 TYPE 2 DIABETES MELLITUS WITH HYPERGLYCEMIA, WITH LONG-TERM CURRENT USE OF INSULIN: ICD-10-CM

## 2024-11-08 DIAGNOSIS — I65.29 STENOSIS OF CAROTID ARTERY, UNSPECIFIED LATERALITY: ICD-10-CM

## 2024-11-08 DIAGNOSIS — Z95.5 S/P CORONARY ARTERY STENT PLACEMENT: ICD-10-CM

## 2024-11-08 DIAGNOSIS — N18.32 STAGE 3B CHRONIC KIDNEY DISEASE: ICD-10-CM

## 2024-11-08 DIAGNOSIS — I38 VALVULAR HEART DISEASE: ICD-10-CM

## 2024-11-08 DIAGNOSIS — I25.810 CORONARY ARTERY DISEASE INVOLVING CORONARY BYPASS GRAFT OF NATIVE HEART WITHOUT ANGINA PECTORIS: Primary | ICD-10-CM

## 2024-11-08 DIAGNOSIS — Z79.4 TYPE 2 DIABETES MELLITUS WITH HYPERGLYCEMIA, WITH LONG-TERM CURRENT USE OF INSULIN: ICD-10-CM

## 2024-11-08 DIAGNOSIS — Z01.810 PREOPERATIVE CARDIOVASCULAR EXAMINATION: ICD-10-CM

## 2024-11-08 NOTE — LETTER
November 13, 2024     ANH Medina  [address] [facility] [address]    Patient: Monisha Gardner   YOB: 1960   Date of Visit: 11/8/2024       Dear ANH Medina    Monisha Gardner was in my office today. Below is a copy of my note.    If you have questions, please do not hesitate to call me. I look forward to following Monisha along with you.         Sincerely,        TRUONG Briones        CC: No Recipients    Subjective    Monisha Gardner is a 64 y.o. female.   Chief Complaint   Patient presents with   • Surgical Clearance   History of Present Illness   Monisha Gardner is a 64-year-old female who presents to clinic today for cardiology follow-up/surgery clearance.     She reports a jaw mass and previously underwent surgery in [place].  She continues to report intermittent problems due to infection.  She is now needing another jaw surgery and is requesting cardiovascular clearance.  She had recent stent (6/2024) and remains on DAPT, recommend  delay for 1 year from June 2024 unless emergent surgery required (form completed and faxed to dental provider as requested.     She had some issues with falls requiring hospitalization. She states it was felt that her medications were causing her falls and nightmares.  Those medications have since been discontinued.  She states she is overall doing better.      She did have extensive imaging revealing mild to moderate narrowing at the origin of the left subclavian artery otherwise no significant stenosis.  She will need annual monitoring while continuing her aspirin and Repatha (statin intolerant).  She did have a distinct nodule on her thyroid which ultrasound was recommended in primary is arranging.      CAD currently on ASA, Plavix, Imdur, Ranexa, Lopressor and Repatha (statin intolerant).  Reports compliance with medicine. She denies chest pain, dyspnea, palpitations or syncope. She denies nitroglycerin use.  Cardiac cath  on 6/20/2024 with 80% in-stent restenosis of proximal/ostial RCA stented with 3.5 x 38 mm VITALIY and reduced to no significant residual disease.  Nonobstructive disease in left coronary system and a calculated LVEF is 65%.     Hypertension currently on Losartan, Norvasc and Lopressor. She reports home BPs are well-controlled.  She reports compliance with medicine.       Hyperlipidemia with statin intolerance.  She reports continuing on Repatha she does report missing some Repatha due to insurance coverage. She remains on fenofibrate.  She denies medication side effects and reports compliance. LDL is at 33.      Valvular heart disease (aortic) which has been stable.  She denies shortness of air, orthopnea or swelling.     CKD and was previously following with nephrology and has re-established care with [facility].     Patient Active Problem List   Diagnosis   • ASCVD (arteriosclerotic cardiovascular disease)   • Essential hypertension   • Dyslipidemia   • Type 2 diabetes mellitus with hyperglycemia, with long-term current use of insulin   • Body mass index (bmi) 30.0-30.9, adult   • Osteoarthritis   • Atypical angina   • Elevated liver enzymes   • Gastroesophageal reflux disease with esophagitis   • Vitamin D deficiency   • Hx of pancreatitis   • Menopausal symptoms   • Vitamin B 12 deficiency   • Nocturnal leg cramps   • Anxiety   • Left arm weakness   • Chronic gout of multiple sites   • Squamous cell carcinoma in situ (SCCIS) of skin   • Mixed hyperlipidemia   • PVC (premature ventricular contraction)   • RLS (restless legs syndrome)   • Psychophysiological insomnia   • Dry mouth   • Type 2 diabetes mellitus with diabetic autonomic neuropathy, with long-term current use of insulin   • Bone mass   • Mass of mandible   • Coronary artery disease   • Abnormal nuclear stress test   • CAD (coronary artery disease)   • Falls   • Closed fracture of coccyx with routine healing   • Thyroid nodule     Past Medical  History:   Diagnosis Date   • Anxiety    • Arthritis    • Diabetes mellitus    • Diverticulosis    • GERD (gastroesophageal reflux disease)    • Gout    • Hyperlipidemia    • Hypertension    • Myocardial infarction      Past Surgical History:   Procedure Laterality Date   • APPENDECTOMY     • BREAST BIOPSY Right     benign   • CARDIAC CATHETERIZATION     • CARDIAC CATHETERIZATION N/A 2024    Procedure: Left Heart Cath;  Surgeon: Derrick Watts MD;  Location:  [facility];  Service: Cardiology;  Laterality: N/A;   • CARDIAC CATHETERIZATION N/A 2024    Procedure: Stent VITALIY coronary;  Surgeon: Derrick Watts MD;  Location:  [facility];  Service: Cardiology;  Laterality: N/A;   •  SECTION     • CHOLECYSTECTOMY     • CORONARY STENT PLACEMENT      Stenting of the RCA with 3.5 x 26 mm Resolute drug-eluting stent   • HYSTERECTOMY     • JOINT REPLACEMENT     • TONSILLECTOMY     • TUMOR REMOVAL  02/15/2024    FACIAL TUMOR       Family History   Problem Relation Age of Onset   • Arthritis Mother    • Heart disease Mother    • Heart disease Sister    • Vision loss Daughter    • Heart disease Father    • Heart disease Sister    • Heart disease Sister    • Heart disease Sister      Social History     Tobacco Use   • Smoking status: Never     Passive exposure: Never   • Smokeless tobacco: Never   Vaping Use   • Vaping status: Never Used   Substance Use Topics   • Alcohol use: No   • Drug use: No         The following portions of the patient's history were reviewed and updated as appropriate: allergies, current medications, past family history, past medical history, past social history, past surgical history and problem list.    Allergies   Allergen Reactions   • Ciprofloxacin Anaphylaxis   • Penicillins Shortness Of Breath   • Statins Myalgia   • Fish Oil [Omega-3 Fatty Acids] Nausea And Vomiting   • Lyrica [Pregabalin] Mental Status Change   • Wellbutrin [Bupropion] Mental Status  Change         Current Outpatient Medications:   •  allopurinol (ZYLOPRIM) 100 MG tablet, TAKE 1 TABLET BY MOUTH TWICE DAILY, Disp: 180 tablet, Rfl: 3  •  amLODIPine (NORVASC) 2.5 MG tablet, TAKE 1 TABLET BY MOUTH DAILY, Disp: 90 tablet, Rfl: 1  •  aspirin 81 MG EC tablet, Take 1 tablet by mouth Daily., Disp: 100 tablet, Rfl: 3  •  clopidogrel (PLAVIX) 75 MG tablet, Take 1 tablet by mouth Daily., Disp: 90 tablet, Rfl: 3  •  Evolocumab (REPATHA) solution auto-injector SureClick injection, Inject 1 mL under the skin into the appropriate area as directed Every 14 (Fourteen) Days., Disp: 2 mL, Rfl: 5  •  fenofibrate 160 MG tablet, TAKE 1 TABLET BY MOUTH EVERY DAY, Disp: 90 tablet, Rfl: 1  •  FLUoxetine (PROzac) 40 MG capsule, Take 1 capsule by mouth Daily., Disp: 90 capsule, Rfl: 1  •  gabapentin (NEURONTIN) 600 MG tablet, Take 1 tablet by mouth 2 (Two) Times a Day., Disp: 60 tablet, Rfl: 3  •  isosorbide mononitrate (IMDUR) 30 MG 24 hr tablet, Take 1 tablet by mouth Daily., Disp: 90 tablet, Rfl: 1  •  losartan (COZAAR) 100 MG tablet, Take 1 tablet by mouth Daily., Disp: 30 tablet, Rfl: 5  •  metoprolol tartrate (LOPRESSOR) 100 MG tablet, TAKE 1 TABLET BY MOUTH TWICE DAILY, Disp: 180 tablet, Rfl: 3  •  pantoprazole (PROTONIX) 40 MG EC tablet, Take 1 tablet by mouth Daily., Disp: 90 tablet, Rfl: 3  •  ranolazine (Ranexa) 500 MG 12 hr tablet, Take 1 tablet by mouth 2 (Two) Times a Day., Disp: 60 tablet, Rfl: 5  •  rOPINIRole (REQUIP) 2 MG tablet, Take 1 tablet by mouth Every Night., Disp: 90 tablet, Rfl: 3  •  Semaglutide, 2 MG/DOSE, (OZEMPIC) 8 MG/3ML solution pen-injector, Inject 2 mg under the skin into the appropriate area as directed 1 (One) Time Per Week., Disp: 3 mL, Rfl: 5    Review of Systems   Constitutional:  Negative for activity change, appetite change, chills, diaphoresis, fatigue and fever.   HENT:  Negative for congestion, drooling, ear discharge, ear pain, mouth sores, nosebleeds, postnasal drip,  "rhinorrhea, sinus pressure, sneezing and sore throat.    Eyes:  Negative for pain, discharge and visual disturbance.   Respiratory:  Negative for cough, chest tightness, shortness of breath and wheezing.    Cardiovascular:  Negative for chest pain, palpitations and leg swelling.   Gastrointestinal:  Negative for abdominal pain, constipation, diarrhea, nausea and vomiting.   Endocrine: Negative for cold intolerance, heat intolerance, polydipsia, polyphagia and polyuria.   Musculoskeletal:  Negative for arthralgias, myalgias and neck pain.   Skin:  Negative for rash and wound.   Neurological:  Negative for dizziness, syncope, speech difficulty, weakness, light-headedness and headaches.   Hematological:  Negative for adenopathy. Does not bruise/bleed easily.   Psychiatric/Behavioral:  Negative for confusion, dysphoric mood and sleep disturbance. The patient is not nervous/anxious.    All other systems reviewed and are negative.    /76 (BP Location: Left arm, Patient Position: Sitting, Cuff Size: Adult)   Pulse 75   Ht 162.6 cm (64\")   Wt 68.1 kg (150 lb 3.2 oz)   LMP  (LMP Unknown)   SpO2 97%   BMI 25.78 kg/m²     Objective  Allergies   Allergen Reactions   • Ciprofloxacin Anaphylaxis   • Penicillins Shortness Of Breath   • Statins Myalgia   • Fish Oil [Omega-3 Fatty Acids] Nausea And Vomiting   • Lyrica [Pregabalin] Mental Status Change   • Wellbutrin [Bupropion] Mental Status Change       Physical Exam  Vitals reviewed.   Constitutional:       Appearance: Normal appearance. She is well-developed.   HENT:      Head: Normocephalic.   Eyes:      Conjunctiva/sclera: Conjunctivae normal.   Neck:      Thyroid: No thyromegaly.      Vascular: No carotid bruit or JVD.   Cardiovascular:      Rate and Rhythm: Normal rate and regular rhythm.   Pulmonary:      Effort: Pulmonary effort is normal.      Breath sounds: Normal breath sounds.   Musculoskeletal:      Cervical back: Neck supple.      Right lower leg: No " edema.      Left lower leg: No edema.   Skin:     General: Skin is warm and dry.   Neurological:      Mental Status: She is alert and oriented to person, place, and time.   Psychiatric:         Attention and Perception: Attention normal.         Mood and Affect: Mood normal.         Speech: Speech normal.         Behavior: Behavior normal. Behavior is cooperative.         Cognition and Memory: Cognition normal.           ECG 12 Lead    Date/Time: 2024 11:27 AM  Performed by: Asia Hawkins APRN    Authorized by: Asia Hawkins APRN  Comparison: compared with previous ECG   Similar to previous ECG  Comparison to previous EC2024  Rhythm: sinus rhythm  Rate: normal  BPM: 77  Other findings: non-specific ST-T wave changes and T wave abnormality    Clinical impression: abnormal EKG and myocardial infarction  Clinical impression comment: septal infarction, indeterminate age  Comments: QT/QTc - 399/430          LABS  WBC   Date Value Ref Range Status   2024 7.66 3.70 - 10.30 10*3/uL Final     RBC   Date Value Ref Range Status   2024 4.86 3.90 - 5.20 10*6/uL Final     Hemoglobin   Date Value Ref Range Status   2024 13.9 11.2 - 15.7 g/dL Final     Hematocrit   Date Value Ref Range Status   2024 41.6 34.0 - 45.0 % Final     MCV   Date Value Ref Range Status   2024 86 79 - 98 fL Final     MCH   Date Value Ref Range Status   2024 28.6 26.0 - 32.0 pg Final     MCHC   Date Value Ref Range Status   2024 33.4 30.7 - 35.5 g/dL Final     RDW   Date Value Ref Range Status   2024 16.5 (H) 11.5 - 14.5 % Final     RDW-SD   Date Value Ref Range Status   2024 49.8 37.0 - 54.0 fl Final     MPV   Date Value Ref Range Status   2024 9.0 8.8 - 12.5 fL Final     Platelets   Date Value Ref Range Status   2024 313 155 - 369 10*3/uL Final     Neutrophil Rel %   Date Value Ref Range Status   2024 54.0 % Final     Lymphocyte Rel %   Date Value Ref Range  Status   09/21/2024 33.0 % Final     Monocyte Rel %   Date Value Ref Range Status   09/21/2024 8.0 % Final     Eosinophil %   Date Value Ref Range Status   09/21/2024 3.0 % Final     Basophil Rel %   Date Value Ref Range Status   09/21/2024 1.0 % Final     Immature Grans %   Date Value Ref Range Status   09/21/2024 1.0 % Final     Neutrophils Absolute   Date Value Ref Range Status   09/21/2024 4.11 1.60 - 6.10 10*3/uL Final     Lymphocytes Absolute   Date Value Ref Range Status   09/21/2024 2.54 1.20 - 3.90 10*3/uL Final     Monocytes Absolute   Date Value Ref Range Status   09/21/2024 0.62 0.30 - 0.90 10*3/uL Final     Eosinophils Absolute   Date Value Ref Range Status   09/21/2024 0.24 0.00 - 0.50 10*3/uL Final     Basophils Absolute   Date Value Ref Range Status   09/21/2024 0.09 0.00 - 0.10 10*3/uL Final     Immature Grans, Absolute   Date Value Ref Range Status   09/21/2024 0.06 0.00 - 0.06 10*3/uL Final     nRBC   Date Value Ref Range Status   09/21/2024 0.0 <=0.0 per 100 WBCs Final   09/17/2024 0.0 0.0 - 0.2 /100 WBC Final       Total Cholesterol   Date Value Ref Range Status   10/11/2024 120 0 - 200 mg/dL Final     Triglycerides   Date Value Ref Range Status   10/11/2024 252 (H) 0 - 150 mg/dL Final     HDL Cholesterol   Date Value Ref Range Status   10/11/2024 49 40 - 60 mg/dL Final     LDL Cholesterol    Date Value Ref Range Status   10/11/2024 33 0 - 100 mg/dL Final     LDL Chol Calc (NIH)   Date Value Ref Range Status   11/12/2021 124 (H) 0 - 100 mg/dL Final     IMAGING    US Thyroid    Result Date: 10/11/2024  Bilateral TI-RADS 3 nodules which should be followed up in 1 year to ensure stability.   This report was finalized on 10/11/2024 12:19 PM by Lyla Carrington M.D..      CT Angiogram Head    Result Date: 9/19/2024  Neck CTA: Mild to moderate narrowing at the origin of the left subclavian artery. Otherwise no flow significant stenosis. Heterogenous thyroid lobe, right worse than left, with distinct  hypodense nodule measuring up to 10 mm in size in the right thyroid lobe. Consider clinical correlation and thyroid ultrasound to further characterize. Head CTA: No flow significant stenosis or definite aneurysms. CRITICAL RESULT: No. COMMUNICATION: Per this written report. By electronically signing this report, I, the attending physician, attest that I have personally reviewed the images/data for the above examination(s) and agree with the final edited report. Drafted by Wicho Walker MD on 9/19/2024 1:56 PM Final report signed by Adam Draper MD on 9/19/2024 4:20 PM    CT Angiogram Neck    Result Date: 9/19/2024  Neck CTA: Mild to moderate narrowing at the origin of the left subclavian artery. Otherwise no flow significant stenosis. Heterogenous thyroid lobe, right worse than left, with distinct hypodense nodule measuring up to 10 mm in size in the right thyroid lobe. Consider clinical correlation and thyroid ultrasound to further characterize. Head CTA: No flow significant stenosis or definite aneurysms. CRITICAL RESULT: No. COMMUNICATION: Per this written report. By electronically signing this report, I, the attending physician, attest that I have personally reviewed the images/data for the above examination(s) and agree with the final edited report. Drafted by Wicho Walker MD on 9/19/2024 1:56 PM Final report signed by Adam Draper MD on 9/19/2024 4:20 PM    CT Head Without Contrast    Result Date: 9/19/2024  No acute large cortical infarct or intracranial hemorrhage. CRITICAL RESULT:   No. COMMUNICATION: Per this written report. By electronically signing this report, I, the attending physician, attest that I have personally reviewed the images/data for the above examination(s) and agree with the final edited report. Drafted by Wicho Walker MD on 9/19/2024 1:43 PM Final report signed by Adam Draper MD on 9/19/2024 4:16 PM    CT Head Without Contrast    Result Date:  9/17/2024  Impression: No evidence of acute trauma to the brain or other acute intracranial disease. Electronically Signed: Jeanmarie Aguilar MD  9/17/2024 11:12 AM EDT  Workstation ID: [id number]    XR Chest 1 View    Result Date: 9/17/2024  Impression: No evidence of active chest disease. Electronically Signed: Jeanmarie Aguilar MD  9/17/2024 11:02 AM EDT  Workstation ID: [id number]    MRI Pelvis With & Without Contrast    Result Date: 9/8/2024  Impression: 1.Edema signal in the inferior coccyx and surrounding soft tissues, suspected to represent a nondisplaced fracture given patient's reported fall. 2.Status post left hip arthroplasty. 3.Suspected mild to moderate degenerative changes at the sacroiliac joints and mild right hip osteoarthritis. Electronically Signed: Ramone Daly  9/8/2024 1:03 PM EDT  Workstation ID: [id number]    MRI Lumbar Spine Without Contrast    Result Date: 9/5/2024  1. Degenerative disc disease and facet arthropathy with central canal and neuroforaminal stenosis at levels detailed above. 2. Central canal and neuroforaminal stenosis is most pronounced at the L4/5 level. 3. Facet joint inflammation at L4/5. 4. No fracture or traumatic malalignment.  This report was finalized on 9/5/2024 4:43 PM by Dr. Sai Gómez MD.      MRI Thoracic Spine Without Contrast    Result Date: 9/5/2024  1.  No levels of significant central canal or neuroforaminal stenosis identified. 2.  No acute appearing disc herniations. 3.  No fracture or malalignment identified. 4.  No paraspinal fluid collections noted.   This report was finalized on 9/5/2024 4:37 PM by Dr. Sai Gómez MD.      MRI Brain Without Contrast    Result Date: 9/5/2024  1. No acute intracranial abnormality. 2. Otherwise unremarkable exam.  This report was finalized on 9/5/2024 4:23 PM by Dr. Sai Gómez MD.      XR Chest 1 View    Result Date: 9/5/2024    Unremarkable exam. No acute cardiopulmonary findings identified.   This report was finalized on  9/5/2024 3:44 PM by Dr. Sai Gómez MD.      CT Pelvis Without Contrast    Result Date: 9/5/2024  1.  No evidence of acute displaced fracture or dislocation. 2.  Extensive patient motion artifact at the level of the pubic rami limits assessment in this region. 3.  Left hip arthroplasty changes noted without evidence of hardware complication.   This report was finalized on 9/5/2024 3:07 PM by Dr. Sai Gómez MD.      CT Pelvis Without Contrast    Result Date: 8/27/2024  No acute fracture seen   This report was finalized on 8/27/2024 1:29 PM by Dr. Blayne Contreras MD.      CT Lumbar Spine Without Contrast    Result Date: 8/27/2024  1.  Disc base narrowing at L4-5 and L5-S1 stable from the previous exam. 2.  No acute fracture.   This report was finalized on 8/27/2024 1:27 PM by Dr. Blayne Contreras MD.      CT Lumbar Spine Without Contrast    Result Date: 8/16/2024  Impression: No evidence of fracture or malalignment of the lumbar spine or pelvis. Degenerative changes of lumbar the spine most advanced at L4-5. Electronically Signed: Brett Bryson MD  8/16/2024 11:21 AM EDT  Workstation ID: [id number]    CT Pelvis Without Contrast    Result Date: 8/16/2024  Impression: No evidence of fracture or malalignment of the lumbar spine or pelvis. Degenerative changes of lumbar the spine most advanced at L4-5. Electronically Signed: Brett Bryson MD  8/16/2024 11:21 AM EDT  Workstation ID: [id number]    XR Spine Lumbar 2 or 3 View    Result Date: 8/16/2024  Impression: No evidence of displaced fracture or traumatic malalignment. Degenerative changes of the lumbar spine most advanced at L5-S1. Left hip arthroplasty without evidence of complication. Electronically Signed: Brett Bryson MD  8/16/2024 10:02 AM EDT  Workstation ID: [id number]    XR Hip With or Without Pelvis 2 - 3 View Left    Result Date: 8/16/2024  Impression: No evidence of displaced fracture or traumatic malalignment. Degenerative changes of the lumbar  spine most advanced at L5-S1. Left hip arthroplasty without evidence of complication. Electronically Signed: Brett Bryson MD  8/16/2024 10:02 AM EDT  Workstation ID: [id number]            Assessment & Plan  Diagnoses and all orders for this visit:    1. Coronary artery disease involving coronary bypass graft of native heart without angina pectoris (Primary)  -     ECG 12 Lead  EKG reviewed and discussed  Continue aspirin, Plavix, Repatha, beta-blocker and Ranexa  Clinically asymptomatic, will continue to monitor    2. S/P coronary artery stent placement  Patient remains on DAPT and denies bleeding.    3. Essential hypertension  Well-controlled, continue current therapy    4. Dyslipidemia  Continue on statin, LDL goal less than 70, heart healthy diet     5. Stage 3b chronic kidney disease  Management by nephrology     6. Stenosis of carotid artery, unspecified laterality  Continue asa and repatha (statin intolerant), annual surveillance     7. Valvular heart disease  Clinically asymptomatic, continue to monitor     8. Type 2 diabetes mellitus with hyperglycemia, with long-term current use of insulin  Recommend tight glycemic control     9. Preoperative cardiovascular examination  Surgical form completed.  Patient is moderate to high risk.  She is on DAPT with a stent 6/20/2024.  It is recommended she continue 1 year without interruption unless emergent surgery is needed.      Review of medical record     Lifestyle modifications including heart healthy diet, regular exercise, maintenance of desirable body weight and avoidance of tobacco products     Follow-up as scheduled, sooner if needed

## 2024-11-13 ENCOUNTER — TELEPHONE (OUTPATIENT)
Dept: CARDIOLOGY | Facility: CLINIC | Age: 64
End: 2024-11-13

## 2024-11-13 NOTE — PROGRESS NOTES
Subjective     Monisha Gardner is a 64 y.o. female.   Chief Complaint   Patient presents with    Surgical Clearance   History of Present Illness   Monisha Gardner is a 64-year-old female who presents to clinic today for cardiology follow-up/surgery clearance.     She reports a jaw mass and previously underwent surgery in [place].  She continues to report intermittent problems due to infection.  She is now needing another jaw surgery and is requesting cardiovascular clearance.  She had recent stent (6/2024) and remains on DAPT, recommend  delay for 1 year from June 2024 unless emergent surgery required (form completed and faxed to dental provider as requested.     She had some issues with falls requiring hospitalization. She states it was felt that her medications were causing her falls and nightmares.  Those medications have since been discontinued.  She states she is overall doing better.      She did have extensive imaging revealing mild to moderate narrowing at the origin of the left subclavian artery otherwise no significant stenosis.  She will need annual monitoring while continuing her aspirin and Repatha (statin intolerant).  She did have a distinct nodule on her thyroid which ultrasound was recommended in primary is arranging.      CAD currently on ASA, Plavix, Imdur, Ranexa, Lopressor and Repatha (statin intolerant).  Reports compliance with medicine. She denies chest pain, dyspnea, palpitations or syncope. She denies nitroglycerin use.  Cardiac cath on 6/20/2024 with 80% in-stent restenosis of proximal/ostial RCA stented with 3.5 x 38 mm VITALIY and reduced to no significant residual disease.  Nonobstructive disease in left coronary system and a calculated LVEF is 65%.     Hypertension currently on Losartan, Norvasc and Lopressor. She reports home BPs are well-controlled.  She reports compliance with medicine.       Hyperlipidemia with statin intolerance.  She reports continuing on Repatha she does  report missing some Repatha due to insurance coverage. She remains on fenofibrate.  She denies medication side effects and reports compliance. LDL is at 33.      Valvular heart disease (aortic) which has been stable.  She denies shortness of air, orthopnea or swelling.     CKD and was previously following with nephrology and has re-established care with [facility]rology [facility].     Patient Active Problem List   Diagnosis    ASCVD (arteriosclerotic cardiovascular disease)    Essential hypertension    Dyslipidemia    Type 2 diabetes mellitus with hyperglycemia, with long-term current use of insulin    Body mass index (bmi) 30.0-30.9, adult    Osteoarthritis    Atypical angina    Elevated liver enzymes    Gastroesophageal reflux disease with esophagitis    Vitamin D deficiency    Hx of pancreatitis    Menopausal symptoms    Vitamin B 12 deficiency    Nocturnal leg cramps    Anxiety    Left arm weakness    Chronic gout of multiple sites    Squamous cell carcinoma in situ (SCCIS) of skin    Mixed hyperlipidemia    PVC (premature ventricular contraction)    RLS (restless legs syndrome)    Psychophysiological insomnia    Dry mouth    Type 2 diabetes mellitus with diabetic autonomic neuropathy, with long-term current use of insulin    Bone mass    Mass of mandible    Coronary artery disease    Abnormal nuclear stress test    CAD (coronary artery disease)    Falls    Closed fracture of coccyx with routine healing    Thyroid nodule     Past Medical History:   Diagnosis Date    Anxiety     Arthritis     Diabetes mellitus     Diverticulosis     GERD (gastroesophageal reflux disease)     Gout     Hyperlipidemia     Hypertension     Myocardial infarction      Past Surgical History:   Procedure Laterality Date    APPENDECTOMY      BREAST BIOPSY Right     benign    CARDIAC CATHETERIZATION      CARDIAC CATHETERIZATION N/A 6/20/2024    Procedure: Left Heart Cath;  Surgeon: Derrick Watts MD;  Location: [facility] CATH INVASIVE LOCATION;   Service: Cardiology;  Laterality: N/A;    CARDIAC CATHETERIZATION N/A 2024    Procedure: Stent VITALIY coronary;  Surgeon: Derrick Watts MD;  Location: [facility] CATH INVASIVE LOCATION;  Service: Cardiology;  Laterality: N/A;     SECTION      CHOLECYSTECTOMY      CORONARY STENT PLACEMENT      Stenting of the RCA with 3.5 x 26 mm Resolute drug-eluting stent    HYSTERECTOMY      JOINT REPLACEMENT      TONSILLECTOMY      TUMOR REMOVAL  02/15/2024    FACIAL TUMOR       Family History   Problem Relation Age of Onset    Arthritis Mother     Heart disease Mother     Heart disease Sister     Vision loss Daughter     Heart disease Father     Heart disease Sister     Heart disease Sister     Heart disease Sister      Social History     Tobacco Use    Smoking status: Never     Passive exposure: Never    Smokeless tobacco: Never   Vaping Use    Vaping status: Never Used   Substance Use Topics    Alcohol use: No    Drug use: No         The following portions of the patient's history were reviewed and updated as appropriate: allergies, current medications, past family history, past medical history, past social history, past surgical history and problem list.    Allergies   Allergen Reactions    Ciprofloxacin Anaphylaxis    Penicillins Shortness Of Breath    Statins Myalgia    Fish Oil [Omega-3 Fatty Acids] Nausea And Vomiting    Lyrica [Pregabalin] Mental Status Change    Wellbutrin [Bupropion] Mental Status Change         Current Outpatient Medications:     allopurinol (ZYLOPRIM) 100 MG tablet, TAKE 1 TABLET BY MOUTH TWICE DAILY, Disp: 180 tablet, Rfl: 3    amLODIPine (NORVASC) 2.5 MG tablet, TAKE 1 TABLET BY MOUTH DAILY, Disp: 90 tablet, Rfl: 1    aspirin 81 MG EC tablet, Take 1 tablet by mouth Daily., Disp: 100 tablet, Rfl: 3    clopidogrel (PLAVIX) 75 MG tablet, Take 1 tablet by mouth Daily., Disp: 90 tablet, Rfl: 3    Evolocumab (REPATHA) solution auto-injector SureClick injection, Inject 1 mL under the skin into the  appropriate area as directed Every 14 (Fourteen) Days., Disp: 2 mL, Rfl: 5    fenofibrate 160 MG tablet, TAKE 1 TABLET BY MOUTH EVERY DAY, Disp: 90 tablet, Rfl: 1    FLUoxetine (PROzac) 40 MG capsule, Take 1 capsule by mouth Daily., Disp: 90 capsule, Rfl: 1    gabapentin (NEURONTIN) 600 MG tablet, Take 1 tablet by mouth 2 (Two) Times a Day., Disp: 60 tablet, Rfl: 3    isosorbide mononitrate (IMDUR) 30 MG 24 hr tablet, Take 1 tablet by mouth Daily., Disp: 90 tablet, Rfl: 1    losartan (COZAAR) 100 MG tablet, Take 1 tablet by mouth Daily., Disp: 30 tablet, Rfl: 5    metoprolol tartrate (LOPRESSOR) 100 MG tablet, TAKE 1 TABLET BY MOUTH TWICE DAILY, Disp: 180 tablet, Rfl: 3    pantoprazole (PROTONIX) 40 MG EC tablet, Take 1 tablet by mouth Daily., Disp: 90 tablet, Rfl: 3    ranolazine (Ranexa) 500 MG 12 hr tablet, Take 1 tablet by mouth 2 (Two) Times a Day., Disp: 60 tablet, Rfl: 5    rOPINIRole (REQUIP) 2 MG tablet, Take 1 tablet by mouth Every Night., Disp: 90 tablet, Rfl: 3    Semaglutide, 2 MG/DOSE, (OZEMPIC) 8 MG/3ML solution pen-injector, Inject 2 mg under the skin into the appropriate area as directed 1 (One) Time Per Week., Disp: 3 mL, Rfl: 5    Review of Systems   Constitutional:  Negative for activity change, appetite change, chills, diaphoresis, fatigue and fever.   HENT:  Negative for congestion, drooling, ear discharge, ear pain, mouth sores, nosebleeds, postnasal drip, rhinorrhea, sinus pressure, sneezing and sore throat.    Eyes:  Negative for pain, discharge and visual disturbance.   Respiratory:  Negative for cough, chest tightness, shortness of breath and wheezing.    Cardiovascular:  Negative for chest pain, palpitations and leg swelling.   Gastrointestinal:  Negative for abdominal pain, constipation, diarrhea, nausea and vomiting.   Endocrine: Negative for cold intolerance, heat intolerance, polydipsia, polyphagia and polyuria.   Musculoskeletal:  Negative for arthralgias, myalgias and neck pain.  "  Skin:  Negative for rash and wound.   Neurological:  Negative for dizziness, syncope, speech difficulty, weakness, light-headedness and headaches.   Hematological:  Negative for adenopathy. Does not bruise/bleed easily.   Psychiatric/Behavioral:  Negative for confusion, dysphoric mood and sleep disturbance. The patient is not nervous/anxious.    All other systems reviewed and are negative.    /76 (BP Location: Left arm, Patient Position: Sitting, Cuff Size: Adult)   Pulse 75   Ht 162.6 cm (64\")   Wt 68.1 kg (150 lb 3.2 oz)   LMP  (LMP Unknown)   SpO2 97%   BMI 25.78 kg/m²     Objective   Allergies   Allergen Reactions    Ciprofloxacin Anaphylaxis    Penicillins Shortness Of Breath    Statins Myalgia    Fish Oil [Omega-3 Fatty Acids] Nausea And Vomiting    Lyrica [Pregabalin] Mental Status Change    Wellbutrin [Bupropion] Mental Status Change       Physical Exam  Vitals reviewed.   Constitutional:       Appearance: Normal appearance. She is well-developed.   HENT:      Head: Normocephalic.   Eyes:      Conjunctiva/sclera: Conjunctivae normal.   Neck:      Thyroid: No thyromegaly.      Vascular: No carotid bruit or JVD.   Cardiovascular:      Rate and Rhythm: Normal rate and regular rhythm.   Pulmonary:      Effort: Pulmonary effort is normal.      Breath sounds: Normal breath sounds.   Musculoskeletal:      Cervical back: Neck supple.      Right lower leg: No edema.      Left lower leg: No edema.   Skin:     General: Skin is warm and dry.   Neurological:      Mental Status: She is alert and oriented to person, place, and time.   Psychiatric:         Attention and Perception: Attention normal.         Mood and Affect: Mood normal.         Speech: Speech normal.         Behavior: Behavior normal. Behavior is cooperative.         Cognition and Memory: Cognition normal.           ECG 12 Lead    Date/Time: 11/13/2024 11:27 AM  Performed by: Asia Hawkins APRN    Authorized by: Asia Hawkins APRN  " Comparison: compared with previous ECG   Similar to previous ECG  Comparison to previous EC2024  Rhythm: sinus rhythm  Rate: normal  BPM: 77  Other findings: non-specific ST-T wave changes and T wave abnormality    Clinical impression: abnormal EKG and myocardial infarction  Clinical impression comment: septal infarction, indeterminate age  Comments: QT/QTc - 399/430          LABS  WBC   Date Value Ref Range Status   2024 7.66 3.70 - 10.30 10*3/uL Final     RBC   Date Value Ref Range Status   2024 4.86 3.90 - 5.20 10*6/uL Final     Hemoglobin   Date Value Ref Range Status   2024 13.9 11.2 - 15.7 g/dL Final     Hematocrit   Date Value Ref Range Status   2024 41.6 34.0 - 45.0 % Final     MCV   Date Value Ref Range Status   2024 86 79 - 98 fL Final     MCH   Date Value Ref Range Status   2024 28.6 26.0 - 32.0 pg Final     MCHC   Date Value Ref Range Status   2024 33.4 30.7 - 35.5 g/dL Final     RDW   Date Value Ref Range Status   2024 16.5 (H) 11.5 - 14.5 % Final     RDW-SD   Date Value Ref Range Status   2024 49.8 37.0 - 54.0 fl Final     MPV   Date Value Ref Range Status   2024 9.0 8.8 - 12.5 fL Final     Platelets   Date Value Ref Range Status   2024 313 155 - 369 10*3/uL Final     Neutrophil Rel %   Date Value Ref Range Status   2024 54.0 % Final     Lymphocyte Rel %   Date Value Ref Range Status   2024 33.0 % Final     Monocyte Rel %   Date Value Ref Range Status   2024 8.0 % Final     Eosinophil %   Date Value Ref Range Status   2024 3.0 % Final     Basophil Rel %   Date Value Ref Range Status   2024 1.0 % Final     Immature Grans %   Date Value Ref Range Status   2024 1.0 % Final     Neutrophils Absolute   Date Value Ref Range Status   2024 4.11 1.60 - 6.10 10*3/uL Final     Lymphocytes Absolute   Date Value Ref Range Status   2024 2.54 1.20 - 3.90 10*3/uL Final     Monocytes Absolute   Date  Value Ref Range Status   09/21/2024 0.62 0.30 - 0.90 10*3/uL Final     Eosinophils Absolute   Date Value Ref Range Status   09/21/2024 0.24 0.00 - 0.50 10*3/uL Final     Basophils Absolute   Date Value Ref Range Status   09/21/2024 0.09 0.00 - 0.10 10*3/uL Final     Immature Grans, Absolute   Date Value Ref Range Status   09/21/2024 0.06 0.00 - 0.06 10*3/uL Final     nRBC   Date Value Ref Range Status   09/21/2024 0.0 <=0.0 per 100 WBCs Final   09/17/2024 0.0 0.0 - 0.2 /100 WBC Final       Total Cholesterol   Date Value Ref Range Status   10/11/2024 120 0 - 200 mg/dL Final     Triglycerides   Date Value Ref Range Status   10/11/2024 252 (H) 0 - 150 mg/dL Final     HDL Cholesterol   Date Value Ref Range Status   10/11/2024 49 40 - 60 mg/dL Final     LDL Cholesterol    Date Value Ref Range Status   10/11/2024 33 0 - 100 mg/dL Final     LDL Chol Calc (NIH)   Date Value Ref Range Status   11/12/2021 124 (H) 0 - 100 mg/dL Final     IMAGING    US Thyroid    Result Date: 10/11/2024  Bilateral TI-RADS 3 nodules which should be followed up in 1 year to ensure stability.   This report was finalized on 10/11/2024 12:19 PM by Lyla Carrington M.D..      CT Angiogram Head    Result Date: 9/19/2024  Neck CTA: Mild to moderate narrowing at the origin of the left subclavian artery. Otherwise no flow significant stenosis. Heterogenous thyroid lobe, right worse than left, with distinct hypodense nodule measuring up to 10 mm in size in the right thyroid lobe. Consider clinical correlation and thyroid ultrasound to further characterize. Head CTA: No flow significant stenosis or definite aneurysms. CRITICAL RESULT: No. COMMUNICATION: Per this written report. By electronically signing this report, I, the attending physician, attest that I have personally reviewed the images/data for the above examination(s) and agree with the final edited report. Drafted by Wicho Walker MD on 9/19/2024 1:56 PM Final report signed by Adam Ponce  MD Baron on 9/19/2024 4:20 PM    CT Angiogram Neck    Result Date: 9/19/2024  Neck CTA: Mild to moderate narrowing at the origin of the left subclavian artery. Otherwise no flow significant stenosis. Heterogenous thyroid lobe, right worse than left, with distinct hypodense nodule measuring up to 10 mm in size in the right thyroid lobe. Consider clinical correlation and thyroid ultrasound to further characterize. Head CTA: No flow significant stenosis or definite aneurysms. CRITICAL RESULT: No. COMMUNICATION: Per this written report. By electronically signing this report, I, the attending physician, attest that I have personally reviewed the images/data for the above examination(s) and agree with the final edited report. Drafted by Wicho Walker MD on 9/19/2024 1:56 PM Final report signed by Adam Draper MD on 9/19/2024 4:20 PM    CT Head Without Contrast    Result Date: 9/19/2024  No acute large cortical infarct or intracranial hemorrhage. CRITICAL RESULT:   No. COMMUNICATION: Per this written report. By electronically signing this report, I, the attending physician, attest that I have personally reviewed the images/data for the above examination(s) and agree with the final edited report. Drafted by Wicho Walker MD on 9/19/2024 1:43 PM Final report signed by Adam Draper MD on 9/19/2024 4:16 PM    CT Head Without Contrast    Result Date: 9/17/2024  Impression: No evidence of acute trauma to the brain or other acute intracranial disease. Electronically Signed: Jeanmarie Aguilar MD  9/17/2024 11:12 AM EDT  Workstation ID: [id number]    XR Chest 1 View    Result Date: 9/17/2024  Impression: No evidence of active chest disease. Electronically Signed: Jeanmarie Aguilar MD  9/17/2024 11:02 AM EDT  Workstation ID: [id number]    MRI Pelvis With & Without Contrast    Result Date: 9/8/2024  Impression: 1.Edema signal in the inferior coccyx and surrounding soft tissues, suspected to represent a nondisplaced fracture  given patient's reported fall. 2.Status post left hip arthroplasty. 3.Suspected mild to moderate degenerative changes at the sacroiliac joints and mild right hip osteoarthritis. Electronically Signed: Ramone Daly  9/8/2024 1:03 PM EDT  Workstation ID: [id number]    MRI Lumbar Spine Without Contrast    Result Date: 9/5/2024  1. Degenerative disc disease and facet arthropathy with central canal and neuroforaminal stenosis at levels detailed above. 2. Central canal and neuroforaminal stenosis is most pronounced at the L4/5 level. 3. Facet joint inflammation at L4/5. 4. No fracture or traumatic malalignment.  This report was finalized on 9/5/2024 4:43 PM by Dr. Sai Gómez MD.      MRI Thoracic Spine Without Contrast    Result Date: 9/5/2024  1.  No levels of significant central canal or neuroforaminal stenosis identified. 2.  No acute appearing disc herniations. 3.  No fracture or malalignment identified. 4.  No paraspinal fluid collections noted.   This report was finalized on 9/5/2024 4:37 PM by Dr. Sai Gómez MD.      MRI Brain Without Contrast    Result Date: 9/5/2024  1. No acute intracranial abnormality. 2. Otherwise unremarkable exam.  This report was finalized on 9/5/2024 4:23 PM by Dr. Sai Gómez MD.      XR Chest 1 View    Result Date: 9/5/2024    Unremarkable exam. No acute cardiopulmonary findings identified.   This report was finalized on 9/5/2024 3:44 PM by Dr. Sai Gómez MD.      CT Pelvis Without Contrast    Result Date: 9/5/2024  1.  No evidence of acute displaced fracture or dislocation. 2.  Extensive patient motion artifact at the level of the pubic rami limits assessment in this region. 3.  Left hip arthroplasty changes noted without evidence of hardware complication.   This report was finalized on 9/5/2024 3:07 PM by Dr. Sai Gómez MD.      CT Pelvis Without Contrast    Result Date: 8/27/2024  No acute fracture seen   This report was finalized on 8/27/2024 1:29 PM by Dr. Cisneros  MD Ben.      CT Lumbar Spine Without Contrast    Result Date: 8/27/2024  1.  Disc base narrowing at L4-5 and L5-S1 stable from the previous exam. 2.  No acute fracture.   This report was finalized on 8/27/2024 1:27 PM by Dr. Blayne Contreras MD.      CT Lumbar Spine Without Contrast    Result Date: 8/16/2024  Impression: No evidence of fracture or malalignment of the lumbar spine or pelvis. Degenerative changes of lumbar the spine most advanced at L4-5. Electronically Signed: Brett Bryson MD  8/16/2024 11:21 AM EDT  Workstation ID: [id number]    CT Pelvis Without Contrast    Result Date: 8/16/2024  Impression: No evidence of fracture or malalignment of the lumbar spine or pelvis. Degenerative changes of lumbar the spine most advanced at L4-5. Electronically Signed: Brett Bryson MD  8/16/2024 11:21 AM EDT  Workstation ID: [id number]    XR Spine Lumbar 2 or 3 View    Result Date: 8/16/2024  Impression: No evidence of displaced fracture or traumatic malalignment. Degenerative changes of the lumbar spine most advanced at L5-S1. Left hip arthroplasty without evidence of complication. Electronically Signed: Brett Bryson MD  8/16/2024 10:02 AM EDT  Workstation ID: [id number]    XR Hip With or Without Pelvis 2 - 3 View Left    Result Date: 8/16/2024  Impression: No evidence of displaced fracture or traumatic malalignment. Degenerative changes of the lumbar spine most advanced at L5-S1. Left hip arthroplasty without evidence of complication. Electronically Signed: Brett Bryson MD  8/16/2024 10:02 AM EDT  Workstation ID: [id number]            Assessment & Plan   Diagnoses and all orders for this visit:    1. Coronary artery disease involving coronary bypass graft of native heart without angina pectoris (Primary)  -     ECG 12 Lead  EKG reviewed and discussed  Continue aspirin, Plavix, Repatha, beta-blocker and Ranexa  Clinically asymptomatic, will continue to monitor    2. S/P coronary artery stent  placement  Patient remains on DAPT and denies bleeding.    3. Essential hypertension  Well-controlled, continue current therapy    4. Dyslipidemia  Continue on statin, LDL goal less than 70, heart healthy diet     5. Stage 3b chronic kidney disease  Management by nephrology     6. Stenosis of carotid artery, unspecified laterality  Continue asa and repatha (statin intolerant), annual surveillance     7. Valvular heart disease  Clinically asymptomatic, continue to monitor     8. Type 2 diabetes mellitus with hyperglycemia, with long-term current use of insulin  Recommend tight glycemic control     9. Preoperative cardiovascular examination  Surgical form completed.  Patient is moderate to high risk.  She is on DAPT with a stent 6/20/2024.  It is recommended she continue 1 year without interruption unless emergent surgery is needed.      Review of medical record     Lifestyle modifications including heart healthy diet, regular exercise, maintenance of desirable body weight and avoidance of tobacco products     Follow-up as scheduled, sooner if needed

## 2024-11-13 NOTE — TELEPHONE ENCOUNTER
SPOKE WITH PT    PT V/U    ----- Message from Asia Hawkins sent at 11/13/2024 12:27 PM EST -----  Holter monitor occasional irregular beats and fast heartbeat. No pauses or blocks. No a. Fib. Continue with current plan of care

## 2024-11-15 RX ORDER — ONDANSETRON 4 MG/1
4 TABLET, FILM COATED ORAL EVERY 8 HOURS PRN
Qty: 30 TABLET | Refills: 0 | OUTPATIENT
Start: 2024-11-15

## 2024-11-18 RX ORDER — ONDANSETRON 4 MG/1
4 TABLET, FILM COATED ORAL EVERY 8 HOURS PRN
Qty: 30 TABLET | Refills: 0 | OUTPATIENT
Start: 2024-11-18

## 2024-11-27 ENCOUNTER — SPECIALTY PHARMACY (OUTPATIENT)
Dept: PHARMACY | Facility: HOSPITAL | Age: 64
End: 2024-11-27
Payer: COMMERCIAL

## 2024-11-27 NOTE — PROGRESS NOTES
Medication Management Clinic/ Specialty Pharmacy Patient Management Program  Lipid Management Program - PCSK9i Initial Assessment     Monisha Gardner is a 64 y.o. female referred by their provider, Asia Hawkins, to the Hyperlipidemia Patient Management program offered by [facility] for Lipid Management.  An initial outreach was conducted, including assessment of therapy appropriateness and specialty medication education for Repatha. The patient was introduced to services offered by [facility], including: regular assessments, refill coordination, curbside pick-up or mail order delivery options, prior authorization maintenance, and financial assistance programs as applicable. The patient was also provided with contact information for the pharmacy team.     Monisha Gardner is  treated for clinical ASCVD/hyperlipidemia and currently takes Repatha 140mg every 14 days. (Patient has been on Repatha for years in the past). In the past, Pt has tried Zetia, Livalo, Crestor, Lipitor, and Zocor. Patient is statin intolerant due to myalgias. The patient denies any allergies to latex.        Insurance Coverage & Financial Support  [employer]     Relevant Past Medical History and Comorbidities  Relevant medical history and concomitant health conditions were discussed with the patient. The patient's chart has been reviewed for relevant past medical history and comorbid conditions and updated as necessary.  Past Medical History:   Diagnosis Date    Anxiety     Arthritis     Diabetes mellitus     Diverticulosis     GERD (gastroesophageal reflux disease)     Gout     Hyperlipidemia     Hypertension     Myocardial infarction      Social History     Socioeconomic History    Marital status:     Number of children: 2    Years of education: 12   Tobacco Use    Smoking status: Never     Passive exposure: Never    Smokeless tobacco: Never   Vaping Use     Vaping status: Never Used   Substance and Sexual Activity    Alcohol use: No    Drug use: No    Sexual activity: Defer       Problem list reviewed by Lyndsay Grissom PharmD on 11/27/2024 at  3:13 PM    Allergies  Known allergies and reactions were discussed with the patient. The patient's chart has been reviewed for  allergy information and updated as necessary.   Allergies   Allergen Reactions    Ciprofloxacin Anaphylaxis    Penicillins Shortness Of Breath    Statins Myalgia    Fish Oil [Omega-3 Fatty Acids] Nausea And Vomiting    Lyrica [Pregabalin] Mental Status Change    Wellbutrin [Bupropion] Mental Status Change       Allergies reviewed by Lyndsay Grissom, Nathanael on 11/27/2024 at  3:13 PM    Relevant Laboratory Values  Relevant laboratory values were discussed with the patient. The following specialty medication dose adjustment(s) are recommended: See plan, if applicable   Lab Results   Component Value Date    CHOL 120 10/11/2024    CHLPL 227 (H) 11/12/2021    TRIG 252 (H) 10/11/2024    HDL 49 10/11/2024    LDL 33 10/11/2024       Current Medication List  This medication list has been reviewed with the patient and evaluated for any interactions or necessary modifications/recommendations, and updated to include all prescription medications, OTC medications, and supplements the patient is currently taking.  This list reflects what is contained in the patient's profile, which has also been marked as reviewed to communicate to other providers it is the most up to date version of the patient's current medication therapy.     Current Outpatient Medications:     allopurinol (ZYLOPRIM) 100 MG tablet, TAKE 1 TABLET BY MOUTH TWICE DAILY, Disp: 180 tablet, Rfl: 3    amLODIPine (NORVASC) 2.5 MG tablet, TAKE 1 TABLET BY MOUTH DAILY, Disp: 90 tablet, Rfl: 1    aspirin 81 MG EC tablet, Take 1 tablet by mouth Daily., Disp: 100 tablet, Rfl: 3    clopidogrel (PLAVIX) 75 MG tablet, Take 1 tablet by mouth Daily., Disp: 90  tablet, Rfl: 3    Evolocumab (REPATHA) solution auto-injector SureClick injection, Inject 1 mL under the skin into the appropriate area as directed Every 14 (Fourteen) Days., Disp: 2 mL, Rfl: 5    fenofibrate 160 MG tablet, TAKE 1 TABLET BY MOUTH EVERY DAY, Disp: 90 tablet, Rfl: 1    FLUoxetine (PROzac) 40 MG capsule, Take 1 capsule by mouth Daily., Disp: 90 capsule, Rfl: 1    gabapentin (NEURONTIN) 600 MG tablet, Take 1 tablet by mouth 2 (Two) Times a Day., Disp: 60 tablet, Rfl: 3    isosorbide mononitrate (IMDUR) 30 MG 24 hr tablet, Take 1 tablet by mouth Daily., Disp: 90 tablet, Rfl: 1    losartan (COZAAR) 100 MG tablet, Take 1 tablet by mouth Daily., Disp: 30 tablet, Rfl: 5    metoprolol tartrate (LOPRESSOR) 100 MG tablet, TAKE 1 TABLET BY MOUTH TWICE DAILY, Disp: 180 tablet, Rfl: 3    pantoprazole (PROTONIX) 40 MG EC tablet, Take 1 tablet by mouth Daily., Disp: 90 tablet, Rfl: 3    ranolazine (Ranexa) 500 MG 12 hr tablet, Take 1 tablet by mouth 2 (Two) Times a Day., Disp: 60 tablet, Rfl: 5    rOPINIRole (REQUIP) 2 MG tablet, Take 1 tablet by mouth Every Night., Disp: 90 tablet, Rfl: 3    Semaglutide, 2 MG/DOSE, (OZEMPIC) 8 MG/3ML solution pen-injector, Inject 2 mg under the skin into the appropriate area as directed 1 (One) Time Per Week., Disp: 3 mL, Rfl: 5    Medicines reviewed by Lyndsay Grissom, PharmD on 11/27/2024 at  3:13 PM    Drug Interactions  None with repatha    Adherence and Self-Administration  Adherence related to the patient's specialty therapy was discussed with the patient. The Adherence segment of this outreach has been reviewed and updated.     Is there a concern with patient's ability to self administer the medication correctly and without issue?: No  Were any potential barriers to adherence identified during the initial assessment or patient education?: No  Are there any concerns regarding the patient's understanding of the importance of medication adherence?: No  Methods for Supporting  Patient Adherence and/or Self-Administration: see plan, if applicable     Open Medication Therapy Problems  No medication therapy recommendations to display    Goals of Therapy  Goals related to the patient's specialty therapy were discussed with the patient. The Patient Goals segment of this outreach has been reviewed and updated.   Goals Addressed Today        Specialty Pharmacy General Goal      LDL < 55 mg/dl    11/27/24 [place]: LDL reduced from 101 to 33 mg/dl on 10/11/24 labs              Medication Assessment & Plan  Medication Therapy Changes: Patient started today on repatha 140 mg SC every 2 weeks.   Injection training and medication education provided.   Welcome information and patient satisfaction survey to be sent by specialty pharmacy team with patient's initial fill.  Related Plans, Therapy Recommendations, or Therapy Problems to Be Addressed: none  LDL at goal   Patient will continue regular follow-up with cardiology. Next 2/7/2025  Patient will follow up with specialty pharmacy mail-out services for next injection. Care Coordinator to set up future refill outreaches, coordinate prescription delivery, and escalate clinical questions to pharmacist.  Pharmacist to perform regular assessments no more than (6) months from the previous assessment. Will follow-up in 6 months, or sooner if needed.    Initial Education Provided for Specialty Medication  The patient has been provided with the following education and any applicable administration techniques (i.e. self-injection) have been demonstrated for the therapies indicated. All questions and concerns have been addressed prior to the patient receiving the medication, and the patient has verbalized comprehension of the education and any materials provided. Additional patient education shall be provided and documented upon request by the patient, provider, or payer.    REPATHA® (evolocumab)  Medication Expectations   Why am I taking this medication? You are  taking Repatha to lower your “bad” cholesterol (LDL-C). This medication can be used in adults with high blood cholesterol including primary hyperlipidemia and familial hypercholesterolemia.    What should I expect while on this medication? You should expect to see your cholesterol improve over time. Specifically, you should see your LDL-C decrease.    How does the medication work? Repatha works by blocking a protein called PCSK9 that contributes to high levels of bad cholesterol. It helps increase your liver's ability to remove bad cholesterol from your blood.     How long will I be on this medication for? The amount of time you will be on this medication will be determined by your doctor based on your cholesterol and/or your risk of having a cardiac event. You will most likely be on this medication or another cholesterol medication throughout your lifetime. Do not abruptly stop this medication without talking to your doctor first.    How do I take this medication? Take as directed on your prescription label. Repatha is injected under the skin (subcutaneously) of your stomach, thigh, or upper arm. This medication is usually given one or twice a month.   What are some possible side effects? The most common side effects of Repatha include redness, itching, swelling, or pain/tenderness at the injection site, symptoms of the common cold, flu or flu-like symptoms or back pain.    What happens if I miss a dose? If you miss a dose, take it as soon as you remember if it is within 7 days from the usual day of administration then resume your original schedule. If it is beyond 7 days and you use the bridgett-2-week dose, skip the missed dose and resume your normal dosing schedule.If it is beyond 7 days and you use the once-monthly dose, inject the dose and start a new schedule based on that date.      Medication Safety   What are things I should warn my doctor immediately about? Talk to your doctor if you are pregnant, planning to  become pregnant, or breastfeeding. Stop the medication and tell your doctor or seek emergency medical help if you notice any signs/symptoms of an allergic reaction (severe rash, redness, hives, severe itching, trouble breathing, or swelling of the face, lips, or tongue). If you have a rubber or latex allergy, you should not use the Repatha SureClick® Autoinjector pen or the prefilled syringe, please notify your doctor or pharmacist.   What are things that I should be cautious of? Be cautious of any side effects from this medication. Talk to your doctor if any new ones develop or aren't getting better.   What are some medications that can interact with this one? There are no known significant drug interactions with Repatha. Always tell your doctor or pharmacist immediately if you start taking any new medications, including over-the-counter medications, vitamins, and herbal supplements.      Medication Storage/Handling   How should I handle this medication? Do not shake or expose the pens, cartridges, or syringes to extreme heat or direct sunlight. Keep this medication out of reach of pets/children. Allow medication to warm at room temperature prior to administration.   How does this medication need to be stored? Store unused pens, cartridges, or syringes in the refrigerator in the original cartons to protect from light. If needed, Repatha may be kept at room temperature in the original carton for up to 30 days. Do not freeze.    How should I dispose of this medication? All the Repatha devices are single-dose and should be discarded in a sharps container after use. If your doctor decides to stop this medication, take to your local police station for proper disposal. Some pharmacies also have take-back bins for medication drop-off.      Resources/Support   How can I remind myself to take this medication? You can download reminder apps to help you manage your refills. You may also set an alarm on your phone to remind  you to take your dose.    Is financial support available?  [employer] can provide co-pay cards if you have commercial insurance or patient assistance if you have Medicare or no insurance.    Which vaccines are recommended for me? Talk to your doctor about these vaccines: Flu, Coronavirus (COVID-19), Pneumococcal (pneumonia), Tdap, Hepatitis B, Zoster (shingles)         Attestation  Therapeutic appropriateness: Appropriate   I attest the patient was actively involved in and has agreed to the above plan of care. If the prescribed therapy is at any point deemed not appropriate based on the current or future assessments, a consultation will be initiated with the patient's specialty care provider to determine the best course of action. The revised plan of therapy will be documented along with any required assessments and/or additional patient education provided.     Lyndsay Grissom, PharmD  11/27/2024  15:16 EST

## 2024-12-05 ENCOUNTER — OFFICE VISIT (OUTPATIENT)
Dept: FAMILY MEDICINE CLINIC | Facility: CLINIC | Age: 64
End: 2024-12-05
Payer: COMMERCIAL

## 2024-12-05 VITALS
OXYGEN SATURATION: 95 % | HEART RATE: 77 BPM | HEIGHT: 64 IN | WEIGHT: 152.6 LBS | BODY MASS INDEX: 26.05 KG/M2 | TEMPERATURE: 98.2 F | SYSTOLIC BLOOD PRESSURE: 128 MMHG | DIASTOLIC BLOOD PRESSURE: 74 MMHG

## 2024-12-05 DIAGNOSIS — E11.43 TYPE 2 DIABETES MELLITUS WITH DIABETIC AUTONOMIC NEUROPATHY, WITH LONG-TERM CURRENT USE OF INSULIN: Chronic | ICD-10-CM

## 2024-12-05 DIAGNOSIS — J01.00 ACUTE NON-RECURRENT MAXILLARY SINUSITIS: Primary | ICD-10-CM

## 2024-12-05 DIAGNOSIS — G25.81 RLS (RESTLESS LEGS SYNDROME): Chronic | ICD-10-CM

## 2024-12-05 DIAGNOSIS — Z79.4 TYPE 2 DIABETES MELLITUS WITH DIABETIC AUTONOMIC NEUROPATHY, WITH LONG-TERM CURRENT USE OF INSULIN: Chronic | ICD-10-CM

## 2024-12-05 DIAGNOSIS — Z79.4 TYPE 2 DIABETES MELLITUS WITH HYPERGLYCEMIA, WITH LONG-TERM CURRENT USE OF INSULIN: Chronic | ICD-10-CM

## 2024-12-05 DIAGNOSIS — E11.65 TYPE 2 DIABETES MELLITUS WITH HYPERGLYCEMIA, WITH LONG-TERM CURRENT USE OF INSULIN: Chronic | ICD-10-CM

## 2024-12-05 DIAGNOSIS — R11.0 NAUSEA: ICD-10-CM

## 2024-12-05 LAB
ALBUMIN SERPL-MCNC: 4.5 G/DL (ref 3.5–5.2)
ALBUMIN/GLOB SERPL: 2 G/DL
ALP SERPL-CCNC: 65 U/L (ref 39–117)
ALT SERPL W P-5'-P-CCNC: 16 U/L (ref 1–33)
ANION GAP SERPL CALCULATED.3IONS-SCNC: 10.7 MMOL/L (ref 5–15)
AST SERPL-CCNC: 27 U/L (ref 1–32)
BASOPHILS # BLD AUTO: 0.07 10*3/MM3 (ref 0–0.2)
BASOPHILS NFR BLD AUTO: 0.9 % (ref 0–1.5)
BILIRUB SERPL-MCNC: 0.4 MG/DL (ref 0–1.2)
BUN SERPL-MCNC: 16 MG/DL (ref 8–23)
BUN/CREAT SERPL: 10.9 (ref 7–25)
CALCIUM SPEC-SCNC: 9.6 MG/DL (ref 8.6–10.5)
CHLORIDE SERPL-SCNC: 103 MMOL/L (ref 98–107)
CO2 SERPL-SCNC: 27.3 MMOL/L (ref 22–29)
CREAT SERPL-MCNC: 1.47 MG/DL (ref 0.57–1)
DEPRECATED RDW RBC AUTO: 42.9 FL (ref 37–54)
EGFRCR SERPLBLD CKD-EPI 2021: 40 ML/MIN/1.73
EOSINOPHIL # BLD AUTO: 0.14 10*3/MM3 (ref 0–0.4)
EOSINOPHIL NFR BLD AUTO: 1.8 % (ref 0.3–6.2)
ERYTHROCYTE [DISTWIDTH] IN BLOOD BY AUTOMATED COUNT: 13.9 % (ref 12.3–15.4)
GLOBULIN UR ELPH-MCNC: 2.2 GM/DL
GLUCOSE SERPL-MCNC: 101 MG/DL (ref 65–99)
HCT VFR BLD AUTO: 38.4 % (ref 34–46.6)
HGB BLD-MCNC: 12.7 G/DL (ref 12–15.9)
IMM GRANULOCYTES # BLD AUTO: 0.05 10*3/MM3 (ref 0–0.05)
IMM GRANULOCYTES NFR BLD AUTO: 0.6 % (ref 0–0.5)
LYMPHOCYTES # BLD AUTO: 3.49 10*3/MM3 (ref 0.7–3.1)
LYMPHOCYTES NFR BLD AUTO: 45.1 % (ref 19.6–45.3)
MCH RBC QN AUTO: 27.9 PG (ref 26.6–33)
MCHC RBC AUTO-ENTMCNC: 33.1 G/DL (ref 31.5–35.7)
MCV RBC AUTO: 84.2 FL (ref 79–97)
MONOCYTES # BLD AUTO: 0.65 10*3/MM3 (ref 0.1–0.9)
MONOCYTES NFR BLD AUTO: 8.4 % (ref 5–12)
NEUTROPHILS NFR BLD AUTO: 3.34 10*3/MM3 (ref 1.7–7)
NEUTROPHILS NFR BLD AUTO: 43.2 % (ref 42.7–76)
NRBC BLD AUTO-RTO: 0 /100 WBC (ref 0–0.2)
PLATELET # BLD AUTO: 336 10*3/MM3 (ref 140–450)
PMV BLD AUTO: 9.2 FL (ref 6–12)
POTASSIUM SERPL-SCNC: 4.5 MMOL/L (ref 3.5–5.2)
PROT SERPL-MCNC: 6.7 G/DL (ref 6–8.5)
RBC # BLD AUTO: 4.56 10*6/MM3 (ref 3.77–5.28)
SODIUM SERPL-SCNC: 141 MMOL/L (ref 136–145)
WBC NRBC COR # BLD AUTO: 7.74 10*3/MM3 (ref 3.4–10.8)

## 2024-12-05 PROCEDURE — 85025 COMPLETE CBC W/AUTO DIFF WBC: CPT | Performed by: PHYSICIAN ASSISTANT

## 2024-12-05 PROCEDURE — 80053 COMPREHEN METABOLIC PANEL: CPT | Performed by: PHYSICIAN ASSISTANT

## 2024-12-05 PROCEDURE — 99214 OFFICE O/P EST MOD 30 MIN: CPT | Performed by: PHYSICIAN ASSISTANT

## 2024-12-05 PROCEDURE — 36415 COLL VENOUS BLD VENIPUNCTURE: CPT | Performed by: PHYSICIAN ASSISTANT

## 2024-12-05 RX ORDER — ONDANSETRON 4 MG/1
4 TABLET, FILM COATED ORAL EVERY 8 HOURS PRN
Qty: 30 TABLET | Refills: 0 | Status: SHIPPED | OUTPATIENT
Start: 2024-12-05

## 2024-12-05 RX ORDER — HYDROXYZINE PAMOATE 50 MG/1
50 CAPSULE ORAL 3 TIMES DAILY PRN
COMMUNITY
Start: 2024-10-06

## 2024-12-05 RX ORDER — DOXYCYCLINE 100 MG/1
100 CAPSULE ORAL 2 TIMES DAILY
Qty: 20 CAPSULE | Refills: 0 | Status: SHIPPED | OUTPATIENT
Start: 2024-12-05 | End: 2024-12-13

## 2024-12-05 RX ORDER — ACYCLOVIR 400 MG/1
TABLET ORAL
COMMUNITY
Start: 2024-11-15

## 2024-12-05 RX ORDER — GABAPENTIN 600 MG/1
600 TABLET ORAL 2 TIMES DAILY
Qty: 60 TABLET | Refills: 2 | Status: SHIPPED | OUTPATIENT
Start: 2024-12-05

## 2024-12-05 RX ORDER — DEXTROMETHORPHAN HYDROBROMIDE AND PROMETHAZINE HYDROCHLORIDE 15; 6.25 MG/5ML; MG/5ML
5 SYRUP ORAL 4 TIMES DAILY PRN
Qty: 240 ML | Refills: 0 | Status: SHIPPED | OUTPATIENT
Start: 2024-12-05

## 2024-12-05 NOTE — PROGRESS NOTES
"Subjective   Monisha Gardner is a 60 y.o. female.       Chief Complaint -dysuria    History of Present Illness -    ROS    Dysuria-  Patient complains of moderate to severe burning pain with urination and associated hematuria for the past week.    Polyneuropathy-  Patient has been seen by neurologist, Dr. Cao.  She had nerve conduction study since the last visit.  I received a note from Dr. Cao stating that she has small fiber polyneuropathy.  He states that the nerve conduction study may not show this diagnosis but clinically she has small fiber polyneuropathy.    Diabetes mellitus-  Stable despite the acute illness with the use of Janumet, Ozempic, and Lantus.  She states that she does try to eat a lower carbohydrate diet.    Coronary artery disease-  Stable with risk factor modification including Repatha and fenofibrate    Generalized anxiety disorder/depression -  Stable with Cymbalta.  She denies any SI or HI.      The following portions of the patient's history were reviewed and updated as appropriate: allergies, current medications, past family history, past medical history, past social history, past surgical history and problem list.    Review of Systems   Constitutional: Positive for activity change and fatigue.   Genitourinary: Positive for dysuria, frequency, hematuria and urgency.   Musculoskeletal:        Leg pain-chronic   Psychiatric/Behavioral: The patient is nervous/anxious.        Objective  Vital signs:  /74   Pulse 87   Temp 97.3 °F (36.3 °C) (Temporal)   Ht 162.6 cm (64\")   Wt 74.8 kg (165 lb)   LMP  (LMP Unknown)   SpO2 99%   BMI 28.32 kg/m²     Physical Exam  Vitals and nursing note reviewed.   Constitutional:       Appearance: Normal appearance. She is well-developed. She is ill-appearing.      Comments: She appears tired   HENT:      Head: Normocephalic and atraumatic.      Right Ear: Tympanic membrane normal.      Left Ear: Tympanic membrane normal.      Nose: Nose normal.     " Anesthesia and endo report received. Pt arrouseable to verbal stimuli and moves all exttremities    Mouth/Throat:      Mouth: Mucous membranes are moist.      Pharynx: No oropharyngeal exudate or posterior oropharyngeal erythema.   Eyes:      Extraocular Movements: Extraocular movements intact.      Conjunctiva/sclera: Conjunctivae normal.   Neck:      Thyroid: No thyromegaly.      Trachea: No tracheal deviation.   Cardiovascular:      Rate and Rhythm: Normal rate and regular rhythm.      Heart sounds: Normal heart sounds. No murmur heard.     Pulmonary:      Effort: Pulmonary effort is normal. No respiratory distress.      Breath sounds: Normal breath sounds. No wheezing.   Abdominal:      General: Bowel sounds are normal.      Palpations: Abdomen is soft.      Tenderness: There is no abdominal tenderness. There is no guarding.   Musculoskeletal:         General: No tenderness. Normal range of motion.      Cervical back: Normal range of motion and neck supple.   Lymphadenopathy:      Cervical: No cervical adenopathy.   Skin:     General: Skin is warm and dry.      Findings: No rash.   Neurological:      General: No focal deficit present.      Mental Status: She is alert and oriented to person, place, and time.   Psychiatric:         Mood and Affect: Mood normal.         Behavior: Behavior normal.         Thought Content: Thought content normal.         The following data was reviewed by: ANH Medina on 08/27/2021:  CMP    CMP 4/2/21   Glucose 154 (A)   BUN 26 (A)   Creatinine 1.52 (A)   eGFR Non  Am 35 (A)   eGFR African Am 42 (A)   Sodium 138   Potassium 5.0   Chloride 97 (A)   Calcium 10.8 (A)   Total Protein 7.1   Albumin 4.70   Globulin 2.4   Total Bilirubin 0.3   Alkaline Phosphatase 103   AST (SGOT) 98 (A)   ALT (SGPT) 75 (A)   (A) Abnormal value       Comments are available for some flowsheets but are not being displayed.           Lipid Panel    Lipid Panel 4/2/21   Total Cholesterol 226 (A)   Triglycerides 531 (A)   HDL Cholesterol 27 (A)   VLDL Cholesterol 92 (A)   LDL Cholesterol  107  (A)   (A) Abnormal value       Comments are available for some flowsheets but are not being displayed.           TSH    TSH 4/2/21   TSH 0.870           Most Recent A1C    HGBA1C Most Recent 4/2/21   Hemoglobin A1C 7.60 (A)   (A) Abnormal value       Comments are available for some flowsheets but are not being displayed.           Data reviewed: Urinalysis reviewed       Assessment/Plan     Diagnoses and all orders for this visit:    1. Cystitis with hematuria (Primary)  Comments:  Start Pyridium and Bactrim  Urine will be sent for culture  Advised increase water intake  RTC if symptoms not improved within 5 days  Orders:  -     sulfamethoxazole-trimethoprim (Bactrim DS) 800-160 MG per tablet; Take 1 tablet by mouth 2 (Two) Times a Day.  Dispense: 20 tablet; Refill: 0  -     phenazopyridine (Pyridium) 100 MG tablet; Take 1 tablet by mouth 3 (Three) Times a Day As Needed for Bladder Spasms.  Dispense: 30 tablet; Refill: 0  -     Urine Culture - Urine, Urine, Clean Catch  -     Urine Culture - Urine, Urine, Random Void      2. Small fiber polyneuropathy  Comments:  Increase Cymbalta 90 mg daily  Continue gabapentin  Discussed that this is a chronic issue  Plan to request nerve conduction study/EMG  Orders:  -     DULoxetine (CYMBALTA) 30 MG capsule; Take 3 capsules by mouth Daily.  Dispense: 90 capsule; Refill: 5    3. Anxiety and depression  Comments:  discontinue lexapro and hydroxizine due to hypersomnolence   Increase Cymbalta 90 mg daily due to polyneuropathy  Orders:  -     DULoxetine (CYMBALTA) 30 MG capsule; Take 3 capsules by mouth Daily.  Dispense: 90 capsule; Refill: 5    4. Type 2 diabetes mellitus with hyperglycemia, with long-term current use of insulin (CMS/[facility])  Comments:  Discussed glucose may be elevated during acute illness temporarily  Continue to monitor  Continue Janumet Ozempic and Lantus  Advised low carbohydrate diet    5. Coronary artery disease involving native coronary artery of native  heart with other form of angina pectoris (CMS/HCC)  Comments:  Continue risk factor modification including Repatha and fenofibrate            Patient was given instructions and counseling regarding his condition or for health maintenance advice. Please see specific information pulled into the AVS if appropriate      This document has been electronically signed by:  Kimmy Vela PA-C

## 2024-12-06 RX ORDER — RANOLAZINE 500 MG/1
500 TABLET, EXTENDED RELEASE ORAL 2 TIMES DAILY
Qty: 60 TABLET | Refills: 5 | Status: SHIPPED | OUTPATIENT
Start: 2024-12-06

## 2024-12-06 RX ORDER — BUPROPION HYDROCHLORIDE 150 MG/1
150 TABLET, EXTENDED RELEASE ORAL 2 TIMES DAILY
Qty: 180 TABLET | Refills: 0 | Status: SHIPPED | OUTPATIENT
Start: 2024-12-06

## 2024-12-10 NOTE — PROGRESS NOTES
"Chief Complaint -congestion    History of Present Illness -     Monisha Gardner is a 64 y.o. female.     Congestion-  Patient complains of nasal congestion with thick nasal discharge, dry cough and pressure in her ears for the past 5 days.  Minimal relief with Mucinex.  She does have associated nausea    Restless leg syndrome-  She complains of restless leg syndrome with severe leg cramps in bilateral legs.  Some relief with Requip    Diabetes mellitus type 2-  Stable with Lantus, Ozempic and low-carb diet      The following portions of the patient's history were reviewed and updated as appropriate: allergies, current medications, past family history, past medical history, past social history, past surgical history and problem list.        Objective  Vital signs:  /74 (BP Location: Left arm, Patient Position: Sitting, Cuff Size: Adult)   Pulse 77   Temp 98.2 °F (36.8 °C) (Oral)   Ht 162.6 cm (64\")   Wt 69.2 kg (152 lb 9.6 oz)   LMP  (LMP Unknown)   SpO2 95%   BMI 26.19 kg/m²     Physical Exam  Vitals and nursing note reviewed.   Constitutional:       General: She is not in acute distress.     Appearance: Normal appearance. She is well-developed. She is not diaphoretic.   HENT:      Head: Normocephalic and atraumatic.      Right Ear: Tympanic membrane, ear canal and external ear normal.      Left Ear: Tympanic membrane, ear canal and external ear normal.      Nose: Nose normal. Congestion present.      Mouth/Throat:      Mouth: Mucous membranes are moist.      Pharynx: Posterior oropharyngeal erythema present. No oropharyngeal exudate.      Comments: Tenderness noted over bilateral maxillary sinus areas  Eyes:      Extraocular Movements: Extraocular movements intact.      Conjunctiva/sclera: Conjunctivae normal.   Neck:      Thyroid: No thyromegaly.   Cardiovascular:      Rate and Rhythm: Normal rate and regular rhythm.      Heart sounds: Normal heart sounds. No murmur heard.  Pulmonary:      Effort: " Pulmonary effort is normal. No respiratory distress.      Breath sounds: Normal breath sounds. No wheezing or rales.   Musculoskeletal:         General: No tenderness.      Cervical back: Normal range of motion and neck supple.   Lymphadenopathy:      Cervical: Cervical adenopathy present.   Skin:     General: Skin is warm and dry.      Findings: No rash.   Neurological:      Mental Status: She is alert and oriented to person, place, and time.   Psychiatric:         Mood and Affect: Mood normal.         Behavior: Behavior normal.         Thought Content: Thought content normal.         The following data was reviewed by Kimmy Vela PA-C:         Lab Results   Component Value Date    BUN 16 12/05/2024    CREATININE 1.47 (H) 12/05/2024    EGFR 40.0 (L) 12/05/2024    ALT 16 12/05/2024    AST 27 12/05/2024    WBC 7.74 12/05/2024    HGB 12.7 12/05/2024    HCT 38.4 12/05/2024     12/05/2024    CHOL 120 10/11/2024    TRIG 252 (H) 10/11/2024    HDL 49 10/11/2024    LDL 33 10/11/2024    TSH 0.965 09/07/2024    HGBA1C 5.80 (H) 09/05/2024           Assessment & Plan     Diagnoses and all orders for this visit:    1. Acute non-recurrent maxillary sinusitis (Primary)  -     doxycycline (VIBRAMYCIN) 100 MG capsule; Take 1 capsule by mouth 2 (Two) Times a Day for 10 days.  Dispense: 20 capsule; Refill: 0  -     promethazine-dextromethorphan (PROMETHAZINE-DM) 6.25-15 MG/5ML syrup; Take 5 mL by mouth 4 (Four) Times a Day As Needed for Cough.  Dispense: 240 mL; Refill: 0    2. RLS (restless legs syndrome)  Comments:  Continue Requip  Lab work ordered to check potassium for further evaluation  Orders:  -     Comprehensive Metabolic Panel  -     CBC & Differential    3. Type 2 diabetes mellitus with hyperglycemia, with long-term current use of insulin  Comments:  Continue Lantus and Ozempic  Advised a low carbohydrate diabetic diet  Orders:  -     Semaglutide, 2 MG/DOSE, (OZEMPIC) 8 MG/3ML solution pen-injector; Inject 2 mg  under the skin into the appropriate area as directed 1 (One) Time Per Week.  Dispense: 3 mL; Refill: 5    4. Nausea  -     ondansetron (Zofran) 4 MG tablet; Take 1 tablet by mouth Every 8 (Eight) Hours As Needed for Nausea or Vomiting.  Dispense: 30 tablet; Refill: 0    5. Type 2 diabetes mellitus with diabetic autonomic neuropathy, with long-term current use of insulin  Comments:  Continue Ozempic 2 mg weekly  Advised a low carbohydrate diabetic diet  Orders:  -     gabapentin (NEURONTIN) 600 MG tablet; Take 1 tablet by mouth 2 (Two) Times a Day.  Dispense: 60 tablet; Refill: 2                   Patient was given instructions and counseling regarding his condition or for health maintenance advice. Please see specific information pulled into the AVS if appropriate      This document has been electronically signed by:  Kimmy Vela PA-C

## 2024-12-10 NOTE — PATIENT INSTRUCTIONS
Carbohydrate Counting for Diabetes Mellitus, Adult  Carbohydrate counting is a method of keeping track of how many carbohydrates you eat. Eating carbohydrates increases the amount of sugar (glucose) in the blood. Counting how many carbohydrates you eat improves how well you manage your blood glucose. This, in turn, helps you manage your diabetes.  Carbohydrates are measured in grams (g) per serving. It is important to know how many carbohydrates (in grams or by serving size) you can have in each meal. This is different for every person. A dietitian can help you make a meal plan and calculate how many carbohydrates you should have at each meal and snack.  What foods contain carbohydrates?  Carbohydrates are found in the following foods:  Grains, such as breads and cereals.  Dried beans and soy products.  Starchy vegetables, such as potatoes, peas, and corn.  Fruit and fruit juices.  Milk and yogurt.  Sweets and snack foods, such as cake, cookies, candy, chips, and soft drinks.  How do I count carbohydrates in foods?  There are two ways to count carbohydrates in food. You can read food labels or learn standard serving sizes of foods. You can use either of these methods or a combination of both.  Using the Nutrition Facts label  The Nutrition Facts list is included on the labels of almost all packaged foods and beverages in the [place]. It includes:  The serving size.  Information about nutrients in each serving, including the grams of carbohydrate per serving.  To use the Nutrition Facts, decide how many servings you will have. Then, multiply the number of servings by the number of carbohydrates per serving. The resulting number is the total grams of carbohydrates that you will be having.  Learning the standard serving sizes of foods  When you eat carbohydrate foods that are not packaged or do not include Nutrition Facts on the label, you need to measure the servings in order to count the grams of  carbohydrates.  Measure the foods that you will eat with a food scale or measuring cup, if needed.  Decide how many standard-size servings you will eat.  Multiply the number of servings by 15. For foods that contain carbohydrates, one serving equals 15 g of carbohydrates.  For example, if you eat 2 cups or 10 oz (300 g) of strawberries, you will have eaten 2 servings and 30 g of carbohydrates (2 servings x 15 g = 30 g).  For foods that have more than one food mixed, such as soups and casseroles, you must count the carbohydrates in each food that is included.  The following list contains standard serving sizes of common carbohydrate-rich foods. Each of these servings has about 15 g of carbohydrates:  1 slice of bread.  1 six-inch (15 cm) tortilla.  ? cup or 2 oz (53 g) cooked rice or pasta.  ½ cup or 3 oz (85 g) cooked or canned, drained and rinsed beans or lentils.  ½ cup or 3 oz (85 g) starchy vegetable, such as peas, corn, or squash.  ½ cup or 4 oz (120 g) hot cereal.  ½ cup or 3 oz (85 g) boiled or mashed potatoes, or ¼ or 3 oz (85 g) of a large baked potato.  ½ cup or 4 fl oz (118 mL) fruit juice.  1 cup or 8 fl oz (237 mL) milk.  1 small or 4 oz (106 g) apple.  ½ or 2 oz (63 g) of a medium banana.  1 cup or 5 oz (150 g) strawberries.  3 cups or 1 oz (28.3 g) popped popcorn.  What is an example of carbohydrate counting?  To calculate the grams of carbohydrates in this sample meal, follow the steps shown below.  Sample meal  3 oz (85 g) chicken breast.  ? cup or 4 oz (106 g) brown rice.  ½ cup or 3 oz (85 g) corn.  1 cup or 8 fl oz (237 mL) milk.  1 cup or 5 oz (150 g) strawberries with sugar-free whipped topping.  Carbohydrate calculation  Identify the foods that contain carbohydrates:  Rice.  Corn.  Milk.  Strawberries.  Calculate how many servings you have of each food:  2 servings rice.  1 serving corn.  1 serving milk.  1 serving strawberries.  Multiply each number of servings by 15  servings rice x 15  g = 30 g.  1 serving corn x 15 g = 15 g.  1 serving milk x 15 g = 15 g.  1 serving strawberries x 15 g = 15 g.  Add together all of the amounts to find the total grams of carbohydrates eaten:  30 g + 15 g + 15 g + 15 g = 75 g of carbohydrates total.  What are tips for following this plan?  Shopping  Develop a meal plan and then make a shopping list.  Buy fresh and frozen vegetables, fresh and frozen fruit, dairy, eggs, beans, lentils, and whole grains.  Look at food labels. Choose foods that have more fiber and less sugar.  Avoid processed foods and foods with added sugars.  Meal planning  Aim to have the same number of grams of carbohydrates at each meal and for each snack time.  Plan to have regular, balanced meals and snacks.  Where to find more information  American Diabetes Association: diabetes.org  Centers for Disease Control and Prevention: cdc.gov  Academy of Nutrition and Dietetics: eatright.org  Association of Diabetes Care & Education Specialists: diabeteseducator.org  Summary  Carbohydrate counting is a method of keeping track of how many carbohydrates you eat.  Eating carbohydrates increases the amount of sugar (glucose) in your blood.  Counting how many carbohydrates you eat improves how well you manage your blood glucose. This helps you manage your diabetes.  A dietitian can help you make a meal plan and calculate how many carbohydrates you should have at each meal and snack.  This information is not intended to replace advice given to you by your health care provider. Make sure you discuss any questions you have with your health care provider.  Document Revised: 07/21/2021 Document Reviewed: 07/21/2021  Elsevier Patient Education © 2024 [employer]

## 2024-12-11 ENCOUNTER — TELEPHONE (OUTPATIENT)
Dept: PEDIATRICS | Facility: OTHER | Age: 64
End: 2024-12-11
Payer: COMMERCIAL

## 2024-12-11 ENCOUNTER — TELEPHONE (OUTPATIENT)
Dept: FAMILY MEDICINE CLINIC | Facility: CLINIC | Age: 64
End: 2024-12-11

## 2024-12-11 NOTE — TELEPHONE ENCOUNTER
Caller: Monisha Gardner    Relationship: Self    Best call back number: [phone]     What was the call regarding: PATIENT STATED THAT SHE HAS BEEN EXPERIENCING THIS COUGH FOR 9 DAYS NOW , SHE WAS SEE ON 12/5/24  BUT ISNT ANY BETTER AND WOULD LIKE A CALL BACK TO DISCUSS WHAT NEEDS TO BE DONE OR WHAT NEEDS TO BE SENT IN

## 2024-12-12 DIAGNOSIS — R05.1 ACUTE COUGH: Primary | ICD-10-CM

## 2024-12-12 RX ORDER — ALBUTEROL SULFATE 90 UG/1
2 INHALANT RESPIRATORY (INHALATION) EVERY 4 HOURS PRN
Qty: 18 G | Refills: 0 | Status: SHIPPED | OUTPATIENT
Start: 2024-12-12

## 2024-12-12 NOTE — TELEPHONE ENCOUNTER
Inform the patient that I sent prescription for albuterol inhaler to the pharmacy.  I have also put in orders for her to go to [facility] and get chest x-ray.  I will be glad to see her in office or discuss if symptoms are not improving.

## 2024-12-12 NOTE — TELEPHONE ENCOUNTER
Name: Monisha Gardnery      Relationship: Self      Best Callback Number: [phone]       HUB PROVIDED THE RELAY MESSAGE FROM THE OFFICE      PATIENT: VOICED UNDERSTANDING AND HAS NO FURTHER QUESTIONS AT THIS TIME    ADDITIONAL INFORMATION:

## 2024-12-13 ENCOUNTER — OFFICE VISIT (OUTPATIENT)
Dept: FAMILY MEDICINE CLINIC | Facility: CLINIC | Age: 64
End: 2024-12-13
Payer: COMMERCIAL

## 2024-12-13 VITALS
OXYGEN SATURATION: 98 % | SYSTOLIC BLOOD PRESSURE: 120 MMHG | HEART RATE: 52 BPM | TEMPERATURE: 97.8 F | DIASTOLIC BLOOD PRESSURE: 72 MMHG | BODY MASS INDEX: 25.78 KG/M2 | HEIGHT: 64 IN | WEIGHT: 151 LBS

## 2024-12-13 DIAGNOSIS — J40 BRONCHITIS: Primary | ICD-10-CM

## 2024-12-13 DIAGNOSIS — I25.110 CORONARY ARTERY DISEASE INVOLVING NATIVE CORONARY ARTERY OF NATIVE HEART WITH UNSTABLE ANGINA PECTORIS: Chronic | ICD-10-CM

## 2024-12-13 DIAGNOSIS — K21.00 GASTROESOPHAGEAL REFLUX DISEASE WITH ESOPHAGITIS WITHOUT HEMORRHAGE: Chronic | ICD-10-CM

## 2024-12-13 RX ORDER — METHYLPREDNISOLONE ACETATE 80 MG/ML
80 INJECTION, SUSPENSION INTRA-ARTICULAR; INTRALESIONAL; INTRAMUSCULAR; SOFT TISSUE ONCE
Status: COMPLETED | OUTPATIENT
Start: 2024-12-13 | End: 2024-12-13

## 2024-12-13 RX ADMIN — METHYLPREDNISOLONE ACETATE 80 MG: 80 INJECTION, SUSPENSION INTRA-ARTICULAR; INTRALESIONAL; INTRAMUSCULAR; SOFT TISSUE at 13:32

## 2024-12-13 NOTE — PROGRESS NOTES
"Chief Complaint -cough    History of Present Illness -     Monisha Gardner is a 64 y.o. female.     Cough-  Patient complains of productive cough.  She has recently taken doxycycline for 10 days.  She reports that her sinus symptoms have significantly improved but she continued to have some drainage and now the congestion in her chest with the cough.  She reports that she is unable to take Phenergan DM during the day because of sleepiness.    Coronary artery disease-  Stable with risk factor modification including aspirin and Plavix    Gastroesophageal reflux disease-  Stable with pantoprazole and dietary modification      The following portions of the patient's history were reviewed and updated as appropriate: allergies, current medications, past family history, past medical history, past social history, past surgical history and problem list.        Objective  Vital signs:  /72   Pulse 52   Temp 97.8 °F (36.6 °C) (Temporal)   Ht 162.6 cm (64\")   Wt 68.5 kg (151 lb)   LMP  (LMP Unknown)   SpO2 98%   BMI 25.92 kg/m²     Physical Exam  Vitals and nursing note reviewed.   Constitutional:       Appearance: Normal appearance. She is well-developed.   Eyes:      Extraocular Movements: Extraocular movements intact.      Conjunctiva/sclera: Conjunctivae normal.   Cardiovascular:      Rate and Rhythm: Normal rate and regular rhythm.      Heart sounds: Normal heart sounds. No murmur heard.  Pulmonary:      Effort: Pulmonary effort is normal. No respiratory distress.      Breath sounds: No wheezing.      Comments: Diffuse bronchovesicular breath sounds noted bilaterally  Musculoskeletal:         General: No tenderness.   Skin:     General: Skin is warm and dry.      Findings: No rash.   Neurological:      Mental Status: She is alert and oriented to person, place, and time.   Psychiatric:         Mood and Affect: Mood normal.         Behavior: Behavior normal.         Thought Content: Thought content normal. "         The following data was reviewed by Kimmy Vela PA-C:         Lab Results   Component Value Date    BUN 16 12/05/2024    CREATININE 1.47 (H) 12/05/2024    EGFR 40.0 (L) 12/05/2024    ALT 16 12/05/2024    AST 27 12/05/2024    WBC 7.74 12/05/2024    HGB 12.7 12/05/2024    HCT 38.4 12/05/2024     12/05/2024    CHOL 120 10/11/2024    TRIG 252 (H) 10/11/2024    HDL 49 10/11/2024    LDL 33 10/11/2024    TSH 0.965 09/07/2024    HGBA1C 5.80 (H) 09/05/2024           Assessment & Plan     Diagnoses and all orders for this visit:    1. Bronchitis (Primary)  Comments:  Advised Delsym cough syrup during day  Ordered chest x-ray for further evaluation  Rx written albuterol HFA to be used as needed  Orders:  -     methylPREDNISolone acetate (DEPO-medrol) injection 80 mg    2. Coronary artery disease involving native coronary artery of native heart with unstable angina pectoris  Comments:  Continue risk factor modification including aspirin and Plavix    3. Gastroesophageal reflux disease with esophagitis without hemorrhage  Comments:  Continue pantoprazole  Advised to avoid known trigger foods                   Patient was given instructions and counseling regarding his condition or for health maintenance advice. Please see specific information pulled into the AVS if appropriate      This document has been electronically signed by:  Kimmy Vela PA-C

## 2024-12-30 DIAGNOSIS — E78.2 MIXED HYPERLIPIDEMIA: Chronic | ICD-10-CM

## 2024-12-30 RX ORDER — METOPROLOL TARTRATE 100 MG/1
100 TABLET ORAL 2 TIMES DAILY
Qty: 180 TABLET | Refills: 0 | Status: SHIPPED | OUTPATIENT
Start: 2024-12-30

## 2024-12-30 RX ORDER — FENOFIBRATE 160 MG/1
160 TABLET ORAL DAILY
Qty: 90 TABLET | Refills: 0 | Status: SHIPPED | OUTPATIENT
Start: 2024-12-30

## 2024-12-30 RX ORDER — HYDROXYZINE PAMOATE 50 MG/1
50 CAPSULE ORAL 3 TIMES DAILY PRN
Qty: 270 CAPSULE | Refills: 0 | Status: SHIPPED | OUTPATIENT
Start: 2024-12-30

## 2024-12-30 RX ORDER — AMLODIPINE BESYLATE 2.5 MG/1
2.5 TABLET ORAL DAILY
Qty: 90 TABLET | Refills: 0 | Status: SHIPPED | OUTPATIENT
Start: 2024-12-30

## 2025-01-06 RX ORDER — ISOSORBIDE MONONITRATE 30 MG/1
30 TABLET, EXTENDED RELEASE ORAL DAILY
Qty: 90 TABLET | Refills: 1 | Status: SHIPPED | OUTPATIENT
Start: 2025-01-06

## 2025-01-24 DIAGNOSIS — K21.00 GASTROESOPHAGEAL REFLUX DISEASE WITH ESOPHAGITIS WITHOUT HEMORRHAGE: Chronic | ICD-10-CM

## 2025-01-24 RX ORDER — PANTOPRAZOLE SODIUM 40 MG/1
40 TABLET, DELAYED RELEASE ORAL DAILY
Qty: 90 TABLET | Refills: 3 | Status: SHIPPED | OUTPATIENT
Start: 2025-01-24

## 2025-01-24 NOTE — TELEPHONE ENCOUNTER
Caller: Monisha Gardner    Relationship: Self    Best call back number: [phone]     Requested Prescriptions:   Requested Prescriptions     Pending Prescriptions Disp Refills    pantoprazole (PROTONIX) 40 MG EC tablet 90 tablet 3     Sig: Take 1 tablet by mouth Daily.        Pharmacy where request should be sent: [id number] [address] AT [facility] [address] - [phone] PH - [phone] FX     Last office visit with prescribing clinician: 12/13/2024   Last telemedicine visit with prescribing clinician: 9/19/2024   Next office visit with prescribing clinician: 3/6/2025     Additional details provided by patient: PATIENT IS OUT    Does the patient have less than a 3 day supply:  [x] Yes  [] No    Would you like a call back once the refill request has been completed: [] Yes [x] No    If the office needs to give you a call back, can they leave a voicemail: [] Yes [x] No    Mark Joseph Rep   01/24/25 10:56 EST

## 2025-02-03 NOTE — TELEPHONE ENCOUNTER
Patient called returning your call.  Patient states you can leave a detailed message on voicemail if she is unable to answer.   Routing to triage team to determine if this medication is one we prescribe at our office

## 2025-02-07 ENCOUNTER — SPECIALTY PHARMACY (OUTPATIENT)
Dept: PHARMACY | Facility: HOSPITAL | Age: 65
End: 2025-02-07
Payer: COMMERCIAL

## 2025-02-07 ENCOUNTER — OFFICE VISIT (OUTPATIENT)
Dept: CARDIOLOGY | Facility: CLINIC | Age: 65
End: 2025-02-07
Payer: COMMERCIAL

## 2025-02-07 VITALS
HEART RATE: 72 BPM | OXYGEN SATURATION: 99 % | HEIGHT: 64 IN | SYSTOLIC BLOOD PRESSURE: 124 MMHG | DIASTOLIC BLOOD PRESSURE: 70 MMHG | WEIGHT: 151.8 LBS | BODY MASS INDEX: 25.92 KG/M2

## 2025-02-07 DIAGNOSIS — Z01.810 PREOPERATIVE CARDIOVASCULAR EXAMINATION: ICD-10-CM

## 2025-02-07 DIAGNOSIS — I10 ESSENTIAL HYPERTENSION: ICD-10-CM

## 2025-02-07 DIAGNOSIS — E11.65 TYPE 2 DIABETES MELLITUS WITH HYPERGLYCEMIA, WITH LONG-TERM CURRENT USE OF INSULIN: ICD-10-CM

## 2025-02-07 DIAGNOSIS — N18.32 STAGE 3B CHRONIC KIDNEY DISEASE: ICD-10-CM

## 2025-02-07 DIAGNOSIS — I25.810 CORONARY ARTERY DISEASE INVOLVING CORONARY BYPASS GRAFT OF NATIVE HEART WITHOUT ANGINA PECTORIS: Primary | ICD-10-CM

## 2025-02-07 DIAGNOSIS — Z79.4 TYPE 2 DIABETES MELLITUS WITH HYPERGLYCEMIA, WITH LONG-TERM CURRENT USE OF INSULIN: ICD-10-CM

## 2025-02-07 DIAGNOSIS — E78.5 DYSLIPIDEMIA: ICD-10-CM

## 2025-02-07 NOTE — PROGRESS NOTES
Specialty Pharmacy Patient Management Program  Medication Management Clinic Refill Outreach      Monisha was contacted today regarding refills of her medication(s).    Specialty medication(s) and dose(s) confirmed: repatha sureclick    Refill Questions      Flowsheet Row Most Recent Value   Changes to allergies? No   Changes to medications? No   New conditions or infections since last clinic visit No   Unplanned office visit, urgent care, ED, or hospital admission in the last 4 weeks  No   How does patient/caregiver feel medication is working? Very good   Financial problems or insurance changes  No   Since the previous refill, were any specialty medication doses or scheduled injections missed or delayed?  No   Does this patient require a clinical escalation to a pharmacist? No          Delivery Questions      Flowsheet Row Most Recent Value   Delivery method [employer]   Delivery address verified with patient/caregiver? Yes   Delivery address Home   Number of medications in delivery 1   Medication(s) being filled and delivered Evolocumab (REPATHA)   Doses left of specialty medications 2   Copay verified? Yes   Copay amount $15.00   Ship Date 2/10/25   Delivery Date Selection 02/11/25   Signature Required No            Follow-Up: 28 days per patient request    Lyndsay Grissom, PharmD  2/7/2025  10:56 EST

## 2025-02-07 NOTE — PROGRESS NOTES
Subjective     Monisha Gardner is a 64 y.o. female.   Chief Complaint   Patient presents with    Coronary Artery Disease      History of Present Illness   History of Present Illness  Monisha Gardner is a 64-year-old female who presents to clinic today for cardiology follow-up.  She feels she is overall doing well and denies acute complaint. The patient presents for evaluation blood pressure management, cholesterol management, diabetes mellitus, and medication management.    She reports that her oral surgeon postponed her surgery as she was high risk. Despite this, the surgical team is willing to proceed with the operation once her risk improves. She is not experiencing any chest discomfort or respiratory issues. Her fatigue levels have improved, although she acknowledges a lack of physical activity. Her current medication regimen includes aspirin, Plavix, isosorbide, Ranexa, and Lopressor.    CAD currently on ASA, Plavix, Imdur, Ranexa, Lopressor and Repatha (statin intolerant).  Reports compliance with medicine. She denies chest pain, dyspnea, palpitations or syncope. She denies nitroglycerin use.  Cardiac cath on 6/20/2024 with 80% in-stent restenosis of proximal/ostial RCA stented with 3.5 x 38 mm VITALIY and reduced to no significant residual disease.  Nonobstructive disease in left coronary system and a calculated LVEF is 65%.     Hypertension reports that her blood pressure and heart rate are well-controlled at home.    She has undergone imaging studies for a thyroid nodule, which were reported as normal.    She expresses a desire to lose weight and has been on Ozempic for 10 years, which she reports as ineffective. She experiences diarrhea and cramping with Ozempic, necessitating frequent bathroom visits. Her blood glucose levels are well-controlled. She is going to discuss treatment options with her PCP.     Hyperlipidemia with statin intolerance. She is on cholesterol injections and believes her  cholesterol levels are good. She had her cholesterol checked in October 2024.     CKD and was previously following with nephrology and has re-established care with [facility].       MEDICATIONS  aspirin, Plavix, isosorbide, Ranexa, Lopressor, Ozempic, Repatha      Patient Active Problem List   Diagnosis    ASCVD (arteriosclerotic cardiovascular disease)    Essential hypertension    Dyslipidemia    Type 2 diabetes mellitus with hyperglycemia, with long-term current use of insulin    Body mass index (bmi) 30.0-30.9, adult    Osteoarthritis    Atypical angina    Elevated liver enzymes    Gastroesophageal reflux disease with esophagitis    Vitamin D deficiency    Hx of pancreatitis    Menopausal symptoms    Vitamin B 12 deficiency    Nocturnal leg cramps    Anxiety    Left arm weakness    Chronic gout of multiple sites    Squamous cell carcinoma in situ (SCCIS) of skin    Mixed hyperlipidemia    PVC (premature ventricular contraction)    RLS (restless legs syndrome)    Psychophysiological insomnia    Dry mouth    Type 2 diabetes mellitus with diabetic autonomic neuropathy, with long-term current use of insulin    Bone mass    Mass of mandible    Coronary artery disease    Abnormal nuclear stress test    CAD (coronary artery disease)    Falls    Closed fracture of coccyx with routine healing    Thyroid nodule     Past Medical History:   Diagnosis Date    Anxiety     Arthritis     Diabetes mellitus     Diverticulosis     GERD (gastroesophageal reflux disease)     Gout     Hyperlipidemia     Hypertension     Myocardial infarction      Past Surgical History:   Procedure Laterality Date    APPENDECTOMY      BREAST BIOPSY Right     benign    CARDIAC CATHETERIZATION      CARDIAC CATHETERIZATION N/A 6/20/2024    Procedure: Left Heart Cath;  Surgeon: Derrick Watts MD;  Location: [facility] CATH INVASIVE LOCATION;  Service: Cardiology;  Laterality: N/A;    CARDIAC CATHETERIZATION N/A 6/20/2024    Procedure: Stent VITALIY  coronary;  Surgeon: Derrick Watts MD;  Location: [facility] CATH INVASIVE LOCATION;  Service: Cardiology;  Laterality: N/A;     SECTION      CHOLECYSTECTOMY      CORONARY STENT PLACEMENT      Stenting of the RCA with 3.5 x 26 mm Resolute drug-eluting stent    HYSTERECTOMY      JOINT REPLACEMENT      TONSILLECTOMY      TUMOR REMOVAL  02/15/2024    FACIAL TUMOR       Family History   Problem Relation Age of Onset    Arthritis Mother     Heart disease Mother     Heart disease Sister     Vision loss Daughter     Heart disease Father     Heart disease Sister     Heart disease Sister     Heart disease Sister      Social History     Tobacco Use    Smoking status: Never     Passive exposure: Never    Smokeless tobacco: Never   Vaping Use    Vaping status: Never Used   Substance Use Topics    Alcohol use: No    Drug use: No         The following portions of the patient's history were reviewed and updated as appropriate: allergies, current medications, past family history, past medical history, past social history, past surgical history and problem list.    Allergies   Allergen Reactions    Ciprofloxacin Anaphylaxis    Penicillins Shortness Of Breath    Statins Myalgia    Fish Oil [Omega-3 Fatty Acids] Nausea And Vomiting    Fish Oil Nausea Only    Lyrica [Pregabalin] Mental Status Change    Wellbutrin [Bupropion] Mental Status Change         Current Outpatient Medications:     allopurinol (ZYLOPRIM) 100 MG tablet, TAKE 1 TABLET BY MOUTH TWICE DAILY, Disp: 180 tablet, Rfl: 3    amLODIPine (NORVASC) 2.5 MG tablet, TAKE 1 TABLET BY MOUTH DAILY, Disp: 90 tablet, Rfl: 0    aspirin 81 MG EC tablet, Take 1 tablet by mouth Daily., Disp: 100 tablet, Rfl: 3    clopidogrel (PLAVIX) 75 MG tablet, Take 1 tablet by mouth Daily., Disp: 90 tablet, Rfl: 3    Evolocumab (REPATHA) solution auto-injector SureClick injection, Inject 1 mL under the skin into the appropriate area as directed Every 14 (Fourteen) Days., Disp: 6 mL, Rfl: 2     fenofibrate 160 MG tablet, TAKE 1 TABLET BY MOUTH EVERY DAY, Disp: 90 tablet, Rfl: 0    gabapentin (NEURONTIN) 600 MG tablet, Take 1 tablet by mouth 2 (Two) Times a Day., Disp: 60 tablet, Rfl: 2    hydrOXYzine pamoate (VISTARIL) 50 MG capsule, TAKE 1 CAPSULE BY MOUTH THREE TIMES DAILY AS NEEDED FOR ITCHING, Disp: 270 capsule, Rfl: 0    isosorbide mononitrate (IMDUR) 30 MG 24 hr tablet, Take 1 tablet by mouth Daily., Disp: 90 tablet, Rfl: 1    losartan (COZAAR) 100 MG tablet, Take 1 tablet by mouth Daily., Disp: 30 tablet, Rfl: 5    metoprolol tartrate (LOPRESSOR) 100 MG tablet, TAKE 1 TABLET BY MOUTH TWICE DAILY, Disp: 180 tablet, Rfl: 0    ondansetron (Zofran) 4 MG tablet, Take 1 tablet by mouth Every 8 (Eight) Hours As Needed for Nausea or Vomiting., Disp: 30 tablet, Rfl: 0    pantoprazole (PROTONIX) 40 MG EC tablet, Take 1 tablet by mouth Daily., Disp: 90 tablet, Rfl: 3    ranolazine (Ranexa) 500 MG 12 hr tablet, Take 1 tablet by mouth 2 (Two) Times a Day., Disp: 60 tablet, Rfl: 5    rOPINIRole (REQUIP) 2 MG tablet, Take 1 tablet by mouth Every Night., Disp: 90 tablet, Rfl: 3    DULoxetine (CYMBALTA) 30 MG capsule, TAKE 3 CAPSULES BY MOUTH EVERY DAY, Disp: 360 capsule, Rfl: 1    Finerenone (Kerendia) 10 MG tablet, Take 1 tablet by mouth Daily., Disp: 30 tablet, Rfl: 3    fluconazole (Diflucan) 150 MG tablet, Take 1 tablet by mouth Daily., Disp: 3 tablet, Rfl: 0    metroNIDAZOLE (Flagyl) 500 MG tablet, Take 1 tablet by mouth 2 (Two) Times a Day., Disp: 14 tablet, Rfl: 0    Review of Systems   Constitutional:  Negative for activity change, appetite change, chills, diaphoresis, fatigue and fever.   HENT:  Negative for congestion, drooling, ear discharge, ear pain, mouth sores, nosebleeds, postnasal drip, rhinorrhea, sinus pressure, sneezing and sore throat.    Eyes:  Negative for pain, discharge and visual disturbance.   Respiratory:  Negative for cough, chest tightness, shortness of breath and wheezing.   "  Cardiovascular:  Negative for chest pain, palpitations and leg swelling.   Gastrointestinal:  Negative for abdominal pain, constipation, diarrhea, nausea and vomiting.   Endocrine: Negative for cold intolerance, heat intolerance, polydipsia, polyphagia and polyuria.   Musculoskeletal:  Negative for arthralgias, myalgias and neck pain.   Skin:  Negative for rash and wound.   Neurological:  Negative for dizziness, syncope, speech difficulty, weakness, light-headedness and headaches.   Hematological:  Negative for adenopathy. Does not bruise/bleed easily.   Psychiatric/Behavioral:  Negative for confusion, dysphoric mood and sleep disturbance. The patient is not nervous/anxious.    All other systems reviewed and are negative.    /70 (BP Location: Left arm, Patient Position: Sitting, Cuff Size: Adult)   Pulse 72   Ht 162.6 cm (64\")   Wt 68.9 kg (151 lb 12.8 oz)   LMP  (LMP Unknown)   SpO2 99%   BMI 26.06 kg/m²     Objective   Allergies   Allergen Reactions    Ciprofloxacin Anaphylaxis    Penicillins Shortness Of Breath    Statins Myalgia    Fish Oil [Omega-3 Fatty Acids] Nausea And Vomiting    Fish Oil Nausea Only    Lyrica [Pregabalin] Mental Status Change    Wellbutrin [Bupropion] Mental Status Change     Physical Exam  Vitals reviewed.   Constitutional:       Appearance: Normal appearance. She is well-developed.   HENT:      Head: Normocephalic.      Right Ear: Tympanic membrane normal.      Left Ear: Tympanic membrane normal.      Nose: Nose normal.   Eyes:      Conjunctiva/sclera: Conjunctivae normal.      Pupils: Pupils are equal, round, and reactive to light.   Neck:      Thyroid: No thyromegaly.      Vascular: No carotid bruit or JVD.   Cardiovascular:      Rate and Rhythm: Normal rate and regular rhythm.   Pulmonary:      Effort: Pulmonary effort is normal.      Breath sounds: Normal breath sounds.   Abdominal:      General: Bowel sounds are normal.      Palpations: Abdomen is soft. There is no " hepatomegaly, splenomegaly or mass.      Tenderness: There is no abdominal tenderness.   Musculoskeletal:         General: Normal range of motion.      Cervical back: Neck supple.      Right lower leg: No edema.      Left lower leg: No edema.   Skin:     General: Skin is warm and dry.   Neurological:      Mental Status: She is alert and oriented to person, place, and time.   Psychiatric:         Attention and Perception: Attention normal.         Mood and Affect: Mood normal.         Speech: Speech normal.         Behavior: Behavior normal. Behavior is cooperative.         Cognition and Memory: Cognition normal.         LABS  WBC   Date Value Ref Range Status   12/05/2024 7.74 3.40 - 10.80 10*3/mm3 Final   09/21/2024 7.66 3.70 - 10.30 10*3/uL Final     RBC   Date Value Ref Range Status   12/05/2024 4.56 3.77 - 5.28 10*6/mm3 Final   09/21/2024 4.86 3.90 - 5.20 10*6/uL Final     Hemoglobin   Date Value Ref Range Status   12/05/2024 12.7 12.0 - 15.9 g/dL Final   09/21/2024 13.9 11.2 - 15.7 g/dL Final     Hematocrit   Date Value Ref Range Status   12/05/2024 38.4 34.0 - 46.6 % Final   09/21/2024 41.6 34.0 - 45.0 % Final     MCV   Date Value Ref Range Status   12/05/2024 84.2 79.0 - 97.0 fL Final   09/21/2024 86 79 - 98 fL Final     MCH   Date Value Ref Range Status   12/05/2024 27.9 26.6 - 33.0 pg Final   09/21/2024 28.6 26.0 - 32.0 pg Final     MCHC   Date Value Ref Range Status   12/05/2024 33.1 31.5 - 35.7 g/dL Final   09/21/2024 33.4 30.7 - 35.5 g/dL Final     RDW   Date Value Ref Range Status   12/05/2024 13.9 12.3 - 15.4 % Final   09/21/2024 16.5 (H) 11.5 - 14.5 % Final     RDW-SD   Date Value Ref Range Status   12/05/2024 42.9 37.0 - 54.0 fl Final     MPV   Date Value Ref Range Status   12/05/2024 9.2 6.0 - 12.0 fL Final   09/21/2024 9.0 8.8 - 12.5 fL Final     Platelets   Date Value Ref Range Status   12/05/2024 336 140 - 450 10*3/mm3 Final   09/21/2024 313 155 - 369 10*3/uL Final     Neutrophil Rel %   Date  Value Ref Range Status   09/21/2024 54.0 % Final     Neutrophil %   Date Value Ref Range Status   12/05/2024 43.2 42.7 - 76.0 % Final     Lymphocyte Rel %   Date Value Ref Range Status   09/21/2024 33.0 % Final     Lymphocyte %   Date Value Ref Range Status   12/05/2024 45.1 19.6 - 45.3 % Final     Monocyte Rel %   Date Value Ref Range Status   09/21/2024 8.0 % Final     Monocyte %   Date Value Ref Range Status   12/05/2024 8.4 5.0 - 12.0 % Final     Eosinophil %   Date Value Ref Range Status   12/05/2024 1.8 0.3 - 6.2 % Final   09/21/2024 3.0 % Final     Basophil Rel %   Date Value Ref Range Status   09/21/2024 1.0 % Final     Basophil %   Date Value Ref Range Status   12/05/2024 0.9 0.0 - 1.5 % Final     Immature Grans %   Date Value Ref Range Status   12/05/2024 0.6 (H) 0.0 - 0.5 % Final   09/21/2024 1.0 % Final     Neutrophils Absolute   Date Value Ref Range Status   09/21/2024 4.11 1.60 - 6.10 10*3/uL Final     Neutrophils, Absolute   Date Value Ref Range Status   12/05/2024 3.34 1.70 - 7.00 10*3/mm3 Final     Lymphocytes Absolute   Date Value Ref Range Status   09/21/2024 2.54 1.20 - 3.90 10*3/uL Final     Lymphocytes, Absolute   Date Value Ref Range Status   12/05/2024 3.49 (H) 0.70 - 3.10 10*3/mm3 Final     Monocytes Absolute   Date Value Ref Range Status   09/21/2024 0.62 0.30 - 0.90 10*3/uL Final     Monocytes, Absolute   Date Value Ref Range Status   12/05/2024 0.65 0.10 - 0.90 10*3/mm3 Final     Eosinophils Absolute   Date Value Ref Range Status   09/21/2024 0.24 0.00 - 0.50 10*3/uL Final     Eosinophils, Absolute   Date Value Ref Range Status   12/05/2024 0.14 0.00 - 0.40 10*3/mm3 Final     Basophils Absolute   Date Value Ref Range Status   09/21/2024 0.09 0.00 - 0.10 10*3/uL Final     Basophils, Absolute   Date Value Ref Range Status   12/05/2024 0.07 0.00 - 0.20 10*3/mm3 Final     Immature Grans, Absolute   Date Value Ref Range Status   12/05/2024 0.05 0.00 - 0.05 10*3/mm3 Final   09/21/2024 0.06 0.00  - 0.06 10*3/uL Final     nRBC   Date Value Ref Range Status   12/05/2024 0.0 0.0 - 0.2 /100 WBC Final       Total Cholesterol   Date Value Ref Range Status   10/11/2024 120 0 - 200 mg/dL Final     Triglycerides   Date Value Ref Range Status   10/11/2024 252 (H) 0 - 150 mg/dL Final     HDL Cholesterol   Date Value Ref Range Status   10/11/2024 49 40 - 60 mg/dL Final     LDL Cholesterol    Date Value Ref Range Status   10/11/2024 33 0 - 100 mg/dL Final     LDL Chol Calc (NIH)   Date Value Ref Range Status   11/12/2021 124 (H) 0 - 100 mg/dL Final     IMAGING  US Thyroid    Result Date: 10/11/2024  Bilateral TI-RADS 3 nodules which should be followed up in 1 year to ensure stability.   This report was finalized on 10/11/2024 12:19 PM by Lyla Carrington M.D..              Assessment & Plan   Diagnoses and all orders for this visit:    1. Coronary artery disease involving coronary bypass graft of native heart without angina pectoris (Primary)  Clinically asymptomatic  Continue Imdur, metoprolol, Repatha (statin intolerant), aspirin and Plavix    2. Dyslipidemia  -     Comprehensive Metabolic Panel; Future  -     Lipid Panel; Future  -     CK; Future  Her cholesterol levels are within the normal range, as long as she continues her injections. A message has been sent to the hospital clinic regarding her Repatha prescription. She is advised to contact the clinic if she requires a refill. Orders for routine blood work, including cholesterol levels, have been placed.    3. Essential hypertension  -     TSH; Future  Her blood pressure is well-controlled.  Continue current therapy     4. Stage 3b chronic kidney disease  Stable, continue follow up with nephrology     5. Preoperative cardiovascular examination  Plan to revisit in the summer    6. Type 2 diabetes mellitus with hyperglycemia, with long-term current use of insulin  She reports that her blood sugar levels are well-controlled. She has been on Ozempic for 10 years  but reports it is not effective and causes significant diarrhea. Mounjaro is suggested as an alternative to help with diabetes management and weight loss, she will further discuss with her PCP       Thyroid ultrasound shows nodules on both the right and left lobe. A repeat thyroid ultrasound is recommended in October 2025 to monitor the nodules, recommend following up with PCP     Routine surveillance of carotids, reviewed ct report    Assessment & Plan      Review of medical record     Lifestyle modifications including heart healthy diet, regular exercise, maintenance of desirable body weight and avoidance of tobacco products     Follow-up in 3 months, sooner if needed.           Patient or patient representative verbalized consent for the use of Ambient Listening during the visit with  TRUONG Briones for chart documentation. 2/21/2025  10:11 EST

## 2025-02-15 DIAGNOSIS — F32.A ANXIETY AND DEPRESSION: Chronic | ICD-10-CM

## 2025-02-15 DIAGNOSIS — F41.9 ANXIETY AND DEPRESSION: Chronic | ICD-10-CM

## 2025-02-15 DIAGNOSIS — I10 ESSENTIAL HYPERTENSION: Chronic | ICD-10-CM

## 2025-02-15 DIAGNOSIS — G62.89 SMALL FIBER POLYNEUROPATHY: Chronic | ICD-10-CM

## 2025-02-17 RX ORDER — DULOXETIN HYDROCHLORIDE 30 MG/1
90 CAPSULE, DELAYED RELEASE ORAL DAILY
Qty: 360 CAPSULE | Refills: 1 | Status: SHIPPED | OUTPATIENT
Start: 2025-02-17

## 2025-02-17 RX ORDER — ISOSORBIDE MONONITRATE 60 MG/1
60 TABLET, EXTENDED RELEASE ORAL DAILY
Qty: 90 TABLET | Refills: 3 | OUTPATIENT
Start: 2025-02-17

## 2025-02-17 NOTE — TELEPHONE ENCOUNTER
Her cardiologist wrote Imdur 30 mg for her January 2025.  Asked the patient to call her cardiologist for refills of Imdur to ensure proper dosage

## 2025-02-17 NOTE — TELEPHONE ENCOUNTER
Please verify the correct isosorbide/Imdur dosage.  The request is for 60 mg and her med list states she is on 30 mg

## 2025-02-21 ENCOUNTER — OFFICE VISIT (OUTPATIENT)
Dept: FAMILY MEDICINE CLINIC | Facility: CLINIC | Age: 65
End: 2025-02-21
Payer: COMMERCIAL

## 2025-02-21 VITALS
SYSTOLIC BLOOD PRESSURE: 110 MMHG | HEART RATE: 56 BPM | BODY MASS INDEX: 26.63 KG/M2 | HEIGHT: 64 IN | DIASTOLIC BLOOD PRESSURE: 70 MMHG | TEMPERATURE: 98.3 F | WEIGHT: 156 LBS | OXYGEN SATURATION: 99 %

## 2025-02-21 DIAGNOSIS — R30.0 DYSURIA: ICD-10-CM

## 2025-02-21 DIAGNOSIS — N76.0 ACUTE VAGINITIS: Primary | ICD-10-CM

## 2025-02-21 DIAGNOSIS — Z79.4 TYPE 2 DIABETES MELLITUS WITH HYPERGLYCEMIA, WITH LONG-TERM CURRENT USE OF INSULIN: ICD-10-CM

## 2025-02-21 DIAGNOSIS — N18.32 STAGE 3B CHRONIC KIDNEY DISEASE: Chronic | ICD-10-CM

## 2025-02-21 DIAGNOSIS — N18.32 TYPE 2 DIABETES MELLITUS WITH STAGE 3B CHRONIC KIDNEY DISEASE, WITHOUT LONG-TERM CURRENT USE OF INSULIN: Chronic | ICD-10-CM

## 2025-02-21 DIAGNOSIS — E11.22 TYPE 2 DIABETES MELLITUS WITH STAGE 3B CHRONIC KIDNEY DISEASE, WITHOUT LONG-TERM CURRENT USE OF INSULIN: Chronic | ICD-10-CM

## 2025-02-21 DIAGNOSIS — E11.65 TYPE 2 DIABETES MELLITUS WITH HYPERGLYCEMIA, WITH LONG-TERM CURRENT USE OF INSULIN: ICD-10-CM

## 2025-02-21 LAB
BILIRUB BLD-MCNC: NEGATIVE MG/DL
CLARITY, POC: CLEAR
COLOR UR: YELLOW
GLUCOSE UR STRIP-MCNC: NEGATIVE MG/DL
KETONES UR QL: NEGATIVE
LEUKOCYTE EST, POC: NEGATIVE
NITRITE UR-MCNC: NEGATIVE MG/ML
PH UR: 7 [PH] (ref 5–8)
PROT UR STRIP-MCNC: NEGATIVE MG/DL
RBC # UR STRIP: NEGATIVE /UL
SP GR UR: 1.01 (ref 1–1.03)
UROBILINOGEN UR QL: NORMAL

## 2025-02-21 PROCEDURE — 82570 ASSAY OF URINE CREATININE: CPT | Performed by: PHYSICIAN ASSISTANT

## 2025-02-21 PROCEDURE — 82043 UR ALBUMIN QUANTITATIVE: CPT | Performed by: PHYSICIAN ASSISTANT

## 2025-02-21 PROCEDURE — 87086 URINE CULTURE/COLONY COUNT: CPT | Performed by: PHYSICIAN ASSISTANT

## 2025-02-21 RX ORDER — METRONIDAZOLE 500 MG/1
500 TABLET ORAL 2 TIMES DAILY
Qty: 14 TABLET | Refills: 0 | Status: SHIPPED | OUTPATIENT
Start: 2025-02-21

## 2025-02-21 RX ORDER — FINERENONE 10 MG/1
10 TABLET, FILM COATED ORAL DAILY
Qty: 30 TABLET | Refills: 3 | Status: SHIPPED | OUTPATIENT
Start: 2025-02-21

## 2025-02-21 RX ORDER — FLUCONAZOLE 150 MG/1
150 TABLET ORAL DAILY
Qty: 3 TABLET | Refills: 0 | Status: SHIPPED | OUTPATIENT
Start: 2025-02-21

## 2025-02-21 NOTE — PATIENT INSTRUCTIONS
Carbohydrate Counting for Diabetes Mellitus, Adult  Carbohydrate counting is a method of keeping track of how many carbohydrates you eat. Eating carbohydrates increases the amount of sugar (glucose) in the blood. Counting how many carbohydrates you eat improves how well you manage your blood glucose. This, in turn, helps you manage your diabetes.  Carbohydrates are measured in grams (g) per serving. It is important to know how many carbohydrates (in grams or by serving size) you can have in each meal. This is different for every person. A dietitian can help you make a meal plan and calculate how many carbohydrates you should have at each meal and snack.  What foods contain carbohydrates?  Carbohydrates are found in the following foods:  Grains, such as breads and cereals.  Dried beans and soy products.  Starchy vegetables, such as potatoes, peas, and corn.  Fruit and fruit juices.  Milk and yogurt.  Sweets and snack foods, such as cake, cookies, candy, chips, and soft drinks.  How do I count carbohydrates in foods?  There are two ways to count carbohydrates in food. You can read food labels or learn standard serving sizes of foods. You can use either of these methods or a combination of both.  Using the Nutrition Facts label  The Nutrition Facts list is included on the labels of almost all packaged foods and beverages in the [place]. It includes:  The serving size.  Information about nutrients in each serving, including the grams of carbohydrate per serving.  To use the Nutrition Facts, decide how many servings you will have. Then, multiply the number of servings by the number of carbohydrates per serving. The resulting number is the total grams of carbohydrates that you will be having.  Learning the standard serving sizes of foods  When you eat carbohydrate foods that are not packaged or do not include Nutrition Facts on the label, you need to measure the servings in order to count the grams of  carbohydrates.  Measure the foods that you will eat with a food scale or measuring cup, if needed.  Decide how many standard-size servings you will eat.  Multiply the number of servings by 15. For foods that contain carbohydrates, one serving equals 15 g of carbohydrates.  For example, if you eat 2 cups or 10 oz (300 g) of strawberries, you will have eaten 2 servings and 30 g of carbohydrates (2 servings x 15 g = 30 g).  For foods that have more than one food mixed, such as soups and casseroles, you must count the carbohydrates in each food that is included.  The following list contains standard serving sizes of common carbohydrate-rich foods. Each of these servings has about 15 g of carbohydrates:  1 slice of bread.  1 six-inch (15 cm) tortilla.  ? cup or 2 oz (53 g) cooked rice or pasta.  ½ cup or 3 oz (85 g) cooked or canned, drained and rinsed beans or lentils.  ½ cup or 3 oz (85 g) starchy vegetable, such as peas, corn, or squash.  ½ cup or 4 oz (120 g) hot cereal.  ½ cup or 3 oz (85 g) boiled or mashed potatoes, or ¼ or 3 oz (85 g) of a large baked potato.  ½ cup or 4 fl oz (118 mL) fruit juice.  1 cup or 8 fl oz (237 mL) milk.  1 small or 4 oz (106 g) apple.  ½ or 2 oz (63 g) of a medium banana.  1 cup or 5 oz (150 g) strawberries.  3 cups or 1 oz (28.3 g) popped popcorn.  What is an example of carbohydrate counting?  To calculate the grams of carbohydrates in this sample meal, follow the steps shown below.  Sample meal  3 oz (85 g) chicken breast.  ? cup or 4 oz (106 g) brown rice.  ½ cup or 3 oz (85 g) corn.  1 cup or 8 fl oz (237 mL) milk.  1 cup or 5 oz (150 g) strawberries with sugar-free whipped topping.  Carbohydrate calculation  Identify the foods that contain carbohydrates:  Rice.  Corn.  Milk.  Strawberries.  Calculate how many servings you have of each food:  2 servings rice.  1 serving corn.  1 serving milk.  1 serving strawberries.  Multiply each number of servings by 15  servings rice x 15  g = 30 g.  1 serving corn x 15 g = 15 g.  1 serving milk x 15 g = 15 g.  1 serving strawberries x 15 g = 15 g.  Add together all of the amounts to find the total grams of carbohydrates eaten:  30 g + 15 g + 15 g + 15 g = 75 g of carbohydrates total.  What are tips for following this plan?  Shopping  Develop a meal plan and then make a shopping list.  Buy fresh and frozen vegetables, fresh and frozen fruit, dairy, eggs, beans, lentils, and whole grains.  Look at food labels. Choose foods that have more fiber and less sugar.  Avoid processed foods and foods with added sugars.  Meal planning  Aim to have the same number of grams of carbohydrates at each meal and for each snack time.  Plan to have regular, balanced meals and snacks.  Where to find more information  American Diabetes Association: diabetes.org  Centers for Disease Control and Prevention: cdc.gov  Academy of Nutrition and Dietetics: eatright.org  Association of Diabetes Care & Education Specialists: diabeteseducator.org  Summary  Carbohydrate counting is a method of keeping track of how many carbohydrates you eat.  Eating carbohydrates increases the amount of sugar (glucose) in your blood.  Counting how many carbohydrates you eat improves how well you manage your blood glucose. This helps you manage your diabetes.  A dietitian can help you make a meal plan and calculate how many carbohydrates you should have at each meal and snack.  This information is not intended to replace advice given to you by your health care provider. Make sure you discuss any questions you have with your health care provider.  Document Revised: 07/20/2021 Document Reviewed: 07/21/2021  Elsevier Patient Education © 2024 [employer]

## 2025-02-21 NOTE — PROGRESS NOTES
"Chief Complaint -vaginal itching    History of Present Illness -     Monisha Gardner is a 64 y.o. female.     Vaginal itching-  Patient complains of vaginal itching and pressure with vaginal dryness reported.  Patient is not sexually active.    Diabetes-  Stable with hemoglobin A1c of 5.8.  Patient has been using Ozempic 2 mg weekly.  She reports severe abdominal cramping and diarrhea.  She states the diarrhea is causing her to have accidents when she cannot make it to the restroom.  She declines going down on the dose to 1 mg stating that it did not help her glucose.    CKD-  Patient last estimated GFR was 40 with a creatinine of 1.47.  We discussed her kidney disease and she has not seen nephrology in well over a year.      The following portions of the patient's history were reviewed and updated as appropriate: allergies, current medications, past family history, past medical history, past social history, past surgical history and problem list.        Objective  Vital signs:  /70   Pulse 56   Temp 98.3 °F (36.8 °C) (Temporal)   Ht 162.6 cm (64\")   Wt 70.8 kg (156 lb)   LMP  (LMP Unknown)   SpO2 99%   BMI 26.78 kg/m²     Physical Exam  Vitals and nursing note reviewed.   Constitutional:       Appearance: Normal appearance. She is well-developed.   Eyes:      Extraocular Movements: Extraocular movements intact.      Conjunctiva/sclera: Conjunctivae normal.   Cardiovascular:      Rate and Rhythm: Regular rhythm. Bradycardia present.      Heart sounds: Normal heart sounds. No murmur heard.  Pulmonary:      Effort: Pulmonary effort is normal. No respiratory distress.      Breath sounds: Normal breath sounds. No wheezing.   Musculoskeletal:         General: No tenderness.   Skin:     General: Skin is warm and dry.      Findings: No rash.   Neurological:      Mental Status: She is alert and oriented to person, place, and time.   Psychiatric:         Mood and Affect: Mood normal.         Behavior: " Behavior normal.         Thought Content: Thought content normal.         The following data was reviewed by Kimmy Vela PA-C:    Data reviewed : Urinalysis reviewed      Lab Results   Component Value Date    BUN 16 12/05/2024    CREATININE 1.47 (H) 12/05/2024    EGFR 40.0 (L) 12/05/2024    ALT 16 12/05/2024    AST 27 12/05/2024    WBC 7.74 12/05/2024    HGB 12.7 12/05/2024    HCT 38.4 12/05/2024     12/05/2024    CHOL 120 10/11/2024    TRIG 252 (H) 10/11/2024    HDL 49 10/11/2024    LDL 33 10/11/2024    TSH 0.965 09/07/2024    HGBA1C 5.80 (H) 09/05/2024           Assessment & Plan     Diagnoses and all orders for this visit:    1. Acute vaginitis (Primary)  -     fluconazole (Diflucan) 150 MG tablet; Take 1 tablet by mouth Daily.  Dispense: 3 tablet; Refill: 0  -     metroNIDAZOLE (Flagyl) 500 MG tablet; Take 1 tablet by mouth 2 (Two) Times a Day.  Dispense: 14 tablet; Refill: 0    2. Type 2 diabetes mellitus with hyperglycemia, with long-term current use of insulin  -     Microalbumin / Creatinine Urine Ratio - Urine, Clean Catch  -     Hemoglobin A1c; Future  -     Microalbumin / Creatinine Urine Ratio - Urine, Clean Catch; Future    3. Dysuria  -     Urine Culture - Urine, Urine, Clean Catch  -     POCT urinalysis dipstick, manual    4. Stage 3b chronic kidney disease  Comments:  Start Kerendia  Refer to nephrology for further evaluation  Orders:  -     Ambulatory Referral to Nephrology  -     Comprehensive Metabolic Panel; Future    5. Type 2 diabetes mellitus with stage 3b chronic kidney disease, without long-term current use of insulin  Comments:  Discontinue Ozempic due to severe abdominal cramping and diarrhea  Start Jaylin Garcia  Advised low-carb diabetic diet  Orders:  -     Finerenone (Kerendia) 10 MG tablet; Take 1 tablet by mouth Daily.  Dispense: 30 tablet; Refill: 3  -     Comprehensive Metabolic Panel; Future                   Patient was given instructions and counseling regarding his condition  or for health maintenance advice. Please see specific information pulled into the AVS if appropriate      This document has been electronically signed by:  Kimmy Vela PA-C

## 2025-02-22 LAB
ALBUMIN UR-MCNC: <1.2 MG/DL
CREAT UR-MCNC: 56.1 MG/DL
MICROALBUMIN/CREAT UR: NORMAL MG/G{CREAT}

## 2025-02-24 LAB — BACTERIA SPEC AEROBE CULT: NO GROWTH

## 2025-02-27 RX ORDER — LIDOCAINE 50 MG/G
PATCH TOPICAL
Qty: 60 PATCH | OUTPATIENT
Start: 2025-02-27

## 2025-03-03 RX ORDER — BUPROPION HYDROCHLORIDE 150 MG/1
150 TABLET, EXTENDED RELEASE ORAL 2 TIMES DAILY
Qty: 180 TABLET | Refills: 0 | OUTPATIENT
Start: 2025-03-03

## 2025-03-13 ENCOUNTER — TELEPHONE (OUTPATIENT)
Dept: FAMILY MEDICINE CLINIC | Facility: CLINIC | Age: 65
End: 2025-03-13
Payer: COMMERCIAL

## 2025-03-13 NOTE — TELEPHONE ENCOUNTER
HUB TO READ/RELAY  Called and left message letting patient know she needs to have completed prior to appointment on 3/21

## 2025-03-29 DIAGNOSIS — E78.2 MIXED HYPERLIPIDEMIA: Chronic | ICD-10-CM

## 2025-03-29 DIAGNOSIS — M1A.09X0 IDIOPATHIC CHRONIC GOUT OF MULTIPLE SITES WITHOUT TOPHUS: ICD-10-CM

## 2025-03-31 RX ORDER — AMLODIPINE BESYLATE 2.5 MG/1
2.5 TABLET ORAL DAILY
Qty: 90 TABLET | Refills: 0 | Status: SHIPPED | OUTPATIENT
Start: 2025-03-31

## 2025-03-31 RX ORDER — FENOFIBRATE 160 MG/1
160 TABLET ORAL DAILY
Qty: 90 TABLET | Refills: 0 | Status: SHIPPED | OUTPATIENT
Start: 2025-03-31

## 2025-03-31 RX ORDER — ALLOPURINOL 100 MG/1
100 TABLET ORAL 2 TIMES DAILY
Qty: 180 TABLET | Refills: 3 | Status: SHIPPED | OUTPATIENT
Start: 2025-03-31

## 2025-03-31 RX ORDER — HYDROXYZINE PAMOATE 50 MG/1
50 CAPSULE ORAL 3 TIMES DAILY PRN
Qty: 270 CAPSULE | Refills: 0 | Status: SHIPPED | OUTPATIENT
Start: 2025-03-31

## 2025-03-31 RX ORDER — METOPROLOL TARTRATE 100 MG/1
100 TABLET ORAL 2 TIMES DAILY
Qty: 180 TABLET | Refills: 0 | Status: SHIPPED | OUTPATIENT
Start: 2025-03-31

## 2025-04-04 ENCOUNTER — OFFICE VISIT (OUTPATIENT)
Dept: CARDIOLOGY | Facility: CLINIC | Age: 65
End: 2025-04-04
Payer: COMMERCIAL

## 2025-04-04 VITALS
HEART RATE: 72 BPM | BODY MASS INDEX: 25.95 KG/M2 | HEIGHT: 64 IN | OXYGEN SATURATION: 98 % | WEIGHT: 152 LBS | SYSTOLIC BLOOD PRESSURE: 120 MMHG | DIASTOLIC BLOOD PRESSURE: 74 MMHG

## 2025-04-04 DIAGNOSIS — I10 ESSENTIAL HYPERTENSION: ICD-10-CM

## 2025-04-04 DIAGNOSIS — Z87.898 HISTORY OF PROLONGED Q-T INTERVAL ON ECG: ICD-10-CM

## 2025-04-04 DIAGNOSIS — I25.810 CORONARY ARTERY DISEASE INVOLVING CORONARY BYPASS GRAFT OF NATIVE HEART WITHOUT ANGINA PECTORIS: Primary | ICD-10-CM

## 2025-04-04 DIAGNOSIS — E78.5 DYSLIPIDEMIA: ICD-10-CM

## 2025-04-04 DIAGNOSIS — R00.1 BRADYCARDIA, SINUS: ICD-10-CM

## 2025-04-04 DIAGNOSIS — R29.90 NEUROLOGICAL COMPLAINT: ICD-10-CM

## 2025-04-04 DIAGNOSIS — R94.31 ABNORMAL EKG: ICD-10-CM

## 2025-04-04 PROCEDURE — 93000 ELECTROCARDIOGRAM COMPLETE: CPT | Performed by: NURSE PRACTITIONER

## 2025-04-04 PROCEDURE — 99214 OFFICE O/P EST MOD 30 MIN: CPT | Performed by: NURSE PRACTITIONER

## 2025-04-04 NOTE — LETTER
April 4, 2025     Silvia Taveras DO  [address] [place] [address]    Patient: Monisha Gardner   YOB: 1960   Date of Visit: 4/4/2025       Dear Silvia Taveras DO,    Monisha Gardner was in my office today. Below are the relevant portions of my assessment and plan of care.           If you have questions, please do not hesitate to call me. I look forward to following Monisha along with you.         Sincerely,        TRUONG Briones        CC: No Recipients

## 2025-04-04 NOTE — PROGRESS NOTES
Subjective     Monisha Gardner is a 64 y.o. female.   Chief Complaint   Patient presents with    Follow-up     Pt was in hospital feb 27or 28th , got flew to  on same day, thought she had a stroke, but results were she did not. Pt was admitted for 21 days     History of Present Illness   History of Present Illness  Monisha Gardenr is a 64-year-old female who presents to clinic today for cardiology follow up.  She was hospitalized at the end of February and March and is here today for follow-up.  She states she spent 21 days in the hospital and rehab together.    She experienced an episode on 02/28/2025, initially suspected to be a stroke due to the presence of all associated symptoms. However, subsequent scans revealed no abnormalities. She was unable to walk and perform activities of daily living, necessitating a week-long stay in rehabilitation and a total hospital stay of 21 days. The cause of her symptoms was attributed to psychological factors, with her body not exhibiting signs of a stroke. She recalls waking up one Sunday with a blood pressure reading of 228/197, accompanied by dizziness, nausea, and excessive sweating. She sought fresh air outside but continued to feel unwell upon returning indoors. At this point, she noticed significant bruising, although she does not recall falling. She checked her blood pressure and contacted her daughter and grandson. She reports no significant stressors or life events prior to the onset of her symptoms. She does not recall being found unresponsive later that day. She is currently on anticoagulant therapy and has been off work for 6 weeks. Her primary care physician has granted her a month's leave from work, and she has an upcoming appointment with her on Tuesday. She is uncertain about her ability to return to work after this period.    CAD currently on ASA, Plavix, Imdur, Ranexa, Lopressor and Repatha (statin intolerant).  Reports compliance with medicine. She  denies chest pain, dyspnea, palpitations or syncope. She denies nitroglycerin use.  Cardiac cath on 6/20/2024 with 80% in-stent restenosis of proximal/ostial RCA stented with 3.5 x 38 mm VITALIY and reduced to no significant residual disease.  Nonobstructive disease in left coronary system and a calculated LVEF is 65%.      Hypertension reports that her blood pressure and heart rate are well-controlled at home. She has been monitoring her blood pressure at home.     Hyperlipidemia with statin intolerance. She is on cholesterol injections and believes her cholesterol levels are good. She had her cholesterol checked in October 2024.     CKD and was previously following with nephrology and has re-established care with [facility].    She has recently changed her primary care physician.She has an appointment with her new primary care physician on Tuesday.    She has been on short-term disability since February 2025 and is seeking to undergo surgery before returning to work. She needs to have an oral surgery performed however is high risk and not bee on DAPT for one year s/p stent.     FAMILY HISTORY  Her mother had thyroid cancer.      Patient Active Problem List   Diagnosis    ASCVD (arteriosclerotic cardiovascular disease)    Essential hypertension    Dyslipidemia    Type 2 diabetes mellitus with hyperglycemia, with long-term current use of insulin    Body mass index (bmi) 30.0-30.9, adult    Osteoarthritis    Atypical angina    Elevated liver enzymes    Gastroesophageal reflux disease with esophagitis    Vitamin D deficiency    Hx of pancreatitis    Menopausal symptoms    Vitamin B 12 deficiency    Nocturnal leg cramps    Anxiety    Left arm weakness    Chronic gout of multiple sites    Squamous cell carcinoma in situ (SCCIS) of skin    Mixed hyperlipidemia    PVC (premature ventricular contraction)    RLS (restless legs syndrome)    Psychophysiological insomnia    Dry mouth    Type 2 diabetes mellitus with  diabetic autonomic neuropathy, with long-term current use of insulin    Bone mass    Mass of mandible    Coronary artery disease    Abnormal nuclear stress test    CAD (coronary artery disease)    Falls    Closed fracture of coccyx with routine healing    Thyroid nodule     Past Medical History:   Diagnosis Date    Anxiety     Arthritis     Diabetes mellitus     Diverticulosis     GERD (gastroesophageal reflux disease)     Gout     Hyperlipidemia     Hypertension     Myocardial infarction      Past Surgical History:   Procedure Laterality Date    APPENDECTOMY      BREAST BIOPSY Right     benign    CARDIAC CATHETERIZATION      CARDIAC CATHETERIZATION N/A 2024    Procedure: Left Heart Cath;  Surgeon: Derrick Watts MD;  Location:  [facility];  Service: Cardiology;  Laterality: N/A;    CARDIAC CATHETERIZATION N/A 2024    Procedure: Stent VITALIY coronary;  Surgeon: eDrrick Watts MD;  Location:  [facility];  Service: Cardiology;  Laterality: N/A;     SECTION      CHOLECYSTECTOMY      CORONARY STENT PLACEMENT      Stenting of the RCA with 3.5 x 26 mm Resolute drug-eluting stent    HYSTERECTOMY      JOINT REPLACEMENT      TONSILLECTOMY      TUMOR REMOVAL  02/15/2024    FACIAL TUMOR       Family History   Problem Relation Age of Onset    Arthritis Mother     Heart disease Mother     Heart disease Sister     Vision loss Daughter     Heart disease Father     Heart disease Sister     Heart disease Sister     Heart disease Sister      Social History     Tobacco Use    Smoking status: Never     Passive exposure: Never    Smokeless tobacco: Never   Vaping Use    Vaping status: Never Used   Substance Use Topics    Alcohol use: No    Drug use: No         The following portions of the patient's history were reviewed and updated as appropriate: allergies, current medications, past family history, past medical history, past social history, past surgical history and problem  list.    Allergies   Allergen Reactions    Ciprofloxacin Anaphylaxis    Penicillins Shortness Of Breath    Statins Myalgia    Fish Oil [Omega-3 Fatty Acids] Nausea And Vomiting    Fish Oil Nausea Only    Lyrica [Pregabalin] Mental Status Change    Wellbutrin [Bupropion] Mental Status Change         Current Outpatient Medications:     allopurinol (ZYLOPRIM) 100 MG tablet, TAKE 1 TABLET BY MOUTH TWICE DAILY, Disp: 180 tablet, Rfl: 3    amLODIPine (NORVASC) 2.5 MG tablet, TAKE 1 TABLET BY MOUTH DAILY, Disp: 90 tablet, Rfl: 0    aspirin 81 MG EC tablet, Take 1 tablet by mouth Daily., Disp: 100 tablet, Rfl: 3    clopidogrel (PLAVIX) 75 MG tablet, Take 1 tablet by mouth Daily., Disp: 90 tablet, Rfl: 3    DULoxetine (CYMBALTA) 30 MG capsule, TAKE 3 CAPSULES BY MOUTH EVERY DAY, Disp: 360 capsule, Rfl: 1    Evolocumab (REPATHA) solution auto-injector SureClick injection, Inject 1 mL under the skin into the appropriate area as directed Every 14 (Fourteen) Days., Disp: 6 mL, Rfl: 2    fenofibrate 160 MG tablet, TAKE 1 TABLET BY MOUTH EVERY DAY, Disp: 90 tablet, Rfl: 0    Finerenone (Kerendia) 10 MG tablet, Take 1 tablet by mouth Daily., Disp: 30 tablet, Rfl: 3    fluconazole (Diflucan) 150 MG tablet, Take 1 tablet by mouth Daily., Disp: 3 tablet, Rfl: 0    gabapentin (NEURONTIN) 600 MG tablet, Take 1 tablet by mouth 2 (Two) Times a Day., Disp: 60 tablet, Rfl: 2    hydrOXYzine pamoate (VISTARIL) 50 MG capsule, TAKE 1 CAPSULE BY MOUTH THREE TIMES DAILY AS NEEDED FOR ITCHING, Disp: 270 capsule, Rfl: 0    isosorbide mononitrate (IMDUR) 30 MG 24 hr tablet, Take 1 tablet by mouth Daily., Disp: 90 tablet, Rfl: 1    losartan (COZAAR) 100 MG tablet, Take 1 tablet by mouth Daily., Disp: 30 tablet, Rfl: 5    metoprolol tartrate (LOPRESSOR) 100 MG tablet, TAKE 1 TABLET BY MOUTH TWICE DAILY, Disp: 180 tablet, Rfl: 0    metroNIDAZOLE (Flagyl) 500 MG tablet, Take 1 tablet by mouth 2 (Two) Times a Day., Disp: 14 tablet, Rfl: 0    ondansetron  "(Zofran) 4 MG tablet, Take 1 tablet by mouth Every 8 (Eight) Hours As Needed for Nausea or Vomiting., Disp: 30 tablet, Rfl: 0    pantoprazole (PROTONIX) 40 MG EC tablet, Take 1 tablet by mouth Daily., Disp: 90 tablet, Rfl: 3    ranolazine (Ranexa) 500 MG 12 hr tablet, Take 1 tablet by mouth 2 (Two) Times a Day., Disp: 60 tablet, Rfl: 5    rOPINIRole (REQUIP) 2 MG tablet, Take 1 tablet by mouth Every Night., Disp: 90 tablet, Rfl: 3    Review of Systems   Constitutional:  Negative for activity change, appetite change, chills, diaphoresis, fatigue and fever.   HENT:  Negative for congestion, drooling, ear discharge, ear pain, mouth sores, nosebleeds, postnasal drip, rhinorrhea, sinus pressure, sneezing and sore throat.    Eyes:  Negative for pain, discharge and visual disturbance.   Respiratory:  Negative for cough, chest tightness, shortness of breath and wheezing.    Cardiovascular:  Negative for chest pain, palpitations and leg swelling.   Gastrointestinal:  Negative for abdominal pain, constipation, diarrhea, nausea and vomiting.   Endocrine: Negative for cold intolerance, heat intolerance, polydipsia, polyphagia and polyuria.   Musculoskeletal:  Negative for arthralgias, myalgias and neck pain.   Skin:  Negative for rash and wound.   Neurological:  Negative for dizziness, syncope, speech difficulty, weakness, light-headedness and headaches.   Hematological:  Negative for adenopathy. Does not bruise/bleed easily.   Psychiatric/Behavioral:  Negative for confusion, dysphoric mood and sleep disturbance. The patient is not nervous/anxious.    All other systems reviewed and are negative.    /74 (BP Location: Left arm, Patient Position: Sitting, Cuff Size: Adult)   Pulse 72   Ht 162.6 cm (64\")   Wt 68.9 kg (152 lb)   LMP  (LMP Unknown)   SpO2 98%   BMI 26.09 kg/m²     Objective   Allergies   Allergen Reactions    Ciprofloxacin Anaphylaxis    Penicillins Shortness Of Breath    Statins Myalgia    Fish Oil " [Omega-3 Fatty Acids] Nausea And Vomiting    Fish Oil Nausea Only    Lyrica [Pregabalin] Mental Status Change    Wellbutrin [Bupropion] Mental Status Change     Physical Exam  Vitals reviewed.   Constitutional:       Appearance: Normal appearance. She is well-developed.   HENT:      Head: Normocephalic.   Eyes:      Conjunctiva/sclera: Conjunctivae normal.   Neck:      Thyroid: No thyromegaly.      Vascular: No carotid bruit or JVD.   Cardiovascular:      Rate and Rhythm: Normal rate and regular rhythm.   Pulmonary:      Effort: Pulmonary effort is normal.      Breath sounds: Normal breath sounds.   Musculoskeletal:      Cervical back: Neck supple.      Right lower leg: No edema.      Left lower leg: No edema.   Skin:     General: Skin is warm and dry.   Neurological:      Mental Status: She is alert and oriented to person, place, and time.   Psychiatric:         Attention and Perception: Attention normal.         Mood and Affect: Mood normal.         Speech: Speech normal.         Behavior: Behavior normal. Behavior is cooperative.         Cognition and Memory: Cognition normal.           ECG 12 Lead    Date/Time: 4/4/2025 1:02 PM  Performed by: Asia Hawkins APRN    Authorized by: Asia Hawkins APRN  Comparison: compared with previous ECG   Similar to previous ECG  Rhythm: sinus rhythm and sinus arrhythmia  Rate: normal  BPM: 60  Other findings: non-specific ST-T wave changes    Clinical impression: abnormal EKG and myocardial infarction  Clinical impression comment: septal, indeterminate age  Comments: QT/QTc - 418/419          LABS  WBC   Date Value Ref Range Status   03/05/2025 9.55 3.70 - 10.30 10*3/uL Final     RBC   Date Value Ref Range Status   03/05/2025 4.93 3.90 - 5.20 10*6/uL Final     Hemoglobin   Date Value Ref Range Status   03/05/2025 13.5 11.2 - 15.7 g/dL Final     Hematocrit   Date Value Ref Range Status   03/05/2025 41.2 34.0 - 45.0 % Final     MCV   Date Value Ref Range Status    03/05/2025 84 79 - 98 fL Final     MCH   Date Value Ref Range Status   03/05/2025 27.4 26.0 - 32.0 pg Final     MCHC   Date Value Ref Range Status   03/05/2025 32.8 30.7 - 35.5 g/dL Final     RDW   Date Value Ref Range Status   03/05/2025 14.8 (H) 11.5 - 14.5 % Final     RDW-SD   Date Value Ref Range Status   12/05/2024 42.9 37.0 - 54.0 fl Final     MPV   Date Value Ref Range Status   03/05/2025 9.1 8.8 - 12.5 fL Final     Platelets   Date Value Ref Range Status   03/05/2025 313 155 - 369 10*3/uL Final     Neutrophil Rel %   Date Value Ref Range Status   03/02/2025 44 % Final     Lymphocyte Rel %   Date Value Ref Range Status   03/02/2025 46 % Final     Monocyte Rel %   Date Value Ref Range Status   03/02/2025 6 % Final     Eosinophil %   Date Value Ref Range Status   03/02/2025 2 % Final     Basophil Rel %   Date Value Ref Range Status   03/02/2025 1 % Final     Immature Grans %   Date Value Ref Range Status   03/02/2025 1 % Final     Neutrophils Absolute   Date Value Ref Range Status   03/02/2025 3.82 1.60 - 6.10 10*3/uL Final     Lymphocytes Absolute   Date Value Ref Range Status   03/02/2025 4.04 (H) 1.20 - 3.90 10*3/uL Final     Monocytes Absolute   Date Value Ref Range Status   03/02/2025 0.52 0.30 - 0.90 10*3/uL Final     Eosinophils Absolute   Date Value Ref Range Status   03/02/2025 0.13 0.00 - 0.50 10*3/uL Final     Basophils Absolute   Date Value Ref Range Status   03/02/2025 0.09 0.00 - 0.10 10*3/uL Final     Immature Grans, Absolute   Date Value Ref Range Status   03/02/2025 0.04 0.00 - 0.06 10*3/uL Final     nRBC   Date Value Ref Range Status   03/05/2025 0 <=0.0 per 100 WBCs Final   12/05/2024 0.0 0.0 - 0.2 /100 WBC Final       Total Cholesterol   Date Value Ref Range Status   10/11/2024 120 0 - 200 mg/dL Final     Triglycerides   Date Value Ref Range Status   10/11/2024 252 (H) 0 - 150 mg/dL Final     HDL Cholesterol   Date Value Ref Range Status   10/11/2024 49 40 - 60 mg/dL Final     LDL  Cholesterol    Date Value Ref Range Status   10/11/2024 33 0 - 100 mg/dL Final     LDL Chol Calc (NIH)   Date Value Ref Range Status   11/12/2021 124 (H) 0 - 100 mg/dL Final     IMAGING   XR Abdomen 1 View  Result Date: 3/8/2025  Moderate colonic stool burden. Nonobstructive bowel gas pattern. No pneumoperitoneum. CRITICAL RESULT:   No. COMMUNICATION: Per this written report. Drafted by Carrie Fontanez MD on 3/8/2025 10:47 AM Final report signed by Carrie Fontanez MD on 3/8/2025 10:47 AM    FL Esophagram w Modified Barium Swallow Single Contrast  Result Date: 3/4/2025  Transient penetration of thin liquid consistency. No penetration or aspiration of other trialed barium media. Please see separate note by Speech therapy team for dietary recommendations. CRITICAL RESULT:   No. COMMUNICATION: Per this written report. By electronically signing this report, I, the attending physician, attest that I have personally reviewed the images/data for the above examination(s) and agree with the final edited report. Drafted by Wicho Walker MD on 3/4/2025 3:13 PM Final report signed by Carrie Fontanez MD on 3/4/2025 3:23 PM    CT Angiogram Neck  Result Date: 3/4/2025  1. No significant stenosis, or obvious evidence of vascular injury within the visualized cervical carotid or vertebral vessels. 2. Enlarged thyroid gland with hypodense nodules within, suggestive of multinodular goiter. Recommend clinical correlation and further evaluation with ultrasound / histopathologic evaluation if clinically warranted. CRITICAL RESULT: No. COMMUNICATION: Per this written report. By electronically signing this report, I, the attending physician, attest that I have personally reviewed the images/data for the above examination(s) and agree with the final edited report. Drafted by Jermain Phan MD on 3/4/2025 9:34 AM Final report signed by Radha Spicer MD on 3/4/2025 10:45 AM    MRI Brain Without Contrast  Result Date: 3/4/2025  *  There is no evidence of intracranial mass, hemorrhage, or acute infarction. CRITICAL RESULT: No. COMMUNICATION: Per this written report. Drafted by Néstor Andrade MD on 3/4/2025 8:19 AM Final report signed by Néstor Andrade MD on 3/4/2025 8:24 AM    XR Shoulder 2+ View Left  Result Date: 3/3/2025  No acute osseous findings. CRITICAL RESULT:   No. COMMUNICATION: Per this written report. Drafted by Mohan Sifuentes MD on 3/3/2025 1:05 PM Final report signed by Mohan Sifuentes MD on 3/3/2025 1:15 PM    XR Abdomen 1 View  Result Date: 3/2/2025  No suspicious retained metallic foreign bodies. CRITICAL RESULT:   No. COMMUNICATION: Per this written report. Drafted by Audi Chapman MD on 3/2/2025 8:59 PM Final report signed by Audi Chapman MD on 3/2/2025 9:00 PM    XR Chest 1 View  Result Date: 3/2/2025  No suspicious retained metallic foreign bodies. CRITICAL RESULT:   No. COMMUNICATION: Per this written report. Drafted by Audi Chapman MD on 3/2/2025 8:59 PM Final report signed by Audi Chapman MD on 3/2/2025 9:00 PM    CT Angiogram Neck  Result Date: 3/2/2025  Neck CTA: Markedly degraded study with nondiagnostic evaluation of major portions, especially at the levels of right common and subclavian artery origins, origins and proximal vertebral arteries and at bilateral distal common carotid and proximal cervical internal carotid arteries  This study may be repeated for better assessment if necessary. Head CTA: Focal calcification along the right pericallosal artery at proximal A4 level with probable severe stenosis in the adjacent portion. Otherwise no definite large vessel high-grade stenosis or occlusion. CRITICAL RESULT: No. COMMUNICATION: Per this written report. By electronically signing this report, I, the attending physician, attest that I have personally reviewed the images/data for the above examination(s) and agree with the final edited report. Drafted by Jaja Guzmán MD  on 3/2/2025 11:49 AM Final report signed by Joshua Hill MD on 3/2/2025 1:14 PM    CT Angiogram Head  Result Date: 3/2/2025  Neck CTA: Markedly degraded study with nondiagnostic evaluation of major portions, especially at the levels of right common and subclavian artery origins, origins and proximal vertebral arteries and at bilateral distal common carotid and proximal cervical internal carotid arteries  This study may be repeated for better assessment if necessary. Head CTA: Focal calcification along the right pericallosal artery at proximal A4 level with probable severe stenosis in the adjacent portion. Otherwise no definite large vessel high-grade stenosis or occlusion. CRITICAL RESULT: No. COMMUNICATION: Per this written report. By electronically signing this report, I, the attending physician, attest that I have personally reviewed the images/data for the above examination(s) and agree with the final edited report. Drafted by Jaja Guzmán MD on 3/2/2025 11:49 AM Final report signed by Joshua Hill MD on 3/2/2025 1:14 PM    CT Head Without Contrast  Result Date: 3/2/2025  Evaluation is limited by motion degradation and additional artifact. Within the significant limitations no definite large acute territorial infarction or acute large intracranial hemorrhage is appreciable. If necessary CT may be repeated or MRI study can be performed for further assessment if not contraindicated. CRITICAL RESULT:   No. COMMUNICATION: Per this written report. By electronically signing this report, I, the attending physician, attest that I have personally reviewed the images/data for the above examination(s) and agree with the final edited report. Drafted by Jaja Guzmán MD on 3/2/2025 11:40 AM Final report signed by Joshua Hill MD on 3/2/2025 12:11 PM            Assessment & Plan   Diagnoses and all orders for this visit:    1. Coronary artery disease involving coronary bypass graft of native heart  without angina pectoris (Primary)  -     ECG 12 Lead  Clinically asymptomatic  Continue current therapy  EKG reviewed and discussed, no acute change changes    2. Essential hypertension  -     ECG 12 Lead  She reported a significant increase in blood pressure (228/197) during the recent event. She has been keeping a record of her blood pressure since returning home. No changes in medication were noted upon discharge from the hospital.  Routine monitoring recommended  Continue current therapy    3. Dyslipidemia  Continue current therapy  LDL goal 55  CMP, CK and FLP in the near future    4. Abnormal EKG  EKG remains abnormal but stable  QTc has improved    5. Neurological complaint  -     Holter Monitor - 72 Hour Up To 15 Days; Future  Review of your Medical record  Her blood pressure and heart rate are within normal limits today. She has a history of stent placement but is not experiencing any chest pain. An ultrasound conducted during her hospital stay revealed a strong cardiac pump function and overall healthy valves. A monitor was placed in November 2024, which did not indicate any significant arrhythmia. However, given her recent event, it would be prudent to repeat this test to rule out the possibility of an arrhythmia. She underwent a catheterization and stent placement in June 2024. A 2-week monitor will be placed on her to ensure there is no underlying arrhythmia that could have contributed to or caused her recent event. An EKG will also be performed today.    6. History of prolonged Q-T interval on ECG  QTc improved from hospitalization  Review of EKG from today    7. Bradycardia, sinus  Clinically asymptomatic, will continue to monitor    Thyroid nodules.  Her discharge summary indicates an enlarged thyroid gland with nodules. She has been advised to follow up with her primary care physician regarding these nodules. She has an appointment scheduled with her new primary care physician on Tuesday.    Despite  being a few months short of the one-year elda for discontinuing anticoagulant therapy, she is still classified as high risk due to the recent event. If she and her surgeon decide to proceed with surgery.     Assessment & Plan  Review of medical record including records from     Lifestyle modification including heart healthy diet, regular exercise, maintenance of desirable body weight and avoidance of tobacco products     Follow-up  The patient will follow up in 1 month.    PROCEDURE  The patient underwent a catheterization and stent placement in June 2024.             Patient or patient representative verbalized consent for the use of Ambient Listening during the visit with  TRUONG Briones for chart documentation. 4/4/2025  13:11 EDT

## 2025-04-04 NOTE — Clinical Note
April 4, 2025     Silvia Taveras DO  [address] [facility] [address]    Patient: Monisha Gardner   YOB: 1960   Date of Visit: 4/4/2025       Dear Silvia Taveras DO    Monisha Gardner was in my office today. Below is a copy of my note.    If you have questions, please do not hesitate to call me. I look forward to following Monisha along with you.         Sincerely,        TRUONG Briones        CC: No Recipients    Subjective    Monisha Gardner is a 64 y.o. female.   Chief Complaint   Patient presents with   • Follow-up     Pt was in hospital feb 27or 28th , got flew to  on same day, thought she had a stroke, but results were she did not. Pt was admitted for 21 days     History of Present Illness   History of Present Illness  The patient presents for evaluation of a suspected stroke.    She experienced an episode on 03/28/2025, initially suspected to be a stroke due to the presence of all associated symptoms. However, subsequent scans revealed no abnormalities. She was unable to walk and perform activities of daily living, necessitating a week-long stay in rehabilitation and a total hospital stay of 21 days. The cause of her symptoms was attributed to psychological factors, with her body not exhibiting signs of a stroke. She has been on short-term disability since February 2025 and is seeking to undergo surgery before returning to work. She recalls waking up one Sunday with a blood pressure reading of 228/197, accompanied by dizziness, nausea, and excessive sweating. She sought fresh air outside but continued to feel unwell upon returning indoors. At this point, she noticed significant bruising, although she does not recall falling. She checked her blood pressure and contacted her daughter and grandson. She reports no significant stressors or life events prior to the onset of her symptoms. She does not recall being found unresponsive later that day. She is currently on  anticoagulant therapy and has been off work for 6 weeks. Her primary care physician has granted her a month's leave from work, and she has an upcoming appointment with her on Tuesday. She is uncertain about her ability to return to work after this period.    She has been monitoring her blood pressure at home and has recently changed her primary care physician.    She has an appointment with her new primary care physician on Tuesday.    FAMILY HISTORY  Her mother had thyroid cancer.      Patient Active Problem List   Diagnosis   • ASCVD (arteriosclerotic cardiovascular disease)   • Essential hypertension   • Dyslipidemia   • Type 2 diabetes mellitus with hyperglycemia, with long-term current use of insulin   • Body mass index (bmi) 30.0-30.9, adult   • Osteoarthritis   • Atypical angina   • Elevated liver enzymes   • Gastroesophageal reflux disease with esophagitis   • Vitamin D deficiency   • Hx of pancreatitis   • Menopausal symptoms   • Vitamin B 12 deficiency   • Nocturnal leg cramps   • Anxiety   • Left arm weakness   • Chronic gout of multiple sites   • Squamous cell carcinoma in situ (SCCIS) of skin   • Mixed hyperlipidemia   • PVC (premature ventricular contraction)   • RLS (restless legs syndrome)   • Psychophysiological insomnia   • Dry mouth   • Type 2 diabetes mellitus with diabetic autonomic neuropathy, with long-term current use of insulin   • Bone mass   • Mass of mandible   • Coronary artery disease   • Abnormal nuclear stress test   • CAD (coronary artery disease)   • Falls   • Closed fracture of coccyx with routine healing   • Thyroid nodule     Past Medical History:   Diagnosis Date   • Anxiety    • Arthritis    • Diabetes mellitus    • Diverticulosis    • GERD (gastroesophageal reflux disease)    • Gout    • Hyperlipidemia    • Hypertension    • Myocardial infarction      Past Surgical History:   Procedure Laterality Date   • APPENDECTOMY     • BREAST BIOPSY Right     benign   • CARDIAC  CATHETERIZATION     • CARDIAC CATHETERIZATION N/A 2024    Procedure: Left Heart Cath;  Surgeon: Derrick Watts MD;  Location:  [facility];  Service: Cardiology;  Laterality: N/A;   • CARDIAC CATHETERIZATION N/A 2024    Procedure: Stent VITALIY coronary;  Surgeon: Derrick Watts MD;  Location:  [facility];  Service: Cardiology;  Laterality: N/A;   •  SECTION     • CHOLECYSTECTOMY     • CORONARY STENT PLACEMENT      Stenting of the RCA with 3.5 x 26 mm Resolute drug-eluting stent   • HYSTERECTOMY     • JOINT REPLACEMENT     • TONSILLECTOMY     • TUMOR REMOVAL  02/15/2024    FACIAL TUMOR       Family History   Problem Relation Age of Onset   • Arthritis Mother    • Heart disease Mother    • Heart disease Sister    • Vision loss Daughter    • Heart disease Father    • Heart disease Sister    • Heart disease Sister    • Heart disease Sister      Social History     Tobacco Use   • Smoking status: Never     Passive exposure: Never   • Smokeless tobacco: Never   Vaping Use   • Vaping status: Never Used   Substance Use Topics   • Alcohol use: No   • Drug use: No         The following portions of the patient's history were reviewed and updated as appropriate: allergies, current medications, past family history, past medical history, past social history, past surgical history and problem list.    Allergies   Allergen Reactions   • Ciprofloxacin Anaphylaxis   • Penicillins Shortness Of Breath   • Statins Myalgia   • Fish Oil [Omega-3 Fatty Acids] Nausea And Vomiting   • Fish Oil Nausea Only   • Lyrica [Pregabalin] Mental Status Change   • Wellbutrin [Bupropion] Mental Status Change         Current Outpatient Medications:   •  allopurinol (ZYLOPRIM) 100 MG tablet, TAKE 1 TABLET BY MOUTH TWICE DAILY, Disp: 180 tablet, Rfl: 3  •  amLODIPine (NORVASC) 2.5 MG tablet, TAKE 1 TABLET BY MOUTH DAILY, Disp: 90 tablet, Rfl: 0  •  aspirin 81 MG EC tablet, Take 1 tablet by mouth Daily., Disp:  "100 tablet, Rfl: 3  •  clopidogrel (PLAVIX) 75 MG tablet, Take 1 tablet by mouth Daily., Disp: 90 tablet, Rfl: 3  •  DULoxetine (CYMBALTA) 30 MG capsule, TAKE 3 CAPSULES BY MOUTH EVERY DAY, Disp: 360 capsule, Rfl: 1  •  Evolocumab (REPATHA) solution auto-injector SureClick injection, Inject 1 mL under the skin into the appropriate area as directed Every 14 (Fourteen) Days., Disp: 6 mL, Rfl: 2  •  fenofibrate 160 MG tablet, TAKE 1 TABLET BY MOUTH EVERY DAY, Disp: 90 tablet, Rfl: 0  •  Finerenone (Kerendia) 10 MG tablet, Take 1 tablet by mouth Daily., Disp: 30 tablet, Rfl: 3  •  fluconazole (Diflucan) 150 MG tablet, Take 1 tablet by mouth Daily., Disp: 3 tablet, Rfl: 0  •  gabapentin (NEURONTIN) 600 MG tablet, Take 1 tablet by mouth 2 (Two) Times a Day., Disp: 60 tablet, Rfl: 2  •  hydrOXYzine pamoate (VISTARIL) 50 MG capsule, TAKE 1 CAPSULE BY MOUTH THREE TIMES DAILY AS NEEDED FOR ITCHING, Disp: 270 capsule, Rfl: 0  •  isosorbide mononitrate (IMDUR) 30 MG 24 hr tablet, Take 1 tablet by mouth Daily., Disp: 90 tablet, Rfl: 1  •  losartan (COZAAR) 100 MG tablet, Take 1 tablet by mouth Daily., Disp: 30 tablet, Rfl: 5  •  metoprolol tartrate (LOPRESSOR) 100 MG tablet, TAKE 1 TABLET BY MOUTH TWICE DAILY, Disp: 180 tablet, Rfl: 0  •  metroNIDAZOLE (Flagyl) 500 MG tablet, Take 1 tablet by mouth 2 (Two) Times a Day., Disp: 14 tablet, Rfl: 0  •  ondansetron (Zofran) 4 MG tablet, Take 1 tablet by mouth Every 8 (Eight) Hours As Needed for Nausea or Vomiting., Disp: 30 tablet, Rfl: 0  •  pantoprazole (PROTONIX) 40 MG EC tablet, Take 1 tablet by mouth Daily., Disp: 90 tablet, Rfl: 3  •  ranolazine (Ranexa) 500 MG 12 hr tablet, Take 1 tablet by mouth 2 (Two) Times a Day., Disp: 60 tablet, Rfl: 5  •  rOPINIRole (REQUIP) 2 MG tablet, Take 1 tablet by mouth Every Night., Disp: 90 tablet, Rfl: 3    Review of Systems    /74 (BP Location: Left arm, Patient Position: Sitting, Cuff Size: Adult)   Pulse 72   Ht 162.6 cm (64\")   Wt " 68.9 kg (152 lb)   LMP  (LMP Unknown)   SpO2 98%   BMI 26.09 kg/m²     Objective  Allergies   Allergen Reactions   • Ciprofloxacin Anaphylaxis   • Penicillins Shortness Of Breath   • Statins Myalgia   • Fish Oil [Omega-3 Fatty Acids] Nausea And Vomiting   • Fish Oil Nausea Only   • Lyrica [Pregabalin] Mental Status Change   • Wellbutrin [Bupropion] Mental Status Change       Physical Exam    Procedures    [unfilled]  WBC   Date Value Ref Range Status   03/05/2025 9.55 3.70 - 10.30 10*3/uL Final     RBC   Date Value Ref Range Status   03/05/2025 4.93 3.90 - 5.20 10*6/uL Final     Hemoglobin   Date Value Ref Range Status   03/05/2025 13.5 11.2 - 15.7 g/dL Final     Hematocrit   Date Value Ref Range Status   03/05/2025 41.2 34.0 - 45.0 % Final     MCV   Date Value Ref Range Status   03/05/2025 84 79 - 98 fL Final     MCH   Date Value Ref Range Status   03/05/2025 27.4 26.0 - 32.0 pg Final     MCHC   Date Value Ref Range Status   03/05/2025 32.8 30.7 - 35.5 g/dL Final     RDW   Date Value Ref Range Status   03/05/2025 14.8 (H) 11.5 - 14.5 % Final     RDW-SD   Date Value Ref Range Status   12/05/2024 42.9 37.0 - 54.0 fl Final     MPV   Date Value Ref Range Status   03/05/2025 9.1 8.8 - 12.5 fL Final     Platelets   Date Value Ref Range Status   03/05/2025 313 155 - 369 10*3/uL Final     Neutrophil Rel %   Date Value Ref Range Status   03/02/2025 44 % Final     Lymphocyte Rel %   Date Value Ref Range Status   03/02/2025 46 % Final     Monocyte Rel %   Date Value Ref Range Status   03/02/2025 6 % Final     Eosinophil %   Date Value Ref Range Status   03/02/2025 2 % Final     Basophil Rel %   Date Value Ref Range Status   03/02/2025 1 % Final     Immature Grans %   Date Value Ref Range Status   03/02/2025 1 % Final     Neutrophils Absolute   Date Value Ref Range Status   03/02/2025 3.82 1.60 - 6.10 10*3/uL Final     Lymphocytes Absolute   Date Value Ref Range Status   03/02/2025 4.04 (H) 1.20 - 3.90 10*3/uL Final      Monocytes Absolute   Date Value Ref Range Status   03/02/2025 0.52 0.30 - 0.90 10*3/uL Final     Eosinophils Absolute   Date Value Ref Range Status   03/02/2025 0.13 0.00 - 0.50 10*3/uL Final     Basophils Absolute   Date Value Ref Range Status   03/02/2025 0.09 0.00 - 0.10 10*3/uL Final     Immature Grans, Absolute   Date Value Ref Range Status   03/02/2025 0.04 0.00 - 0.06 10*3/uL Final     nRBC   Date Value Ref Range Status   03/05/2025 0 <=0.0 per 100 WBCs Final   12/05/2024 0.0 0.0 - 0.2 /100 WBC Final   LASTLAB(GLUCOSE,NA,K,CO2,CL,ANIONGAP,CREATININE,BUN,BCR,CALCIUM,EGFRIFNONA,ALKPHOS,PROTEINTOT,ALT,AST,BILITOT,ALBUMIN,GLOB,LABIL2)@  Total Cholesterol   Date Value Ref Range Status   10/11/2024 120 0 - 200 mg/dL Final     Triglycerides   Date Value Ref Range Status   10/11/2024 252 (H) 0 - 150 mg/dL Final     HDL Cholesterol   Date Value Ref Range Status   10/11/2024 49 40 - 60 mg/dL Final     LDL Cholesterol    Date Value Ref Range Status   10/11/2024 33 0 - 100 mg/dL Final     LDL Chol Calc (NIH)   Date Value Ref Range Status   11/12/2021 124 (H) 0 - 100 mg/dL Final       XR Abdomen 1 View  Result Date: 3/8/2025  Moderate colonic stool burden. Nonobstructive bowel gas pattern. No pneumoperitoneum. CRITICAL RESULT:   No. COMMUNICATION: Per this written report. Drafted by Carrie Fontanez MD on 3/8/2025 10:47 AM Final report signed by Carrie Fontanez MD on 3/8/2025 10:47 AM    FL Esophagram w Modified Barium Swallow Single Contrast  Result Date: 3/4/2025  Transient penetration of thin liquid consistency. No penetration or aspiration of other trialed barium media. Please see separate note by Speech therapy team for dietary recommendations. CRITICAL RESULT:   No. COMMUNICATION: Per this written report. By electronically signing this report, I, the attending physician, attest that I have personally reviewed the images/data for the above examination(s) and agree with the final edited report. Drafted by  Wicho Walker MD on 3/4/2025 3:13 PM Final report signed by Carrie Fontanez MD on 3/4/2025 3:23 PM    CT Angiogram Neck  Result Date: 3/4/2025  1. No significant stenosis, or obvious evidence of vascular injury within the visualized cervical carotid or vertebral vessels. 2. Enlarged thyroid gland with hypodense nodules within, suggestive of multinodular goiter. Recommend clinical correlation and further evaluation with ultrasound / histopathologic evaluation if clinically warranted. CRITICAL RESULT: No. COMMUNICATION: Per this written report. By electronically signing this report, I, the attending physician, attest that I have personally reviewed the images/data for the above examination(s) and agree with the final edited report. Drafted by Jermain Phan MD on 3/4/2025 9:34 AM Final report signed by Radha Spicer MD on 3/4/2025 10:45 AM    MRI Brain Without Contrast  Result Date: 3/4/2025  * There is no evidence of intracranial mass, hemorrhage, or acute infarction. CRITICAL RESULT: No. COMMUNICATION: Per this written report. Drafted by Néstor Andrade MD on 3/4/2025 8:19 AM Final report signed by Néstor Andrade MD on 3/4/2025 8:24 AM    XR Shoulder 2+ View Left  Result Date: 3/3/2025  No acute osseous findings. CRITICAL RESULT:   No. COMMUNICATION: Per this written report. Drafted by Mohan Sifuentes MD on 3/3/2025 1:05 PM Final report signed by Mohan Sifuentes MD on 3/3/2025 1:15 PM    XR Abdomen 1 View  Result Date: 3/2/2025  No suspicious retained metallic foreign bodies. CRITICAL RESULT:   No. COMMUNICATION: Per this written report. Drafted by Audi Chapman MD on 3/2/2025 8:59 PM Final report signed by Audi Chapman MD on 3/2/2025 9:00 PM    XR Chest 1 View  Result Date: 3/2/2025  No suspicious retained metallic foreign bodies. CRITICAL RESULT:   No. COMMUNICATION: Per this written report. Drafted by Audi Chapman MD on 3/2/2025 8:59 PM Final report signed by Audi  MD Adela on 3/2/2025 9:00 PM    CT Angiogram Neck  Result Date: 3/2/2025  Neck CTA: Markedly degraded study with nondiagnostic evaluation of major portions, especially at the levels of right common and subclavian artery origins, origins and proximal vertebral arteries and at bilateral distal common carotid and proximal cervical internal carotid arteries  This study may be repeated for better assessment if necessary. Head CTA: Focal calcification along the right pericallosal artery at proximal A4 level with probable severe stenosis in the adjacent portion. Otherwise no definite large vessel high-grade stenosis or occlusion. CRITICAL RESULT: No. COMMUNICATION: Per this written report. By electronically signing this report, I, the attending physician, attest that I have personally reviewed the images/data for the above examination(s) and agree with the final edited report. Drafted by Jaja Guzmán MD on 3/2/2025 11:49 AM Final report signed by Joshua Hill MD on 3/2/2025 1:14 PM    CT Angiogram Head  Result Date: 3/2/2025  Neck CTA: Markedly degraded study with nondiagnostic evaluation of major portions, especially at the levels of right common and subclavian artery origins, origins and proximal vertebral arteries and at bilateral distal common carotid and proximal cervical internal carotid arteries  This study may be repeated for better assessment if necessary. Head CTA: Focal calcification along the right pericallosal artery at proximal A4 level with probable severe stenosis in the adjacent portion. Otherwise no definite large vessel high-grade stenosis or occlusion. CRITICAL RESULT: No. COMMUNICATION: Per this written report. By electronically signing this report, I, the attending physician, attest that I have personally reviewed the images/data for the above examination(s) and agree with the final edited report. Drafted by Jaja Guzmán MD on 3/2/2025 11:49 AM Final report signed by Joshua Hill MD  on 3/2/2025 1:14 PM    CT Head Without Contrast  Result Date: 3/2/2025  Evaluation is limited by motion degradation and additional artifact. Within the significant limitations no definite large acute territorial infarction or acute large intracranial hemorrhage is appreciable. If necessary CT may be repeated or MRI study can be performed for further assessment if not contraindicated. CRITICAL RESULT:   No. COMMUNICATION: Per this written report. By electronically signing this report, I, the attending physician, attest that I have personally reviewed the images/data for the above examination(s) and agree with the final edited report. Drafted by Jaja Guzmán MD on 3/2/2025 11:40 AM Final report signed by Joshua Hill MD on 3/2/2025 12:11 PM            Assessment & Plan  There are no diagnoses linked to this encounter.  No diagnosis found.  Assessment & Plan  1. Suspected stroke.  Her blood pressure and heart rate are within normal limits today. She has a history of stent placement but is not experiencing any chest pain. An ultrasound conducted during her hospital stay revealed a strong cardiac pump function and overall healthy valves. A monitor was placed in November 2024, which did not indicate any significant arrhythmia. However, given her recent event, it would be prudent to repeat this test to rule out the possibility of an arrhythmia. She underwent a catheterization and stent placement in June 2024. Despite being a few months short of the one-year elda for discontinuing anticoagulant therapy, she is still classified as high risk due to the recent event. If she and her surgeon decide to proceed with surgery, they may consider stopping the anticoagulant therapy a few months earlier than planned. While this would still pose some risk, it would be lower than if they were to discontinue the therapy two months from now. A 2-week monitor will be placed on her to ensure there is no underlying arrhythmia that  could have contributed to or caused her recent event. An EKG will also be performed today.    2. Hypertension.  She reported a significant increase in blood pressure (228/197) during the recent event. She has been keeping a record of her blood pressure since returning home. No changes in medication were noted upon discharge from the hospital.    3. Thyroid nodules.  Her discharge summary indicates an enlarged thyroid gland with nodules. She has been advised to follow up with her primary care physician regarding these nodules. She has an appointment scheduled with her new primary care physician on Tuesday.    Follow-up  The patient will follow up in 1 month.    PROCEDURE  The patient underwent a catheterization and stent placement in June 2024.             {[facility] [employer] Provider Statement:[id number]}

## 2025-04-10 ENCOUNTER — TRANSCRIBE ORDERS (OUTPATIENT)
Dept: ADMINISTRATIVE | Facility: HOSPITAL | Age: 65
End: 2025-04-10
Payer: COMMERCIAL

## 2025-04-10 DIAGNOSIS — M75.02 ADHESIVE CAPSULITIS OF LEFT SHOULDER: ICD-10-CM

## 2025-04-10 DIAGNOSIS — Z12.31 VISIT FOR SCREENING MAMMOGRAM: Primary | ICD-10-CM

## 2025-04-23 ENCOUNTER — TELEPHONE (OUTPATIENT)
Dept: CARDIOLOGY | Facility: CLINIC | Age: 65
End: 2025-04-23
Payer: COMMERCIAL

## 2025-04-23 NOTE — TELEPHONE ENCOUNTER
Called patient to discuss results of Holter Monitor per Asia Hawkins. Patient verbalized understanding of results with no further questions at this time.

## 2025-05-02 ENCOUNTER — OFFICE VISIT (OUTPATIENT)
Dept: CARDIOLOGY | Facility: CLINIC | Age: 65
End: 2025-05-02
Payer: COMMERCIAL

## 2025-05-02 VITALS
HEART RATE: 70 BPM | BODY MASS INDEX: 26.91 KG/M2 | HEIGHT: 64 IN | WEIGHT: 157.6 LBS | DIASTOLIC BLOOD PRESSURE: 80 MMHG | OXYGEN SATURATION: 94 % | SYSTOLIC BLOOD PRESSURE: 134 MMHG

## 2025-05-02 DIAGNOSIS — I25.810 CORONARY ARTERY DISEASE INVOLVING CORONARY BYPASS GRAFT OF NATIVE HEART WITHOUT ANGINA PECTORIS: Primary | ICD-10-CM

## 2025-05-02 DIAGNOSIS — E11.65 TYPE 2 DIABETES MELLITUS WITH HYPERGLYCEMIA, WITH LONG-TERM CURRENT USE OF INSULIN: ICD-10-CM

## 2025-05-02 DIAGNOSIS — E78.5 DYSLIPIDEMIA: ICD-10-CM

## 2025-05-02 DIAGNOSIS — Z01.810 PREOPERATIVE CARDIOVASCULAR EXAMINATION: ICD-10-CM

## 2025-05-02 DIAGNOSIS — Z79.4 TYPE 2 DIABETES MELLITUS WITH HYPERGLYCEMIA, WITH LONG-TERM CURRENT USE OF INSULIN: ICD-10-CM

## 2025-05-02 DIAGNOSIS — I10 ESSENTIAL HYPERTENSION: ICD-10-CM

## 2025-05-02 RX ORDER — DAPAGLIFLOZIN 10 MG/1
1 TABLET, FILM COATED ORAL DAILY
COMMUNITY
Start: 2025-04-10

## 2025-05-02 RX ORDER — SEMAGLUTIDE 0.68 MG/ML
INJECTION, SOLUTION SUBCUTANEOUS
COMMUNITY
Start: 2025-04-24

## 2025-05-02 NOTE — PROGRESS NOTES
Subjective     Monisha Gardner is a 64 y.o. female.   Chief Complaint   Patient presents with    Coronary Artery Disease      History of Present Illness   History of Present Illness  Monisha Gardner is a 64-year-old female who presents to clinic today for cardiology follow up.       She experienced an episode on 02/28/2025, initially suspected to be a stroke due to the presence of all associated symptoms. However, subsequent scans revealed no abnormalities. She was unable to walk and perform activities of daily living, necessitating a week-long stay in rehabilitation and a total hospital stay of 21 days. The cause of her symptoms was attributed to psychological factors, with her body not exhibiting signs of a stroke. She recalls waking up one Sunday with a blood pressure reading of 228/197, accompanied by dizziness, nausea, and excessive sweating. She sought fresh air outside but continued to feel unwell upon returning indoors. At this point, she noticed significant bruising, although she does not recall falling. She checked her blood pressure and contacted her daughter and grandson. She reports no significant stressors or life events prior to the onset of her symptoms. She does not recall being found unresponsive later that day. She is currently on anticoagulant therapy and has been off work for 6 weeks. Her primary care physician has granted her a month's leave from work. She completed a monitor due to this episode and returns today to discuss results.  She denies recurrence of symptoms.      CAD currently on ASA, Plavix, Imdur, Ranexa, Lopressor and Repatha (statin intolerant).  Reports compliance with medicine. She denies chest pain, dyspnea, palpitations or syncope. She denies nitroglycerin use.  Cardiac cath on 6/20/2024 with 80% in-stent restenosis of proximal/ostial RCA stented with 3.5 x 38 mm VITALIY and reduced to no significant residual disease.  Nonobstructive disease in left coronary system and a  calculated LVEF is 65%.      Hypertension reports that her blood pressure and heart rate are well-controlled at home. She has been monitoring her blood pressure at home.      Hyperlipidemia with statin intolerance. She is on cholesterol injections and believes her cholesterol levels are good. She had her cholesterol checked in October 2024.     CKD and was previously following with nephrology and has re-established care with [facility].     She is needing to have oral surgery performed however we have been waiting until 1 year status post stents so she can discontinue her Plavix. She is coming upon that year.  Due to this event and her jaw she feels her speech is continuing to be affected and plans to see speech therapy.     She states when she had her episode/fall she has continued to have left shoulder pain and is recently had an MRI and has a tear and plans on seeing orthopedic in the near future.      Patient Active Problem List   Diagnosis    ASCVD (arteriosclerotic cardiovascular disease)    Essential hypertension    Dyslipidemia    Type 2 diabetes mellitus with hyperglycemia, with long-term current use of insulin    Body mass index (bmi) 30.0-30.9, adult    Osteoarthritis    Atypical angina    Elevated liver enzymes    Gastroesophageal reflux disease with esophagitis    Vitamin D deficiency    Hx of pancreatitis    Menopausal symptoms    Vitamin B 12 deficiency    Nocturnal leg cramps    Anxiety    Left arm weakness    Chronic gout of multiple sites    Squamous cell carcinoma in situ (SCCIS) of skin    Mixed hyperlipidemia    PVC (premature ventricular contraction)    RLS (restless legs syndrome)    Psychophysiological insomnia    Dry mouth    Type 2 diabetes mellitus with diabetic autonomic neuropathy, with long-term current use of insulin    Bone mass    Mass of mandible    Coronary artery disease    Abnormal nuclear stress test    CAD (coronary artery disease)    Falls    Closed fracture of  coccyx with routine healing    Thyroid nodule     Past Medical History:   Diagnosis Date    Anxiety     Arthritis     Diabetes mellitus     Diverticulosis     GERD (gastroesophageal reflux disease)     Gout     Hyperlipidemia     Hypertension     Myocardial infarction      Past Surgical History:   Procedure Laterality Date    APPENDECTOMY      BREAST BIOPSY Right     benign    CARDIAC CATHETERIZATION      CARDIAC CATHETERIZATION N/A 2024    Procedure: Left Heart Cath;  Surgeon: Derrick Watts MD;  Location:  [facility];  Service: Cardiology;  Laterality: N/A;    CARDIAC CATHETERIZATION N/A 2024    Procedure: Stent VITALIY coronary;  Surgeon: Derrick Watts MD;  Location:  [facility];  Service: Cardiology;  Laterality: N/A;     SECTION      CHOLECYSTECTOMY      CORONARY STENT PLACEMENT      Stenting of the RCA with 3.5 x 26 mm Resolute drug-eluting stent    HYSTERECTOMY      JOINT REPLACEMENT      TONSILLECTOMY      TUMOR REMOVAL  02/15/2024    FACIAL TUMOR       Family History   Problem Relation Age of Onset    Arthritis Mother     Heart disease Mother     Heart disease Sister     Vision loss Daughter     Heart disease Father     Heart disease Sister     Heart disease Sister     Heart disease Sister      Social History     Tobacco Use    Smoking status: Never     Passive exposure: Never    Smokeless tobacco: Never   Vaping Use    Vaping status: Never Used   Substance Use Topics    Alcohol use: No    Drug use: No         The following portions of the patient's history were reviewed and updated as appropriate: allergies, current medications, past family history, past medical history, past social history, past surgical history and problem list.    Allergies   Allergen Reactions    Ciprofloxacin Anaphylaxis    Penicillins Shortness Of Breath    Statins Myalgia    Fish Oil [Omega-3 Fatty Acids] Nausea And Vomiting    Fish Oil Nausea Only    Lyrica [Pregabalin] Mental Status  Change    Wellbutrin [Bupropion] Mental Status Change         Current Outpatient Medications:     allopurinol (ZYLOPRIM) 100 MG tablet, TAKE 1 TABLET BY MOUTH TWICE DAILY, Disp: 180 tablet, Rfl: 3    amLODIPine (NORVASC) 2.5 MG tablet, TAKE 1 TABLET BY MOUTH DAILY, Disp: 90 tablet, Rfl: 0    aspirin 81 MG EC tablet, Take 1 tablet by mouth Daily., Disp: 100 tablet, Rfl: 3    clopidogrel (PLAVIX) 75 MG tablet, Take 1 tablet by mouth Daily., Disp: 90 tablet, Rfl: 3    DULoxetine (CYMBALTA) 30 MG capsule, TAKE 3 CAPSULES BY MOUTH EVERY DAY, Disp: 360 capsule, Rfl: 1    Evolocumab (REPATHA) solution auto-injector SureClick injection, Inject 1 mL under the skin into the appropriate area as directed Every 14 (Fourteen) Days., Disp: 6 mL, Rfl: 2    Farxiga 10 MG tablet, Take 10 mg by mouth Daily., Disp: , Rfl:     fenofibrate 160 MG tablet, TAKE 1 TABLET BY MOUTH EVERY DAY, Disp: 90 tablet, Rfl: 0    Finerenone (Kerendia) 10 MG tablet, Take 1 tablet by mouth Daily., Disp: 30 tablet, Rfl: 3    fluconazole (Diflucan) 150 MG tablet, Take 1 tablet by mouth Daily., Disp: 3 tablet, Rfl: 0    gabapentin (NEURONTIN) 600 MG tablet, Take 1 tablet by mouth 2 (Two) Times a Day., Disp: 60 tablet, Rfl: 2    hydrOXYzine pamoate (VISTARIL) 50 MG capsule, TAKE 1 CAPSULE BY MOUTH THREE TIMES DAILY AS NEEDED FOR ITCHING, Disp: 270 capsule, Rfl: 0    isosorbide mononitrate (IMDUR) 30 MG 24 hr tablet, Take 1 tablet by mouth Daily., Disp: 90 tablet, Rfl: 1    losartan (COZAAR) 100 MG tablet, Take 1 tablet by mouth Daily., Disp: 30 tablet, Rfl: 5    metoprolol tartrate (LOPRESSOR) 100 MG tablet, TAKE 1 TABLET BY MOUTH TWICE DAILY, Disp: 180 tablet, Rfl: 0    metroNIDAZOLE (Flagyl) 500 MG tablet, Take 1 tablet by mouth 2 (Two) Times a Day., Disp: 14 tablet, Rfl: 0    ondansetron (Zofran) 4 MG tablet, Take 1 tablet by mouth Every 8 (Eight) Hours As Needed for Nausea or Vomiting., Disp: 30 tablet, Rfl: 0    Ozempic, 0.25 or 0.5 MG/DOSE, 2 MG/3ML  "solution pen-injector, INJECT 0.5 MG UNDER THE SKIN ONCE EVERY WEEK, Disp: , Rfl:     pantoprazole (PROTONIX) 40 MG EC tablet, Take 1 tablet by mouth Daily., Disp: 90 tablet, Rfl: 3    ranolazine (Ranexa) 500 MG 12 hr tablet, Take 1 tablet by mouth 2 (Two) Times a Day., Disp: 60 tablet, Rfl: 5    rOPINIRole (REQUIP) 2 MG tablet, Take 1 tablet by mouth Every Night., Disp: 90 tablet, Rfl: 3    Review of Systems   Constitutional:  Negative for activity change, appetite change, chills, diaphoresis, fatigue and fever.   HENT:  Negative for congestion, drooling, ear discharge, ear pain, mouth sores, nosebleeds, postnasal drip, rhinorrhea, sinus pressure, sneezing and sore throat.    Eyes:  Negative for pain, discharge and visual disturbance.   Respiratory:  Negative for cough, chest tightness, shortness of breath and wheezing.    Cardiovascular:  Negative for chest pain, palpitations and leg swelling.   Gastrointestinal:  Negative for abdominal pain, constipation, diarrhea, nausea and vomiting.   Endocrine: Negative for cold intolerance, heat intolerance, polydipsia, polyphagia and polyuria.   Musculoskeletal:  Negative for arthralgias, myalgias and neck pain.   Skin:  Negative for rash and wound.   Neurological:  Negative for dizziness, syncope, speech difficulty, weakness, light-headedness and headaches.   Hematological:  Negative for adenopathy. Does not bruise/bleed easily.   Psychiatric/Behavioral:  Negative for confusion, dysphoric mood and sleep disturbance. The patient is not nervous/anxious.    All other systems reviewed and are negative.    /80 (BP Location: Left arm, Patient Position: Sitting, Cuff Size: Adult)   Pulse 70   Ht 162.6 cm (64\")   Wt 71.5 kg (157 lb 9.6 oz)   LMP  (LMP Unknown)   SpO2 94%   BMI 27.05 kg/m²     Objective   Allergies   Allergen Reactions    Ciprofloxacin Anaphylaxis    Penicillins Shortness Of Breath    Statins Myalgia    Fish Oil [Omega-3 Fatty Acids] Nausea And Vomiting "    Fish Oil Nausea Only    Lyrica [Pregabalin] Mental Status Change    Wellbutrin [Bupropion] Mental Status Change     Physical Exam  Vitals reviewed.   Constitutional:       Appearance: Normal appearance. She is well-developed.   HENT:      Head: Normocephalic.      Right Ear: Tympanic membrane normal.      Left Ear: Tympanic membrane normal.      Nose: Nose normal.   Eyes:      Conjunctiva/sclera: Conjunctivae normal.      Pupils: Pupils are equal, round, and reactive to light.   Neck:      Thyroid: No thyromegaly.      Vascular: No carotid bruit or JVD.   Cardiovascular:      Rate and Rhythm: Normal rate and regular rhythm.   Pulmonary:      Effort: Pulmonary effort is normal.      Breath sounds: Normal breath sounds.   Abdominal:      General: Bowel sounds are normal.      Palpations: Abdomen is soft. There is no hepatomegaly, splenomegaly or mass.      Tenderness: There is no abdominal tenderness.   Musculoskeletal:         General: Normal range of motion.      Cervical back: Neck supple.      Right lower leg: No edema.      Left lower leg: No edema.   Skin:     General: Skin is warm and dry.   Neurological:      Mental Status: She is alert and oriented to person, place, and time.   Psychiatric:         Attention and Perception: Attention normal.         Mood and Affect: Mood normal.         Speech: Speech normal.         Behavior: Behavior normal. Behavior is cooperative.         Cognition and Memory: Cognition normal.         LABS  WBC   Date Value Ref Range Status   03/05/2025 9.55 3.70 - 10.30 10*3/uL Final     RBC   Date Value Ref Range Status   03/05/2025 4.93 3.90 - 5.20 10*6/uL Final     Hemoglobin   Date Value Ref Range Status   03/05/2025 13.5 11.2 - 15.7 g/dL Final     Hematocrit   Date Value Ref Range Status   03/05/2025 41.2 34.0 - 45.0 % Final     MCV   Date Value Ref Range Status   03/05/2025 84 79 - 98 fL Final     MCH   Date Value Ref Range Status   03/05/2025 27.4 26.0 - 32.0 pg Final     MCHC    Date Value Ref Range Status   03/05/2025 32.8 30.7 - 35.5 g/dL Final     RDW   Date Value Ref Range Status   03/05/2025 14.8 (H) 11.5 - 14.5 % Final     RDW-SD   Date Value Ref Range Status   12/05/2024 42.9 37.0 - 54.0 fl Final     MPV   Date Value Ref Range Status   03/05/2025 9.1 8.8 - 12.5 fL Final     Platelets   Date Value Ref Range Status   03/05/2025 313 155 - 369 10*3/uL Final     Neutrophil Rel %   Date Value Ref Range Status   03/02/2025 44 % Final     Lymphocyte Rel %   Date Value Ref Range Status   03/02/2025 46 % Final     Monocyte Rel %   Date Value Ref Range Status   03/02/2025 6 % Final     Eosinophil %   Date Value Ref Range Status   03/02/2025 2 % Final     Basophil Rel %   Date Value Ref Range Status   03/02/2025 1 % Final     Immature Grans %   Date Value Ref Range Status   03/02/2025 1 % Final     Neutrophils Absolute   Date Value Ref Range Status   03/02/2025 3.82 1.60 - 6.10 10*3/uL Final     Lymphocytes Absolute   Date Value Ref Range Status   03/02/2025 4.04 (H) 1.20 - 3.90 10*3/uL Final     Monocytes Absolute   Date Value Ref Range Status   03/02/2025 0.52 0.30 - 0.90 10*3/uL Final     Eosinophils Absolute   Date Value Ref Range Status   03/02/2025 0.13 0.00 - 0.50 10*3/uL Final     Basophils Absolute   Date Value Ref Range Status   03/02/2025 0.09 0.00 - 0.10 10*3/uL Final     Immature Grans, Absolute   Date Value Ref Range Status   03/02/2025 0.04 0.00 - 0.06 10*3/uL Final     nRBC   Date Value Ref Range Status   03/05/2025 0 <=0.0 per 100 WBCs Final   12/05/2024 0.0 0.0 - 0.2 /100 WBC Final       Total Cholesterol   Date Value Ref Range Status   10/11/2024 120 0 - 200 mg/dL Final     Triglycerides   Date Value Ref Range Status   10/11/2024 252 (H) 0 - 150 mg/dL Final     HDL Cholesterol   Date Value Ref Range Status   10/11/2024 49 40 - 60 mg/dL Final     LDL Cholesterol    Date Value Ref Range Status   10/11/2024 33 0 - 100 mg/dL Final     LDL Chol Calc ([facility])   Date Value Ref Range  Status   11/12/2021 124 (H) 0 - 100 mg/dL Final     IMAGING   XR Abdomen 1 View  Result Date: 3/8/2025  Moderate colonic stool burden. Nonobstructive bowel gas pattern. No pneumoperitoneum. CRITICAL RESULT:   No. COMMUNICATION: Per this written report. Drafted by Carrie Fontanez MD on 3/8/2025 10:47 AM Final report signed by Carrie Fontanez MD on 3/8/2025 10:47 AM    FL Esophagram w Modified Barium Swallow Single Contrast  Result Date: 3/4/2025  Transient penetration of thin liquid consistency. No penetration or aspiration of other trialed barium media. Please see separate note by Speech therapy team for dietary recommendations. CRITICAL RESULT:   No. COMMUNICATION: Per this written report. By electronically signing this report, I, the attending physician, attest that I have personally reviewed the images/data for the above examination(s) and agree with the final edited report. Drafted by Wicho Walker MD on 3/4/2025 3:13 PM Final report signed by Carrie Fontanez MD on 3/4/2025 3:23 PM    CT Angiogram Neck  Result Date: 3/4/2025  1. No significant stenosis, or obvious evidence of vascular injury within the visualized cervical carotid or vertebral vessels. 2. Enlarged thyroid gland with hypodense nodules within, suggestive of multinodular goiter. Recommend clinical correlation and further evaluation with ultrasound / histopathologic evaluation if clinically warranted. CRITICAL RESULT: No. COMMUNICATION: Per this written report. By electronically signing this report, I, the attending physician, attest that I have personally reviewed the images/data for the above examination(s) and agree with the final edited report. Drafted by Jermain Phan MD on 3/4/2025 9:34 AM Final report signed by Radha Spicer MD on 3/4/2025 10:45 AM    MRI Brain Without Contrast  Result Date: 3/4/2025  * There is no evidence of intracranial mass, hemorrhage, or acute infarction. CRITICAL RESULT: No. COMMUNICATION: Per this written  report. Drafted by Néstor Andrade MD on 3/4/2025 8:19 AM Final report signed by Néstor Andrade MD on 3/4/2025 8:24 AM    XR Shoulder 2+ View Left  Result Date: 3/3/2025  No acute osseous findings. CRITICAL RESULT:   No. COMMUNICATION: Per this written report. Drafted by Mohan Sifuentes MD on 3/3/2025 1:05 PM Final report signed by Mohan Sifuentes MD on 3/3/2025 1:15 PM    XR Abdomen 1 View  Result Date: 3/2/2025  No suspicious retained metallic foreign bodies. CRITICAL RESULT:   No. COMMUNICATION: Per this written report. Drafted by Audi Chapman MD on 3/2/2025 8:59 PM Final report signed by Audi Chapman MD on 3/2/2025 9:00 PM    XR Chest 1 View  Result Date: 3/2/2025  No suspicious retained metallic foreign bodies. CRITICAL RESULT:   No. COMMUNICATION: Per this written report. Drafted by Audi Chapman MD on 3/2/2025 8:59 PM Final report signed by Audi Chapman MD on 3/2/2025 9:00 PM    CT Angiogram Neck  Result Date: 3/2/2025  Neck CTA: Markedly degraded study with nondiagnostic evaluation of major portions, especially at the levels of right common and subclavian artery origins, origins and proximal vertebral arteries and at bilateral distal common carotid and proximal cervical internal carotid arteries  This study may be repeated for better assessment if necessary. Head CTA: Focal calcification along the right pericallosal artery at proximal A4 level with probable severe stenosis in the adjacent portion. Otherwise no definite large vessel high-grade stenosis or occlusion. CRITICAL RESULT: No. COMMUNICATION: Per this written report. By electronically signing this report, I, the attending physician, attest that I have personally reviewed the images/data for the above examination(s) and agree with the final edited report. Drafted by Jaja Guzmán MD on 3/2/2025 11:49 AM Final report signed by Joshua Hill MD on 3/2/2025 1:14 PM    CT Angiogram Head  Result Date:  3/2/2025  Neck CTA: Markedly degraded study with nondiagnostic evaluation of major portions, especially at the levels of right common and subclavian artery origins, origins and proximal vertebral arteries and at bilateral distal common carotid and proximal cervical internal carotid arteries  This study may be repeated for better assessment if necessary. Head CTA: Focal calcification along the right pericallosal artery at proximal A4 level with probable severe stenosis in the adjacent portion. Otherwise no definite large vessel high-grade stenosis or occlusion. CRITICAL RESULT: No. COMMUNICATION: Per this written report. By electronically signing this report, I, the attending physician, attest that I have personally reviewed the images/data for the above examination(s) and agree with the final edited report. Drafted by Jaja Guzmán MD on 3/2/2025 11:49 AM Final report signed by Joshua Hill MD on 3/2/2025 1:14 PM    CT Head Without Contrast  Result Date: 3/2/2025  Evaluation is limited by motion degradation and additional artifact. Within the significant limitations no definite large acute territorial infarction or acute large intracranial hemorrhage is appreciable. If necessary CT may be repeated or MRI study can be performed for further assessment if not contraindicated. CRITICAL RESULT:   No. COMMUNICATION: Per this written report. By electronically signing this report, I, the attending physician, attest that I have personally reviewed the images/data for the above examination(s) and agree with the final edited report. Drafted by Jaja Guzmán MD on 3/2/2025 11:40 AM Final report signed by Joshua Hill MD on 3/2/2025 12:11 PM    Holter Monitor 4/22/2025  Interpretation Summary       A relatively benign monitor study.    The patient was monitored for 4 days 23 hours and 12 minutes.    The predominant rhythm noted during the testing period was sinus rhythm.    Average HR: 74. Min HR: 52. Max HR:  110.    No atrial fibrillation/flutter or pause detected.    Occasional PACs were noted with PAC burden 0.02% 2 atrial couplets were noted.    One 3 beat run of supraventricular arrhythmia at a rate of 93 bpm was noted.    Patient recorded 6 events.  No EKG correlation detected.             Assessment & Plan   Diagnoses and all orders for this visit:    1. Coronary artery disease involving coronary bypass graft of native heart without angina pectoris (Primary)  Clinically asymptomatic   continue current therapy    2. Essential hypertension  Well-controlled, continue current therapy  Sodium restrictions  Routine monitoring recommend    3. Dyslipidemia  Continue on statin, heart healthy diet    4. Type 2 diabetes mellitus with hyperglycemia, with long-term current use of insulin  Management by primary    5. Preoperative cardiovascular examination  Will readdress at follow-up visit       Assessment & Plan    Review of medical record including Holter monitor    Aggressive risk factor modification including heart healthy diet, regular exercise, maintenance of desirable body weight and avoidance of tobacco product    Follow-up in 6 weeks, sooner if needed

## 2025-05-02 NOTE — LETTER
May 15, 2025     Silvia Taveras DO  [address] [facility] [address]    Patient: Monisha Gardner   YOB: 1960   Date of Visit: 5/2/2025       Dear Silvia Taveras DO,    Monisha Gardner was in my office today. Below are the relevant portions of my assessment and plan of care.           If you have questions, please do not hesitate to call me. I look forward to following Monisha along with you.         Sincerely,        TRUONG Briones        CC: No Recipients

## 2025-05-02 NOTE — Clinical Note
May 15, 2025     Silvia Taveras DO  [address] [facility] [address]    Patient: Monisha Gardner   YOB: 1960   Date of Visit: 5/2/2025       Dear Silvia Taveras DO    Monisha Gardner was in my office today. Below is a copy of my note.    If you have questions, please do not hesitate to call me. I look forward to following Monisha along with you.         Sincerely,        TRUONG Briones        CC: No Recipients    Subjective    Monisha Gardner is a 64 y.o. female.   Chief Complaint   Patient presents with    Coronary Artery Disease          History of Present Illness   History of Present Illness        Patient Active Problem List   Diagnosis    ASCVD (arteriosclerotic cardiovascular disease)    Essential hypertension    Dyslipidemia    Type 2 diabetes mellitus with hyperglycemia, with long-term current use of insulin    Body mass index (bmi) 30.0-30.9, adult    Osteoarthritis    Atypical angina    Elevated liver enzymes    Gastroesophageal reflux disease with esophagitis    Vitamin D deficiency    Hx of pancreatitis    Menopausal symptoms    Vitamin B 12 deficiency    Nocturnal leg cramps    Anxiety    Left arm weakness    Chronic gout of multiple sites    Squamous cell carcinoma in situ (SCCIS) of skin    Mixed hyperlipidemia    PVC (premature ventricular contraction)    RLS (restless legs syndrome)    Psychophysiological insomnia    Dry mouth    Type 2 diabetes mellitus with diabetic autonomic neuropathy, with long-term current use of insulin    Bone mass    Mass of mandible    Coronary artery disease    Abnormal nuclear stress test    CAD (coronary artery disease)    Falls    Closed fracture of coccyx with routine healing    Thyroid nodule     Past Medical History:   Diagnosis Date    Anxiety     Arthritis     Diabetes mellitus     Diverticulosis     GERD (gastroesophageal reflux disease)     Gout     Hyperlipidemia     Hypertension     Myocardial infarction      Past Surgical  History:   Procedure Laterality Date    APPENDECTOMY      BREAST BIOPSY Right     benign    CARDIAC CATHETERIZATION      CARDIAC CATHETERIZATION N/A 2024    Procedure: Left Heart Cath;  Surgeon: Derrick Watts MD;  Location:  [facility];  Service: Cardiology;  Laterality: N/A;    CARDIAC CATHETERIZATION N/A 2024    Procedure: Stent VITALIY coronary;  Surgeon: Derrick Watts MD;  Location:  [facility];  Service: Cardiology;  Laterality: N/A;     SECTION      CHOLECYSTECTOMY      CORONARY STENT PLACEMENT      Stenting of the RCA with 3.5 x 26 mm Resolute drug-eluting stent    HYSTERECTOMY      JOINT REPLACEMENT      TONSILLECTOMY      TUMOR REMOVAL  02/15/2024    FACIAL TUMOR       Family History   Problem Relation Age of Onset    Arthritis Mother     Heart disease Mother     Heart disease Sister     Vision loss Daughter     Heart disease Father     Heart disease Sister     Heart disease Sister     Heart disease Sister      Social History     Tobacco Use    Smoking status: Never     Passive exposure: Never    Smokeless tobacco: Never   Vaping Use    Vaping status: Never Used   Substance Use Topics    Alcohol use: No    Drug use: No         The following portions of the patient's history were reviewed and updated as appropriate: allergies, current medications, past family history, past medical history, past social history, past surgical history and problem list.    Allergies   Allergen Reactions    Ciprofloxacin Anaphylaxis    Penicillins Shortness Of Breath    Statins Myalgia    Fish Oil [Omega-3 Fatty Acids] Nausea And Vomiting    Fish Oil Nausea Only    Lyrica [Pregabalin] Mental Status Change    Wellbutrin [Bupropion] Mental Status Change         Current Outpatient Medications:     allopurinol (ZYLOPRIM) 100 MG tablet, TAKE 1 TABLET BY MOUTH TWICE DAILY, Disp: 180 tablet, Rfl: 3    amLODIPine (NORVASC) 2.5 MG tablet, TAKE 1 TABLET BY MOUTH DAILY, Disp: 90 tablet, Rfl:  0    aspirin 81 MG EC tablet, Take 1 tablet by mouth Daily., Disp: 100 tablet, Rfl: 3    clopidogrel (PLAVIX) 75 MG tablet, Take 1 tablet by mouth Daily., Disp: 90 tablet, Rfl: 3    DULoxetine (CYMBALTA) 30 MG capsule, TAKE 3 CAPSULES BY MOUTH EVERY DAY, Disp: 360 capsule, Rfl: 1    Evolocumab (REPATHA) solution auto-injector SureClick injection, Inject 1 mL under the skin into the appropriate area as directed Every 14 (Fourteen) Days., Disp: 6 mL, Rfl: 2    Farxiga 10 MG tablet, Take 10 mg by mouth Daily., Disp: , Rfl:     fenofibrate 160 MG tablet, TAKE 1 TABLET BY MOUTH EVERY DAY, Disp: 90 tablet, Rfl: 0    Finerenone (Kerendia) 10 MG tablet, Take 1 tablet by mouth Daily., Disp: 30 tablet, Rfl: 3    fluconazole (Diflucan) 150 MG tablet, Take 1 tablet by mouth Daily., Disp: 3 tablet, Rfl: 0    gabapentin (NEURONTIN) 600 MG tablet, Take 1 tablet by mouth 2 (Two) Times a Day., Disp: 60 tablet, Rfl: 2    hydrOXYzine pamoate (VISTARIL) 50 MG capsule, TAKE 1 CAPSULE BY MOUTH THREE TIMES DAILY AS NEEDED FOR ITCHING, Disp: 270 capsule, Rfl: 0    isosorbide mononitrate (IMDUR) 30 MG 24 hr tablet, Take 1 tablet by mouth Daily., Disp: 90 tablet, Rfl: 1    losartan (COZAAR) 100 MG tablet, Take 1 tablet by mouth Daily., Disp: 30 tablet, Rfl: 5    metoprolol tartrate (LOPRESSOR) 100 MG tablet, TAKE 1 TABLET BY MOUTH TWICE DAILY, Disp: 180 tablet, Rfl: 0    metroNIDAZOLE (Flagyl) 500 MG tablet, Take 1 tablet by mouth 2 (Two) Times a Day., Disp: 14 tablet, Rfl: 0    ondansetron (Zofran) 4 MG tablet, Take 1 tablet by mouth Every 8 (Eight) Hours As Needed for Nausea or Vomiting., Disp: 30 tablet, Rfl: 0    Ozempic, 0.25 or 0.5 MG/DOSE, 2 MG/3ML solution pen-injector, INJECT 0.5 MG UNDER THE SKIN ONCE EVERY WEEK, Disp: , Rfl:     pantoprazole (PROTONIX) 40 MG EC tablet, Take 1 tablet by mouth Daily., Disp: 90 tablet, Rfl: 3    ranolazine (Ranexa) 500 MG 12 hr tablet, Take 1 tablet by mouth 2 (Two) Times a Day., Disp: 60 tablet,  "Rfl: 5    rOPINIRole (REQUIP) 2 MG tablet, Take 1 tablet by mouth Every Night., Disp: 90 tablet, Rfl: 3    Review of Systems    /80 (BP Location: Left arm, Patient Position: Sitting, Cuff Size: Adult)   Pulse 70   Ht 162.6 cm (64\")   Wt 71.5 kg (157 lb 9.6 oz)   LMP  (LMP Unknown)   SpO2 94%   BMI 27.05 kg/m²     Objective  Allergies   Allergen Reactions    Ciprofloxacin Anaphylaxis    Penicillins Shortness Of Breath    Statins Myalgia    Fish Oil [Omega-3 Fatty Acids] Nausea And Vomiting    Fish Oil Nausea Only    Lyrica [Pregabalin] Mental Status Change    Wellbutrin [Bupropion] Mental Status Change       Physical Exam    Procedures    [unfilled]  WBC   Date Value Ref Range Status   03/05/2025 9.55 3.70 - 10.30 10*3/uL Final     RBC   Date Value Ref Range Status   03/05/2025 4.93 3.90 - 5.20 10*6/uL Final     Hemoglobin   Date Value Ref Range Status   03/05/2025 13.5 11.2 - 15.7 g/dL Final     Hematocrit   Date Value Ref Range Status   03/05/2025 41.2 34.0 - 45.0 % Final     MCV   Date Value Ref Range Status   03/05/2025 84 79 - 98 fL Final     MCH   Date Value Ref Range Status   03/05/2025 27.4 26.0 - 32.0 pg Final     MCHC   Date Value Ref Range Status   03/05/2025 32.8 30.7 - 35.5 g/dL Final     RDW   Date Value Ref Range Status   03/05/2025 14.8 (H) 11.5 - 14.5 % Final     RDW-SD   Date Value Ref Range Status   12/05/2024 42.9 37.0 - 54.0 fl Final     MPV   Date Value Ref Range Status   03/05/2025 9.1 8.8 - 12.5 fL Final     Platelets   Date Value Ref Range Status   03/05/2025 313 155 - 369 10*3/uL Final     Neutrophil Rel %   Date Value Ref Range Status   03/02/2025 44 % Final     Lymphocyte Rel %   Date Value Ref Range Status   03/02/2025 46 % Final     Monocyte Rel %   Date Value Ref Range Status   03/02/2025 6 % Final     Eosinophil %   Date Value Ref Range Status   03/02/2025 2 % Final     Basophil Rel %   Date Value Ref Range Status   03/02/2025 1 % Final     Immature Grans %   Date Value " Ref Range Status   03/02/2025 1 % Final     Neutrophils Absolute   Date Value Ref Range Status   03/02/2025 3.82 1.60 - 6.10 10*3/uL Final     Lymphocytes Absolute   Date Value Ref Range Status   03/02/2025 4.04 (H) 1.20 - 3.90 10*3/uL Final     Monocytes Absolute   Date Value Ref Range Status   03/02/2025 0.52 0.30 - 0.90 10*3/uL Final     Eosinophils Absolute   Date Value Ref Range Status   03/02/2025 0.13 0.00 - 0.50 10*3/uL Final     Basophils Absolute   Date Value Ref Range Status   03/02/2025 0.09 0.00 - 0.10 10*3/uL Final     Immature Grans, Absolute   Date Value Ref Range Status   03/02/2025 0.04 0.00 - 0.06 10*3/uL Final     nRBC   Date Value Ref Range Status   03/05/2025 0 <=0.0 per 100 WBCs Final   12/05/2024 0.0 0.0 - 0.2 /100 WBC Final   LASTLAB(GLUCOSE,NA,K,CO2,CL,ANIONGAP,CREATININE,BUN,BCR,CALCIUM,EGFRIFNONA,ALKPHOS,PROTEINTOT,ALT,AST,BILITOT,ALBUMIN,GLOB,LABIL2)@  Total Cholesterol   Date Value Ref Range Status   10/11/2024 120 0 - 200 mg/dL Final     Triglycerides   Date Value Ref Range Status   10/11/2024 252 (H) 0 - 150 mg/dL Final     HDL Cholesterol   Date Value Ref Range Status   10/11/2024 49 40 - 60 mg/dL Final     LDL Cholesterol    Date Value Ref Range Status   10/11/2024 33 0 - 100 mg/dL Final     LDL Chol Calc (NIH)   Date Value Ref Range Status   11/12/2021 124 (H) 0 - 100 mg/dL Final       XR Abdomen 1 View  Result Date: 3/8/2025  Moderate colonic stool burden. Nonobstructive bowel gas pattern. No pneumoperitoneum. CRITICAL RESULT:   No. COMMUNICATION: Per this written report. Drafted by Carrie Fontanez MD on 3/8/2025 10:47 AM Final report signed by Carrie Fontanez MD on 3/8/2025 10:47 AM    FL Esophagram w Modified Barium Swallow Single Contrast  Result Date: 3/4/2025  Transient penetration of thin liquid consistency. No penetration or aspiration of other trialed barium media. Please see separate note by Speech therapy team for dietary recommendations. CRITICAL RESULT:   No.  COMMUNICATION: Per this written report. By electronically signing this report, I, the attending physician, attest that I have personally reviewed the images/data for the above examination(s) and agree with the final edited report. Drafted by Wicho Walker MD on 3/4/2025 3:13 PM Final report signed by Carrie Fontanez MD on 3/4/2025 3:23 PM    CT Angiogram Neck  Result Date: 3/4/2025  1. No significant stenosis, or obvious evidence of vascular injury within the visualized cervical carotid or vertebral vessels. 2. Enlarged thyroid gland with hypodense nodules within, suggestive of multinodular goiter. Recommend clinical correlation and further evaluation with ultrasound / histopathologic evaluation if clinically warranted. CRITICAL RESULT: No. COMMUNICATION: Per this written report. By electronically signing this report, I, the attending physician, attest that I have personally reviewed the images/data for the above examination(s) and agree with the final edited report. Drafted by Jermain Phan MD on 3/4/2025 9:34 AM Final report signed by Radha Spicer MD on 3/4/2025 10:45 AM    MRI Brain Without Contrast  Result Date: 3/4/2025  * There is no evidence of intracranial mass, hemorrhage, or acute infarction. CRITICAL RESULT: No. COMMUNICATION: Per this written report. Drafted by Néstor Andrade MD on 3/4/2025 8:19 AM Final report signed by Néstor Andrade MD on 3/4/2025 8:24 AM    XR Shoulder 2+ View Left  Result Date: 3/3/2025  No acute osseous findings. CRITICAL RESULT:   No. COMMUNICATION: Per this written report. Drafted by Mohan Sifuentes MD on 3/3/2025 1:05 PM Final report signed by Mohan Sifuentes MD on 3/3/2025 1:15 PM    XR Abdomen 1 View  Result Date: 3/2/2025  No suspicious retained metallic foreign bodies. CRITICAL RESULT:   No. COMMUNICATION: Per this written report. Drafted by Audi Chapman MD on 3/2/2025 8:59 PM Final report signed by Audi Chapman MD on 3/2/2025  9:00 PM    XR Chest 1 View  Result Date: 3/2/2025  No suspicious retained metallic foreign bodies. CRITICAL RESULT:   No. COMMUNICATION: Per this written report. Drafted by Audi Chapman MD on 3/2/2025 8:59 PM Final report signed by Audi Chapman MD on 3/2/2025 9:00 PM    CT Angiogram Neck  Result Date: 3/2/2025  Neck CTA: Markedly degraded study with nondiagnostic evaluation of major portions, especially at the levels of right common and subclavian artery origins, origins and proximal vertebral arteries and at bilateral distal common carotid and proximal cervical internal carotid arteries  This study may be repeated for better assessment if necessary. Head CTA: Focal calcification along the right pericallosal artery at proximal A4 level with probable severe stenosis in the adjacent portion. Otherwise no definite large vessel high-grade stenosis or occlusion. CRITICAL RESULT: No. COMMUNICATION: Per this written report. By electronically signing this report, I, the attending physician, attest that I have personally reviewed the images/data for the above examination(s) and agree with the final edited report. Drafted by Jaja Guzmán MD on 3/2/2025 11:49 AM Final report signed by Joshua Hill MD on 3/2/2025 1:14 PM    CT Angiogram Head  Result Date: 3/2/2025  Neck CTA: Markedly degraded study with nondiagnostic evaluation of major portions, especially at the levels of right common and subclavian artery origins, origins and proximal vertebral arteries and at bilateral distal common carotid and proximal cervical internal carotid arteries  This study may be repeated for better assessment if necessary. Head CTA: Focal calcification along the right pericallosal artery at proximal A4 level with probable severe stenosis in the adjacent portion. Otherwise no definite large vessel high-grade stenosis or occlusion. CRITICAL RESULT: No. COMMUNICATION: Per this written report. By electronically signing this report, I, the  attending physician, attest that I have personally reviewed the images/data for the above examination(s) and agree with the final edited report. Drafted by Jaja Guzmán MD on 3/2/2025 11:49 AM Final report signed by Joshua Hill MD on 3/2/2025 1:14 PM    CT Head Without Contrast  Result Date: 3/2/2025  Evaluation is limited by motion degradation and additional artifact. Within the significant limitations no definite large acute territorial infarction or acute large intracranial hemorrhage is appreciable. If necessary CT may be repeated or MRI study can be performed for further assessment if not contraindicated. CRITICAL RESULT:   No. COMMUNICATION: Per this written report. By electronically signing this report, I, the attending physician, attest that I have personally reviewed the images/data for the above examination(s) and agree with the final edited report. Drafted by Jaja Guzmán MD on 3/2/2025 11:40 AM Final report signed by Joshua Hill MD on 3/2/2025 12:11 PM            Assessment & Plan  There are no diagnoses linked to this encounter.  No diagnosis found.  Assessment & Plan               {[facility] [employer] Provider Statement:89809}

## 2025-05-27 ENCOUNTER — TELEPHONE (OUTPATIENT)
Dept: CARDIOLOGY | Facility: CLINIC | Age: 65
End: 2025-05-27

## 2025-05-27 NOTE — TELEPHONE ENCOUNTER
REQUEST FOR CARDIAC CLEARANCE    Caller name: Monisha Gardner     Phone Number: [phone]     Surgeon's name: TIM VALENCIA   [facility]     Type of planned surgery: MENTAL IN JAW LINE REMOVAL, GOING TO REPLACE THIS.     Date of planned surgery: 7/1/25    Type of anesthesia: UNKNOWN     Have you been experiencing chest pain or shortness of breath? NO     Is your doctor requesting for you to stop any of your medications prior to your surgery? PLAVIX     Where should we fax the clearance to? N/A  PHONE NUMBER: [phone]    PT IS SCHD ON 7/1, STATES THIS IS AN EMERGENCY DUE TO MENTAL BEING PLACED IN MOUTH LAST YEAR HAS BROKEN, IS AT RISK FOR THIS COMING THOUGH HER SKIN.

## 2025-05-29 ENCOUNTER — SPECIALTY PHARMACY (OUTPATIENT)
Dept: PHARMACY | Facility: HOSPITAL | Age: 65
End: 2025-05-29
Payer: COMMERCIAL

## 2025-05-29 NOTE — PROGRESS NOTES
Specialty Pharmacy Patient Management Program  Refill Outreach     Monisha was contacted today regarding refills of their medication(s).    Refill Questions      Flowsheet Row Most Recent Value   Changes to allergies? No   Changes to medications? No   New conditions or infections since last clinic visit No   Unplanned office visit, urgent care, ED, or hospital admission in the last 4 weeks  No   How does patient/caregiver feel medication is working? Good   Financial problems or insurance changes  No   Since the previous refill, were any specialty medication doses or scheduled injections missed or delayed?  No   Does this patient require a clinical escalation to a pharmacist? No            Delivery Questions      Flowsheet Row Most Recent Value   Delivery method [employer]   Delivery address verified with patient/caregiver? Yes   Delivery address Home   Medication(s) being filled and delivered Evolocumab (REPATHA)   Copay verified? Yes   Copay amount $0   Copay form of payment No copayment ($0)   Delivery Date Selection 05/30/25   Signature Required No                 Follow-up: 84 day(s)     Renee Rasmussen, Pharmacy Technician  5/29/2025  11:25 EDT

## 2025-06-02 ENCOUNTER — SPECIALTY PHARMACY (OUTPATIENT)
Dept: PHARMACY | Facility: HOSPITAL | Age: 65
End: 2025-06-02
Payer: COMMERCIAL

## 2025-06-02 NOTE — PROGRESS NOTES
[facility] Patient Management Program  Lipid Management Program - PCSK9i Initial Assessment     Monisha Gardner is a 64 y.o. female referred by their provider, Asia Hawkins, to the Hyperlipidemia Patient Management program offered by [facility] for Lipid Management.  Monisha Gardner is  treated for clinical ASCVD/hyperlipidemia and currently takes Repatha 140mg every 14 days. (Patient has been on Repatha for years in the past). In the past, Pt has tried Zetia, Livalo, Crestor, Lipitor, and Zocor. Patient is statin intolerant due to myalgias. The patient denies any allergies to latex.       A follow-up outreach was conducted, including assessment of continued therapy appropriateness, medication adherence, and side effect incidence and management for Repatha. The patient denies any trouble giving themself the injection.  They deny missing doses or adverse effects.     Initial Start Date of PCSK9i: 4/1/2024  Initial LDL: 101 mg/dl 3/1/2024    Changes to Insurance Coverage or Financial Support  [employer]- no changes    Relevant Past Medical History and Comorbidities  Relevant medical history and concomitant health conditions were discussed with the patient. The patient's chart has been reviewed for relevant past medical history and comorbid health conditions and updated as necessary.   Past Medical History:   Diagnosis Date    Anxiety     Arthritis     Diabetes mellitus     Diverticulosis     GERD (gastroesophageal reflux disease)     Gout     Hyperlipidemia     Hypertension     Myocardial infarction      Social History     Socioeconomic History    Marital status:     Number of children: 2    Years of education: 12   Tobacco Use    Smoking status: Never     Passive exposure: Never    Smokeless tobacco: Never   Vaping Use    Vaping status: Never Used   Substance and Sexual Activity    Alcohol use: No    Drug use:  No    Sexual activity: Defer     Problem list reviewed by Lyndsay Grisosm, PharmD on 6/2/2025 at  2:31 PM    Hospitalizations and Urgent Care Since Last Assessment  ED Visits, Admissions, or Hospitalizations:   Admission 3/2-11/2025:  for stroke like symptoms  Urgent Office Visits: none reported    Allergies  Known allergies and reactions were discussed with the patient. The patient's chart has been reviewed for allergy information and updated as necessary.   Allergies   Allergen Reactions    Ciprofloxacin Anaphylaxis    Penicillins Shortness Of Breath    Statins Myalgia    Fish Oil [Omega-3 Fatty Acids] Nausea And Vomiting    Fish Oil Nausea Only    Lyrica [Pregabalin] Mental Status Change    Wellbutrin [Bupropion] Mental Status Change     Allergies reviewed by Lyndsay Grissom, PharmD on 6/2/2025 at  2:31 PM    Relevant Laboratory Values  Relevant laboratory values were discussed with the patient. The following specialty medication dose adjustment(s) are recommended: none    Lab Results   Component Value Date    GLUCOSE 101 (H) 12/05/2024    CALCIUM 9.6 12/05/2024     12/05/2024    K 4.5 12/05/2024    CO2 27.3 12/05/2024     12/05/2024    BUN 16 12/05/2024    CREATININE 1.47 (H) 12/05/2024    EGFRIFAFRI 78 11/12/2021    EGFRIFNONA 65 11/12/2021    BCR 10.9 12/05/2024    ANIONGAP 10.7 12/05/2024     Lab Results   Component Value Date    CHOL 120 10/11/2024    CHLPL 227 (H) 11/12/2021    TRIG 252 (H) 10/11/2024    HDL 49 10/11/2024    LDL 33 10/11/2024     Current Medication List  This medication list has been reviewed with the patient and evaluated for any interactions or necessary modifications/recommendations, and updated to include all prescription medications, OTC medications, and supplements the patient is currently taking.  This list reflects what is contained in the patient's profile, which has also been marked as reviewed to communicate to other providers it is the most up to date version  of the patient's current medication therapy.     Current Outpatient Medications:     allopurinol (ZYLOPRIM) 100 MG tablet, TAKE 1 TABLET BY MOUTH TWICE DAILY, Disp: 180 tablet, Rfl: 3    amLODIPine (NORVASC) 2.5 MG tablet, TAKE 1 TABLET BY MOUTH DAILY, Disp: 90 tablet, Rfl: 0    aspirin 81 MG EC tablet, Take 1 tablet by mouth Daily., Disp: 100 tablet, Rfl: 3    clopidogrel (PLAVIX) 75 MG tablet, Take 1 tablet by mouth Daily., Disp: 90 tablet, Rfl: 3    DULoxetine (CYMBALTA) 30 MG capsule, TAKE 3 CAPSULES BY MOUTH EVERY DAY, Disp: 360 capsule, Rfl: 1    Evolocumab (REPATHA) solution auto-injector SureClick injection, Inject 1 mL under the skin into the appropriate area as directed Every 14 (Fourteen) Days., Disp: 6 mL, Rfl: 2    Farxiga 10 MG tablet, Take 10 mg by mouth Daily., Disp: , Rfl:     fenofibrate 160 MG tablet, TAKE 1 TABLET BY MOUTH EVERY DAY, Disp: 90 tablet, Rfl: 0    Finerenone (Kerendia) 10 MG tablet, Take 1 tablet by mouth Daily., Disp: 30 tablet, Rfl: 3    fluconazole (Diflucan) 150 MG tablet, Take 1 tablet by mouth Daily., Disp: 3 tablet, Rfl: 0    gabapentin (NEURONTIN) 600 MG tablet, Take 1 tablet by mouth 2 (Two) Times a Day., Disp: 60 tablet, Rfl: 2    hydrOXYzine pamoate (VISTARIL) 50 MG capsule, TAKE 1 CAPSULE BY MOUTH THREE TIMES DAILY AS NEEDED FOR ITCHING, Disp: 270 capsule, Rfl: 0    isosorbide mononitrate (IMDUR) 30 MG 24 hr tablet, Take 1 tablet by mouth Daily., Disp: 90 tablet, Rfl: 1    losartan (COZAAR) 100 MG tablet, Take 1 tablet by mouth Daily., Disp: 30 tablet, Rfl: 5    metoprolol tartrate (LOPRESSOR) 100 MG tablet, TAKE 1 TABLET BY MOUTH TWICE DAILY, Disp: 180 tablet, Rfl: 0    metroNIDAZOLE (Flagyl) 500 MG tablet, Take 1 tablet by mouth 2 (Two) Times a Day., Disp: 14 tablet, Rfl: 0    ondansetron (Zofran) 4 MG tablet, Take 1 tablet by mouth Every 8 (Eight) Hours As Needed for Nausea or Vomiting., Disp: 30 tablet, Rfl: 0    Ozempic, 0.25 or 0.5 MG/DOSE, 2 MG/3ML solution  pen-injector, INJECT 0.5 MG UNDER THE SKIN ONCE EVERY WEEK, Disp: , Rfl:     pantoprazole (PROTONIX) 40 MG EC tablet, Take 1 tablet by mouth Daily., Disp: 90 tablet, Rfl: 3    ranolazine (Ranexa) 500 MG 12 hr tablet, Take 1 tablet by mouth 2 (Two) Times a Day., Disp: 60 tablet, Rfl: 5    rOPINIRole (REQUIP) 2 MG tablet, Take 1 tablet by mouth Every Night., Disp: 90 tablet, Rfl: 3    Medicines reviewed by Lydnsay Grissom, PharmD on 6/2/2025 at  2:31 PM    Drug Interactions  None with repatha    Adverse Drug Reactions  Medication tolerability: Tolerating with no to minimal ADRs  Medication plan: Continue therapy with normal follow-up  Plan for ADR Management: n/a    Adherence, Self-Administration, and Current Therapy Problems  Adherence related to the patient's specialty therapy was discussed with the patient. The Adherence segment of this outreach has been reviewed and updated.     Adherence Questions  Linked Medication(s) Assessed: Evolocumab (REPATHA)  On average, how many doses/injections does the patient miss per month?: 0  What are the identified reasons for non-adherence or missed doses? : no problems identified  What is the estimated medication adherence level?: %  Based on the patient/caregiver response and refill history, does this patient require an MTP to track adherence improvements?: no    Additional Barriers to Patient Self-Administration: n/a  Methods for Supporting Patient Self-Administration: calendar    Open Medication Therapy Problems  No medication therapy recommendations to display    Goals of Therapy  Goals related to the patient's specialty therapy were discussed with the patient. The Patient Goals segment of this outreach has been reviewed and updated.   Goals Addressed Today        Specialty Pharmacy General Goal      LDL < 55 mg/dl    11/27/24 [place]: LDL reduced from 101 to 33 mg/dl on 10/11/24 labs    6/2/25 [place]: LDL 41 mg/dl (at goal) on 3/3/25              Quality of Life Assessment    Quality of Life related to the patient's enrollment in the patient management program and services provided was discussed with the patient. The QOL segment of this outreach has been reviewed and updated.  Quality of Life Improvement Scale: 10-Significantly better    Reassessment Plan & Follow-Up  1. Medication Therapy Changes: none reported  2. Related Plans, Therapy Recommendations, or Issues to Be Addressed:   LDL at goal  Refill too soon until 7/31/25  Refills profiled for patient  3. Pharmacist to perform regular assessments no more than (6) months from the previous assessment.  Patient will continue regular follow-up with referring provider.   4. Care Coordinator to set up future refill outreaches, coordinate prescription delivery, and escalate clinical questions to pharmacist.    Attestation  Therapeutic appropriateness: Appropriate   I attest the patient was actively involved in and has agreed to the above plan of care.  If the prescribed therapy is at any point deemed not appropriate based on the current or future assessments, a consultation will be initiated with the patient's specialty care provider to determine the best course of action. The revised plan of therapy will be documented along with any required assessments and/or additional patient education provided.     Lyndsay Grissom, PharmD  6/2/2025  14:33 EDT

## 2025-06-05 ENCOUNTER — TELEPHONE (OUTPATIENT)
Dept: CARDIOLOGY | Facility: CLINIC | Age: 65
End: 2025-06-05

## 2025-06-05 NOTE — TELEPHONE ENCOUNTER
Caller: Monisha Gardner    Relationship: Self    Best call back number: [phone] (home)     What is the best time to reach you: ANY    Who are you requesting to speak with (clinical staff, provider,  specific staff member): CLINICAL    What was the call regarding: PT IS ASKING FOR AN UPDATE ON CARDIAC CLEARANCE FOR ORAL SURGERY TO REPAIR METAL IN HER MOUTH. SHE IS ASKING WHEN SHE NEEDS TO STOP HER PLAVIX MEDICATION. PLEASE CALL PT WHEN AVAILABLE. THEY ARE ATTEMPTING TO MOVE THIS SURGERY UP TO 6/12/25.

## 2025-06-23 RX ORDER — AMLODIPINE BESYLATE 2.5 MG/1
2.5 TABLET ORAL DAILY
Qty: 90 TABLET | Refills: 0 | OUTPATIENT
Start: 2025-06-23

## 2025-07-28 RX ORDER — RANOLAZINE 500 MG/1
500 TABLET, EXTENDED RELEASE ORAL 2 TIMES DAILY
Qty: 180 TABLET | OUTPATIENT
Start: 2025-07-28

## 2025-08-06 ENCOUNTER — SPECIALTY PHARMACY (OUTPATIENT)
Dept: PHARMACY | Facility: HOSPITAL | Age: 65
End: 2025-08-06
Payer: COMMERCIAL

## (undated) DEVICE — ADULT, W/LG. BACK PAD, RADIOTRANSPARENT ELEMENT AND LEAD WIRE COMPATIBLE W/: Brand: DEFIBRILLATION ELECTRODES

## (undated) DEVICE — TR BAND RADIAL ARTERY COMPRESSION DEVICE: Brand: TR BAND

## (undated) DEVICE — CATH DIAG EXPO .045 FL3  5F 100CM

## (undated) DEVICE — MODEL BT2000 P/N 700287-012KIT CONTENTS: MANIFOLD WITH SALINE AND CONTRAST PORTS, SALINE TUBING WITH SPIKE AND HAND SYRINGE, TRANSDUCER: Brand: BT2000 AUTOMATED MANIFOLD KIT

## (undated) DEVICE — DEV INFL MONARCH 25W

## (undated) DEVICE — HI-TORQUE VERSATURN F GUIDE WIRE FULLY COATED .014 STRAIGHT TIP 190 CM: Brand: HI-TORQUE VERSATURN

## (undated) DEVICE — CATH GUIDE CONVEY IM .058IN 5F

## (undated) DEVICE — GUIDELINER CATHETERS ARE INTENDED TO BE USED IN CONJUNCTION WITH GUIDE CATHETERS TO ACCESS DISCRETE REGIONS OF THE CORONARY AND/OR PERIPHERAL VASCULATURE, AND TO FACILITATE PLACEMENT OF INTERVENTIONAL DEVICES.: Brand: GUIDELINER® V3 CATHETER

## (undated) DEVICE — MINI TREK CORONARY DILATATION CATHETER 1.50 MM X 15 MM / RAPID-EXCHANGE: Brand: MINI TREK

## (undated) DEVICE — GUIDE CATHETER: Brand: MACH1™

## (undated) DEVICE — TREK CORONARY DILATATION CATHETER 3.0 MM X 15 MM / RAPID-EXCHANGE: Brand: TREK

## (undated) DEVICE — NDL ANGIOGR ADV THN SMOTH SGLWALL 21G 1.5

## (undated) DEVICE — GW INQWIRE FC PTFE STD J/1.5 .035 260

## (undated) DEVICE — CATH DIAG EXPO M/ PK 5F FL4/FR4 PIG

## (undated) DEVICE — NC TREK NEO™ CORONARY DILATATION CATHETER 3.50 MM X 15 MM / RAPID-EXCHANGE: Brand: NC TREK NEO™

## (undated) DEVICE — RADIFOCUS GLIDEWIRE: Brand: GLIDEWIRE

## (undated) DEVICE — INTRO SHEATH PRELUDE IDEAL SPRNG COIL 021 6F 23X80CM

## (undated) DEVICE — PK CATH CARD 10

## (undated) DEVICE — MODEL AT P65, P/N 701554-001KIT CONTENTS: HAND CONTROLLER, 3-WAY HIGH-PRESSURE STOPCOCK WITH ROTATING END AND PREMIUM HIGH-PRESSURE TUBING: Brand: ANGIOTOUCH® KIT